# Patient Record
Sex: FEMALE | Race: BLACK OR AFRICAN AMERICAN | NOT HISPANIC OR LATINO | Employment: OTHER | URBAN - METROPOLITAN AREA
[De-identification: names, ages, dates, MRNs, and addresses within clinical notes are randomized per-mention and may not be internally consistent; named-entity substitution may affect disease eponyms.]

---

## 2017-01-05 RX ORDER — HYDROCHLOROTHIAZIDE 12.5 MG/1
CAPSULE ORAL
Qty: 30 CAPSULE | Refills: 1 | Status: SHIPPED | OUTPATIENT
Start: 2017-01-05 | End: 2017-08-31

## 2017-01-10 ENCOUNTER — CLINICAL SUPPORT (OUTPATIENT)
Dept: BARIATRICS | Facility: CLINIC | Age: 42
End: 2017-01-10
Payer: MEDICARE

## 2017-01-10 ENCOUNTER — OFFICE VISIT (OUTPATIENT)
Dept: CARDIOLOGY | Facility: CLINIC | Age: 42
End: 2017-01-10
Payer: MEDICARE

## 2017-01-10 ENCOUNTER — OFFICE VISIT (OUTPATIENT)
Dept: PSYCHIATRY | Facility: CLINIC | Age: 42
End: 2017-01-10
Payer: MEDICARE

## 2017-01-10 VITALS — BODY MASS INDEX: 43.14 KG/M2 | WEIGHT: 259.25 LBS

## 2017-01-10 VITALS
HEART RATE: 124 BPM | DIASTOLIC BLOOD PRESSURE: 106 MMHG | SYSTOLIC BLOOD PRESSURE: 137 MMHG | WEIGHT: 260.13 LBS | HEIGHT: 65 IN | BODY MASS INDEX: 43.34 KG/M2 | OXYGEN SATURATION: 98 %

## 2017-01-10 DIAGNOSIS — E66.01 MORBID OBESITY DUE TO EXCESS CALORIES: ICD-10-CM

## 2017-01-10 DIAGNOSIS — F41.1 GENERALIZED ANXIETY DISORDER: ICD-10-CM

## 2017-01-10 DIAGNOSIS — I10 ESSENTIAL HYPERTENSION: Chronic | ICD-10-CM

## 2017-01-10 DIAGNOSIS — E66.01 MORBID OBESITY WITH BMI OF 40.0-44.9, ADULT: ICD-10-CM

## 2017-01-10 DIAGNOSIS — R00.0 TACHYCARDIA: Primary | ICD-10-CM

## 2017-01-10 DIAGNOSIS — F39 MOOD DISORDER: ICD-10-CM

## 2017-01-10 DIAGNOSIS — Z71.3 DIETARY COUNSELING AND SURVEILLANCE: ICD-10-CM

## 2017-01-10 DIAGNOSIS — Z01.818 PREOPERATIVE EVALUATION TO RULE OUT SURGICAL CONTRAINDICATION: Primary | ICD-10-CM

## 2017-01-10 DIAGNOSIS — I10 UNSPECIFIED ESSENTIAL HYPERTENSION: ICD-10-CM

## 2017-01-10 DIAGNOSIS — Z95.818 STATUS POST PLACEMENT OF IMPLANTABLE LOOP RECORDER: Chronic | ICD-10-CM

## 2017-01-10 DIAGNOSIS — I10 ESSENTIAL HYPERTENSION: ICD-10-CM

## 2017-01-10 DIAGNOSIS — Z98.84 S/P BARIATRIC SURGERY: ICD-10-CM

## 2017-01-10 PROCEDURE — 96102 PR PSYCHOLOGIC TESTING BY TECHNICIAN: CPT | Mod: 59,S$GLB,, | Performed by: PSYCHOLOGIST

## 2017-01-10 PROCEDURE — 96101 PR PSYCHOLOGIC TESTING BY PSYCH/PHYS: CPT | Mod: S$GLB,,, | Performed by: PSYCHOLOGIST

## 2017-01-10 PROCEDURE — 3080F DIAST BP >= 90 MM HG: CPT | Mod: GC,S$GLB,, | Performed by: INTERNAL MEDICINE

## 2017-01-10 PROCEDURE — 3075F SYST BP GE 130 - 139MM HG: CPT | Mod: GC,S$GLB,, | Performed by: INTERNAL MEDICINE

## 2017-01-10 PROCEDURE — 99999 PR PBB SHADOW E&M-EST. PATIENT-LVL I: CPT | Mod: PBBFAC,,, | Performed by: DIETITIAN, REGISTERED

## 2017-01-10 PROCEDURE — 99214 OFFICE O/P EST MOD 30 MIN: CPT | Mod: GC,S$GLB,, | Performed by: INTERNAL MEDICINE

## 2017-01-10 PROCEDURE — 97803 MED NUTRITION INDIV SUBSEQ: CPT | Mod: S$GLB,,, | Performed by: DIETITIAN, REGISTERED

## 2017-01-10 PROCEDURE — 99499 UNLISTED E&M SERVICE: CPT | Mod: S$GLB,,, | Performed by: DIETITIAN, REGISTERED

## 2017-01-10 PROCEDURE — 99999 PR PBB SHADOW E&M-EST. PATIENT-LVL IV: CPT | Mod: PBBFAC,GC,, | Performed by: INTERNAL MEDICINE

## 2017-01-10 PROCEDURE — 1159F MED LIST DOCD IN RCRD: CPT | Mod: GC,S$GLB,, | Performed by: INTERNAL MEDICINE

## 2017-01-10 PROCEDURE — 90791 PSYCH DIAGNOSTIC EVALUATION: CPT | Mod: S$GLB,,, | Performed by: PSYCHOLOGIST
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
## 2017-01-10 NOTE — PROGRESS NOTES
Subjective:    Patient ID:  Homar Jerry is a 41 y.o. female who presents for evaluation of Hypertension; Tachycardia; Dizziness; Shortness of Breath; and Chest Pain (sharp pain )    HPI Ms. Jerry is a 41 y.o. Lady with morbid obesity BMI 44, syncope, hypertension, Hypothyroidism, bipolar d/o, fibromyalgia, gastroparesis, diabetes mellitus, s/p gastric sleeve, and tachycardia who presents for follow up after recently having been admitted to the hospital for tachycardia.    She reports progressive shortness of breath over the past 1-2 years, worse with exertion to where she can only walk around in [employer] for 20 min, 4 pillow orthopnea, occasional chest pain both at rest and with exertion - sharp.  All symptoms have been present for at least the past year without relief.      She presented to hospital in December 2016 with some of these symptoms - was found to have tachycardia with rates to 150s.  She was given adenosine - EP consulted and reviewed strips - and ultimately felt this was sinus tachycardia and recommended prior team work up sinus tachycardia.  TSH near normal and FT4 also normal.  Labs not suggestive of severe hypovolumia, CT PE negative for PE. It was felt there was a component of anxiety. She was already on coreg 25 BID.    She presents today with symptoms as above and continues to have fast heart rate - HR in 120s here.  She reports she has had elevated rates for years however EKG review confirms rates in 100s only since December.  No other medication changes.  No fever, no new pain.    She also a remote history of syncope - has not had any syncope since - however has seen Dr. KIARA Johns and had ILR which interrogations have thusfar failed to reveal any pathology.  Of note most recent interrogation showing normal sinus rate was prior to most recent admission this past month.    Review of Systems   Constitution: Positive for weakness. Negative for chills, diaphoresis and fever.   HENT:  "Negative.    Eyes: Negative.    Cardiovascular: Positive for chest pain, dyspnea on exertion, irregular heartbeat, orthopnea and palpitations. Negative for leg swelling, paroxysmal nocturnal dyspnea and syncope.   Respiratory: Positive for shortness of breath.    Endocrine: Negative.    Skin: Negative.    Musculoskeletal: Positive for arthritis and joint pain.   Gastrointestinal: Negative.    Genitourinary: Negative.    Psychiatric/Behavioral: Negative.         Objective:    Physical Exam   Constitutional: She is oriented to person, place, and time. She appears well-developed and well-nourished. No distress.   HENT:   Head: Normocephalic and atraumatic.   Mouth/Throat: No oropharyngeal exudate.   Eyes: No scleral icterus.   Neck: Normal range of motion. Neck supple. No JVD present.   Cardiovascular: Regular rhythm.  Exam reveals no gallop and no friction rub.    No murmur heard.  Tachycardia, S4 gallop   Pulmonary/Chest: Effort normal and breath sounds normal. No respiratory distress. She has no wheezes.   Abdominal: Soft. Bowel sounds are normal. She exhibits no distension. There is no tenderness.   Musculoskeletal: Normal range of motion. She exhibits no edema.   Neurological: She is alert and oriented to person, place, and time. No cranial nerve deficit.   Skin: Skin is warm and dry. She is not diaphoretic. No erythema.   Psychiatric: She has a normal mood and affect.     Visit Vitals    BP (!) 137/106 (BP Location: Left arm, Patient Position: Sitting, BP Method: Automatic)    Pulse (!) 124    Ht 5' 5" (1.651 m)    Wt 118 kg (260 lb 2.3 oz)    LMP  (LMP Unknown)    SpO2 98%    BMI 43.29 kg/m2         Assessment:       1. Tachycardia    2. Morbid obesity with BMI of 40.0-44.9, adult    3. Status post placement of implantable loop recorder    4. Essential hypertension         Plan:       Ms. Jerry is a 41 y.o. Lady with morbid obesity BMI 44, syncope, hypertension, Hypothyroidism, bipolar d/o, fibromyalgia, " gastroparesis, diabetes mellitus, s/p gastric sleeve, and tachycardia who presents for follow up after recently having been admitted to the hospital for tachycardia.    #Tachycardia - No evidence of Infection on CBC, no fever, TSH/FT4 close to normal, no PE on CTA, Labs without evidence of gross dehydration, cbc without anemia, infrequent albuterol use and denies any other stimulant. Obesity likely contributing however tachycardia appears to be out of proportion. Unclear exact etiology.  It has been~ 1 years since last TTE - will check TTE to ensure EF is maintained and no new cardiac pathology.  Of note P-wave axis is similar but not identical to baseline EKG.  -TTE  -Interrogate loop.  -Metanephrines for pheo  -Discuss with EP for corlanor if IST is felt to be most likely Dx.    #HTN - will re-evaluate after HR addressed depending upon the agent of choice.    Andre Dyer MD
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
## 2017-01-10 NOTE — Clinical Note
Cleared by nutrition. Sx not until April/May. Had psych hospital stay last April and needs to wait 12 mths.

## 2017-01-10 NOTE — MR AVS SNAPSHOT
[facility] - Cardiology  [address]  [facility] [address]  Phone: [phone]                  Homar Jerry   1/10/2017 3:00 PM   Office Visit    Description:  Female : 1975   Provider:  Andre Dyer MD   Department:  [facility] - Cardiology           Reason for Visit     Hypertension     Tachycardia     Dizziness     Shortness of Breath     Chest Pain           Diagnoses this Visit        Comments    Tachycardia    -  Primary            To Do List           Future Appointments        Provider Department Dept Phone    2017 8:00 AM HOME MONITOR DEVICE CHECK, [facility] - Arrhythmia [phone]    2017 1:45 PM ECHO, [facility] - Echo/Stress Lab [phone]      Goals (5 Years of Data)     None      [facility]     [facility] On Call Nurse Care Line -  Assistance  Registered nurses in the [facility] On Call Center provide clinical advisement, health education, appointment booking, and other advisory services.  Call for this free service at [phone].             Medications           Message regarding Medications     Verify the changes and/or additions to your medication regime listed below are the same as discussed with your clinician today.  If any of these changes or additions are incorrect, please notify your healthcare provider.             Verify that the below list of medications is an accurate representation of the medications you are currently taking.  If none reported, the list may be blank. If incorrect, please contact your healthcare provider. Carry this list with you in case of emergency.           Current Medications     albuterol 90 mcg/actuation inhaler Inhale 2 puffs into the lungs every 6 (six) hours as needed for Wheezing. Please provide cheapest brand/generic formulation    alprazolam (XANAX) 1 MG tablet Take 1 tablet (1 mg total) by mouth 2 (two) times daily.    blood-glucose meter kit Use as directed to check BG, three times  "daily. accucheck    carvedilol (COREG) 12.5 MG tablet Take 2 tablets (25 mg total) by mouth 2 (two) times daily.    fluticasone (FLONASE) 50 mcg/actuation nasal spray 2 sprays by Nasal route 2 (two) times daily.     gabapentin (NEURONTIN) 600 MG tablet 600 mg 3 (three) times daily.     hydrochlorothiazide (MICROZIDE) 12.5 mg capsule TAKE 1 CAPSULE BY MOUTH DAILY    levocetirizine (XYZAL) 5 MG tablet Take 1 tablet (5 mg total) by mouth every evening.    levothyroxine (SYNTHROID) 125 MCG tablet Take 1 tablet (125 mcg total) by mouth once daily.    meclizine (ANTIVERT) 25 mg tablet Take 1 tablet (25 mg total) by mouth 3 (three) times daily as needed for Dizziness or Nausea.    mirtazapine (REMERON) 15 MG tablet Take 1 tablet (15 mg total) by mouth every evening.    olanzapine (ZYPREXA) 20 MG tablet Take 1 tablet (20 mg total) by mouth every evening.    oxcarbazepine (TRILEPTAL) 150 MG Tab 150 mg every evening.     oxycodone-acetaminophen (PERCOCET)  mg per tablet Take 1 tablet by mouth every 4 (four) hours as needed for Pain. Per Dr. Villa at [facility] [place], [place]    pantoprazole (PROTONIX) 40 MG tablet Take 1 tablet (40 mg total) by mouth 2 (two) times daily before meals.    promethazine (PHENERGAN) 12.5 MG Tab Take 1 tablet (12.5 mg total) by mouth every 6 (six) hours as needed.    quetiapine (SEROQUEL) 100 MG Tab 100 mg once daily.     quetiapine (SEROQUEL) 200 MG Tab     tizanidine (ZANAFLEX) 4 MG tablet TAKE 3 TABLETS (12 MG TOTAL) BY MOUTH EVERY EVENING.    topiramate (TOPAMAX) 25 MG tablet Take 1 tablet (25 mg total) by mouth daily as needed (migraines).           Clinical Reference Information           Vital Signs - Last Recorded  Most recent update: 1/10/2017  3:06 PM by Pauline Lopez MA    BP Pulse Ht Wt LMP SpO2    (!) 137/106 (BP Location: Left arm, Patient Position: Sitting, BP Method: Automatic) (!) 124 5' 5" (1.651 m) 118 kg (260 lb 2.3 oz) (LMP Unknown) 98%    BMI             "    43.29 kg/m2          Blood Pressure          Most Recent Value    Right Arm BP - Sitting  132/88    Left Arm BP - Sitting  137/106    BP  (!)  137/106      Allergies as of 1/10/2017     Dexamethasone    Ciprofloxacin    Compazine [Prochlorperazine]    Lyrica [Pregabalin]    Prednisone      Immunizations Administered on Date of Encounter - 1/10/2017     None      Orders Placed During Today's Visit     Future Labs/Procedures Expected by Expires    2D Echo w/ Color Flow Doppler  As directed 1/10/2018
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
## 2017-01-11 ENCOUNTER — DOCUMENTATION ONLY (OUTPATIENT)
Dept: BARIATRICS | Facility: CLINIC | Age: 42
End: 2017-01-11

## 2017-01-11 NOTE — PROGRESS NOTES
I have personally seen and examined the patient. All labs and studies were reviewed. I agree with the fellows findings and plan as above.

## 2017-01-12 ENCOUNTER — DOCUMENTATION ONLY (OUTPATIENT)
Dept: BARIATRICS | Facility: CLINIC | Age: 42
End: 2017-01-12

## 2017-01-13 RX ORDER — CARVEDILOL 12.5 MG/1
25 TABLET ORAL 2 TIMES DAILY
Qty: 120 TABLET | Refills: 11 | Status: SHIPPED | OUTPATIENT
Start: 2017-01-13 | End: 2017-10-24 | Stop reason: SDUPTHER

## 2017-01-13 NOTE — TELEPHONE ENCOUNTER
----- Message from Luna Rao sent at 1/13/2017 11:02 AM CST -----  Contact: Self/[phone]  RX request - refill or new RX.  Is this a refill or new RX:  Refill   RX name and strength: carvedilol (COREG) 12.5 MG tablet  Directions: Take 2 tablets (25 mg total) by mouth 2 (two) times daily  Is this a 30 day or 90 day RX:    Pharmacy name and phone #: [facility] [address] , [phone]   Comments:  Please call and advise      Thank you

## 2017-01-17 ENCOUNTER — HOSPITAL ENCOUNTER (INPATIENT)
Facility: HOSPITAL | Age: 42
LOS: 6 days | Discharge: HOME | DRG: 638 | End: 2017-01-23
Attending: EMERGENCY MEDICINE | Admitting: INTERNAL MEDICINE
Payer: MEDICARE

## 2017-01-17 ENCOUNTER — OFFICE VISIT (OUTPATIENT)
Dept: INTERNAL MEDICINE | Facility: CLINIC | Age: 42
DRG: 638 | End: 2017-01-17
Payer: MEDICARE

## 2017-01-17 VITALS
HEIGHT: 65 IN | SYSTOLIC BLOOD PRESSURE: 135 MMHG | BODY MASS INDEX: 42.06 KG/M2 | TEMPERATURE: 98 F | OXYGEN SATURATION: 96 % | WEIGHT: 252.44 LBS | DIASTOLIC BLOOD PRESSURE: 75 MMHG | HEART RATE: 144 BPM

## 2017-01-17 DIAGNOSIS — F11.20 CHRONIC NARCOTIC DEPENDENCE: ICD-10-CM

## 2017-01-17 DIAGNOSIS — C73 THYROID CANCER: ICD-10-CM

## 2017-01-17 DIAGNOSIS — E13.65 OTHER SPECIFIED DIABETES MELLITUS WITH HYPERGLYCEMIA, UNSPECIFIED LONG TERM INSULIN USE STATUS: ICD-10-CM

## 2017-01-17 DIAGNOSIS — E89.0 HYPOTHYROIDISM, POSTSURGICAL: Chronic | ICD-10-CM

## 2017-01-17 DIAGNOSIS — E11.65 TYPE 2 DIABETES MELLITUS WITH HYPERGLYCEMIA, WITHOUT LONG-TERM CURRENT USE OF INSULIN: ICD-10-CM

## 2017-01-17 DIAGNOSIS — E11.10 DIABETIC KETOACIDOSIS WITHOUT COMA ASSOCIATED WITH TYPE 2 DIABETES MELLITUS: Primary | ICD-10-CM

## 2017-01-17 DIAGNOSIS — E11.9 DIABETES MELLITUS, NEW ONSET: ICD-10-CM

## 2017-01-17 DIAGNOSIS — R00.0 TACHYCARDIA: ICD-10-CM

## 2017-01-17 DIAGNOSIS — M06.9 RHEUMATOID ARTHRITIS, INVOLVING UNSPECIFIED SITE, UNSPECIFIED RHEUMATOID FACTOR PRESENCE: ICD-10-CM

## 2017-01-17 DIAGNOSIS — R73.09 SUGAR BLOOD LEVEL INCREASED: Primary | ICD-10-CM

## 2017-01-17 DIAGNOSIS — R73.9 HYPERGLYCEMIA: ICD-10-CM

## 2017-01-17 DIAGNOSIS — R42 DIZZINESS: ICD-10-CM

## 2017-01-17 DIAGNOSIS — E87.6 HYPOKALEMIA: ICD-10-CM

## 2017-01-17 LAB
ALBUMIN SERPL BCP-MCNC: 4.2 G/DL
ALLENS TEST: ABNORMAL
ALP SERPL-CCNC: 156 U/L
ALT SERPL W/O P-5'-P-CCNC: 9 U/L
ANION GAP SERPL CALC-SCNC: 22 MMOL/L
AST SERPL-CCNC: 9 U/L
B-OH-BUTYR BLD STRIP-SCNC: 5 MMOL/L
BACTERIA #/AREA URNS AUTO: ABNORMAL /HPF
BASOPHILS # BLD AUTO: 0.01 K/UL
BASOPHILS NFR BLD: 0.1 %
BILIRUB SERPL-MCNC: 0.3 MG/DL
BILIRUB UR QL STRIP: NEGATIVE
BUN SERPL-MCNC: 10 MG/DL
CALCIUM SERPL-MCNC: 10 MG/DL
CHLORIDE SERPL-SCNC: 99 MMOL/L
CLARITY UR REFRACT.AUTO: CLEAR
CO2 SERPL-SCNC: 14 MMOL/L
COLOR UR AUTO: ABNORMAL
CREAT SERPL-MCNC: 1.4 MG/DL
DELSYS: ABNORMAL
DIFFERENTIAL METHOD: NORMAL
EOSINOPHIL # BLD AUTO: 0 K/UL
EOSINOPHIL NFR BLD: 0.3 %
ERYTHROCYTE [DISTWIDTH] IN BLOOD BY AUTOMATED COUNT: 13.6 %
ERYTHROCYTE [SEDIMENTATION RATE] IN BLOOD BY WESTERGREN METHOD: 18 MM/H
EST. GFR  (AFRICAN AMERICAN): 53.5 ML/MIN/1.73 M^2
EST. GFR  (NON AFRICAN AMERICAN): 46.4 ML/MIN/1.73 M^2
FIO2: 21
GLUCOSE SERPL-MCNC: 626 MG/DL
GLUCOSE SERPL-MCNC: >500 MG/DL (ref 70–110)
GLUCOSE UR QL STRIP: ABNORMAL
HCO3 UR-SCNC: 20.1 MMOL/L (ref 24–28)
HCT VFR BLD AUTO: 42.5 %
HCT VFR BLD CALC: 46 %PCV (ref 36–54)
HGB BLD-MCNC: 14.4 G/DL
HGB UR QL STRIP: NEGATIVE
KETONES UR QL STRIP: ABNORMAL
LEUKOCYTE ESTERASE UR QL STRIP: ABNORMAL
LYMPHOCYTES # BLD AUTO: 3.1 K/UL
LYMPHOCYTES NFR BLD: 38.5 %
MCH RBC QN AUTO: 29.1 PG
MCHC RBC AUTO-ENTMCNC: 33.9 %
MCV RBC AUTO: 86 FL
MICROSCOPIC COMMENT: ABNORMAL
MODE: ABNORMAL
MONOCYTES # BLD AUTO: 0.6 K/UL
MONOCYTES NFR BLD: 7.4 %
NEUTROPHILS # BLD AUTO: 4.2 K/UL
NEUTROPHILS NFR BLD: 53.6 %
NITRITE UR QL STRIP: NEGATIVE
PCO2 BLDA: 40.3 MMHG (ref 35–45)
PH SMN: 7.31 [PH] (ref 7.35–7.45)
PH UR STRIP: 5 [PH] (ref 5–8)
PLATELET # BLD AUTO: 299 K/UL
PMV BLD AUTO: 10.9 FL
PO2 BLDA: 36 MMHG (ref 40–60)
POC BE: -6 MMOL/L
POC IONIZED CALCIUM: 1.13 MMOL/L (ref 1.06–1.42)
POC SATURATED O2: 63 % (ref 95–100)
POC TCO2: 21 MMOL/L (ref 24–29)
POCT GLUCOSE: 370 MG/DL (ref 70–110)
POCT GLUCOSE: 407 MG/DL (ref 70–110)
POCT GLUCOSE: 410 MG/DL (ref 70–110)
POTASSIUM BLD-SCNC: 5.1 MMOL/L (ref 3.5–5.1)
POTASSIUM SERPL-SCNC: 4 MMOL/L
PROT SERPL-MCNC: 8.4 G/DL
PROT UR QL STRIP: ABNORMAL
RBC # BLD AUTO: 4.94 M/UL
RBC #/AREA URNS AUTO: 1 /HPF (ref 0–4)
SAMPLE: ABNORMAL
SITE: ABNORMAL
SODIUM BLD-SCNC: 137 MMOL/L (ref 136–145)
SODIUM SERPL-SCNC: 135 MMOL/L
SP GR UR STRIP: >=1.03 (ref 1–1.03)
SP02: 100
SQUAMOUS #/AREA URNS AUTO: 1 /HPF
URN SPEC COLLECT METH UR: ABNORMAL
UROBILINOGEN UR STRIP-ACNC: NEGATIVE EU/DL
WBC # BLD AUTO: 7.92 K/UL
WBC #/AREA URNS AUTO: 9 /HPF (ref 0–5)
YEAST UR QL AUTO: ABNORMAL

## 2017-01-17 PROCEDURE — 85014 HEMATOCRIT: CPT

## 2017-01-17 PROCEDURE — 3045F PR MOST RECENT HEMOGLOBIN A1C LEVEL 7.0-9.0%: CPT | Mod: S$GLB,,, | Performed by: INTERNAL MEDICINE

## 2017-01-17 PROCEDURE — 36592 COLLECT BLOOD FROM PICC: CPT

## 2017-01-17 PROCEDURE — 3060F POS MICROALBUMINURIA REV: CPT | Mod: S$GLB,,, | Performed by: INTERNAL MEDICINE

## 2017-01-17 PROCEDURE — 25000003 PHARM REV CODE 250: Performed by: EMERGENCY MEDICINE

## 2017-01-17 PROCEDURE — 93005 ELECTROCARDIOGRAM TRACING: CPT

## 2017-01-17 PROCEDURE — 84295 ASSAY OF SERUM SODIUM: CPT

## 2017-01-17 PROCEDURE — 96361 HYDRATE IV INFUSION ADD-ON: CPT

## 2017-01-17 PROCEDURE — 82803 BLOOD GASES ANY COMBINATION: CPT

## 2017-01-17 PROCEDURE — 3078F DIAST BP <80 MM HG: CPT | Mod: S$GLB,,, | Performed by: INTERNAL MEDICINE

## 2017-01-17 PROCEDURE — 83036 HEMOGLOBIN GLYCOSYLATED A1C: CPT

## 2017-01-17 PROCEDURE — 93010 ELECTROCARDIOGRAM REPORT: CPT | Mod: ,,, | Performed by: INTERNAL MEDICINE

## 2017-01-17 PROCEDURE — 99213 OFFICE O/P EST LOW 20 MIN: CPT | Mod: S$GLB,,, | Performed by: INTERNAL MEDICINE

## 2017-01-17 PROCEDURE — 99291 CRITICAL CARE FIRST HOUR: CPT | Mod: ,,, | Performed by: EMERGENCY MEDICINE

## 2017-01-17 PROCEDURE — 1159F MED LIST DOCD IN RCRD: CPT | Mod: S$GLB,,, | Performed by: INTERNAL MEDICINE

## 2017-01-17 PROCEDURE — 82962 GLUCOSE BLOOD TEST: CPT

## 2017-01-17 PROCEDURE — 80053 COMPREHEN METABOLIC PANEL: CPT

## 2017-01-17 PROCEDURE — 96366 THER/PROPH/DIAG IV INF ADDON: CPT

## 2017-01-17 PROCEDURE — 82948 REAGENT STRIP/BLOOD GLUCOSE: CPT | Mod: S$GLB,,, | Performed by: INTERNAL MEDICINE

## 2017-01-17 PROCEDURE — 96365 THER/PROPH/DIAG IV INF INIT: CPT

## 2017-01-17 PROCEDURE — 99499 UNLISTED E&M SERVICE: CPT | Mod: S$GLB,,, | Performed by: INTERNAL MEDICINE

## 2017-01-17 PROCEDURE — 82010 KETONE BODYS QUAN: CPT

## 2017-01-17 PROCEDURE — 94760 N-INVAS EAR/PLS OXIMETRY 1: CPT

## 2017-01-17 PROCEDURE — 82330 ASSAY OF CALCIUM: CPT

## 2017-01-17 PROCEDURE — 96367 TX/PROPH/DG ADDL SEQ IV INF: CPT

## 2017-01-17 PROCEDURE — 81001 URINALYSIS AUTO W/SCOPE: CPT

## 2017-01-17 PROCEDURE — 3075F SYST BP GE 130 - 139MM HG: CPT | Mod: S$GLB,,, | Performed by: INTERNAL MEDICINE

## 2017-01-17 PROCEDURE — 99291 CRITICAL CARE FIRST HOUR: CPT | Mod: 25

## 2017-01-17 PROCEDURE — 84132 ASSAY OF SERUM POTASSIUM: CPT

## 2017-01-17 PROCEDURE — 96375 TX/PRO/DX INJ NEW DRUG ADDON: CPT

## 2017-01-17 PROCEDURE — 99999 PR PBB SHADOW E&M-EST. PATIENT-LVL III: CPT | Mod: PBBFAC,,, | Performed by: INTERNAL MEDICINE

## 2017-01-17 PROCEDURE — 63600175 PHARM REV CODE 636 W HCPCS: Performed by: EMERGENCY MEDICINE

## 2017-01-17 PROCEDURE — 20600001 HC STEP DOWN PRIVATE ROOM

## 2017-01-17 PROCEDURE — 85025 COMPLETE CBC W/AUTO DIFF WBC: CPT

## 2017-01-17 RX ORDER — OXYCODONE AND ACETAMINOPHEN 10; 325 MG/1; MG/1
1 TABLET ORAL EVERY 6 HOURS PRN
Status: DISCONTINUED | OUTPATIENT
Start: 2017-01-18 | End: 2017-01-23 | Stop reason: HOSPADM

## 2017-01-17 RX ORDER — DEXTROSE MONOHYDRATE 100 MG/ML
1000 INJECTION, SOLUTION INTRAVENOUS
Status: DISCONTINUED | OUTPATIENT
Start: 2017-01-17 | End: 2017-01-18

## 2017-01-17 RX ORDER — ENOXAPARIN SODIUM 100 MG/ML
40 INJECTION SUBCUTANEOUS EVERY 24 HOURS
Status: DISCONTINUED | OUTPATIENT
Start: 2017-01-18 | End: 2017-01-23 | Stop reason: HOSPADM

## 2017-01-17 RX ORDER — MECLIZINE HYDROCHLORIDE 25 MG/1
25 TABLET ORAL 3 TIMES DAILY PRN
Status: DISCONTINUED | OUTPATIENT
Start: 2017-01-18 | End: 2017-01-19

## 2017-01-17 RX ORDER — PROMETHAZINE HYDROCHLORIDE 12.5 MG/1
12.5 TABLET ORAL EVERY 6 HOURS PRN
Status: DISCONTINUED | OUTPATIENT
Start: 2017-01-18 | End: 2017-01-19

## 2017-01-17 RX ORDER — PANTOPRAZOLE SODIUM 40 MG/1
40 TABLET, DELAYED RELEASE ORAL
Status: DISCONTINUED | OUTPATIENT
Start: 2017-01-18 | End: 2017-01-23 | Stop reason: HOSPADM

## 2017-01-17 RX ORDER — IBUPROFEN 200 MG
16 TABLET ORAL
Status: DISCONTINUED | OUTPATIENT
Start: 2017-01-18 | End: 2017-01-23 | Stop reason: HOSPADM

## 2017-01-17 RX ORDER — GLUCAGON 1 MG
1 KIT INJECTION
Status: DISCONTINUED | OUTPATIENT
Start: 2017-01-18 | End: 2017-01-23 | Stop reason: HOSPADM

## 2017-01-17 RX ORDER — LEVOTHYROXINE SODIUM 125 UG/1
125 TABLET ORAL DAILY
Status: DISCONTINUED | OUTPATIENT
Start: 2017-01-18 | End: 2017-01-18

## 2017-01-17 RX ORDER — TOPIRAMATE 25 MG/1
25 TABLET ORAL DAILY PRN
Status: DISCONTINUED | OUTPATIENT
Start: 2017-01-18 | End: 2017-01-23 | Stop reason: HOSPADM

## 2017-01-17 RX ORDER — MIRTAZAPINE 7.5 MG/1
15 TABLET, FILM COATED ORAL NIGHTLY
Status: DISCONTINUED | OUTPATIENT
Start: 2017-01-17 | End: 2017-01-23 | Stop reason: HOSPADM

## 2017-01-17 RX ORDER — GABAPENTIN 300 MG/1
600 CAPSULE ORAL 3 TIMES DAILY
Status: DISCONTINUED | OUTPATIENT
Start: 2017-01-17 | End: 2017-01-23 | Stop reason: HOSPADM

## 2017-01-17 RX ORDER — ALPRAZOLAM 1 MG/1
1 TABLET ORAL 2 TIMES DAILY
Status: DISCONTINUED | OUTPATIENT
Start: 2017-01-17 | End: 2017-01-23 | Stop reason: HOSPADM

## 2017-01-17 RX ORDER — CARVEDILOL 25 MG/1
25 TABLET ORAL 2 TIMES DAILY
Status: DISCONTINUED | OUTPATIENT
Start: 2017-01-17 | End: 2017-01-23 | Stop reason: HOSPADM

## 2017-01-17 RX ORDER — TIZANIDINE 4 MG/1
4 TABLET ORAL NIGHTLY
Status: DISCONTINUED | OUTPATIENT
Start: 2017-01-17 | End: 2017-01-22

## 2017-01-17 RX ORDER — HYDROCHLOROTHIAZIDE 12.5 MG/1
12.5 TABLET ORAL DAILY
Status: DISCONTINUED | OUTPATIENT
Start: 2017-01-18 | End: 2017-01-23 | Stop reason: HOSPADM

## 2017-01-17 RX ORDER — IBUPROFEN 200 MG
24 TABLET ORAL
Status: DISCONTINUED | OUTPATIENT
Start: 2017-01-18 | End: 2017-01-23 | Stop reason: HOSPADM

## 2017-01-17 RX ORDER — OXCARBAZEPINE 150 MG/1
150 TABLET, FILM COATED ORAL NIGHTLY
Status: DISCONTINUED | OUTPATIENT
Start: 2017-01-17 | End: 2017-01-23 | Stop reason: HOSPADM

## 2017-01-17 RX ORDER — OLANZAPINE 5 MG/1
20 TABLET ORAL NIGHTLY
Status: DISCONTINUED | OUTPATIENT
Start: 2017-01-17 | End: 2017-01-23 | Stop reason: HOSPADM

## 2017-01-17 RX ADMIN — INSULIN HUMAN 10 UNITS: 100 INJECTION, SOLUTION PARENTERAL at 08:01

## 2017-01-17 RX ADMIN — SODIUM CHLORIDE 10 UNITS/HR: 9 INJECTION, SOLUTION INTRAVENOUS at 08:01

## 2017-01-17 RX ADMIN — SODIUM CHLORIDE 1000 ML: 0.9 INJECTION, SOLUTION INTRAVENOUS at 07:01

## 2017-01-17 RX ADMIN — SODIUM CHLORIDE 10 UNITS/HR: 9 INJECTION, SOLUTION INTRAVENOUS at 09:01

## 2017-01-17 NOTE — IP AVS SNAPSHOT
[facility]  [address]  [facility] [address]  Phone: [phone]           Patient Discharge Instructions     Our goal is to set you up for success. This packet includes information on your condition, medications, and your home care. It will help you to care for yourself so you don't get sicker and need to go back to the hospital.     Please ask your nurse if you have any questions.        There are many details to remember when preparing to leave the hospital. Here is what you will need to do:    1. Take your medicine. If you are prescribed medications, review your Medication List in the following pages. You may have new medications to  at the pharmacy and others that you'll need to stop taking. Review the instructions for how and when to take your medications. Talk with your doctor or nurses if you are unsure of what to do.     2. Go to your follow-up appointments. Specific follow-up information is listed in the following pages. Your may be contacted by a transition nurse or clinical provider about future appointments. Be sure we have all of the phone numbers to reach you, if needed. Please contact your provider's office if you are unable to make an appointment.     3. Watch for warning signs. Your doctor or nurse will give you detailed warning signs to watch for and when to call for assistance. These instructions may also include educational information about your condition. If you experience any of warning signs to your health, call your doctor.               [facility]  Unless otherwise directed by your provider, please contact [facility], our nurse care line that is available for 24/7 assistance.     [phone] (toll-free)    Registered nurses in the [facility] provide clinical advisement, health education, appointment booking, and other advisory services.                    ** Verify the list of medication(s) below is accurate and up  to date. Carry this with you in case of emergency. If your medications have changed, please notify your healthcare provider.             Medication List      START taking these medications        Additional Info                      blood sugar diagnostic Strp   Quantity:  100 each   Refills:  2   Dose:  1 each    Instructions:  1 each by Misc.(Non-Drug; Combo Route) route 4 (four) times daily with meals and nightly.     Begin Date    AM    Noon    PM    Bedtime       blood-glucose meter kit   Quantity:  1 each   Refills:  0    Instructions:  Use as instructed     Begin Date    AM    Noon    PM    Bedtime       calcium carbonate 500 mg calcium (1,250 mg) tablet   Commonly known as:  OS-MARIAN   Refills:  0   Dose:  2 tablet    Instructions:  Take 2 tablets (1,000 mg total) by mouth once daily.     Begin Date    AM    Noon    PM    Bedtime       cholecalciferol (vitamin D3) 1,000 unit capsule   Refills:  0   Dose:  2000 Units    Instructions:  Take 2 capsules (2,000 Units total) by mouth once daily.     Begin Date    AM    Noon    PM    Bedtime       ergocalciferol 50,000 unit Cap   Commonly known as:  ERGOCALCIFEROL   Quantity:  5 capsule   Refills:  0    Last time this was given:  50,000 Units on 1/23/2017  8:58 AM   Instructions:  Take one capsule (50,000 units) daily for 5 days, then take over the counter 2000 units daily of Vitamin D.     Begin Date    AM    Noon    PM    Bedtime       insulin aspart 100 unit/mL Inpn pen   Commonly known as:  NovoLOG   Quantity:  1 Box   Refills:  2   Dose:  16 Units    Last time this was given:  16 Units on 1/23/2017  1:01 PM   Instructions:  Inject 16 Units into the skin 3 (three) times daily with meals.     Begin Date    AM    Noon    PM    Bedtime       insulin detemir 100 unit/mL (3 mL) Inpn pen   Commonly known as:  LEVEMIR FLEXTOUCH   Quantity:  1 Box   Refills:  1   Dose:  30 Units    Last time this was given:  33 Units on 1/23/2017  8:58 AM   Instructions:  Inject 30 Units  "into the skin 2 (two) times daily.     Begin Date    AM    Noon    PM    Bedtime       lancets Misc   Quantity:  100 each   Refills:  2   Dose:  1 each    Instructions:  1 each by Misc.(Non-Drug; Combo Route) route 4 (four) times daily with meals and nightly.     Begin Date    AM    Noon    PM    Bedtime       levothyroxine 150 MCG tablet   Commonly known as:  SYNTHROID   Quantity:  30 tablet   Refills:  3   Dose:  150 mcg    Last time this was given:  150 mcg on 1/23/2017  6:43 AM   Instructions:  Take 1 tablet (150 mcg total) by mouth once daily.     Begin Date    AM    Noon    PM    Bedtime       pen needle, diabetic 31 gauge x 5/16" Ndle   Quantity:  200 each   Refills:  2   Dose:  1 each    Instructions:  1 each by Misc.(Non-Drug; Combo Route) route 5 (five) times daily.     Begin Date    AM    Noon    PM    Bedtime         CHANGE how you take these medications        Additional Info                      levocetirizine 5 MG tablet   Commonly known as:  XYZAL   Quantity:  90 tablet   Refills:  3   Dose:  5 mg   What changed:    - when to take this  - reasons to take this    Instructions:  Take 1 tablet (5 mg total) by mouth every evening.     Begin Date    AM    Noon    PM    Bedtime       promethazine 12.5 MG Tab   Commonly known as:  PHENERGAN   Quantity:  15 tablet   Refills:  3   Dose:  12.5 mg   What changed:  reasons to take this    Last time this was given:  12.5 mg on 1/18/2017  1:42 AM   Instructions:  Take 1 tablet (12.5 mg total) by mouth every 6 (six) hours as needed.     Begin Date    AM    Noon    PM    Bedtime         CONTINUE taking these medications        Additional Info                      albuterol 90 mcg/actuation inhaler   Quantity:  18 g   Refills:  1   Dose:  2 puff    Instructions:  Inhale 2 puffs into the lungs every 6 (six) hours as needed for Wheezing. Please provide cheapest brand/generic formulation     Begin Date    AM    Noon    PM    Bedtime       alprazolam 1 MG tablet "   Commonly known as:  XANAX   Quantity:  60 tablet   Refills:  5   Dose:  1 mg    Last time this was given:  1 mg on 1/23/2017  8:58 AM   Instructions:  Take 1 tablet (1 mg total) by mouth 2 (two) times daily.     Begin Date    AM    Noon    PM    Bedtime       carvedilol 12.5 MG tablet   Commonly known as:  COREG   Quantity:  120 tablet   Refills:  11   Dose:  25 mg    Last time this was given:  25 mg on 1/23/2017  8:58 AM   Instructions:  Take 2 tablets (25 mg total) by mouth 2 (two) times daily.     Begin Date    AM    Noon    PM    Bedtime       fluticasone 50 mcg/actuation nasal spray   Commonly known as:  FLONASE   Refills:  0   Dose:  2 spray    Instructions:  2 sprays by Nasal route 2 (two) times daily.     Begin Date    AM    Noon    PM    Bedtime       gabapentin 600 MG tablet   Commonly known as:  NEURONTIN   Refills:  0   Dose:  600 mg    Instructions:  Take 600 mg by mouth 3 (three) times daily.     Begin Date    AM    Noon    PM    Bedtime       hydrochlorothiazide 12.5 mg capsule   Commonly known as:  MICROZIDE   Quantity:  30 capsule   Refills:  1    Instructions:  TAKE 1 CAPSULE BY MOUTH DAILY     Begin Date    AM    Noon    PM    Bedtime       meclizine 25 mg tablet   Commonly known as:  ANTIVERT   Quantity:  90 tablet   Refills:  0   Dose:  25 mg    Instructions:  Take 1 tablet (25 mg total) by mouth 3 (three) times daily as needed for Dizziness or Nausea.     Begin Date    AM    Noon    PM    Bedtime       mirtazapine 15 MG tablet   Commonly known as:  REMERON   Quantity:  30 tablet   Refills:  5   Dose:  15 mg    Last time this was given:  15 mg on 1/22/2017  9:45 PM   Instructions:  Take 1 tablet (15 mg total) by mouth every evening.     Begin Date    AM    Noon    PM    Bedtime       olanzapine 20 MG tablet   Commonly known as:  ZyPREXA   Quantity:  30 tablet   Refills:  5   Dose:  20 mg    Last time this was given:  20 mg on 1/22/2017  9:45 PM   Instructions:  Take 1 tablet (20 mg total) by  mouth every evening.     Begin Date    AM    Noon    PM    Bedtime       oxcarbazepine 150 MG Tab   Commonly known as:  TRILEPTAL   Refills:  0   Dose:  150 mg    Last time this was given:  150 mg on 1/22/2017  9:44 PM   Instructions:  Take 150 mg by mouth every evening.     Begin Date    AM    Noon    PM    Bedtime       oxycodone-acetaminophen  mg per tablet   Commonly known as:  PERCOCET   Refills:  0   Dose:  1 tablet    Last time this was given:  1 tablet on 1/23/2017 12:43 PM   Instructions:  Take 1 tablet by mouth every 4 (four) hours as needed for Pain. Per Dr. Villa at [facility] [place] [place]     Begin Date    AM    Noon    PM    Bedtime       pantoprazole 40 MG tablet   Commonly known as:  PROTONIX   Quantity:  60 tablet   Refills:  3   Dose:  40 mg    Last time this was given:  40 mg on 1/23/2017  6:43 AM   Instructions:  Take 1 tablet (40 mg total) by mouth 2 (two) times daily before meals.     Begin Date    AM    Noon    PM    Bedtime       * quetiapine 100 MG Tab   Commonly known as:  SEROQUEL   Refills:  0   Dose:  100 mg    Last time this was given:  500 mg on 1/22/2017  9:46 PM   Instructions:  Take 100 mg by mouth every evening. with a 400 mg tablet to equal 500 mg total     Begin Date    AM    Noon    PM    Bedtime       * quetiapine 200 MG Tab   Commonly known as:  SEROQUEL   Refills:  0   Dose:  400 mg    Last time this was given:  500 mg on 1/22/2017  9:46 PM   Instructions:  Take 400 mg by mouth every evening. with a 100 mg tablet to equal 500 mg total     Begin Date    AM    Noon    PM    Bedtime       tizanidine 4 MG tablet   Commonly known as:  ZANAFLEX   Quantity:  90 tablet   Refills:  2    Last time this was given:  12 mg on 1/22/2017  9:45 PM   Instructions:  TAKE 3 TABLETS (12 MG TOTAL) BY MOUTH EVERY EVENING.     Begin Date    AM    Noon    PM    Bedtime       topiramate 25 MG tablet   Commonly known as:  TOPAMAX   Quantity:  90 tablet   Refills:  3   Dose:  25 mg  "  Indications:  Migraine Prevention    Instructions:  Take 1 tablet (25 mg total) by mouth daily as needed (migraines).     Begin Date    AM    Noon    PM    Bedtime       * Notice:  This list has 2 medication(s) that are the same as other medications prescribed for you. Read the directions carefully, and ask your doctor or other care provider to review them with you.         Where to Get Your Medications      These medications were sent to [facility] - [place], [place] - [address] [facility]  [address] [facility] [address]     Phone:  [phone]     blood sugar diagnostic Strp    blood-glucose meter kit    ergocalciferol 50,000 unit Cap    insulin aspart 100 unit/mL Inpn pen    insulin detemir 100 unit/mL (3 mL) Inpn pen    lancets Misc    levothyroxine 150 MCG tablet    pen needle, diabetic 31 gauge x 5/16" Ndle         You can get these medications from any pharmacy     You don't need a prescription for these medications     calcium carbonate 500 mg calcium (1,250 mg) tablet    cholecalciferol (vitamin D3) 1,000 unit capsule                  Please bring to all follow up appointments:    1. A copy of your discharge instructions.  2. All medicines you are currently taking in their original bottles.  3. Identification and insurance card.    Please arrive 15 minutes ahead of scheduled appointment time.    Please call 24 hours in advance if you must reschedule your appointment and/or time.        Your Scheduled Appointments     Jan 25, 2017  1:45 PM [facility]   Color Flow Doppler Echo with ECHO, [facility] - Echo/Stress Lab ([facility] )    [address]  [place] [address] [phone]            Jan 27, 2017  1:00 PM CST   Hospital Follow Up with ELMO Kendrick clive - [facility] ([facility])    [address]  [place] [address] [phone]              Follow-up Information     Call " "Matti Girard - Endo/Diab/Metab.    Specialty:  Endocrinology    Contact information:    [address]  [facility] [address] [phone]    Additional information:    9th Floor        Schedule an appointment as soon as possible for a visit with Mary Cabrera MD.    Specialty:  Internal Medicine    Contact information:    [address]  [facility] [address]  [phone]          Discharge Instructions     Future Orders    Call MD for:  difficulty breathing or increased cough     Call MD for:  increased confusion or weakness     Call MD for:  persistent dizziness, light-headedness, or visual disturbances     Call MD for:  persistent nausea and vomiting or diarrhea     Diet general     Questions:    Total calories:      Fat restriction, if any:      Protein restriction, if any:      Na restriction, if any:      Fluid restriction:      Additional restrictions:          Discharge Instructions       **MODERATE CORRECTION DOSE**   Blood Glucose   mg/dL                  Pre-meal                Bedtime   151-200                2 units                    1 unit   201-250                4 units                    2 units    251-300                6 units                    3 units    301-350                8 units                    4 units    >350                     10 units                  5 units   **CALL MD for BG >350**   Administer subcutaneously if needed at times designated by monitoring schedule.    DO NOT HOLD correction dose insulin for patients who are temporarily NPO.   "HIGH ALERT MEDICATION" - Administer with meals or TF/TPN.     - We have started you on insulin at home to control your blood sugars and prevent another episode diabetic ketoacidosis. Please inject 33 units of DETEMIR in the MORNING and 30 units in the EVENING and 16 units of ASPART with meals along with additional moderate dose correct (see above) with the meals of ASPART. Please be consistent with checking your blood " "glucose.    - Also, your thyroid levels are low and we are increasing your thyroid medication to 150 mcg daily.    - Your calcium values are low as well. We have prescribed for you 5 days of 50,000 unit capsules of Vitamin D. Please take one capsule (50,000 units) daily for additional 3 days, then take over the counter Vitamin D 2000 units daily.  Also take 1200 or 1250 mg of calcium daily as well. You can purchase these supplements over the counter.     - You will follow-up in the next week with endocrine clinic. An appointment should be made for you, but please call the number provided if they have not called you by Monday.         Primary Diagnosis     Your primary diagnosis was:  Diabetic Ketoacidosis Without Coma Associated With Type 2 Diabetes Mellitus      Admission Information     Date & Time Provider Department CSN    1/17/2017  6:22 PM Amanda Wilson MD [facility] [id number]      Care Providers     Provider Role Specialty Primary office phone    Amanda Wilson MD Attending Provider Hospitalist [phone]    Amanda Wilson MD Team Attending  Hospitalist [phone]    Vinnie Thayer II, MD Consulting Physician  Endocrinology [phone]    Roger Menon MD Consulting Physician  Endocrinology [phone]    Natalie Ku MD Consulting Physician  Endocrinology [phone]    Ale Hernandez MD Consulting Physician  Endocrinology [phone]    Sarita Ivey MD (Inactive) Consulting Physician  Endocrinology [phone]    Lyudmila Gee MD Consulting Physician  Endocrinology [phone]      Your Vitals Were     BP Pulse Temp Resp Height Weight    122/79 78 97.7 °F (36.5 °C) (Oral) 20 5' 5" (1.651 m) 117.8 kg (259 lb 11.2 oz)    Last Period SpO2 BMI          (LMP Unknown) 94% 43.22 kg/m2        Recent Lab Values        6/30/2015 9/27/2015 1/24/2016 1/25/2016 12/1/2016 1/17/2017           10:45 AM  2:53 PM  5:37 PM  7:00 AM  4:29 AM  6:48 PM  "     A1C 6.4 (H) 6.4 (H) 6.2 6.1 8.0 (H) 12.7 (H)      Comment for A1C at  4:29 AM on 12/1/2016:  According to ADA guidelines, hemoglobin A1C <7.0% represents  optimal control in non-pregnant diabetic patients.  Different  metrics may apply to specific populations.   Standards of Medical Care in Diabetes - 2016.  For the purpose of screening for the presence of diabetes:  <5.7%     Consistent with the absence of diabetes  5.7-6.4%  Consistent with increasing risk for diabetes   (prediabetes)  >or=6.5%  Consistent with diabetes  Currently no consensus exists for use of hemoglobin A1C  for diagnosis of diabetes for children.      Comment for A1C at  6:48 PM on 1/17/2017:  According to ADA guidelines, hemoglobin A1C <7.0% represents  optimal control in non-pregnant diabetic patients.  Different  metrics may apply to specific populations.   Standards of Medical Care in Diabetes - 2016.  For the purpose of screening for the presence of diabetes:  <5.7%     Consistent with the absence of diabetes  5.7-6.4%  Consistent with increasing risk for diabetes   (prediabetes)  >or=6.5%  Consistent with diabetes  Currently no consensus exists for use of hemoglobin A1C  for diagnosis of diabetes for children.        Allergies as of 1/23/2017        Reactions    Dexamethasone Hives, Shortness Of Breath    Per pt, only steroid she is allergic to is dexamethasone    Ciprofloxacin     Counteracts with seroquel, xanax, tizanidine    Compazine [Prochlorperazine] Hives, Itching    Lyrica [Pregabalin]     depression    Prednisone     Gastroparesis flare up      Advance Directives     An advance directive is a document which, in the event you are no longer able to make decisions for yourself, tells your healthcare team what kind of treatment you do or do not want to receive, or who you would like to make those decisions for you.  If you do not currently have an advance directive, [facility] encourages you to create one.  For more information  call:  [phone]), [phone] ([phone]),  or log on to [email or web address].        Language Assistance Services     ATTENTION: Language assistance services are available, free of charge. Please call [phone].      ATENCIÓN: Si habla español, tiene a gibson disposición servicios gratuitos de asistencia lingüística. Llame al [phone].     CHÚ Ý: N?u b?n nói Ti?ng Vi?t, có các d?ch v? h? tr? ngôn ng? mi?n phí dành cho b?n. G?i s? [phone].        Pneumonmia Discharge Instructions                Diabetes Discharge Instructions                                    [facility] complies with applicable Federal civil rights laws and does not discriminate on the basis of race, color, national origin, age, disability, or sex.

## 2017-01-17 NOTE — PROGRESS NOTES
"Subjective:       Patient ID: Homar Jerry is a 41 y.o. female.    Chief Complaint: Hyperglycemia; Cough; Sinus Problem; and Dizziness    HPI   42 yo F here for urgent visit for elevated blood sugar also with cough, sinus congestion, and dizziness.     POCT glucose is >500.   Pulse is 137.     Seen by cardiology for tachycardia. Unclear etiology. TTE ordered. Loop monitor. Metanephrines.     Last a1c was 8.0% on 12/1/16. Prior to that was 6.1% in Jan 2016. Not currently on any medications.     Recent diagnosis of diabetes.   BG has been running high - 575, 475, 500.   No recent steroids.   Frequent urination, feels sore in vaginal area due to frequent urination.   Review of Systems   Constitutional: Positive for fatigue.   HENT: Positive for sinus pressure.    Respiratory: Negative for shortness of breath.    Genitourinary: Positive for frequency.   Neurological: Positive for dizziness.       Objective:     Visit Vitals    /75    Pulse (!) 144    Temp 98.4 °F (36.9 °C)    Ht 5' 5" (1.651 m)    Wt 114.5 kg (252 lb 6.8 oz)    LMP  (LMP Unknown)    SpO2 96%    BMI 42.01 kg/m2        Physical Exam   Constitutional: She is oriented to person, place, and time. She appears well-developed and well-nourished. No distress.   Fatigued appearing   HENT:   Head: Normocephalic and atraumatic.   Cardiovascular: Normal rate.    Tachycardic - 144   Pulmonary/Chest: Effort normal. No respiratory distress.   Neurological: She is alert and oriented to person, place, and time.   Skin: Skin is warm and dry. She is not diaphoretic.   Psychiatric: She has a normal mood and affect. Her behavior is normal.       Assessment:       1. Sugar blood level increased    2. Diabetes mellitus, new onset    3. Type 2 diabetes mellitus with hyperglycemia, without long-term current use of insulin    4. Tachycardia    5. Dizziness        Plan:       Homar was seen today for hyperglycemia, cough, sinus problem and " dizziness.    Diagnoses and all orders for this visit:    Sugar blood level increased  Diabetes mellitus, new onset with severe hyperglycemia with tachycardia and dizziness   -     POCT glucose over 500  -     Refer to Emergency Dept.   Need to rule out DKA, infection. Needs insulin. Possibly will need diabetic education for insulin if secondary cause not identified. I am concerned by the sudden increase in blood sugars for secondary cause.   She was previously prediabetic with a1c 6.1% last checked Jan 2016. Other provider checked a1c 12/1/16 and increased to 8%.

## 2017-01-17 NOTE — MR AVS SNAPSHOT
[facility] - Internal Medicine  [address]  [facility] [address]  Phone: [phone]  Fax: [phone]                  Homar Jerry   2017 4:00 PM   Office Visit    Description:  Female : 1975   Provider:  Mary Cabrera MD   Department:  [facility] - Internal Medicine           Reason for Visit     Hyperglycemia     Cough     Sinus Problem     Dizziness           Diagnoses this Visit        Comments    Sugar blood level increased    -  Primary     Diabetes mellitus, new onset         Type 2 diabetes mellitus with hyperglycemia, without long-term current use of insulin         Tachycardia         Dizziness                To Do List           Future Appointments        Provider Department Dept Phone    2017 8:00 AM [facility] [phone]    2017 1:45 PM ECHO, [facility] [address] - Echo/Stress Lab [phone]      Goals (5 Years of Data)     None      [facility]     [facility] Nurse Care Line -  Assistance  Registered nurses in the [facility] provide clinical advisement, health education, appointment booking, and other advisory services.  Call for this free service at [phone].             Medications           Message regarding Medications     Verify the changes and/or additions to your medication regime listed below are the same as discussed with your clinician today.  If any of these changes or additions are incorrect, please notify your healthcare provider.             Verify that the below list of medications is an accurate representation of the medications you are currently taking.  If none reported, the list may be blank. If incorrect, please contact your healthcare provider. Carry this list with you in case of emergency.           Current Medications     albuterol 90 mcg/actuation inhaler Inhale 2 puffs into the lungs every 6 (six) hours as needed for Wheezing. Please provide  cheapest brand/generic formulation    alprazolam (XANAX) 1 MG tablet Take 1 tablet (1 mg total) by mouth 2 (two) times daily.    blood-glucose meter kit Use as directed to check BG, three times daily. accucheck    carvedilol (COREG) 12.5 MG tablet Take 2 tablets (25 mg total) by mouth 2 (two) times daily.    fluticasone (FLONASE) 50 mcg/actuation nasal spray 2 sprays by Nasal route 2 (two) times daily.     gabapentin (NEURONTIN) 600 MG tablet 600 mg 3 (three) times daily.     hydrochlorothiazide (MICROZIDE) 12.5 mg capsule TAKE 1 CAPSULE BY MOUTH DAILY    levocetirizine (XYZAL) 5 MG tablet Take 1 tablet (5 mg total) by mouth every evening.    levothyroxine (SYNTHROID) 125 MCG tablet Take 1 tablet (125 mcg total) by mouth once daily.    meclizine (ANTIVERT) 25 mg tablet Take 1 tablet (25 mg total) by mouth 3 (three) times daily as needed for Dizziness or Nausea.    mirtazapine (REMERON) 15 MG tablet Take 1 tablet (15 mg total) by mouth every evening.    olanzapine (ZYPREXA) 20 MG tablet Take 1 tablet (20 mg total) by mouth every evening.    oxcarbazepine (TRILEPTAL) 150 MG Tab 150 mg every evening.     oxycodone-acetaminophen (PERCOCET)  mg per tablet Take 1 tablet by mouth every 4 (four) hours as needed for Pain. Per Dr. Villa at [facility] [place] [place]    pantoprazole (PROTONIX) 40 MG tablet Take 1 tablet (40 mg total) by mouth 2 (two) times daily before meals.    promethazine (PHENERGAN) 12.5 MG Tab Take 1 tablet (12.5 mg total) by mouth every 6 (six) hours as needed.    quetiapine (SEROQUEL) 100 MG Tab 100 mg once daily.     quetiapine (SEROQUEL) 200 MG Tab     tizanidine (ZANAFLEX) 4 MG tablet TAKE 3 TABLETS (12 MG TOTAL) BY MOUTH EVERY EVENING.    topiramate (TOPAMAX) 25 MG tablet Take 1 tablet (25 mg total) by mouth daily as needed (migraines).           Clinical Reference Information           Vital Signs - Last Recorded  Most recent update: 1/17/2017  4:50 PM by Mary Cabrera MD     "BP Pulse Temp Ht Wt LMP    135/75 (!) 144 98.4 °F (36.9 °C) 5' 5" (1.651 m) 114.5 kg (252 lb 6.8 oz) (LMP Unknown)    SpO2 BMI             96% 42.01 kg/m2         Blood Pressure          Most Recent Value    BP  135/75      Allergies as of 1/17/2017     Dexamethasone    Ciprofloxacin    Compazine [Prochlorperazine]    Lyrica [Pregabalin]    Prednisone      Immunizations Administered on Date of Encounter - 1/17/2017     None      Orders Placed During Today's Visit      Normal Orders This Visit    POCT glucose     Refer to Emergency Dept.          1/17/2017  4:55 PM - Shona Roque MA      Component Results     Component Value Flag Ref Range Units Status    POC Glucose >500  70 - 110 mg/dL Final      "

## 2017-01-18 ENCOUNTER — CLINICAL SUPPORT (OUTPATIENT)
Dept: ELECTROPHYSIOLOGY | Facility: CLINIC | Age: 42
DRG: 638 | End: 2017-01-18
Attending: EMERGENCY MEDICINE
Payer: MEDICARE

## 2017-01-18 DIAGNOSIS — R55 SYNCOPE, UNSPECIFIED SYNCOPE TYPE: ICD-10-CM

## 2017-01-18 DIAGNOSIS — Z95.818 STATUS POST PLACEMENT OF IMPLANTABLE LOOP RECORDER: ICD-10-CM

## 2017-01-18 PROBLEM — R73.9 HYPERGLYCEMIA: Status: ACTIVE | Noted: 2017-01-18

## 2017-01-18 PROBLEM — E87.6 HYPOKALEMIA: Status: ACTIVE | Noted: 2017-01-18

## 2017-01-18 PROBLEM — R09.02 HYPOXIA: Status: ACTIVE | Noted: 2017-01-18

## 2017-01-18 PROBLEM — E11.9 DIABETES MELLITUS, NEW ONSET: Status: ACTIVE | Noted: 2017-01-18

## 2017-01-18 PROBLEM — E83.51 HYPOCALCEMIA: Status: ACTIVE | Noted: 2017-01-18

## 2017-01-18 PROBLEM — E66.01 MORBID OBESITY: Status: ACTIVE | Noted: 2017-01-18

## 2017-01-18 LAB
ANION GAP SERPL CALC-SCNC: 10 MMOL/L
ANION GAP SERPL CALC-SCNC: 11 MMOL/L
ANION GAP SERPL CALC-SCNC: 12 MMOL/L
ANION GAP SERPL CALC-SCNC: 13 MMOL/L
ANION GAP SERPL CALC-SCNC: 19 MMOL/L
BUN SERPL-MCNC: 10 MG/DL
BUN SERPL-MCNC: 11 MG/DL
BUN SERPL-MCNC: 9 MG/DL
CALCIUM SERPL-MCNC: 8.4 MG/DL
CALCIUM SERPL-MCNC: 8.6 MG/DL
CALCIUM SERPL-MCNC: 8.8 MG/DL
CALCIUM SERPL-MCNC: 9 MG/DL
CALCIUM SERPL-MCNC: 9.5 MG/DL
CHLORIDE SERPL-SCNC: 103 MMOL/L
CHLORIDE SERPL-SCNC: 104 MMOL/L
CHLORIDE SERPL-SCNC: 105 MMOL/L
CHLORIDE SERPL-SCNC: 107 MMOL/L
CHLORIDE SERPL-SCNC: 108 MMOL/L
CO2 SERPL-SCNC: 16 MMOL/L
CO2 SERPL-SCNC: 16 MMOL/L
CO2 SERPL-SCNC: 20 MMOL/L
CO2 SERPL-SCNC: 22 MMOL/L
CO2 SERPL-SCNC: 23 MMOL/L
CREAT SERPL-MCNC: 0.9 MG/DL
CREAT SERPL-MCNC: 0.9 MG/DL
CREAT SERPL-MCNC: 1 MG/DL
CREAT SERPL-MCNC: 1.1 MG/DL
CREAT SERPL-MCNC: 1.1 MG/DL
EST. GFR  (AFRICAN AMERICAN): >60 ML/MIN/1.73 M^2
EST. GFR  (NON AFRICAN AMERICAN): >60 ML/MIN/1.73 M^2
ESTIMATED AVG GLUCOSE: 318 MG/DL
GLUCOSE SERPL-MCNC: 266 MG/DL
GLUCOSE SERPL-MCNC: 295 MG/DL
GLUCOSE SERPL-MCNC: 339 MG/DL
GLUCOSE SERPL-MCNC: 387 MG/DL
GLUCOSE SERPL-MCNC: 397 MG/DL
HBA1C MFR BLD HPLC: 12.7 %
MAGNESIUM SERPL-MCNC: 1.9 MG/DL
MAGNESIUM SERPL-MCNC: 2 MG/DL
MAGNESIUM SERPL-MCNC: 2.1 MG/DL
MAGNESIUM SERPL-MCNC: 2.1 MG/DL
MAGNESIUM SERPL-MCNC: 2.2 MG/DL
PHOSPHATE SERPL-MCNC: 2.5 MG/DL
PHOSPHATE SERPL-MCNC: 2.6 MG/DL
PHOSPHATE SERPL-MCNC: 2.8 MG/DL
PHOSPHATE SERPL-MCNC: 2.9 MG/DL
PHOSPHATE SERPL-MCNC: 2.9 MG/DL
POCT GLUCOSE: 112 MG/DL (ref 70–110)
POCT GLUCOSE: 228 MG/DL (ref 70–110)
POCT GLUCOSE: 236 MG/DL (ref 70–110)
POCT GLUCOSE: 245 MG/DL (ref 70–110)
POCT GLUCOSE: 273 MG/DL (ref 70–110)
POCT GLUCOSE: 274 MG/DL (ref 70–110)
POCT GLUCOSE: 276 MG/DL (ref 70–110)
POCT GLUCOSE: 277 MG/DL (ref 70–110)
POCT GLUCOSE: 291 MG/DL (ref 70–110)
POCT GLUCOSE: 308 MG/DL (ref 70–110)
POCT GLUCOSE: 310 MG/DL (ref 70–110)
POCT GLUCOSE: 323 MG/DL (ref 70–110)
POCT GLUCOSE: 327 MG/DL (ref 70–110)
POCT GLUCOSE: 342 MG/DL (ref 70–110)
POCT GLUCOSE: 362 MG/DL (ref 70–110)
POTASSIUM SERPL-SCNC: 3.4 MMOL/L
POTASSIUM SERPL-SCNC: 3.6 MMOL/L
POTASSIUM SERPL-SCNC: 3.7 MMOL/L
POTASSIUM SERPL-SCNC: 3.9 MMOL/L
POTASSIUM SERPL-SCNC: 4.7 MMOL/L
SODIUM SERPL-SCNC: 136 MMOL/L
SODIUM SERPL-SCNC: 136 MMOL/L
SODIUM SERPL-SCNC: 139 MMOL/L

## 2017-01-18 PROCEDURE — 20600001 HC STEP DOWN PRIVATE ROOM

## 2017-01-18 PROCEDURE — 93299 LOOP RECORDER REMOTE: CPT | Mod: ,,, | Performed by: INTERNAL MEDICINE

## 2017-01-18 PROCEDURE — 36415 COLL VENOUS BLD VENIPUNCTURE: CPT

## 2017-01-18 PROCEDURE — 93299 LOOP RECORDER REMOTE: CPT

## 2017-01-18 PROCEDURE — C1751 CATH, INF, PER/CENT/MIDLINE: HCPCS

## 2017-01-18 PROCEDURE — 80048 BASIC METABOLIC PNL TOTAL CA: CPT | Mod: 91

## 2017-01-18 PROCEDURE — 86341 ISLET CELL ANTIBODY: CPT

## 2017-01-18 PROCEDURE — 84100 ASSAY OF PHOSPHORUS: CPT | Mod: 91

## 2017-01-18 PROCEDURE — 84681 ASSAY OF C-PEPTIDE: CPT

## 2017-01-18 PROCEDURE — 63600175 PHARM REV CODE 636 W HCPCS: Performed by: INTERNAL MEDICINE

## 2017-01-18 PROCEDURE — 80048 BASIC METABOLIC PNL TOTAL CA: CPT

## 2017-01-18 PROCEDURE — 93297 REM INTERROG DEV EVAL ICPMS: CPT | Mod: ,,, | Performed by: INTERNAL MEDICINE

## 2017-01-18 PROCEDURE — 25000003 PHARM REV CODE 250: Performed by: STUDENT IN AN ORGANIZED HEALTH CARE EDUCATION/TRAINING PROGRAM

## 2017-01-18 PROCEDURE — 25000003 PHARM REV CODE 250: Performed by: INTERNAL MEDICINE

## 2017-01-18 PROCEDURE — 83735 ASSAY OF MAGNESIUM: CPT | Mod: 91

## 2017-01-18 PROCEDURE — 99233 SBSQ HOSP IP/OBS HIGH 50: CPT | Mod: ,,, | Performed by: INTERNAL MEDICINE

## 2017-01-18 PROCEDURE — 36569 INSJ PICC 5 YR+ W/O IMAGING: CPT

## 2017-01-18 PROCEDURE — 63600175 PHARM REV CODE 636 W HCPCS: Performed by: STUDENT IN AN ORGANIZED HEALTH CARE EDUCATION/TRAINING PROGRAM

## 2017-01-18 PROCEDURE — 25000003 PHARM REV CODE 250: Performed by: EMERGENCY MEDICINE

## 2017-01-18 PROCEDURE — 63600175 PHARM REV CODE 636 W HCPCS: Performed by: EMERGENCY MEDICINE

## 2017-01-18 PROCEDURE — 95251 CONT GLUC MNTR ANALYSIS I&R: CPT | Mod: ,,, | Performed by: INTERNAL MEDICINE

## 2017-01-18 PROCEDURE — 76937 US GUIDE VASCULAR ACCESS: CPT

## 2017-01-18 PROCEDURE — 99223 1ST HOSP IP/OBS HIGH 75: CPT | Mod: GC,,, | Performed by: INTERNAL MEDICINE

## 2017-01-18 RX ORDER — POTASSIUM CHLORIDE 20 MEQ/15ML
60 SOLUTION ORAL ONCE
Status: COMPLETED | OUTPATIENT
Start: 2017-01-18 | End: 2017-01-18

## 2017-01-18 RX ORDER — INSULIN ASPART 100 [IU]/ML
0-10 INJECTION, SOLUTION INTRAVENOUS; SUBCUTANEOUS
Status: DISCONTINUED | OUTPATIENT
Start: 2017-01-18 | End: 2017-01-19

## 2017-01-18 RX ORDER — DEXTROSE MONOHYDRATE 100 MG/ML
1000 INJECTION, SOLUTION INTRAVENOUS
Status: DISCONTINUED | OUTPATIENT
Start: 2017-01-18 | End: 2017-01-23 | Stop reason: HOSPADM

## 2017-01-18 RX ADMIN — GABAPENTIN 600 MG: 300 CAPSULE ORAL at 02:01

## 2017-01-18 RX ADMIN — TIZANIDINE 4 MG: 4 TABLET ORAL at 01:01

## 2017-01-18 RX ADMIN — INSULIN ASPART 6 UNITS: 100 INJECTION, SOLUTION INTRAVENOUS; SUBCUTANEOUS at 06:01

## 2017-01-18 RX ADMIN — ALPRAZOLAM 1 MG: 1 TABLET ORAL at 09:01

## 2017-01-18 RX ADMIN — ENOXAPARIN SODIUM 40 MG: 100 INJECTION SUBCUTANEOUS at 12:01

## 2017-01-18 RX ADMIN — OXYCODONE HYDROCHLORIDE AND ACETAMINOPHEN 1 TABLET: 10; 325 TABLET ORAL at 01:01

## 2017-01-18 RX ADMIN — PROMETHAZINE HYDROCHLORIDE 12.5 MG: 12.5 TABLET ORAL at 01:01

## 2017-01-18 RX ADMIN — CARVEDILOL 25 MG: 25 TABLET, FILM COATED ORAL at 09:01

## 2017-01-18 RX ADMIN — TIZANIDINE 4 MG: 4 TABLET ORAL at 09:01

## 2017-01-18 RX ADMIN — OXCARBAZEPINE 150 MG: 150 TABLET ORAL at 01:01

## 2017-01-18 RX ADMIN — GABAPENTIN 600 MG: 300 CAPSULE ORAL at 01:01

## 2017-01-18 RX ADMIN — MIRTAZAPINE 15 MG: 7.5 TABLET, FILM COATED ORAL at 01:01

## 2017-01-18 RX ADMIN — OXYCODONE HYDROCHLORIDE AND ACETAMINOPHEN 1 TABLET: 10; 325 TABLET ORAL at 10:01

## 2017-01-18 RX ADMIN — OXYCODONE HYDROCHLORIDE AND ACETAMINOPHEN 1 TABLET: 10; 325 TABLET ORAL at 04:01

## 2017-01-18 RX ADMIN — ALPRAZOLAM 1 MG: 1 TABLET ORAL at 01:01

## 2017-01-18 RX ADMIN — OXYCODONE HYDROCHLORIDE AND ACETAMINOPHEN 1 TABLET: 10; 325 TABLET ORAL at 09:01

## 2017-01-18 RX ADMIN — QUETIAPINE FUMARATE 500 MG: 300 TABLET, FILM COATED ORAL at 09:01

## 2017-01-18 RX ADMIN — ALPRAZOLAM 1 MG: 1 TABLET ORAL at 08:01

## 2017-01-18 RX ADMIN — INSULIN ASPART 6 UNITS: 100 INJECTION, SOLUTION INTRAVENOUS; SUBCUTANEOUS at 10:01

## 2017-01-18 RX ADMIN — MIRTAZAPINE 15 MG: 7.5 TABLET, FILM COATED ORAL at 09:01

## 2017-01-18 RX ADMIN — OLANZAPINE 20 MG: 5 TABLET, FILM COATED ORAL at 01:01

## 2017-01-18 RX ADMIN — QUETIAPINE FUMARATE 500 MG: 300 TABLET, FILM COATED ORAL at 01:01

## 2017-01-18 RX ADMIN — LEVOTHYROXINE SODIUM 125 MCG: 125 TABLET ORAL at 08:01

## 2017-01-18 RX ADMIN — OLANZAPINE 20 MG: 5 TABLET, FILM COATED ORAL at 09:01

## 2017-01-18 RX ADMIN — POTASSIUM ACETATE: 3.93 INJECTION, SOLUTION, CONCENTRATE INTRAVENOUS at 01:01

## 2017-01-18 RX ADMIN — POTASSIUM ACETATE: 3.93 INJECTION, SOLUTION, CONCENTRATE INTRAVENOUS at 07:01

## 2017-01-18 RX ADMIN — HYDROCHLOROTHIAZIDE 12.5 MG: 12.5 TABLET ORAL at 08:01

## 2017-01-18 RX ADMIN — POTASSIUM CHLORIDE 60 MEQ: 20 SOLUTION ORAL at 01:01

## 2017-01-18 RX ADMIN — PANTOPRAZOLE SODIUM 40 MG: 40 TABLET, DELAYED RELEASE ORAL at 04:01

## 2017-01-18 RX ADMIN — GABAPENTIN 600 MG: 300 CAPSULE ORAL at 09:01

## 2017-01-18 RX ADMIN — CARVEDILOL 25 MG: 25 TABLET, FILM COATED ORAL at 08:01

## 2017-01-18 RX ADMIN — POTASSIUM ACETATE: 3.93 INJECTION, SOLUTION, CONCENTRATE INTRAVENOUS at 12:01

## 2017-01-18 RX ADMIN — CARVEDILOL 25 MG: 25 TABLET, FILM COATED ORAL at 01:01

## 2017-01-18 RX ADMIN — INSULIN ASPART 4 UNITS: 100 INJECTION, SOLUTION INTRAVENOUS; SUBCUTANEOUS at 11:01

## 2017-01-18 RX ADMIN — PROMETHAZINE HYDROCHLORIDE 6.25 MG: 25 INJECTION INTRAMUSCULAR; INTRAVENOUS at 06:01

## 2017-01-18 RX ADMIN — OXCARBAZEPINE 150 MG: 150 TABLET ORAL at 09:01

## 2017-01-18 NOTE — ASSESSMENT & PLAN NOTE
- continue insulin infusion per protocol  - continue BMP q4  - continue accucheck q1  - continue IV NS with 20 meq KCL at 150 cc/hr  - will replenish potassium PRN

## 2017-01-18 NOTE — ASSESSMENT & PLAN NOTE
Blood glucose goal 140-180  Will order c-peptide, CARMEN Ab  Plan for insulin transition gtt today    Discharge rec: will require basal/prandial insulin on discharge with glucometer, test strips, pen needles

## 2017-01-18 NOTE — PLAN OF CARE
Problem: Patient Care Overview  Goal: Plan of Care Review  Outcome: Ongoing (interventions implemented as appropriate)  Pt arrived to Rm 1032 AAOx4.  Pt is on insulin gtt at 3.0 units/h. NS with 20 mEq of K at 150 mL/h going.   Accuchecks q 1.  Pt complained of pain to the legs.  PRN pain medication given.  Pt on clear liquid diet.  Pt complained of nausea and had one episode of emesis throughout the night.  No falls or injuries to occur.  Call light within reach.

## 2017-01-18 NOTE — H&P
[facility] Hospital Medicine  History & Physical    Patient Name: Homar Jerry  MRN: [id number]  Admission Date: 1/17/2017  Attending Physician: Amanda Wilson MD   Primary Care Provider: Mary Cabrera MD    [facility] Medicine Team: [facility] HOSP MED 2 Isai Alcantara MD     Patient information was obtained from patient and ER records.     Subjective:     Principal Problem:Diabetic ketoacidosis without coma associated with type 2 diabetes mellitus    Chief Complaint:  DM and DKA     HPI: Homar Jerry is a 41 year old woman with a past medical history of gastroparesis s/p gastric pacemaker and sleeve gastrectomy, RA, chronic sinus tachycardia, HTN and obesity who presented to the ED from clinic with newly diagnosed diabetes after being found to be hyperglycemic to the 500s with elevated ion gap. States that for approximately the past 2 weeks she has had increasing thirst, urine output, blurred vision, headache. In ED found to have worsening hyperglycemia, with anion gap and acidosis. Was bolused with NS and started on insulin infusion with DKA protocol. Patient denies headache, chest pain, sob, abdominal discomfort, changes in bowel habits, blood in stool or urine, abnormal bruising, bleeding, neurological changes.     Past Medical History   Diagnosis Date    Anemia     Anxiety     Asthma      usually with cold weather    Back pain     Bipolar 1 disorder 3/17/2015    Cervical cancer 2009     DOT    Diabetes mellitus with hyperglycemia 1/17/2017    Fibrocystic breast     Fibromyalgia     Gastroparesis 2012     s/p sleeve to cover damaged nerves; s/p gastric pacemaker which did not help; due to nerve damage during surgery    Hypertension     Hypothyroidism, postsurgical     Morbid obesity     Prediabetes     Rheumatoid arthritis     Thyroid cancer 2003 & 2013     thyroidectomy    Urinary incontinence        Past Surgical History   Procedure Laterality Date     Hysterectomy  2009     total including cervix    Exploratory laparotomy due to complications of hysterectomy  2009     cervical cuff burst with air in abdomen    Ureteral sling      Multiple breast biopsies      Gastric pacemaker      Gastric sleeve      Ankle fracture surgery Right     Knee surgery  05/12/2016    Loop monitor  2016    Breast surgery      Knee arthroscopy w/ meniscal repair Left 2016    Fracture surgery      Tonsillectomy      Gastrectomy         Review of patient's allergies indicates:   Allergen Reactions    Dexamethasone Hives and Shortness Of Breath     Per pt, only steroid she is allergic to is dexamethasone    Ciprofloxacin      Counteracts with seroquel, xanax, tizanidine    Compazine [prochlorperazine] Hives and Itching    Lyrica [pregabalin]      depression    Prednisone      Gastroparesis flare up       No current facility-administered medications on file prior to encounter.      Current Outpatient Prescriptions on File Prior to Encounter   Medication Sig    albuterol 90 mcg/actuation inhaler Inhale 2 puffs into the lungs every 6 (six) hours as needed for Wheezing. Please provide cheapest brand/generic formulation    alprazolam (XANAX) 1 MG tablet Take 1 tablet (1 mg total) by mouth 2 (two) times daily.    blood-glucose meter kit Use as directed to check BG, three times daily. accucheck    carvedilol (COREG) 12.5 MG tablet Take 2 tablets (25 mg total) by mouth 2 (two) times daily.    fluticasone (FLONASE) 50 mcg/actuation nasal spray 2 sprays by Nasal route 2 (two) times daily.     gabapentin (NEURONTIN) 600 MG tablet 600 mg 3 (three) times daily.     hydrochlorothiazide (MICROZIDE) 12.5 mg capsule TAKE 1 CAPSULE BY MOUTH DAILY    levocetirizine (XYZAL) 5 MG tablet Take 1 tablet (5 mg total) by mouth every evening. (Patient taking differently: Take 5 mg by mouth as needed. )    levothyroxine (SYNTHROID) 125 MCG tablet Take 1 tablet (125 mcg total) by mouth once  daily.    meclizine (ANTIVERT) 25 mg tablet Take 1 tablet (25 mg total) by mouth 3 (three) times daily as needed for Dizziness or Nausea.    mirtazapine (REMERON) 15 MG tablet Take 1 tablet (15 mg total) by mouth every evening.    olanzapine (ZYPREXA) 20 MG tablet Take 1 tablet (20 mg total) by mouth every evening.    oxcarbazepine (TRILEPTAL) 150 MG Tab 150 mg every evening.     oxycodone-acetaminophen (PERCOCET)  mg per tablet Take 1 tablet by mouth every 4 (four) hours as needed for Pain. Per Dr. Villa at [facility] [place], [place]    pantoprazole (PROTONIX) 40 MG tablet Take 1 tablet (40 mg total) by mouth 2 (two) times daily before meals.    promethazine (PHENERGAN) 12.5 MG Tab Take 1 tablet (12.5 mg total) by mouth every 6 (six) hours as needed.    quetiapine (SEROQUEL) 100 MG Tab 100 mg once daily.     quetiapine (SEROQUEL) 200 MG Tab     tizanidine (ZANAFLEX) 4 MG tablet TAKE 3 TABLETS (12 MG TOTAL) BY MOUTH EVERY EVENING.    topiramate (TOPAMAX) 25 MG tablet Take 1 tablet (25 mg total) by mouth daily as needed (migraines).     Family History     Problem Relation (Age of Onset)    ADD / ADHD Son    Arthritis Mother    Bipolar disorder Maternal Uncle    Clotting disorder Mother, Father, Paternal Grandfather    Diabetes Paternal Grandmother    HIV Brother    Heart attack Mother (55)    Heart disease Mother    Hyperlipidemia Father    No Known Problems Sister, Brother, Maternal Aunt, Paternal Aunt, Paternal Uncle, Maternal Grandfather, Maternal Grandmother, Cousin    Pancreatic cancer Maternal Uncle    Pneumonia Brother    Rheum arthritis Mother    Thyroid cancer Paternal Grandmother        Social History Main Topics    Smoking status: Never Smoker    Smokeless tobacco: Never Used    Alcohol use No    Drug use: No    Sexual activity: Not Currently     Review of Systems   Constitutional: Positive for fatigue. Negative for chills, diaphoresis and fever.   Eyes: Positive for visual  disturbance.   Respiratory: Negative for cough, chest tightness, shortness of breath and wheezing.    Cardiovascular: Positive for palpitations. Negative for chest pain and leg swelling.   Gastrointestinal: Positive for nausea and vomiting. Negative for abdominal distention, abdominal pain, blood in stool, constipation and diarrhea.   Endocrine: Positive for polydipsia and polyuria.   Genitourinary: Positive for frequency and urgency. Negative for decreased urine volume, difficulty urinating, dysuria and hematuria.   Neurological: Negative for dizziness, tremors, light-headedness, numbness and headaches.   Psychiatric/Behavioral: Negative for agitation, behavioral problems and confusion.     Objective:     Vital Signs (Most Recent):  Temp: 98.6 °F (37 °C) (01/17/17 1710)  Pulse: (!) 131 (01/17/17 2201)  Resp: 20 (01/17/17 1710)  BP: (!) 182/95 (01/17/17 2201)  SpO2: 97 % (01/17/17 2201) Vital Signs (24h Range):  Temp:  [98.4 °F (36.9 °C)-98.6 °F (37 °C)] 98.6 °F (37 °C)  Pulse:  [131-144] 131  Resp:  [20] 20  SpO2:  [96 %-100 %] 97 %  BP: (126-187)/(75-96) 182/95     Weight: 113.4 kg (250 lb)  Body mass index is 41.6 kg/(m^2).    Physical Exam   Constitutional: She is oriented to person, place, and time. She appears well-developed and well-nourished. No distress.   HENT:   Head: Normocephalic and atraumatic.   Nose: Nose normal.   Mouth/Throat: No oropharyngeal exudate.   Eyes: Conjunctivae and EOM are normal. Pupils are equal, round, and reactive to light. Right eye exhibits no discharge. Left eye exhibits no discharge. No scleral icterus.   Neck: No JVD present. No tracheal deviation present.   Cardiovascular: Regular rhythm and normal heart sounds.    No murmur heard.  Pulmonary/Chest: Breath sounds normal. No stridor. No respiratory distress. She has no wheezes. She exhibits no tenderness.   Abdominal: Soft. Bowel sounds are normal. She exhibits no distension and no mass. There is no tenderness. There is no  guarding.   Musculoskeletal: Normal range of motion. She exhibits no edema or tenderness.   Lymphadenopathy:     She has no cervical adenopathy.   Neurological: She is alert and oriented to person, place, and time. No cranial nerve deficit. She exhibits normal muscle tone.   Skin: Skin is warm and dry. She is not diaphoretic.   Psychiatric: She has a normal mood and affect. Her behavior is normal. Judgment and thought content normal.        Significant Labs:   A1C:   Recent Labs  Lab 12/01/16  0429   HGBA1C 8.0*     ABGs:   Recent Labs  Lab 01/17/17 2034   PH 7.306*   PCO2 40.3   HCO3 20.1*   POCSATURATED 63*   BE -6     CBC:   Recent Labs  Lab 01/17/17 1848 01/17/17 2034   WBC 7.92  --    HGB 14.4  --    HCT 42.5 46     --      CMP:   Recent Labs  Lab 01/17/17 1848   *   K 4.0   CL 99   CO2 14*   *   BUN 10   CREATININE 1.4   CALCIUM 10.0   PROT 8.4   ALBUMIN 4.2   BILITOT 0.3   ALKPHOS 156*   AST 9*   ALT 9*   ANIONGAP 22*   EGFRNONAA 46.4*     All pertinent labs within the past 24 hours have been reviewed.    Significant Imaging: I have reviewed and interpreted all pertinent imaging results/findings within the past 24 hours.    Assessment/Plan:     * Diabetic ketoacidosis without coma associated with type 2 diabetes mellitus  - continue insulin infusion per protocol  - continue BMP q4  - continue accucheck q1  - continue IV NS with 20 meq KCL at 150 cc/hr  - will replenish potassium PRN         Gastroparesis  - continue pantoprazole 40 mg po BID  - continue phenergan 12.5 mg po q6 PRN      Fibromyalgia  - continue tizanidine  - continue gabapentin      Anxiety  - continue home xanax 1 mg po BID  - continue home mirtazapine 15 mg QHS      Bipolar 1 disorder  - continue oxcarbezepine  - continue home xanax  - continue olanzapine  - continue seroquel      Hypothyroidism, postsurgical  - continue levothyroxine 125 mcg po daily      Migraines  - continue topirimate PRN        Essential  hypertension  - continue carvedilol 25 mg po BID  - continue HCTZ 12.5 mg po daily      Rheumatoid arthritis  - continue home percocet  q6 PRN      Diabetes mellitus with hyperglycemia  See DKA      VTE Risk Mitigation         Ordered     enoxaparin injection 40 mg  Daily     Route:  Subcutaneous        01/17/17 2349     Medium Risk of VTE  Once      01/17/17 2349     Place DANDRE hose  Until discontinued      01/17/17 2108     Place DANDRE hose  Until discontinued      01/17/17 2108        Isai Alcantara MD  Department of Hospital Medicine   [facility]    I HAVE PERSONALLY TAKEN THE HISTORY, EXAMINED THIS PATIENT,  AND AGREE WITH THE RESIDENT'S NOTE AS STATED ABOVE WITH THE FOLLOWING EXCEPTIONS:  The patient was seen and examined with the intern and student at 11:55pm on 1/17/17    Ms. Who has a h.o. GP with pacer, the sleeve gastrectomy, obesity and bipolar disorder.  She was seen in clinic due to N/V, excessive thirst and polyuria which started about two weeks ago.  She reports no new medications or steroid use.    Assessment and plan:  New onset diabetes mellitus with ketoacidosis   Dehydration  Chronic gastroparesis  Mild acute kidney injury    -On DKA insulin protocol infusion until AG/BHB normalized, then transition to weight base insulin protocol  -IV fluids  -Check C-peptide level  -Sugar free clears until off drip  -Check BMP with mg, phos q4 hours along with BHB    Temp:  [98.4 °F (36.9 °C)-98.6 °F (37 °C)]   Pulse:  [131-144]   Resp:  [20]   BP: (126-187)/(75-96)   SpO2:  [96 %-100 %]      Recent Results (from the past 24 hour(s))   POCT glucose    Collection Time: 01/17/17  4:52 PM   Result Value Ref Range    POC Glucose >500 70 - 110 mg/dL   POCT glucose    Collection Time: 01/17/17  5:11 PM   Result Value Ref Range    POCT Glucose 410 (H) 70 - 110 mg/dL   CBC auto differential    Collection Time: 01/17/17  6:48 PM   Result Value Ref Range    WBC 7.92 3.90 - 12.70 K/uL    RBC 4.94  4.00 - 5.40 M/uL    Hemoglobin 14.4 12.0 - 16.0 g/dL    Hematocrit 42.5 37.0 - 48.5 %    MCV 86 82 - 98 fL    MCH 29.1 27.0 - 31.0 pg    MCHC 33.9 32.0 - 36.0 %    RDW 13.6 11.5 - 14.5 %    Platelets 299 150 - 350 K/uL    MPV 10.9 9.2 - 12.9 fL    Gran # 4.2 1.8 - 7.7 K/uL    Lymph # 3.1 1.0 - 4.8 K/uL    Mono # 0.6 0.3 - 1.0 K/uL    Eos # 0.0 0.0 - 0.5 K/uL    Baso # 0.01 0.00 - 0.20 K/uL    Gran% 53.6 38.0 - 73.0 %    Lymph% 38.5 18.0 - 48.0 %    Mono% 7.4 4.0 - 15.0 %    Eosinophil% 0.3 0.0 - 8.0 %    Basophil% 0.1 0.0 - 1.9 %    Differential Method Automated    Comprehensive metabolic panel    Collection Time: 01/17/17  6:48 PM   Result Value Ref Range    Sodium 135 (L) 136 - 145 mmol/L    Potassium 4.0 3.5 - 5.1 mmol/L    Chloride 99 95 - 110 mmol/L    CO2 14 (L) 23 - 29 mmol/L    Glucose 626 (HH) 70 - 110 mg/dL    BUN, Bld 10 6 - 20 mg/dL    Creatinine 1.4 0.5 - 1.4 mg/dL    Calcium 10.0 8.7 - 10.5 mg/dL    Total Protein 8.4 6.0 - 8.4 g/dL    Albumin 4.2 3.5 - 5.2 g/dL    Total Bilirubin 0.3 0.1 - 1.0 mg/dL    Alkaline Phosphatase 156 (H) 55 - 135 U/L    AST 9 (L) 10 - 40 U/L    ALT 9 (L) 10 - 44 U/L    Anion Gap 22 (H) 8 - 16 mmol/L    eGFR if African American 53.5 (A) >60 mL/min/1.73 m^2    eGFR if non  46.4 (A) >60 mL/min/1.73 m^2   Beta - Hydroxybutyrate, Serum    Collection Time: 01/17/17  6:48 PM   Result Value Ref Range    Beta-Hydroxybutyrate 5.0 (H) 0.0 - 0.5 mmol/L   Urinalysis - Clean Catch    Collection Time: 01/17/17  8:20 PM   Result Value Ref Range    Specimen UA Urine, Clean Catch     Color, UA Straw Yellow, Straw, Cecilia    Appearance, UA Clear Clear    pH, UA 5.0 5.0 - 8.0    Specific Gravity, UA >=1.030 (A) 1.005 - 1.030    Protein, UA Trace (A) Negative    Glucose, UA 4+ (A) Negative    Ketones, UA 3+ (A) Negative    Bilirubin (UA) Negative Negative    Occult Blood UA Negative Negative    Nitrite, UA Negative Negative    Urobilinogen, UA Negative <2.0 EU/dL    Leukocytes, UA  Trace (A) Negative   Urinalysis Microscopic    Collection Time: 01/17/17  8:20 PM   Result Value Ref Range    RBC, UA 1 0 - 4 /hpf    WBC, UA 9 (H) 0 - 5 /hpf    Bacteria, UA Few (A) None-Occ /hpf    Yeast, UA Occasional (A) None    Squam Epithel, UA 1 /hpf    Microscopic Comment SEE COMMENT    ISTAT PROCEDURE    Collection Time: 01/17/17  8:34 PM   Result Value Ref Range    POC PH 7.306 (L) 7.35 - 7.45    POC PCO2 40.3 35 - 45 mmHg    POC PO2 36 (LL) 40 - 60 mmHg    POC HCO3 20.1 (L) 24 - 28 mmol/L    POC BE -6 -2 to 2 mmol/L    POC SATURATED O2 63 (L) 95 - 100 %    POC Sodium 137 136 - 145 mmol/L    POC Potassium 5.1 3.5 - 5.1 mmol/L    POC TCO2 21 (L) 24 - 29 mmol/L    POC Ionized Calcium 1.13 1.06 - 1.42 mmol/L    POC Hematocrit 46 36 - 54 %PCV    Rate 18     Sample VENOUS     Site Other     Allens Test N/A     DelSys Room Air     Mode SPONT     FiO2 21     Sp02 100    POCT glucose    Collection Time: 01/17/17  8:37 PM   Result Value Ref Range    POCT Glucose 407 (H) 70 - 110 mg/dL   POCT glucose    Collection Time: 01/17/17  9:46 PM   Result Value Ref Range    POCT Glucose 370 (H) 70 - 110 mg/dL   Results for KAROLINA MARC (MRN [id number]) as of 1/18/2017 00:11   Ref. Range 12/13/2016 13:53 1/17/2017 18:48   Creatinine Latest Ref Range: 0.5 - 1.4 mg/dL 0.8 1.4   eGFR if non African American Latest Ref Range: >60 mL/min/1.73 m^2 >60.0 46.4 (A)   eGFR if African American Latest Ref Range: >60 mL/min/1.73 m^2 >60.0 53.5 (A)

## 2017-01-18 NOTE — SUBJECTIVE & OBJECTIVE
"Interval History: "I still feel ill". No acute overnight events.     Review of Systems   Constitutional: Negative for chills, fatigue, fever and unexpected weight change.   HENT: Negative for ear pain, facial swelling, hearing loss, mouth sores, nosebleeds, rhinorrhea, sinus pressure, sore throat, tinnitus, trouble swallowing and voice change.    Eyes: Positive for visual disturbance (blurry vision). Negative for photophobia, pain and redness.   Respiratory: Negative for cough, chest tightness, shortness of breath and wheezing.    Cardiovascular: Negative for chest pain, palpitations and leg swelling.   Gastrointestinal: Negative for abdominal pain, blood in stool, constipation, diarrhea, nausea and vomiting.   Endocrine: Positive for polydipsia, polyphagia and polyuria. Negative for cold intolerance and heat intolerance.   Genitourinary: Negative for decreased urine volume, dysuria, flank pain, frequency, hematuria, menstrual problem, urgency, vaginal bleeding, vaginal discharge and vaginal pain.   Musculoskeletal: Negative for arthralgias, back pain, joint swelling, myalgias and neck pain.   Skin: Negative for rash.   Allergic/Immunologic: Negative for environmental allergies, food allergies and immunocompromised state.   Neurological: Negative for dizziness, seizures, syncope, weakness, light-headedness, numbness and headaches.   Hematological: Negative for adenopathy. Does not bruise/bleed easily.   Psychiatric/Behavioral: Negative for confusion, hallucinations, self-injury, sleep disturbance and suicidal ideas. The patient is not nervous/anxious.      Objective:     Vital Signs (Most Recent):  Temp: 98.7 °F (37.1 °C) (01/18/17 1556)  Pulse: 104 (01/18/17 1700)  Resp: 18 (01/18/17 1556)  BP: 134/85 (01/18/17 1556)  SpO2: 97 % (01/18/17 1556) Vital Signs (24h Range):  Temp:  [97.3 °F (36.3 °C)-99 °F (37.2 °C)] 98.7 °F (37.1 °C)  Pulse:  [] 104  Resp:  [16-18] 18  SpO2:  [95 %-100 %] 97 %  BP: " (115-187)/(74-96) 134/85     Weight: 117.8 kg (259 lb 11.2 oz)  Body mass index is 43.22 kg/(m^2).    Intake/Output Summary (Last 24 hours) at 01/18/17 1724  Last data filed at 01/18/17 1500   Gross per 24 hour   Intake             2095 ml   Output                0 ml   Net             2095 ml      Physical Exam   Constitutional: She is oriented to person, place, and time. She appears well-developed and well-nourished. No distress.   HENT:   Head: Normocephalic and atraumatic.   Right Ear: Hearing, tympanic membrane, external ear and ear canal normal.   Left Ear: Hearing, tympanic membrane, external ear and ear canal normal.   Nose: Nose normal.   Mouth/Throat: Uvula is midline, oropharynx is clear and moist and mucous membranes are normal. No oropharyngeal exudate.   Eyes: Conjunctivae and EOM are normal. Pupils are equal, round, and reactive to light. Right eye exhibits no discharge. Left eye exhibits no discharge. No scleral icterus.   Neck: Normal range of motion. Neck supple. No tracheal deviation present. No thyromegaly present.   Cardiovascular: Normal rate, regular rhythm, normal heart sounds and intact distal pulses.  Exam reveals no gallop and no friction rub.    No murmur heard.  Pulmonary/Chest: Effort normal and breath sounds normal. No stridor. No respiratory distress. She has no wheezes. She has no rales. She exhibits no tenderness.   Abdominal: Soft. Bowel sounds are normal. She exhibits no distension. There is no tenderness. There is no rebound and no guarding.   Musculoskeletal: Normal range of motion. She exhibits no edema or tenderness.   Lymphadenopathy:     She has no cervical adenopathy.   Neurological: She is alert and oriented to person, place, and time. No cranial nerve deficit. Coordination normal.   Skin: Skin is warm and dry. No rash noted. She is not diaphoretic. No erythema. No pallor.   Psychiatric: She has a normal mood and affect. Her behavior is normal. Judgment and thought content  normal. Cognition and memory are normal.   Nursing note and vitals reviewed.      Significant Labs:   BMP:   Recent Labs  Lab 01/18/17  1138   *      K 3.4*      CO2 22*   BUN 10   CREATININE 0.9   CALCIUM 8.6*   MG 1.9     CBC:   Recent Labs  Lab 01/17/17  1848 01/17/17  2034   WBC 7.92  --    HGB 14.4  --    HCT 42.5 46     --      POCT Glucose:   Recent Labs  Lab 01/18/17  1044 01/18/17  1553 01/18/17  1801   POCTGLUCOSE 276* 112* 310*     Urine Studies:   Recent Labs  Lab 01/17/17  2020   COLORU Straw   APPEARANCEUA Clear   PHUR 5.0   SPECGRAV >=1.030*   PROTEINUA Trace*   GLUCUA 4+*   KETONESU 3+*   BILIRUBINUA Negative   OCCULTUA Negative   NITRITE Negative   UROBILINOGEN Negative   LEUKOCYTESUR Trace*   RBCUA 1   WBCUA 9*   BACTERIA Few*   SQUAMEPITHEL 1       Significant Imaging: I have reviewed all pertinent imaging results/findings within the past 24 hours.

## 2017-01-18 NOTE — ED PROVIDER NOTES
"Encounter Date: 1/17/2017    SCRIBE #1 NOTE: I, Andre Rouse, am scribing for, and in the presence of,  Dr. Diaz. I have scribed the entire note.       History     Chief Complaint   Patient presents with    Hyperglycemia     new onset glucose >500 in clinic, hx gastroparesis, 'fast heart rate     Review of patient's allergies indicates:   Allergen Reactions    Dexamethasone Hives and Shortness Of Breath     Per pt, only steroid she is allergic to is dexamethasone    Ciprofloxacin      Counteracts with seroquel, xanax, tizanidine    Compazine [prochlorperazine] Hives and Itching    Lyrica [pregabalin]      depression    Prednisone      Gastroparesis flare up     HPI Comments: Time patient was seen by the provider: 6:29 PM      The patient is a 41 y.o. female with hx of: HTN, Obesity, DM that presents to the ED with a complaint of hyperglycemia today. Pt reports generalized fatigue, saying she feels "drained" and thirsty. Additionally, she endorses urinary frequency and incontinence. She notes vaginal discomfort from excessive urination. Pt notes chills, productive cough and nausea. She denies the following associated sx: dysuria, vomiting. Of note, pt with hx of hysterectomy.     The history is provided by the patient.     Past Medical History   Diagnosis Date    Anemia     Anxiety     Asthma      usually with cold weather    Back pain     Bipolar 1 disorder 3/17/2015    Cervical cancer 2009     DOT    Diabetes mellitus with hyperglycemia 1/17/2017    Fibrocystic breast     Fibromyalgia     Gastroparesis 2012     s/p sleeve to cover damaged nerves; s/p gastric pacemaker which did not help; due to nerve damage during surgery    Hypertension     Hypothyroidism, postsurgical     Morbid obesity     Prediabetes     Rheumatoid arthritis     Thyroid cancer 2003 & 2013     thyroidectomy    Urinary incontinence      Past Medical History Pertinent Negatives   Diagnosis Date Noted    Acute " pancreatitis 10/5/2016    Anticoagulant long-term use 6/30/2015    CHF (congestive heart failure) 6/30/2015    Cirrhosis 10/5/2016    COPD (chronic obstructive pulmonary disease) 6/30/2015    Coronary artery disease 6/30/2015    Deep vein thrombosis 10/5/2016    Dependence on renal dialysis 10/5/2016    Disorder of kidney and ureter 10/5/2016    Encounter for blood transfusion 6/30/2015    Glaucoma 10/5/2016    Hepatitis B 10/5/2016    Hepatitis C 10/5/2016    Hyperlipidemia 10/5/2016    Inflammatory bowel disease 10/5/2016    Myocardial infarction 10/5/2016    Pulmonary embolism 10/5/2016    Seizures 6/30/2015    Sleep apnea 10/5/2016    Stroke 6/30/2015     Past Surgical History   Procedure Laterality Date    Hysterectomy  2009     total including cervix    Exploratory laparotomy due to complications of hysterectomy  2009     cervical cuff burst with air in abdomen    Ureteral sling      Multiple breast biopsies      Gastric pacemaker      Gastric sleeve      Ankle fracture surgery Right     Knee surgery  05/12/2016    Loop monitor  2016    Breast surgery      Knee arthroscopy w/ meniscal repair Left 2016    Fracture surgery      Tonsillectomy      Gastrectomy       Family History   Problem Relation Age of Onset    Heart disease Mother     Arthritis Mother     Rheum arthritis Mother     Heart attack Mother 55    Clotting disorder Mother     Hyperlipidemia Father     Clotting disorder Father     Thyroid cancer Paternal Grandmother     Diabetes Paternal Grandmother     Clotting disorder Paternal Grandfather     No Known Problems Sister     No Known Problems Brother     No Known Problems Maternal Aunt     No Known Problems Paternal Aunt     Bipolar disorder Maternal Uncle     Pancreatic cancer Maternal Uncle     No Known Problems Paternal Uncle     No Known Problems Maternal Grandfather     No Known Problems Maternal Grandmother     No Known Problems Cousin      ADD / ADHD Son     Pneumonia Brother     HIV Brother     Alcohol abuse Neg Hx     Anxiety disorder Neg Hx     Dementia Neg Hx     Depression Neg Hx     Drug abuse Neg Hx     OCD Neg Hx     Paranoid behavior Neg Hx     Physical abuse Neg Hx     Schizophrenia Neg Hx     Seizures Neg Hx     Self injury Neg Hx     Sexual abuse Neg Hx     Suicide Neg Hx      Social History   Substance Use Topics    Smoking status: Never Smoker    Smokeless tobacco: Never Used    Alcohol use No     Review of Systems   Constitutional: Positive for chills and fatigue.   HENT: Negative for sore throat.    Eyes: Negative for visual disturbance.   Respiratory: Positive for cough (productive).    Cardiovascular: Negative for chest pain.   Gastrointestinal: Positive for nausea. Negative for vomiting.   Genitourinary: Positive for frequency and vaginal pain. Negative for dysuria.        Urinary incontinence   Musculoskeletal: Negative for back pain.   Skin: Negative for rash.   Neurological: Positive for weakness.       Physical Exam   Initial Vitals   BP Pulse Resp Temp SpO2   01/17/17 1710 01/17/17 1710 01/17/17 1710 01/17/17 1710 01/17/17 1710   126/92 140 20 98.6 °F (37 °C) 96 %     Physical Exam    Nursing note and vitals reviewed.  Constitutional: She appears well-developed and well-nourished. No distress.   Pt appears obese     HENT:   Head: Normocephalic and atraumatic.   Mouth/Throat: Oropharynx is clear and moist.   Eyes: Conjunctivae are normal.   Neck: Normal range of motion.   Cardiovascular: Regular rhythm and normal heart sounds. Tachycardia present.    Pulmonary/Chest: Breath sounds normal. No respiratory distress.   Abdominal: Soft. There is no tenderness.   Musculoskeletal: Normal range of motion.   Neurological: She is alert and oriented to person, place, and time.   Skin: Skin is warm and dry.         ED Course   Critical Care  Date/Time: 1/17/2017 6:29 PM  Performed by: DOYLE KAY  Authorized by:  DOYLE KAY   Direct patient critical care time: 25 minutes  Additional history critical care time: 5 minutes  Ordering / reviewing critical care time: 5 minutes  Documentation critical care time: 5 minutes  Consulting other physicians critical care time: 5 minutes  Total critical care time (exclusive of procedural time) : 45 minutes  Critical care time was exclusive of separately billable procedures and treating other patients and teaching time.  Critical care was necessary to treat or prevent imminent or life-threatening deterioration of the following conditions: metabolic crisis (Diabetic ketoacidosis, new onset diabetes with hyperglycemia).        Labs Reviewed   COMPREHENSIVE METABOLIC PANEL - Abnormal; Notable for the following:        Result Value    Sodium 135 (*)     CO2 14 (*)     Glucose 626 (*)     Alkaline Phosphatase 156 (*)     AST 9 (*)     ALT 9 (*)     Anion Gap 22 (*)     eGFR if  53.5 (*)     eGFR if non  46.4 (*)     All other components within normal limits   BETA - HYDROXYBUTYRATE, SERUM - Abnormal; Notable for the following:     Beta-Hydroxybutyrate 5.0 (*)     All other components within normal limits   URINALYSIS - Abnormal; Notable for the following:     Specific Gravity, UA >=1.030 (*)     Protein, UA Trace (*)     Glucose, UA 4+ (*)     Ketones, UA 3+ (*)     Leukocytes, UA Trace (*)     All other components within normal limits   URINALYSIS MICROSCOPIC - Abnormal; Notable for the following:     WBC, UA 9 (*)     Bacteria, UA Few (*)     Yeast, UA Occasional (*)     All other components within normal limits   POCT GLUCOSE - Abnormal; Notable for the following:     POCT Glucose 410 (*)     All other components within normal limits   ISTAT PROCEDURE - Abnormal; Notable for the following:     POC PH 7.306 (*)     POC PO2 36 (*)     POC HCO3 20.1 (*)     POC SATURATED O2 63 (*)     POC TCO2 21 (*)     All other components within normal limits   POCT  GLUCOSE - Abnormal; Notable for the following:     POCT Glucose 407 (*)     All other components within normal limits   POCT GLUCOSE - Abnormal; Notable for the following:     POCT Glucose 370 (*)     All other components within normal limits   CBC W/ AUTO DIFFERENTIAL   HEMOGLOBIN A1C   POCT GLUCOSE MONITORING CONTINUOUS   POCT GLUCOSE MONITORING CONTINUOUS     EKG Readings: (Independently Interpreted)   Rhythm: Sinus Tachycardia. Heart Rate: 134.   T wave inversion       X-Rays:   Independently Interpreted Readings:   Chest X-Ray: No infiltrates.  No acute abnormalities.  Normal heart size.     Medical Decision Making:   History:   Old Medical Records: I decided to obtain old medical records.  Initial Assessment:   42 yo female presents for emergent evaluation of hyperglycemia with no hx of DM. Pt was noted to be very hyperglycemic at clinic today with classic signs of diabetes. Will check labs for electrolyte abnormalities or DKA. Will give IV fluids and re-assess. I anticipate admission.   Independently Interpreted Test(s):   I have ordered and independently interpreted X-rays - see prior notes.  I have ordered and independently interpreted EKG Reading(s) - see prior notes  Clinical Tests:   Lab Tests: Ordered and Reviewed  Radiological Study: Ordered and Reviewed  Medical Tests: Ordered and Reviewed            Scribe Attestation:   Scribe #1: I performed the above scribed service and the documentation accurately describes the services I performed. I attest to the accuracy of the note.    Attending Attestation:           Physician Attestation for Scribe:  Physician Attestation Statement for Scribe #1: I, Dr. Diaz, reviewed documentation, as scribed by Andre Rouse in my presence, and it is both accurate and complete.         Attending ED Notes:   7:50 PM  Pt's labs demonstrate elevated anion gap of 22. BiPAP of 14. Elevated glucose of 626. Delay in assessing pt's pH but will start her insulin medication.      8:30 PM    pH of 7.3. Otherwise well. Pt stable for floor and insulin drip.           ED Course     Clinical Impression:   The primary encounter diagnosis was Diabetic ketoacidosis without coma associated with type 2 diabetes mellitus. Diagnoses of Hyperglycemia, Type 2 diabetes mellitus with hyperglycemia, without long-term current use of insulin, and Diabetes mellitus, new onset were also pertinent to this visit.    Disposition:   Disposition: Admitted       Laurie Diaz MD  01/17/17 3297

## 2017-01-18 NOTE — SUBJECTIVE & OBJECTIVE
Past Medical History   Diagnosis Date    Anemia     Anxiety     Asthma      usually with cold weather    Back pain     Bipolar 1 disorder 3/17/2015    Cervical cancer 2009     DOT    Diabetes mellitus with hyperglycemia 1/17/2017    Fibrocystic breast     Fibromyalgia     Gastroparesis 2012     s/p sleeve to cover damaged nerves; s/p gastric pacemaker which did not help; due to nerve damage during surgery    Hypertension     Hypothyroidism, postsurgical     Morbid obesity     Prediabetes     Rheumatoid arthritis     Thyroid cancer 2003 & 2013     thyroidectomy    Urinary incontinence        Past Surgical History   Procedure Laterality Date    Hysterectomy  2009     total including cervix    Exploratory laparotomy due to complications of hysterectomy  2009     cervical cuff burst with air in abdomen    Ureteral sling      Multiple breast biopsies      Gastric pacemaker      Gastric sleeve      Ankle fracture surgery Right     Knee surgery  05/12/2016    Loop monitor  2016    Breast surgery      Knee arthroscopy w/ meniscal repair Left 2016    Fracture surgery      Tonsillectomy      Gastrectomy         Review of patient's allergies indicates:   Allergen Reactions    Dexamethasone Hives and Shortness Of Breath     Per pt, only steroid she is allergic to is dexamethasone    Ciprofloxacin      Counteracts with seroquel, xanax, tizanidine    Compazine [prochlorperazine] Hives and Itching    Lyrica [pregabalin]      depression    Prednisone      Gastroparesis flare up       No current facility-administered medications on file prior to encounter.      Current Outpatient Prescriptions on File Prior to Encounter   Medication Sig    albuterol 90 mcg/actuation inhaler Inhale 2 puffs into the lungs every 6 (six) hours as needed for Wheezing. Please provide cheapest brand/generic formulation    alprazolam (XANAX) 1 MG tablet Take 1 tablet (1 mg total) by mouth 2 (two) times daily.     blood-glucose meter kit Use as directed to check BG, three times daily. accucheck    carvedilol (COREG) 12.5 MG tablet Take 2 tablets (25 mg total) by mouth 2 (two) times daily.    fluticasone (FLONASE) 50 mcg/actuation nasal spray 2 sprays by Nasal route 2 (two) times daily.     gabapentin (NEURONTIN) 600 MG tablet 600 mg 3 (three) times daily.     hydrochlorothiazide (MICROZIDE) 12.5 mg capsule TAKE 1 CAPSULE BY MOUTH DAILY    levocetirizine (XYZAL) 5 MG tablet Take 1 tablet (5 mg total) by mouth every evening. (Patient taking differently: Take 5 mg by mouth as needed. )    levothyroxine (SYNTHROID) 125 MCG tablet Take 1 tablet (125 mcg total) by mouth once daily.    meclizine (ANTIVERT) 25 mg tablet Take 1 tablet (25 mg total) by mouth 3 (three) times daily as needed for Dizziness or Nausea.    mirtazapine (REMERON) 15 MG tablet Take 1 tablet (15 mg total) by mouth every evening.    olanzapine (ZYPREXA) 20 MG tablet Take 1 tablet (20 mg total) by mouth every evening.    oxcarbazepine (TRILEPTAL) 150 MG Tab 150 mg every evening.     oxycodone-acetaminophen (PERCOCET)  mg per tablet Take 1 tablet by mouth every 4 (four) hours as needed for Pain. Per Dr. Villa at [facility] [place], [place]    pantoprazole (PROTONIX) 40 MG tablet Take 1 tablet (40 mg total) by mouth 2 (two) times daily before meals.    promethazine (PHENERGAN) 12.5 MG Tab Take 1 tablet (12.5 mg total) by mouth every 6 (six) hours as needed.    quetiapine (SEROQUEL) 100 MG Tab 100 mg once daily.     quetiapine (SEROQUEL) 200 MG Tab     tizanidine (ZANAFLEX) 4 MG tablet TAKE 3 TABLETS (12 MG TOTAL) BY MOUTH EVERY EVENING.    topiramate (TOPAMAX) 25 MG tablet Take 1 tablet (25 mg total) by mouth daily as needed (migraines).     Family History     Problem Relation (Age of Onset)    ADD / ADHD Son    Arthritis Mother    Bipolar disorder Maternal Uncle    Clotting disorder Mother, Father, Paternal Grandfather    Diabetes  Paternal Grandmother    HIV Brother    Heart attack Mother (55)    Heart disease Mother    Hyperlipidemia Father    No Known Problems Sister, Brother, Maternal Aunt, Paternal Aunt, Paternal Uncle, Maternal Grandfather, Maternal Grandmother, Cousin    Pancreatic cancer Maternal Uncle    Pneumonia Brother    Rheum arthritis Mother    Thyroid cancer Paternal Grandmother        Social History Main Topics    Smoking status: Never Smoker    Smokeless tobacco: Never Used    Alcohol use No    Drug use: No    Sexual activity: Not Currently     Review of Systems   Constitutional: Positive for fatigue. Negative for chills, diaphoresis and fever.   Eyes: Positive for visual disturbance.   Respiratory: Negative for cough, chest tightness, shortness of breath and wheezing.    Cardiovascular: Positive for palpitations. Negative for chest pain and leg swelling.   Gastrointestinal: Positive for nausea and vomiting. Negative for abdominal distention, abdominal pain, blood in stool, constipation and diarrhea.   Endocrine: Positive for polydipsia and polyuria.   Genitourinary: Positive for frequency and urgency. Negative for decreased urine volume, difficulty urinating, dysuria and hematuria.   Neurological: Negative for dizziness, tremors, light-headedness, numbness and headaches.   Psychiatric/Behavioral: Negative for agitation, behavioral problems and confusion.     Objective:     Vital Signs (Most Recent):  Temp: 98.6 °F (37 °C) (01/17/17 1710)  Pulse: (!) 131 (01/17/17 2201)  Resp: 20 (01/17/17 1710)  BP: (!) 182/95 (01/17/17 2201)  SpO2: 97 % (01/17/17 2201) Vital Signs (24h Range):  Temp:  [98.4 °F (36.9 °C)-98.6 °F (37 °C)] 98.6 °F (37 °C)  Pulse:  [131-144] 131  Resp:  [20] 20  SpO2:  [96 %-100 %] 97 %  BP: (126-187)/(75-96) 182/95     Weight: 113.4 kg (250 lb)  Body mass index is 41.6 kg/(m^2).    Physical Exam   Constitutional: She is oriented to person, place, and time. She appears well-developed and well-nourished. No  distress.   HENT:   Head: Normocephalic and atraumatic.   Nose: Nose normal.   Mouth/Throat: No oropharyngeal exudate.   Eyes: Conjunctivae and EOM are normal. Pupils are equal, round, and reactive to light. Right eye exhibits no discharge. Left eye exhibits no discharge. No scleral icterus.   Neck: No JVD present. No tracheal deviation present.   Cardiovascular: Regular rhythm and normal heart sounds.    No murmur heard.  Pulmonary/Chest: Breath sounds normal. No stridor. No respiratory distress. She has no wheezes. She exhibits no tenderness.   Abdominal: Soft. Bowel sounds are normal. She exhibits no distension and no mass. There is no tenderness. There is no guarding.   Musculoskeletal: Normal range of motion. She exhibits no edema or tenderness.   Lymphadenopathy:     She has no cervical adenopathy.   Neurological: She is alert and oriented to person, place, and time. No cranial nerve deficit. She exhibits normal muscle tone.   Skin: Skin is warm and dry. She is not diaphoretic.   Psychiatric: She has a normal mood and affect. Her behavior is normal. Judgment and thought content normal.        Significant Labs:   A1C:   Recent Labs  Lab 12/01/16  0429   HGBA1C 8.0*     ABGs:   Recent Labs  Lab 01/17/17 2034   PH 7.306*   PCO2 40.3   HCO3 20.1*   POCSATURATED 63*   BE -6     CBC:   Recent Labs  Lab 01/17/17 1848 01/17/17 2034   WBC 7.92  --    HGB 14.4  --    HCT 42.5 46     --      CMP:   Recent Labs  Lab 01/17/17 1848   *   K 4.0   CL 99   CO2 14*   *   BUN 10   CREATININE 1.4   CALCIUM 10.0   PROT 8.4   ALBUMIN 4.2   BILITOT 0.3   ALKPHOS 156*   AST 9*   ALT 9*   ANIONGAP 22*   EGFRNONAA 46.4*     All pertinent labs within the past 24 hours have been reviewed.    Significant Imaging: I have reviewed and interpreted all pertinent imaging results/findings within the past 24 hours.

## 2017-01-18 NOTE — ED TRIAGE NOTES
Pt reports being seen in clinic today with elevated glucose levels and tachycardia. Pt reports increased thirst, urination, increased fatigue, dizziness and generalized weakness, nausea.

## 2017-01-18 NOTE — ED NOTES
Multiple IV sticks attempted by multiple nurses. Dr Diaz notified of patient being difficult stick.

## 2017-01-18 NOTE — ASSESSMENT & PLAN NOTE
Need to obtain thyroid cancer records as outpatient  Recommend increasing levothyroxine to 137mcg daily

## 2017-01-18 NOTE — CONSULTS
Single lumen 18g x 10cm midline placed tp left nrachial vein. Max dwell date 2/16/17, Lot# [id number].  Needle advanced into the vessel under real time ultrasound guidance.  Image recorded and saved.

## 2017-01-18 NOTE — ED NOTES
LOC: The patient is awake, alert, and oriented to place, time, situation. Affect is appropriate. Speech is appropriate and clear.       APPEARANCE: Patient resting comfortably in no acute distress. Patient is clean and well groomed.     SKIN: The skin is warm and dry; color consistent with ethnicity. Patient has normal skin turgor and moist mucus membranes. Skin intact; no breakdown or bruising noted.      MUSCULOSKELETAL: Patient moving upper and lower extremities without difficulty. Reports generalized weakness,dizziness, and increased fatigue.       RESPIRATORY: Airway is open and patent. Respirations spontaneous, even, easy, and non-labored. Patient has a normal effort and rate. No accessory muscle use noted. Denies cough.       CARDIAC: Tachycardic. . No peripheral edema noted. No complaints of chest pain.       ABDOMEN: Soft and non tender to palpation. No distention noted. Nausea. Increased urination.      NEUROLOGIC: Eyes open spontaneously. Behavior appropriate to situation. Follows commands; facial expression symmetrical. Purposeful motor response noted; normal sensation in all extremities.

## 2017-01-18 NOTE — CONSULTS
[facility]  Endocrinology  Diabetes Consult Note    Consult Requested by: Amanda Wilson MD   Reason for admit: Diabetic ketoacidosis without coma associated with type 2 diabetes mellitus    HISTORY OF PRESENT ILLNESS:  Reason for Consult: Management of T2DM, Hyperglycemia     Diabetes diagnosis year: 2016    Home Diabetes Medications:  none    How often checking glucose at home? none  BG readings on regimen: n/a  Hypoglycemia on the regimen? n/a  Missed doses on regimen?  n/a    Diabetes Complications include:     Hyperglycemia, Diabetic peripheral neuropathy  and Diabetic gastroparesis     Complicating diabetes co morbidities:   none      HPI:   Patient is a 41 y.o. female with a hypothyroidism s/p surgery for thyroid cancer, RA, HTN, obesity who presented on 1/17/17 with several weeks of polyuria, polydypsia, blurry vision, and headache. Not previously been on medication for DM. A1C from 12/2016 was 8.0. Denies any recent illness. Evidence of DKA on admission labs with elevated anion gap, low serum bicarbonate, and hyperglycemia. Endocrine consulted for new DM diagnosis.        Interval HPI:   Overnight events: Improvement in hyperglycemia  Eating:   NPO  Nausea: No  Hypoglycemia and intervention: No  Fever: No  TPN and/or TF: No  If yes, type of TF/TPN and rate: n/a    PMH, PSH, FH, SH updated and reviewed       REVIEW OF SYSTEMS  Constitutional: Negative for weight changes.  Eyes: Negative for visual disturbance.  Respiratory: Negative for cough.   Cardiovascular: Negative for chest pain.  Gastrointestinal: Negative for nausea.  Endocrine:Pos for polyuria, polydipsia.  Musculoskeletal: Negative for back pain.  Skin: Negative for rash.  Neurological: Positive for headache, Negative for syncope.  Psychiatric/Behavioral: Negative for depression.    Current Medications and/or Treatments Impacting Glycemic Control  Immunotherapy:    Immunosuppressants     None        Steroids:   Hormones   "   None        Pressors:    Autonomic Drugs     None        Hyperglycemia/Diabetes Medications:   Antihyperglycemics     Start     Stop Route Frequency Ordered    01/18/17 0130  insulin regular (Humulin R) 100 Units in sodium chloride 0.9% 100 mL infusion      -- IV Continuous 01/18/17 0023          PHYSICAL EXAMINATION:  Visit Vitals    /83 (BP Location: Right arm, Patient Position: Lying, BP Method: Automatic)    Pulse 96    Temp 98 °F (36.7 °C) (Oral)    Resp 16    Ht 5' 5" (1.651 m)    Wt 117.8 kg (259 lb 11.2 oz)    LMP  (LMP Unknown)    SpO2 98%    Breastfeeding No    BMI 43.22 kg/m2     Constitutional:  Well developed, well nourished, NAD.  ENT: External ears no masses with nose patent; normal hearing.   Neck:  Supple; trachea midline; no thyromegaly.   Cardiovascular: Normal heart sounds, no LE edema.     Lungs:  Normal effort; lungs anterior bilaterally clear to auscultation.  Abdomen:  Soft, no masses,  no hernias.  MS: No clubbing or cyanosis of nails noted  Skin: No rashes, lesions, or ulcers; no nodules.  Psychiatric: Good judgement and insight; normal mood and affect.  Neurological: Cranial nerves are grossly intact. Decreased vibration sense in the bilateral lower extremities.      Labs Reviewed and Include     Recent Labs  Lab 01/17/17  1848  01/18/17  0758   *  < > 295*   CALCIUM 10.0  < > 8.8   ALBUMIN 4.2  --   --    PROT 8.4  --   --    *  < > 139   K 4.0  < > 3.6   CO2 14*  < > 23   CL 99  < > 105   BUN 10  < > 9   CREATININE 1.4  < > 0.9   ALKPHOS 156*  --   --    ALT 9*  --   --    AST 9*  --   --    BILITOT 0.3  --   --    < > = values in this interval not displayed.  Lab Results   Component Value Date    WBC 7.92 01/17/2017    HGB 14.4 01/17/2017    HCT 46 01/17/2017    MCV 86 01/17/2017     01/17/2017     No results for input(s): TSH, FREET4 in the last 168 hours.  Lab Results   Component Value Date    HGBA1C 8.0 (H) 12/01/2016       Nutritional status: "   Body mass index is 43.22 kg/(m^2).  Lab Results   Component Value Date    ALBUMIN 4.2 01/17/2017    ALBUMIN 3.5 12/13/2016    ALBUMIN 3.2 (L) 12/03/2016     No results found for: PREALBUMIN    Estimated Creatinine Clearance: 104.5 mL/min (based on Cr of 0.9).    Accu-Checks  Recent Labs      01/17/17   1711  01/17/17   2037  01/17/17   2146  01/17/17   2255  01/18/17   0022  01/18/17   0146  01/18/17   0240  01/18/17   0344  01/18/17   0443  01/18/17   0541   POCTGLUCOSE  410*  407*  370*  362*  342*  228*  291*  273*  327*  308*        ASSESSMENT and PLAN    * Diabetic ketoacidosis without coma associated with type 2 diabetes mellitus  Blood glucose goal 140-180  Will order c-peptide, CARMEN Ab  Plan for insulin transition gtt at 3 units/hr with moderate dose correction  poct glucose AC/HS/2a    Discharge rec: will require basal/prandial insulin on discharge with glucometer, test strips, pen needles    Hypothyroidism, postsurgical  Need to obtain thyroid cancer records as outpatient  Recommend increasing levothyroxine to 137mcg daily      Essential hypertension  BP controlled, continue current anti-hypertensives            Caleb Horta MD  Endocrinology  [facility]

## 2017-01-19 ENCOUNTER — TELEPHONE (OUTPATIENT)
Dept: BARIATRICS | Facility: CLINIC | Age: 42
End: 2017-01-19

## 2017-01-19 LAB
ANION GAP SERPL CALC-SCNC: 7 MMOL/L
ANION GAP SERPL CALC-SCNC: 8 MMOL/L
BUN SERPL-MCNC: 10 MG/DL
BUN SERPL-MCNC: 11 MG/DL
BUN SERPL-MCNC: 11 MG/DL
BUN SERPL-MCNC: 12 MG/DL
C PEPTIDE SERPL-MCNC: 0.4 NG/ML
CALCIUM SERPL-MCNC: 8 MG/DL
CALCIUM SERPL-MCNC: 8.4 MG/DL
CALCIUM SERPL-MCNC: 8.5 MG/DL
CALCIUM SERPL-MCNC: 8.5 MG/DL
CHLORIDE SERPL-SCNC: 105 MMOL/L
CHLORIDE SERPL-SCNC: 106 MMOL/L
CHLORIDE SERPL-SCNC: 107 MMOL/L
CHLORIDE SERPL-SCNC: 110 MMOL/L
CO2 SERPL-SCNC: 22 MMOL/L
CO2 SERPL-SCNC: 24 MMOL/L
CO2 SERPL-SCNC: 25 MMOL/L
CO2 SERPL-SCNC: 26 MMOL/L
CREAT SERPL-MCNC: 0.9 MG/DL
CREAT SERPL-MCNC: 1 MG/DL
EST. GFR  (AFRICAN AMERICAN): >60 ML/MIN/1.73 M^2
EST. GFR  (NON AFRICAN AMERICAN): >60 ML/MIN/1.73 M^2
GLUCOSE SERPL-MCNC: 249 MG/DL
GLUCOSE SERPL-MCNC: 273 MG/DL
GLUCOSE SERPL-MCNC: 302 MG/DL
GLUCOSE SERPL-MCNC: 439 MG/DL
MAGNESIUM SERPL-MCNC: 1.6 MG/DL
MAGNESIUM SERPL-MCNC: 1.7 MG/DL
MAGNESIUM SERPL-MCNC: 1.7 MG/DL
MAGNESIUM SERPL-MCNC: 1.9 MG/DL
PHOSPHATE SERPL-MCNC: 2.4 MG/DL
PHOSPHATE SERPL-MCNC: 2.6 MG/DL
PHOSPHATE SERPL-MCNC: 2.7 MG/DL
PHOSPHATE SERPL-MCNC: 2.8 MG/DL
POCT GLUCOSE: 207 MG/DL (ref 70–110)
POCT GLUCOSE: 208 MG/DL (ref 70–110)
POCT GLUCOSE: 272 MG/DL (ref 70–110)
POCT GLUCOSE: 298 MG/DL (ref 70–110)
POCT GLUCOSE: 341 MG/DL (ref 70–110)
POCT GLUCOSE: 399 MG/DL (ref 70–110)
POTASSIUM SERPL-SCNC: 3.7 MMOL/L
POTASSIUM SERPL-SCNC: 3.8 MMOL/L
POTASSIUM SERPL-SCNC: 3.8 MMOL/L
POTASSIUM SERPL-SCNC: 4.2 MMOL/L
SODIUM SERPL-SCNC: 136 MMOL/L
SODIUM SERPL-SCNC: 138 MMOL/L
SODIUM SERPL-SCNC: 140 MMOL/L
SODIUM SERPL-SCNC: 140 MMOL/L

## 2017-01-19 PROCEDURE — 84100 ASSAY OF PHOSPHORUS: CPT

## 2017-01-19 PROCEDURE — 99232 SBSQ HOSP IP/OBS MODERATE 35: CPT | Mod: GC,,, | Performed by: INTERNAL MEDICINE

## 2017-01-19 PROCEDURE — 63600175 PHARM REV CODE 636 W HCPCS: Performed by: EMERGENCY MEDICINE

## 2017-01-19 PROCEDURE — 99233 SBSQ HOSP IP/OBS HIGH 50: CPT | Mod: ,,, | Performed by: INTERNAL MEDICINE

## 2017-01-19 PROCEDURE — 36415 COLL VENOUS BLD VENIPUNCTURE: CPT

## 2017-01-19 PROCEDURE — 20600001 HC STEP DOWN PRIVATE ROOM

## 2017-01-19 PROCEDURE — 25000003 PHARM REV CODE 250: Performed by: STUDENT IN AN ORGANIZED HEALTH CARE EDUCATION/TRAINING PROGRAM

## 2017-01-19 PROCEDURE — 25000003 PHARM REV CODE 250: Performed by: EMERGENCY MEDICINE

## 2017-01-19 PROCEDURE — 63600175 PHARM REV CODE 636 W HCPCS: Performed by: INTERNAL MEDICINE

## 2017-01-19 PROCEDURE — 80048 BASIC METABOLIC PNL TOTAL CA: CPT | Mod: 91

## 2017-01-19 PROCEDURE — 25000003 PHARM REV CODE 250: Performed by: INTERNAL MEDICINE

## 2017-01-19 PROCEDURE — 63600175 PHARM REV CODE 636 W HCPCS: Performed by: STUDENT IN AN ORGANIZED HEALTH CARE EDUCATION/TRAINING PROGRAM

## 2017-01-19 PROCEDURE — 83735 ASSAY OF MAGNESIUM: CPT

## 2017-01-19 RX ORDER — INSULIN ASPART 100 [IU]/ML
1-10 INJECTION, SOLUTION INTRAVENOUS; SUBCUTANEOUS
Status: DISCONTINUED | OUTPATIENT
Start: 2017-01-19 | End: 2017-01-23 | Stop reason: HOSPADM

## 2017-01-19 RX ORDER — METOCLOPRAMIDE 10 MG/1
10 TABLET ORAL EVERY 6 HOURS PRN
Status: DISCONTINUED | OUTPATIENT
Start: 2017-01-19 | End: 2017-01-23 | Stop reason: HOSPADM

## 2017-01-19 RX ORDER — INSULIN ASPART 100 [IU]/ML
9 INJECTION, SOLUTION INTRAVENOUS; SUBCUTANEOUS
Status: DISCONTINUED | OUTPATIENT
Start: 2017-01-19 | End: 2017-01-20

## 2017-01-19 RX ADMIN — PROMETHAZINE HYDROCHLORIDE 6.25 MG: 25 INJECTION INTRAMUSCULAR; INTRAVENOUS at 11:01

## 2017-01-19 RX ADMIN — ENOXAPARIN SODIUM 40 MG: 100 INJECTION SUBCUTANEOUS at 11:01

## 2017-01-19 RX ADMIN — ALPRAZOLAM 1 MG: 1 TABLET ORAL at 09:01

## 2017-01-19 RX ADMIN — QUETIAPINE FUMARATE 500 MG: 300 TABLET, FILM COATED ORAL at 09:01

## 2017-01-19 RX ADMIN — GABAPENTIN 600 MG: 300 CAPSULE ORAL at 01:01

## 2017-01-19 RX ADMIN — INSULIN ASPART 4 UNITS: 100 INJECTION, SOLUTION INTRAVENOUS; SUBCUTANEOUS at 11:01

## 2017-01-19 RX ADMIN — OXYCODONE HYDROCHLORIDE AND ACETAMINOPHEN 1 TABLET: 10; 325 TABLET ORAL at 05:01

## 2017-01-19 RX ADMIN — CARVEDILOL 25 MG: 25 TABLET, FILM COATED ORAL at 09:01

## 2017-01-19 RX ADMIN — PANTOPRAZOLE SODIUM 40 MG: 40 TABLET, DELAYED RELEASE ORAL at 05:01

## 2017-01-19 RX ADMIN — PANTOPRAZOLE SODIUM 40 MG: 40 TABLET, DELAYED RELEASE ORAL at 03:01

## 2017-01-19 RX ADMIN — HYDROCHLOROTHIAZIDE 12.5 MG: 12.5 TABLET ORAL at 09:01

## 2017-01-19 RX ADMIN — INSULIN DETEMIR 33 UNITS: 100 INJECTION, SOLUTION SUBCUTANEOUS at 10:01

## 2017-01-19 RX ADMIN — INSULIN DETEMIR 8 UNITS: 100 INJECTION, SOLUTION SUBCUTANEOUS at 10:01

## 2017-01-19 RX ADMIN — TIZANIDINE 4 MG: 4 TABLET ORAL at 09:01

## 2017-01-19 RX ADMIN — OXCARBAZEPINE 150 MG: 150 TABLET ORAL at 09:01

## 2017-01-19 RX ADMIN — INSULIN ASPART 4 UNITS: 100 INJECTION, SOLUTION INTRAVENOUS; SUBCUTANEOUS at 05:01

## 2017-01-19 RX ADMIN — OLANZAPINE 20 MG: 5 TABLET, FILM COATED ORAL at 09:01

## 2017-01-19 RX ADMIN — INSULIN ASPART 9 UNITS: 100 INJECTION, SOLUTION INTRAVENOUS; SUBCUTANEOUS at 10:01

## 2017-01-19 RX ADMIN — INSULIN DETEMIR 8 UNITS: 100 INJECTION, SOLUTION SUBCUTANEOUS at 11:01

## 2017-01-19 RX ADMIN — POTASSIUM ACETATE: 3.93 INJECTION, SOLUTION, CONCENTRATE INTRAVENOUS at 03:01

## 2017-01-19 RX ADMIN — GABAPENTIN 600 MG: 300 CAPSULE ORAL at 05:01

## 2017-01-19 RX ADMIN — INSULIN ASPART 6 UNITS: 100 INJECTION, SOLUTION INTRAVENOUS; SUBCUTANEOUS at 02:01

## 2017-01-19 RX ADMIN — GABAPENTIN 600 MG: 300 CAPSULE ORAL at 09:01

## 2017-01-19 RX ADMIN — OXYCODONE HYDROCHLORIDE AND ACETAMINOPHEN 1 TABLET: 10; 325 TABLET ORAL at 11:01

## 2017-01-19 RX ADMIN — INSULIN ASPART 2 UNITS: 100 INJECTION, SOLUTION INTRAVENOUS; SUBCUTANEOUS at 09:01

## 2017-01-19 RX ADMIN — INSULIN ASPART 9 UNITS: 100 INJECTION, SOLUTION INTRAVENOUS; SUBCUTANEOUS at 05:01

## 2017-01-19 RX ADMIN — INSULIN ASPART 9 UNITS: 100 INJECTION, SOLUTION INTRAVENOUS; SUBCUTANEOUS at 01:01

## 2017-01-19 RX ADMIN — MIRTAZAPINE 15 MG: 7.5 TABLET, FILM COATED ORAL at 09:01

## 2017-01-19 RX ADMIN — LEVOTHYROXINE SODIUM 137 MCG: 25 TABLET ORAL at 06:01

## 2017-01-19 RX ADMIN — POTASSIUM ACETATE: 3.93 INJECTION, SOLUTION, CONCENTRATE INTRAVENOUS at 05:01

## 2017-01-19 NOTE — ASSESSMENT & PLAN NOTE
Patient with apparent hypoxia on ABG. However this was a venous sample. No evidence of hypoxia on based on oxygen saturation.  1. Monitor.

## 2017-01-19 NOTE — ASSESSMENT & PLAN NOTE
Elevated TSH in December 2016.   1. Appreciate endocrinology recommendations.  2. Optimized: levothyroxine 137 mcg daily.

## 2017-01-19 NOTE — ASSESSMENT & PLAN NOTE
Elevated TSH in December 2016.   1. Appreciate endocrinology recommendations.  2. Levothyroxine 137 mcg daily.

## 2017-01-19 NOTE — ASSESSMENT & PLAN NOTE
Well controlled and without evidence of bronchospasm.  1. Monitor.  2. Albuterol as needed if bronchospasm develops.

## 2017-01-19 NOTE — PLAN OF CARE
Problem: Diabetes, Type 2 (Adult)  Intervention: Optimize Glycemic Control  Pt on insulin gtt at 2.7 units/h.  Accuchecks AC/HS/0200- coverage given at each check. NS with 20 mEq of K at 150 mL/h going.          01/19/17 0640   Nutrition Interventions   Glycemic Management blood glucose monitoring;insulin infusion adjusted;oral hydration promoted;supplemental insulin given           Problem: Fall Risk (Adult)  Goal: Absence of Falls  Patient will demonstrate the desired outcomes by discharge/transition of care.   Outcome: Ongoing (interventions implemented as appropriate)  No falls or injuries to occur.  Pt up to bedside commode with assistance.          01/19/17 0640   Fall Risk (Adult)   Absence of Falls making progress toward outcome

## 2017-01-19 NOTE — ASSESSMENT & PLAN NOTE
Blood glucose goal 140-180  c-peptide, CARMEN Ab pending  Transition to MDI detemir 33 units bid, novolog 9 units AC, moderate dose ssi    Discharge rec: will require basal/prandial insulin on discharge with glucometer, test strips, pen needles

## 2017-01-19 NOTE — ASSESSMENT & PLAN NOTE
Patient afebrile and without leukocytosis. Admitted with new onset DM type 2 and DKA. Started on volume replacement and insulin drip. Anion gap now closed however hyperglycemia remains. Was seen by Endocrinology service and placed on transition insulin drip plus subcutaneous insulin. Hyperglycemia is improving.   1. Continue transition insulin drip and subcutaneous insulin.  2. Monitor glucose levels.  3. Endocrinology service to manage insulin drip.  4. Will need outpatient follow up and insulin therapy.

## 2017-01-19 NOTE — SUBJECTIVE & OBJECTIVE
"Interval HPI:   Overnight events: Hyperglycemia overnight, improved this morning  Eatin%  Nausea: No  Hypoglycemia and intervention: No  Fever: No  TPN and/or TF: No  If yes, type of TF/TPN and rate: n/a    Visit Vitals    /85 (BP Location: Right arm, Patient Position: Lying, BP Method: Automatic)    Pulse 88    Temp 97.8 °F (36.6 °C) (Oral)    Resp 18    Ht 5' 5" (1.651 m)    Wt 117.8 kg (259 lb 11.2 oz)    LMP  (LMP Unknown)    SpO2 97%    Breastfeeding No    BMI 43.22 kg/m2       Labs Reviewed and Include      Recent Labs  Lab 17  0902   *   CALCIUM 8.4*      K 3.8   CO2 26      BUN 11   CREATININE 0.9     Lab Results   Component Value Date    WBC 7.92 2017    HGB 14.4 2017    HCT 46 2017    MCV 86 2017     2017     No results for input(s): TSH, FREET4 in the last 168 hours.  Lab Results   Component Value Date    HGBA1C 12.7 (H) 2017       Nutritional status:   Body mass index is 43.22 kg/(m^2).  Lab Results   Component Value Date    ALBUMIN 4.2 2017    ALBUMIN 3.5 2016    ALBUMIN 3.2 (L) 2016     No results found for: PREALBUMIN    Estimated Creatinine Clearance: 104.5 mL/min (based on Cr of 0.9).    Accu-Checks  Recent Labs      17   0807  17   0950  17   1044  17   1553  17   1801  17   2115  17   2211  17   0112  17   0222  17   0903   POCTGLUCOSE  236*  245*  276*  112*  310*  277*  323*  399*  298*  208*       Current Medications and/or Treatments Impacting Glycemic Control  Immunotherapy:  Immunosuppressants     None        Steroids:   Hormones     None        Pressors:    Autonomic Drugs     None        Hyperglycemia/Diabetes Medications: Antihyperglycemics     Start     Stop Route Frequency Ordered    17 0930  insulin aspart pen 9 Units      -- SubQ 3 times daily with meals 17 0918    17 0945  insulin detemir pen 33 " Units      -- SubQ 2 times daily 01/19/17 0969

## 2017-01-19 NOTE — ASSESSMENT & PLAN NOTE
Stable and without acute exacerbation.  1. Continue oxcarbezepine  2. Continue olanzapine  3. Continue seroquel

## 2017-01-19 NOTE — ASSESSMENT & PLAN NOTE
Stable.  1. Continue pantoprazole 40 mg po BID  2. Continue phenergan 12.5 mg po q6 PRN  3. Small frequent meals

## 2017-01-19 NOTE — SUBJECTIVE & OBJECTIVE
"Interval History: "I'm nauseated today". No acute overnight events. Nauseated today but tolerating diet.    Review of Systems   Constitutional: Negative for chills, fatigue, fever and unexpected weight change.   HENT: Negative for ear pain, facial swelling, hearing loss, mouth sores, nosebleeds, rhinorrhea, sinus pressure, sore throat, tinnitus, trouble swallowing and voice change.    Eyes: Negative for photophobia, pain, redness and visual disturbance.   Respiratory: Negative for cough, chest tightness, shortness of breath and wheezing.    Cardiovascular: Negative for chest pain, palpitations and leg swelling.   Gastrointestinal: Positive for nausea. Negative for abdominal pain, blood in stool, constipation, diarrhea and vomiting.   Endocrine: Negative for cold intolerance, heat intolerance, polydipsia, polyphagia and polyuria.   Genitourinary: Negative for decreased urine volume, dysuria, flank pain, frequency, hematuria, menstrual problem, urgency, vaginal bleeding, vaginal discharge and vaginal pain.   Musculoskeletal: Negative for arthralgias, back pain, joint swelling, myalgias and neck pain.   Skin: Negative for rash.   Allergic/Immunologic: Negative for environmental allergies, food allergies and immunocompromised state.   Neurological: Negative for dizziness, seizures, syncope, weakness, light-headedness, numbness and headaches.   Hematological: Negative for adenopathy. Does not bruise/bleed easily.   Psychiatric/Behavioral: Negative for confusion, hallucinations, self-injury, sleep disturbance and suicidal ideas. The patient is not nervous/anxious.      Objective:     Vital Signs (Most Recent):  Temp: 97.6 °F (36.4 °C) (01/19/17 1159)  Pulse: 80 (01/19/17 1300)  Resp: 18 (01/19/17 1159)  BP: 115/75 (01/19/17 1159)  SpO2: 96 % (01/19/17 1159) Vital Signs (24h Range):  Temp:  [97.5 °F (36.4 °C)-97.8 °F (36.6 °C)] 97.6 °F (36.4 °C)  Pulse:  [75-94] 80  Resp:  [18-20] 18  SpO2:  [95 %-98 %] 96 %  BP: " (110-118)/(70-85) 115/75     Weight: 117.8 kg (259 lb 11.2 oz)  Body mass index is 43.22 kg/(m^2).    Intake/Output Summary (Last 24 hours) at 01/19/17 1737  Last data filed at 01/18/17 1810   Gross per 24 hour   Intake              500 ml   Output                0 ml   Net              500 ml      Physical Exam   Constitutional: She is oriented to person, place, and time. She appears well-developed and well-nourished. No distress.   HENT:   Head: Normocephalic and atraumatic.   Right Ear: Hearing, tympanic membrane, external ear and ear canal normal.   Left Ear: Hearing, tympanic membrane, external ear and ear canal normal.   Nose: Nose normal.   Mouth/Throat: Uvula is midline, oropharynx is clear and moist and mucous membranes are normal. No oropharyngeal exudate.   Eyes: Conjunctivae and EOM are normal. Pupils are equal, round, and reactive to light. Right eye exhibits no discharge. Left eye exhibits no discharge. No scleral icterus.   Neck: Normal range of motion. Neck supple. No tracheal deviation present. No thyromegaly present.   Cardiovascular: Normal rate, regular rhythm, normal heart sounds and intact distal pulses.  Exam reveals no gallop and no friction rub.    No murmur heard.  Pulmonary/Chest: Effort normal and breath sounds normal. No stridor. No respiratory distress. She has no wheezes. She has no rales. She exhibits no tenderness.   Abdominal: Soft. Bowel sounds are normal. She exhibits no distension. There is no tenderness. There is no rebound and no guarding.   Musculoskeletal: Normal range of motion. She exhibits no edema or tenderness.   Lymphadenopathy:     She has no cervical adenopathy.   Neurological: She is alert and oriented to person, place, and time. No cranial nerve deficit. Coordination normal.   Skin: Skin is warm and dry. No rash noted. She is not diaphoretic. No erythema. No pallor.   Psychiatric: She has a normal mood and affect. Her behavior is normal. Judgment and thought content  normal. Cognition and memory are normal.   Nursing note and vitals reviewed.      Significant Labs:   BMP:   Recent Labs  Lab 01/19/17  1146   *      K 3.8      CO2 25   BUN 10   CREATININE 0.9   CALCIUM 8.0*   MG 1.6     CBC:   Recent Labs  Lab 01/17/17  1848 01/17/17  2034   WBC 7.92  --    HGB 14.4  --    HCT 42.5 46     --        Significant Imaging: I have reviewed all pertinent imaging results/findings within the past 24 hours.

## 2017-01-19 NOTE — ASSESSMENT & PLAN NOTE
On transition insulin drip early this morning. Placed on subcutaneous preprandial and basal insulin. Hyperglycemia improved but not yet at ADA recommended goals.  1. Insulin aspart 9 units TID with meals.  2. Insulin detemir 33 units BID.  3. Moderate correction dose SSI.  4. Diabetic diet.  5. Monitor glucose and titrate insulin to achieve ADA recommended goals.

## 2017-01-19 NOTE — ASSESSMENT & PLAN NOTE
Likely secondary to poor nutritional intake and intracellular shift in the setting of insulin therapy.  1. Replaced.   2. Monitor.

## 2017-01-19 NOTE — PLAN OF CARE
Mary Cabrera MD   [address] [address]       [facility] [place] - [address]  [address] [address]  Phone: [phone] Fax: [phone]    [facility] - [place], [place] - [address]  [address]  [place] [address]  Phone: [phone] Fax: [phone]    [facility] - [place], [place] - [address]  [address] [facility] [address]  Phone: [phone] Fax: [phone]         01/19/17 1130   Discharge Assessment   Assessment Type Discharge Planning Assessment   Confirmed/corrected address and phone number on facesheet? Yes   Assessment information obtained from? Patient   Expected Length of Stay (days) 3   Communicated expected length of stay with patient/caregiver yes   Prior to hospitilization cognitive status: Alert/Oriented   Prior to hospitalization functional status: Independent   Current cognitive status: Alert/Oriented   Current Functional Status: Independent   Arrived From home or self-care   Lives With child(leticia), adult   Able to Return to Prior Arrangements yes   Is patient able to care for self after discharge? Yes   Patient's perception of discharge disposition home or selfcare   Readmission Within The Last 30 Days no previous admission in last 30 days   Patient currently being followed by outpatient case management? No   Patient currently receives home health services? No   Does the patient currently use HME? Yes   Patient currently receives private duty nursing? No   Patient currently receives any other outside agency services? No   Equipment Currently Used at Home shower chair;bedside commode;wheelchair;walker, rolling   Do you have any problems affording any of your prescribed medications? No   Is the patient taking medications as prescribed? yes   Do you have any financial concerns preventing you from receiving the healthcare you need? No    Does the patient have transportation to healthcare appointments? Yes   Transportation Available family or friend will provide   On Dialysis? No   Does the patient receive services at the Coumadin Clinic? No   Discharge Plan A Home with family   Patient/Family In Agreement With Plan yes       Patient/ family given the Patient Information and Education packet.

## 2017-01-19 NOTE — NURSING
Patient was free of falls and injury during shift. Complains of pain in legs, stating that this is a chronic problem but could not identify the cause.  Shows no non-verbal indicators of pain.     Blood sugar is being managed by endocrine, multiple dosing changes throughout shift. Diet was advanced and education was provided about calling staff when food arrives for BS checks. No acute events during shift. VSS, midline in place and has blood return.

## 2017-01-19 NOTE — PROGRESS NOTES
"[facility]  Endocrinology  Progress Note    Admit Date: 2017     Reason for Consult: Management of T2DM, Hyperglycemia     Diabetes diagnosis year:     Home Diabetes Medications:  none    How often checking glucose at home? none  BG readings on regimen: n/a  Hypoglycemia on the regimen? n/a  Missed doses on regimen?  n/a    Diabetes Complications include:     Hyperglycemia, Diabetic peripheral neuropathy  and Diabetic gastroparesis     Complicating diabetes co morbidities:   none      HPI:   Patient is a 41 y.o. female with a hypothyroidism s/p surgery for thyroid cancer, RA, HTN, obesity who presented on 17 with several weeks of polyuria, polydypsia, blurry vision, and headache. Not previously been on medication for DM. A1C from 2016 was 8.0. Denies any recent illness. Evidence of DKA on admission labs with elevated anion gap, low serum bicarbonate, and hyperglycemia. Endocrine consulted for new DM diagnosis.        Interval HPI:   Overnight events: Hyperglycemia overnight, improved this morning  Eatin%  Nausea: No  Hypoglycemia and intervention: No  Fever: No  TPN and/or TF: No  If yes, type of TF/TPN and rate: n/a    Visit Vitals    /85 (BP Location: Right arm, Patient Position: Lying, BP Method: Automatic)    Pulse 88    Temp 97.8 °F (36.6 °C) (Oral)    Resp 18    Ht 5' 5" (1.651 m)    Wt 117.8 kg (259 lb 11.2 oz)    LMP  (LMP Unknown)    SpO2 97%    Breastfeeding No    BMI 43.22 kg/m2       Labs Reviewed and Include      Recent Labs  Lab 17  0902   *   CALCIUM 8.4*      K 3.8   CO2 26      BUN 11   CREATININE 0.9     Lab Results   Component Value Date    WBC 7.92 2017    HGB 14.4 2017    HCT 46 2017    MCV 86 2017     2017     No results for input(s): TSH, FREET4 in the last 168 hours.  Lab Results   Component Value Date    HGBA1C 12.7 (H) 2017       Nutritional status:   Body mass " index is 43.22 kg/(m^2).  Lab Results   Component Value Date    ALBUMIN 4.2 01/17/2017    ALBUMIN 3.5 12/13/2016    ALBUMIN 3.2 (L) 12/03/2016     No results found for: PREALBUMIN    Estimated Creatinine Clearance: 104.5 mL/min (based on Cr of 0.9).    Accu-Checks  Recent Labs      01/18/17   0807  01/18/17   0950  01/18/17   1044  01/18/17   1553  01/18/17   1801  01/18/17   2115  01/18/17   2211  01/19/17   0112  01/19/17   0222  01/19/17   0903   POCTGLUCOSE  236*  245*  276*  112*  310*  277*  323*  399*  298*  208*       Current Medications and/or Treatments Impacting Glycemic Control  Immunotherapy:  Immunosuppressants     None        Steroids:   Hormones     None        Pressors:    Autonomic Drugs     None        Hyperglycemia/Diabetes Medications: Antihyperglycemics     Start     Stop Route Frequency Ordered    01/19/17 0930  insulin aspart pen 9 Units      -- SubQ 3 times daily with meals 01/19/17 0918    01/19/17 0945  insulin detemir pen 33 Units      -- SubQ 2 times daily 01/19/17 0938          ASSESSMENT and PLAN    * Diabetic ketoacidosis without coma associated with type 2 diabetes mellitus  Blood glucose goal 140-180  c-peptide, CARMEN Ab pending  Transition to MDI detemir 33 units bid, novolog 9 units AC, moderate dose ssi    Discharge rec: will require basal/prandial insulin on discharge with glucometer, test strips, pen needles    Hypothyroidism, postsurgical  Need to obtain thyroid cancer records as outpatient  Recommend increasing levothyroxine to 137mcg daily      Essential hypertension  BP controlled, continue current anti-hypertensives        Caleb Horta MD  Endocrinology  [facility]

## 2017-01-19 NOTE — ASSESSMENT & PLAN NOTE
Well controlled as per JNC-8 guideline criteria.  1. Continue carvedilol 25 mg po BID  2. Continue HCTZ 12.5 mg po daily  3. Discontinue volume replacement when patient able to up keep oral intake.

## 2017-01-19 NOTE — PROGRESS NOTES
"[facility] Hospital Medicine  Progress Note    Patient Name: Homar Jerry  MRN: [id number]  Patient Class: IP- Inpatient   Admission Date: 1/17/2017  Length of Stay: 1 days  Attending Physician: Amanda Wilson MD  Primary Care Provider: Mary Cabrera MD    [facility] Medicine Team: [facility] HOSP MED 2 Amanda Wilson MD    Subjective:     Principal Problem:Diabetic ketoacidosis without coma associated with type 2 diabetes mellitus    HPI:  Homar Jerry is a 41 year old woman with a past medical history of gastroparesis s/p gastric pacemaker and sleeve gastrectomy, RA, chronic sinus tachycardia, HTN and obesity who presented to the ED from clinic with newly diagnosed diabetes after being found to be hyperglycemic to the 500s with elevated ion gap. States that for approximately the past 2 weeks she has had increasing thirst, urine output, blurred vision, headache. In ED found to have worsening hyperglycemia, with anion gap and acidosis. Was bolused with NS and started on insulin infusion with DKA protocol. Patient denies headache, chest pain, sob, abdominal discomfort, changes in bowel habits, blood in stool or urine, abnormal bruising, bleeding, neurological changes.     Hospital Course:  On hospital day one she was evaluated by Endocrinology. Her diabetic ketoacidosis resolved as evidenced by a closed anion gap. She continued to have hyperglycemia and was placed on a transition insulin drip with subcutaneous insulin. Clear liquid diet was started and her levothyroxine dose up titrated to 137 mcg.     Interval History: "I still feel ill". No acute overnight events.     Review of Systems   Constitutional: Negative for chills, fatigue, fever and unexpected weight change.   HENT: Negative for ear pain, facial swelling, hearing loss, mouth sores, nosebleeds, rhinorrhea, sinus pressure, sore throat, tinnitus, trouble swallowing and voice change.    Eyes: Positive for visual " disturbance (blurry vision). Negative for photophobia, pain and redness.   Respiratory: Negative for cough, chest tightness, shortness of breath and wheezing.    Cardiovascular: Negative for chest pain, palpitations and leg swelling.   Gastrointestinal: Negative for abdominal pain, blood in stool, constipation, diarrhea, nausea and vomiting.   Endocrine: Positive for polydipsia, polyphagia and polyuria. Negative for cold intolerance and heat intolerance.   Genitourinary: Negative for decreased urine volume, dysuria, flank pain, frequency, hematuria, menstrual problem, urgency, vaginal bleeding, vaginal discharge and vaginal pain.   Musculoskeletal: Negative for arthralgias, back pain, joint swelling, myalgias and neck pain.   Skin: Negative for rash.   Allergic/Immunologic: Negative for environmental allergies, food allergies and immunocompromised state.   Neurological: Negative for dizziness, seizures, syncope, weakness, light-headedness, numbness and headaches.   Hematological: Negative for adenopathy. Does not bruise/bleed easily.   Psychiatric/Behavioral: Negative for confusion, hallucinations, self-injury, sleep disturbance and suicidal ideas. The patient is not nervous/anxious.      Objective:     Vital Signs (Most Recent):  Temp: 98.7 °F (37.1 °C) (01/18/17 1556)  Pulse: 104 (01/18/17 1700)  Resp: 18 (01/18/17 1556)  BP: 134/85 (01/18/17 1556)  SpO2: 97 % (01/18/17 1556) Vital Signs (24h Range):  Temp:  [97.3 °F (36.3 °C)-99 °F (37.2 °C)] 98.7 °F (37.1 °C)  Pulse:  [] 104  Resp:  [16-18] 18  SpO2:  [95 %-100 %] 97 %  BP: (115-187)/(74-96) 134/85     Weight: 117.8 kg (259 lb 11.2 oz)  Body mass index is 43.22 kg/(m^2).    Intake/Output Summary (Last 24 hours) at 01/18/17 1724  Last data filed at 01/18/17 1500   Gross per 24 hour   Intake             2095 ml   Output                0 ml   Net             2095 ml      Physical Exam   Constitutional: She is oriented to person, place, and time. She appears  well-developed and well-nourished. No distress.   HENT:   Head: Normocephalic and atraumatic.   Right Ear: Hearing, tympanic membrane, external ear and ear canal normal.   Left Ear: Hearing, tympanic membrane, external ear and ear canal normal.   Nose: Nose normal.   Mouth/Throat: Uvula is midline, oropharynx is clear and moist and mucous membranes are normal. No oropharyngeal exudate.   Eyes: Conjunctivae and EOM are normal. Pupils are equal, round, and reactive to light. Right eye exhibits no discharge. Left eye exhibits no discharge. No scleral icterus.   Neck: Normal range of motion. Neck supple. No tracheal deviation present. No thyromegaly present.   Cardiovascular: Normal rate, regular rhythm, normal heart sounds and intact distal pulses.  Exam reveals no gallop and no friction rub.    No murmur heard.  Pulmonary/Chest: Effort normal and breath sounds normal. No stridor. No respiratory distress. She has no wheezes. She has no rales. She exhibits no tenderness.   Abdominal: Soft. Bowel sounds are normal. She exhibits no distension. There is no tenderness. There is no rebound and no guarding.   Musculoskeletal: Normal range of motion. She exhibits no edema or tenderness.   Lymphadenopathy:     She has no cervical adenopathy.   Neurological: She is alert and oriented to person, place, and time. No cranial nerve deficit. Coordination normal.   Skin: Skin is warm and dry. No rash noted. She is not diaphoretic. No erythema. No pallor.   Psychiatric: She has a normal mood and affect. Her behavior is normal. Judgment and thought content normal. Cognition and memory are normal.   Nursing note and vitals reviewed.      Significant Labs:   BMP:   Recent Labs  Lab 01/18/17  1138   *      K 3.4*      CO2 22*   BUN 10   CREATININE 0.9   CALCIUM 8.6*   MG 1.9     CBC:   Recent Labs  Lab 01/17/17  1848 01/17/17  2034   WBC 7.92  --    HGB 14.4  --    HCT 42.5 46     --      POCT Glucose:   Recent  Labs  Lab 01/18/17  1044 01/18/17  1553 01/18/17  1801   POCTGLUCOSE 276* 112* 310*     Urine Studies:   Recent Labs  Lab 01/17/17 2020   COLORU Straw   APPEARANCEUA Clear   PHUR 5.0   SPECGRAV >=1.030*   PROTEINUA Trace*   GLUCUA 4+*   KETONESU 3+*   BILIRUBINUA Negative   OCCULTUA Negative   NITRITE Negative   UROBILINOGEN Negative   LEUKOCYTESUR Trace*   RBCUA 1   WBCUA 9*   BACTERIA Few*   SQUAMEPITHEL 1       Significant Imaging: I have reviewed all pertinent imaging results/findings within the past 24 hours.    Assessment/Plan:      * Diabetic ketoacidosis without coma associated with type 2 diabetes mellitus  Patient afebrile and without leukocytosis. Admitted with new onset DM type 2 and DKA. Started on volume replacement and insulin drip. Anion gap now closed however hyperglycemia remains. Was seen by Endocrinology service and placed on transition insulin drip plus subcutaneous insulin. Hyperglycemia is improving.   1. Continue transition insulin drip and subcutaneous insulin.  2. Monitor glucose levels.  3. Endocrinology service to manage insulin drip.  4. Will need outpatient follow up and insulin therapy.      Type 2 diabetes mellitus with hyperglycemia, without long-term current use of insulin  As above.    Gastroparesis  Stable.  1. Continue pantoprazole 40 mg po BID  2. Continue phenergan 12.5 mg po q6 PRN  3. Small frequent meals    Fibromyalgia  Stable.  1. Continue tizanidine  2. Continue gabapentin      Anxiety  Stable.  1. Continue home xanax 1 mg po BID  2. Continue home mirtazapine 15 mg QHS      Bipolar 1 disorder  Stable and without acute exacerbation.  1. Continue oxcarbezepine  2. Continue olanzapine  3. Continue seroquel      Hypothyroidism, postsurgical  Elevated TSH in December 2016.   1. Appreciate endocrinology recommendations.  2. Optimized: levothyroxine 137 mcg daily.    Migraines  Asymptomatic.  1. Continue topirimate PRN        Essential hypertension  Well controlled as per JNC-8  guideline criteria.  1. Continue carvedilol 25 mg po BID  2. Continue HCTZ 12.5 mg po daily  3. Discontinue volume replacement when patient able to up keep oral intake.       Asthma  Well controlled and without evidence of bronchospasm.  1. Monitor.  2. Albuterol as needed if bronchospasm develops.      Rheumatoid arthritis  Stable.  1. Continue home percocet  q6 PRN      Morbid obesity  Patient will require lifestyle modifications.  1. Outpatient management.      Hypokalemia  Likely secondary to poor nutritional intake and intracellular shift in the setting of insulin therapy.  1. Replaced.   2. Monitor.      Hypoxia  Patient with apparent hypoxia on ABG. However this was a venous sample. No evidence of hypoxia on based on oxygen saturation.  1. Monitor.      Hypocalcemia  Likely pseudo-hypocalcemia as patient had normal calcium level upon admission.  1. Monitor.      VTE Risk Mitigation         Ordered     enoxaparin injection 40 mg  Daily     Route:  Subcutaneous        01/17/17 2349     Medium Risk of VTE  Once      01/17/17 2349     Place DANDRE hose  Until discontinued      01/17/17 7125          Amanda Wilson MD  Department of Hospital Medicine   [facility]

## 2017-01-19 NOTE — ASSESSMENT & PLAN NOTE
Stable.  1. Continue pantoprazole 40 mg po BID  2. Continue phenergan 12.5 mg every 6 hours as needed  3. Small frequent meals

## 2017-01-20 LAB
ANION GAP SERPL CALC-SCNC: 8 MMOL/L
BUN SERPL-MCNC: 11 MG/DL
CALCIUM SERPL-MCNC: 8.2 MG/DL
CHLORIDE SERPL-SCNC: 107 MMOL/L
CO2 SERPL-SCNC: 23 MMOL/L
CREAT SERPL-MCNC: 1 MG/DL
EST. GFR  (AFRICAN AMERICAN): >60 ML/MIN/1.73 M^2
EST. GFR  (NON AFRICAN AMERICAN): >60 ML/MIN/1.73 M^2
GAD65 AB SER-SCNC: 0.05 NMOL/L
GLUCOSE SERPL-MCNC: 403 MG/DL
MAGNESIUM SERPL-MCNC: 1.7 MG/DL
PHOSPHATE SERPL-MCNC: 3.9 MG/DL
POCT GLUCOSE: 181 MG/DL (ref 70–110)
POCT GLUCOSE: 192 MG/DL (ref 70–110)
POCT GLUCOSE: 311 MG/DL (ref 70–110)
POCT GLUCOSE: 353 MG/DL (ref 70–110)
POTASSIUM SERPL-SCNC: 3.8 MMOL/L
SODIUM SERPL-SCNC: 138 MMOL/L

## 2017-01-20 PROCEDURE — 80048 BASIC METABOLIC PNL TOTAL CA: CPT

## 2017-01-20 PROCEDURE — 83735 ASSAY OF MAGNESIUM: CPT

## 2017-01-20 PROCEDURE — 25000003 PHARM REV CODE 250: Performed by: STUDENT IN AN ORGANIZED HEALTH CARE EDUCATION/TRAINING PROGRAM

## 2017-01-20 PROCEDURE — 25000003 PHARM REV CODE 250: Performed by: INTERNAL MEDICINE

## 2017-01-20 PROCEDURE — 99233 SBSQ HOSP IP/OBS HIGH 50: CPT | Mod: ,,, | Performed by: INTERNAL MEDICINE

## 2017-01-20 PROCEDURE — 63600175 PHARM REV CODE 636 W HCPCS: Performed by: INTERNAL MEDICINE

## 2017-01-20 PROCEDURE — 99232 SBSQ HOSP IP/OBS MODERATE 35: CPT | Mod: GC,,, | Performed by: INTERNAL MEDICINE

## 2017-01-20 PROCEDURE — 84100 ASSAY OF PHOSPHORUS: CPT

## 2017-01-20 PROCEDURE — 63600175 PHARM REV CODE 636 W HCPCS: Performed by: STUDENT IN AN ORGANIZED HEALTH CARE EDUCATION/TRAINING PROGRAM

## 2017-01-20 PROCEDURE — 20600001 HC STEP DOWN PRIVATE ROOM

## 2017-01-20 RX ORDER — LANCETS
1 EACH MISCELLANEOUS
Qty: 100 EACH | Refills: 2 | Status: SHIPPED | OUTPATIENT
Start: 2017-01-20

## 2017-01-20 RX ORDER — LEVOTHYROXINE SODIUM 137 UG/1
137 TABLET ORAL DAILY
Qty: 30 TABLET | Refills: 11 | Status: SHIPPED | OUTPATIENT
Start: 2017-01-20 | End: 2017-01-21

## 2017-01-20 RX ORDER — INSULIN ASPART 100 [IU]/ML
16 INJECTION, SOLUTION INTRAVENOUS; SUBCUTANEOUS 3 TIMES DAILY
Qty: 1 BOX | Refills: 1 | Status: SHIPPED | OUTPATIENT
Start: 2017-01-20 | End: 2017-01-21 | Stop reason: HOSPADM

## 2017-01-20 RX ORDER — INSULIN ASPART 100 [IU]/ML
13 INJECTION, SOLUTION INTRAVENOUS; SUBCUTANEOUS
Qty: 1 BOX | Refills: 2 | Status: SHIPPED | OUTPATIENT
Start: 2017-01-20 | End: 2017-01-21

## 2017-01-20 RX ORDER — INSULIN ASPART 100 [IU]/ML
16 INJECTION, SOLUTION INTRAVENOUS; SUBCUTANEOUS
Status: DISCONTINUED | OUTPATIENT
Start: 2017-01-20 | End: 2017-01-23 | Stop reason: HOSPADM

## 2017-01-20 RX ORDER — INSULIN PUMP SYRINGE, 3 ML
EACH MISCELLANEOUS
Qty: 1 EACH | Refills: 0 | Status: SHIPPED | OUTPATIENT
Start: 2017-01-20 | End: 2020-07-16

## 2017-01-20 RX ORDER — PEN NEEDLE, DIABETIC 30 GX3/16"
1 NEEDLE, DISPOSABLE MISCELLANEOUS
Qty: 200 EACH | Refills: 2 | Status: SHIPPED | OUTPATIENT
Start: 2017-01-20

## 2017-01-20 RX ORDER — TIZANIDINE 4 MG/1
8 TABLET ORAL ONCE
Status: COMPLETED | OUTPATIENT
Start: 2017-01-20 | End: 2017-01-20

## 2017-01-20 RX ADMIN — OXYCODONE HYDROCHLORIDE AND ACETAMINOPHEN 1 TABLET: 10; 325 TABLET ORAL at 10:01

## 2017-01-20 RX ADMIN — GABAPENTIN 600 MG: 300 CAPSULE ORAL at 05:01

## 2017-01-20 RX ADMIN — TIZANIDINE 8 MG: 4 TABLET ORAL at 11:01

## 2017-01-20 RX ADMIN — ALPRAZOLAM 1 MG: 1 TABLET ORAL at 09:01

## 2017-01-20 RX ADMIN — PANTOPRAZOLE SODIUM 40 MG: 40 TABLET, DELAYED RELEASE ORAL at 05:01

## 2017-01-20 RX ADMIN — CARVEDILOL 25 MG: 25 TABLET, FILM COATED ORAL at 09:01

## 2017-01-20 RX ADMIN — HYDROCHLOROTHIAZIDE 12.5 MG: 12.5 TABLET ORAL at 09:01

## 2017-01-20 RX ADMIN — TIZANIDINE 4 MG: 4 TABLET ORAL at 09:01

## 2017-01-20 RX ADMIN — POTASSIUM ACETATE: 3.93 INJECTION, SOLUTION, CONCENTRATE INTRAVENOUS at 01:01

## 2017-01-20 RX ADMIN — QUETIAPINE FUMARATE 500 MG: 300 TABLET, FILM COATED ORAL at 09:01

## 2017-01-20 RX ADMIN — OLANZAPINE 20 MG: 5 TABLET, FILM COATED ORAL at 09:01

## 2017-01-20 RX ADMIN — INSULIN ASPART 2 UNITS: 100 INJECTION, SOLUTION INTRAVENOUS; SUBCUTANEOUS at 06:01

## 2017-01-20 RX ADMIN — GABAPENTIN 600 MG: 300 CAPSULE ORAL at 03:01

## 2017-01-20 RX ADMIN — POTASSIUM ACETATE: 3.93 INJECTION, SOLUTION, CONCENTRATE INTRAVENOUS at 11:01

## 2017-01-20 RX ADMIN — INSULIN DETEMIR 33 UNITS: 100 INJECTION, SOLUTION SUBCUTANEOUS at 10:01

## 2017-01-20 RX ADMIN — POTASSIUM ACETATE: 3.93 INJECTION, SOLUTION, CONCENTRATE INTRAVENOUS at 09:01

## 2017-01-20 RX ADMIN — MIRTAZAPINE 15 MG: 7.5 TABLET, FILM COATED ORAL at 09:01

## 2017-01-20 RX ADMIN — INSULIN DETEMIR 25 UNITS: 100 INJECTION, SOLUTION SUBCUTANEOUS at 09:01

## 2017-01-20 RX ADMIN — INSULIN ASPART 8 UNITS: 100 INJECTION, SOLUTION INTRAVENOUS; SUBCUTANEOUS at 10:01

## 2017-01-20 RX ADMIN — INSULIN ASPART 1 UNITS: 100 INJECTION, SOLUTION INTRAVENOUS; SUBCUTANEOUS at 09:01

## 2017-01-20 RX ADMIN — OXCARBAZEPINE 150 MG: 150 TABLET ORAL at 09:01

## 2017-01-20 RX ADMIN — INSULIN ASPART 10 UNITS: 100 INJECTION, SOLUTION INTRAVENOUS; SUBCUTANEOUS at 02:01

## 2017-01-20 RX ADMIN — OXYCODONE HYDROCHLORIDE AND ACETAMINOPHEN 1 TABLET: 10; 325 TABLET ORAL at 11:01

## 2017-01-20 RX ADMIN — GABAPENTIN 600 MG: 300 CAPSULE ORAL at 09:01

## 2017-01-20 RX ADMIN — INSULIN ASPART 16 UNITS: 100 INJECTION, SOLUTION INTRAVENOUS; SUBCUTANEOUS at 06:01

## 2017-01-20 RX ADMIN — PANTOPRAZOLE SODIUM 40 MG: 40 TABLET, DELAYED RELEASE ORAL at 03:01

## 2017-01-20 RX ADMIN — ENOXAPARIN SODIUM 40 MG: 100 INJECTION SUBCUTANEOUS at 11:01

## 2017-01-20 RX ADMIN — LEVOTHYROXINE SODIUM 137 MCG: 25 TABLET ORAL at 05:01

## 2017-01-20 RX ADMIN — INSULIN ASPART 9 UNITS: 100 INJECTION, SOLUTION INTRAVENOUS; SUBCUTANEOUS at 02:01

## 2017-01-20 RX ADMIN — OXYCODONE HYDROCHLORIDE AND ACETAMINOPHEN 1 TABLET: 10; 325 TABLET ORAL at 04:01

## 2017-01-20 RX ADMIN — INSULIN ASPART 9 UNITS: 100 INJECTION, SOLUTION INTRAVENOUS; SUBCUTANEOUS at 10:01

## 2017-01-20 NOTE — ASSESSMENT & PLAN NOTE
Blood glucose goal 140-180  c-peptide low -- can be falsely low with glucose toxicity, CARMEN Ab pending  Recommend MDI detemir 33 units bid, novolog 9 units AC, moderate dose ssi    Discharge rec: will require basal/prandial insulin on discharge with glucometer, test strips, pen needles

## 2017-01-20 NOTE — PROGRESS NOTES
"[facility]  Endocrinology  Progress Note    Admit Date: 2017     Reason for Consult: Management of T2DM, Hyperglycemia     Diabetes diagnosis year:     Home Diabetes Medications:  none    How often checking glucose at home? none  BG readings on regimen: n/a  Hypoglycemia on the regimen? n/a  Missed doses on regimen?  n/a    Diabetes Complications include:     Hyperglycemia, Diabetic peripheral neuropathy  and Diabetic gastroparesis     Complicating diabetes co morbidities:   none      HPI:   Patient is a 41 y.o. female with a hypothyroidism s/p surgery for thyroid cancer, RA, HTN, obesity who presented on 17 with several weeks of polyuria, polydypsia, blurry vision, and headache. Not previously been on medication for DM. A1C from 2016 was 8.0. Denies any recent illness. Evidence of DKA on admission labs with elevated anion gap, low serum bicarbonate, and hyperglycemia. Endocrine consulted for new DM diagnosis.        Interval HPI:   Overnight events: hyperglycemia this morning  Eatin%  Nausea: No  Hypoglycemia and intervention: No  Fever: No  TPN and/or TF: No  If yes, type of TF/TPN and rate: n/a    Visit Vitals    /79 (BP Location: Right arm, Patient Position: Lying, BP Method: Automatic)    Pulse 79    Temp 97.9 °F (36.6 °C) (Oral)    Resp 20    Ht 5' 5" (1.651 m)    Wt 117.8 kg (259 lb 11.2 oz)    LMP  (LMP Unknown)    SpO2 97%    Breastfeeding No    BMI 43.22 kg/m2       Labs Reviewed and Include      Recent Labs  Lab 17  0440   *   CALCIUM 8.2*      K 3.8   CO2 23      BUN 11   CREATININE 1.0     Lab Results   Component Value Date    WBC 7.92 2017    HGB 14.4 2017    HCT 46 2017    MCV 86 2017     2017     No results for input(s): TSH, FREET4 in the last 168 hours.  Lab Results   Component Value Date    HGBA1C 12.7 (H) 2017       Nutritional status:   Body mass index is 43.22 " kg/(m^2).  Lab Results   Component Value Date    ALBUMIN 4.2 01/17/2017    ALBUMIN 3.5 12/13/2016    ALBUMIN 3.2 (L) 12/03/2016     No results found for: PREALBUMIN    Estimated Creatinine Clearance: 94.1 mL/min (based on Cr of 1).    Accu-Checks  Recent Labs      01/18/17   1553  01/18/17   1801  01/18/17   2115  01/18/17   2211  01/19/17   0112  01/19/17   0222  01/19/17   0903  01/19/17   1347  01/19/17   1729  01/19/17   2258   POCTGLUCOSE  112*  310*  277*  323*  399*  298*  208*  272*  207*  341*       Current Medications and/or Treatments Impacting Glycemic Control  Immunotherapy:  Immunosuppressants     None        Steroids:   Hormones     None        Pressors:    Autonomic Drugs     None        Hyperglycemia/Diabetes Medications: Antihyperglycemics     Start     Stop Route Frequency Ordered    01/19/17 0930  insulin aspart pen 9 Units      -- SubQ 3 times daily with meals 01/19/17 0918    01/19/17 1610  insulin aspart pen 1-10 Units      -- SubQ Before meals & nightly PRN 01/19/17 1510    01/20/17 2100  insulin detemir pen 33 Units      -- SubQ 2 times daily 01/20/17 1008          ASSESSMENT and PLAN    * Diabetic ketoacidosis without coma associated with type 2 diabetes mellitus  Resolved --Blood glucose goal 140-180  c-peptide low -- can be falsely low with glucose toxicity, CARMEN Ab pending  Recommend MDI detemir 33 units bid, novolog 9 units AC, moderate dose ssi    Discharge rec: will require basal/prandial insulin on discharge with glucometer, test strips, pen needles    Hypothyroidism, postsurgical  Need to obtain thyroid cancer records as outpatient  Recommend levothyroxine to 137mcg daily    Hypocalcemia  Maybe secondary to thyroid surgery and hypoparathyroidism  Would recommend Vit D level  Likely would benefit from calcium and Vit D supplementation    Essential hypertension  BP controlled, continue current anti-hypertensives      Morbid obesity  May contribute to insulin resistance      Caleb  MD Mychal  Endocrinology  [facility]

## 2017-01-20 NOTE — PLAN OF CARE
Sw asked to assist in getting pt's insulin, Sw asked to fax/esribe Pt's prescription to [facility] and Sw did fax cost transfer form to [phone]. RAFI Crawford to assist if needed and team aware.

## 2017-01-20 NOTE — PLAN OF CARE
Problem: Patient Care Overview  Goal: Plan of Care Review  Outcome: Ongoing (interventions implemented as appropriate)  No acute events to occur overnight.  Pt remained AAOx4.  Accuchecks AC/HS- coverage given during those times.  16 units of levemir given during shift per Dr. Hearn's orders.  No falls or injuries to occur.  Pt to bedside commode with assist.

## 2017-01-20 NOTE — PROGRESS NOTES
"[facility] Hospital Medicine  Progress Note    Patient Name: Homar Jerry  MRN: [id number]  Patient Class: IP- Inpatient   Admission Date: 1/17/2017  Length of Stay: 2 days  Attending Physician: Amanda Wilson MD  Primary Care Provider: Mary Cabrera MD    [facility] Medicine Team: [facility] HOSP MED 2 Amanda Wilson MD    Subjective:     Principal Problem:Diabetic ketoacidosis without coma associated with type 2 diabetes mellitus    HPI:  Homar Jerry is a 41 year old woman with a past medical history of gastroparesis s/p gastric pacemaker and sleeve gastrectomy, RA, chronic sinus tachycardia, HTN and obesity who presented to the ED from clinic with newly diagnosed diabetes after being found to be hyperglycemic to the 500s with elevated ion gap. States that for approximately the past 2 weeks she has had increasing thirst, urine output, blurred vision, headache. In ED found to have worsening hyperglycemia, with anion gap and acidosis. Was bolused with NS and started on insulin infusion with DKA protocol. Patient denies headache, chest pain, sob, abdominal discomfort, changes in bowel habits, blood in stool or urine, abnormal bruising, bleeding, neurological changes.     Hospital Course:  On hospital day one she was evaluated by Endocrinology. Her diabetic ketoacidosis resolved as evidenced by a closed anion gap. She continued to have hyperglycemia and was placed on a transition insulin drip with subcutaneous insulin. Clear liquid diet was started and her levothyroxine dose up titrated to 137 mcg. On hospital day 2 she was taken off transition insulin drip and placed on prandial with basal insulin. Her hyperglycemia improved.     Interval History: "I'm nauseated today". No acute overnight events. Nauseated today but tolerating diet.    Review of Systems   Constitutional: Negative for chills, fatigue, fever and unexpected weight change.   HENT: Negative for ear pain, facial " swelling, hearing loss, mouth sores, nosebleeds, rhinorrhea, sinus pressure, sore throat, tinnitus, trouble swallowing and voice change.    Eyes: Negative for photophobia, pain, redness and visual disturbance.   Respiratory: Negative for cough, chest tightness, shortness of breath and wheezing.    Cardiovascular: Negative for chest pain, palpitations and leg swelling.   Gastrointestinal: Positive for nausea. Negative for abdominal pain, blood in stool, constipation, diarrhea and vomiting.   Endocrine: Negative for cold intolerance, heat intolerance, polydipsia, polyphagia and polyuria.   Genitourinary: Negative for decreased urine volume, dysuria, flank pain, frequency, hematuria, menstrual problem, urgency, vaginal bleeding, vaginal discharge and vaginal pain.   Musculoskeletal: Negative for arthralgias, back pain, joint swelling, myalgias and neck pain.   Skin: Negative for rash.   Allergic/Immunologic: Negative for environmental allergies, food allergies and immunocompromised state.   Neurological: Negative for dizziness, seizures, syncope, weakness, light-headedness, numbness and headaches.   Hematological: Negative for adenopathy. Does not bruise/bleed easily.   Psychiatric/Behavioral: Negative for confusion, hallucinations, self-injury, sleep disturbance and suicidal ideas. The patient is not nervous/anxious.      Objective:     Vital Signs (Most Recent):  Temp: 97.6 °F (36.4 °C) (01/19/17 1159)  Pulse: 80 (01/19/17 1300)  Resp: 18 (01/19/17 1159)  BP: 115/75 (01/19/17 1159)  SpO2: 96 % (01/19/17 1159) Vital Signs (24h Range):  Temp:  [97.5 °F (36.4 °C)-97.8 °F (36.6 °C)] 97.6 °F (36.4 °C)  Pulse:  [75-94] 80  Resp:  [18-20] 18  SpO2:  [95 %-98 %] 96 %  BP: (110-118)/(70-85) 115/75     Weight: 117.8 kg (259 lb 11.2 oz)  Body mass index is 43.22 kg/(m^2).    Intake/Output Summary (Last 24 hours) at 01/19/17 6417  Last data filed at 01/18/17 1810   Gross per 24 hour   Intake              500 ml   Output                 0 ml   Net              500 ml      Physical Exam   Constitutional: She is oriented to person, place, and time. She appears well-developed and well-nourished. No distress.   HENT:   Head: Normocephalic and atraumatic.   Right Ear: Hearing, tympanic membrane, external ear and ear canal normal.   Left Ear: Hearing, tympanic membrane, external ear and ear canal normal.   Nose: Nose normal.   Mouth/Throat: Uvula is midline, oropharynx is clear and moist and mucous membranes are normal. No oropharyngeal exudate.   Eyes: Conjunctivae and EOM are normal. Pupils are equal, round, and reactive to light. Right eye exhibits no discharge. Left eye exhibits no discharge. No scleral icterus.   Neck: Normal range of motion. Neck supple. No tracheal deviation present. No thyromegaly present.   Cardiovascular: Normal rate, regular rhythm, normal heart sounds and intact distal pulses.  Exam reveals no gallop and no friction rub.    No murmur heard.  Pulmonary/Chest: Effort normal and breath sounds normal. No stridor. No respiratory distress. She has no wheezes. She has no rales. She exhibits no tenderness.   Abdominal: Soft. Bowel sounds are normal. She exhibits no distension. There is no tenderness. There is no rebound and no guarding.   Musculoskeletal: Normal range of motion. She exhibits no edema or tenderness.   Lymphadenopathy:     She has no cervical adenopathy.   Neurological: She is alert and oriented to person, place, and time. No cranial nerve deficit. Coordination normal.   Skin: Skin is warm and dry. No rash noted. She is not diaphoretic. No erythema. No pallor.   Psychiatric: She has a normal mood and affect. Her behavior is normal. Judgment and thought content normal. Cognition and memory are normal.   Nursing note and vitals reviewed.      Significant Labs:   BMP:   Recent Labs  Lab 01/19/17  1146   *      K 3.8      CO2 25   BUN 10   CREATININE 0.9   CALCIUM 8.0*   MG 1.6     CBC:   Recent  Labs  Lab 01/17/17 1848 01/17/17 2034   WBC 7.92  --    HGB 14.4  --    HCT 42.5 46     --        Significant Imaging: I have reviewed all pertinent imaging results/findings within the past 24 hours.    Assessment/Plan:      * Diabetic ketoacidosis without coma associated with type 2 diabetes mellitus  On transition insulin drip early this morning. Placed on subcutaneous preprandial and basal insulin. Hyperglycemia improved but not yet at ADA recommended goals.  1. Insulin aspart 9 units TID with meals.  2. Insulin detemir 33 units BID.  3. Moderate correction dose SSI.  4. Diabetic diet.  5. Monitor glucose and titrate insulin to achieve ADA recommended goals.    Type 2 diabetes mellitus with hyperglycemia, without long-term current use of insulin  As above.    Gastroparesis  Stable.  1. Continue pantoprazole 40 mg po BID  2. Continue phenergan 12.5 mg every 6 hours as needed  3. Small frequent meals    Fibromyalgia  Stable.  1. Continue tizanidine  2. Continue gabapentin      Anxiety  Stable.  1. Continue home xanax 1 mg po BID  2. Continue home mirtazapine 15 mg QHS      Bipolar 1 disorder  Stable and without acute exacerbation.  1. Continue oxcarbezepine  2. Continue olanzapine  3. Continue seroquel      Hypothyroidism, postsurgical  Elevated TSH in December 2016.   1. Appreciate endocrinology recommendations.  2. Levothyroxine 137 mcg daily.    Migraines  Asymptomatic.  1. Continue topirimate PRN        Essential hypertension  Well controlled as per JNC-8 guideline criteria.  1. Continue carvedilol 25 mg po BID  2. Continue HCTZ 12.5 mg po daily  3. Discontinue volume replacement when patient able to up keep oral intake.       Asthma  Well controlled and without evidence of bronchospasm.  1. Monitor.  2. Albuterol as needed if bronchospasm develops.      Rheumatoid arthritis  Stable.  1. Continue home percocet  every 6 hours as needed      Morbid obesity  Patient will require lifestyle  modifications.  1. Outpatient management.      Hypokalemia  Likely secondary to poor nutritional intake and intracellular shift in the setting of insulin therapy. Now resolved.  1. Monitor.      Hypoxia  Patient with apparent hypoxia on ABG. However this was a venous sample. No evidence of hypoxia on based on oxygen saturation.  1. Monitor.      Hypocalcemia  Likely pseudo-hypocalcemia as patient had normal calcium level upon admission.  1. Monitor.      Diabetes mellitus, new onset  As above.      Hyperglycemia  As above      VTE Risk Mitigation         Ordered     enoxaparin injection 40 mg  Daily     Route:  Subcutaneous        01/17/17 2349     Medium Risk of VTE  Once      01/17/17 2349     Place DANDRE hose  Until discontinued      01/17/17 0268          Amanda Wilson MD  Department of Hospital Medicine   [facility]

## 2017-01-20 NOTE — SUBJECTIVE & OBJECTIVE
"Interval HPI:   Overnight events: hyperglycemia this morning  Eatin%  Nausea: No  Hypoglycemia and intervention: No  Fever: No  TPN and/or TF: No  If yes, type of TF/TPN and rate: n/a    Visit Vitals    /79 (BP Location: Right arm, Patient Position: Lying, BP Method: Automatic)    Pulse 79    Temp 97.9 °F (36.6 °C) (Oral)    Resp 20    Ht 5' 5" (1.651 m)    Wt 117.8 kg (259 lb 11.2 oz)    LMP  (LMP Unknown)    SpO2 97%    Breastfeeding No    BMI 43.22 kg/m2       Labs Reviewed and Include      Recent Labs  Lab 17  0440   *   CALCIUM 8.2*      K 3.8   CO2 23      BUN 11   CREATININE 1.0     Lab Results   Component Value Date    WBC 7.92 2017    HGB 14.4 2017    HCT 46 2017    MCV 86 2017     2017     No results for input(s): TSH, FREET4 in the last 168 hours.  Lab Results   Component Value Date    HGBA1C 12.7 (H) 2017       Nutritional status:   Body mass index is 43.22 kg/(m^2).  Lab Results   Component Value Date    ALBUMIN 4.2 2017    ALBUMIN 3.5 2016    ALBUMIN 3.2 (L) 2016     No results found for: PREALBUMIN    Estimated Creatinine Clearance: 94.1 mL/min (based on Cr of 1).    Accu-Checks  Recent Labs      17   1553  17   1801  17   2115  17   2211  17   0112  17   0222  17   0903  17   1347  17   1729  17   2258   POCTGLUCOSE  112*  310*  277*  323*  399*  298*  208*  272*  207*  341*       Current Medications and/or Treatments Impacting Glycemic Control  Immunotherapy:  Immunosuppressants     None        Steroids:   Hormones     None        Pressors:    Autonomic Drugs     None        Hyperglycemia/Diabetes Medications: Antihyperglycemics     Start     Stop Route Frequency Ordered    17 0930  insulin aspart pen 9 Units      -- SubQ 3 times daily with meals 17 0918    17 1610  insulin aspart pen 1-10 Units      -- SubQ Before " meals & nightly PRN 01/19/17 1510    01/20/17 2100  insulin detemir pen 33 Units      -- SubQ 2 times daily 01/20/17 1008

## 2017-01-20 NOTE — ASSESSMENT & PLAN NOTE
Maybe secondary to thyroid surgery and hypoparathyroidism  Would recommend Vit D level  Likely would benefit from calcium and Vit D supplementation

## 2017-01-20 NOTE — ASSESSMENT & PLAN NOTE
Likely secondary to poor nutritional intake and intracellular shift in the setting of insulin therapy. Now resolved.  1. Monitor.

## 2017-01-20 NOTE — PLAN OF CARE
Patient not medically stable for discharge at this time. Admitted with new onset DM type 2. Patient is still hyperglycemic, adjusting insulin doses. Ms. Jerry is expected to discharge home with family when medically stable.       01/20/17 1352   Right Care Assessment   Can the patient answer the patient profile reliably? Yes, cognitively intact   How often would a person be available to care for the patient? Occasionally   Describe the patient's ability to walk at the present time. No restrictions   How does the patient rate their overall health at the present time? Fair   Number of comorbid conditions (as recorded on the chart) Diabetes, complicated   During the past month, has the patient often been bothered by feeling down, depressed or hopeless? Yes   During the past month, has the patient often been bothered by little interest or pleasure in doing things? Yes

## 2017-01-21 PROBLEM — E55.9 VITAMIN D DEFICIENCY: Status: ACTIVE | Noted: 2017-01-21

## 2017-01-21 PROBLEM — E83.42 HYPOMAGNESEMIA: Status: ACTIVE | Noted: 2017-01-21

## 2017-01-21 PROBLEM — E11.10 DIABETIC KETOACIDOSIS WITHOUT COMA ASSOCIATED WITH TYPE 2 DIABETES MELLITUS: Status: RESOLVED | Noted: 2017-01-17 | Resolved: 2017-01-21

## 2017-01-21 LAB
25(OH)D3+25(OH)D2 SERPL-MCNC: 20 NG/ML
ANION GAP SERPL CALC-SCNC: 7 MMOL/L
BUN SERPL-MCNC: 15 MG/DL
CALCIUM SERPL-MCNC: 8 MG/DL
CHLORIDE SERPL-SCNC: 108 MMOL/L
CO2 SERPL-SCNC: 26 MMOL/L
CREAT SERPL-MCNC: 1 MG/DL
EST. GFR  (AFRICAN AMERICAN): >60 ML/MIN/1.73 M^2
EST. GFR  (NON AFRICAN AMERICAN): >60 ML/MIN/1.73 M^2
GLUCOSE SERPL-MCNC: 297 MG/DL
MAGNESIUM SERPL-MCNC: 1.5 MG/DL
PHOSPHATE SERPL-MCNC: 4.5 MG/DL
POCT GLUCOSE: 164 MG/DL (ref 70–110)
POCT GLUCOSE: 176 MG/DL (ref 70–110)
POCT GLUCOSE: 193 MG/DL (ref 70–110)
POCT GLUCOSE: 219 MG/DL (ref 70–110)
POCT GLUCOSE: 263 MG/DL (ref 70–110)
POTASSIUM SERPL-SCNC: 3.6 MMOL/L
SODIUM SERPL-SCNC: 141 MMOL/L
T4 FREE SERPL-MCNC: 0.83 NG/DL
TSH SERPL DL<=0.005 MIU/L-ACNC: 10.82 UIU/ML

## 2017-01-21 PROCEDURE — 80048 BASIC METABOLIC PNL TOTAL CA: CPT

## 2017-01-21 PROCEDURE — 20600001 HC STEP DOWN PRIVATE ROOM

## 2017-01-21 PROCEDURE — 84439 ASSAY OF FREE THYROXINE: CPT

## 2017-01-21 PROCEDURE — 84443 ASSAY THYROID STIM HORMONE: CPT

## 2017-01-21 PROCEDURE — 84100 ASSAY OF PHOSPHORUS: CPT

## 2017-01-21 PROCEDURE — 99239 HOSP IP/OBS DSCHRG MGMT >30: CPT | Mod: GC,,, | Performed by: INTERNAL MEDICINE

## 2017-01-21 PROCEDURE — 82306 VITAMIN D 25 HYDROXY: CPT

## 2017-01-21 PROCEDURE — 25000003 PHARM REV CODE 250: Performed by: INTERNAL MEDICINE

## 2017-01-21 PROCEDURE — 25000003 PHARM REV CODE 250: Performed by: STUDENT IN AN ORGANIZED HEALTH CARE EDUCATION/TRAINING PROGRAM

## 2017-01-21 PROCEDURE — 63600175 PHARM REV CODE 636 W HCPCS: Performed by: STUDENT IN AN ORGANIZED HEALTH CARE EDUCATION/TRAINING PROGRAM

## 2017-01-21 PROCEDURE — 99232 SBSQ HOSP IP/OBS MODERATE 35: CPT | Mod: GC,,, | Performed by: INTERNAL MEDICINE

## 2017-01-21 PROCEDURE — 83735 ASSAY OF MAGNESIUM: CPT

## 2017-01-21 RX ORDER — LEVOTHYROXINE SODIUM 150 UG/1
150 TABLET ORAL DAILY
Status: DISCONTINUED | OUTPATIENT
Start: 2017-01-22 | End: 2017-01-23 | Stop reason: HOSPADM

## 2017-01-21 RX ORDER — LANOLIN ALCOHOL/MO/W.PET/CERES
400 CREAM (GRAM) TOPICAL ONCE
Status: COMPLETED | OUTPATIENT
Start: 2017-01-21 | End: 2017-01-21

## 2017-01-21 RX ORDER — ERGOCALCIFEROL 1.25 MG/1
CAPSULE ORAL
Qty: 5 CAPSULE | Refills: 0 | Status: SHIPPED | OUTPATIENT
Start: 2017-01-21 | End: 2017-02-07 | Stop reason: ALTCHOICE

## 2017-01-21 RX ORDER — ERGOCALCIFEROL 1.25 MG/1
50000 CAPSULE ORAL ONCE
Status: COMPLETED | OUTPATIENT
Start: 2017-01-22 | End: 2017-01-22

## 2017-01-21 RX ORDER — VIT C/E/ZN/COPPR/LUTEIN/ZEAXAN 250MG-90MG
2000 CAPSULE ORAL DAILY
Refills: 0 | COMMUNITY
Start: 2017-01-21

## 2017-01-21 RX ORDER — INSULIN ASPART 100 [IU]/ML
1-10 INJECTION, SOLUTION INTRAVENOUS; SUBCUTANEOUS
Qty: 1 BOX | Refills: 2 | Status: SHIPPED | OUTPATIENT
Start: 2017-01-21 | End: 2017-01-21 | Stop reason: HOSPADM

## 2017-01-21 RX ORDER — LEVOTHYROXINE SODIUM 137 UG/1
150 TABLET ORAL DAILY
Qty: 30 TABLET | Refills: 11 | Status: SHIPPED | OUTPATIENT
Start: 2017-01-21 | End: 2017-01-21

## 2017-01-21 RX ORDER — INSULIN ASPART 100 [IU]/ML
16 INJECTION, SOLUTION INTRAVENOUS; SUBCUTANEOUS
Qty: 1 BOX | Refills: 2 | Status: ON HOLD | OUTPATIENT
Start: 2017-01-21 | End: 2018-04-16 | Stop reason: HOSPADM

## 2017-01-21 RX ORDER — ERGOCALCIFEROL 1.25 MG/1
50000 CAPSULE ORAL ONCE
Status: DISCONTINUED | OUTPATIENT
Start: 2017-01-21 | End: 2017-01-21

## 2017-01-21 RX ORDER — CALCIUM CARBONATE 500(1250)
2 TABLET ORAL DAILY
Refills: 0 | Status: ON HOLD | COMMUNITY
Start: 2017-01-21 | End: 2018-04-02

## 2017-01-21 RX ORDER — LEVOTHYROXINE SODIUM 150 UG/1
150 TABLET ORAL DAILY
Qty: 30 TABLET | Refills: 3 | Status: ON HOLD | OUTPATIENT
Start: 2017-01-21 | End: 2018-04-16 | Stop reason: HOSPADM

## 2017-01-21 RX ADMIN — OLANZAPINE 20 MG: 5 TABLET, FILM COATED ORAL at 08:01

## 2017-01-21 RX ADMIN — POTASSIUM ACETATE: 3.93 INJECTION, SOLUTION, CONCENTRATE INTRAVENOUS at 08:01

## 2017-01-21 RX ADMIN — GABAPENTIN 600 MG: 300 CAPSULE ORAL at 09:01

## 2017-01-21 RX ADMIN — OXCARBAZEPINE 150 MG: 150 TABLET ORAL at 08:01

## 2017-01-21 RX ADMIN — OXYCODONE HYDROCHLORIDE AND ACETAMINOPHEN 1 TABLET: 10; 325 TABLET ORAL at 12:01

## 2017-01-21 RX ADMIN — MIRTAZAPINE 15 MG: 7.5 TABLET, FILM COATED ORAL at 08:01

## 2017-01-21 RX ADMIN — OXYCODONE HYDROCHLORIDE AND ACETAMINOPHEN 1 TABLET: 10; 325 TABLET ORAL at 08:01

## 2017-01-21 RX ADMIN — ENOXAPARIN SODIUM 40 MG: 100 INJECTION SUBCUTANEOUS at 12:01

## 2017-01-21 RX ADMIN — ALPRAZOLAM 1 MG: 1 TABLET ORAL at 08:01

## 2017-01-21 RX ADMIN — INSULIN ASPART 16 UNITS: 100 INJECTION, SOLUTION INTRAVENOUS; SUBCUTANEOUS at 06:01

## 2017-01-21 RX ADMIN — METOCLOPRAMIDE 10 MG: 10 TABLET ORAL at 06:01

## 2017-01-21 RX ADMIN — GABAPENTIN 600 MG: 300 CAPSULE ORAL at 02:01

## 2017-01-21 RX ADMIN — INSULIN ASPART 2 UNITS: 100 INJECTION, SOLUTION INTRAVENOUS; SUBCUTANEOUS at 01:01

## 2017-01-21 RX ADMIN — MAGNESIUM OXIDE TAB 400 MG (241.3 MG ELEMENTAL MG) 400 MG: 400 (241.3 MG) TAB at 02:01

## 2017-01-21 RX ADMIN — GABAPENTIN 600 MG: 300 CAPSULE ORAL at 05:01

## 2017-01-21 RX ADMIN — PANTOPRAZOLE SODIUM 40 MG: 40 TABLET, DELAYED RELEASE ORAL at 05:01

## 2017-01-21 RX ADMIN — INSULIN ASPART 2 UNITS: 100 INJECTION, SOLUTION INTRAVENOUS; SUBCUTANEOUS at 06:01

## 2017-01-21 RX ADMIN — INSULIN ASPART 16 UNITS: 100 INJECTION, SOLUTION INTRAVENOUS; SUBCUTANEOUS at 08:01

## 2017-01-21 RX ADMIN — QUETIAPINE FUMARATE 500 MG: 300 TABLET, FILM COATED ORAL at 08:01

## 2017-01-21 RX ADMIN — INSULIN ASPART 16 UNITS: 100 INJECTION, SOLUTION INTRAVENOUS; SUBCUTANEOUS at 01:01

## 2017-01-21 RX ADMIN — TIZANIDINE 4 MG: 4 TABLET ORAL at 08:01

## 2017-01-21 RX ADMIN — CARVEDILOL 25 MG: 25 TABLET, FILM COATED ORAL at 08:01

## 2017-01-21 RX ADMIN — HYDROCHLOROTHIAZIDE 12.5 MG: 12.5 TABLET ORAL at 08:01

## 2017-01-21 RX ADMIN — INSULIN ASPART 2 UNITS: 100 INJECTION, SOLUTION INTRAVENOUS; SUBCUTANEOUS at 09:01

## 2017-01-21 RX ADMIN — PANTOPRAZOLE SODIUM 40 MG: 40 TABLET, DELAYED RELEASE ORAL at 04:01

## 2017-01-21 RX ADMIN — INSULIN ASPART 6 UNITS: 100 INJECTION, SOLUTION INTRAVENOUS; SUBCUTANEOUS at 08:01

## 2017-01-21 RX ADMIN — LEVOTHYROXINE SODIUM 137 MCG: 25 TABLET ORAL at 05:01

## 2017-01-21 RX ADMIN — OXYCODONE HYDROCHLORIDE AND ACETAMINOPHEN 1 TABLET: 10; 325 TABLET ORAL at 05:01

## 2017-01-21 NOTE — ASSESSMENT & PLAN NOTE
- Likely secondary to poor nutritional intake and intracellular shift in the setting of insulin therapy. Now resolved at 2.6 this morning.

## 2017-01-21 NOTE — ASSESSMENT & PLAN NOTE
Blood glucose goal 140-180  c-peptide low -- can be falsely low with glucose toxicity, CARMEN Ab positive  Recommend MDI detemir 33 units BID, novolog 16 units AC, moderate dose ssi    Discharge rec: will require basal/prandial insulin on discharge with glucometer, test strips, pen needles

## 2017-01-21 NOTE — ASSESSMENT & PLAN NOTE
- Likely secondary to Vitamin D insufficiency as lab low this morning at 20.   - Calcium 8.0; no CMP done this performed but last albumin was normal on 1/17, therefore not secondary to hypoalbuminemia.   - Endocrinology recommended ergocalciferol 50,000U for 5 days with daily 2000 IU afterward along with 1200 mg OTC calcium on discharge.

## 2017-01-21 NOTE — SUBJECTIVE & OBJECTIVE
"Interval HPI:   Overnight events: Improvement in glycemic control  Eatin%  Nausea: No  Hypoglycemia and intervention: No  Fever: No  TPN and/or TF: No  If yes, type of TF/TPN and rate: n/a    Visit Vitals    /69 (BP Location: Right arm, Patient Position: Lying, BP Method: Automatic)    Pulse 79    Temp 97.5 °F (36.4 °C) (Oral)    Resp 18    Ht 5' 5" (1.651 m)    Wt 117.8 kg (259 lb 11.2 oz)    LMP  (LMP Unknown)    SpO2 97%    Breastfeeding No    BMI 43.22 kg/m2       Labs Reviewed and Include    No results for input(s): GLU, CALCIUM, ALBUMIN, PROT, NA, K, CO2, CL, BUN, CREATININE, ALKPHOS, ALT, AST, BILITOT in the last 24 hours.  Lab Results   Component Value Date    WBC 7.92 2017    HGB 14.4 2017    HCT 46 2017    MCV 86 2017     2017     No results for input(s): TSH, FREET4 in the last 168 hours.  Lab Results   Component Value Date    HGBA1C 12.7 (H) 2017       Nutritional status:   Body mass index is 43.22 kg/(m^2).  Lab Results   Component Value Date    ALBUMIN 4.2 2017    ALBUMIN 3.5 2016    ALBUMIN 3.2 (L) 2016     No results found for: PREALBUMIN    Estimated Creatinine Clearance: 94.1 mL/min (based on Cr of 1).    Accu-Checks  Recent Labs      17   0222  17   0903  17   1347  17   1729  17   2258  17   1039  17   1359  17   1832  17   2147  17   0127   POCTGLUCOSE  298*  208*  272*  207*  341*  311*  353*  192*  181*  219*       Current Medications and/or Treatments Impacting Glycemic Control  Immunotherapy:    Immunosuppressants     None        Steroids:   Hormones     None        Pressors:    Autonomic Drugs     None        Hyperglycemia/Diabetes Medications:   Antihyperglycemics     Start     Stop Route Frequency Ordered    17 1610  insulin aspart pen 1-10 Units      -- SubQ Before meals & nightly PRN 01/19/17 1510    17 1645  insulin aspart pen 16 " Units      -- SubQ 3 times daily with meals 01/20/17 1437    01/21/17 0900  insulin detemir pen 33 Units      -- SubQ Daily 01/20/17 1438    01/20/17 2100  insulin detemir pen 25 Units      -- SubQ Nightly 01/20/17 1439

## 2017-01-21 NOTE — PROGRESS NOTES
"[facility]  Endocrinology  Progress Note    Admit Date: 2017     Reason for Consult: Management of T2DM, Hyperglycemia     Diabetes diagnosis year:     Home Diabetes Medications:  none    How often checking glucose at home? none  BG readings on regimen: n/a  Hypoglycemia on the regimen? n/a  Missed doses on regimen?  n/a    Diabetes Complications include:     Hyperglycemia, Diabetic peripheral neuropathy  and Diabetic gastroparesis     Complicating diabetes co morbidities:   none      HPI:   Patient is a 41 y.o. female with a hypothyroidism s/p surgery for thyroid cancer, RA, HTN, obesity who presented on 17 with several weeks of polyuria, polydypsia, blurry vision, and headache. Not previously been on medication for DM. A1C from 2016 was 8.0. Denies any recent illness. Evidence of DKA on admission labs with elevated anion gap, low serum bicarbonate, and hyperglycemia. Endocrine consulted for new DM diagnosis.        Interval HPI:   Overnight events: Improvement in glycemic control  Eatin%  Nausea: No  Hypoglycemia and intervention: No  Fever: No  TPN and/or TF: No  If yes, type of TF/TPN and rate: n/a    Visit Vitals    /69 (BP Location: Right arm, Patient Position: Lying, BP Method: Automatic)    Pulse 79    Temp 97.5 °F (36.4 °C) (Oral)    Resp 18    Ht 5' 5" (1.651 m)    Wt 117.8 kg (259 lb 11.2 oz)    LMP  (LMP Unknown)    SpO2 97%    Breastfeeding No    BMI 43.22 kg/m2       Labs Reviewed and Include    No results for input(s): GLU, CALCIUM, ALBUMIN, PROT, NA, K, CO2, CL, BUN, CREATININE, ALKPHOS, ALT, AST, BILITOT in the last 24 hours.  Lab Results   Component Value Date    WBC 7.92 2017    HGB 14.4 2017    HCT 46 2017    MCV 86 2017     2017     No results for input(s): TSH, FREET4 in the last 168 hours.  Lab Results   Component Value Date    HGBA1C 12.7 (H) 2017       Nutritional status:   Body mass index " is 43.22 kg/(m^2).  Lab Results   Component Value Date    ALBUMIN 4.2 01/17/2017    ALBUMIN 3.5 12/13/2016    ALBUMIN 3.2 (L) 12/03/2016     No results found for: PREALBUMIN    Estimated Creatinine Clearance: 94.1 mL/min (based on Cr of 1).    Accu-Checks  Recent Labs      01/19/17   0222  01/19/17   0903  01/19/17   1347  01/19/17   1729  01/19/17   2258  01/20/17   1039  01/20/17   1359  01/20/17   1832  01/20/17   2147  01/21/17   0127   POCTGLUCOSE  298*  208*  272*  207*  341*  311*  353*  192*  181*  219*       Current Medications and/or Treatments Impacting Glycemic Control  Immunotherapy:    Immunosuppressants     None        Steroids:   Hormones     None        Pressors:    Autonomic Drugs     None        Hyperglycemia/Diabetes Medications:   Antihyperglycemics     Start     Stop Route Frequency Ordered    01/19/17 1610  insulin aspart pen 1-10 Units      -- SubQ Before meals & nightly PRN 01/19/17 1510    01/20/17 1645  insulin aspart pen 16 Units      -- SubQ 3 times daily with meals 01/20/17 1437    01/21/17 0900  insulin detemir pen 33 Units      -- SubQ Daily 01/20/17 1438    01/20/17 2100  insulin detemir pen 25 Units      -- SubQ Nightly 01/20/17 1439          ASSESSMENT and PLAN    * Diabetic ketoacidosis without coma associated with type 2 diabetes mellitus  Blood glucose goal 140-180  c-peptide low -- can be falsely low with glucose toxicity, CARMEN Ab positive  Recommend MDI detemir 33 units BID, novolog 16 units AC, moderate dose ssi    Discharge rec: will require basal/prandial insulin on discharge with glucometer, test strips, pen needles    Hypothyroidism, postsurgical  Need to obtain thyroid cancer records as outpatient  Recommend increasing levothyroxine to 150mcg daily  TSH remains elevated      Morbid obesity  May contribute to insulin resistance      Hypocalcemia  Maybe secondary to thyroid surgery and hypoparathyroidism  Would recommend Vit D level  Likely would benefit from calcium and  Vit D supplementation        Caleb Horta MD  Endocrinology  [facility]

## 2017-01-21 NOTE — ASSESSMENT & PLAN NOTE
Placed on subcutaneous preprandial and basal insulin. Hyperglycemia in the 400 range this morning. Improved throughout the day  1. Insulin aspart 16 units TID with meals.  2. Insulin detemir 33 units in the morning and 25 units nightly.  3. Moderate correction dose SSI.  4. Diabetic diet.  5. Monitor glucose and titrate insulin to achieve ADA recommended goals.

## 2017-01-21 NOTE — ASSESSMENT & PLAN NOTE
Well controlled as per JNC-8 guideline criteria.  1. Continue carvedilol 25 mg po BID  2. Continue HCTZ 12.5 mg po daily  3. Patient would benefit from antihypertensive optimization with addition of ACEI/ARB in the setting of DM2 however will leave determination of change to her PCP.

## 2017-01-21 NOTE — PLAN OF CARE
Problem: Patient Care Overview  Goal: Plan of Care Review  Outcome: Outcome(s) achieved Date Met:  01/21/17  Pt AAOx4. No falls or injury during shift. Pt very active in own care and was able to self administer SSI. Overnight pt complained of pain to lower extremities. Administered Percocet as ordered. Potassium acetate infusing at 100ml/hr. Call light in reach. Will continue to monitor.

## 2017-01-21 NOTE — PROGRESS NOTES
"[facility] Hospital Medicine  Progress Note    Patient Name: Homar Jerry  MRN: [id number]  Patient Class: IP- Inpatient   Admission Date: 1/17/2017  Length of Stay: 3 days  Attending Physician: Amanda Wilson MD  Primary Care Provider: Mary Cabrera MD    [facility] Medicine Team: [facility] MED 2 Amanda Wilson MD    Subjective:     Principal Problem:Diabetic ketoacidosis without coma associated with type 2 diabetes mellitus    HPI:  Homar Jerry is a 41 year old woman with a past medical history of gastroparesis s/p gastric pacemaker and sleeve gastrectomy, RA, chronic sinus tachycardia, HTN and obesity who presented to the ED from clinic with newly diagnosed diabetes after being found to be hyperglycemic to the 500s with elevated ion gap. States that for approximately the past 2 weeks she has had increasing thirst, urine output, blurred vision, headache. In ED found to have worsening hyperglycemia, with anion gap and acidosis. Was bolused with NS and started on insulin infusion with DKA protocol. Patient denies headache, chest pain, sob, abdominal discomfort, changes in bowel habits, blood in stool or urine, abnormal bruising, bleeding, neurological changes.     Hospital Course:  On hospital day one she was evaluated by Endocrinology. Her diabetic ketoacidosis resolved as evidenced by a closed anion gap. She continued to have hyperglycemia and was placed on a transition insulin drip with subcutaneous insulin. Clear liquid diet was started and her levothyroxine dose up titrated to 137 mcg. On hospital day 2 she was taken off transition insulin drip and placed on prandial with basal insulin. Her hyperglycemia improved. On hospital day 3 she continued to be hyperglycemia. Her insulin therapy was further adjusted by the endocrinology service.     Interval History: "OK". No acute overnight events. Nausea improving. Still hyperglycemic in the 400 range this morning.    Review " of Systems   Constitutional: Negative for chills, fatigue, fever and unexpected weight change.   HENT: Negative for ear pain, facial swelling, hearing loss, mouth sores, nosebleeds, rhinorrhea, sinus pressure, sore throat, tinnitus, trouble swallowing and voice change.    Eyes: Negative for photophobia, pain, redness and visual disturbance.   Respiratory: Negative for cough, chest tightness, shortness of breath and wheezing.    Cardiovascular: Negative for chest pain, palpitations and leg swelling.   Gastrointestinal: Positive for nausea. Negative for abdominal pain, blood in stool, constipation, diarrhea and vomiting.   Endocrine: Negative for cold intolerance, heat intolerance, polydipsia, polyphagia and polyuria.   Genitourinary: Negative for decreased urine volume, dysuria, flank pain, frequency, hematuria, menstrual problem, urgency, vaginal bleeding, vaginal discharge and vaginal pain.   Musculoskeletal: Negative for arthralgias, back pain, joint swelling, myalgias and neck pain.   Skin: Negative for rash.   Allergic/Immunologic: Negative for environmental allergies, food allergies and immunocompromised state.   Neurological: Negative for dizziness, seizures, syncope, weakness, light-headedness, numbness and headaches.   Hematological: Negative for adenopathy. Does not bruise/bleed easily.   Psychiatric/Behavioral: Negative for confusion, hallucinations, self-injury, sleep disturbance and suicidal ideas. The patient is not nervous/anxious.      Objective:     Vital Signs (Most Recent):  Temp: 97.8 °F (36.6 °C) (01/20/17 1500)  Pulse: 93 (01/20/17 1500)  Resp: 20 (01/20/17 1500)  BP: (!) 125/91 (01/20/17 1500)  SpO2: 96 % (01/20/17 1500) Vital Signs (24h Range):  Temp:  [97.6 °F (36.4 °C)-98.6 °F (37 °C)] 97.8 °F (36.6 °C)  Pulse:  [77-95] 93  Resp:  [16-20] 20  SpO2:  [95 %-97 %] 96 %  BP: (108-125)/(67-91) 125/91     Weight: 117.8 kg (259 lb 11.2 oz)  Body mass index is 43.22 kg/(m^2).    Intake/Output  Summary (Last 24 hours) at 01/20/17 2011  Last data filed at 01/20/17 1800   Gross per 24 hour   Intake             1460 ml   Output                0 ml   Net             1460 ml      Physical Exam   Constitutional: She is oriented to person, place, and time. She appears well-developed and well-nourished. No distress.   HENT:   Head: Normocephalic and atraumatic.   Right Ear: Hearing, tympanic membrane, external ear and ear canal normal.   Left Ear: Hearing, tympanic membrane, external ear and ear canal normal.   Nose: Nose normal.   Mouth/Throat: Uvula is midline, oropharynx is clear and moist and mucous membranes are normal. No oropharyngeal exudate.   Eyes: Conjunctivae and EOM are normal. Pupils are equal, round, and reactive to light. Right eye exhibits no discharge. Left eye exhibits no discharge. No scleral icterus.   Neck: Normal range of motion. Neck supple. No tracheal deviation present. No thyromegaly present.   Cardiovascular: Normal rate, regular rhythm, normal heart sounds and intact distal pulses.  Exam reveals no gallop and no friction rub.    No murmur heard.  Pulmonary/Chest: Effort normal and breath sounds normal. No stridor. No respiratory distress. She has no wheezes. She has no rales. She exhibits no tenderness.   Abdominal: Soft. Bowel sounds are normal. She exhibits no distension. There is no tenderness. There is no rebound and no guarding.   Musculoskeletal: Normal range of motion. She exhibits no edema or tenderness.   Lymphadenopathy:     She has no cervical adenopathy.   Neurological: She is alert and oriented to person, place, and time. No cranial nerve deficit. Coordination normal.   Skin: Skin is warm and dry. No rash noted. She is not diaphoretic. No erythema. No pallor.   Psychiatric: She has a normal mood and affect. Her behavior is normal. Judgment and thought content normal. Cognition and memory are normal.   Nursing note and vitals reviewed.      Significant Labs:   BMP:      Recent Labs  Lab 01/20/17  0440   *      K 3.8      CO2 23   BUN 11   CREATININE 1.0   CALCIUM 8.2*   MG 1.7     CBC: No results for input(s): WBC, HGB, HCT, PLT in the last 48 hours.    Significant Imaging: I have reviewed all pertinent imaging results/findings within the past 24 hours.    Assessment/Plan:      * Diabetic ketoacidosis without coma associated with type 2 diabetes mellitus  Placed on subcutaneous preprandial and basal insulin. Hyperglycemia in the 400 range this morning. Improved throughout the day  1. Insulin aspart 16 units TID with meals.  2. Insulin detemir 33 units in the morning and 25 units nightly.  3. Moderate correction dose SSI.  4. Diabetic diet.  5. Monitor glucose and titrate insulin to achieve ADA recommended goals.    Type 2 diabetes mellitus with hyperglycemia, without long-term current use of insulin  As above.    Gastroparesis  Stable.  1. Continue pantoprazole 40 mg po BID  2. Continue phenergan 12.5 mg every 6 hours as needed  3. Small frequent meals    Fibromyalgia  Stable.  1. Continue tizanidine  2. Continue gabapentin      Anxiety  Stable.  1. Continue home xanax 1 mg po BID  2. Continue home mirtazapine 15 mg QHS      Bipolar 1 disorder  Stable and without acute exacerbation.  1. Continue oxcarbezepine  2. Continue olanzapine  3. Continue seroquel      Hypothyroidism, postsurgical  Elevated TSH in December 2016.   1. Appreciate endocrinology recommendations.  2. Levothyroxine 137 mcg daily.    Migraines  Asymptomatic.  1. Continue topirimate PRN        Essential hypertension  Well controlled as per JNC-8 guideline criteria.  1. Continue carvedilol 25 mg po BID  2. Continue HCTZ 12.5 mg po daily  3. Patient would benefit from antihypertensive optimization with addition of ACEI/ARB in the setting of DM2 however will leave determination of change to her PCP.      Asthma  Well controlled and without evidence of bronchospasm.  1. Monitor.  2. Albuterol as  needed if bronchospasm develops.      Rheumatoid arthritis  Stable.  1. Continue home percocet  every 6 hours as needed      Morbid obesity  Patient will require lifestyle modifications.  1. Outpatient management.      Hypokalemia  Likely secondary to poor nutritional intake and intracellular shift in the setting of insulin therapy. Now resolved.  1. Monitor.      Hypoxia  Patient with apparent hypoxia on ABG. However this was a venous sample. No evidence of hypoxia on based on oxygen saturation.  1. Monitor.      Hypocalcemia  Likely pseudo-hypocalcemia as patient had normal calcium level upon admission.  1. Monitor.      Diabetes mellitus, new onset  As above.      Hyperglycemia  As above      VTE Risk Mitigation         Ordered     enoxaparin injection 40 mg  Daily     Route:  Subcutaneous        01/17/17 2349     Medium Risk of VTE  Once      01/17/17 2349     Place DANDRE hose  Until discontinued      01/17/17 3892          Amanda Wilson MD  Department of Hospital Medicine   [facility]

## 2017-01-21 NOTE — DISCHARGE INSTRUCTIONS
"**MODERATE CORRECTION DOSE**   Blood Glucose   mg/dL                  Pre-meal                Bedtime   151-200                2 units                    1 unit   201-250                4 units                    2 units    251-300                6 units                    3 units    301-350                8 units                    4 units    >350                     10 units                  5 units   **CALL MD for BG >350**   Administer subcutaneously if needed at times designated by monitoring schedule.    DO NOT HOLD correction dose insulin for patients who are temporarily NPO.   "HIGH ALERT MEDICATION" - Administer with meals or TF/TPN.     - We have started you on insulin at home to control your blood sugars and prevent another episode diabetic ketoacidosis. Please inject 33 units of DETEMIR in the MORNING and 30 units in the EVENING and 16 units of ASPART with meals along with additional moderate dose correct (see above) with the meals of ASPART. Please be consistent with checking your blood glucose.    - Also, your thyroid levels are low and we are increasing your thyroid medication to 150 mcg daily.    - Your calcium values are low as well. We have prescribed for you 5 days of 50,000 unit capsules of Vitamin D. Please take one capsule (50,000 units) daily for additional 3 days, then take over the counter Vitamin D 2000 units daily.  Also take 1200 or 1250 mg of calcium daily as well. You can purchase these supplements over the counter.     - You will follow-up in the next week with endocrine clinic. An appointment should be made for you, but please call the number provided if they have not called you by Monday.     "

## 2017-01-21 NOTE — PLAN OF CARE
Problem: Patient Care Overview  Goal: Plan of Care Review  Outcome: Ongoing (interventions implemented as appropriate)  Pt hyperglycemic this shift. >350 this afternoon. MD notified and insulin regimen adjusted. Pt pain controlled with PRN percocet. Pain located to lower back and legs. Pt up to commode with one assist. Pt report no BM for one week. Voiding urine without difficulty. Continuous Potassium acetate drip maintained @100 mL. No acute changes. VSS. Free of fall and injury.

## 2017-01-21 NOTE — ASSESSMENT & PLAN NOTE
- Likely secondary to chronic opiate use at home (Percocet) and poor nutrition.  - Without BM in 4 days, but denies abdominal pain. States she has stool softener at home and is not interested in laxative at this time as it gives her diarrhea.

## 2017-01-21 NOTE — SUBJECTIVE & OBJECTIVE
"Interval History: "OK". No acute overnight events. Nausea improving. Still hyperglycemic in the 400 range this morning.    Review of Systems   Constitutional: Negative for chills, fatigue, fever and unexpected weight change.   HENT: Negative for ear pain, facial swelling, hearing loss, mouth sores, nosebleeds, rhinorrhea, sinus pressure, sore throat, tinnitus, trouble swallowing and voice change.    Eyes: Negative for photophobia, pain, redness and visual disturbance.   Respiratory: Negative for cough, chest tightness, shortness of breath and wheezing.    Cardiovascular: Negative for chest pain, palpitations and leg swelling.   Gastrointestinal: Positive for nausea. Negative for abdominal pain, blood in stool, constipation, diarrhea and vomiting.   Endocrine: Negative for cold intolerance, heat intolerance, polydipsia, polyphagia and polyuria.   Genitourinary: Negative for decreased urine volume, dysuria, flank pain, frequency, hematuria, menstrual problem, urgency, vaginal bleeding, vaginal discharge and vaginal pain.   Musculoskeletal: Negative for arthralgias, back pain, joint swelling, myalgias and neck pain.   Skin: Negative for rash.   Allergic/Immunologic: Negative for environmental allergies, food allergies and immunocompromised state.   Neurological: Negative for dizziness, seizures, syncope, weakness, light-headedness, numbness and headaches.   Hematological: Negative for adenopathy. Does not bruise/bleed easily.   Psychiatric/Behavioral: Negative for confusion, hallucinations, self-injury, sleep disturbance and suicidal ideas. The patient is not nervous/anxious.      Objective:     Vital Signs (Most Recent):  Temp: 97.8 °F (36.6 °C) (01/20/17 1500)  Pulse: 93 (01/20/17 1500)  Resp: 20 (01/20/17 1500)  BP: (!) 125/91 (01/20/17 1500)  SpO2: 96 % (01/20/17 1500) Vital Signs (24h Range):  Temp:  [97.6 °F (36.4 °C)-98.6 °F (37 °C)] 97.8 °F (36.6 °C)  Pulse:  [77-95] 93  Resp:  [16-20] 20  SpO2:  [95 %-97 %] 96 " %  BP: (108-125)/(67-91) 125/91     Weight: 117.8 kg (259 lb 11.2 oz)  Body mass index is 43.22 kg/(m^2).    Intake/Output Summary (Last 24 hours) at 01/20/17 2011  Last data filed at 01/20/17 1800   Gross per 24 hour   Intake             1460 ml   Output                0 ml   Net             1460 ml      Physical Exam   Constitutional: She is oriented to person, place, and time. She appears well-developed and well-nourished. No distress.   HENT:   Head: Normocephalic and atraumatic.   Right Ear: Hearing, tympanic membrane, external ear and ear canal normal.   Left Ear: Hearing, tympanic membrane, external ear and ear canal normal.   Nose: Nose normal.   Mouth/Throat: Uvula is midline, oropharynx is clear and moist and mucous membranes are normal. No oropharyngeal exudate.   Eyes: Conjunctivae and EOM are normal. Pupils are equal, round, and reactive to light. Right eye exhibits no discharge. Left eye exhibits no discharge. No scleral icterus.   Neck: Normal range of motion. Neck supple. No tracheal deviation present. No thyromegaly present.   Cardiovascular: Normal rate, regular rhythm, normal heart sounds and intact distal pulses.  Exam reveals no gallop and no friction rub.    No murmur heard.  Pulmonary/Chest: Effort normal and breath sounds normal. No stridor. No respiratory distress. She has no wheezes. She has no rales. She exhibits no tenderness.   Abdominal: Soft. Bowel sounds are normal. She exhibits no distension. There is no tenderness. There is no rebound and no guarding.   Musculoskeletal: Normal range of motion. She exhibits no edema or tenderness.   Lymphadenopathy:     She has no cervical adenopathy.   Neurological: She is alert and oriented to person, place, and time. No cranial nerve deficit. Coordination normal.   Skin: Skin is warm and dry. No rash noted. She is not diaphoretic. No erythema. No pallor.   Psychiatric: She has a normal mood and affect. Her behavior is normal. Judgment and thought  content normal. Cognition and memory are normal.   Nursing note and vitals reviewed.      Significant Labs:   BMP:     Recent Labs  Lab 01/20/17  0440   *      K 3.8      CO2 23   BUN 11   CREATININE 1.0   CALCIUM 8.2*   MG 1.7     CBC: No results for input(s): WBC, HGB, HCT, PLT in the last 48 hours.    Significant Imaging: I have reviewed all pertinent imaging results/findings within the past 24 hours.

## 2017-01-21 NOTE — NURSING
Dc instructions reviewed with patient. Including diabetic education, sign and symptoms of high and low blood sugar readings. How and when to check glucose.

## 2017-01-21 NOTE — ASSESSMENT & PLAN NOTE
- Elevated TSH in December 2016. With history of thyroid cancer.   - TSH checked today this morning elevated at 10.8.   - Endocrinology recommendations include increasing levothyroxine from 137 mcg to 150 mcg PO daily.

## 2017-01-21 NOTE — SUBJECTIVE & OBJECTIVE
Interval History:  With no acute events overnight. Patient states that she does not endorse abdominal pain, and nausea and vomiting are well-controlled. Patient feels fatigued but this has been constant for some time. Otherwise, she states she has not had a BM in 4 days. Tolerated PO intake well.     Review of Systems   Constitutional: Negative for chills, fatigue, fever and unexpected weight change.   HENT: Negative for mouth sores, nosebleeds, rhinorrhea, sore throat and trouble swallowing.    Eyes: Negative for pain and redness.   Respiratory: Negative for cough, chest tightness, shortness of breath and wheezing.    Cardiovascular: Negative for chest pain, palpitations and leg swelling.   Gastrointestinal: Positive for constipation. Negative for abdominal pain, blood in stool, diarrhea, nausea and vomiting.   Endocrine: Negative for cold intolerance, heat intolerance, polydipsia, polyphagia and polyuria.   Genitourinary: Negative for decreased urine volume, dysuria, frequency and hematuria.   Musculoskeletal: Negative for arthralgias.   Skin: Negative for rash.   Neurological: Negative for dizziness, seizures, syncope, weakness, light-headedness, numbness and headaches.   Hematological: Negative for adenopathy.   Psychiatric/Behavioral: Negative for confusion, hallucinations, self-injury, sleep disturbance and suicidal ideas. The patient is not nervous/anxious.      Objective:     Vital Signs (Most Recent):  Temp: 97.8 °F (36.6 °C) (01/21/17 1132)  Pulse: 89 (01/21/17 1132)  Resp: 18 (01/21/17 1132)  BP: 128/85 (01/21/17 1132)  SpO2: 100 % (01/21/17 1132) Vital Signs (24h Range):  Temp:  [97.5 °F (36.4 °C)-98 °F (36.7 °C)] 97.8 °F (36.6 °C)  Pulse:  [79-93] 89  Resp:  [18-20] 18  SpO2:  [96 %-100 %] 100 %  BP: ()/(61-91) 128/85     Weight: 117.8 kg (259 lb 11.2 oz)  Body mass index is 43.22 kg/(m^2).    Intake/Output Summary (Last 24 hours) at 01/21/17 1450  Last data filed at 01/21/17 0600   Gross per 24  hour   Intake              860 ml   Output                0 ml   Net              860 ml      Physical Exam   Constitutional: She is oriented to person, place, and time. She appears well-developed and well-nourished. No distress.   HENT:   Head: Normocephalic.   Right Ear: Hearing, tympanic membrane and ear canal normal.   Left Ear: Hearing, tympanic membrane and ear canal normal.   Mouth/Throat: Uvula is midline, oropharynx is clear and moist and mucous membranes are normal. No oropharyngeal exudate.   Eyes: EOM are normal. Pupils are equal, round, and reactive to light. Right eye exhibits no discharge. Left eye exhibits no discharge.   Neck: Normal range of motion. Neck supple. No JVD present.   Cardiovascular: Normal rate, regular rhythm, normal heart sounds and intact distal pulses.    No murmur heard.  Pulmonary/Chest: Effort normal and breath sounds normal. No respiratory distress. She has no wheezes.   Abdominal: Soft. Bowel sounds are normal. She exhibits no distension. There is no tenderness. There is no rebound.   Musculoskeletal: Normal range of motion. She exhibits no edema or tenderness.   Lymphadenopathy:     She has no cervical adenopathy.   Neurological: She is alert and oriented to person, place, and time. No cranial nerve deficit.   Skin: Skin is warm and dry. She is not diaphoretic.   Psychiatric: Cognition and memory are normal.   Nursing note and vitals reviewed.    Significant Labs:     CMP:   Recent Labs  Lab 01/20/17  0440 01/21/17  0606    141   K 3.8 3.6    108   CO2 23 26   * 297*   BUN 11 15   CREATININE 1.0 1.0   CALCIUM 8.2* 8.0*   ANIONGAP 8 7*   EGFRNONAA >60.0 >60.0      1/21/2017 06:06   TSH 10.823 (H)     Vitamin D, 25-Hydroxy - 20

## 2017-01-21 NOTE — ASSESSMENT & PLAN NOTE
Need to obtain thyroid cancer records as outpatient  Recommend increasing levothyroxine to 150mcg daily  TSH remains elevated

## 2017-01-21 NOTE — ASSESSMENT & PLAN NOTE
Blood glucose goal 140-180  c-peptide low -- can be falsely low with glucose toxicity, CARMEN Ab positive  Recommend MDI detemir 33 units, 25 units nightly, novolog 16 units AC, moderate dose ssi    Discharge rec: will require basal/prandial insulin on discharge with glucometer, test strips, pen needles

## 2017-01-21 NOTE — ASSESSMENT & PLAN NOTE
Unclear if truly hypocalcemia, corrects for low albumin in the past, but no albumin available on daily labs since admission   Maybe secondary to thyroid surgery and hypoparathyroidism.   PTH is inappropriately normal for low vit D and low Ca. If hypocalcemia does not correct with albumin, please notify us, we will consider calcitriol and oral calcium and vit d supplementation

## 2017-01-21 NOTE — ASSESSMENT & PLAN NOTE
Placed on subcutaneous preprandial and basal insulin. Hyperglycemia controlled during day, but elevated this morning at 297; recommended by endocrine to increase nightly detemir 25 U to 33U  1. Insulin aspart 16 units TID with meals.  2. Insulin detemir 33 units in the morning and 33 units nightly.  3. Moderate correction dose SSI.  4. Diabetic diet.  5. Monitor glucose and titrate insulin to achieve ADA recommended goals.

## 2017-01-21 NOTE — PROGRESS NOTES
[facility] Medicine  Progress Note    Patient Name: Homar Jerry  MRN: [id number]  Patient Class: IP- Inpatient   Admission Date: 1/17/2017  Length of Stay: 4 days  Attending Physician: Amanda Wilson MD  Primary Care Provider: Mary Cabrera MD    [facility] Medicine Team: [facility]OSP MED 2 Brodie White MD    Subjective:     Principal Problem:Diabetic ketoacidosis without coma associated with type 2 diabetes mellitus    HPI:  Homar Jerry is a 41 year old woman with a past medical history of gastroparesis s/p gastric pacemaker and sleeve gastrectomy, RA, chronic sinus tachycardia, HTN and obesity who presented to the ED from clinic with newly diagnosed diabetes after being found to be hyperglycemic to the 500s with elevated ion gap. States that for approximately the past 2 weeks she has had increasing thirst, urine output, blurred vision, headache. In ED found to have worsening hyperglycemia, with anion gap and acidosis. Was bolused with NS and started on insulin infusion with DKA protocol. Patient denies headache, chest pain, sob, abdominal discomfort, changes in bowel habits, blood in stool or urine, abnormal bruising, bleeding, neurological changes.     Hospital Course:  On hospital day one she was evaluated by Endocrinology. Her diabetic ketoacidosis resolved as evidenced by a closed anion gap. She continued to have hyperglycemia and was placed on a transition insulin drip with subcutaneous insulin. Clear liquid diet was started and her levothyroxine dose up titrated to 137 mcg. On hospital day 2 she was taken off transition insulin drip and placed on prandial with basal insulin. Her hyperglycemia improved. On hospital day 3 she continued to be hyperglycemia. Her insulin therapy was further adjusted by the endocrinology service. Patient's day glucose was better controlled on 1/20, however the morning glucose on 1/21 was elevated at 297. Patient's final home  insulin regimen per endocrinology was 33U daily and nightly with 16U with meals and moderate dose SSI. Of note, patient's TSH was elevated at 10.8 and patients levothyroxine was increased from 137 mcg to 150 mcg. Patient was also found to be hypocalcemic at 8.0 likely secondary to vitamin D insufficiency which was at 20 that morning. PTH results are pending. Patient was discharged in stable condition on 1/21 with home insulin as above with moderate dose SSI, as well as home ergocalciferol, OTC vitamin D and calcium, and follow-up with endocrinology clinic.     Interval History:  With no acute events overnight. Patient states that she does not endorse abdominal pain, and nausea and vomiting are well-controlled. Patient feels fatigued but this has been constant for some time. Otherwise, she states she has not had a BM in 4 days. Tolerated PO intake well.     Review of Systems   Constitutional: Negative for chills, fatigue, fever and unexpected weight change.   HENT: Negative for mouth sores, nosebleeds, rhinorrhea, sore throat and trouble swallowing.    Eyes: Negative for pain and redness.   Respiratory: Negative for cough, chest tightness, shortness of breath and wheezing.    Cardiovascular: Negative for chest pain, palpitations and leg swelling.   Gastrointestinal: Positive for constipation. Negative for abdominal pain, blood in stool, diarrhea, nausea and vomiting.   Endocrine: Negative for cold intolerance, heat intolerance, polydipsia, polyphagia and polyuria.   Genitourinary: Negative for decreased urine volume, dysuria, frequency and hematuria.   Musculoskeletal: Negative for arthralgias.   Skin: Negative for rash.   Neurological: Negative for dizziness, seizures, syncope, weakness, light-headedness, numbness and headaches.   Hematological: Negative for adenopathy.   Psychiatric/Behavioral: Negative for confusion, hallucinations, self-injury, sleep disturbance and suicidal ideas. The patient is not  nervous/anxious.      Objective:     Vital Signs (Most Recent):  Temp: 97.8 °F (36.6 °C) (01/21/17 1132)  Pulse: 89 (01/21/17 1132)  Resp: 18 (01/21/17 1132)  BP: 128/85 (01/21/17 1132)  SpO2: 100 % (01/21/17 1132) Vital Signs (24h Range):  Temp:  [97.5 °F (36.4 °C)-98 °F (36.7 °C)] 97.8 °F (36.6 °C)  Pulse:  [79-93] 89  Resp:  [18-20] 18  SpO2:  [96 %-100 %] 100 %  BP: ()/(61-91) 128/85     Weight: 117.8 kg (259 lb 11.2 oz)  Body mass index is 43.22 kg/(m^2).    Intake/Output Summary (Last 24 hours) at 01/21/17 1450  Last data filed at 01/21/17 0600   Gross per 24 hour   Intake              860 ml   Output                0 ml   Net              860 ml      Physical Exam   Constitutional: She is oriented to person, place, and time. She appears well-developed and well-nourished. No distress.   HENT:   Head: Normocephalic.   Right Ear: Hearing, tympanic membrane and ear canal normal.   Left Ear: Hearing, tympanic membrane and ear canal normal.   Mouth/Throat: Uvula is midline, oropharynx is clear and moist and mucous membranes are normal. No oropharyngeal exudate.   Eyes: EOM are normal. Pupils are equal, round, and reactive to light. Right eye exhibits no discharge. Left eye exhibits no discharge.   Neck: Normal range of motion. Neck supple. No JVD present.   Cardiovascular: Normal rate, regular rhythm, normal heart sounds and intact distal pulses.    No murmur heard.  Pulmonary/Chest: Effort normal and breath sounds normal. No respiratory distress. She has no wheezes.   Abdominal: Soft. Bowel sounds are normal. She exhibits no distension. There is no tenderness. There is no rebound.   Musculoskeletal: Normal range of motion. She exhibits no edema or tenderness.   Lymphadenopathy:     She has no cervical adenopathy.   Neurological: She is alert and oriented to person, place, and time. No cranial nerve deficit.   Skin: Skin is warm and dry. She is not diaphoretic.   Psychiatric: Cognition and memory are normal.    Nursing note and vitals reviewed.    Significant Labs:     CMP:   Recent Labs  Lab 01/20/17  0440 01/21/17  0606    141   K 3.8 3.6    108   CO2 23 26   * 297*   BUN 11 15   CREATININE 1.0 1.0   CALCIUM 8.2* 8.0*   ANIONGAP 8 7*   EGFRNONAA >60.0 >60.0      1/21/2017 06:06   TSH 10.823 (H)     Vitamin D, 25-Hydroxy - 20     Assessment/Plan:      * Diabetic ketoacidosis without coma associated with type 2 diabetes mellitus  Placed on subcutaneous preprandial and basal insulin. Hyperglycemia controlled during day, but elevated this morning at 297; recommended by endocrine to increase nightly detemir 25 U to 33U  1. Insulin aspart 16 units TID with meals.  2. Insulin detemir 33 units in the morning and 33 units nightly.  3. Moderate correction dose SSI.  4. Diabetic diet.  5. Monitor glucose and titrate insulin to achieve ADA recommended goals.    Gastroparesis  Stable.  1. Continue pantoprazole 40 mg po BID  2. Continue phenergan 12.5 mg every 6 hours as needed  3. Small frequent meals    Fibromyalgia  Stable.  1. Continue tizanidine  2. Continue gabapentin      Anxiety  Stable.  1. Continue home xanax 1 mg po BID  2. Continue home mirtazapine 15 mg QHS      Bipolar 1 disorder  Stable and without acute exacerbation.  1. Continue oxcarbezepine  2. Continue olanzapine  3. Continue seroquel      Hypothyroidism, postsurgical  - Elevated TSH in December 2016. With history of thyroid cancer.   - TSH checked today this morning elevated at 10.8.   - Endocrinology recommendations include increasing levothyroxine from 137 mcg to 150 mcg PO daily.    Migraines  Asymptomatic.  1. Continue topirimate PRN        Essential hypertension  Well controlled as per JNC-8 guideline criteria.  1. Continue carvedilol 25 mg po BID  2. Continue HCTZ 12.5 mg po daily  3. Patient would benefit from antihypertensive optimization with addition of ACEI/ARB in the setting of DM2 however will leave determination of change to her  PCP.      Asthma  Well controlled and without evidence of bronchospasm.  1. Monitor.  2. Albuterol as needed if bronchospasm develops.      Constipation  - Likely secondary to chronic opiate use at home (Percocet) and poor nutrition.  - Without BM in 4 days, but denies abdominal pain. States she has stool softener at home and is not interested in laxative at this time as it gives her diarrhea.       Rheumatoid arthritis  Stable.  1. Continue home percocet  every 6 hours as needed      Type 2 diabetes mellitus with hyperglycemia, without long-term current use of insulin  As above.    Hypokalemia  - Likely secondary to poor nutritional intake and intracellular shift in the setting of insulin therapy. Now resolved at 2.6 this morning.         Hypoxia  Patient with apparent hypoxia on ABG. However this was a venous sample. No evidence of hypoxia on based on oxygen saturation.  1. Monitor.      Hypocalcemia  - Likely secondary to Vitamin D insufficiency as lab low this morning at 20.   - Calcium 8.0; no CMP done this performed but last albumin was normal on 1/17, therefore not secondary to hypoalbuminemia.   - Endocrinology recommended ergocalciferol 50,000U for 5 days with daily 2000 IU afterward along with 1200 mg OTC calcium on discharge.       Diabetes mellitus, new onset  As above.      Hyperglycemia  - Secondary to type II DM.      Hypomagnesemia  - 1.5 this morning; replenished with magnesium oxide 400 mg PO once.       VTE Risk Mitigation         Ordered     enoxaparin injection 40 mg  Daily     Route:  Subcutaneous        01/17/17 2349     Medium Risk of VTE  Once      01/17/17 2349     Place DANDRE hose  Until discontinued      01/17/17 2108        Diet: Diabetic 1800 calories  Code: Full    Disposition: Stable for discharge; following endocrine recommendations as noted above which includes home insulin and moderate does SSI as well as ergocalciferol, and OTC vitamin D and calcium with increase of  levothyroxine. Will follow up with endocrine clinic here.    Brodie White MD  PGY-1 Internal Medicine  [phone]    Department of [facility] Medicine   [facility]

## 2017-01-21 NOTE — ASSESSMENT & PLAN NOTE
Need to obtain thyroid cancer records as outpatient  Recommend levothyroxine to 137mcg daily  TSH pending

## 2017-01-22 LAB
ANION GAP SERPL CALC-SCNC: 5 MMOL/L
BUN SERPL-MCNC: 14 MG/DL
CALCIUM SERPL-MCNC: 8 MG/DL
CHLORIDE SERPL-SCNC: 109 MMOL/L
CO2 SERPL-SCNC: 29 MMOL/L
CREAT SERPL-MCNC: 0.8 MG/DL
EST. GFR  (AFRICAN AMERICAN): >60 ML/MIN/1.73 M^2
EST. GFR  (NON AFRICAN AMERICAN): >60 ML/MIN/1.73 M^2
GLUCOSE SERPL-MCNC: 112 MG/DL
MAGNESIUM SERPL-MCNC: 1.6 MG/DL
PHOSPHATE SERPL-MCNC: 4.8 MG/DL
PHOSPHATE SERPL-MCNC: 4.8 MG/DL
POCT GLUCOSE: 153 MG/DL (ref 70–110)
POCT GLUCOSE: 194 MG/DL (ref 70–110)
POCT GLUCOSE: 89 MG/DL (ref 70–110)
POTASSIUM SERPL-SCNC: 4.2 MMOL/L
PTH-INTACT SERPL-MCNC: 18 PG/ML
SODIUM SERPL-SCNC: 143 MMOL/L

## 2017-01-22 PROCEDURE — 25000003 PHARM REV CODE 250: Performed by: STUDENT IN AN ORGANIZED HEALTH CARE EDUCATION/TRAINING PROGRAM

## 2017-01-22 PROCEDURE — 99233 SBSQ HOSP IP/OBS HIGH 50: CPT | Mod: GC,,, | Performed by: INTERNAL MEDICINE

## 2017-01-22 PROCEDURE — 80048 BASIC METABOLIC PNL TOTAL CA: CPT

## 2017-01-22 PROCEDURE — 36415 COLL VENOUS BLD VENIPUNCTURE: CPT

## 2017-01-22 PROCEDURE — 83970 ASSAY OF PARATHORMONE: CPT

## 2017-01-22 PROCEDURE — 63600175 PHARM REV CODE 636 W HCPCS: Performed by: STUDENT IN AN ORGANIZED HEALTH CARE EDUCATION/TRAINING PROGRAM

## 2017-01-22 PROCEDURE — 84100 ASSAY OF PHOSPHORUS: CPT

## 2017-01-22 PROCEDURE — 83735 ASSAY OF MAGNESIUM: CPT

## 2017-01-22 PROCEDURE — 20600001 HC STEP DOWN PRIVATE ROOM

## 2017-01-22 RX ORDER — LANOLIN ALCOHOL/MO/W.PET/CERES
400 CREAM (GRAM) TOPICAL ONCE
Status: COMPLETED | OUTPATIENT
Start: 2017-01-22 | End: 2017-01-22

## 2017-01-22 RX ORDER — TIZANIDINE 4 MG/1
12 TABLET ORAL NIGHTLY
Status: DISCONTINUED | OUTPATIENT
Start: 2017-01-22 | End: 2017-01-23 | Stop reason: HOSPADM

## 2017-01-22 RX ADMIN — CARVEDILOL 25 MG: 25 TABLET, FILM COATED ORAL at 08:01

## 2017-01-22 RX ADMIN — OXYCODONE HYDROCHLORIDE AND ACETAMINOPHEN 1 TABLET: 10; 325 TABLET ORAL at 09:01

## 2017-01-22 RX ADMIN — ENOXAPARIN SODIUM 40 MG: 100 INJECTION SUBCUTANEOUS at 12:01

## 2017-01-22 RX ADMIN — ALPRAZOLAM 1 MG: 1 TABLET ORAL at 09:01

## 2017-01-22 RX ADMIN — OLANZAPINE 20 MG: 5 TABLET, FILM COATED ORAL at 09:01

## 2017-01-22 RX ADMIN — GABAPENTIN 600 MG: 300 CAPSULE ORAL at 01:01

## 2017-01-22 RX ADMIN — INSULIN ASPART 16 UNITS: 100 INJECTION, SOLUTION INTRAVENOUS; SUBCUTANEOUS at 08:01

## 2017-01-22 RX ADMIN — OXYCODONE HYDROCHLORIDE AND ACETAMINOPHEN 1 TABLET: 10; 325 TABLET ORAL at 12:01

## 2017-01-22 RX ADMIN — OXCARBAZEPINE 150 MG: 150 TABLET ORAL at 09:01

## 2017-01-22 RX ADMIN — TIZANIDINE 12 MG: 4 TABLET ORAL at 09:01

## 2017-01-22 RX ADMIN — QUETIAPINE FUMARATE 500 MG: 300 TABLET, FILM COATED ORAL at 09:01

## 2017-01-22 RX ADMIN — MIRTAZAPINE 15 MG: 7.5 TABLET, FILM COATED ORAL at 09:01

## 2017-01-22 RX ADMIN — INSULIN ASPART 16 UNITS: 100 INJECTION, SOLUTION INTRAVENOUS; SUBCUTANEOUS at 01:01

## 2017-01-22 RX ADMIN — PANTOPRAZOLE SODIUM 40 MG: 40 TABLET, DELAYED RELEASE ORAL at 05:01

## 2017-01-22 RX ADMIN — MAGNESIUM OXIDE TAB 400 MG (241.3 MG ELEMENTAL MG) 400 MG: 400 (241.3 MG) TAB at 09:01

## 2017-01-22 RX ADMIN — GABAPENTIN 600 MG: 300 CAPSULE ORAL at 05:01

## 2017-01-22 RX ADMIN — GABAPENTIN 600 MG: 300 CAPSULE ORAL at 09:01

## 2017-01-22 RX ADMIN — INSULIN ASPART 16 UNITS: 100 INJECTION, SOLUTION INTRAVENOUS; SUBCUTANEOUS at 09:01

## 2017-01-22 RX ADMIN — OXYCODONE HYDROCHLORIDE AND ACETAMINOPHEN 1 TABLET: 10; 325 TABLET ORAL at 05:01

## 2017-01-22 RX ADMIN — INSULIN ASPART 2 UNITS: 100 INJECTION, SOLUTION INTRAVENOUS; SUBCUTANEOUS at 09:01

## 2017-01-22 RX ADMIN — PANTOPRAZOLE SODIUM 40 MG: 40 TABLET, DELAYED RELEASE ORAL at 06:01

## 2017-01-22 RX ADMIN — ERGOCALCIFEROL 50000 UNITS: 1.25 CAPSULE ORAL at 08:01

## 2017-01-22 RX ADMIN — LEVOTHYROXINE SODIUM 150 MCG: 150 TABLET ORAL at 05:01

## 2017-01-22 RX ADMIN — HYDROCHLOROTHIAZIDE 12.5 MG: 12.5 TABLET ORAL at 08:01

## 2017-01-22 RX ADMIN — CARVEDILOL 25 MG: 25 TABLET, FILM COATED ORAL at 09:01

## 2017-01-22 RX ADMIN — INSULIN ASPART 2 UNITS: 100 INJECTION, SOLUTION INTRAVENOUS; SUBCUTANEOUS at 08:01

## 2017-01-22 NOTE — PROGRESS NOTES
[facility] Hospital Medicine  Progress Note    Patient Name: Homar Jerry  MRN: [id number]  Patient Class: IP- Inpatient   Admission Date: 1/17/2017  Length of Stay: 5 days  Attending Physician: Amanda Wilson MD  Primary Care Provider: Mary Cabrera MD    [facility] Medicine Team: [facility] MED 2 Brodie White MD    Subjective:     Principal Problem:Diabetic ketoacidosis without coma associated with type 2 diabetes mellitus    HPI:  Homar Jerry is a 41 year old woman with a past medical history of gastroparesis s/p gastric pacemaker and sleeve gastrectomy, RA, chronic sinus tachycardia, HTN and obesity who presented to the ED from clinic with newly diagnosed diabetes after being found to be hyperglycemic to the 500s with elevated ion gap. States that for approximately the past 2 weeks she has had increasing thirst, urine output, blurred vision, headache. In ED found to have worsening hyperglycemia, with anion gap and acidosis. Was bolused with NS and started on insulin infusion with DKA protocol. Patient denies headache, chest pain, sob, abdominal discomfort, changes in bowel habits, blood in stool or urine, abnormal bruising, bleeding, neurological changes.     Hospital Course:  On hospital day one she was evaluated by Endocrinology. Her diabetic ketoacidosis resolved as evidenced by a closed anion gap. She continued to have hyperglycemia and was placed on a transition insulin drip with subcutaneous insulin. Clear liquid diet was started and her levothyroxine dose up titrated to 137 mcg. On hospital day 2 she was taken off transition insulin drip and placed on prandial with basal insulin. Her hyperglycemia improved. On hospital day 3 she continued to be hyperglycemia. Her insulin therapy was further adjusted by the endocrinology service. Patient's day glucose was better controlled on 1/20, however the morning glucose on 1/21 was elevated at 297. Patient's final home  insulin regimen per endocrinology was 33U daily and nightly with 16U with meals and moderate dose SSI. Of note, patient's TSH was elevated at 10.8 and patients levothyroxine was increased from 137 mcg to 150 mcg. Patient was also found to be hypocalcemic at 8.0 likely secondary to vitamin D insufficiency which was at 20 that morning. PTH results are pending. Patient was stable for discharge on 1/21 but secondary to medication reimbursement of insulin and equipment, her discharge was held. Patient will be discharged on home insulin as above with moderate dose SSI, as well as home ergocalciferol, OTC vitamin D and calcium, and follow-up with endocrinology clinic within one week.     Interval History:  No acute events overnight. Patient was able to have one moderate-sized bowel movement overnight (had not had one reportedly for 6 days). Otherwise, endorses some mild nausea without vomiting. Without shortness of breath, fevers or chills, abdominal pain.     Review of Systems   Constitutional: Negative for chills, fatigue, fever and unexpected weight change.   HENT: Negative for mouth sores, nosebleeds, rhinorrhea, sore throat and trouble swallowing.    Eyes: Negative for pain and redness.   Respiratory: Negative for cough, chest tightness, shortness of breath and wheezing.    Cardiovascular: Negative for chest pain, palpitations and leg swelling.   Gastrointestinal: Negative for abdominal pain, blood in stool, constipation, diarrhea, nausea and vomiting.   Endocrine: Negative for cold intolerance, heat intolerance, polydipsia, polyphagia and polyuria.   Genitourinary: Negative for decreased urine volume, dysuria, frequency and hematuria.   Musculoskeletal: Negative for arthralgias.   Skin: Negative for rash.   Neurological: Negative for dizziness, seizures, syncope, weakness, light-headedness, numbness and headaches.   Hematological: Negative for adenopathy.   Psychiatric/Behavioral: Negative for confusion,  hallucinations, self-injury, sleep disturbance and suicidal ideas. The patient is not nervous/anxious.      Objective:     Vital Signs (Most Recent):  Temp: 97.4 °F (36.3 °C) (01/22/17 0400)  Pulse: 85 (01/22/17 0400)  Resp: 18 (01/22/17 0400)  BP: 110/68 (01/22/17 0400)  SpO2: 100 % (01/22/17 0400) Vital Signs (24h Range):  Temp:  [97.4 °F (36.3 °C)-98.1 °F (36.7 °C)] 97.4 °F (36.3 °C)  Pulse:  [80-89] 85  Resp:  [18] 18  SpO2:  [100 %] 100 %  BP: ()/(60-91) 110/68     Weight: 117.8 kg (259 lb 11.2 oz)  Body mass index is 43.22 kg/(m^2).    Intake/Output Summary (Last 24 hours) at 01/22/17 0801  Last data filed at 01/22/17 0600   Gross per 24 hour   Intake             1700 ml   Output              900 ml   Net              800 ml      Physical Exam   Constitutional: She is oriented to person, place, and time. She appears well-developed and well-nourished. No distress.   Morbidly obese, pleasant female.    HENT:   Head: Normocephalic.   Right Ear: Hearing, tympanic membrane and ear canal normal.   Left Ear: Hearing, tympanic membrane and ear canal normal.   Mouth/Throat: Uvula is midline, oropharynx is clear and moist and mucous membranes are normal. No oropharyngeal exudate.   Eyes: EOM are normal. Pupils are equal, round, and reactive to light. Right eye exhibits no discharge. Left eye exhibits no discharge.   Neck: Normal range of motion. Neck supple. No JVD present.   Cardiovascular: Normal rate, regular rhythm, normal heart sounds and intact distal pulses.    No murmur heard.  Pulmonary/Chest: Effort normal and breath sounds normal. No respiratory distress. She has no wheezes.   Abdominal: Soft. Bowel sounds are normal. She exhibits no distension. There is no tenderness. There is no rebound.   Musculoskeletal: Normal range of motion. She exhibits no edema or tenderness.   Lymphadenopathy:     She has no cervical adenopathy.   Neurological: She is alert and oriented to person, place, and time. No cranial  nerve deficit.   Skin: Skin is warm and dry. She is not diaphoretic.   Psychiatric: Cognition and memory are normal.   Nursing note and vitals reviewed.      Significant Labs:   A1C:   Recent Labs  Lab 12/01/16  0429 01/17/17  1848   HGBA1C 8.0* 12.7*       Recent Labs  Lab 01/21/17  0606 01/22/17  0600    143   K 3.6 4.2    109   CO2 26 29   * 112*   BUN 15 14   CREATININE 1.0 0.8   CALCIUM 8.0* 8.0*   ANIONGAP 7* 5*   EGFRNONAA >60.0 >60.0     Assessment/Plan:      * Diabetic ketoacidosis without coma associated with type 2 diabetes mellitus  Placed on subcutaneous preprandial and basal insulin.   Followed by endocrinology, with much better blood glucose control yesterday. BG at 110 this morning following increase of nightly detemir to 33U yesterday. Recs below  1. Insulin aspart 16 units TID with meals.  2. Insulin detemir 33 units in the morning and 33 units nightly.  3. Moderate correction dose SSI.  4. Diabetic diet.  5. Monitor glucose and titrate insulin to achieve ADA recommended goals.    Gastroparesis  Stable.  1. Continue pantoprazole 40 mg po BID  2. Continue phenergan 12.5 mg every 6 hours as needed  3. Small frequent meals    Fibromyalgia  Stable.  1. Continue tizanidine  2. Continue gabapentin      Anxiety  Stable.  1. Continue home xanax 1 mg po BID  2. Continue home mirtazapine 15 mg QHS      Bipolar 1 disorder  Stable and without acute exacerbation.  1. Continue oxcarbezepine  2. Continue olanzapine  3. Continue seroquel      Hypothyroidism, postsurgical  - Elevated TSH in December 2016. With history of thyroid cancer.   - TSH checked today this morning elevated at 10.8.   - Endocrinology recommendations include increasing levothyroxine from 137 mcg to 150 mcg PO daily.    Migraines  Asymptomatic.  1. Continue topirimate PRN        Essential hypertension  Well controlled as per JNC-8 guideline criteria. Well-controlled overnight with SBP ranging from  in 24 hours.   1.  Continue carvedilol 25 mg po BID  2. Continue HCTZ 12.5 mg po daily  3. Patient would benefit from antihypertensive optimization with addition of ACEI/ARB in the setting of DM2 however will leave determination of change to her PCP.      Asthma  Well controlled and without evidence of bronchospasm.  1. Monitor.  2. Albuterol as needed if bronchospasm develops.      Constipation  - Likely secondary to chronic opiate use at home (Percocet) and poor nutrition/fiber  - Resolved, with one moderate size bowel movement this morning.   - States she has stool softener at home and is not interested in laxative at this time as it gives her diarrhea.       Rheumatoid arthritis  Stable.  1. Continue home percocet  every 6 hours as needed      Type 2 diabetes mellitus with hyperglycemia, without long-term current use of insulin  As above.    Morbid obesity  Patient will require lifestyle modifications.  1. Outpatient management.      Hypokalemia  - Likely secondary to poor nutritional intake and intracellular shift in the setting of insulin therapy.  - 4.2 this morning, but not it is partially hemolyzed.         Hypocalcemia  - Likely secondary to Vitamin D insufficiency as lab low this morning at 20. PTH normal at 18.  - Calcium 8.0; no CMP done this performed but last albumin was normal on 1/17 at 4.2, therefore not secondary to hypoalbuminemia.   - Endocrinology recommended ergocalciferol 50,000U for 5 days with daily 2000 IU afterward along with 1200 mg OTC calcium on discharge.       Diabetes mellitus, new onset  As above.      Hyperglycemia  - Secondary to type II DM.      Hypomagnesemia  - 1.6 this morning; replenished with magnesium oxide 400 mg PO once.       Vitamin D deficiency        VTE Risk Mitigation         Ordered     enoxaparin injection 40 mg  Daily     Route:  Subcutaneous        01/17/17 2342     Medium Risk of VTE  Once      01/17/17 2349     Place DANDRE hose  Until discontinued      01/17/17 5979         Diet: Diabetic 1800 calories    Disposition: Stable for discharge, will need outpatient insulin regimen but there has been issue with affordability of medication. Will discuss with case management today as outpatient [facility] is closed if voucher can be attained with outside pharmacy. Patient will likely be discharged tomorrow to cover insulin reimbursement.     Brodie White MD  PGY-1 Internal Medicine  [phone]    Department of Hospital Medicine   [facility]

## 2017-01-22 NOTE — TREATMENT PLAN
Recommend lantus 33 units qam, 30 units qhs and novolog 16 units with meals   Moderate dose insulin correction

## 2017-01-22 NOTE — ASSESSMENT & PLAN NOTE
- Likely secondary to poor nutritional intake and intracellular shift in the setting of insulin therapy.  - 4.2 this morning, but not it is partially hemolyzed.

## 2017-01-22 NOTE — ASSESSMENT & PLAN NOTE
- Likely secondary to Vitamin D insufficiency as lab low this morning at 20. PTH normal at 18.  - Calcium 8.0; no CMP done this performed but last albumin was normal on 1/17 at 4.2, therefore not secondary to hypoalbuminemia.   - Endocrinology recommended ergocalciferol 50,000U for 5 days with daily 2000 IU afterward along with 1200 mg OTC calcium on discharge.

## 2017-01-22 NOTE — ASSESSMENT & PLAN NOTE
- Likely secondary to chronic opiate use at home (Percocet) and poor nutrition/fiber  - Resolved, with one moderate size bowel movement this morning.   - States she has stool softener at home and is not interested in laxative at this time as it gives her diarrhea.

## 2017-01-22 NOTE — PLAN OF CARE
RAFI asked by Dr. White to assist pt with getting meds covered for d/c today from [facility]. He stated the pharmacy notified him yesterday the form done on Friday by team RAFI/KIRK was not complete and they had questions. Advised [facility] today, this will have to be addressed tomorrow by team RAFI/KIRK when [facility] reopens in the morning as I can not cover medication costs through anywhere else.

## 2017-01-22 NOTE — PLAN OF CARE
Problem: Patient Care Overview  Goal: Plan of Care Review  Outcome: Ongoing (interventions implemented as appropriate)  Plan of care reviewed with patient. No acute events overnight. Patient complains of pain to leg, relieved with prn medication. Patient seemed to be resting comfortably throughout the night. Vitals remain stable. Continued teaching on insulin administration, patient demonstrated use of insulin pen and verbalized understanding of need for different types of insulin, when to take, etc. Patient states she is ready to go home today. Free from injury/falls. Midline access remains intact to left arm.

## 2017-01-22 NOTE — ASSESSMENT & PLAN NOTE
Well controlled as per JNC-8 guideline criteria. Well-controlled overnight with SBP ranging from  in 24 hours.   1. Continue carvedilol 25 mg po BID  2. Continue HCTZ 12.5 mg po daily  3. Patient would benefit from antihypertensive optimization with addition of ACEI/ARB in the setting of DM2 however will leave determination of change to her PCP.

## 2017-01-22 NOTE — ASSESSMENT & PLAN NOTE
Placed on subcutaneous preprandial and basal insulin.   Followed by endocrinology, with much better blood glucose control yesterday. BG at 110 this morning following increase of nightly detemir to 33U yesterday. Recs below  1. Insulin aspart 16 units TID with meals.  2. Insulin detemir 33 units in the morning and 33 units nightly.  3. Moderate correction dose SSI.  4. Diabetic diet.  5. Monitor glucose and titrate insulin to achieve ADA recommended goals.

## 2017-01-22 NOTE — SUBJECTIVE & OBJECTIVE
Interval History:  No acute events overnight. Patient was able to have one moderate-sized bowel movement overnight (had not had one reportedly for 6 days). Otherwise, endorses some mild nausea without vomiting. Without shortness of breath, fevers or chills, abdominal pain.     Review of Systems   Constitutional: Negative for chills, fatigue, fever and unexpected weight change.   HENT: Negative for mouth sores, nosebleeds, rhinorrhea, sore throat and trouble swallowing.    Eyes: Negative for pain and redness.   Respiratory: Negative for cough, chest tightness, shortness of breath and wheezing.    Cardiovascular: Negative for chest pain, palpitations and leg swelling.   Gastrointestinal: Negative for abdominal pain, blood in stool, constipation, diarrhea, nausea and vomiting.   Endocrine: Negative for cold intolerance, heat intolerance, polydipsia, polyphagia and polyuria.   Genitourinary: Negative for decreased urine volume, dysuria, frequency and hematuria.   Musculoskeletal: Negative for arthralgias.   Skin: Negative for rash.   Neurological: Negative for dizziness, seizures, syncope, weakness, light-headedness, numbness and headaches.   Hematological: Negative for adenopathy.   Psychiatric/Behavioral: Negative for confusion, hallucinations, self-injury, sleep disturbance and suicidal ideas. The patient is not nervous/anxious.      Objective:     Vital Signs (Most Recent):  Temp: 97.4 °F (36.3 °C) (01/22/17 0400)  Pulse: 85 (01/22/17 0400)  Resp: 18 (01/22/17 0400)  BP: 110/68 (01/22/17 0400)  SpO2: 100 % (01/22/17 0400) Vital Signs (24h Range):  Temp:  [97.4 °F (36.3 °C)-98.1 °F (36.7 °C)] 97.4 °F (36.3 °C)  Pulse:  [80-89] 85  Resp:  [18] 18  SpO2:  [100 %] 100 %  BP: ()/(60-91) 110/68     Weight: 117.8 kg (259 lb 11.2 oz)  Body mass index is 43.22 kg/(m^2).    Intake/Output Summary (Last 24 hours) at 01/22/17 0801  Last data filed at 01/22/17 0600   Gross per 24 hour   Intake             1700 ml   Output               900 ml   Net              800 ml      Physical Exam   Constitutional: She is oriented to person, place, and time. She appears well-developed and well-nourished. No distress.   Morbidly obese, pleasant female.    HENT:   Head: Normocephalic.   Right Ear: Hearing, tympanic membrane and ear canal normal.   Left Ear: Hearing, tympanic membrane and ear canal normal.   Mouth/Throat: Uvula is midline, oropharynx is clear and moist and mucous membranes are normal. No oropharyngeal exudate.   Eyes: EOM are normal. Pupils are equal, round, and reactive to light. Right eye exhibits no discharge. Left eye exhibits no discharge.   Neck: Normal range of motion. Neck supple. No JVD present.   Cardiovascular: Normal rate, regular rhythm, normal heart sounds and intact distal pulses.    No murmur heard.  Pulmonary/Chest: Effort normal and breath sounds normal. No respiratory distress. She has no wheezes.   Abdominal: Soft. Bowel sounds are normal. She exhibits no distension. There is no tenderness. There is no rebound.   Musculoskeletal: Normal range of motion. She exhibits no edema or tenderness.   Lymphadenopathy:     She has no cervical adenopathy.   Neurological: She is alert and oriented to person, place, and time. No cranial nerve deficit.   Skin: Skin is warm and dry. She is not diaphoretic.   Psychiatric: Cognition and memory are normal.   Nursing note and vitals reviewed.      Significant Labs:   A1C:   Recent Labs  Lab 12/01/16  0429 01/17/17  1848   HGBA1C 8.0* 12.7*       Recent Labs  Lab 01/21/17  0606 01/22/17  0600    143   K 3.6 4.2    109   CO2 26 29   * 112*   BUN 15 14   CREATININE 1.0 0.8   CALCIUM 8.0* 8.0*   ANIONGAP 7* 5*   EGFRNONAA >60.0 >60.0

## 2017-01-23 VITALS
HEART RATE: 78 BPM | BODY MASS INDEX: 43.27 KG/M2 | DIASTOLIC BLOOD PRESSURE: 79 MMHG | WEIGHT: 259.69 LBS | HEIGHT: 65 IN | SYSTOLIC BLOOD PRESSURE: 122 MMHG | RESPIRATION RATE: 20 BRPM | OXYGEN SATURATION: 94 % | TEMPERATURE: 98 F

## 2017-01-23 PROBLEM — E13.9 LADA (LATENT AUTOIMMUNE DIABETES IN ADULTS), MANAGED AS TYPE 1: Status: ACTIVE | Noted: 2017-01-23

## 2017-01-23 LAB
ANION GAP SERPL CALC-SCNC: 6 MMOL/L
BUN SERPL-MCNC: 14 MG/DL
CALCIUM SERPL-MCNC: 8.3 MG/DL
CHLORIDE SERPL-SCNC: 108 MMOL/L
CO2 SERPL-SCNC: 29 MMOL/L
CREAT SERPL-MCNC: 0.8 MG/DL
EST. GFR  (AFRICAN AMERICAN): >60 ML/MIN/1.73 M^2
EST. GFR  (NON AFRICAN AMERICAN): >60 ML/MIN/1.73 M^2
GLUCOSE SERPL-MCNC: 120 MG/DL
MAGNESIUM SERPL-MCNC: 1.8 MG/DL
PHOSPHATE SERPL-MCNC: 5.5 MG/DL
POCT GLUCOSE: 157 MG/DL (ref 70–110)
POCT GLUCOSE: 171 MG/DL (ref 70–110)
POCT GLUCOSE: 93 MG/DL (ref 70–110)
POTASSIUM SERPL-SCNC: 4.1 MMOL/L
SODIUM SERPL-SCNC: 143 MMOL/L

## 2017-01-23 PROCEDURE — 80048 BASIC METABOLIC PNL TOTAL CA: CPT

## 2017-01-23 PROCEDURE — 99239 HOSP IP/OBS DSCHRG MGMT >30: CPT | Mod: GC,,, | Performed by: INTERNAL MEDICINE

## 2017-01-23 PROCEDURE — 84100 ASSAY OF PHOSPHORUS: CPT

## 2017-01-23 PROCEDURE — 25000003 PHARM REV CODE 250: Performed by: INTERNAL MEDICINE

## 2017-01-23 PROCEDURE — 25000003 PHARM REV CODE 250: Performed by: STUDENT IN AN ORGANIZED HEALTH CARE EDUCATION/TRAINING PROGRAM

## 2017-01-23 PROCEDURE — 83735 ASSAY OF MAGNESIUM: CPT

## 2017-01-23 PROCEDURE — 63600175 PHARM REV CODE 636 W HCPCS: Performed by: STUDENT IN AN ORGANIZED HEALTH CARE EDUCATION/TRAINING PROGRAM

## 2017-01-23 RX ORDER — ERGOCALCIFEROL 1.25 MG/1
50000 CAPSULE ORAL ONCE
Status: COMPLETED | OUTPATIENT
Start: 2017-01-23 | End: 2017-01-23

## 2017-01-23 RX ADMIN — INSULIN ASPART 16 UNITS: 100 INJECTION, SOLUTION INTRAVENOUS; SUBCUTANEOUS at 01:01

## 2017-01-23 RX ADMIN — HYDROCHLOROTHIAZIDE 12.5 MG: 12.5 TABLET ORAL at 08:01

## 2017-01-23 RX ADMIN — POTASSIUM ACETATE: 3.93 INJECTION, SOLUTION, CONCENTRATE INTRAVENOUS at 06:01

## 2017-01-23 RX ADMIN — LEVOTHYROXINE SODIUM 150 MCG: 150 TABLET ORAL at 06:01

## 2017-01-23 RX ADMIN — GABAPENTIN 600 MG: 300 CAPSULE ORAL at 06:01

## 2017-01-23 RX ADMIN — OXYCODONE HYDROCHLORIDE AND ACETAMINOPHEN 1 TABLET: 10; 325 TABLET ORAL at 04:01

## 2017-01-23 RX ADMIN — PANTOPRAZOLE SODIUM 40 MG: 40 TABLET, DELAYED RELEASE ORAL at 06:01

## 2017-01-23 RX ADMIN — INSULIN ASPART 1 UNITS: 100 INJECTION, SOLUTION INTRAVENOUS; SUBCUTANEOUS at 12:01

## 2017-01-23 RX ADMIN — ENOXAPARIN SODIUM 40 MG: 100 INJECTION SUBCUTANEOUS at 01:01

## 2017-01-23 RX ADMIN — OXYCODONE HYDROCHLORIDE AND ACETAMINOPHEN 1 TABLET: 10; 325 TABLET ORAL at 12:01

## 2017-01-23 RX ADMIN — CARVEDILOL 25 MG: 25 TABLET, FILM COATED ORAL at 08:01

## 2017-01-23 RX ADMIN — INSULIN ASPART 16 UNITS: 100 INJECTION, SOLUTION INTRAVENOUS; SUBCUTANEOUS at 08:01

## 2017-01-23 RX ADMIN — ALPRAZOLAM 1 MG: 1 TABLET ORAL at 08:01

## 2017-01-23 RX ADMIN — ERGOCALCIFEROL 50000 UNITS: 1.25 CAPSULE ORAL at 08:01

## 2017-01-23 NOTE — PLAN OF CARE
Problem: Patient Care Overview  Goal: Plan of Care Review  Outcome: Ongoing (interventions implemented as appropriate)  Patient is alert, awake, and oriented. Complains of pain. Prn pain medicine is available. VS stable. Neuro checks q4hrs. Free o injuries and falls. Verbalizes understanding of plan. Verbalized and demonstrated insulin administration. Nurse will continue to monitor.

## 2017-01-23 NOTE — PLAN OF CARE
Future Appointments  Date Time Provider Department Center   1/25/2017 1:45 PM ECHO, [facility] ECHOLAB [address]   1/25/2017 3:30 PM Carmelo Zhou NP [facility] PSYCH [address]   1/27/2017 1:00 PM ELMO Kendrick [facility] IMPRICL [address] PCW          01/23/17 0922   Final Note   Assessment Type Final Discharge Note   Discharge Disposition Home   Hospital Follow Up  Appt(s) scheduled? Yes   Offered [facility] -- Bedside Delivery? Yes   Discharge/Hospital Encounter Summary to (non[facility]) PCP n/a   Referral to Outpatient Case Management complete? n/a   Referral to / orders for Home Health Complete? n/a   Any social issues identified prior to discharge? No   Did you assess the readiness or willingness of the family or caregiver to support self management of care? Yes

## 2017-01-23 NOTE — PROGRESS NOTES
[facility]  Endocrinology  Progress Note    Admit Date: 1/17/2017     Reason for Consult: Management of T2DM, Hyperglycemia     Diabetes diagnosis year: 2016    Home Diabetes Medications:  none    How often checking glucose at home? none  BG readings on regimen: n/a  Hypoglycemia on the regimen? n/a  Missed doses on regimen?  n/a    Diabetes Complications include:     Hyperglycemia, Diabetic peripheral neuropathy  and Diabetic gastroparesis     Complicating diabetes co morbidities:   none      HPI:   Patient is a 41 y.o. female with a hypothyroidism s/p surgery for thyroid cancer, RA, HTN, obesity who presented on 1/17/17 with several weeks of polyuria, polydypsia, blurry vision, and headache. Not previously been on medication for DM. A1C from 12/2016 was 8.0. Denies any recent illness. Evidence of DKA on admission labs with elevated anion gap, low serum bicarbonate, and hyperglycemia. Endocrine consulted for new DM diagnosis.        No new subjective & objective note has been filed under this hospital service since the last note was generated.      ASSESSMENT and PLAN    * Diabetic ketoacidosis without coma associated with type 2 diabetes mellitus  Blood glucose goal 140-180  c-peptide low -- can be falsely low with glucose toxicity, CARMEN Ab positive  Recommend MDI detemir 33 units BID, novolog 16 units AC, moderate dose ssi    Discharge rec: will require basal/prandial insulin on discharge with glucometer, test strips, pen needles    Hypothyroidism, postsurgical  Need to obtain thyroid cancer records as outpatient  Recommend increasing levothyroxine to 150mcg daily  TSH remains elevated      Morbid obesity  May contribute to insulin resistance      Hypocalcemia  Unclear if truly hypocalcemia, corrects for low albumin in the past, but no albumin available on daily labs since admission   Maybe secondary to thyroid surgery and hypoparathyroidism.   PTH is inappropriately normal for low vit D and  low Ca. 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

## 2017-01-23 NOTE — PLAN OF CARE
This CM was asked to assist in getting patient's prescriptions filled. Interdepartmental Cost Transfer sheet for $183.83 completed and faxed to [facility]. Pharmacy to deliver patient's prescriptions to bedside. Will continue to follow.    Paulina Diaz RN  Ext [phone]

## 2017-01-23 NOTE — NURSING
Patient education and discharge completed. Patient verbalized and demonstrated understanding of use of blood sugar testing and insulin administration. Patient IV removed and tolerated well. Currently waiting on transportation to arrive.

## 2017-01-23 NOTE — ASSESSMENT & PLAN NOTE
Would treat as T1DM/ESHA since Cpeptide low 0.4 (), CARMEN mildly positive (0.05)  Blood glucose goal 140-180  Noted to have FBS in low nl range (90s) so can consider decreasing levemir 30 bid, continue novolog 16 units AC, moderate dose correction, checks ac/hs     Discharge rec: will require basal/prandial insulin on discharge with glucometer, test strips, lancets, pen needles and o/p DM education

## 2017-01-24 LAB — POCT GLUCOSE: 122 MG/DL (ref 70–110)

## 2017-01-24 NOTE — ASSESSMENT & PLAN NOTE
Well controlled as per JNC-8 guideline criteria. Moderately controlled overnight with SBP ranging from 110-157 in 24 hours.   1. Continue carvedilol 25 mg po BID  2. Continue HCTZ 12.5 mg po daily  3. Patient would benefit from antihypertensive optimization with addition of ACEI/ARB in the setting of DM2 however will leave determination of change to her PCP.

## 2017-01-24 NOTE — ASSESSMENT & PLAN NOTE
Placed on subcutaneous preprandial and basal insulin.   BG well-controlled, at 120 on CMP this morning. Following endocrinology recs.  1. Insulin aspart 16 units TID with meals.  2. Insulin detemir 33 units in the morning and 30 units nightly.  3. Moderate correction dose SSI.  4. Diabetic diet.  5. Monitor glucose and titrate insulin to achieve ADA recommended goals.

## 2017-01-24 NOTE — ASSESSMENT & PLAN NOTE
"- Per endocrine "would treat as T1DM/ESHA since Cpeptide low 0.4 (), CARMEN mildly positive (0.05)"    "

## 2017-01-24 NOTE — PROGRESS NOTES
[facility] Medicine  Progress Note    Patient Name: Homar Jerry  MRN: [id number]  Patient Class: IP- Inpatient   Admission Date: 1/17/2017  Length of Stay: 6 days  Attending Physician: DINORAH PAULA MD  Primary Care Provider: Mary Cabrera MD    [facility] Medicine Team: [facility] MED 2 Brodie White MD    Subjective:     Principal Problem:Diabetic ketoacidosis without coma associated with type 2 diabetes mellitus    HPI:  Homar Jerry is a 41 year old woman with a past medical history of gastroparesis s/p gastric pacemaker and sleeve gastrectomy, RA, chronic sinus tachycardia, HTN and obesity who presented to the ED from clinic with newly diagnosed diabetes after being found to be hyperglycemic to the 500s with elevated ion gap. States that for approximately the past 2 weeks she has had increasing thirst, urine output, blurred vision, headache. In ED found to have worsening hyperglycemia, with anion gap and acidosis. Was bolused with NS and started on insulin infusion with DKA protocol. Patient denies headache, chest pain, sob, abdominal discomfort, changes in bowel habits, blood in stool or urine, abnormal bruising, bleeding, neurological changes.     Hospital Course:  On hospital day one she was evaluated by Endocrinology. Her diabetic ketoacidosis resolved as evidenced by a closed anion gap. She continued to have hyperglycemia and was placed on a transition insulin drip with subcutaneous insulin. Clear liquid diet was started and her levothyroxine dose up titrated to 137 mcg. On hospital day 2 she was taken off transition insulin drip and placed on prandial with basal insulin. Her hyperglycemia improved. On hospital day 3 she continued to be hyperglycemia. Her insulin therapy was further adjusted by the endocrinology service. Patient's day glucose was better controlled on 1/20, however the morning glucose on 1/21 was elevated at 297. Patient's final  home insulin regimen per endocrinology was 33U daily and nightly with 16U with meals and moderate dose SSI. Of note, patient's TSH was elevated at 10.8 and patients levothyroxine was increased from 137 mcg to 150 mcg. Patient was also found to be hypocalcemic at 8.0 likely secondary to vitamin D insufficiency which was at 20 that morning. PTH results are pending. Patient was stable for discharge on 1/21 but secondary to medication reimbursement of insulin and equipment, her discharge was held. Patient will be discharged on home insulin as above with moderate dose SSI, as well as home ergocalciferol, OTC vitamin D and calcium, and follow-up with endocrinology clinic within one week. She was discharged in stable condition the morning of 1/23.    Interval History:   No acute events overnight. Patient states last BM was 2 evenings ago. She does endorse some mild nausea but denies and vomiting episodes. She is tolerating her diet well.     Review of Systems   Constitutional: Negative for chills, fatigue, fever and unexpected weight change.   HENT: Negative for mouth sores, nosebleeds, rhinorrhea, sore throat and trouble swallowing.    Eyes: Negative for pain and redness.   Respiratory: Negative for cough, chest tightness, shortness of breath and wheezing.    Cardiovascular: Negative for chest pain, palpitations and leg swelling.   Gastrointestinal: Negative for abdominal pain, blood in stool, constipation, diarrhea, nausea and vomiting.   Endocrine: Negative for cold intolerance, heat intolerance, polydipsia, polyphagia and polyuria.   Genitourinary: Negative for decreased urine volume, dysuria, frequency and hematuria.   Musculoskeletal: Negative for arthralgias.   Skin: Negative for rash.   Neurological: Negative for dizziness, seizures, syncope, weakness, light-headedness, numbness and headaches.   Hematological: Negative for adenopathy.   Psychiatric/Behavioral: Negative for confusion, hallucinations, self-injury,  sleep disturbance and suicidal ideas. The patient is not nervous/anxious.      Objective:     Vital Signs (Most Recent):  Temp: 97.7 °F (36.5 °C) (01/23/17 0730)  Pulse: 78 (01/23/17 0730)  Resp: 20 (01/23/17 0730)  BP: 122/79 (01/23/17 0858)  SpO2: (!) 94 % (01/23/17 0730) Vital Signs (24h Range):  Temp:  [97.6 °F (36.4 °C)-98.6 °F (37 °C)] 97.7 °F (36.5 °C)  Pulse:  [76-83] 78  Resp:  [16-20] 20  SpO2:  [92 %-95 %] 94 %  BP: ()/(51-93) 122/79     Weight: 117.8 kg (259 lb 11.2 oz)  Body mass index is 43.22 kg/(m^2).    Intake/Output Summary (Last 24 hours) at 01/23/17 1946  Last data filed at 01/23/17 0647   Gross per 24 hour   Intake             1732 ml   Output              700 ml   Net             1032 ml      Physical Exam   Constitutional: She is oriented to person, place, and time. She appears well-developed and well-nourished. No distress.   Morbidly obese, pleasant female.    HENT:   Head: Normocephalic.   Right Ear: Hearing, tympanic membrane and ear canal normal.   Left Ear: Hearing, tympanic membrane and ear canal normal.   Mouth/Throat: Uvula is midline, oropharynx is clear and moist and mucous membranes are normal. No oropharyngeal exudate.   Eyes: EOM are normal. Pupils are equal, round, and reactive to light. Right eye exhibits no discharge. Left eye exhibits no discharge.   Neck: Normal range of motion. Neck supple. No JVD present.   Cardiovascular: Normal rate, regular rhythm, normal heart sounds and intact distal pulses.    No murmur heard.  Pulmonary/Chest: Effort normal and breath sounds normal. No respiratory distress. She has no wheezes.   Abdominal: Soft. Bowel sounds are normal. She exhibits no distension. There is no tenderness. There is no rebound.   Musculoskeletal: Normal range of motion. She exhibits no edema or tenderness.   Lymphadenopathy:     She has no cervical adenopathy.   Neurological: She is alert and oriented to person, place, and time. No cranial nerve deficit.   Skin:  Skin is warm and dry. She is not diaphoretic.   Psychiatric: Cognition and memory are normal.   Nursing note and vitals reviewed.      Significant Labs:   CMP:   Recent Labs  Lab 01/22/17  0600 01/23/17  0445    143   K 4.2 4.1    108   CO2 29 29   * 120*   BUN 14 14   CREATININE 0.8 0.8   CALCIUM 8.0* 8.3*   ANIONGAP 5* 6*   EGFRNONAA >60.0 >60.0     Assessment/Plan:      * Diabetic ketoacidosis without coma associated with type 2 diabetes mellitus  Placed on subcutaneous preprandial and basal insulin.   BG well-controlled, at 120 on CMP this morning. Following endocrinology recs.  1. Insulin aspart 16 units TID with meals.  2. Insulin detemir 33 units in the morning and 30 units nightly.  3. Moderate correction dose SSI.  4. Diabetic diet.  5. Monitor glucose and titrate insulin to achieve ADA recommended goals.    Gastroparesis  Stable.  1. Continue pantoprazole 40 mg po BID  2. Continue phenergan 12.5 mg every 6 hours as needed  3. Small frequent meals    Fibromyalgia  Stable.  1. Continue tizanidine  2. Continue gabapentin      Anxiety  Stable.  1. Continue home xanax 1 mg po BID  2. Continue home mirtazapine 15 mg QHS      Bipolar 1 disorder  Stable and without acute exacerbation.  1. Continue oxcarbezepine  2. Continue olanzapine  3. Continue seroquel      Hypothyroidism, postsurgical  - Elevated TSH in December 2016. With history of thyroid cancer.   - TSH checked today this morning elevated at 10.8.   - Endocrinology recommendations include increasing levothyroxine from 137 mcg to 150 mcg PO daily.    Migraines  Asymptomatic.  1. Continue topirimate PRN        Essential hypertension  Well controlled as per JNC-8 guideline criteria. Moderately controlled overnight with SBP ranging from 110-157 in 24 hours.   1. Continue carvedilol 25 mg po BID  2. Continue HCTZ 12.5 mg po daily  3. Patient would benefit from antihypertensive optimization with addition of ACEI/ARB in the setting of DM2  "however will leave determination of change to her PCP.      Asthma  Well controlled and without evidence of bronchospasm.  1. Monitor.  2. Albuterol as needed if bronchospasm develops.      Constipation  - Likely secondary to chronic opiate use at home (Percocet) and poor nutrition/fiber  - With one moderate size bowel movement two evenings ago.   - States she has stool softener at home and is not interested in laxative at this time as it gives her diarrhea.       Rheumatoid arthritis  Stable.  1. Continue home percocet  every 6 hours as needed      Type 2 diabetes mellitus with hyperglycemia, without long-term current use of insulin  - See DKA.     Hypokalemia  - Likely secondary to poor nutritional intake and intracellular shift in the setting of insulin therapy.  - Resolved. 4.1 this morning.         Hypoxia  Patient with apparent hypoxia on ABG. However this was a venous sample. No evidence of hypoxia on based on oxygen saturation.  1. Monitor.      Hypocalcemia  - Calcium 8.3  - Likely secondary to Vitamin D insufficiency as lab low at 20. PTH normal at 18.  - Endocrinology recommended ergocalciferol 50,000U for 5 days with daily 2000 IU afterward along with 1200 mg OTC calcium on discharge.       Diabetes mellitus, new onset  - See endocrine recommendations, well controlled on current insulin regimen.      Hyperglycemia  - Secondary to type II DM.      Hypomagnesemia  - Resolved with PO magnesium-oxide. 1.8 this morning.       Vitamin D deficiency  - See hypocalcemia.       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 She received insulin from pharmacy this morning. Will follow-up with [facility] care clinic on 1/27 and instructed to make " follow-up with endocrinology (who states they will also call patient to make appointment).     Brodie White MD  PGY-1 Internal Medicine  [phone]    Department of [facility] Medicine   [facility]

## 2017-01-24 NOTE — SUBJECTIVE & OBJECTIVE
Interval History:   No acute events overnight. Patient states last BM was 2 evenings ago. She does endorse some mild nausea but denies and vomiting episodes. She is tolerating her diet well.     Review of Systems   Constitutional: Negative for chills, fatigue, fever and unexpected weight change.   HENT: Negative for mouth sores, nosebleeds, rhinorrhea, sore throat and trouble swallowing.    Eyes: Negative for pain and redness.   Respiratory: Negative for cough, chest tightness, shortness of breath and wheezing.    Cardiovascular: Negative for chest pain, palpitations and leg swelling.   Gastrointestinal: Negative for abdominal pain, blood in stool, constipation, diarrhea, nausea and vomiting.   Endocrine: Negative for cold intolerance, heat intolerance, polydipsia, polyphagia and polyuria.   Genitourinary: Negative for decreased urine volume, dysuria, frequency and hematuria.   Musculoskeletal: Negative for arthralgias.   Skin: Negative for rash.   Neurological: Negative for dizziness, seizures, syncope, weakness, light-headedness, numbness and headaches.   Hematological: Negative for adenopathy.   Psychiatric/Behavioral: Negative for confusion, hallucinations, self-injury, sleep disturbance and suicidal ideas. The patient is not nervous/anxious.      Objective:     Vital Signs (Most Recent):  Temp: 97.7 °F (36.5 °C) (01/23/17 0730)  Pulse: 78 (01/23/17 0730)  Resp: 20 (01/23/17 0730)  BP: 122/79 (01/23/17 0858)  SpO2: (!) 94 % (01/23/17 0730) Vital Signs (24h Range):  Temp:  [97.6 °F (36.4 °C)-98.6 °F (37 °C)] 97.7 °F (36.5 °C)  Pulse:  [76-83] 78  Resp:  [16-20] 20  SpO2:  [92 %-95 %] 94 %  BP: ()/(51-93) 122/79     Weight: 117.8 kg (259 lb 11.2 oz)  Body mass index is 43.22 kg/(m^2).    Intake/Output Summary (Last 24 hours) at 01/23/17 1946  Last data filed at 01/23/17 0623   Gross per 24 hour   Intake             1732 ml   Output              700 ml   Net             1032 ml      Physical Exam    Constitutional: She is oriented to person, place, and time. She appears well-developed and well-nourished. No distress.   Morbidly obese, pleasant female.    HENT:   Head: Normocephalic.   Right Ear: Hearing, tympanic membrane and ear canal normal.   Left Ear: Hearing, tympanic membrane and ear canal normal.   Mouth/Throat: Uvula is midline, oropharynx is clear and moist and mucous membranes are normal. No oropharyngeal exudate.   Eyes: EOM are normal. Pupils are equal, round, and reactive to light. Right eye exhibits no discharge. Left eye exhibits no discharge.   Neck: Normal range of motion. Neck supple. No JVD present.   Cardiovascular: Normal rate, regular rhythm, normal heart sounds and intact distal pulses.    No murmur heard.  Pulmonary/Chest: Effort normal and breath sounds normal. No respiratory distress. She has no wheezes.   Abdominal: Soft. Bowel sounds are normal. She exhibits no distension. There is no tenderness. There is no rebound.   Musculoskeletal: Normal range of motion. She exhibits no edema or tenderness.   Lymphadenopathy:     She has no cervical adenopathy.   Neurological: She is alert and oriented to person, place, and time. No cranial nerve deficit.   Skin: Skin is warm and dry. She is not diaphoretic.   Psychiatric: Cognition and memory are normal.   Nursing note and vitals reviewed.      Significant Labs:   CMP:   Recent Labs  Lab 01/22/17  0600 01/23/17  0445    143   K 4.2 4.1    108   CO2 29 29   * 120*   BUN 14 14   CREATININE 0.8 0.8   CALCIUM 8.0* 8.3*   ANIONGAP 5* 6*   EGFRNONAA >60.0 >60.0

## 2017-01-24 NOTE — ASSESSMENT & PLAN NOTE
- Likely secondary to chronic opiate use at home (Percocet) and poor nutrition/fiber  - With one moderate size bowel movement two evenings ago.   - States she has stool softener at home and is not interested in laxative at this time as it gives her diarrhea.

## 2017-01-24 NOTE — ASSESSMENT & PLAN NOTE
- Calcium 8.3  - Likely secondary to Vitamin D insufficiency as lab low at 20. PTH normal at 18.  - Endocrinology recommended ergocalciferol 50,000U for 5 days with daily 2000 IU afterward along with 1200 mg OTC calcium on discharge.

## 2017-01-24 NOTE — ASSESSMENT & PLAN NOTE
- Likely secondary to poor nutritional intake and intracellular shift in the setting of insulin therapy.  - Resolved. 4.1 this morning.

## 2017-01-24 NOTE — DISCHARGE SUMMARY
[facility] Hospital Medicine  Discharge Summary      Patient Name: Homar Jerry  MRN: [id number]  Admission Date: 1/17/2017  Hospital Length of Stay: 6 days  Discharge Date and Time: 1/23/2017 9 AM  Attending Physician: DINORAH PAULA MD  Discharging Provider: Cristina White MD  Primary Care Provider: Mary Cabrera MD  [facility] Medicine Team: [facility] HOSP MED 2 Cristina White MD    HPI:   Homar Jerry is a 41 year old woman with a past medical history of gastroparesis s/p gastric pacemaker and sleeve gastrectomy, RA, chronic sinus tachycardia, HTN and obesity who presented to the ED from clinic with newly diagnosed diabetes after being found to be hyperglycemic to the 500s with elevated ion gap. States that for approximately the past 2 weeks she has had increasing thirst, urine output, blurred vision, headache. In ED found to have worsening hyperglycemia, with anion gap and acidosis. Was bolused with NS and started on insulin infusion with DKA protocol. Patient denies headache, chest pain, sob, abdominal discomfort, changes in bowel habits, blood in stool or urine, abnormal bruising, bleeding, neurological changes.     * No surgery found *      Indwelling Lines/Drains at time of discharge:   Lines/Drains/Airways          No matching active lines, drains, or airways        Hospital Course:   On hospital day one she was evaluated by Endocrinology. Her diabetic ketoacidosis resolved as evidenced by a closed anion gap. She continued to have hyperglycemia and was placed on a transition insulin drip with subcutaneous insulin. Clear liquid diet was started and her levothyroxine dose up titrated to 137 mcg. On hospital day 2 she was taken off transition insulin drip and placed on prandial with basal insulin. Her hyperglycemia improved. On hospital day 3 she continued to be hyperglycemia. Her insulin therapy was further adjusted by the endocrinology service. Patient's day  glucose was better controlled on 1/20, however the morning glucose on 1/21 was elevated at 297. Patient's final home insulin regimen per endocrinology was 33U daily and nightly with 16U with meals and moderate dose SSI. Of note, patient's TSH was elevated at 10.8 and patients levothyroxine was increased from 137 mcg to 150 mcg. Patient was also found to be hypocalcemic at 8.0 likely secondary to vitamin D insufficiency which was at 20 that morning. PTH results are pending. Patient was stable for discharge on 1/21 but secondary to medication reimbursement of insulin and equipment, her discharge was held. Patient will be discharged on home insulin as above with moderate dose SSI, as well as home ergocalciferol, OTC vitamin D and calcium, and follow-up with endocrinology clinic within one week. She was discharged in stable condition the morning of 1/23.     Consults:   Consults         Status Ordering Provider     Inpatient consult to Endocrinology  Once     Provider:  (Not yet assigned)    Final result COURT WARNER     Inpatient consult to PICC team ([facility])  Once     Provider:  (Not yet assigned)    Completed DINORAH PAULA          Significant Diagnostic Studies:      1/17/2017 18:48 1/18/2017 00:00 1/18/2017 04:18 1/18/2017 07:58 1/18/2017 11:38 1/18/2017 20:41 1/18/2017 23:55 1/19/2017 04:53 1/19/2017 09:02 1/19/2017 11:46 1/20/2017 04:40 1/21/2017 06:06 1/22/2017 06:00 1/23/2017 04:45   Glucose 626 (HH) 387 (H) 339 (H) 295 (H) 266 (H) 397 (H) 439 (H) 273 (H) 249 (H) 302 (H) 403 (H) 297 (H) 112 (H) 120 (H)      1/25/2016 07:00 12/1/2016 04:29 1/17/2017 18:48   Hemoglobin A1C 6.1 8.0 (H) 12.7 (H)     Pending Diagnostic Studies:     None        Final Active Diagnoses:    Diagnosis Date Noted POA    PRINCIPAL PROBLEM:  Diabetic ketoacidosis without coma associated with type 2 diabetes mellitus [E13.10] 01/17/2017 Yes    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), 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] 01/23/2017 Yes     Hypomagnesemia [E83.42] 01/21/2017 Yes    Vitamin D deficiency [E55.9] 01/21/2017 Yes    Morbid obesity [E66.01] 01/18/2017 Yes    Hypokalemia [E87.6] 01/18/2017 Yes    Hypoxia [R09.02] 01/18/2017 Yes    Hypocalcemia [E83.51] 01/18/2017 No    Diabetes mellitus, new onset [E11.9] 01/18/2017 Yes    Hyperglycemia [R73.9] 01/18/2017 Yes    Rheumatoid arthritis [M06.9] 01/17/2017 Yes    Type 2 diabetes mellitus with hyperglycemia, without long-term current use of insulin [E11.65] 01/17/2017 Yes    Constipation [K59.00] 02/19/2016 Yes     Chronic    Asthma [J45.909]  Yes     Chronic    Essential hypertension [I10]  Yes     Chronic    Migraines [G43.909] 06/30/2015 Yes     Chronic    Bipolar 1 disorder [F31.9] 03/17/2015 Yes     Chronic    Hypothyroidism, postsurgical [E89.0]  Yes     Chronic    Anxiety [F41.9] 01/12/2015 Yes     Chronic    Fibromyalgia [M79.7] 11/14/2014 Yes     Chronic    Gastroparesis [K31.84] 03/28/2013 Yes      Problems Resolved During this Admission:    Diagnosis Date Noted Date Resolved POA      No new Assessment & Plan notes have been filed under this hospital service since the last note was generated.  Service: Hospital Medicine      Discharged Condition: fair    Disposition: Home or Self Care    Follow Up:  Follow-up Information     Call Matti Girard - Endo/Diab/Metab.    Specialty:  Endocrinology    Contact information:    [address]  [facility] [address] [phone]    Additional information:    [address]        Schedule an appointment as soon as possible for a visit with Mary Cabrera MD.    Specialty:  Internal Medicine    Contact information:    [address]  [facility] [address] [phone]          Patient Instructions:     Diet general     Call MD for:  persistent nausea and vomiting or diarrhea     Call MD for:  difficulty breathing or increased cough     Call MD for:  increased confusion or weakness     Call MD for:  persistent  "dizziness, light-headedness, or visual disturbances       Medications:  Reconciled Home Medications:   Discharge Medication List as of 1/23/2017  1:24 PM      START taking these medications    Details   blood sugar diagnostic Strp 1 each by Misc.(Non-Drug; Combo Route) route 4 (four) times daily with meals and nightly., Starting 1/20/2017, Until Discontinued, Normal      blood-glucose meter kit Use as instructed, Normal      calcium carbonate (OS-MARIAN) 500 mg calcium (1,250 mg) tablet Take 2 tablets (1,000 mg total) by mouth once daily., Starting 1/21/2017, Until Sun 1/21/18, OTC      cholecalciferol, vitamin D3, 1,000 unit capsule Take 2 capsules (2,000 Units total) by mouth once daily., Starting 1/21/2017, Until Discontinued, OTC      ergocalciferol (ERGOCALCIFEROL) 50,000 unit Cap Take one capsule (50,000 units) daily for 5 days, then take over the counter 2000 units daily of Vitamin D., Normal      lancets Misc 1 each by Misc.(Non-Drug; Combo Route) route 4 (four) times daily with meals and nightly., Starting 1/20/2017, Until Discontinued, Normal      pen needle, diabetic 31 gauge x 5/16" Ndle 1 each by Misc.(Non-Drug; Combo Route) route 5 (five) times daily., Starting 1/20/2017, Until Discontinued, Normal         CONTINUE these medications which have CHANGED    Details   insulin aspart (NOVOLOG) 100 unit/mL InPn pen Inject 16 Units into the skin 3 (three) times daily with meals., Starting 1/21/2017, Until Sun 1/21/18, Normal      insulin detemir (LEVEMIR FLEXTOUCH) 100 unit/mL (3 mL) SubQ InPn pen Inject 30 Units into the skin 2 (two) times daily., Starting 1/23/2017, Until Tue 1/23/18, Normal      levothyroxine (SYNTHROID) 150 MCG tablet Take 1 tablet (150 mcg total) by mouth once daily., Starting 1/21/2017, Until Sun 1/21/18, Normal         CONTINUE these medications which have NOT CHANGED    Details   albuterol 90 mcg/actuation inhaler Inhale 2 puffs into the lungs every 6 (six) hours as needed for Wheezing. " Please provide cheapest brand/generic formulation, Starting 11/1/2016, Until Wed 11/1/17, Normal      alprazolam (XANAX) 1 MG tablet Take 1 tablet (1 mg total) by mouth 2 (two) times daily., Starting 10/20/2016, Until Discontinued, Normal      carvedilol (COREG) 12.5 MG tablet Take 2 tablets (25 mg total) by mouth 2 (two) times daily., Starting 1/13/2017, Until Sat 1/13/18, Normal      fluticasone (FLONASE) 50 mcg/actuation nasal spray 2 sprays by Nasal route 2 (two) times daily. , Starting 8/14/2013, Until Discontinued, Historical Med      gabapentin (NEURONTIN) 600 MG tablet Take 600 mg by mouth 3 (three) times daily. , Starting 10/10/2016, Until Discontinued, Historical Med      hydrochlorothiazide (MICROZIDE) 12.5 mg capsule TAKE 1 CAPSULE BY MOUTH DAILY, Normal      levocetirizine (XYZAL) 5 MG tablet Take 1 tablet (5 mg total) by mouth every evening., Starting 8/24/2015, Until Discontinued, Normal      meclizine (ANTIVERT) 25 mg tablet Take 1 tablet (25 mg total) by mouth 3 (three) times daily as needed for Dizziness or Nausea., Starting 3/31/2016, Until Discontinued, Normal      mirtazapine (REMERON) 15 MG tablet Take 1 tablet (15 mg total) by mouth every evening., Starting 10/20/2016, Until Discontinued, Normal      olanzapine (ZYPREXA) 20 MG tablet Take 1 tablet (20 mg total) by mouth every evening., Starting 10/20/2016, Until Discontinued, Normal      oxcarbazepine (TRILEPTAL) 150 MG Tab Take 150 mg by mouth every evening. , Starting 11/4/2016, Until Discontinued, Historical Med      oxycodone-acetaminophen (PERCOCET)  mg per tablet Take 1 tablet by mouth every 4 (four) hours as needed for Pain. Per Dr. Villa at [facility] [place], [place], Until Discontinued, Historical Med      pantoprazole (PROTONIX) 40 MG tablet Take 1 tablet (40 mg total) by mouth 2 (two) times daily before meals., Starting 9/12/2016, Until Tue 9/12/17, Normal      promethazine (PHENERGAN) 12.5 MG Tab Take 1 tablet (12.5  mg total) by mouth every 6 (six) hours as needed., Starting 12/8/2016, Until Discontinued, Normal      !! quetiapine (SEROQUEL) 100 MG Tab Take 100 mg by mouth every evening. with a 400 mg tablet to equal 500 mg total, Starting 11/8/2016, Until Discontinued, Historical Med      !! quetiapine (SEROQUEL) 200 MG Tab Take 400 mg by mouth every evening. with a 100 mg tablet to equal 500 mg total, Starting 11/8/2016, Until Discontinued, Historical Med      tizanidine (ZANAFLEX) 4 MG tablet TAKE 3 TABLETS (12 MG TOTAL) BY MOUTH EVERY EVENING., Normal      topiramate (TOPAMAX) 25 MG tablet Take 1 tablet (25 mg total) by mouth daily as needed (migraines)., Starting 8/17/2016, Until Discontinued, Normal       !! - Potential duplicate medications found. Please discuss with provider.        Time spent on the discharge of patient: 20 minutes    Brodie White MD  PGY-1 Internal Medicine  [phone]    Department of Hospital Medicine  [facility]

## 2017-01-27 ENCOUNTER — TELEPHONE (OUTPATIENT)
Dept: PRIMARY CARE CLINIC | Facility: CLINIC | Age: 42
End: 2017-01-27

## 2017-02-02 ENCOUNTER — HOSPITAL ENCOUNTER (INPATIENT)
Facility: HOSPITAL | Age: 42
LOS: 2 days | Discharge: HOME | DRG: 074 | End: 2017-02-04
Attending: FAMILY MEDICINE | Admitting: HOSPITALIST
Payer: MEDICARE

## 2017-02-02 DIAGNOSIS — R19.7 DIARRHEA, UNSPECIFIED TYPE: ICD-10-CM

## 2017-02-02 DIAGNOSIS — R10.9 ABDOMINAL PAIN, UNSPECIFIED LOCATION: ICD-10-CM

## 2017-02-02 DIAGNOSIS — R11.0 CHRONIC NAUSEA: ICD-10-CM

## 2017-02-02 DIAGNOSIS — R11.2 INTRACTABLE VOMITING WITH NAUSEA, UNSPECIFIED VOMITING TYPE: ICD-10-CM

## 2017-02-02 DIAGNOSIS — K31.84 GASTROPARESIS: Primary | ICD-10-CM

## 2017-02-02 LAB
ALBUMIN SERPL BCP-MCNC: 2.9 G/DL
ALP SERPL-CCNC: 87 U/L
ALT SERPL W/O P-5'-P-CCNC: 7 U/L
ANION GAP SERPL CALC-SCNC: 12 MMOL/L
AST SERPL-CCNC: 9 U/L
BACTERIA #/AREA URNS AUTO: NORMAL /HPF
BASOPHILS # BLD AUTO: 0.01 K/UL
BASOPHILS NFR BLD: 0.1 %
BILIRUB SERPL-MCNC: 0.2 MG/DL
BILIRUB UR QL STRIP: NEGATIVE
BUN SERPL-MCNC: 9 MG/DL
CALCIUM SERPL-MCNC: 7.8 MG/DL
CHLORIDE SERPL-SCNC: 117 MMOL/L
CLARITY UR REFRACT.AUTO: ABNORMAL
CO2 SERPL-SCNC: 13 MMOL/L
COLOR UR AUTO: YELLOW
CREAT SERPL-MCNC: 0.8 MG/DL
DIFFERENTIAL METHOD: ABNORMAL
EOSINOPHIL # BLD AUTO: 0 K/UL
EOSINOPHIL NFR BLD: 0.1 %
ERYTHROCYTE [DISTWIDTH] IN BLOOD BY AUTOMATED COUNT: 13.8 %
EST. GFR  (AFRICAN AMERICAN): >60 ML/MIN/1.73 M^2
EST. GFR  (NON AFRICAN AMERICAN): >60 ML/MIN/1.73 M^2
GLUCOSE SERPL-MCNC: 193 MG/DL
GLUCOSE UR QL STRIP: ABNORMAL
HCT VFR BLD AUTO: 43.3 %
HGB BLD-MCNC: 14.1 G/DL
HGB UR QL STRIP: NEGATIVE
HYALINE CASTS UR QL AUTO: 1 /LPF
KETONES UR QL STRIP: ABNORMAL
LEUKOCYTE ESTERASE UR QL STRIP: NEGATIVE
LIPASE SERPL-CCNC: 10 U/L
LYMPHOCYTES # BLD AUTO: 2 K/UL
LYMPHOCYTES NFR BLD: 16.8 %
MCH RBC QN AUTO: 28.4 PG
MCHC RBC AUTO-ENTMCNC: 32.6 %
MCV RBC AUTO: 87 FL
MICROSCOPIC COMMENT: NORMAL
MONOCYTES # BLD AUTO: 0.3 K/UL
MONOCYTES NFR BLD: 2.7 %
NEUTROPHILS # BLD AUTO: 9.7 K/UL
NEUTROPHILS NFR BLD: 79.9 %
NITRITE UR QL STRIP: NEGATIVE
PH UR STRIP: 5 [PH] (ref 5–8)
PLATELET # BLD AUTO: 313 K/UL
PMV BLD AUTO: 10.2 FL
POCT GLUCOSE: 211 MG/DL (ref 70–110)
POTASSIUM SERPL-SCNC: 3.4 MMOL/L
PROT SERPL-MCNC: 6.4 G/DL
PROT UR QL STRIP: ABNORMAL
RBC # BLD AUTO: 4.97 M/UL
RBC #/AREA URNS AUTO: 0 /HPF (ref 0–4)
SODIUM SERPL-SCNC: 142 MMOL/L
SP GR UR STRIP: 1.02 (ref 1–1.03)
SQUAMOUS #/AREA URNS AUTO: 4 /HPF
URN SPEC COLLECT METH UR: ABNORMAL
UROBILINOGEN UR STRIP-ACNC: NEGATIVE EU/DL
WBC # BLD AUTO: 12.13 K/UL
WBC #/AREA URNS AUTO: 0 /HPF (ref 0–5)

## 2017-02-02 PROCEDURE — 83690 ASSAY OF LIPASE: CPT

## 2017-02-02 PROCEDURE — 96366 THER/PROPH/DIAG IV INF ADDON: CPT

## 2017-02-02 PROCEDURE — 25000003 PHARM REV CODE 250: Performed by: PHYSICIAN ASSISTANT

## 2017-02-02 PROCEDURE — 80053 COMPREHEN METABOLIC PANEL: CPT

## 2017-02-02 PROCEDURE — 63600175 PHARM REV CODE 636 W HCPCS: Performed by: PHYSICIAN ASSISTANT

## 2017-02-02 PROCEDURE — 85025 COMPLETE CBC W/AUTO DIFF WBC: CPT

## 2017-02-02 PROCEDURE — 96361 HYDRATE IV INFUSION ADD-ON: CPT

## 2017-02-02 PROCEDURE — 99285 EMERGENCY DEPT VISIT HI MDM: CPT | Mod: ,,, | Performed by: PHYSICIAN ASSISTANT

## 2017-02-02 PROCEDURE — 96367 TX/PROPH/DG ADDL SEQ IV INF: CPT

## 2017-02-02 PROCEDURE — 12000002 HC ACUTE/MED SURGE SEMI-PRIVATE ROOM

## 2017-02-02 PROCEDURE — 96375 TX/PRO/DX INJ NEW DRUG ADDON: CPT

## 2017-02-02 PROCEDURE — 96365 THER/PROPH/DIAG IV INF INIT: CPT

## 2017-02-02 PROCEDURE — 82962 GLUCOSE BLOOD TEST: CPT

## 2017-02-02 PROCEDURE — 81001 URINALYSIS AUTO W/SCOPE: CPT

## 2017-02-02 PROCEDURE — 96376 TX/PRO/DX INJ SAME DRUG ADON: CPT

## 2017-02-02 PROCEDURE — 96372 THER/PROPH/DIAG INJ SC/IM: CPT

## 2017-02-02 PROCEDURE — 96368 THER/DIAG CONCURRENT INF: CPT

## 2017-02-02 PROCEDURE — 99285 EMERGENCY DEPT VISIT HI MDM: CPT

## 2017-02-02 RX ORDER — POTASSIUM CHLORIDE 7.45 MG/ML
10 INJECTION INTRAVENOUS
Status: COMPLETED | OUTPATIENT
Start: 2017-02-03 | End: 2017-02-03

## 2017-02-02 RX ORDER — DICYCLOMINE HYDROCHLORIDE 10 MG/ML
20 INJECTION INTRAMUSCULAR
Status: COMPLETED | OUTPATIENT
Start: 2017-02-02 | End: 2017-02-02

## 2017-02-02 RX ORDER — ACETAMINOPHEN 325 MG/1
650 TABLET ORAL EVERY 4 HOURS PRN
Status: DISCONTINUED | OUTPATIENT
Start: 2017-02-03 | End: 2017-02-04 | Stop reason: HOSPADM

## 2017-02-02 RX ORDER — INSULIN ASPART 100 [IU]/ML
0-5 INJECTION, SOLUTION INTRAVENOUS; SUBCUTANEOUS
Status: DISCONTINUED | OUTPATIENT
Start: 2017-02-03 | End: 2017-02-03

## 2017-02-02 RX ORDER — KETOROLAC TROMETHAMINE 30 MG/ML
15 INJECTION, SOLUTION INTRAMUSCULAR; INTRAVENOUS EVERY 6 HOURS PRN
Status: DISCONTINUED | OUTPATIENT
Start: 2017-02-03 | End: 2017-02-03

## 2017-02-02 RX ORDER — ENOXAPARIN SODIUM 100 MG/ML
40 INJECTION SUBCUTANEOUS EVERY 24 HOURS
Status: DISCONTINUED | OUTPATIENT
Start: 2017-02-03 | End: 2017-02-04 | Stop reason: HOSPADM

## 2017-02-02 RX ORDER — ONDANSETRON 2 MG/ML
4 INJECTION INTRAMUSCULAR; INTRAVENOUS EVERY 12 HOURS PRN
Status: DISCONTINUED | OUTPATIENT
Start: 2017-02-03 | End: 2017-02-03

## 2017-02-02 RX ORDER — METOCLOPRAMIDE HYDROCHLORIDE 5 MG/ML
10 INJECTION INTRAMUSCULAR; INTRAVENOUS
Status: COMPLETED | OUTPATIENT
Start: 2017-02-02 | End: 2017-02-02

## 2017-02-02 RX ORDER — IBUPROFEN 200 MG
16 TABLET ORAL
Status: DISCONTINUED | OUTPATIENT
Start: 2017-02-03 | End: 2017-02-04 | Stop reason: HOSPADM

## 2017-02-02 RX ORDER — MORPHINE SULFATE 2 MG/ML
4 INJECTION, SOLUTION INTRAMUSCULAR; INTRAVENOUS EVERY 4 HOURS PRN
Status: DISCONTINUED | OUTPATIENT
Start: 2017-02-03 | End: 2017-02-03

## 2017-02-02 RX ORDER — GLUCAGON 1 MG
1 KIT INJECTION
Status: DISCONTINUED | OUTPATIENT
Start: 2017-02-03 | End: 2017-02-04 | Stop reason: HOSPADM

## 2017-02-02 RX ORDER — IBUPROFEN 200 MG
24 TABLET ORAL
Status: DISCONTINUED | OUTPATIENT
Start: 2017-02-03 | End: 2017-02-04 | Stop reason: HOSPADM

## 2017-02-02 RX ORDER — MORPHINE SULFATE 2 MG/ML
4 INJECTION, SOLUTION INTRAMUSCULAR; INTRAVENOUS
Status: COMPLETED | OUTPATIENT
Start: 2017-02-02 | End: 2017-02-02

## 2017-02-02 RX ORDER — METOCLOPRAMIDE HYDROCHLORIDE 5 MG/ML
5 INJECTION INTRAMUSCULAR; INTRAVENOUS EVERY 6 HOURS PRN
Status: DISCONTINUED | OUTPATIENT
Start: 2017-02-03 | End: 2017-02-03

## 2017-02-02 RX ADMIN — MORPHINE SULFATE 4 MG: 2 INJECTION, SOLUTION INTRAMUSCULAR; INTRAVENOUS at 10:02

## 2017-02-02 RX ADMIN — DICYCLOMINE HYDROCHLORIDE 20 MG: 20 INJECTION, SOLUTION INTRAMUSCULAR at 06:02

## 2017-02-02 RX ADMIN — SODIUM CHLORIDE 1000 ML: 0.9 INJECTION, SOLUTION INTRAVENOUS at 10:02

## 2017-02-02 RX ADMIN — METOCLOPRAMIDE 10 MG: 5 INJECTION, SOLUTION INTRAMUSCULAR; INTRAVENOUS at 06:02

## 2017-02-02 RX ADMIN — SODIUM CHLORIDE 1000 ML: 0.9 INJECTION, SOLUTION INTRAVENOUS at 06:02

## 2017-02-02 RX ADMIN — PROMETHAZINE HYDROCHLORIDE 12.5 MG: 25 INJECTION INTRAMUSCULAR; INTRAVENOUS at 10:02

## 2017-02-02 NOTE — ED NOTES
Patient here c/o N/V/D and abdominal pain onset 6am this morning.  Pt in no acute distress, respirations even and unlabored, AA&OX3

## 2017-02-02 NOTE — IP AVS SNAPSHOT
[facility]  [address]  [facility] [address]  Phone: [phone]           Patient Discharge Instructions     Our goal is to set you up for success. This packet includes information on your condition, medications, and your home care. It will help you to care for yourself so you don't get sicker and need to go back to the hospital.     Please ask your nurse if you have any questions.        There are many details to remember when preparing to leave the hospital. Here is what you will need to do:    1. Take your medicine. If you are prescribed medications, review your Medication List in the following pages. You may have new medications to  at the pharmacy and others that you'll need to stop taking. Review the instructions for how and when to take your medications. Talk with your doctor or nurses if you are unsure of what to do.     2. Go to your follow-up appointments. Specific follow-up information is listed in the following pages. Your may be contacted by a transition nurse or clinical provider about future appointments. Be sure we have all of the phone numbers to reach you, if needed. Please contact your provider's office if you are unable to make an appointment.     3. Watch for warning signs. Your doctor or nurse will give you detailed warning signs to watch for and when to call for assistance. These instructions may also include educational information about your condition. If you experience any of warning signs to your health, call your doctor.               [facility]  Unless otherwise directed by your provider, please contact [facility], our nurse care line that is available for 24/7 assistance.     [phone] (toll-free)    Registered nurses in the [facility] provide clinical advisement, health education, appointment booking, and other advisory services.                    ** Verify the list of medication(s) below is accurate and up  to date. Carry this with you in case of emergency. If your medications have changed, please notify your healthcare provider.             Medication List      START taking these medications        Additional Info                      erythromycin 250 mg Tab   Commonly known as:  ERYTHROCIN   Quantity:  90 tablet   Refills:  0   Dose:  250 mg    Instructions:  Take 1 tablet (250 mg total) by mouth 3 (three) times daily with meals.     Begin Date    AM    Noon    PM    Bedtime       ondansetron 8 MG Tbdl   Commonly known as:  ZOFRAN-ODT   Quantity:  30 tablet   Refills:  1   Dose:  8 mg    Instructions:  Take 1 tablet (8 mg total) by mouth every 6 (six) hours as needed (nausea).     Begin Date    AM    Noon    PM    Bedtime         CHANGE how you take these medications        Additional Info                      levocetirizine 5 MG tablet   Commonly known as:  XYZAL   Quantity:  90 tablet   Refills:  3   Dose:  5 mg   What changed:    - when to take this  - reasons to take this    Instructions:  Take 1 tablet (5 mg total) by mouth every evening.     Begin Date    AM    Noon    PM    Bedtime       promethazine 25 MG tablet   Commonly known as:  PHENERGAN   Quantity:  60 tablet   Refills:  2   Dose:  25 mg   What changed:    - medication strength  - how much to take  - reasons to take this    Instructions:  Take 1 tablet (25 mg total) by mouth every 6 (six) hours as needed (nausea).     Begin Date    AM    Noon    PM    Bedtime         CONTINUE taking these medications        Additional Info                      albuterol 90 mcg/actuation inhaler   Quantity:  18 g   Refills:  1   Dose:  2 puff    Instructions:  Inhale 2 puffs into the lungs every 6 (six) hours as needed for Wheezing. Please provide cheapest brand/generic formulation     Begin Date    AM    Noon    PM    Bedtime       alprazolam 1 MG tablet   Commonly known as:  XANAX   Quantity:  60 tablet   Refills:  5   Dose:  1 mg    Last time this was given:  1 mg on  2/4/2017  9:11 AM   Instructions:  Take 1 tablet (1 mg total) by mouth 2 (two) times daily.     Begin Date    AM    Noon    PM    Bedtime       blood sugar diagnostic Strp   Quantity:  100 each   Refills:  2   Dose:  1 each    Instructions:  1 each by Misc.(Non-Drug; Combo Route) route 4 (four) times daily with meals and nightly.     Begin Date    AM    Noon    PM    Bedtime       blood-glucose meter kit   Quantity:  1 each   Refills:  0    Instructions:  Use as instructed     Begin Date    AM    Noon    PM    Bedtime       calcium carbonate 500 mg calcium (1,250 mg) tablet   Commonly known as:  OS-MARIAN   Refills:  0   Dose:  2 tablet    Instructions:  Take 2 tablets (1,000 mg total) by mouth once daily.     Begin Date    AM    Noon    PM    Bedtime       carvedilol 12.5 MG tablet   Commonly known as:  COREG   Quantity:  120 tablet   Refills:  11   Dose:  25 mg    Last time this was given:  25 mg on 2/4/2017  9:11 AM   Instructions:  Take 2 tablets (25 mg total) by mouth 2 (two) times daily.     Begin Date    AM    Noon    PM    Bedtime       cholecalciferol (vitamin D3) 1,000 unit capsule   Refills:  0   Dose:  2000 Units    Instructions:  Take 2 capsules (2,000 Units total) by mouth once daily.     Begin Date    AM    Noon    PM    Bedtime       ergocalciferol 50,000 unit Cap   Commonly known as:  ERGOCALCIFEROL   Quantity:  5 capsule   Refills:  0    Instructions:  Take one capsule (50,000 units) daily for 5 days, then take over the counter 2000 units daily of Vitamin D.     Begin Date    AM    Noon    PM    Bedtime       fluticasone 50 mcg/actuation nasal spray   Commonly known as:  FLONASE   Refills:  0   Dose:  2 spray    Instructions:  2 sprays by Nasal route 2 (two) times daily.     Begin Date    AM    Noon    PM    Bedtime       gabapentin 600 MG tablet   Commonly known as:  NEURONTIN   Refills:  0   Dose:  600 mg    Instructions:  Take 600 mg by mouth 3 (three) times daily.     Begin Date    AM    Noon     PM    Bedtime       hydrochlorothiazide 12.5 mg capsule   Commonly known as:  MICROZIDE   Quantity:  30 capsule   Refills:  1    Instructions:  TAKE 1 CAPSULE BY MOUTH DAILY     Begin Date    AM    Noon    PM    Bedtime       insulin aspart 100 unit/mL Inpn pen   Commonly known as:  NovoLOG   Quantity:  1 Box   Refills:  2   Dose:  16 Units    Instructions:  Inject 16 Units into the skin 3 (three) times daily with meals.     Begin Date    AM    Noon    PM    Bedtime       insulin detemir 100 unit/mL (3 mL) Inpn pen   Commonly known as:  LEVEMIR FLEXTOUCH   Quantity:  1 Box   Refills:  1   Dose:  30 Units    Last time this was given:  15 Units on 2/4/2017  9:11 AM   Instructions:  Inject 30 Units into the skin 2 (two) times daily.     Begin Date    AM    Noon    PM    Bedtime       lancets Misc   Quantity:  100 each   Refills:  2   Dose:  1 each    Instructions:  1 each by Misc.(Non-Drug; Combo Route) route 4 (four) times daily with meals and nightly.     Begin Date    AM    Noon    PM    Bedtime       levothyroxine 150 MCG tablet   Commonly known as:  SYNTHROID   Quantity:  30 tablet   Refills:  3   Dose:  150 mcg    Last time this was given:  150 mcg on 2/4/2017  5:32 AM   Instructions:  Take 1 tablet (150 mcg total) by mouth once daily.     Begin Date    AM    Noon    PM    Bedtime       meclizine 25 mg tablet   Commonly known as:  ANTIVERT   Quantity:  90 tablet   Refills:  0   Dose:  25 mg    Instructions:  Take 1 tablet (25 mg total) by mouth 3 (three) times daily as needed for Dizziness or Nausea.     Begin Date    AM    Noon    PM    Bedtime       mirtazapine 15 MG tablet   Commonly known as:  REMERON   Quantity:  30 tablet   Refills:  5   Dose:  15 mg    Last time this was given:  15 mg on 2/3/2017  9:32 PM   Instructions:  Take 1 tablet (15 mg total) by mouth every evening.     Begin Date    AM    Noon    PM    Bedtime       olanzapine 20 MG tablet   Commonly known as:  ZyPREXA   Quantity:  30 tablet  "  Refills:  5   Dose:  20 mg    Last time this was given:  20 mg on 2/3/2017  9:35 PM   Instructions:  Take 1 tablet (20 mg total) by mouth every evening.     Begin Date    AM    Noon    PM    Bedtime       oxcarbazepine 150 MG Tab   Commonly known as:  TRILEPTAL   Refills:  0   Dose:  150 mg    Last time this was given:  150 mg on 2/3/2017  9:36 PM   Instructions:  Take 150 mg by mouth every evening.     Begin Date    AM    Noon    PM    Bedtime       oxycodone-acetaminophen  mg per tablet   Commonly known as:  PERCOCET   Refills:  0   Dose:  1 tablet    Instructions:  Take 1 tablet by mouth every 4 (four) hours as needed for Pain. Per Dr. Villa at [facility] [place], [place]     Begin Date    AM    Noon    PM    Bedtime       pantoprazole 40 MG tablet   Commonly known as:  PROTONIX   Quantity:  60 tablet   Refills:  3   Dose:  40 mg    Last time this was given:  40 mg on 2/4/2017  4:14 PM   Instructions:  Take 1 tablet (40 mg total) by mouth 2 (two) times daily before meals.     Begin Date    AM    Noon    PM    Bedtime       pen needle, diabetic 31 gauge x 5/16" Ndle   Quantity:  200 each   Refills:  2   Dose:  1 each    Instructions:  1 each by Misc.(Non-Drug; Combo Route) route 5 (five) times daily.     Begin Date    AM    Noon    PM    Bedtime       * quetiapine 100 MG Tab   Commonly known as:  SEROQUEL   Refills:  0   Dose:  100 mg    Last time this was given:  500 mg on 2/3/2017  9:33 PM   Instructions:  Take 100 mg by mouth every evening. with a 400 mg tablet to equal 500 mg total     Begin Date    AM    Noon    PM    Bedtime       * quetiapine 200 MG Tab   Commonly known as:  SEROQUEL   Refills:  0   Dose:  400 mg    Last time this was given:  500 mg on 2/3/2017  9:33 PM   Instructions:  Take 400 mg by mouth every evening. with a 100 mg tablet to equal 500 mg total     Begin Date    AM    Noon    PM    Bedtime       tizanidine 4 MG tablet   Commonly known as:  ZANAFLEX   Quantity:  90 tablet "   Refills:  2    Last time this was given:  12 mg on 2/3/2017  9:34 PM   Instructions:  TAKE 3 TABLETS (12 MG TOTAL) BY MOUTH EVERY EVENING.     Begin Date    AM    Noon    PM    Bedtime       topiramate 25 MG tablet   Commonly known as:  TOPAMAX   Quantity:  90 tablet   Refills:  3   Dose:  25 mg   Indications:  Migraine Prevention    Instructions:  Take 1 tablet (25 mg total) by mouth daily as needed (migraines).     Begin Date    AM    Noon    PM    Bedtime       * Notice:  This list has 2 medication(s) that are the same as other medications prescribed for you. Read the directions carefully, and ask your doctor or other care provider to review them with you.         Where to Get Your Medications      These medications were sent to [facility] [address]  [address] [place] [address]     Phone:  [phone]     erythromycin 250 mg Tab         You can get these medications from any pharmacy     Bring a paper prescription for each of these medications     ondansetron 8 MG Tbdl    promethazine 25 MG tablet                  Please bring to all follow up appointments:    1. A copy of your discharge instructions.  2. All medicines you are currently taking in their original bottles.  3. Identification and insurance card.    Please arrive 15 minutes ahead of scheduled appointment time.    Please call 24 hours in advance if you must reschedule your appointment and/or time.        Your Scheduled Appointments     Feb 20, 2017  8:00 AM CST   Remote Interrogation with HOME MONITOR DEVICE CHECK, [facility]   [facility] ([address] )    [address]  [facility] [address] [phone]              Follow-up Information     Follow up with Mary Cabrera MD In 1 week.    Specialty:  Internal Medicine    Contact information:    [address]  [facility] [id number] [phone]          Discharge Instructions     Future Orders    Activity as tolerated      "Diet general     Questions:    Total calories:      Fat restriction, if any:      Protein restriction, if any:      Na restriction, if any:      Fluid restriction:      Additional restrictions:          Primary Diagnosis     Your primary diagnosis was:  Digestive System Disorder      Admission Information     Date & Time Provider Department CSN    2/2/2017  5:26 PM Veto Stern MD [facility] [id number]      Care Providers     Provider Role Specialty Primary office phone    Veto Stern MD Attending Provider Hospitalist [phone]    Omar Albarran MD Team Attending  Hospitalist [phone]      Your Vitals Were     BP Pulse Temp Resp Height Weight    109/53 (BP Location: Left arm, Patient Position: Lying, BP Method: Automatic) 90 97.5 °F (36.4 °C) (Oral) 18 5' 5" (1.651 m) 113.4 kg (250 lb)    Last Period SpO2 BMI          (LMP Unknown) 93% 41.6 kg/m2        Recent Lab Values        6/30/2015 9/27/2015 1/24/2016 1/25/2016 12/1/2016 1/17/2017           10:45 AM  2:53 PM  5:37 PM  7:00 AM  4:29 AM  6:48 PM      A1C 6.4 (H) 6.4 (H) 6.2 6.1 8.0 (H) 12.7 (H)      Comment for A1C at  4:29 AM on 12/1/2016:  According to ADA guidelines, hemoglobin A1C <7.0% represents  optimal control in non-pregnant diabetic patients.  Different  metrics may apply to specific populations.   Standards of Medical Care in Diabetes - 2016.  For the purpose of screening for the presence of diabetes:  <5.7%     Consistent with the absence of diabetes  5.7-6.4%  Consistent with increasing risk for diabetes   (prediabetes)  >or=6.5%  Consistent with diabetes  Currently no consensus exists for use of hemoglobin A1C  for diagnosis of diabetes for children.      Comment for A1C at  6:48 PM on 1/17/2017:  According to ADA guidelines, hemoglobin A1C <7.0% represents  optimal control in non-pregnant diabetic patients.  Different  metrics may apply to specific populations.   Standards of Medical Care in " Diabetes - 2016.  For the purpose of screening for the presence of diabetes:  <5.7%     Consistent with the absence of diabetes  5.7-6.4%  Consistent with increasing risk for diabetes   (prediabetes)  >or=6.5%  Consistent with diabetes  Currently no consensus exists for use of hemoglobin A1C  for diagnosis of diabetes for children.        Allergies as of 2/4/2017        Reactions    Dexamethasone Hives, Shortness Of Breath    Per pt, only steroid she is allergic to is dexamethasone    Ciprofloxacin     Counteracts with seroquel, xanax, tizanidine    Compazine [Prochlorperazine] Hives, Itching    Lyrica [Pregabalin]     depression    Prednisone     Gastroparesis flare up      Advance Directives     An advance directive is a document which, in the event you are no longer able to make decisions for yourself, tells your healthcare team what kind of treatment you do or do not want to receive, or who you would like to make those decisions for you.  If you do not currently have an advance directive, [facility] encourages you to create one.  For more information call:  [phone]), [phone] ([phone]),  or log on to [email or web address].        Language Assistance Services     ATTENTION: Language assistance services are available, free of charge. Please call [phone].      ATENCIÓN: Si habla español, tiene a gibson disposición servicios gratuitos de asistencia lingüística. Llame al [phone].     CHÚ Ý: N?u b?n nói Ti?ng Vi?t, có các d?ch v? h? tr? ngôn ng? mi?n phí dành cho b?n. G?i s? [phone].        Pneumonmia Discharge Instructions                Diabetes Discharge Instructions                                    [facility] complies with applicable Federal civil rights laws and does not discriminate on the basis of race, color, national origin, age, disability, or sex.

## 2017-02-03 PROBLEM — E11.10 DIABETIC KETOACIDOSIS WITHOUT COMA ASSOCIATED WITH TYPE 2 DIABETES MELLITUS: Status: RESOLVED | Noted: 2017-01-17 | Resolved: 2017-02-03

## 2017-02-03 PROBLEM — E87.6 HYPOKALEMIA: Status: RESOLVED | Noted: 2017-01-18 | Resolved: 2017-02-03

## 2017-02-03 PROBLEM — R09.02 HYPOXIA: Status: RESOLVED | Noted: 2017-01-18 | Resolved: 2017-02-03

## 2017-02-03 PROBLEM — E83.42 HYPOMAGNESEMIA: Status: RESOLVED | Noted: 2017-01-21 | Resolved: 2017-02-03

## 2017-02-03 PROBLEM — E83.51 HYPOCALCEMIA: Status: RESOLVED | Noted: 2017-01-18 | Resolved: 2017-02-03

## 2017-02-03 PROBLEM — R73.9 HYPERGLYCEMIA: Status: RESOLVED | Noted: 2017-01-18 | Resolved: 2017-02-03

## 2017-02-03 LAB
ANION GAP SERPL CALC-SCNC: 9 MMOL/L
BASOPHILS # BLD AUTO: 0.01 K/UL
BASOPHILS NFR BLD: 0.1 %
BUN SERPL-MCNC: 13 MG/DL
CALCIUM SERPL-MCNC: 7.9 MG/DL
CHLORIDE SERPL-SCNC: 112 MMOL/L
CO2 SERPL-SCNC: 18 MMOL/L
CREAT SERPL-MCNC: 0.8 MG/DL
DIFFERENTIAL METHOD: ABNORMAL
EOSINOPHIL # BLD AUTO: 0.1 K/UL
EOSINOPHIL NFR BLD: 1.2 %
ERYTHROCYTE [DISTWIDTH] IN BLOOD BY AUTOMATED COUNT: 14 %
EST. GFR  (AFRICAN AMERICAN): >60 ML/MIN/1.73 M^2
EST. GFR  (NON AFRICAN AMERICAN): >60 ML/MIN/1.73 M^2
GLUCOSE SERPL-MCNC: 204 MG/DL
HCT VFR BLD AUTO: 36.4 %
HGB BLD-MCNC: 11.9 G/DL
LYMPHOCYTES # BLD AUTO: 3.5 K/UL
LYMPHOCYTES NFR BLD: 33.8 %
MAGNESIUM SERPL-MCNC: 1.7 MG/DL
MCH RBC QN AUTO: 28.4 PG
MCHC RBC AUTO-ENTMCNC: 32.7 %
MCV RBC AUTO: 87 FL
MONOCYTES # BLD AUTO: 0.6 K/UL
MONOCYTES NFR BLD: 5.8 %
NEUTROPHILS # BLD AUTO: 6.1 K/UL
NEUTROPHILS NFR BLD: 58.8 %
PLATELET # BLD AUTO: 279 K/UL
PMV BLD AUTO: 10 FL
POCT GLUCOSE: 113 MG/DL (ref 70–110)
POCT GLUCOSE: 83 MG/DL (ref 70–110)
POTASSIUM SERPL-SCNC: 4.5 MMOL/L
RBC # BLD AUTO: 4.19 M/UL
SODIUM SERPL-SCNC: 139 MMOL/L
WBC # BLD AUTO: 10.43 K/UL

## 2017-02-03 PROCEDURE — 99222 1ST HOSP IP/OBS MODERATE 55: CPT | Mod: AI,GC,, | Performed by: HOSPITALIST

## 2017-02-03 PROCEDURE — 85025 COMPLETE CBC W/AUTO DIFF WBC: CPT

## 2017-02-03 PROCEDURE — 12000002 HC ACUTE/MED SURGE SEMI-PRIVATE ROOM

## 2017-02-03 PROCEDURE — 25000003 PHARM REV CODE 250: Performed by: STUDENT IN AN ORGANIZED HEALTH CARE EDUCATION/TRAINING PROGRAM

## 2017-02-03 PROCEDURE — 83735 ASSAY OF MAGNESIUM: CPT

## 2017-02-03 PROCEDURE — 63600175 PHARM REV CODE 636 W HCPCS: Performed by: STUDENT IN AN ORGANIZED HEALTH CARE EDUCATION/TRAINING PROGRAM

## 2017-02-03 PROCEDURE — 80048 BASIC METABOLIC PNL TOTAL CA: CPT

## 2017-02-03 RX ORDER — METOCLOPRAMIDE HYDROCHLORIDE 5 MG/ML
10 INJECTION INTRAMUSCULAR; INTRAVENOUS DAILY
Status: DISCONTINUED | OUTPATIENT
Start: 2017-02-03 | End: 2017-02-03

## 2017-02-03 RX ORDER — ALPRAZOLAM 1 MG/1
1 TABLET ORAL 2 TIMES DAILY
Status: DISCONTINUED | OUTPATIENT
Start: 2017-02-03 | End: 2017-02-04 | Stop reason: HOSPADM

## 2017-02-03 RX ORDER — PANTOPRAZOLE SODIUM 40 MG/1
40 TABLET, DELAYED RELEASE ORAL
Status: DISCONTINUED | OUTPATIENT
Start: 2017-02-03 | End: 2017-02-04 | Stop reason: HOSPADM

## 2017-02-03 RX ORDER — MIRTAZAPINE 15 MG/1
15 TABLET, FILM COATED ORAL NIGHTLY
Status: DISCONTINUED | OUTPATIENT
Start: 2017-02-03 | End: 2017-02-04 | Stop reason: HOSPADM

## 2017-02-03 RX ORDER — MORPHINE SULFATE 15 MG/1
15 TABLET ORAL EVERY 4 HOURS PRN
Status: DISCONTINUED | OUTPATIENT
Start: 2017-02-03 | End: 2017-02-04 | Stop reason: HOSPADM

## 2017-02-03 RX ORDER — GABAPENTIN 300 MG/1
600 CAPSULE ORAL 3 TIMES DAILY
Status: DISCONTINUED | OUTPATIENT
Start: 2017-02-03 | End: 2017-02-04 | Stop reason: HOSPADM

## 2017-02-03 RX ORDER — ONDANSETRON 4 MG/1
8 TABLET, FILM COATED ORAL EVERY 6 HOURS PRN
Status: DISCONTINUED | OUTPATIENT
Start: 2017-02-03 | End: 2017-02-04 | Stop reason: HOSPADM

## 2017-02-03 RX ORDER — TIZANIDINE 4 MG/1
12 TABLET ORAL NIGHTLY
Status: DISCONTINUED | OUTPATIENT
Start: 2017-02-03 | End: 2017-02-04 | Stop reason: HOSPADM

## 2017-02-03 RX ORDER — OLANZAPINE 10 MG/1
20 TABLET ORAL NIGHTLY
Status: DISCONTINUED | OUTPATIENT
Start: 2017-02-03 | End: 2017-02-04 | Stop reason: HOSPADM

## 2017-02-03 RX ORDER — PROMETHAZINE HYDROCHLORIDE 25 MG/ML
25 INJECTION, SOLUTION INTRAMUSCULAR; INTRAVENOUS EVERY 6 HOURS PRN
Status: DISCONTINUED | OUTPATIENT
Start: 2017-02-03 | End: 2017-02-04 | Stop reason: HOSPADM

## 2017-02-03 RX ORDER — LEVOTHYROXINE SODIUM 75 UG/1
150 TABLET ORAL
Status: DISCONTINUED | OUTPATIENT
Start: 2017-02-03 | End: 2017-02-04 | Stop reason: HOSPADM

## 2017-02-03 RX ORDER — ERYTHROMYCIN 250 MG/1
500 TABLET, DELAYED RELEASE ORAL
Status: DISCONTINUED | OUTPATIENT
Start: 2017-02-04 | End: 2017-02-04

## 2017-02-03 RX ORDER — LISINOPRIL 10 MG/1
10 TABLET ORAL DAILY
Status: DISCONTINUED | OUTPATIENT
Start: 2017-02-03 | End: 2017-02-04 | Stop reason: HOSPADM

## 2017-02-03 RX ORDER — TOPIRAMATE 25 MG/1
25 TABLET ORAL DAILY PRN
Status: DISCONTINUED | OUTPATIENT
Start: 2017-02-03 | End: 2017-02-04 | Stop reason: HOSPADM

## 2017-02-03 RX ORDER — INSULIN ASPART 100 [IU]/ML
0-5 INJECTION, SOLUTION INTRAVENOUS; SUBCUTANEOUS EVERY 6 HOURS PRN
Status: DISCONTINUED | OUTPATIENT
Start: 2017-02-03 | End: 2017-02-04 | Stop reason: HOSPADM

## 2017-02-03 RX ORDER — OXCARBAZEPINE 150 MG/1
150 TABLET, FILM COATED ORAL NIGHTLY
Status: DISCONTINUED | OUTPATIENT
Start: 2017-02-03 | End: 2017-02-04 | Stop reason: HOSPADM

## 2017-02-03 RX ORDER — KETOROLAC TROMETHAMINE 10 MG/1
10 TABLET, FILM COATED ORAL EVERY 6 HOURS PRN
Status: DISCONTINUED | OUTPATIENT
Start: 2017-02-03 | End: 2017-02-04 | Stop reason: HOSPADM

## 2017-02-03 RX ORDER — HYDROCHLOROTHIAZIDE 12.5 MG/1
12.5 TABLET ORAL DAILY
Status: DISCONTINUED | OUTPATIENT
Start: 2017-02-03 | End: 2017-02-04 | Stop reason: HOSPADM

## 2017-02-03 RX ORDER — CARVEDILOL 25 MG/1
25 TABLET ORAL 2 TIMES DAILY
Status: DISCONTINUED | OUTPATIENT
Start: 2017-02-03 | End: 2017-02-04 | Stop reason: HOSPADM

## 2017-02-03 RX ADMIN — MORPHINE SULFATE 15 MG: 15 TABLET ORAL at 09:02

## 2017-02-03 RX ADMIN — ONDANSETRON 8 MG: 4 TABLET, FILM COATED ORAL at 09:02

## 2017-02-03 RX ADMIN — MORPHINE SULFATE 4 MG: 2 INJECTION, SOLUTION INTRAMUSCULAR; INTRAVENOUS at 02:02

## 2017-02-03 RX ADMIN — PROMETHAZINE HYDROCHLORIDE 12.5 MG: 25 INJECTION INTRAMUSCULAR; INTRAVENOUS at 01:02

## 2017-02-03 RX ADMIN — ERYTHROMYCIN LACTOBIONATE 500 MG: 500 INJECTION, POWDER, LYOPHILIZED, FOR SOLUTION INTRAVENOUS at 06:02

## 2017-02-03 RX ADMIN — INSULIN DETEMIR 15 UNITS: 100 INJECTION, SOLUTION SUBCUTANEOUS at 09:02

## 2017-02-03 RX ADMIN — POTASSIUM CHLORIDE 10 MEQ: 10 INJECTION, SOLUTION INTRAVENOUS at 02:02

## 2017-02-03 RX ADMIN — ERYTHROMYCIN LACTOBIONATE 500 MG: 500 INJECTION, POWDER, LYOPHILIZED, FOR SOLUTION INTRAVENOUS at 02:02

## 2017-02-03 RX ADMIN — HYDROCHLOROTHIAZIDE 12.5 MG: 12.5 TABLET ORAL at 09:02

## 2017-02-03 RX ADMIN — INSULIN DETEMIR 15 UNITS: 100 INJECTION, SOLUTION SUBCUTANEOUS at 02:02

## 2017-02-03 RX ADMIN — ENOXAPARIN SODIUM 40 MG: 100 INJECTION SUBCUTANEOUS at 01:02

## 2017-02-03 RX ADMIN — GABAPENTIN 600 MG: 300 CAPSULE ORAL at 01:02

## 2017-02-03 RX ADMIN — PANTOPRAZOLE SODIUM 40 MG: 40 TABLET, DELAYED RELEASE ORAL at 08:02

## 2017-02-03 RX ADMIN — MIRTAZAPINE 15 MG: 15 TABLET, FILM COATED ORAL at 09:02

## 2017-02-03 RX ADMIN — MORPHINE SULFATE 4 MG: 2 INJECTION, SOLUTION INTRAMUSCULAR; INTRAVENOUS at 10:02

## 2017-02-03 RX ADMIN — TIZANIDINE 12 MG: 4 TABLET ORAL at 09:02

## 2017-02-03 RX ADMIN — POTASSIUM CHLORIDE 10 MEQ: 10 INJECTION, SOLUTION INTRAVENOUS at 03:02

## 2017-02-03 RX ADMIN — ALPRAZOLAM 1 MG: 1 TABLET ORAL at 09:02

## 2017-02-03 RX ADMIN — LEVOTHYROXINE SODIUM 150 MCG: 75 TABLET ORAL at 06:02

## 2017-02-03 RX ADMIN — MORPHINE SULFATE 4 MG: 2 INJECTION, SOLUTION INTRAMUSCULAR; INTRAVENOUS at 06:02

## 2017-02-03 RX ADMIN — MIRTAZAPINE 15 MG: 15 TABLET, FILM COATED ORAL at 01:02

## 2017-02-03 RX ADMIN — PANTOPRAZOLE SODIUM 40 MG: 40 TABLET, DELAYED RELEASE ORAL at 04:02

## 2017-02-03 RX ADMIN — POTASSIUM CHLORIDE 10 MEQ: 10 INJECTION, SOLUTION INTRAVENOUS at 04:02

## 2017-02-03 RX ADMIN — CARVEDILOL 25 MG: 25 TABLET, FILM COATED ORAL at 09:02

## 2017-02-03 RX ADMIN — OLANZAPINE 20 MG: 10 TABLET, FILM COATED ORAL at 01:02

## 2017-02-03 RX ADMIN — QUETIAPINE FUMARATE 500 MG: 100 TABLET, FILM COATED ORAL at 09:02

## 2017-02-03 RX ADMIN — OLANZAPINE 20 MG: 10 TABLET, FILM COATED ORAL at 09:02

## 2017-02-03 RX ADMIN — KETOROLAC TROMETHAMINE 15 MG: 30 INJECTION, SOLUTION INTRAMUSCULAR at 06:02

## 2017-02-03 RX ADMIN — OXCARBAZEPINE 150 MG: 150 TABLET ORAL at 09:02

## 2017-02-03 RX ADMIN — GABAPENTIN 600 MG: 300 CAPSULE ORAL at 09:02

## 2017-02-03 RX ADMIN — GABAPENTIN 600 MG: 300 CAPSULE ORAL at 06:02

## 2017-02-03 RX ADMIN — POTASSIUM CHLORIDE 10 MEQ: 10 INJECTION, SOLUTION INTRAVENOUS at 01:02

## 2017-02-03 NOTE — SUBJECTIVE & OBJECTIVE
Interval History: NAEON. VSS last 24 hours; patient remains afebrile. Still reports epigastric pain this morning but is amenable to trying a clear liquid diet today and to advance as tolerated. No further episodes of emesis. When seen later this afternoon patient reports some minor improvement in her symptoms.     Review of Systems   Constitutional: Positive for appetite change. Negative for chills, fatigue and fever.   Respiratory: Negative for cough, shortness of breath, wheezing and stridor.    Cardiovascular: Negative for chest pain, palpitations and leg swelling.   Gastrointestinal: Positive for abdominal pain, diarrhea and nausea. Negative for blood in stool, constipation and vomiting.   Genitourinary: Negative for dysuria.   Skin: Negative for color change, pallor and rash.   Neurological: Negative for dizziness, weakness, light-headedness, numbness and headaches.     Objective:     Vital Signs (Most Recent):  Temp: 98.2 °F (36.8 °C) (02/04/17 0737)  Pulse: 89 (02/04/17 0737)  Resp: 18 (02/04/17 0737)  BP: 125/75 (02/04/17 0737)  SpO2: (!) 93 % (02/04/17 0737) Vital Signs (24h Range):  Temp:  [98 °F (36.7 °C)-98.5 °F (36.9 °C)] 98.2 °F (36.8 °C)  Pulse:  [74-89] 89  Resp:  [18] 18  SpO2:  [93 %-98 %] 93 %  BP: (100-134)/(59-75) 125/75     Weight: 113.4 kg (250 lb)  Body mass index is 41.6 kg/(m^2).  No intake or output data in the 24 hours ending 02/04/17 0903   Physical Exam   Constitutional: She is oriented to person, place, and time. She appears well-developed and well-nourished. No distress.   HENT:   Head: Normocephalic and atraumatic.   Mouth/Throat: Oropharynx is clear and moist. No oropharyngeal exudate.   Eyes: Conjunctivae and EOM are normal. Pupils are equal, round, and reactive to light. No scleral icterus.   Neck: Normal range of motion. Neck supple. No JVD present. No tracheal deviation present.   Cardiovascular: Normal rate, regular rhythm, normal heart sounds and intact distal pulses.  Exam  reveals no gallop and no friction rub.    No murmur heard.  Pulmonary/Chest: Effort normal and breath sounds normal. No stridor. No respiratory distress. She has no wheezes. She has no rales.   Abdominal: Soft. She exhibits distension. She exhibits no mass. There is tenderness. There is no rebound and no guarding.   Normal bowel sounds.  Epigastric TTP.   Musculoskeletal: Normal range of motion. She exhibits no edema, tenderness or deformity.   Neurological: She is alert and oriented to person, place, and time.   Skin: Skin is warm and dry. No rash noted. She is not diaphoretic. No erythema. No pallor.       Significant Labs:   CBC:     Recent Labs  Lab 02/02/17 1832 02/03/17  0536 02/04/17  0433   WBC 12.13 10.43 4.82   HGB 14.1 11.9* 11.0*   HCT 43.3 36.4* 34.1*    279  --      CMP:     Recent Labs  Lab 02/02/17 2015 02/03/17  0536 02/04/17  0433    139 139   K 3.4* 4.5 3.7   * 112* 111*   CO2 13* 18* 18*   * 204* 147*   BUN 9 13 13   CREATININE 0.8 0.8 0.8   CALCIUM 7.8* 7.9* 7.9*   PROT 6.4  --   --    ALBUMIN 2.9*  --   --    BILITOT 0.2  --   --    ALKPHOS 87  --   --    AST 9*  --   --    ALT 7*  --   --    ANIONGAP 12 9 10   EGFRNONAA >60.0 >60.0 >60.0       Significant Imaging: I have reviewed all pertinent imaging results/findings within the past 24 hours.     Abdominal Xray-Limited exam, no evidence of high-grade obstruction.  Correlation for constipation as warranted.

## 2017-02-03 NOTE — ASSESSMENT & PLAN NOTE
-s/p gastric pacemaker (removed) and sleeve gastrectomy  -in ED received bentyl 20 IM, metoclopramide 10 IV, morphine 4 IV, phenergan 12.5 IV, 2L NS bolus    -NPO, but continued many PO home meds to encourage return to PO, patient seems to have stabilized.  -started on erythromycin 500mg IV TID; transitioned to PO this morning for a seven day course  -pain control with toradol and morphine PRN  -phenergan IV PRN nausea, zofran IV PRN nausea, can transition to PO as desired by day team  -continue pantoprazole 40 mg po BID

## 2017-02-03 NOTE — SUBJECTIVE & OBJECTIVE
Past Medical History   Diagnosis Date    Anemia     Anxiety     Asthma      usually with cold weather    Back pain     Bipolar 1 disorder 3/17/2015    Cervical cancer 2009     DOT    Diabetes mellitus with hyperglycemia 1/17/2017    Fibrocystic breast     Fibromyalgia     Gastroparesis 2012     s/p sleeve to cover damaged nerves; s/p gastric pacemaker which did not help; due to nerve damage during surgery    Hypertension     Hypothyroidism, postsurgical     Morbid obesity     Prediabetes     Rheumatoid arthritis     Thyroid cancer 2003 & 2013     thyroidectomy    Urinary incontinence        Past Surgical History   Procedure Laterality Date    Hysterectomy  2009     total including cervix    Exploratory laparotomy due to complications of hysterectomy  2009     cervical cuff burst with air in abdomen    Ureteral sling      Multiple breast biopsies      Gastric pacemaker      Gastric sleeve      Ankle fracture surgery Right     Knee surgery  05/12/2016    Loop monitor  2016    Breast surgery      Knee arthroscopy w/ meniscal repair Left 2016    Fracture surgery      Tonsillectomy      Gastrectomy         Review of patient's allergies indicates:   Allergen Reactions    Dexamethasone Hives and Shortness Of Breath     Per pt, only steroid she is allergic to is dexamethasone    Ciprofloxacin      Counteracts with seroquel, xanax, tizanidine    Compazine [prochlorperazine] Hives and Itching    Lyrica [pregabalin]      depression    Prednisone      Gastroparesis flare up       No current facility-administered medications on file prior to encounter.      Current Outpatient Prescriptions on File Prior to Encounter   Medication Sig    albuterol 90 mcg/actuation inhaler Inhale 2 puffs into the lungs every 6 (six) hours as needed for Wheezing. Please provide cheapest brand/generic formulation    alprazolam (XANAX) 1 MG tablet Take 1 tablet (1 mg total) by mouth 2 (two) times daily.     calcium carbonate (OS-MARIAN) 500 mg calcium (1,250 mg) tablet Take 2 tablets (1,000 mg total) by mouth once daily.    carvedilol (COREG) 12.5 MG tablet Take 2 tablets (25 mg total) by mouth 2 (two) times daily.    cholecalciferol, vitamin D3, 1,000 unit capsule Take 2 capsules (2,000 Units total) by mouth once daily.    ergocalciferol (ERGOCALCIFEROL) 50,000 unit Cap Take one capsule (50,000 units) daily for 5 days, then take over the counter 2000 units daily of Vitamin D.    fluticasone (FLONASE) 50 mcg/actuation nasal spray 2 sprays by Nasal route 2 (two) times daily.     gabapentin (NEURONTIN) 600 MG tablet Take 600 mg by mouth 3 (three) times daily.     hydrochlorothiazide (MICROZIDE) 12.5 mg capsule TAKE 1 CAPSULE BY MOUTH DAILY    insulin aspart (NOVOLOG) 100 unit/mL InPn pen Inject 16 Units into the skin 3 (three) times daily with meals.    insulin detemir (LEVEMIR FLEXTOUCH) 100 unit/mL (3 mL) SubQ InPn pen Inject 30 Units into the skin 2 (two) times daily.    levocetirizine (XYZAL) 5 MG tablet Take 1 tablet (5 mg total) by mouth every evening. (Patient taking differently: Take 5 mg by mouth as needed for Allergies. )    levothyroxine (SYNTHROID) 150 MCG tablet Take 1 tablet (150 mcg total) by mouth once daily.    mirtazapine (REMERON) 15 MG tablet Take 1 tablet (15 mg total) by mouth every evening.    olanzapine (ZYPREXA) 20 MG tablet Take 1 tablet (20 mg total) by mouth every evening.    oxcarbazepine (TRILEPTAL) 150 MG Tab Take 150 mg by mouth every evening.     oxycodone-acetaminophen (PERCOCET)  mg per tablet Take 1 tablet by mouth every 4 (four) hours as needed for Pain. Per Dr. Villa at [facility] [place]    pantoprazole (PROTONIX) 40 MG tablet Take 1 tablet (40 mg total) by mouth 2 (two) times daily before meals.    quetiapine (SEROQUEL) 100 MG Tab Take 100 mg by mouth every evening. with a 400 mg tablet to equal 500 mg total    quetiapine (SEROQUEL) 200 MG Tab  "Take 400 mg by mouth every evening. with a 100 mg tablet to equal 500 mg total    tizanidine (ZANAFLEX) 4 MG tablet TAKE 3 TABLETS (12 MG TOTAL) BY MOUTH EVERY EVENING.    blood sugar diagnostic Strp 1 each by Misc.(Non-Drug; Combo Route) route 4 (four) times daily with meals and nightly.    blood-glucose meter kit Use as instructed    lancets Misc 1 each by Misc.(Non-Drug; Combo Route) route 4 (four) times daily with meals and nightly.    meclizine (ANTIVERT) 25 mg tablet Take 1 tablet (25 mg total) by mouth 3 (three) times daily as needed for Dizziness or Nausea.    pen needle, diabetic 31 gauge x 5/16" Ndle 1 each by Misc.(Non-Drug; Combo Route) route 5 (five) times daily.    promethazine (PHENERGAN) 12.5 MG Tab Take 1 tablet (12.5 mg total) by mouth every 6 (six) hours as needed. (Patient taking differently: Take 12.5 mg by mouth every 6 (six) hours as needed (for nausea). )    topiramate (TOPAMAX) 25 MG tablet Take 1 tablet (25 mg total) by mouth daily as needed (migraines).     Family History     Problem Relation (Age of Onset)    ADD / ADHD Son    Arthritis Mother    Bipolar disorder Maternal Uncle    Clotting disorder Mother, Father, Paternal Grandfather    Diabetes Paternal Grandmother    HIV Brother    Heart attack Mother (55)    Heart disease Mother    Hyperlipidemia Father    No Known Problems Sister, Brother, Maternal Aunt, Paternal Aunt, Paternal Uncle, Maternal Grandfather, Maternal Grandmother, Cousin    Pancreatic cancer Maternal Uncle    Pneumonia Brother    Rheum arthritis Mother    Thyroid cancer Paternal Grandmother        Social History Main Topics    Smoking status: Never Smoker    Smokeless tobacco: Never Used    Alcohol use No    Drug use: No    Sexual activity: Not Currently     Review of Systems   Constitutional: Positive for appetite change and chills. Negative for fatigue and fever.   Respiratory: Negative for cough, shortness of breath, wheezing and stridor.  "   Cardiovascular: Negative for chest pain, palpitations and leg swelling.   Gastrointestinal: Positive for abdominal pain, diarrhea, nausea and vomiting. Negative for blood in stool and constipation.   Genitourinary: Negative for dysuria.   Skin: Negative for color change, pallor and rash.   Neurological: Negative for dizziness, weakness, light-headedness, numbness and headaches.     Objective:     Vital Signs (Most Recent):  Temp: 99.4 °F (37.4 °C) (02/02/17 2125)  Pulse: (!) 114 (02/02/17 2125)  Resp: 20 (02/02/17 2125)  BP: (!) 175/111 (02/02/17 2125)  SpO2: 98 % (02/02/17 2125) Vital Signs (24h Range):  Temp:  [99.3 °F (37.4 °C)-99.4 °F (37.4 °C)] 99.4 °F (37.4 °C)  Pulse:  [112-116] 114  Resp:  [18-20] 20  SpO2:  [97 %-98 %] 98 %  BP: (158-175)/(100-114) 175/111     Weight: 113.4 kg (250 lb)  Body mass index is 41.6 kg/(m^2).    Physical Exam   Constitutional: She is oriented to person, place, and time. She appears well-developed and well-nourished. No distress.   HENT:   Head: Normocephalic and atraumatic.   Mouth/Throat: Oropharynx is clear and moist. No oropharyngeal exudate.   Eyes: Conjunctivae and EOM are normal. Pupils are equal, round, and reactive to light. No scleral icterus.   Neck: Normal range of motion. Neck supple. No JVD present. No tracheal deviation present.   Cardiovascular: Regular rhythm, normal heart sounds and intact distal pulses.  Exam reveals no gallop and no friction rub.    No murmur heard.  Tachycardic   Pulmonary/Chest: Effort normal and breath sounds normal. No stridor. No respiratory distress. She has no wheezes. She has no rales.   Abdominal: Soft. She exhibits no distension and no mass. There is tenderness. There is no rebound and no guarding.   Hypoactive bowel sounds.  Epigastric TTP.   Musculoskeletal: Normal range of motion. She exhibits no edema, tenderness or deformity.   Neurological: She is alert and oriented to person, place, and time.   Skin: Skin is warm and dry. No  rash noted. She is not diaphoretic. No erythema. No pallor.        Significant Labs:   CBC:   Recent Labs  Lab 02/02/17  1832   WBC 12.13   HGB 14.1   HCT 43.3        CMP:   Recent Labs  Lab 02/02/17 2015      K 3.4*   *   CO2 13*   *   BUN 9   CREATININE 0.8   CALCIUM 7.8*   PROT 6.4   ALBUMIN 2.9*   BILITOT 0.2   ALKPHOS 87   AST 9*   ALT 7*   ANIONGAP 12   EGFRNONAA >60.0     Lipase:   Recent Labs  Lab 02/02/17 2015   LIPASE 10     POCT Glucose:   Recent Labs  Lab 02/02/17  1743   POCTGLUCOSE 211*     Urine Studies:   Recent Labs  Lab 02/02/17  2134   COLORU Yellow   APPEARANCEUA Cloudy*   PHUR 5.0   SPECGRAV 1.025   PROTEINUA 1+*   GLUCUA 1+*   KETONESU Trace*   BILIRUBINUA Negative   OCCULTUA Negative   NITRITE Negative   UROBILINOGEN Negative   LEUKOCYTESUR Negative   RBCUA 0   WBCUA 0   BACTERIA None   SQUAMEPITHEL 4   HYALINECASTS 1       Significant Imaging:   Abd Xray 2/2/2017  Exam is limited by body habitus.  No significant air-fluid levels on upright view.  Air and stool is seen within the large bowel, and projected over the rectum noting an overall paucity of bowel gas.  There is moderate stool throughout the colon, correlation for constipation as warranted.  There are no calcifications to convincingly suggest cholelithiasis or nephrolithiasis.  There is suspected vascular calcification.  There appears to be surgical change in the region of the left upper quadrant, suggesting partial gastrectomy.  The lower lung zones are grossly clear.  No acute osseous abnormality.  No large volume free air or pneumatosis.

## 2017-02-03 NOTE — H&P
"[facility] Hospital Medicine  History & Physical    Patient Name: Homar Jerry  MRN: [id number]  Admission Date: 2/2/2017  Attending Physician: Omar Albarran MD  Primary Care Provider: Mary Cabrera MD    [facility] Medicine Team: [facility] MED 2 Mckay Do MD     Patient information was obtained from patient, past medical records and ER records.     Subjective:     Principal Problem:Gastroparesis    Chief Complaint:   Chief Complaint   Patient presents with    Abdominal Pain     hx gastroparesis, diarrhea, nausea, vomiting        HPI: Homar Jerry is a 40 yo female with PMHx of HTN, fibromyalgia, RA, gastroparesis s/p gastric stimulator and sleeve gastrectomy, DM, thyroid cancer s/p thyroidectomy, cervical cancer being admitted for gastroparesis flare.  She presented to the ED complaining of n/v/d and abdominal pain that started at 5:45 am.  She reports this feels like her chronic gastroparesis flareups.  She describes her abdominal pain as a constant 10/10 "stabbing" pain in the epigastric and periumbilical region that has not been relieved with her at-home Percocet.  She states that moving increases her pain.  She's been unable to tolerate neither solids nor liquids by mouth.  She denies hematemesis, melena, or hematochezia.  She denies any abdominal trauma.  She reports associated chills, headache, and lightheadedness.  She denies fever, chest pain, shortness of breath, dysuria, hematuria, vaginal bleeding, vaginal discharge, numbness, weakness.  She denies tobacco, alcohol, or drug use.     She was recently admitted on 1/17 and discharged on 1/23 for DKA.      Past Medical History   Diagnosis Date    Anemia     Anxiety     Asthma      usually with cold weather    Back pain     Bipolar 1 disorder 3/17/2015    Cervical cancer 2009     DOT    Diabetes mellitus with hyperglycemia 1/17/2017    Fibrocystic breast     Fibromyalgia     Gastroparesis 2012     " s/p sleeve to cover damaged nerves; s/p gastric pacemaker which did not help; due to nerve damage during surgery    Hypertension     Hypothyroidism, postsurgical     Morbid obesity     Prediabetes     Rheumatoid arthritis     Thyroid cancer 2003 & 2013     thyroidectomy    Urinary incontinence        Past Surgical History   Procedure Laterality Date    Hysterectomy  2009     total including cervix    Exploratory laparotomy due to complications of hysterectomy  2009     cervical cuff burst with air in abdomen    Ureteral sling      Multiple breast biopsies      Gastric pacemaker      Gastric sleeve      Ankle fracture surgery Right     Knee surgery  05/12/2016    Loop monitor  2016    Breast surgery      Knee arthroscopy w/ meniscal repair Left 2016    Fracture surgery      Tonsillectomy      Gastrectomy         Review of patient's allergies indicates:   Allergen Reactions    Dexamethasone Hives and Shortness Of Breath     Per pt, only steroid she is allergic to is dexamethasone    Ciprofloxacin      Counteracts with seroquel, xanax, tizanidine    Compazine [prochlorperazine] Hives and Itching    Lyrica [pregabalin]      depression    Prednisone      Gastroparesis flare up       No current facility-administered medications on file prior to encounter.      Current Outpatient Prescriptions on File Prior to Encounter   Medication Sig    albuterol 90 mcg/actuation inhaler Inhale 2 puffs into the lungs every 6 (six) hours as needed for Wheezing. Please provide cheapest brand/generic formulation    alprazolam (XANAX) 1 MG tablet Take 1 tablet (1 mg total) by mouth 2 (two) times daily.    calcium carbonate (OS-MARIAN) 500 mg calcium (1,250 mg) tablet Take 2 tablets (1,000 mg total) by mouth once daily.    carvedilol (COREG) 12.5 MG tablet Take 2 tablets (25 mg total) by mouth 2 (two) times daily.    cholecalciferol, vitamin D3, 1,000 unit capsule Take 2 capsules (2,000 Units total) by mouth once  daily.    ergocalciferol (ERGOCALCIFEROL) 50,000 unit Cap Take one capsule (50,000 units) daily for 5 days, then take over the counter 2000 units daily of Vitamin D.    fluticasone (FLONASE) 50 mcg/actuation nasal spray 2 sprays by Nasal route 2 (two) times daily.     gabapentin (NEURONTIN) 600 MG tablet Take 600 mg by mouth 3 (three) times daily.     hydrochlorothiazide (MICROZIDE) 12.5 mg capsule TAKE 1 CAPSULE BY MOUTH DAILY    insulin aspart (NOVOLOG) 100 unit/mL InPn pen Inject 16 Units into the skin 3 (three) times daily with meals.    insulin detemir (LEVEMIR FLEXTOUCH) 100 unit/mL (3 mL) SubQ InPn pen Inject 30 Units into the skin 2 (two) times daily.    levocetirizine (XYZAL) 5 MG tablet Take 1 tablet (5 mg total) by mouth every evening. (Patient taking differently: Take 5 mg by mouth as needed for Allergies. )    levothyroxine (SYNTHROID) 150 MCG tablet Take 1 tablet (150 mcg total) by mouth once daily.    mirtazapine (REMERON) 15 MG tablet Take 1 tablet (15 mg total) by mouth every evening.    olanzapine (ZYPREXA) 20 MG tablet Take 1 tablet (20 mg total) by mouth every evening.    oxcarbazepine (TRILEPTAL) 150 MG Tab Take 150 mg by mouth every evening.     oxycodone-acetaminophen (PERCOCET)  mg per tablet Take 1 tablet by mouth every 4 (four) hours as needed for Pain. Per Dr. Villa at [facility] [place] [place]    pantoprazole (PROTONIX) 40 MG tablet Take 1 tablet (40 mg total) by mouth 2 (two) times daily before meals.    quetiapine (SEROQUEL) 100 MG Tab Take 100 mg by mouth every evening. with a 400 mg tablet to equal 500 mg total    quetiapine (SEROQUEL) 200 MG Tab Take 400 mg by mouth every evening. with a 100 mg tablet to equal 500 mg total    tizanidine (ZANAFLEX) 4 MG tablet TAKE 3 TABLETS (12 MG TOTAL) BY MOUTH EVERY EVENING.    blood sugar diagnostic Strp 1 each by Misc.(Non-Drug; Combo Route) route 4 (four) times daily with meals and nightly.    blood-glucose  "meter kit Use as instructed    lancets Misc 1 each by Misc.(Non-Drug; Combo Route) route 4 (four) times daily with meals and nightly.    meclizine (ANTIVERT) 25 mg tablet Take 1 tablet (25 mg total) by mouth 3 (three) times daily as needed for Dizziness or Nausea.    pen needle, diabetic 31 gauge x 5/16" Ndle 1 each by Misc.(Non-Drug; Combo Route) route 5 (five) times daily.    promethazine (PHENERGAN) 12.5 MG Tab Take 1 tablet (12.5 mg total) by mouth every 6 (six) hours as needed. (Patient taking differently: Take 12.5 mg by mouth every 6 (six) hours as needed (for nausea). )    topiramate (TOPAMAX) 25 MG tablet Take 1 tablet (25 mg total) by mouth daily as needed (migraines).     Family History     Problem Relation (Age of Onset)    ADD / ADHD Son    Arthritis Mother    Bipolar disorder Maternal Uncle    Clotting disorder Mother, Father, Paternal Grandfather    Diabetes Paternal Grandmother    HIV Brother    Heart attack Mother (55)    Heart disease Mother    Hyperlipidemia Father    No Known Problems Sister, Brother, Maternal Aunt, Paternal Aunt, Paternal Uncle, Maternal Grandfather, Maternal Grandmother, Cousin    Pancreatic cancer Maternal Uncle    Pneumonia Brother    Rheum arthritis Mother    Thyroid cancer Paternal Grandmother        Social History Main Topics    Smoking status: Never Smoker    Smokeless tobacco: Never Used    Alcohol use No    Drug use: No    Sexual activity: Not Currently     Review of Systems   Constitutional: Positive for appetite change and chills. Negative for fatigue and fever.   Respiratory: Negative for cough, shortness of breath, wheezing and stridor.    Cardiovascular: Negative for chest pain, palpitations and leg swelling.   Gastrointestinal: Positive for abdominal pain, diarrhea, nausea and vomiting. Negative for blood in stool and constipation.   Genitourinary: Negative for dysuria.   Skin: Negative for color change, pallor and rash.   Neurological: Negative for " dizziness, weakness, light-headedness, numbness and headaches.     Objective:     Vital Signs (Most Recent):  Temp: 99.4 °F (37.4 °C) (02/02/17 2125)  Pulse: (!) 114 (02/02/17 2125)  Resp: 20 (02/02/17 2125)  BP: (!) 175/111 (02/02/17 2125)  SpO2: 98 % (02/02/17 2125) Vital Signs (24h Range):  Temp:  [99.3 °F (37.4 °C)-99.4 °F (37.4 °C)] 99.4 °F (37.4 °C)  Pulse:  [112-116] 114  Resp:  [18-20] 20  SpO2:  [97 %-98 %] 98 %  BP: (158-175)/(100-114) 175/111     Weight: 113.4 kg (250 lb)  Body mass index is 41.6 kg/(m^2).    Physical Exam   Constitutional: She is oriented to person, place, and time. She appears well-developed and well-nourished. No distress.   HENT:   Head: Normocephalic and atraumatic.   Mouth/Throat: Oropharynx is clear and moist. No oropharyngeal exudate.   Eyes: Conjunctivae and EOM are normal. Pupils are equal, round, and reactive to light. No scleral icterus.   Neck: Normal range of motion. Neck supple. No JVD present. No tracheal deviation present.   Cardiovascular: Regular rhythm, normal heart sounds and intact distal pulses.  Exam reveals no gallop and no friction rub.    No murmur heard.  Tachycardic   Pulmonary/Chest: Effort normal and breath sounds normal. No stridor. No respiratory distress. She has no wheezes. She has no rales.   Abdominal: Soft. She exhibits no distension and no mass. There is tenderness. There is no rebound and no guarding.   Hypoactive bowel sounds.  Epigastric TTP.   Musculoskeletal: Normal range of motion. She exhibits no edema, tenderness or deformity.   Neurological: She is alert and oriented to person, place, and time.   Skin: Skin is warm and dry. No rash noted. She is not diaphoretic. No erythema. No pallor.        Significant Labs:   CBC:   Recent Labs  Lab 02/02/17  1832   WBC 12.13   HGB 14.1   HCT 43.3        CMP:   Recent Labs  Lab 02/02/17 2015      K 3.4*   *   CO2 13*   *   BUN 9   CREATININE 0.8   CALCIUM 7.8*   PROT 6.4    ALBUMIN 2.9*   BILITOT 0.2   ALKPHOS 87   AST 9*   ALT 7*   ANIONGAP 12   EGFRNONAA >60.0     Lipase:   Recent Labs  Lab 02/02/17  2015   LIPASE 10     POCT Glucose:   Recent Labs  Lab 02/02/17  1743   POCTGLUCOSE 211*     Urine Studies:   Recent Labs  Lab 02/02/17  2134   COLORU Yellow   APPEARANCEUA Cloudy*   PHUR 5.0   SPECGRAV 1.025   PROTEINUA 1+*   GLUCUA 1+*   KETONESU Trace*   BILIRUBINUA Negative   OCCULTUA Negative   NITRITE Negative   UROBILINOGEN Negative   LEUKOCYTESUR Negative   RBCUA 0   WBCUA 0   BACTERIA None   SQUAMEPITHEL 4   HYALINECASTS 1       Significant Imaging:   Abd Xray 2/2/2017  Exam is limited by body habitus.  No significant air-fluid levels on upright view.  Air and stool is seen within the large bowel, and projected over the rectum noting an overall paucity of bowel gas.  There is moderate stool throughout the colon, correlation for constipation as warranted.  There are no calcifications to convincingly suggest cholelithiasis or nephrolithiasis.  There is suspected vascular calcification.  There appears to be surgical change in the region of the left upper quadrant, suggesting partial gastrectomy.  The lower lung zones are grossly clear.  No acute osseous abnormality.  No large volume free air or pneumatosis.    Assessment/Plan:     * Gastroparesis  -s/p gastric pacemaker (removed) and sleeve gastrectomy  -in ED received bentyl 20 IM, metoclopramide 10 IV, morphine 4 IV, phenergan 12.5 IV, 2L NS bolus    -NPO, but continued many PO home meds to encourage return to PO, patient seems to have stabilized.  -erythromycin 500mg IV TID, can transition to PO as desired by day team.  Chose over metoclopramide due to less side effect profile.  -pain control with toradol and morphine PRN  -phenergan IV PRN nausea, zofran IV PRN nausea, can transition to PO as desired by day team  -continue pantoprazole 40 mg po BID    Fibromyalgia  - continue tizanidine  - continue gabapentin    Anxiety  -  continue home xanax 1 mg po BID  - continue home mirtazapine 15 mg QHS    Bipolar 1 disorder  - continue oxcarbezepine  - continue home xanax  - continue olanzapine  - continue seroquel    Hypothyroidism, postsurgical  - continue levothyroxine 150 mcg po daily    Migraines  - continue topirimate PRN    Essential hypertension  - continue carvedilol 25 mg po BID  - continue HCTZ 12.5 mg po daily    Type 2 diabetes mellitus with hyperglycemia, without long-term current use of insulin  -Home regimen of 16 aspart with meals and 30 detemir BID  -LDSSI with 15 detemir BID while NPO    Abdominal pain  -lipase normal at 10  -abd xray limited by body habitus but no signs of obstruction.    VTE Risk Mitigation         Ordered     enoxaparin injection 40 mg  Daily     Route:  Subcutaneous        02/02/17 2358     Place DANDRE hose  Until discontinued      02/02/17 2358     Place sequential compression device  Until discontinued      02/02/17 2358     Medium Risk of VTE  Once      02/02/17 2358        Mckay Do MD  Department of Hospital Medicine   [facility]

## 2017-02-03 NOTE — PHYSICIAN QUERY
PT Name: Homar Jerry  MR #: [id number]     Physician Query Form - Documentation Clarification    Reviewer  Ext  Jenny Colunga RN - [email or web address]    This form is a permanent document in the medical record.     Query Date: February 3, 2017  By submitting this query, we are merely seeking further clarification of documentation to reflect the severity of illness of your patient. Please utilize your independent clinical judgment when addressing the question(s) below.    (The Medical record reflects the following:)      Supporting Clinical Findings Location in Medical Record   intractable N/V and abdominal pain consistent with gastroparesis exacerbation. Start IV erythromycin for motility and avoid narcotics as much as possible, especially given chronic narcotic dependence.     Past medical history  Gastroparesis  Date 2012        s/p sleeve to cover damaged nerves; s/p gastric pacemaker which did not help; due to nerve damage during surgery    Past surgical history  Hysterectomy    Date 2009  Exploratory laparotomy due to complications of hysterectomy  Date 2009          cervical cuff burst with air in abdomen  Ureteral sling  Gastrectomy [facility]                                                                                        Doctor, Please specify diagnosis or diagnoses associated with above clinical findings.  Please clarify if gastroparesis is  [   ] Inherent/integral to prior surgery   [   ] Routine outcome of prior surgery   [   ] Complication of procedure of prior surgery   [   ] Other (specify) __________  [ X ] Clinically undetermined    Physician Use Only                                                                                                                               [  ] Unable to determine

## 2017-02-03 NOTE — PLAN OF CARE
Mary Cabrera MD   [address] [facility] [address]       [facility]  [address]  [address] [address]  Phone: [phone] Fax: [phone]    [facility] - [place], [place] - [address] [facility]  [address]  [place] [address]  Phone: [phone] Fax: [phone]    [facility] - [place], [place] - [address] [facility]  [address] [facility] [address]  Phone: [phone] Fax: [phone]       02/03/17 1402   Discharge Assessment   Assessment Type Discharge Planning Assessment   Confirmed/corrected address and phone number on facesheet? Yes   Assessment information obtained from? Patient   Expected Length of Stay (days) 3   Communicated expected length of stay with patient/caregiver yes   Prior to hospitilization cognitive status: Alert/Oriented   Prior to hospitalization functional status: Independent   Current cognitive status: Alert/Oriented   Current Functional Status: Independent   Arrived From home or self-care   Lives With child(leticia), adult   Able to Return to Prior Arrangements yes   Is patient able to care for self after discharge? Yes   Patient's perception of discharge disposition home or selfcare   Readmission Within The Last 30 Days current reason for admission unrelated to previous admission   Patient currently being followed by outpatient case management? No   Patient currently receives home health services? No   Does the patient currently use HME? No   Patient currently receives private duty nursing? No   Equipment Currently Used at Home shower chair;bedside commode;wheelchair;walker, rolling   Do you have any problems affording any of your prescribed medications? TBD   Is the patient taking medications as prescribed? yes   Do you have any financial concerns preventing you from receiving the healthcare you need? No   Does the patient have transportation to  healthcare appointments? Yes   Does the patient receive services at the [facility]? No   Discharge Plan A Home with family   Patient/Family In Agreement With Plan yes

## 2017-02-03 NOTE — ASSESSMENT & PLAN NOTE
-epigastric tenderness  -lipase normal at 10  -abd xray limited by body habitus but no signs of obstruction or acute processes.  -UA clean  -WBC 12.13, no signs of acute abdomen

## 2017-02-03 NOTE — ASSESSMENT & PLAN NOTE
-s/p gastric pacemaker and sleeve gastrectomy  -in ED received bentyl 20 IM, metoclopramide 10 IV, morphine 4 IV, phenergan 12.5 IV, 2L NS bolus    -NPO, but continued many PO home meds to encourage return to PO, patient seems to have stabilized.  -metoclopramide 10 IV daily, can transition to PO as desired by day team  -phenergan IV PRN nausea, zofran IV PRN nausea, can transition to PO as desired by day team  -continue pantoprazole 40 mg po BID

## 2017-02-03 NOTE — ED PROVIDER NOTES
"Encounter Date: 2/2/2017       History     Chief Complaint   Patient presents with    Abdominal Pain     hx gastroparesis, diarrhea, nausea, vomiting     Review of patient's allergies indicates:   Allergen Reactions    Dexamethasone Hives and Shortness Of Breath     Per pt, only steroid she is allergic to is dexamethasone    Ciprofloxacin      Counteracts with seroquel, xanax, tizanidine    Compazine [prochlorperazine] Hives and Itching    Lyrica [pregabalin]      depression    Prednisone      Gastroparesis flare up     HPI Comments: 40 y/o  female with PMHx of HTN, fibromyalgia, RA, gastroparesis s/p gastric stimulator and sleeve gastrectomy, DM, thyroid cancer s/p thyroidectomy, cervical cancer presents to the ED complaining of n/v/d and abdominal pain that started at 5:45 am.  She reports this feels like her chronic gastroparesis flareups.  She describes the abdominal pain as a constant 10/10 "stabbing" pain in the epigastric and periumbilical region that has not been relieved with her at-home Percocet.  She denies any specific aggravating factors.  She's been unable to tolerate anything by mouth.  She denies hematemesis, melena, or BRBPR.  She denies any abdominal trauma.  She reports associated chills, headache, and lightheadedness.  She denies fever, chest pain, shortness of breath, dysuria, hematuria, vaginal bleeding, vaginal discharge, numbness, weakness.  She denies tobacco, alcohol, or drug use.    The history is provided by the patient.     Past Medical History   Diagnosis Date    Anemia     Anxiety     Asthma      usually with cold weather    Back pain     Bipolar 1 disorder 3/17/2015    Cervical cancer 2009     DOT    Diabetes mellitus with hyperglycemia 1/17/2017    Fibrocystic breast     Fibromyalgia     Gastroparesis 2012     s/p sleeve to cover damaged nerves; s/p gastric pacemaker which did not help; due to nerve damage during surgery    Hypertension     " Hypothyroidism, postsurgical     Morbid obesity     Prediabetes     Rheumatoid arthritis     Thyroid cancer 2003 & 2013     thyroidectomy    Urinary incontinence      Past Medical History Pertinent Negatives   Diagnosis Date Noted    Acute pancreatitis 10/5/2016    Anticoagulant long-term use 6/30/2015    CHF (congestive heart failure) 6/30/2015    Cirrhosis 10/5/2016    COPD (chronic obstructive pulmonary disease) 6/30/2015    Coronary artery disease 6/30/2015    Deep vein thrombosis 10/5/2016    Dependence on renal dialysis 10/5/2016    Disorder of kidney and ureter 10/5/2016    Encounter for blood transfusion 6/30/2015    Glaucoma 10/5/2016    Hepatitis B 10/5/2016    Hepatitis C 10/5/2016    Hyperlipidemia 10/5/2016    Inflammatory bowel disease 10/5/2016    Myocardial infarction 10/5/2016    Pulmonary embolism 10/5/2016    Seizures 6/30/2015    Sleep apnea 10/5/2016    Stroke 6/30/2015     Past Surgical History   Procedure Laterality Date    Hysterectomy  2009     total including cervix    Exploratory laparotomy due to complications of hysterectomy  2009     cervical cuff burst with air in abdomen    Ureteral sling      Multiple breast biopsies      Gastric pacemaker      Gastric sleeve      Ankle fracture surgery Right     Knee surgery  05/12/2016    Loop monitor  2016    Breast surgery      Knee arthroscopy w/ meniscal repair Left 2016    Fracture surgery      Tonsillectomy      Gastrectomy       Family History   Problem Relation Age of Onset    Heart disease Mother     Arthritis Mother     Rheum arthritis Mother     Heart attack Mother 55    Clotting disorder Mother     Hyperlipidemia Father     Clotting disorder Father     Thyroid cancer Paternal Grandmother     Diabetes Paternal Grandmother     Clotting disorder Paternal Grandfather     No Known Problems Sister     No Known Problems Brother     No Known Problems Maternal Aunt     No Known Problems  Paternal Aunt     Bipolar disorder Maternal Uncle     Pancreatic cancer Maternal Uncle     No Known Problems Paternal Uncle     No Known Problems Maternal Grandfather     No Known Problems Maternal Grandmother     No Known Problems Cousin     ADD / ADHD Son     Pneumonia Brother     HIV Brother     Alcohol abuse Neg Hx     Anxiety disorder Neg Hx     Dementia Neg Hx     Depression Neg Hx     Drug abuse Neg Hx     OCD Neg Hx     Paranoid behavior Neg Hx     Physical abuse Neg Hx     Schizophrenia Neg Hx     Seizures Neg Hx     Self injury Neg Hx     Sexual abuse Neg Hx     Suicide Neg Hx      Social History   Substance Use Topics    Smoking status: Never Smoker    Smokeless tobacco: Never Used    Alcohol use No     Review of Systems   Constitutional: Positive for appetite change and chills. Negative for fever.   HENT: Negative for congestion, rhinorrhea and sore throat.    Eyes: Positive for visual disturbance (blurry with lightheadedness). Negative for photophobia.   Respiratory: Negative for shortness of breath.    Cardiovascular: Negative for chest pain.   Gastrointestinal: Positive for abdominal pain, diarrhea, nausea and vomiting. Negative for anal bleeding, blood in stool and constipation.        No hematemesis   Genitourinary: Positive for flank pain. Negative for dysuria, hematuria, vaginal bleeding and vaginal discharge.        +decreased urination   Musculoskeletal: Positive for back pain. Negative for neck pain and neck stiffness.   Skin: Negative for rash and wound.   Neurological: Positive for light-headedness and headaches. Negative for dizziness, syncope, weakness and numbness.   Psychiatric/Behavioral: Negative for confusion.       Physical Exam   Initial Vitals   BP Pulse Resp Temp SpO2   02/02/17 1602 02/02/17 1602 02/02/17 1602 02/02/17 1602 02/02/17 1602   158/114 112 18 99.3 °F (37.4 °C) 97 %     Physical Exam    Nursing note and vitals reviewed.  Constitutional: She  appears well-developed and well-nourished. She is not diaphoretic. No distress.   HENT:   Head: Normocephalic and atraumatic.   Neck: Normal range of motion. Neck supple.   Cardiovascular: Regular rhythm and normal heart sounds. Tachycardia present.  Exam reveals no gallop and no friction rub.    No murmur heard.  Pulmonary/Chest: Breath sounds normal. She has no wheezes. She has no rhonchi. She has no rales.   Abdominal: Soft. Bowel sounds are normal. There is tenderness in the epigastric area and periumbilical area. There is CVA tenderness (R). There is no rigidity, no rebound, no guarding, no tenderness at McBurney's point and negative Steel's sign.   Musculoskeletal: Normal range of motion.   Neurological: She is alert and oriented to person, place, and time.   Skin: Skin is warm and dry. No rash noted. No erythema.   Psychiatric: She has a normal mood and affect.         ED Course   Procedures  Labs Reviewed   CBC W/ AUTO DIFFERENTIAL - Abnormal; Notable for the following:        Result Value    Gran # 9.7 (*)     Gran% 79.9 (*)     Lymph% 16.8 (*)     Mono% 2.7 (*)     All other components within normal limits   URINALYSIS - Abnormal; Notable for the following:     Appearance, UA Cloudy (*)     Protein, UA 1+ (*)     Glucose, UA 1+ (*)     Ketones, UA Trace (*)     All other components within normal limits   COMPREHENSIVE METABOLIC PANEL - Abnormal; Notable for the following:     Potassium 3.4 (*)     Chloride 117 (*)     CO2 13 (*)     Glucose 193 (*)     Calcium 7.8 (*)     Albumin 2.9 (*)     AST 9 (*)     ALT 7 (*)     All other components within normal limits   POCT GLUCOSE - Abnormal; Notable for the following:     POCT Glucose 211 (*)     All other components within normal limits   LIPASE   URINALYSIS MICROSCOPIC   BASIC METABOLIC PANEL   MAGNESIUM   CBC W/ AUTO DIFFERENTIAL   POCT GLUCOSE MONITORING CONTINUOUS   POCT GLUCOSE MONITORING CONTINUOUS        Imaging Results         X-Ray Abdomen Flat And  Erect (Final result) Result time:  02/02/17 19:16:41    Final result by Dinora Talamantes MD (02/02/17 19:16:41)    Impression:      1.  Limited exam, no evidence of high-grade obstruction.  Correlation for constipation as warranted.      Electronically signed by: DINORA TALAMANTES MD  Date:     02/02/17  Time:    19:16     Narrative:    Abdomen flat and erect    Clinical history: Pain    Comparison: 11/30/2016    Findings:  1 upright view, 2 supine views.    Exam is limited by body habitus.  No significant air-fluid levels on upright view.  Air and stool is seen within the large bowel, and projected over the rectum noting an overall paucity of bowel gas.  There is moderate stool throughout the colon, correlation for constipation as warranted.  There are no calcifications to convincingly suggest cholelithiasis or nephrolithiasis.  There is suspected vascular calcification.  There appears to be surgical change in the region of the left upper quadrant, suggesting partial gastrectomy.  The lower lung zones are grossly clear.  No acute osseous abnormality.  No large volume free air or pneumatosis.                 Medical Decision Making:   History:   Old Medical Records: I decided to obtain old medical records.  Old Records Summarized: records from previous admission(s).       <> Summary of Records: Recently admitted from 1/17-1/23 for DKA.  Has had multiple admissions in the past for gastroparesis pain.  Clinical Tests:   Lab Tests: Ordered and Reviewed  Radiological Study: Ordered and Reviewed       APC / Resident Notes:   42 y/o  female with PMHx of HTN, fibromyalgia, RA, gastroparesis s/p gastric stimulator and sleeve gastrectomy, DM, thyroid cancer s/p thyroidectomy, cervical cancer presents to the ED complaining of n/v/d and abdominal pain that started at 5:45 am.  Tachycardic at 116.  Regular rhythm without murmurs.  Lungs are clear to auscultation bilaterally without wheezes.  Abdomen is soft and  tender to palpation in the epigastric and periumbilical region.  Right CVA tenderness noted.  Differential diagnosis includes but is not limited to chronic pain associated gastroparesis, viral gastroenteritis, pancreatitis, bowel obstruction, DKA, UTI, pyelonephritis.  We'll get labs and x-ray abdomen flat and erect, give IVF, reglan, and bentyl and reassess.     CBC with no signs of leukocytosis with a WBC of 12.13.  CMP shows hyperglycemia with a glucose of 193 but a normal anion gap at 12.  Minimal hypokalemia noted with potassium of 3.4.  Lipase within normal limits at 10.  UA with no signs of infection.    X-ray abdomen flat and erect does not show any evidence of obstruction.    Patient reports minimal improvement of symptoms with fluids, Bentyl, and Reglan.  Still tachycardic.  Will give second liter fluids.      Patient reports she still having significant abdominal pain and nausea.  States that she does not think that she'll be able to go home due to her severe pain.  Will admit to medicine for further management of abdominal pain with n/v.          Attending Attestation:     Physician Attestation Statement for NP/PA:   I have conducted a face to face encounter with this patient in addition to the NP/PA, due to Medical Complexity    Other NP/PA Attestation Additions:     Physical Exam: Mild diffuse upper abdominal tenderness without rebound or involuntary guarding   Medical Decision Making: Needed for pain and nausea control.  Emergency room workup does not suggest a severe acute process.  However, the patient is unable to maintain hydration and pain control at home and will be admitted to observation for actual reintroduction of liquids and food                 ED Course     Clinical Impression:   The primary encounter diagnosis was Gastroparesis. Diagnoses of Abdominal pain, unspecified location, Intractable vomiting with nausea, unspecified vomiting type, and Diarrhea, unspecified type were also  pertinent to this visit.    Disposition:   Disposition: Admitted  Condition: Fair  Internal Medicine       Malorie Robertson PA-C  02/03/17 0114       Deion Martinez MD  02/03/17 1130

## 2017-02-03 NOTE — PHYSICIAN QUERY
"PT Name: Homar Jerry  MR #: [id number]     Physician Query Form - Documentation Clarification    Reviewer  Ext Jenny Colunga RN - [email or web address]    This form is a permanent document in the medical record.     Query Date: February 3, 2017  By submitting this query, we are merely seeking further clarification of documentation to reflect the severity of illness of your patient. Please utilize your independent clinical judgment when addressing the question(s) below.    (The Medical record reflects the following:)      Supporting Clinical Findings Location in Medical Record   Height:  5' 5"   Weight:  113.4 kg  BMI:  41.7   Anthropometrics flow sheet 02/02                                                                                       Doctor, Please specify diagnosis or diagnoses associated with above clinical findings.  [ X ] Morbid obesity  [   ] Other (specify) _____________  [   ] Clinically undetermined      Physician Use Only                                                                                                                               [  ] Unable to determine            "

## 2017-02-03 NOTE — ED NOTES
Erythromycin accidentally disposed of by tech when cleaning recliner area while pt was in the bathroom. GLORIA Barkley, aware; request called in to pharmacy to send a replacement bag to the 9th floor.

## 2017-02-03 NOTE — ASSESSMENT & PLAN NOTE
-Home regimen of 16 aspart with meals and 30 detemir BID  -LDSSI with 15 detemir BID; will restart Aspart once patient starts eating regular meals

## 2017-02-04 VITALS
HEIGHT: 65 IN | TEMPERATURE: 98 F | DIASTOLIC BLOOD PRESSURE: 53 MMHG | HEART RATE: 90 BPM | WEIGHT: 250 LBS | RESPIRATION RATE: 18 BRPM | OXYGEN SATURATION: 93 % | SYSTOLIC BLOOD PRESSURE: 109 MMHG | BODY MASS INDEX: 41.65 KG/M2

## 2017-02-04 LAB
ANION GAP SERPL CALC-SCNC: 10 MMOL/L
BASOPHILS # BLD AUTO: 0 K/UL
BASOPHILS NFR BLD: 0 %
BUN SERPL-MCNC: 13 MG/DL
CALCIUM SERPL-MCNC: 7.9 MG/DL
CHLORIDE SERPL-SCNC: 111 MMOL/L
CO2 SERPL-SCNC: 18 MMOL/L
CREAT SERPL-MCNC: 0.8 MG/DL
DIFFERENTIAL METHOD: ABNORMAL
EOSINOPHIL # BLD AUTO: 0.2 K/UL
EOSINOPHIL NFR BLD: 3.5 %
ERYTHROCYTE [DISTWIDTH] IN BLOOD BY AUTOMATED COUNT: 14.2 %
EST. GFR  (AFRICAN AMERICAN): >60 ML/MIN/1.73 M^2
EST. GFR  (NON AFRICAN AMERICAN): >60 ML/MIN/1.73 M^2
GLUCOSE SERPL-MCNC: 147 MG/DL
HCT VFR BLD AUTO: 34.1 %
HGB BLD-MCNC: 11 G/DL
LYMPHOCYTES # BLD AUTO: 1.8 K/UL
LYMPHOCYTES NFR BLD: 37.3 %
MCH RBC QN AUTO: 28.1 PG
MCHC RBC AUTO-ENTMCNC: 32.3 %
MCV RBC AUTO: 87 FL
MONOCYTES # BLD AUTO: 0.5 K/UL
MONOCYTES NFR BLD: 10 %
NEUTROPHILS # BLD AUTO: 2.4 K/UL
NEUTROPHILS NFR BLD: 49.2 %
PLATELET # BLD AUTO: 166 K/UL
PLATELET BLD QL SMEAR: ABNORMAL
PMV BLD AUTO: 10.9 FL
POCT GLUCOSE: 131 MG/DL (ref 70–110)
POCT GLUCOSE: 146 MG/DL (ref 70–110)
POCT GLUCOSE: 167 MG/DL (ref 70–110)
POTASSIUM SERPL-SCNC: 3.7 MMOL/L
RBC # BLD AUTO: 3.92 M/UL
SODIUM SERPL-SCNC: 139 MMOL/L
WBC # BLD AUTO: 4.82 K/UL

## 2017-02-04 PROCEDURE — 99239 HOSP IP/OBS DSCHRG MGMT >30: CPT | Mod: ,,, | Performed by: INTERNAL MEDICINE

## 2017-02-04 PROCEDURE — 80048 BASIC METABOLIC PNL TOTAL CA: CPT

## 2017-02-04 PROCEDURE — 36415 COLL VENOUS BLD VENIPUNCTURE: CPT

## 2017-02-04 PROCEDURE — 25000003 PHARM REV CODE 250: Performed by: INTERNAL MEDICINE

## 2017-02-04 PROCEDURE — 63600175 PHARM REV CODE 636 W HCPCS: Performed by: STUDENT IN AN ORGANIZED HEALTH CARE EDUCATION/TRAINING PROGRAM

## 2017-02-04 PROCEDURE — 85025 COMPLETE CBC W/AUTO DIFF WBC: CPT

## 2017-02-04 PROCEDURE — 25000003 PHARM REV CODE 250: Performed by: STUDENT IN AN ORGANIZED HEALTH CARE EDUCATION/TRAINING PROGRAM

## 2017-02-04 RX ORDER — ONDANSETRON 8 MG/1
8 TABLET, ORALLY DISINTEGRATING ORAL EVERY 6 HOURS PRN
Qty: 30 TABLET | Refills: 1 | Status: SHIPPED | OUTPATIENT
Start: 2017-02-04 | End: 2018-02-05

## 2017-02-04 RX ORDER — PROMETHAZINE HYDROCHLORIDE 25 MG/1
25 TABLET ORAL EVERY 6 HOURS PRN
Qty: 60 TABLET | Refills: 2 | Status: SHIPPED | OUTPATIENT
Start: 2017-02-04 | End: 2017-06-09 | Stop reason: SDUPTHER

## 2017-02-04 RX ORDER — ERYTHROMYCIN ETHYLSUCCINATE 200 MG/5ML
200 SUSPENSION ORAL
Status: DISCONTINUED | OUTPATIENT
Start: 2017-02-04 | End: 2017-02-04 | Stop reason: HOSPADM

## 2017-02-04 RX ORDER — ERYTHROMYCIN STEARATE 250 MG/1
250 TABLET, FILM COATED ORAL
Qty: 90 TABLET | Refills: 0 | Status: SHIPPED | OUTPATIENT
Start: 2017-02-04 | End: 2017-03-03

## 2017-02-04 RX ADMIN — ERYTHROMYCIN ETHYLSUCCINATE 200 MG: 200 GRANULE, FOR SUSPENSION ORAL at 01:02

## 2017-02-04 RX ADMIN — LEVOTHYROXINE SODIUM 150 MCG: 75 TABLET ORAL at 05:02

## 2017-02-04 RX ADMIN — ENOXAPARIN SODIUM 40 MG: 100 INJECTION SUBCUTANEOUS at 11:02

## 2017-02-04 RX ADMIN — INSULIN DETEMIR 15 UNITS: 100 INJECTION, SOLUTION SUBCUTANEOUS at 09:02

## 2017-02-04 RX ADMIN — CARVEDILOL 25 MG: 25 TABLET, FILM COATED ORAL at 09:02

## 2017-02-04 RX ADMIN — ERYTHROMYCIN ETHYLSUCCINATE 200 MG: 200 GRANULE, FOR SUSPENSION ORAL at 09:02

## 2017-02-04 RX ADMIN — GABAPENTIN 600 MG: 300 CAPSULE ORAL at 05:02

## 2017-02-04 RX ADMIN — ONDANSETRON 8 MG: 4 TABLET, FILM COATED ORAL at 05:02

## 2017-02-04 RX ADMIN — LISINOPRIL 10 MG: 10 TABLET ORAL at 09:02

## 2017-02-04 RX ADMIN — PANTOPRAZOLE SODIUM 40 MG: 40 TABLET, DELAYED RELEASE ORAL at 05:02

## 2017-02-04 RX ADMIN — PANTOPRAZOLE SODIUM 40 MG: 40 TABLET, DELAYED RELEASE ORAL at 04:02

## 2017-02-04 RX ADMIN — MORPHINE SULFATE 15 MG: 15 TABLET ORAL at 11:02

## 2017-02-04 RX ADMIN — ERYTHROMYCIN ETHYLSUCCINATE 200 MG: 200 GRANULE, FOR SUSPENSION ORAL at 05:02

## 2017-02-04 RX ADMIN — GABAPENTIN 600 MG: 300 CAPSULE ORAL at 01:02

## 2017-02-04 RX ADMIN — ONDANSETRON 8 MG: 4 TABLET, FILM COATED ORAL at 11:02

## 2017-02-04 RX ADMIN — HYDROCHLOROTHIAZIDE 12.5 MG: 12.5 TABLET ORAL at 09:02

## 2017-02-04 RX ADMIN — MORPHINE SULFATE 15 MG: 15 TABLET ORAL at 05:02

## 2017-02-04 RX ADMIN — ALPRAZOLAM 1 MG: 1 TABLET ORAL at 09:02

## 2017-02-04 NOTE — DISCHARGE SUMMARY
"DISCHARGE SUMMARY  Hospital Medicine    Team: [facility] HOSP MED 2    Patient Name: Homar Jerry  YOB: 1975    Admit Date: 2/2/2017    Discharge Date: 02/04/2017    Discharge Attending Physician: Veto Stern MD     Resident on Service: Dr. Carmelo Leon    Chief Complaint: Gastroparesis     Princilpal Diagnoses:  Active Hospital Problems    Diagnosis  POA    *Gastroparesis [K31.84]  Yes    Abdominal pain [R10.9]  Yes    Type 2 diabetes mellitus with hyperglycemia, without long-term current use of insulin [E11.65]  Yes    Chronic narcotic dependence [F11.20]  Yes    Essential hypertension [I10]  Yes     Chronic    Migraines [G43.909]  Yes     Chronic    Bipolar 1 disorder [F31.9]  Yes     Chronic    Hypothyroidism, postsurgical [E89.0]  Yes     Chronic    Anxiety [F41.9]  Yes     Chronic    Fibromyalgia [M79.7]  Yes     Chronic      Resolved Hospital Problems    Diagnosis Date Resolved POA   No resolved problems to display.       Discharged Condition: Admit problems have stabilized      HOSPITAL COURSE:      Initial Presentation:    Homar Jerry is a 40 yo female with PMHx of HTN, fibromyalgia, RA, gastroparesis s/p gastric stimulator and sleeve gastrectomy, DM, thyroid cancer s/p thyroidectomy, cervical cancer being admitted for gastroparesis flare. She presented to the ED complaining of n/v/d and abdominal pain that started at 5:45 am. She reports this feels like her chronic gastroparesis flareups. She describes her abdominal pain as a constant 10/10 "stabbing" pain in the epigastric and periumbilical region that has not been relieved with her at-home Percocet. She states that moving increases her pain. She's been unable to tolerate neither solids nor liquids by mouth. She denies hematemesis, melena, or hematochezia. She denies any abdominal trauma. She reports associated chills, headache, and lightheadedness. She denies fever, chest pain, shortness of breath, " dysuria, hematuria, vaginal bleeding, vaginal discharge, numbness, weakness. She denies tobacco, alcohol, or drug use. She was recently admitted for DKA on 1/17 and discharged on 1/23.     Course of Principle Problem for Admission:    Patient with chronic history of gastroparesis s/p gastric pacemaker (removed) and sleeve gastrectomy. In the ED patient received bentyl 20 IM, metoclopramide 10 IV, morphine 4 IV, phenergan 12.5 IV, and 2L NS bolus. She was kept NPO overnight but converted to PO in the morning of discharge after her symptoms of vomiting and nausea resolved with anti-emetics. She was also started on erythromycin 500mg IV TID and transitioned to PO the morning of discharge. Her pain was controlled with toradol and morphine PRN. She was given phenergan IV PRN for and zofran IV PRN for nausea initially but transitioned to PO after one day. We continued her pantoprazole 40 mg PO BID and she was sent home on her home medication after tolerating her diet and feeling better. She was also given a prescription for phenergan as she stated she was almost out of medication. Social work was contacted and provided the patient with a taxi cab ride to her residence as she had no transportation at time of discharge.      Other Medical Problems Addressed in the Hospital:    Fibromyalgia  - continue tizanidine  - continue gabapentin     Anxiety  - continue home xanax 1 mg po BID  - continue home mirtazapine 15 mg QHS     Bipolar 1 disorder  - continue oxcarbezepine  - continue home xanax  - continue olanzapine  - continue seroquel     Hypothyroidism, postsurgical  - continue levothyroxine 150 mcg po daily     Migraines  - continue topirimate PRN     Essential hypertension  - continue carvedilol 25 mg po BID  - continue HCTZ 12.5 mg po daily     Chronic narcotic dependence        Type 2 diabetes mellitus with hyperglycemia, without long-term current use of insulin  -Home regimen of 16 aspart with meals and 30 detemir  BID  -LDSSI with 15 detemir BID; will restart Aspart once patient starts eating regular meals      Abdominal pain  -epigastric tenderness that is intermittent and poorly defined   -lipase normal at 10  -XR abdomen limited by body habitus but no signs of obstruction or acute processes; patient reports an episode of diarrhea 2/3  -UA clean  -WBC 12.13>4.23, no signs of acute abdomen or infectious etiology      CONSULTS:  N/A    Last CBC/BMP/HgbA1c (if applicable):  Recent Results (from the past 336 hour(s))   CBC with Automated Differential    Collection Time: 02/04/17  4:33 AM   Result Value Ref Range    WBC 4.82 3.90 - 12.70 K/uL    Hemoglobin 11.0 (L) 12.0 - 16.0 g/dL    Hematocrit 34.1 (L) 37.0 - 48.5 %    Platelets 166 150 - 350 K/uL   CBC with Automated Differential    Collection Time: 02/03/17  5:36 AM   Result Value Ref Range    WBC 10.43 3.90 - 12.70 K/uL    Hemoglobin 11.9 (L) 12.0 - 16.0 g/dL    Hematocrit 36.4 (L) 37.0 - 48.5 %    Platelets 279 150 - 350 K/uL   CBC auto differential    Collection Time: 02/02/17  6:32 PM   Result Value Ref Range    WBC 12.13 3.90 - 12.70 K/uL    Hemoglobin 14.1 12.0 - 16.0 g/dL    Hematocrit 43.3 37.0 - 48.5 %    Platelets 313 150 - 350 K/uL     Recent Results (from the past 336 hour(s))   Basic Metabolic Panel (BMP)    Collection Time: 02/04/17  4:33 AM   Result Value Ref Range    Sodium 139 136 - 145 mmol/L    Potassium 3.7 3.5 - 5.1 mmol/L    Chloride 111 (H) 95 - 110 mmol/L    CO2 18 (L) 23 - 29 mmol/L    BUN, Bld 13 6 - 20 mg/dL    Creatinine 0.8 0.5 - 1.4 mg/dL    Calcium 7.9 (L) 8.7 - 10.5 mg/dL    Anion Gap 10 8 - 16 mmol/L   Basic Metabolic Panel (BMP)    Collection Time: 02/03/17  5:36 AM   Result Value Ref Range    Sodium 139 136 - 145 mmol/L    Potassium 4.5 3.5 - 5.1 mmol/L    Chloride 112 (H) 95 - 110 mmol/L    CO2 18 (L) 23 - 29 mmol/L    BUN, Bld 13 6 - 20 mg/dL    Creatinine 0.8 0.5 - 1.4 mg/dL    Calcium 7.9 (L) 8.7 - 10.5 mg/dL    Anion Gap 9 8 - 16  mmol/L   Basic metabolic panel    Collection Time: 01/23/17  4:45 AM   Result Value Ref Range    Sodium 143 136 - 145 mmol/L    Potassium 4.1 3.5 - 5.1 mmol/L    Chloride 108 95 - 110 mmol/L    CO2 29 23 - 29 mmol/L    BUN, Bld 14 6 - 20 mg/dL    Creatinine 0.8 0.5 - 1.4 mg/dL    Calcium 8.3 (L) 8.7 - 10.5 mg/dL    Anion Gap 6 (L) 8 - 16 mmol/L     Lab Results   Component Value Date    HGBA1C 12.7 (H) 01/17/2017       Pertinent/Significant Diagnostic Studies:  XR Abdomen: Limited exam, no evidence of high-grade obstruction    Special Treatments/Procedures: N/A    Disposition:  Home       Future Scheduled Appointments:  Future Appointments  Date Time Provider Department Center   2/20/2017 8:00 AM HOME MONITOR DEVICE CHECK, [facility]         Discharge Medication List:       Medication List      START taking these medications          erythromycin 250 mg Tab   Commonly known as:  ERYTHROCIN   Take 1 tablet (250 mg total) by mouth 3 (three) times daily with meals.       ondansetron 8 MG Tbdl   Commonly known as:  ZOFRAN-ODT   Take 1 tablet (8 mg total) by mouth every 6 (six) hours as needed (nausea).         CHANGE how you take these medications          levocetirizine 5 MG tablet   Commonly known as:  XYZAL   Take 1 tablet (5 mg total) by mouth every evening.   What changed:    - when to take this  - reasons to take this       promethazine 25 MG tablet   Commonly known as:  PHENERGAN   Take 1 tablet (25 mg total) by mouth every 6 (six) hours as needed (nausea).   What changed:    - medication strength  - how much to take  - reasons to take this         CONTINUE taking these medications          albuterol 90 mcg/actuation inhaler   Inhale 2 puffs into the lungs every 6 (six) hours as needed for Wheezing. Please provide cheapest brand/generic formulation       alprazolam 1 MG tablet   Commonly known as:  XANAX   Take 1 tablet (1 mg total) by mouth 2 (two) times daily.       blood sugar diagnostic Strp    1 each by Misc.(Non-Drug; Combo Route) route 4 (four) times daily with meals and nightly.       blood-glucose meter kit   Use as instructed       calcium carbonate 500 mg calcium (1,250 mg) tablet   Commonly known as:  OS-MARIAN   Take 2 tablets (1,000 mg total) by mouth once daily.       carvedilol 12.5 MG tablet   Commonly known as:  COREG   Take 2 tablets (25 mg total) by mouth 2 (two) times daily.       cholecalciferol (vitamin D3) 1,000 unit capsule   Take 2 capsules (2,000 Units total) by mouth once daily.       ergocalciferol 50,000 unit Cap   Commonly known as:  ERGOCALCIFEROL   Take one capsule (50,000 units) daily for 5 days, then take over the counter 2000 units daily of Vitamin D.       fluticasone 50 mcg/actuation nasal spray   Commonly known as:  FLONASE       gabapentin 600 MG tablet   Commonly known as:  NEURONTIN       hydrochlorothiazide 12.5 mg capsule   Commonly known as:  MICROZIDE   TAKE 1 CAPSULE BY MOUTH DAILY       insulin aspart 100 unit/mL Inpn pen   Commonly known as:  NovoLOG   Inject 16 Units into the skin 3 (three) times daily with meals.       insulin detemir 100 unit/mL (3 mL) Inpn pen   Commonly known as:  LEVEMIR FLEXTOUCH   Inject 30 Units into the skin 2 (two) times daily.       lancets Misc   1 each by Misc.(Non-Drug; Combo Route) route 4 (four) times daily with meals and nightly.       levothyroxine 150 MCG tablet   Commonly known as:  SYNTHROID   Take 1 tablet (150 mcg total) by mouth once daily.       meclizine 25 mg tablet   Commonly known as:  ANTIVERT   Take 1 tablet (25 mg total) by mouth 3 (three) times daily as needed for Dizziness or Nausea.       mirtazapine 15 MG tablet   Commonly known as:  REMERON   Take 1 tablet (15 mg total) by mouth every evening.       olanzapine 20 MG tablet   Commonly known as:  ZyPREXA   Take 1 tablet (20 mg total) by mouth every evening.       oxcarbazepine 150 MG Tab   Commonly known as:  TRILEPTAL       oxycodone-acetaminophen  mg  "per tablet   Commonly known as:  PERCOCET       pantoprazole 40 MG tablet   Commonly known as:  PROTONIX   Take 1 tablet (40 mg total) by mouth 2 (two) times daily before meals.       pen needle, diabetic 31 gauge x 5/16" Ndle   1 each by Misc.(Non-Drug; Combo Route) route 5 (five) times daily.       * quetiapine 100 MG Tab   Commonly known as:  SEROQUEL       * quetiapine 200 MG Tab   Commonly known as:  SEROQUEL       tizanidine 4 MG tablet   Commonly known as:  ZANAFLEX   TAKE 3 TABLETS (12 MG TOTAL) BY MOUTH EVERY EVENING.       topiramate 25 MG tablet   Commonly known as:  TOPAMAX   Take 1 tablet (25 mg total) by mouth daily as needed (migraines).       * Notice:  This list has 2 medication(s) that are the same as other medications prescribed for you. Read the directions carefully, and ask your doctor or other care provider to review them with you.         Where to Get Your Medications      These medications were sent to [facility] [address]  [address] [place] [address]     Phone:  [phone]     erythromycin 250 mg Tab         You can get these medications from any pharmacy     Bring a paper prescription for each of these medications     ondansetron 8 MG Tbdl    promethazine 25 MG tablet             Patient Instructions:    Discharge Procedure Orders  Diet general     Activity as tolerated         At the time of discharge patient was told to take all medications as prescribed, to keep all followup appointments, and to call their primary care physician or return to the emergency room if they have any worsening or concerning symptoms.    Signing Physician:  Carmelo Leon MD    "

## 2017-02-04 NOTE — PROGRESS NOTES
Examined pt at bedside. Pt was resting comfortably napping. Stated she is feeling improved and was able to eat her lunch and keep it down. Still has some pain, though tolerable. Stated she still has some nausea but overall improved.     Ready for DC home.    Claudy Tejada MD  PGY-3

## 2017-02-04 NOTE — PROGRESS NOTES
Patient received discharge instructions and prescriptions. Patient verbalized understanding. Patient transported off floor via wheelchair. Will continue to monitor.

## 2017-02-04 NOTE — ASSESSMENT & PLAN NOTE
-epigastric tenderness that is intermittent and poorly defined   -lipase normal at 10  -XR abdomen limited by body habitus but no signs of obstruction or acute processes; patient reports an episode of diarrhea 2/3  -UA clean  -WBC 12.13>4.23, no signs of acute abdomen or infectious etiology

## 2017-02-04 NOTE — PROGRESS NOTES
IV access lost during administration of antibiotic. Patient refused nursing stick stating she'd rather wait for the PICC team to do it because she is such a hard stick. MD notified. Meds for nausea and pain were switched to oral meds as well as the antibiotic.

## 2017-02-04 NOTE — PLAN OF CARE
On-call  contacted by physician to arrange a taxi for discharge home today.  Taxi arranged through Service cab to  pt at 1800 and transport to residence on file.

## 2017-02-07 ENCOUNTER — PATIENT OUTREACH (OUTPATIENT)
Dept: ADMINISTRATIVE | Facility: CLINIC | Age: 42
End: 2017-02-07
Payer: MEDICARE

## 2017-02-07 LAB — POCT GLUCOSE: 187 MG/DL (ref 70–110)

## 2017-02-07 NOTE — PATIENT INSTRUCTIONS
Unknown Causes of Abdominal Pain (Female)    The exact cause of your abdominal (stomach) pain is not clear. This does not mean that this is something to worry about. Everyone likes to know the exact cause of the problem, but sometimes with abdominal pain, there is no clear-cut cause, and this could be a good thing. The good news is that your symptoms can be treated, and you will feel better.   Your condition does not seem serious now; however, sometimes the signs of a serious problem may take more time to appear. For this reason, it is important for you to watch for any new symptoms, problems, or worsening of your condition.  Over the next few days, the abdominal pain may come and go, or be continuous. Other common symptoms can include nausea and vomiting. Sometimes it can be difficult to tell if you feel nauseous, you may just feel bad and not associate that feeling with nausea. Constipation, diarrhea, and a fever may go along with the pain.  The pain may continue even if treated correctly over the following days. Depending on how things go, sometimes the cause can become clear and may require further or different treatment. Additional evaluations, medications, or tests may also be needed.  Home care  Your healthcare provider may prescribe medicine for pain, symptoms, or an infection.  Follow the healthcare provider's instructions for taking these medicines.  General care  · Rest as much as you can until your next exam. No strenuous activities.  · Try to find positions that ease discomfort. A small pillow placed on the abdomen may help relieve pain.  · Something warm on your abdomen (such as a heating pad) may help, but be careful not to burn yourself.  Diet  · Do not force yourself to eat, especially if having cramps, vomiting, or diarrhea.  · Water is important so you do not get dehydrated. Soup may also be good. Sports drinks may also help, especially if they are not too acidic. Make sure you don't drink  sugary drinks as this can make things worse. Take liquids in small amounts. Do not guzzle them.  · Caffeine sometimes makes the pain and cramping worse.  · Avoid dairy products if you have vomiting or diarrhea.  · Don't eat large amounts at a time. Wait a few minutes between bites.  · Eat a diet low in fiber (called a low-residue diet). Foods allowed include refined breads, white rice, fruit and vegetable juices without pulp, tender meats. These foods will pass more easily through the intestine.  · Avoid whole-grain foods, whole fruits and vegetables, meats, seeds and nuts, fried or fatty foods, dairy, alcohol and spicy foods until your symptoms go away.  Follow-up care  Follow up with your healthcare provider, or as advised, if your pain does not begin to improve in the next 24 hours.  Call 911  Call 911 if any of these occur:  · Trouble breathing  · Confusion  · Fainting or loss of consciousness  · Rapid heart rate  · Seizure  When to seek medical advice  Call your healthcare provider right away if any of these occur:  · Pain gets worse or moves to the right lower abdomen  · New or worsening vomiting or diarrhea  · Swelling of the abdomen  · Unable to pass stool for more than 3 days  · Fever of 100.4ºF (38ºC) or higher, or as directed by your healthcare provider.  · Blood in vomit or bowel movements (dark red or black color)  · Jaundice (yellow color of eyes and skin)  · Weakness, dizziness  · Chest, arm, back, neck or jaw pain  · Unexpected vaginal bleeding or missed period  · Can't keep down liquids or water and are getting dehydrated  Date Last Reviewed: 12/30/2015  © 4438-3136 [employer]. [address] [place] [address], [place] [address]. All rights reserved. This information is not intended as a substitute for professional medical care. Always follow your healthcare professional's instructions.

## 2017-02-07 NOTE — Clinical Note
This patient Homar Jerry  due to financial reason maybe unable to purchase the following medications erythromycin 250 mg  . If there is anything you can do to assist this patient with the purchase these medications in the future, she would greatly appreciate it. Please follow up with this patient at your earliest convenience.    Respectfully, Jackie Walker LPN  [facility]   [email or web address]

## 2017-02-07 NOTE — Clinical Note
C3 nurse spoke with pt for hospital follow up call, the patient due to financial reasons cannot afford Erythromycin 250 mg rx, cost is 700.00, please call in alternative, message sent to pharmacy assistance to help with coverage if possible.  Respectfully,   Jackie Walker LPN

## 2017-02-10 ENCOUNTER — TELEPHONE (OUTPATIENT)
Dept: BARIATRICS | Facility: CLINIC | Age: 42
End: 2017-02-10

## 2017-02-10 NOTE — TELEPHONE ENCOUNTER
"Nutrition F/U    Patient reports feeling "so-so" and had a recent admit to the hospital for gastroparesis. Reports not eating well because of this. Discussed strategies for nausea/vomiting management. Encouraged patient to continue 5-6 small meals a day and to continue low CHO diet.     Patient reports not drinking protein drinks this past week because of her recent admit but says her son is going to buy her some more today.     Answered all patient's questions and encouraged her to call with any questions.     "

## 2017-02-10 NOTE — TELEPHONE ENCOUNTER
----- Message from Concepción Mckee sent at 1/11/2017  1:34 PM CST -----  Regarding: check in  Check in on jacy tejeda not until April/May

## 2017-02-15 ENCOUNTER — OFFICE VISIT (OUTPATIENT)
Dept: PSYCHIATRY | Facility: CLINIC | Age: 42
End: 2017-02-15
Payer: MEDICARE

## 2017-02-15 DIAGNOSIS — F39 MOOD DISORDER: Primary | ICD-10-CM

## 2017-02-15 DIAGNOSIS — F41.0 PANIC DISORDER WITHOUT AGORAPHOBIA: ICD-10-CM

## 2017-02-15 DIAGNOSIS — F41.1 GENERALIZED ANXIETY DISORDER: ICD-10-CM

## 2017-02-15 PROCEDURE — 99499 UNLISTED E&M SERVICE: CPT | Mod: S$GLB,,, | Performed by: NURSE PRACTITIONER

## 2017-02-15 PROCEDURE — 99214 OFFICE O/P EST MOD 30 MIN: CPT | Mod: S$GLB,,, | Performed by: NURSE PRACTITIONER

## 2017-02-15 PROCEDURE — 90833 PSYTX W PT W E/M 30 MIN: CPT | Mod: S$GLB,,, | Performed by: NURSE PRACTITIONER

## 2017-02-15 PROCEDURE — 99999 PR PBB SHADOW E&M-EST. PATIENT-LVL III: CPT | Mod: PBBFAC,,, | Performed by: NURSE PRACTITIONER

## 2017-02-15 RX ORDER — MIRTAZAPINE 30 MG/1
30 TABLET, FILM COATED ORAL NIGHTLY
Qty: 30 TABLET | Refills: 5 | Status: SHIPPED | OUTPATIENT
Start: 2017-02-15 | End: 2017-07-11 | Stop reason: SDUPTHER

## 2017-02-15 RX ORDER — OLANZAPINE 10 MG/1
10 TABLET ORAL NIGHTLY
Qty: 30 TABLET | Refills: 5 | Status: SHIPPED | OUTPATIENT
Start: 2017-02-15 | End: 2017-07-11 | Stop reason: SDDI

## 2017-02-15 RX ORDER — ALPRAZOLAM 1 MG/1
1 TABLET ORAL NIGHTLY
Qty: 30 TABLET | Refills: 5 | Status: SHIPPED | OUTPATIENT
Start: 2017-02-15 | End: 2017-07-11 | Stop reason: SDUPTHER

## 2017-02-15 RX ORDER — HYDROXYZINE PAMOATE 25 MG/1
25 CAPSULE ORAL DAILY
Qty: 30 CAPSULE | Refills: 5 | Status: SHIPPED | OUTPATIENT
Start: 2017-02-15 | End: 2017-07-11 | Stop reason: SDDI

## 2017-02-15 NOTE — PROGRESS NOTES
"Outpatient Psychiatry Follow-Up Visit (MD/NP)    2/15/2017    Clinical Status of Patient:  Outpatient (Ambulatory)    Chief Complaint:  Homar Jerry is a 41 y.o. female who presents today for follow-up of mood disorder.  Met with patient.      Interval History and Content of Current Session:  Interim Events/Subjective Report/Content of Current Session:     Patient has mult medical issues, please see medical record, chart reviewed, several missed appt, please see medical record    Xanax 1mg po twice daily  Zyprexa 20mg po q hs  Remeron 15mg po q hs    Recently learned she is IDDM, now on insulin.  Patient states she is not sleeping but no communication with me during appts. Was given Seroquel while in the hospital.  States she is sleeping 2-3 hours/night.  Reports improved sleep with Remeron at 30mg in the past.  Mood "all over the place", not sleeping.  Trileptal ordered in the past but patient could not afford.  Does not believe Zyprexa is helpful, requesting higher dose of Xanax, denied, asked for Ativan 2mg po TID, which was also denied.  Patient enjoys reading during the day yet states she has anxiety with nausea during the day.       Psychotherapy:  · Target symptoms: mood disorder  · Why chosen therapy is appropriate versus another modality: relevant to diagnosis  · Outcome monitoring methods: self-report  · Therapeutic intervention type: insight oriented psychotherapy  · Topics discussed/themes: symptom recognition  · The patient's response to the intervention is accepting. The patient's progress toward treatment goals is poor.   · Duration of intervention: 16 minutes.    Review of Systems   GENERAL: weight loss  SKIN: No rashes or lacerations   HEAD: No headaches   EYES: No exophthalmos, jaundice or blindness   EARS: No dizziness, tinnitus or hearing loss   NOSE: No changes in smell   MOUTH & THROAT: No dyskinetic movements or obvious goiter   CHEST: No shortness of breath, hyperventilation or " cough   CARDIOVASCULAR: No tachycardia or chest pain   ABDOMEN: No nausea, vomiting, pain, constipation or diarrhea   URINARY: No frequency, dysuria or sexual dysfunction   ENDOCRINE: No polydipsia, polyuria   MUSCULOSKELETAL: L knee pain, 10/10  NEUROLOGIC: No weakness, sensory changes, seizures, confusion, memory loss, tremor or other abnormal movements      Psychiatric Review Of Systems:  sleep: no  appetite changes: no  weight changes: no  energy/anergy: no  interest/pleasure/anhedonia: no  somatic symptoms: yes  libido: yes  anxiety/panic: yes      Past Medical, Family and Social History: The patient's past medical, family and social history have been reviewed and updated as appropriate within the electronic medical record - see encounter notes.    Compliance: yes but questionable    Side effects: None    Risk Parameters:  Patient reports no suicidal ideation  Patient reports no homicidal ideation  Patient reports no self-injurious behavior  Patient reports no violent behavior    Exam (detailed: at least 9 elements; comprehensive: all 15 elements)   Constitutional  Vitals:  Most recent vital signs, dated less than 90 days prior to this appointment, were reviewed.   There were no vitals filed for this visit.     General:  unremarkable, age appropriate     Musculoskeletal  Muscle Strength/Tone:  no dyskinesia, no dystonia, no tremor, no tic   Gait & Station:  slow, limp     Psychiatric  Speech:  no latency; no press   Mood & Affect:  anxious, depressed, irritable, sad  appropriate   Thought Process:  normal and logical   Associations:  intact   Thought Content:  normal, no suicidality, no homicidality, delusions, or paranoia   Insight:  has awareness of illness   Judgement: behavior is adequate to circumstances   Orientation:  grossly intact   Memory: intact for content of interview   Language: grossly intact   Attention Span & Concentration:  able to focus   Fund of Knowledge:  intact and appropriate to age and  level of education     Assessment and Diagnosis   Status/Progress: Based on the examination today, the patient's problem(s) is/are failing to respond as expected to treatment.  New problems have been presented today.   Co-morbidities and Lack of compliance are complicating management of the primary condition.  There are no active rule-out diagnoses for this patient at this time.     General Impression:       ICD-10-CM ICD-9-CM   1. Mood disorder F39 296.90   2. Generalized anxiety disorder F41.1 300.02       Intervention/Counseling/Treatment Plan   · Medication Management: Continue current medications. The risks and benefits of medication were discussed with the patient.   · Increase Remeron 30mg po q hs  · Decrease  Zyprexa 10mg po q hs  · Decrease Xanax 1mg po q hs  · Trial Vistaril 25mg po q day          Return to Clinic: 6 weeks

## 2017-02-15 NOTE — PATIENT INSTRUCTIONS
[facility] - DEPARTMENT OF PSYCHIATRY   PATIENT INFORMATION    We appreciate the opportunity to participate in your medical care and hope the following protocols will make it easier for you to receive quality treatment in our department.    1. PUNCTUALITY: Your appointment is scheduled for a fixed amount of time.  To get the benefit of your appointment, please arrive early enough to allow time for traffic, parking and registration.  If you are late for your appointment, you may have to reschedule.  Please make every effort to be on time.      2. PAYMENT FOR SERVICES:   Payments are expected at the time of service.  Please contact [phone] if you need to resolve issues involving your account at [facility] or to set up a payment plan.    3. CANCELLATION / MISSED APPOINTMENTS:   In order to receive quality care, all appointments must be kept.  Appointment may be cancelled at least 24 hours before your appointment time. If you do not give at least 24-hour notice of cancellation a fee may be billed directly to the patient.  Please note that insurance does not cover no-show charges, so you will be billed directly.  If you are consistently late, cancel, or do not show for your appointments, our department reserves the right to terminate treatment     MESSAGES- In general you can reach the department by calling [phone], between 8:00 a.m. and 5:00 p.m., Monday through Friday.  You can also use the [facility] Patient Portal.     AFTER HOURS, WEEKEND OR HOLIDAYS- For urgent questions after hours, weekends and holidays, calling the department number [phone] will connect you with a representative.  EMERGENCY-  In case of a crisis when there is a concern of harm to self or others, call 911 or the office [phone] between 8:00 a.m. and 5:00 p.m., Monday through Friday or go to the [facility] Emergency Room.    4. PRESCRIPTION REFILLS:  Prescription refills must be done at your physician office visit, You  will be given a sufficient number of refills to last until your next appointment, you must come to appointments for prescriptions.   No additional refills will be approved beyond the original treatment plan. Again, please note that no additional prescriptions will be approved per patient request.     5. FOLLOW UP APPOINTMENTS:  Follow-up appointments can be made in person at the Psychiatry Appointment Desk, online through the [facility] Patient Portal, or by calling [phone], from 8am to 5pm, between Monday and Friday.  Patients are responsible for scheduling their own follow-up appointment,  It  Is recommended you schedule your appointment before leaving the clinic.    6. Providers are NOT ABLE to schedule appointments; all appointments must be made through the appointment department or through [facility] Patient Portal.           PATIENTS MAY EXPERIENCE SYMPTOMS OF WITHDRAWAL IF THEY RUN OUT OF MEDICATIONS.  THE PATIENT WILL ASSUME ALL RESPONSIBILITIES OF ANY OUTCOMES WHEN THERE IS AN ISSUE WITH NONCOMPLIANCE WITH FOLLOW-UP APPOINTMENTS AND MEDICATIONS.        THERE IS TO BE NO USE OF ALCOHOL AND/OR ILLEGAL SUBSTANCES    PATIENT ARE TO GO TO THE CLOSEST EMERGENCY ROOM IF FEELING A THREAT TO THEMSELVES OR OTHER OR IF GRAVELY DISABLED    TELL US HOW WE ARE DOING.  PLEASE COMPLETE THE PATIENT SATISFACTION SURVERY  Revised December 2016

## 2017-02-15 NOTE — MR AVS SNAPSHOT
[facility] - Psychiatry  [facility]  [place] [address]  Phone: [phone]  Fax: [phone]                  Homar Jerry   2/15/2017 3:30 PM   Office Visit    Description:  Female : 1975   Provider:  Carmelo Zhou NP   Department:  [facility] - Psychiatry           Reason for Visit     Mood           Diagnoses this Visit        Comments    Mood disorder    -  Primary     Generalized anxiety disorder         Panic disorder without agoraphobia                To Do List           Future Appointments        Provider Department Dept Phone    2017 8:00 AM HOME MONITOR DEVICE CHECK, [facility] - Arrhythmia [phone]      Goals (5 Years of Data)     None       These Medications        Disp Refills Start End    alprazolam (XANAX) 1 MG tablet 30 tablet 5 2/15/2017     Take 1 tablet (1 mg total) by mouth every evening. - Oral    Pharmacy: [facility] - [place], [place] - [address] Ph #: [phone]       mirtazapine (REMERON) 30 MG tablet 30 tablet 5 2/15/2017     Take 1 tablet (30 mg total) by mouth every evening. - Oral    Pharmacy: [facility] - [place], [place] - [address] Ph #: [phone]       olanzapine (ZYPREXA) 10 MG tablet 30 tablet 5 2/15/2017     Take 1 tablet (10 mg total) by mouth every evening. - Oral    Pharmacy: [facility] - [place], [place] - [address] Ph #: [phone]       hydrOXYzine pamoate (VISTARIL) 25 MG Cap 30 capsule 5 2/15/2017     Take 1 capsule (25 mg total) by mouth once daily. - Oral    Pharmacy: [facility] - [place], [place] - [address] Ph #: [phone]         [facility]     [facility] Care Line -  Assistance  Registered nurses in the [facility] provide clinical advisement, health education, appointment booking, and other advisory services.  Call for  this free service at [phone].             Medications           Message regarding Medications     Verify the changes and/or additions to your medication regime listed below are the same as discussed with your clinician today.  If any of these changes or additions are incorrect, please notify your healthcare provider.        START taking these NEW medications        Refills    mirtazapine (REMERON) 30 MG tablet 5    Sig: Take 1 tablet (30 mg total) by mouth every evening.    Class: Normal    Route: Oral    olanzapine (ZYPREXA) 10 MG tablet 5    Sig: Take 1 tablet (10 mg total) by mouth every evening.    Class: Normal    Route: Oral    hydrOXYzine pamoate (VISTARIL) 25 MG Cap 5    Sig: Take 1 capsule (25 mg total) by mouth once daily.    Class: Normal    Route: Oral      CHANGE how you are taking these medications     Start Taking Instead of    alprazolam (XANAX) 1 MG tablet alprazolam (XANAX) 1 MG tablet    Dosage:  Take 1 tablet (1 mg total) by mouth every evening. Dosage:  Take 1 tablet (1 mg total) by mouth 2 (two) times daily.    Reason for Change:  Reorder       STOP taking these medications     quetiapine (SEROQUEL) 100 MG Tab Take 100 mg by mouth every evening. with a 400 mg tablet to equal 500 mg total    quetiapine (SEROQUEL) 200 MG Tab Take 400 mg by mouth every evening. with a 100 mg tablet to equal 500 mg total    oxcarbazepine (TRILEPTAL) 150 MG Tab Take 150 mg by mouth every evening.            Verify that the below list of medications is an accurate representation of the medications you are currently taking.  If none reported, the list may be blank. If incorrect, please contact your healthcare provider. Carry this list with you in case of emergency.           Current Medications     albuterol 90 mcg/actuation inhaler Inhale 2 puffs into the lungs every 6 (six) hours as needed for Wheezing. Please provide cheapest brand/generic formulation    alprazolam (XANAX) 1 MG tablet Take 1 tablet (1 mg  total) by mouth every evening.    blood sugar diagnostic Strp 1 each by Misc.(Non-Drug; Combo Route) route 4 (four) times daily with meals and nightly.    blood-glucose meter kit Use as instructed    calcium carbonate (OS-MARIAN) 500 mg calcium (1,250 mg) tablet Take 2 tablets (1,000 mg total) by mouth once daily.    carvedilol (COREG) 12.5 MG tablet Take 2 tablets (25 mg total) by mouth 2 (two) times daily.    cholecalciferol, vitamin D3, 1,000 unit capsule Take 2 capsules (2,000 Units total) by mouth once daily.    erythromycin (ERYTHROCIN) 250 mg Tab Take 1 tablet (250 mg total) by mouth 3 (three) times daily with meals.    fluticasone (FLONASE) 50 mcg/actuation nasal spray 2 sprays by Nasal route 2 (two) times daily.     gabapentin (NEURONTIN) 600 MG tablet Take 600 mg by mouth 3 (three) times daily.     hydrochlorothiazide (MICROZIDE) 12.5 mg capsule TAKE 1 CAPSULE BY MOUTH DAILY    hydrOXYzine pamoate (VISTARIL) 25 MG Cap Take 1 capsule (25 mg total) by mouth once daily.    insulin aspart (NOVOLOG) 100 unit/mL InPn pen Inject 16 Units into the skin 3 (three) times daily with meals.    insulin detemir (LEVEMIR FLEXTOUCH) 100 unit/mL (3 mL) SubQ InPn pen Inject 30 Units into the skin 2 (two) times daily.    lancets Misc 1 each by Misc.(Non-Drug; Combo Route) route 4 (four) times daily with meals and nightly.    levocetirizine (XYZAL) 5 MG tablet Take 1 tablet (5 mg total) by mouth every evening.    levothyroxine (SYNTHROID) 150 MCG tablet Take 1 tablet (150 mcg total) by mouth once daily.    meclizine (ANTIVERT) 25 mg tablet Take 1 tablet (25 mg total) by mouth 3 (three) times daily as needed for Dizziness or Nausea.    mirtazapine (REMERON) 30 MG tablet Take 1 tablet (30 mg total) by mouth every evening.    olanzapine (ZYPREXA) 10 MG tablet Take 1 tablet (10 mg total) by mouth every evening.    ondansetron (ZOFRAN-ODT) 8 MG TbDL Take 1 tablet (8 mg total) by mouth every 6 (six) hours as needed (nausea).     "oxycodone-acetaminophen (PERCOCET)  mg per tablet Take 1 tablet by mouth every 4 (four) hours as needed for Pain. Per Dr. Villa at [facility]    pantoprazole (PROTONIX) 40 MG tablet Take 1 tablet (40 mg total) by mouth 2 (two) times daily before meals.    pen needle, diabetic 31 gauge x 5/16" Ndle 1 each by Misc.(Non-Drug; Combo Route) route 5 (five) times daily.    promethazine (PHENERGAN) 25 MG tablet Take 1 tablet (25 mg total) by mouth every 6 (six) hours as needed (nausea).    tizanidine (ZANAFLEX) 4 MG tablet TAKE 3 TABLETS (12 MG TOTAL) BY MOUTH EVERY EVENING.    topiramate (TOPAMAX) 25 MG tablet Take 1 tablet (25 mg total) by mouth daily as needed (migraines).           Clinical Reference Information           Your Vitals Were     Last Period                   (LMP Unknown)           Allergies as of 2/15/2017     Dexamethasone    Ciprofloxacin    Compazine [Prochlorperazine]    Lyrica [Pregabalin]    Prednisone      Immunizations Administered on Date of Encounter - 2/15/2017     None      Instructions    [facility] - DEPARTMENT OF PSYCHIATRY   PATIENT INFORMATION    We appreciate the opportunity to participate in your medical care and hope the following protocols will make it easier for you to receive quality treatment in our department.    1. PUNCTUALITY: Your appointment is scheduled for a fixed amount of time.  To get the benefit of your appointment, please arrive early enough to allow time for traffic, parking and registration.  If you are late for your appointment, you may have to reschedule.  Please make every effort to be on time.      2. PAYMENT FOR SERVICES:   Payments are expected at the time of service.  Please contact [phone] if you need to resolve issues involving your account at [facility] or to set up a payment plan.    3. CANCELLATION / MISSED APPOINTMENTS:   In order to receive quality care, all appointments must be kept.  Appointment may be " cancelled at least 24 hours before your appointment time. If you do not give at least 24-hour notice of cancellation a fee may be billed directly to the patient.  Please note that insurance does not cover no-show charges, so you will be billed directly.  If you are consistently late, cancel, or do not show for your appointments, our department reserves the right to terminate treatment     MESSAGES- In general you can reach the department by calling [phone], between 8:00 a.m. and 5:00 p.m., Monday through Friday.  You can also use the [facility] Patient Portal.     AFTER HOURS, WEEKEND OR HOLIDAYS- For urgent questions after hours, weekends and holidays, calling the department number [phone] will connect you with a representative.  EMERGENCY-  In case of a crisis when there is a concern of harm to self or others, call 911 or the office [phone] between 8:00 a.m. and 5:00 p.m., Monday through Friday or go to the [facility] Emergency Room.    4. PRESCRIPTION REFILLS:  Prescription refills must be done at your physician office visit, You will be given a sufficient number of refills to last until your next appointment, you must come to appointments for prescriptions.   No additional refills will be approved beyond the original treatment plan. Again, please note that no additional prescriptions will be approved per patient request.     5. FOLLOW UP APPOINTMENTS:  Follow-up appointments can be made in person at the Psychiatry Appointment Desk, online through the [facility] Patient Portal, or by calling [phone], from 8am to 5pm, between Monday and Friday.  Patients are responsible for scheduling their own follow-up appointment,  It  Is recommended you schedule your appointment before leaving the clinic.    6. Providers are NOT ABLE to schedule appointments; all appointments must be made through the appointment department or through [facility].           PATIENTS MAY EXPERIENCE SYMPTOMS OF  WITHDRAWAL IF THEY RUN OUT OF MEDICATIONS.  THE PATIENT WILL ASSUME ALL RESPONSIBILITIES OF ANY OUTCOMES WHEN THERE IS AN ISSUE WITH NONCOMPLIANCE WITH FOLLOW-UP APPOINTMENTS AND MEDICATIONS.        THERE IS TO BE NO USE OF ALCOHOL AND/OR ILLEGAL SUBSTANCES    PATIENT ARE TO GO TO THE CLOSEST EMERGENCY ROOM IF FEELING A THREAT TO THEMSELVES OR OTHER OR IF GRAVELY DISABLED    TELL US HOW WE ARE DOING.  PLEASE COMPLETE THE PATIENT SATISFACTION SURVERY  Revised December 2016         Language Assistance Services     ATTENTION: Language assistance services are available, free of charge. Please call [phone].      ATENCIÓN: Si habla español, tiene a gibson disposición servicios gratuitos de asistencia lingüística. Llame al [phone].     FRED Ý: N?u b?n nói Ti?ng Vi?t, có các d?ch v? h? tr? ngôn ng? mi?n phí dành cho b?n. G?i s? [phone].         [facility] complies with applicable Federal civil rights laws and does not discriminate on the basis of race, color, national origin, age, disability, or sex.

## 2017-02-20 ENCOUNTER — CLINICAL SUPPORT (OUTPATIENT)
Dept: ELECTROPHYSIOLOGY | Facility: CLINIC | Age: 42
End: 2017-02-20
Payer: MEDICARE

## 2017-02-20 DIAGNOSIS — Z95.818 STATUS POST PLACEMENT OF IMPLANTABLE LOOP RECORDER: ICD-10-CM

## 2017-02-20 DIAGNOSIS — R55 SYNCOPE, UNSPECIFIED SYNCOPE TYPE: ICD-10-CM

## 2017-02-20 PROCEDURE — 93299 LOOP RECORDER REMOTE: CPT | Mod: S$GLB,,, | Performed by: INTERNAL MEDICINE

## 2017-02-20 PROCEDURE — 93297 REM INTERROG DEV EVAL ICPMS: CPT | Mod: S$GLB,,, | Performed by: INTERNAL MEDICINE

## 2017-03-02 ENCOUNTER — HOSPITAL ENCOUNTER (INPATIENT)
Facility: HOSPITAL | Age: 42
LOS: 1 days | Discharge: HOME | DRG: 074 | End: 2017-03-04
Attending: EMERGENCY MEDICINE | Admitting: INTERNAL MEDICINE
Payer: MEDICARE

## 2017-03-02 DIAGNOSIS — R10.13 EPIGASTRIC PAIN: ICD-10-CM

## 2017-03-02 DIAGNOSIS — F11.20 CHRONIC NARCOTIC DEPENDENCE: Chronic | ICD-10-CM

## 2017-03-02 DIAGNOSIS — M79.7 FIBROMYALGIA: Chronic | ICD-10-CM

## 2017-03-02 DIAGNOSIS — G43.909 MIGRAINE WITHOUT STATUS MIGRAINOSUS, NOT INTRACTABLE, UNSPECIFIED MIGRAINE TYPE: Chronic | ICD-10-CM

## 2017-03-02 DIAGNOSIS — E55.9 VITAMIN D DEFICIENCY: Chronic | ICD-10-CM

## 2017-03-02 DIAGNOSIS — R11.2 INTRACTABLE VOMITING WITH NAUSEA, UNSPECIFIED VOMITING TYPE: ICD-10-CM

## 2017-03-02 DIAGNOSIS — E66.01 MORBID OBESITY DUE TO EXCESS CALORIES: ICD-10-CM

## 2017-03-02 DIAGNOSIS — K59.00 CONSTIPATION, UNSPECIFIED CONSTIPATION TYPE: Chronic | ICD-10-CM

## 2017-03-02 DIAGNOSIS — Z85.850 HISTORY OF THYROID CANCER: Chronic | ICD-10-CM

## 2017-03-02 DIAGNOSIS — E13.9 LADA (LATENT AUTOIMMUNE DIABETES IN ADULTS), MANAGED AS TYPE 1: ICD-10-CM

## 2017-03-02 DIAGNOSIS — J45.909 UNCOMPLICATED ASTHMA, UNSPECIFIED ASTHMA SEVERITY: Chronic | ICD-10-CM

## 2017-03-02 DIAGNOSIS — I10 ESSENTIAL HYPERTENSION: Chronic | ICD-10-CM

## 2017-03-02 DIAGNOSIS — F31.9 BIPOLAR 1 DISORDER: Chronic | ICD-10-CM

## 2017-03-02 DIAGNOSIS — Z76.5 DRUG-SEEKING BEHAVIOR: ICD-10-CM

## 2017-03-02 DIAGNOSIS — K31.84 GASTROPARESIS: Primary | ICD-10-CM

## 2017-03-02 DIAGNOSIS — Z95.818 STATUS POST PLACEMENT OF IMPLANTABLE LOOP RECORDER: Chronic | ICD-10-CM

## 2017-03-02 DIAGNOSIS — E89.0 HYPOTHYROIDISM, POSTSURGICAL: Chronic | ICD-10-CM

## 2017-03-02 DIAGNOSIS — R53.81 DEBILITY: Chronic | ICD-10-CM

## 2017-03-02 DIAGNOSIS — M17.0 PRIMARY OSTEOARTHRITIS OF BOTH KNEES: ICD-10-CM

## 2017-03-02 DIAGNOSIS — E11.65 TYPE 2 DIABETES MELLITUS WITH HYPERGLYCEMIA, WITHOUT LONG-TERM CURRENT USE OF INSULIN: Chronic | ICD-10-CM

## 2017-03-02 DIAGNOSIS — M06.9 RHEUMATOID ARTHRITIS, INVOLVING UNSPECIFIED SITE, UNSPECIFIED RHEUMATOID FACTOR PRESENCE: Chronic | ICD-10-CM

## 2017-03-02 DIAGNOSIS — F41.9 ANXIETY: Chronic | ICD-10-CM

## 2017-03-02 LAB
ALBUMIN SERPL BCP-MCNC: 4.2 G/DL
ALP SERPL-CCNC: 114 U/L
ALT SERPL W/O P-5'-P-CCNC: 10 U/L
ANION GAP SERPL CALC-SCNC: 15 MMOL/L
AST SERPL-CCNC: 10 U/L
BASOPHILS # BLD AUTO: 0.01 K/UL
BASOPHILS NFR BLD: 0.1 %
BILIRUB SERPL-MCNC: 0.5 MG/DL
BUN SERPL-MCNC: 20 MG/DL
CALCIUM SERPL-MCNC: 9.3 MG/DL
CHLORIDE SERPL-SCNC: 99 MMOL/L
CO2 SERPL-SCNC: 22 MMOL/L
CREAT SERPL-MCNC: 1.4 MG/DL
DIFFERENTIAL METHOD: ABNORMAL
EOSINOPHIL # BLD AUTO: 0.2 K/UL
EOSINOPHIL NFR BLD: 1.3 %
ERYTHROCYTE [DISTWIDTH] IN BLOOD BY AUTOMATED COUNT: 14 %
EST. GFR  (AFRICAN AMERICAN): 53.5 ML/MIN/1.73 M^2
EST. GFR  (NON AFRICAN AMERICAN): 46.4 ML/MIN/1.73 M^2
GLUCOSE SERPL-MCNC: 158 MG/DL
HCT VFR BLD AUTO: 42.4 %
HGB BLD-MCNC: 14.1 G/DL
LIPASE SERPL-CCNC: 8 U/L
LYMPHOCYTES # BLD AUTO: 4.4 K/UL
LYMPHOCYTES NFR BLD: 37.2 %
MCH RBC QN AUTO: 28.1 PG
MCHC RBC AUTO-ENTMCNC: 33.3 %
MCV RBC AUTO: 85 FL
MONOCYTES # BLD AUTO: 0.8 K/UL
MONOCYTES NFR BLD: 6.6 %
NEUTROPHILS # BLD AUTO: 6.5 K/UL
NEUTROPHILS NFR BLD: 54.4 %
PLATELET # BLD AUTO: 360 K/UL
PMV BLD AUTO: 9.7 FL
POCT GLUCOSE: 160 MG/DL (ref 70–110)
POTASSIUM SERPL-SCNC: 3.5 MMOL/L
PROT SERPL-MCNC: 8.4 G/DL
RBC # BLD AUTO: 5.02 M/UL
SODIUM SERPL-SCNC: 136 MMOL/L
WBC # BLD AUTO: 11.92 K/UL

## 2017-03-02 PROCEDURE — 99285 EMERGENCY DEPT VISIT HI MDM: CPT | Mod: 25

## 2017-03-02 PROCEDURE — 85025 COMPLETE CBC W/AUTO DIFF WBC: CPT

## 2017-03-02 PROCEDURE — 96365 THER/PROPH/DIAG IV INF INIT: CPT

## 2017-03-02 PROCEDURE — 93005 ELECTROCARDIOGRAM TRACING: CPT

## 2017-03-02 PROCEDURE — 82962 GLUCOSE BLOOD TEST: CPT

## 2017-03-02 PROCEDURE — 96375 TX/PRO/DX INJ NEW DRUG ADDON: CPT

## 2017-03-02 PROCEDURE — 96368 THER/DIAG CONCURRENT INF: CPT

## 2017-03-02 PROCEDURE — 96366 THER/PROPH/DIAG IV INF ADDON: CPT

## 2017-03-02 PROCEDURE — 80053 COMPREHEN METABOLIC PANEL: CPT

## 2017-03-02 PROCEDURE — 83690 ASSAY OF LIPASE: CPT

## 2017-03-02 PROCEDURE — 96376 TX/PRO/DX INJ SAME DRUG ADON: CPT

## 2017-03-02 PROCEDURE — 96361 HYDRATE IV INFUSION ADD-ON: CPT

## 2017-03-02 PROCEDURE — 63600175 PHARM REV CODE 636 W HCPCS: Performed by: EMERGENCY MEDICINE

## 2017-03-02 PROCEDURE — 99285 EMERGENCY DEPT VISIT HI MDM: CPT | Mod: ,,, | Performed by: EMERGENCY MEDICINE

## 2017-03-02 PROCEDURE — 25000003 PHARM REV CODE 250: Performed by: EMERGENCY MEDICINE

## 2017-03-02 PROCEDURE — 93010 ELECTROCARDIOGRAM REPORT: CPT | Mod: ,,, | Performed by: INTERNAL MEDICINE

## 2017-03-02 RX ORDER — HYDROMORPHONE HYDROCHLORIDE 1 MG/ML
1 INJECTION, SOLUTION INTRAMUSCULAR; INTRAVENOUS; SUBCUTANEOUS
Status: COMPLETED | OUTPATIENT
Start: 2017-03-02 | End: 2017-03-02

## 2017-03-02 RX ORDER — HYDROMORPHONE HYDROCHLORIDE 1 MG/ML
1 INJECTION, SOLUTION INTRAMUSCULAR; INTRAVENOUS; SUBCUTANEOUS
Status: COMPLETED | OUTPATIENT
Start: 2017-03-03 | End: 2017-03-02

## 2017-03-02 RX ORDER — ONDANSETRON 2 MG/ML
8 INJECTION INTRAMUSCULAR; INTRAVENOUS
Status: COMPLETED | OUTPATIENT
Start: 2017-03-02 | End: 2017-03-02

## 2017-03-02 RX ADMIN — HYDROMORPHONE HYDROCHLORIDE 1 MG: 1 INJECTION, SOLUTION INTRAMUSCULAR; INTRAVENOUS; SUBCUTANEOUS at 10:03

## 2017-03-02 RX ADMIN — SODIUM CHLORIDE 1000 ML: 0.9 INJECTION, SOLUTION INTRAVENOUS at 10:03

## 2017-03-02 RX ADMIN — HYDROMORPHONE HYDROCHLORIDE 1 MG: 1 INJECTION, SOLUTION INTRAMUSCULAR; INTRAVENOUS; SUBCUTANEOUS at 11:03

## 2017-03-02 RX ADMIN — ONDANSETRON 8 MG: 2 INJECTION INTRAMUSCULAR; INTRAVENOUS at 10:03

## 2017-03-02 NOTE — IP AVS SNAPSHOT
[facility]  [address]  [facility] [address]  Phone: [phone]           Patient Discharge Instructions     Our goal is to set you up for success. This packet includes information on your condition, medications, and your home care. It will help you to care for yourself so you don't get sicker and need to go back to the hospital.     Please ask your nurse if you have any questions.        There are many details to remember when preparing to leave the hospital. Here is what you will need to do:    1. Take your medicine. If you are prescribed medications, review your Medication List in the following pages. You may have new medications to  at the pharmacy and others that you'll need to stop taking. Review the instructions for how and when to take your medications. Talk with your doctor or nurses if you are unsure of what to do.     2. Go to your follow-up appointments. Specific follow-up information is listed in the following pages. Your may be contacted by a transition nurse or clinical provider about future appointments. Be sure we have all of the phone numbers to reach you, if needed. Please contact your provider's office if you are unable to make an appointment.     3. Watch for warning signs. Your doctor or nurse will give you detailed warning signs to watch for and when to call for assistance. These instructions may also include educational information about your condition. If you experience any of warning signs to your health, call your doctor.               [facility]  Unless otherwise directed by your provider, please contact [facility], our nurse care line that is available for 24/7 assistance.     [phone] (toll-free)    Registered nurses in the [facility] provide clinical advisement, health education, appointment booking, and other advisory services.                    ** Verify the list of medication(s) below is accurate and up  to date. Carry this with you in case of emergency. If your medications have changed, please notify your healthcare provider.             Medication List      START taking these medications        Additional Info                      erythromycin base 500 MG tablet   Commonly known as:  E-MYCIN   Quantity:  90 tablet   Refills:  0   Dose:  500 mg    Instructions:  Take 1 tablet (500 mg total) by mouth 3 (three) times daily with meals.     Begin Date    AM    Noon    PM    Bedtime         CHANGE how you take these medications        Additional Info                      levocetirizine 5 MG tablet   Commonly known as:  XYZAL   Quantity:  90 tablet   Refills:  3   Dose:  5 mg   What changed:    - when to take this  - reasons to take this    Instructions:  Take 1 tablet (5 mg total) by mouth every evening.     Begin Date    AM    Noon    PM    Bedtime         CONTINUE taking these medications        Additional Info                      albuterol 90 mcg/actuation inhaler   Quantity:  18 g   Refills:  1   Dose:  2 puff    Instructions:  Inhale 2 puffs into the lungs every 6 (six) hours as needed for Wheezing. Please provide cheapest brand/generic formulation     Begin Date    AM    Noon    PM    Bedtime       alprazolam 1 MG tablet   Commonly known as:  XANAX   Quantity:  30 tablet   Refills:  5   Dose:  1 mg    Last time this was given:  1 mg on 3/3/2017  9:11 PM   Instructions:  Take 1 tablet (1 mg total) by mouth every evening.     Begin Date    AM    Noon    PM    Bedtime       blood sugar diagnostic Strp   Quantity:  100 each   Refills:  2   Dose:  1 each    Instructions:  1 each by Misc.(Non-Drug; Combo Route) route 4 (four) times daily with meals and nightly.     Begin Date    AM    Noon    PM    Bedtime       blood-glucose meter kit   Quantity:  1 each   Refills:  0    Instructions:  Use as instructed     Begin Date    AM    Noon    PM    Bedtime       calcium carbonate 500 mg calcium (1,250 mg) tablet   Commonly  known as:  OS-MARIAN   Refills:  0   Dose:  2 tablet    Last time this was given:  1,000 mg on 3/4/2017  8:45 AM   Instructions:  Take 2 tablets (1,000 mg total) by mouth once daily.     Begin Date    AM    Noon    PM    Bedtime       carvedilol 12.5 MG tablet   Commonly known as:  COREG   Quantity:  120 tablet   Refills:  11   Dose:  25 mg    Last time this was given:  25 mg on 3/4/2017  8:45 AM   Instructions:  Take 2 tablets (25 mg total) by mouth 2 (two) times daily.     Begin Date    AM    Noon    PM    Bedtime       cholecalciferol (vitamin D3) 1,000 unit capsule   Refills:  0   Dose:  2000 Units    Instructions:  Take 2 capsules (2,000 Units total) by mouth once daily.     Begin Date    AM    Noon    PM    Bedtime       fluticasone 50 mcg/actuation nasal spray   Commonly known as:  FLONASE   Refills:  0   Dose:  2 spray    Last time this was given:  2 sprays on 3/4/2017  9:00 AM   Instructions:  2 sprays by Nasal route 2 (two) times daily.     Begin Date    AM    Noon    PM    Bedtime       gabapentin 600 MG tablet   Commonly known as:  NEURONTIN   Refills:  0   Dose:  600 mg    Instructions:  Take 600 mg by mouth 3 (three) times daily.     Begin Date    AM    Noon    PM    Bedtime       hydrochlorothiazide 12.5 mg capsule   Commonly known as:  MICROZIDE   Quantity:  30 capsule   Refills:  1    Instructions:  TAKE 1 CAPSULE BY MOUTH DAILY     Begin Date    AM    Noon    PM    Bedtime       hydrOXYzine pamoate 25 MG Cap   Commonly known as:  VISTARIL   Quantity:  30 capsule   Refills:  5   Dose:  25 mg    Last time this was given:  25 mg on 3/4/2017  8:45 AM   Instructions:  Take 1 capsule (25 mg total) by mouth once daily.     Begin Date    AM    Noon    PM    Bedtime       insulin aspart 100 unit/mL Inpn pen   Commonly known as:  NovoLOG   Quantity:  1 Box   Refills:  2   Dose:  16 Units    Instructions:  Inject 16 Units into the skin 3 (three) times daily with meals.     Begin Date    AM    Noon    PM     Bedtime       insulin detemir 100 unit/mL (3 mL) Inpn pen   Commonly known as:  LEVEMIR FLEXTOUCH   Quantity:  1 Box   Refills:  1   Dose:  30 Units    Instructions:  Inject 30 Units into the skin 2 (two) times daily.     Begin Date    AM    Noon    PM    Bedtime       lancets Misc   Quantity:  100 each   Refills:  2   Dose:  1 each    Instructions:  1 each by Misc.(Non-Drug; Combo Route) route 4 (four) times daily with meals and nightly.     Begin Date    AM    Noon    PM    Bedtime       levothyroxine 150 MCG tablet   Commonly known as:  SYNTHROID   Quantity:  30 tablet   Refills:  3   Dose:  150 mcg    Last time this was given:  150 mcg on 3/4/2017  5:35 AM   Instructions:  Take 1 tablet (150 mcg total) by mouth once daily.     Begin Date    AM    Noon    PM    Bedtime       meclizine 25 mg tablet   Commonly known as:  ANTIVERT   Quantity:  90 tablet   Refills:  0   Dose:  25 mg    Instructions:  Take 1 tablet (25 mg total) by mouth 3 (three) times daily as needed for Dizziness or Nausea.     Begin Date    AM    Noon    PM    Bedtime       mirtazapine 30 MG tablet   Commonly known as:  REMERON   Quantity:  30 tablet   Refills:  5   Dose:  30 mg    Last time this was given:  30 mg on 3/3/2017  9:12 PM   Instructions:  Take 1 tablet (30 mg total) by mouth every evening.     Begin Date    AM    Noon    PM    Bedtime       olanzapine 10 MG tablet   Commonly known as:  ZyPREXA   Quantity:  30 tablet   Refills:  5   Dose:  10 mg    Last time this was given:  10 mg on 3/3/2017  9:11 PM   Instructions:  Take 1 tablet (10 mg total) by mouth every evening.     Begin Date    AM    Noon    PM    Bedtime       ondansetron 8 MG Tbdl   Commonly known as:  ZOFRAN-ODT   Quantity:  30 tablet   Refills:  1   Dose:  8 mg    Instructions:  Take 1 tablet (8 mg total) by mouth every 6 (six) hours as needed (nausea).     Begin Date    AM    Noon    PM    Bedtime       oxycodone-acetaminophen  mg per tablet   Commonly known as:   "PERCOCET   Refills:  0   Dose:  1 tablet    Instructions:  Take 1 tablet by mouth every 4 (four) hours as needed for Pain. Per Dr. Villa [facility]     Begin Date    AM    Noon    PM    Bedtime       pantoprazole 40 MG tablet   Commonly known as:  PROTONIX   Quantity:  60 tablet   Refills:  3   Dose:  40 mg    Last time this was given:  40 mg on 3/4/2017  8:45 AM   Instructions:  Take 1 tablet (40 mg total) by mouth 2 (two) times daily before meals.     Begin Date    AM    Noon    PM    Bedtime       pen needle, diabetic 31 gauge x 5/16" Ndle   Quantity:  200 each   Refills:  2   Dose:  1 each    Instructions:  1 each by Misc.(Non-Drug; Combo Route) route 5 (five) times daily.     Begin Date    AM    Noon    PM    Bedtime       promethazine 25 MG tablet   Commonly known as:  PHENERGAN   Quantity:  60 tablet   Refills:  2   Dose:  25 mg    Last time this was given:  25 mg on 3/3/2017  2:11 PM   Instructions:  Take 1 tablet (25 mg total) by mouth every 6 (six) hours as needed (nausea).     Begin Date    AM    Noon    PM    Bedtime       tizanidine 4 MG tablet   Commonly known as:  ZANAFLEX   Quantity:  90 tablet   Refills:  2    Instructions:  TAKE 3 TABLETS (12 MG TOTAL) BY MOUTH EVERY EVENING.     Begin Date    AM    Noon    PM    Bedtime       topiramate 25 MG tablet   Commonly known as:  TOPAMAX   Quantity:  90 tablet   Refills:  3   Dose:  25 mg   Indications:  Migraine Prevention    Instructions:  Take 1 tablet (25 mg total) by mouth daily as needed (migraines).     Begin Date    AM    Noon    PM    Bedtime            Where to Get Your Medications      These medications were sent to [facility] - [place], [place] - [address], [place] [address]     Phone:  [phone]     erythromycin base 500 MG tablet                  Please bring to all follow up appointments:    1. A copy of your discharge instructions.  2. All " "medicines you are currently taking in their original bottles.  3. Identification and insurance card.    Please arrive 15 minutes ahead of scheduled appointment time.    Please call 24 hours in advance if you must reschedule your appointment and/or time.        Follow-up Information     Follow up with Mary Cabrera MD In 2 weeks.    Specialty:  Internal Medicine    Why:  hospital follow up; diabetes mgmt    Contact information:    Leonardo GIRARD  [facility] [id number] [phone]        Referrals     Future Orders    Ambulatory referral to Outpatient Case Management     Questions:    Does the patient have a chronic or uncontrolled disease process?:      Does the patient have a new diagnosis of a catastrophic or life altering illness/treatment?:      Does the patient have any psycho-social issues that may affect their ability to adhere to treatment plan?:      Does patient have any behaviors or circumstances that may impede ability to adhere to treatment plan?:      Is patient at risk for admission/readmission?:          Discharge Instructions     Future Orders    Diet general     Questions:    Total calories:      Fat restriction, if any:      Protein restriction, if any:      Na restriction, if any:      Fluid restriction:      Additional restrictions:          Primary Diagnosis     Your primary diagnosis was:  Digestive System Disorder      Admission Information     Date & Time Provider Department CSN    3/2/2017  9:30 PM Veto Stern MD [facility] [id number]      Care Providers     Provider Role Specialty Primary office phone    Veto Stern MD Attending Provider Hospitalist [phone]    Amanda Wilson MD Team Attending  Hospitalist [phone]    Veto Stern MD Consulting Physician  Hospitalist [phone]      Your Vitals Were     BP Pulse Temp Resp Height Weight    110/62 80 97.7 °F (36.5 °C) (Oral) 15 5' 5" (1.651 m) 105.2 kg (232 lb)    " Last Period SpO2 BMI          (LMP Unknown) 94% 38.61 kg/m2        Recent Lab Values        6/30/2015 9/27/2015 1/24/2016 1/25/2016 12/1/2016 1/17/2017 3/3/2017        10:45 AM  2:53 PM  5:37 PM  7:00 AM  4:29 AM  6:48 PM 11:26 AM     A1C 6.4 (H) 6.4 (H) 6.2 6.1 8.0 (H) 12.7 (H) 10.3 (H)     Comment for A1C at  4:29 AM on 12/1/2016:  According to ADA guidelines, hemoglobin A1C <7.0% represents  optimal control in non-pregnant diabetic patients.  Different  metrics may apply to specific populations.   Standards of Medical Care in Diabetes - 2016.  For the purpose of screening for the presence of diabetes:  <5.7%     Consistent with the absence of diabetes  5.7-6.4%  Consistent with increasing risk for diabetes   (prediabetes)  >or=6.5%  Consistent with diabetes  Currently no consensus exists for use of hemoglobin A1C  for diagnosis of diabetes for children.      Comment for A1C at  6:48 PM on 1/17/2017:  According to ADA guidelines, hemoglobin A1C <7.0% represents  optimal control in non-pregnant diabetic patients.  Different  metrics may apply to specific populations.   Standards of Medical Care in Diabetes - 2016.  For the purpose of screening for the presence of diabetes:  <5.7%     Consistent with the absence of diabetes  5.7-6.4%  Consistent with increasing risk for diabetes   (prediabetes)  >or=6.5%  Consistent with diabetes  Currently no consensus exists for use of hemoglobin A1C  for diagnosis of diabetes for children.      Comment for A1C at 11:26 AM on 3/3/2017:  According to ADA guidelines, hemoglobin A1C <7.0% represents  optimal control in non-pregnant diabetic patients.  Different  metrics may apply to specific populations.   Standards of Medical Care in Diabetes - 2016.  For the purpose of screening for the presence of diabetes:  <5.7%     Consistent with the absence of diabetes  5.7-6.4%  Consistent with increasing risk for diabetes   (prediabetes)  >or=6.5%  Consistent with diabetes  Currently no  consensus exists for use of hemoglobin A1C  for diagnosis of diabetes for children.        Allergies as of 3/4/2017        Reactions    Dexamethasone Hives, Shortness Of Breath    Per pt, only steroid she is allergic to is dexamethasone    Ciprofloxacin     Counteracts with seroquel, xanax, tizanidine    Compazine [Prochlorperazine] Hives, Itching    Lyrica [Pregabalin]     depression    Prednisone     Gastroparesis flare up      Advance Directives     An advance directive is a document which, in the event you are no longer able to make decisions for yourself, tells your healthcare team what kind of treatment you do or do not want to receive, or who you would like to make those decisions for you.  If you do not currently have an advance directive, [facility] encourages you to create one.  For more information call:  [phone]), [phone] ([phone]),  or log on to [email or web address].        Language Assistance Services     ATTENTION: Language assistance services are available, free of charge. Please call [phone].      ATENCIÓN: Si habla español, tiene a gibson disposición servicios gratuitos de asistencia lingüística. Llame al [phone].     [facility] Ý: N?u b?n nói Ti?ng Vi?t, có các d?ch v? h? tr? ngôn ng? mi?n phí dành cho b?n. G?i s? [phone].        Pneumonmia Discharge Instructions                Diabetes Discharge Instructions                                    [facility] complies with applicable Federal civil rights laws and does not discriminate on the basis of race, color, national origin, age, disability, or sex.

## 2017-03-03 ENCOUNTER — OUTPATIENT CASE MANAGEMENT (OUTPATIENT)
Dept: ADMINISTRATIVE | Facility: OTHER | Age: 42
End: 2017-03-03

## 2017-03-03 PROBLEM — E11.65 TYPE 2 DIABETES MELLITUS WITH HYPERGLYCEMIA, WITHOUT LONG-TERM CURRENT USE OF INSULIN: Chronic | Status: ACTIVE | Noted: 2017-01-17

## 2017-03-03 PROBLEM — E55.9 VITAMIN D DEFICIENCY: Chronic | Status: ACTIVE | Noted: 2017-01-21

## 2017-03-03 PROBLEM — F11.20 CHRONIC NARCOTIC DEPENDENCE: Chronic | Status: ACTIVE | Noted: 2017-01-17

## 2017-03-03 PROBLEM — M06.9 RHEUMATOID ARTHRITIS: Chronic | Status: ACTIVE | Noted: 2017-01-17

## 2017-03-03 LAB
ANION GAP SERPL CALC-SCNC: 11 MMOL/L
BACTERIA #/AREA URNS AUTO: ABNORMAL /HPF
BILIRUB UR QL STRIP: NEGATIVE
BUN SERPL-MCNC: 20 MG/DL
CALCIUM SERPL-MCNC: 8.1 MG/DL
CHLORIDE SERPL-SCNC: 106 MMOL/L
CLARITY UR REFRACT.AUTO: ABNORMAL
CO2 SERPL-SCNC: 22 MMOL/L
COLOR UR AUTO: YELLOW
CREAT SERPL-MCNC: 1 MG/DL
EST. GFR  (AFRICAN AMERICAN): >60 ML/MIN/1.73 M^2
EST. GFR  (NON AFRICAN AMERICAN): >60 ML/MIN/1.73 M^2
GLUCOSE SERPL-MCNC: 164 MG/DL
GLUCOSE UR QL STRIP: ABNORMAL
HGB UR QL STRIP: NEGATIVE
HYALINE CASTS UR QL AUTO: 16 /LPF
KETONES UR QL STRIP: ABNORMAL
LEUKOCYTE ESTERASE UR QL STRIP: NEGATIVE
MAGNESIUM SERPL-MCNC: 1.5 MG/DL
MICROSCOPIC COMMENT: ABNORMAL
NITRITE UR QL STRIP: NEGATIVE
PH UR STRIP: 6 [PH] (ref 5–8)
PHOSPHATE SERPL-MCNC: 4.2 MG/DL
POCT GLUCOSE: 110 MG/DL (ref 70–110)
POCT GLUCOSE: 114 MG/DL (ref 70–110)
POCT GLUCOSE: 126 MG/DL (ref 70–110)
POCT GLUCOSE: 156 MG/DL (ref 70–110)
POTASSIUM SERPL-SCNC: 3.4 MMOL/L
PROT UR QL STRIP: ABNORMAL
RBC #/AREA URNS AUTO: 0 /HPF (ref 0–4)
SODIUM SERPL-SCNC: 139 MMOL/L
SP GR UR STRIP: 1.02 (ref 1–1.03)
SQUAMOUS #/AREA URNS AUTO: 3 /HPF
URN SPEC COLLECT METH UR: ABNORMAL
UROBILINOGEN UR STRIP-ACNC: NEGATIVE EU/DL
WBC #/AREA URNS AUTO: 2 /HPF (ref 0–5)

## 2017-03-03 PROCEDURE — 63600175 PHARM REV CODE 636 W HCPCS: Performed by: INTERNAL MEDICINE

## 2017-03-03 PROCEDURE — 25000003 PHARM REV CODE 250: Performed by: EMERGENCY MEDICINE

## 2017-03-03 PROCEDURE — 97161 PT EVAL LOW COMPLEX 20 MIN: CPT

## 2017-03-03 PROCEDURE — 25000003 PHARM REV CODE 250: Performed by: INTERNAL MEDICINE

## 2017-03-03 PROCEDURE — 99223 1ST HOSP IP/OBS HIGH 75: CPT | Mod: AI,GC,, | Performed by: INTERNAL MEDICINE

## 2017-03-03 PROCEDURE — 63600175 PHARM REV CODE 636 W HCPCS: Performed by: EMERGENCY MEDICINE

## 2017-03-03 PROCEDURE — 83735 ASSAY OF MAGNESIUM: CPT

## 2017-03-03 PROCEDURE — 97110 THERAPEUTIC EXERCISES: CPT

## 2017-03-03 PROCEDURE — 80048 BASIC METABOLIC PNL TOTAL CA: CPT

## 2017-03-03 PROCEDURE — 25000003 PHARM REV CODE 250: Performed by: HOSPITALIST

## 2017-03-03 PROCEDURE — 84100 ASSAY OF PHOSPHORUS: CPT

## 2017-03-03 PROCEDURE — 36415 COLL VENOUS BLD VENIPUNCTURE: CPT

## 2017-03-03 PROCEDURE — 81001 URINALYSIS AUTO W/SCOPE: CPT

## 2017-03-03 PROCEDURE — 63600175 PHARM REV CODE 636 W HCPCS

## 2017-03-03 PROCEDURE — 11000001 HC ACUTE MED/SURG PRIVATE ROOM

## 2017-03-03 PROCEDURE — 83036 HEMOGLOBIN GLYCOSYLATED A1C: CPT

## 2017-03-03 RX ORDER — TRAMADOL HYDROCHLORIDE 50 MG/1
50 TABLET ORAL EVERY 6 HOURS PRN
Status: DISCONTINUED | OUTPATIENT
Start: 2017-03-03 | End: 2017-03-04 | Stop reason: HOSPADM

## 2017-03-03 RX ORDER — HYDROCHLOROTHIAZIDE 12.5 MG/1
12.5 TABLET ORAL DAILY
Status: DISCONTINUED | OUTPATIENT
Start: 2017-03-03 | End: 2017-03-03

## 2017-03-03 RX ORDER — SODIUM CHLORIDE 9 MG/ML
1000 INJECTION, SOLUTION INTRAVENOUS CONTINUOUS
Status: ACTIVE | OUTPATIENT
Start: 2017-03-03 | End: 2017-03-03

## 2017-03-03 RX ORDER — PROMETHAZINE HYDROCHLORIDE 25 MG/1
25 SUPPOSITORY RECTAL EVERY 6 HOURS PRN
Status: DISCONTINUED | OUTPATIENT
Start: 2017-03-03 | End: 2017-03-04 | Stop reason: HOSPADM

## 2017-03-03 RX ORDER — ONDANSETRON 2 MG/ML
8 INJECTION INTRAMUSCULAR; INTRAVENOUS EVERY 8 HOURS PRN
Status: DISCONTINUED | OUTPATIENT
Start: 2017-03-03 | End: 2017-03-03

## 2017-03-03 RX ORDER — INSULIN ASPART 100 [IU]/ML
0-5 INJECTION, SOLUTION INTRAVENOUS; SUBCUTANEOUS EVERY 6 HOURS PRN
Status: DISCONTINUED | OUTPATIENT
Start: 2017-03-03 | End: 2017-03-03

## 2017-03-03 RX ORDER — PANTOPRAZOLE SODIUM 40 MG/1
40 TABLET, DELAYED RELEASE ORAL DAILY
Status: DISCONTINUED | OUTPATIENT
Start: 2017-03-04 | End: 2017-03-04 | Stop reason: HOSPADM

## 2017-03-03 RX ORDER — GLUCAGON 1 MG
1 KIT INJECTION
Status: DISCONTINUED | OUTPATIENT
Start: 2017-03-03 | End: 2017-03-04 | Stop reason: HOSPADM

## 2017-03-03 RX ORDER — ONDANSETRON 8 MG/1
8 TABLET, ORALLY DISINTEGRATING ORAL EVERY 8 HOURS PRN
Status: DISCONTINUED | OUTPATIENT
Start: 2017-03-03 | End: 2017-03-03

## 2017-03-03 RX ORDER — PANTOPRAZOLE SODIUM 40 MG/1
40 TABLET, DELAYED RELEASE ORAL
Status: DISCONTINUED | OUTPATIENT
Start: 2017-03-03 | End: 2017-03-03

## 2017-03-03 RX ORDER — HEPARIN SODIUM 5000 [USP'U]/ML
5000 INJECTION, SOLUTION INTRAVENOUS; SUBCUTANEOUS EVERY 8 HOURS
Status: DISCONTINUED | OUTPATIENT
Start: 2017-03-03 | End: 2017-03-04 | Stop reason: HOSPADM

## 2017-03-03 RX ORDER — INSULIN ASPART 100 [IU]/ML
1-10 INJECTION, SOLUTION INTRAVENOUS; SUBCUTANEOUS EVERY 6 HOURS PRN
Status: DISCONTINUED | OUTPATIENT
Start: 2017-03-03 | End: 2017-03-03

## 2017-03-03 RX ORDER — CARVEDILOL 25 MG/1
25 TABLET ORAL 2 TIMES DAILY
Status: DISCONTINUED | OUTPATIENT
Start: 2017-03-03 | End: 2017-03-04 | Stop reason: HOSPADM

## 2017-03-03 RX ORDER — HYDROXYZINE PAMOATE 25 MG/1
25 CAPSULE ORAL DAILY
Status: DISCONTINUED | OUTPATIENT
Start: 2017-03-03 | End: 2017-03-04 | Stop reason: HOSPADM

## 2017-03-03 RX ORDER — SODIUM CHLORIDE 9 MG/ML
1000 INJECTION, SOLUTION INTRAVENOUS
Status: COMPLETED | OUTPATIENT
Start: 2017-03-03 | End: 2017-03-03

## 2017-03-03 RX ORDER — ALPRAZOLAM 1 MG/1
1 TABLET ORAL NIGHTLY
Status: DISCONTINUED | OUTPATIENT
Start: 2017-03-03 | End: 2017-03-04 | Stop reason: HOSPADM

## 2017-03-03 RX ORDER — ERYTHROMYCIN 500 MG/1
500 TABLET, COATED ORAL
Qty: 90 TABLET | Refills: 0 | Status: SHIPPED | OUTPATIENT
Start: 2017-03-03 | End: 2017-04-02

## 2017-03-03 RX ORDER — MIRTAZAPINE 15 MG/1
30 TABLET, FILM COATED ORAL NIGHTLY
Status: DISCONTINUED | OUTPATIENT
Start: 2017-03-03 | End: 2017-03-04 | Stop reason: HOSPADM

## 2017-03-03 RX ORDER — ONDANSETRON 2 MG/ML
4 INJECTION INTRAMUSCULAR; INTRAVENOUS ONCE
Status: COMPLETED | OUTPATIENT
Start: 2017-03-03 | End: 2017-03-03

## 2017-03-03 RX ORDER — LEVOTHYROXINE SODIUM 75 UG/1
150 TABLET ORAL
Status: DISCONTINUED | OUTPATIENT
Start: 2017-03-03 | End: 2017-03-04 | Stop reason: HOSPADM

## 2017-03-03 RX ORDER — KETOROLAC TROMETHAMINE 30 MG/ML
INJECTION, SOLUTION INTRAMUSCULAR; INTRAVENOUS
Status: COMPLETED
Start: 2017-03-03 | End: 2017-03-03

## 2017-03-03 RX ORDER — FLUTICASONE PROPIONATE 50 MCG
2 SPRAY, SUSPENSION (ML) NASAL DAILY
Status: DISCONTINUED | OUTPATIENT
Start: 2017-03-03 | End: 2017-03-04 | Stop reason: HOSPADM

## 2017-03-03 RX ORDER — GABAPENTIN 300 MG/1
600 CAPSULE ORAL 3 TIMES DAILY
Status: DISCONTINUED | OUTPATIENT
Start: 2017-03-03 | End: 2017-03-04 | Stop reason: HOSPADM

## 2017-03-03 RX ORDER — TRAMADOL HYDROCHLORIDE 50 MG/1
50 TABLET ORAL ONCE
Status: COMPLETED | OUTPATIENT
Start: 2017-03-03 | End: 2017-03-03

## 2017-03-03 RX ORDER — INSULIN ASPART 100 [IU]/ML
0-5 INJECTION, SOLUTION INTRAVENOUS; SUBCUTANEOUS EVERY 6 HOURS PRN
Status: DISCONTINUED | OUTPATIENT
Start: 2017-03-03 | End: 2017-03-04 | Stop reason: HOSPADM

## 2017-03-03 RX ORDER — PROMETHAZINE HYDROCHLORIDE 25 MG/1
25 TABLET ORAL EVERY 6 HOURS PRN
Status: DISCONTINUED | OUTPATIENT
Start: 2017-03-03 | End: 2017-03-04 | Stop reason: HOSPADM

## 2017-03-03 RX ORDER — CALCIUM CARBONATE 500(1250)
1000 TABLET ORAL DAILY
Status: DISCONTINUED | OUTPATIENT
Start: 2017-03-03 | End: 2017-03-04 | Stop reason: HOSPADM

## 2017-03-03 RX ORDER — OLANZAPINE 10 MG/1
10 TABLET ORAL NIGHTLY
Status: DISCONTINUED | OUTPATIENT
Start: 2017-03-03 | End: 2017-03-04 | Stop reason: HOSPADM

## 2017-03-03 RX ORDER — ONDANSETRON 2 MG/ML
8 INJECTION INTRAMUSCULAR; INTRAVENOUS EVERY 8 HOURS PRN
Status: DISCONTINUED | OUTPATIENT
Start: 2017-03-03 | End: 2017-03-04 | Stop reason: HOSPADM

## 2017-03-03 RX ORDER — METOCLOPRAMIDE 10 MG/1
10 TABLET ORAL EVERY 6 HOURS PRN
Status: DISCONTINUED | OUTPATIENT
Start: 2017-03-03 | End: 2017-03-03

## 2017-03-03 RX ORDER — KETOROLAC TROMETHAMINE 15 MG/ML
15 INJECTION, SOLUTION INTRAMUSCULAR; INTRAVENOUS EVERY 6 HOURS PRN
Status: DISCONTINUED | OUTPATIENT
Start: 2017-03-03 | End: 2017-03-03

## 2017-03-03 RX ORDER — CHOLECALCIFEROL (VITAMIN D3) 25 MCG
2000 TABLET ORAL DAILY
Status: DISCONTINUED | OUTPATIENT
Start: 2017-03-03 | End: 2017-03-04 | Stop reason: HOSPADM

## 2017-03-03 RX ORDER — ALBUTEROL SULFATE 90 UG/1
2 AEROSOL, METERED RESPIRATORY (INHALATION) EVERY 6 HOURS PRN
Status: DISCONTINUED | OUTPATIENT
Start: 2017-03-03 | End: 2017-03-04 | Stop reason: HOSPADM

## 2017-03-03 RX ORDER — PROMETHAZINE HYDROCHLORIDE 12.5 MG/1
25 SUPPOSITORY RECTAL EVERY 6 HOURS PRN
Status: DISCONTINUED | OUTPATIENT
Start: 2017-03-03 | End: 2017-03-03

## 2017-03-03 RX ADMIN — ERYTHROMYCIN LACTOBIONATE 500 MG: 500 INJECTION, POWDER, LYOPHILIZED, FOR SOLUTION INTRAVENOUS at 06:03

## 2017-03-03 RX ADMIN — ERYTHROMYCIN LACTOBIONATE 500 MG: 500 INJECTION, POWDER, LYOPHILIZED, FOR SOLUTION INTRAVENOUS at 03:03

## 2017-03-03 RX ADMIN — MIRTAZAPINE 30 MG: 30 TABLET, FILM COATED ORAL at 02:03

## 2017-03-03 RX ADMIN — OLANZAPINE 10 MG: 10 TABLET, FILM COATED ORAL at 09:03

## 2017-03-03 RX ADMIN — ALPRAZOLAM 1 MG: 1 TABLET ORAL at 09:03

## 2017-03-03 RX ADMIN — MIRTAZAPINE 30 MG: 30 TABLET, FILM COATED ORAL at 09:03

## 2017-03-03 RX ADMIN — HEPARIN SODIUM 5000 UNITS: 5000 INJECTION, SOLUTION INTRAVENOUS; SUBCUTANEOUS at 02:03

## 2017-03-03 RX ADMIN — CALCIUM 1000 MG: 500 TABLET ORAL at 08:03

## 2017-03-03 RX ADMIN — TRAMADOL HYDROCHLORIDE 50 MG: 50 TABLET, COATED ORAL at 02:03

## 2017-03-03 RX ADMIN — LEVOTHYROXINE SODIUM 150 MCG: 75 TABLET ORAL at 05:03

## 2017-03-03 RX ADMIN — HYDROXYZINE PAMOATE 25 MG: 25 CAPSULE ORAL at 08:03

## 2017-03-03 RX ADMIN — FLUTICASONE PROPIONATE 2 SPRAY: 50 SPRAY, METERED NASAL at 08:03

## 2017-03-03 RX ADMIN — ERYTHROMYCIN LACTOBIONATE 500 MG: 500 INJECTION, POWDER, LYOPHILIZED, FOR SOLUTION INTRAVENOUS at 11:03

## 2017-03-03 RX ADMIN — PROMETHAZINE HYDROCHLORIDE 25 MG: 25 INJECTION INTRAMUSCULAR; INTRAVENOUS at 12:03

## 2017-03-03 RX ADMIN — VITAMIN D, TAB 1000IU (100/BT) 2000 UNITS: 25 TAB at 08:03

## 2017-03-03 RX ADMIN — OLANZAPINE 10 MG: 10 TABLET, FILM COATED ORAL at 02:03

## 2017-03-03 RX ADMIN — PROMETHAZINE HYDROCHLORIDE 25 MG: 25 TABLET ORAL at 02:03

## 2017-03-03 RX ADMIN — PANTOPRAZOLE SODIUM 40 MG: 40 TABLET, DELAYED RELEASE ORAL at 05:03

## 2017-03-03 RX ADMIN — SODIUM CHLORIDE 1000 ML: 0.9 INJECTION, SOLUTION INTRAVENOUS at 11:03

## 2017-03-03 RX ADMIN — ONDANSETRON 8 MG: 2 INJECTION INTRAMUSCULAR; INTRAVENOUS at 06:03

## 2017-03-03 RX ADMIN — HEPARIN SODIUM 5000 UNITS: 5000 INJECTION, SOLUTION INTRAVENOUS; SUBCUTANEOUS at 09:03

## 2017-03-03 RX ADMIN — CARVEDILOL 25 MG: 25 TABLET, FILM COATED ORAL at 08:03

## 2017-03-03 RX ADMIN — SODIUM CHLORIDE 1000 ML: 0.9 INJECTION, SOLUTION INTRAVENOUS at 12:03

## 2017-03-03 RX ADMIN — HEPARIN SODIUM 5000 UNITS: 5000 INJECTION, SOLUTION INTRAVENOUS; SUBCUTANEOUS at 05:03

## 2017-03-03 RX ADMIN — GABAPENTIN 600 MG: 300 CAPSULE ORAL at 05:03

## 2017-03-03 RX ADMIN — HYDROCHLOROTHIAZIDE 12.5 MG: 12.5 TABLET ORAL at 08:03

## 2017-03-03 RX ADMIN — PROMETHAZINE HYDROCHLORIDE 25 MG: 25 TABLET ORAL at 08:03

## 2017-03-03 RX ADMIN — TRAMADOL HYDROCHLORIDE 50 MG: 50 TABLET, COATED ORAL at 05:03

## 2017-03-03 RX ADMIN — GABAPENTIN 600 MG: 300 CAPSULE ORAL at 02:03

## 2017-03-03 RX ADMIN — ONDANSETRON 4 MG: 2 INJECTION INTRAMUSCULAR; INTRAVENOUS at 05:03

## 2017-03-03 RX ADMIN — TRAMADOL HYDROCHLORIDE 50 MG: 50 TABLET, COATED ORAL at 09:03

## 2017-03-03 RX ADMIN — KETOROLAC TROMETHAMINE 30 MG: 30 INJECTION, SOLUTION INTRAMUSCULAR at 03:03

## 2017-03-03 RX ADMIN — GABAPENTIN 600 MG: 300 CAPSULE ORAL at 09:03

## 2017-03-03 RX ADMIN — TRAMADOL HYDROCHLORIDE 50 MG: 50 TABLET, COATED ORAL at 08:03

## 2017-03-03 NOTE — ASSESSMENT & PLAN NOTE
- While NPO, will hold off on insulin detemir, mealtime aspart.  - Will start low-dose sliding scale insulin aspart while NPO. If able to start eating again will resume detemir, aspart; likewise if hyperglycemic while NPO will resume reduced-dose detemir.

## 2017-03-03 NOTE — NURSING
Pt arrived to the unit via stretcher. Pt AAO x 4, NAD or complaints noted at this time. Pt oriented to the room. Bed locked and in lowest position, side rails up x 2, call light with in reach. Will continue to monitor. VS via flow sheet.

## 2017-03-03 NOTE — H&P
"[facility] Medicine  History & Physical    Patient Name: Homar Jerry  MRN: [id number]  Admission Date: 3/2/2017  Attending Physician: Amanda Wilson MD  Primary Care Provider: Mary Cabrera MD    [facility] Medicine Team: [facility] 5 D Mateo Aguirre MD     Patient information was obtained from patient, past medical records and ER records.     Subjective:     Principal Problem:Gastroparesis    Chief Complaint:   Chief Complaint   Patient presents with    Abdominal Pain     Reports hx of gastroparesis with worsening abdominal pain, watery diarrhea, decreased urinary output. Pt appears pale        HPI: Ms. Jerry is a 41/F with PMH HTN, hypothyroidism, anxiety, bipolar type I, rheumatoid arthritis, DMII and gastroparesis s/p gastric pacemaker (with subsequent removal) and sleeve gastrectomy who presented to the ED with nausea, vomiting and abdominal pain. History is obtained from the patient and medical records. She reports that she began having worsening pain the morning prior to admission, followed by nausea and vomiting. She states she has had about 6 episodes of emesis in total, all non-bloody. She was last admitted around 02/02/17 with similar complaints and started on IV erythromycin; she was able to be discharged several days later but states that she was unable to afford PO erythromycin because it cost "about 700 dollars for me, and I couldn't afford that." She has otherwise kept up with her other medications.    Past Medical History:   Diagnosis Date    Anemia     Anxiety     Asthma     usually with cold weather    Back pain     Bipolar 1 disorder 3/17/2015    Cervical cancer 2009    DOT    Diabetes mellitus with hyperglycemia 1/17/2017    Fibrocystic breast     Fibromyalgia     Gastroparesis 2012    s/p sleeve to cover damaged nerves; s/p gastric pacemaker which did not help; due to nerve damage during surgery    Hypertension     Hypothyroidism, " postsurgical     Morbid obesity     Prediabetes     Rheumatoid arthritis     Thyroid cancer 2003 & 2013    thyroidectomy    Urinary incontinence        Past Surgical History:   Procedure Laterality Date    ANKLE FRACTURE SURGERY Right     BREAST SURGERY      exploratory laparotomy due to complications of hysterectomy  2009    cervical cuff burst with air in abdomen    FRACTURE SURGERY      GASTRECTOMY      gastric pacemaker      gastric sleeve      HYSTERECTOMY  2009    total including cervix    KNEE ARTHROSCOPY W/ MENISCAL REPAIR Left 2016    KNEE SURGERY  05/12/2016    loop monitor  2016    multiple breast biopsies      TONSILLECTOMY      ureteral sling         Review of patient's allergies indicates:   Allergen Reactions    Dexamethasone Hives and Shortness Of Breath     Per pt, only steroid she is allergic to is dexamethasone    Ciprofloxacin      Counteracts with seroquel, xanax, tizanidine    Compazine [prochlorperazine] Hives and Itching    Lyrica [pregabalin]      depression    Prednisone      Gastroparesis flare up       No current facility-administered medications on file prior to encounter.      Current Outpatient Prescriptions on File Prior to Encounter   Medication Sig    albuterol 90 mcg/actuation inhaler Inhale 2 puffs into the lungs every 6 (six) hours as needed for Wheezing. Please provide cheapest brand/generic formulation    alprazolam (XANAX) 1 MG tablet Take 1 tablet (1 mg total) by mouth every evening.    blood sugar diagnostic Strp 1 each by Misc.(Non-Drug; Combo Route) route 4 (four) times daily with meals and nightly.    blood-glucose meter kit Use as instructed    calcium carbonate (OS-MARIAN) 500 mg calcium (1,250 mg) tablet Take 2 tablets (1,000 mg total) by mouth once daily.    carvedilol (COREG) 12.5 MG tablet Take 2 tablets (25 mg total) by mouth 2 (two) times daily.    cholecalciferol, vitamin D3, 1,000 unit capsule Take 2 capsules (2,000 Units total) by  "mouth once daily.    fluticasone (FLONASE) 50 mcg/actuation nasal spray 2 sprays by Nasal route 2 (two) times daily.     gabapentin (NEURONTIN) 600 MG tablet Take 600 mg by mouth 3 (three) times daily.     hydrochlorothiazide (MICROZIDE) 12.5 mg capsule TAKE 1 CAPSULE BY MOUTH DAILY    hydrOXYzine pamoate (VISTARIL) 25 MG Cap Take 1 capsule (25 mg total) by mouth once daily.    insulin aspart (NOVOLOG) 100 unit/mL InPn pen Inject 16 Units into the skin 3 (three) times daily with meals.    insulin detemir (LEVEMIR FLEXTOUCH) 100 unit/mL (3 mL) SubQ InPn pen Inject 30 Units into the skin 2 (two) times daily.    lancets Misc 1 each by Misc.(Non-Drug; Combo Route) route 4 (four) times daily with meals and nightly.    levocetirizine (XYZAL) 5 MG tablet Take 1 tablet (5 mg total) by mouth every evening. (Patient taking differently: Take 5 mg by mouth daily as needed for Allergies. )    levothyroxine (SYNTHROID) 150 MCG tablet Take 1 tablet (150 mcg total) by mouth once daily.    meclizine (ANTIVERT) 25 mg tablet Take 1 tablet (25 mg total) by mouth 3 (three) times daily as needed for Dizziness or Nausea.    mirtazapine (REMERON) 30 MG tablet Take 1 tablet (30 mg total) by mouth every evening.    olanzapine (ZYPREXA) 10 MG tablet Take 1 tablet (10 mg total) by mouth every evening.    ondansetron (ZOFRAN-ODT) 8 MG TbDL Take 1 tablet (8 mg total) by mouth every 6 (six) hours as needed (nausea).    oxycodone-acetaminophen (PERCOCET)  mg per tablet Take 1 tablet by mouth every 4 (four) hours as needed for Pain. Per Dr. Villa [facility]    pantoprazole (PROTONIX) 40 MG tablet Take 1 tablet (40 mg total) by mouth 2 (two) times daily before meals.    pen needle, diabetic 31 gauge x 5/16" Ndle 1 each by Misc.(Non-Drug; Combo Route) route 5 (five) times daily.    promethazine (PHENERGAN) 25 MG tablet Take 1 tablet (25 mg total) by mouth every 6 (six) hours as needed (nausea).    " tizanidine (ZANAFLEX) 4 MG tablet TAKE 3 TABLETS (12 MG TOTAL) BY MOUTH EVERY EVENING.    topiramate (TOPAMAX) 25 MG tablet Take 1 tablet (25 mg total) by mouth daily as needed (migraines).    [DISCONTINUED] erythromycin (ERYTHROCIN) 250 mg Tab Take 1 tablet (250 mg total) by mouth 3 (three) times daily with meals.     Family History     Problem Relation (Age of Onset)    ADD / ADHD Son    Arthritis Mother    Bipolar disorder Maternal Uncle    Clotting disorder Mother, Father, Paternal Grandfather    Diabetes Paternal Grandmother    HIV Brother    Heart attack Mother (55)    Heart disease Mother    Hyperlipidemia Father    No Known Problems Sister, Brother, Maternal Aunt, Paternal Aunt, Paternal Uncle, Maternal Grandfather, Maternal Grandmother, Cousin    Pancreatic cancer Maternal Uncle    Pneumonia Brother    Rheum arthritis Mother    Thyroid cancer Paternal Grandmother        Social History Main Topics    Smoking status: Never Smoker    Smokeless tobacco: Never Used    Alcohol use No    Drug use: No    Sexual activity: Not Currently     Review of Systems   Constitutional: Positive for fatigue. Negative for chills and fever.   HENT: Negative for sore throat and trouble swallowing.    Eyes: Negative for pain and visual disturbance.   Respiratory: Negative for cough and shortness of breath.    Cardiovascular: Positive for palpitations. Negative for leg swelling.   Gastrointestinal: Positive for abdominal pain, nausea and vomiting. Negative for anal bleeding and blood in stool.   Genitourinary: Positive for decreased urine volume. Negative for difficulty urinating and dysuria.   Musculoskeletal: Positive for arthralgias and myalgias.   Skin: Negative for rash and wound.   Neurological: Positive for light-headedness. Negative for dizziness, weakness and numbness.     Objective:     Vital Signs (Most Recent):  Temp: 98.5 °F (36.9 °C) (03/02/17 2026)  Pulse: (!) 112 (03/03/17 0204)  Resp: 18 (03/03/17 0204)  BP:  119/80 (03/03/17 0204)  SpO2: 95 % (03/03/17 0204) Vital Signs (24h Range):  Temp:  [98.5 °F (36.9 °C)] 98.5 °F (36.9 °C)  Pulse:  [106-123] 112  Resp:  [15-20] 18  SpO2:  [95 %-99 %] 95 %  BP: (116-146)/(74-93) 119/80     Weight: 105.2 kg (232 lb)  Body mass index is 38.61 kg/(m^2).    Physical Exam   Constitutional: She is oriented to person, place, and time. She appears well-developed and well-nourished. No distress.   HENT:   Head: Normocephalic and atraumatic.   Nose: Nose normal.   Mouth/Throat: Oropharynx is clear and moist.   Eyes: Conjunctivae are normal. Pupils are equal, round, and reactive to light.   Cardiovascular: Regular rhythm, normal heart sounds and intact distal pulses.    tachycardic   Pulmonary/Chest: Effort normal and breath sounds normal. No respiratory distress.   Abdominal: Soft. She exhibits no distension. There is tenderness (TTP epigastric with some radiation to RUQ). There is no rebound and no guarding.   borborygmy   Musculoskeletal: She exhibits no edema.   Neurological: She is alert and oriented to person, place, and time.   Skin: Skin is warm and dry. No rash noted.        Significant Labs:   Recent Results (from the past 24 hour(s))   CBC W/ AUTO DIFFERENTIAL    Collection Time: 03/02/17 10:10 PM   Result Value Ref Range    WBC 11.92 3.90 - 12.70 K/uL    RBC 5.02 4.00 - 5.40 M/uL    Hemoglobin 14.1 12.0 - 16.0 g/dL    Hematocrit 42.4 37.0 - 48.5 %    MCV 85 82 - 98 fL    MCH 28.1 27.0 - 31.0 pg    MCHC 33.3 32.0 - 36.0 %    RDW 14.0 11.5 - 14.5 %    Platelets 360 (H) 150 - 350 K/uL    MPV 9.7 9.2 - 12.9 fL    Gran # 6.5 1.8 - 7.7 K/uL    Lymph # 4.4 1.0 - 4.8 K/uL    Mono # 0.8 0.3 - 1.0 K/uL    Eos # 0.2 0.0 - 0.5 K/uL    Baso # 0.01 0.00 - 0.20 K/uL    Gran% 54.4 38.0 - 73.0 %    Lymph% 37.2 18.0 - 48.0 %    Mono% 6.6 4.0 - 15.0 %    Eosinophil% 1.3 0.0 - 8.0 %    Basophil% 0.1 0.0 - 1.9 %    Differential Method Automated    Comp. Metabolic Panel    Collection Time: 03/02/17  10:10 PM   Result Value Ref Range    Sodium 136 136 - 145 mmol/L    Potassium 3.5 3.5 - 5.1 mmol/L    Chloride 99 95 - 110 mmol/L    CO2 22 (L) 23 - 29 mmol/L    Glucose 158 (H) 70 - 110 mg/dL    BUN, Bld 20 6 - 20 mg/dL    Creatinine 1.4 0.5 - 1.4 mg/dL    Calcium 9.3 8.7 - 10.5 mg/dL    Total Protein 8.4 6.0 - 8.4 g/dL    Albumin 4.2 3.5 - 5.2 g/dL    Total Bilirubin 0.5 0.1 - 1.0 mg/dL    Alkaline Phosphatase 114 55 - 135 U/L    AST 10 10 - 40 U/L    ALT 10 10 - 44 U/L    Anion Gap 15 8 - 16 mmol/L    eGFR if African American 53.5 (A) >60 mL/min/1.73 m^2    eGFR if non  46.4 (A) >60 mL/min/1.73 m^2   Lipase    Collection Time: 03/02/17 10:10 PM   Result Value Ref Range    Lipase 8 4 - 60 U/L   POCT glucose    Collection Time: 03/02/17 10:35 PM   Result Value Ref Range    POCT Glucose 160 (H) 70 - 110 mg/dL     Significant Imaging:   KUB 03/03/17:  Unremarkable bowel gas pattern.    Assessment/Plan:     * Gastroparesis  - Gastroparesis s/p gastric pacemaker and subsequent removal, s/p sleeve gastrectomy; most recent episode 02/2017 treated with erythromycin but unable to afford medication as outpatient. Complicated by history of DM; patient additionally reports started having gastroparesis after abdominal surgery initially.  - As it appears to have been efficacious previously, will attempt erythromycin 500mg IV q8hr.  - Given symptom exacerbation with opioid medications will attempt to avoid opiates for pain control, as this will most likely worsen her symptoms. Discussed this with the patient; she voiced her understanding but is reticent to agree. During her last admission they attempted ketorolac for pain control; will attempt ketorolac 15mg IV q6hr PRN for pain initially. She received hydromorphone 1mg IV twice in ED.  - She reports promethazine is more effective for her than ondansetron. Will attempt nausea control with promethazine 25mg PO q6hr first, followed by ondansetron 8mg IV q8hr PRN,  with option of promethazine 25mg [place] q6hr PRN if unable to tolerate PO and ondansetron ineffective.  - Will continue to monitor and adjust pain control and nausea control as needed.  - Will consult social work for assistance with affording erythromycin as outpatient.    Fibromyalgia  - Continuing gabapentin 600mg PO TID.    Anxiety  - Continuing alprazolam 1mg PO qHS, hydroxyzine 25mg PO daily.    Bipolar 1 disorder  - Continuing olanzapine 10mg PO qHS, mirtazapine 30mg PO qHS.    Hypothyroidism, postsurgical  - Continuing levothyroxine 150mcg PO daily.    Migraines  - Takes topiramate at home PRN. Will resume if having migraine aura in hospital.    Essential hypertension  - Continuing HCTZ 12.5mg PO dialy, carvedilol 25mg PO BID.    Asthma  - Continuing albuterol 2 puff inhaled q6hr PRN SOB/wheezing.    Debility  - Chronic debility likely contributing to fibromyalgia, which in turn likely contributing to her symptoms of gastroparesis as well.  - Will order PT/OT to further assess.    Type 2 diabetes mellitus with hyperglycemia, without long-term current use of insulin  - While NPO, will hold off on insulin detemir, mealtime aspart.  - Will start low-dose sliding scale insulin aspart while NPO. If able to start eating again will resume detemir, aspart; likewise if hyperglycemic while NPO will resume reduced-dose detemir.    Vitamin D deficiency  - Continuing calcium carbonate 1000mg PO daily, vitamin D 2000U PO daily.    VTE Risk Mitigation         Ordered     heparin (porcine) injection 5,000 Units  Every 8 hours     Route:  Subcutaneous        03/03/17 0209     Place sequential compression device  Until discontinued      03/03/17 0209     Medium Risk of VTE  Once      03/03/17 0209        Staff attestation to follow.    TANNA Samuel MD   PGY-2   [phone]

## 2017-03-03 NOTE — ED NOTES
Patient identifiers for Homar Stout Jerry  verified.     APPEARANCE: Pt clean and well groomed with clothes appropriately fastened. No distress is noted.  NEURO: Pt is awake and alert x3, Pt follows commands and affect is appropriate. Pt is moving all extremities well and sensation is intact. Speech is clear.  RESPIRATORY: Respirations are even and unlabored on room air. No accessory muscle use noted. Normal rate and effort is noted. Pt placed on continuous pulse ox with O2 sats noted at 98% on room air.   CARDIAC: Pt placed on cardiac monitor. Sinus tach rhythm noted. Rate is abnormal. No abnormal heart sounds noted. Radial and dorsalis pedis pulses are palpable and +2. No edema noted. Cap refill is <3 seconds.   GASTRO: C/o epigastric pain, vomiting, diarrhea x2 days. Similar to previous gastroparesis episodes.   : Pt denies any pain or frequency with urination.  SKIN: Skin is warm, dry and intact. Skin color is appropriate for ethnicity. Normal skin turgor noted. Mucous membranes are moist.   MUSCULOSKELETAL: No abnormalities are noted.

## 2017-03-03 NOTE — SUBJECTIVE & OBJECTIVE
Past Medical History:   Diagnosis Date    Anemia     Anxiety     Asthma     usually with cold weather    Back pain     Bipolar 1 disorder 3/17/2015    Cervical cancer 2009    DOT    Diabetes mellitus with hyperglycemia 1/17/2017    Fibrocystic breast     Fibromyalgia     Gastroparesis 2012    s/p sleeve to cover damaged nerves; s/p gastric pacemaker which did not help; due to nerve damage during surgery    Hypertension     Hypothyroidism, postsurgical     Morbid obesity     Prediabetes     Rheumatoid arthritis     Thyroid cancer 2003 & 2013    thyroidectomy    Urinary incontinence        Past Surgical History:   Procedure Laterality Date    ANKLE FRACTURE SURGERY Right     BREAST SURGERY      exploratory laparotomy due to complications of hysterectomy  2009    cervical cuff burst with air in abdomen    FRACTURE SURGERY      GASTRECTOMY      gastric pacemaker      gastric sleeve      HYSTERECTOMY  2009    total including cervix    KNEE ARTHROSCOPY W/ MENISCAL REPAIR Left 2016    KNEE SURGERY  05/12/2016    loop monitor  2016    multiple breast biopsies      TONSILLECTOMY      ureteral sling         Review of patient's allergies indicates:   Allergen Reactions    Dexamethasone Hives and Shortness Of Breath     Per pt, only steroid she is allergic to is dexamethasone    Ciprofloxacin      Counteracts with seroquel, xanax, tizanidine    Compazine [prochlorperazine] Hives and Itching    Lyrica [pregabalin]      depression    Prednisone      Gastroparesis flare up       No current facility-administered medications on file prior to encounter.      Current Outpatient Prescriptions on File Prior to Encounter   Medication Sig    albuterol 90 mcg/actuation inhaler Inhale 2 puffs into the lungs every 6 (six) hours as needed for Wheezing. Please provide cheapest brand/generic formulation    alprazolam (XANAX) 1 MG tablet Take 1 tablet (1 mg total) by mouth every evening.    blood sugar  diagnostic Strp 1 each by Misc.(Non-Drug; Combo Route) route 4 (four) times daily with meals and nightly.    blood-glucose meter kit Use as instructed    calcium carbonate (OS-MARIAN) 500 mg calcium (1,250 mg) tablet Take 2 tablets (1,000 mg total) by mouth once daily.    carvedilol (COREG) 12.5 MG tablet Take 2 tablets (25 mg total) by mouth 2 (two) times daily.    cholecalciferol, vitamin D3, 1,000 unit capsule Take 2 capsules (2,000 Units total) by mouth once daily.    fluticasone (FLONASE) 50 mcg/actuation nasal spray 2 sprays by Nasal route 2 (two) times daily.     gabapentin (NEURONTIN) 600 MG tablet Take 600 mg by mouth 3 (three) times daily.     hydrochlorothiazide (MICROZIDE) 12.5 mg capsule TAKE 1 CAPSULE BY MOUTH DAILY    hydrOXYzine pamoate (VISTARIL) 25 MG Cap Take 1 capsule (25 mg total) by mouth once daily.    insulin aspart (NOVOLOG) 100 unit/mL InPn pen Inject 16 Units into the skin 3 (three) times daily with meals.    insulin detemir (LEVEMIR FLEXTOUCH) 100 unit/mL (3 mL) SubQ InPn pen Inject 30 Units into the skin 2 (two) times daily.    lancets Misc 1 each by Misc.(Non-Drug; Combo Route) route 4 (four) times daily with meals and nightly.    levocetirizine (XYZAL) 5 MG tablet Take 1 tablet (5 mg total) by mouth every evening. (Patient taking differently: Take 5 mg by mouth daily as needed for Allergies. )    levothyroxine (SYNTHROID) 150 MCG tablet Take 1 tablet (150 mcg total) by mouth once daily.    meclizine (ANTIVERT) 25 mg tablet Take 1 tablet (25 mg total) by mouth 3 (three) times daily as needed for Dizziness or Nausea.    mirtazapine (REMERON) 30 MG tablet Take 1 tablet (30 mg total) by mouth every evening.    olanzapine (ZYPREXA) 10 MG tablet Take 1 tablet (10 mg total) by mouth every evening.    ondansetron (ZOFRAN-ODT) 8 MG TbDL Take 1 tablet (8 mg total) by mouth every 6 (six) hours as needed (nausea).    oxycodone-acetaminophen (PERCOCET)  mg per tablet Take 1  "tablet by mouth every 4 (four) hours as needed for Pain. Per Dr. Villa [facility]    pantoprazole (PROTONIX) 40 MG tablet Take 1 tablet (40 mg total) by mouth 2 (two) times daily before meals.    pen needle, diabetic 31 gauge x 5/16" Ndle 1 each by Misc.(Non-Drug; Combo Route) route 5 (five) times daily.    promethazine (PHENERGAN) 25 MG tablet Take 1 tablet (25 mg total) by mouth every 6 (six) hours as needed (nausea).    tizanidine (ZANAFLEX) 4 MG tablet TAKE 3 TABLETS (12 MG TOTAL) BY MOUTH EVERY EVENING.    topiramate (TOPAMAX) 25 MG tablet Take 1 tablet (25 mg total) by mouth daily as needed (migraines).    [DISCONTINUED] erythromycin (ERYTHROCIN) 250 mg Tab Take 1 tablet (250 mg total) by mouth 3 (three) times daily with meals.     Family History     Problem Relation (Age of Onset)    ADD / ADHD Son    Arthritis Mother    Bipolar disorder Maternal Uncle    Clotting disorder Mother, Father, Paternal Grandfather    Diabetes Paternal Grandmother    HIV Brother    Heart attack Mother (55)    Heart disease Mother    Hyperlipidemia Father    No Known Problems Sister, Brother, Maternal Aunt, Paternal Aunt, Paternal Uncle, Maternal Grandfather, Maternal Grandmother, Cousin    Pancreatic cancer Maternal Uncle    Pneumonia Brother    Rheum arthritis Mother    Thyroid cancer Paternal Grandmother        Social History Main Topics    Smoking status: Never Smoker    Smokeless tobacco: Never Used    Alcohol use No    Drug use: No    Sexual activity: Not Currently     Review of Systems   Constitutional: Positive for fatigue. Negative for chills and fever.   HENT: Negative for sore throat and trouble swallowing.    Eyes: Negative for pain and visual disturbance.   Respiratory: Negative for cough and shortness of breath.    Cardiovascular: Positive for palpitations. Negative for leg swelling.   Gastrointestinal: Positive for abdominal pain, nausea and vomiting. Negative for anal bleeding " and blood in stool.   Genitourinary: Positive for decreased urine volume. Negative for difficulty urinating and dysuria.   Musculoskeletal: Positive for arthralgias and myalgias.   Skin: Negative for rash and wound.   Neurological: Positive for light-headedness. Negative for dizziness, weakness and numbness.     Objective:     Vital Signs (Most Recent):  Temp: 98.5 °F (36.9 °C) (03/02/17 2026)  Pulse: (!) 112 (03/03/17 0204)  Resp: 18 (03/03/17 0204)  BP: 119/80 (03/03/17 0204)  SpO2: 95 % (03/03/17 0204) Vital Signs (24h Range):  Temp:  [98.5 °F (36.9 °C)] 98.5 °F (36.9 °C)  Pulse:  [106-123] 112  Resp:  [15-20] 18  SpO2:  [95 %-99 %] 95 %  BP: (116-146)/(74-93) 119/80     Weight: 105.2 kg (232 lb)  Body mass index is 38.61 kg/(m^2).    Physical Exam   Constitutional: She is oriented to person, place, and time. She appears well-developed and well-nourished. No distress.   HENT:   Head: Normocephalic and atraumatic.   Nose: Nose normal.   Mouth/Throat: Oropharynx is clear and moist.   Eyes: Conjunctivae are normal. Pupils are equal, round, and reactive to light.   Cardiovascular: Regular rhythm, normal heart sounds and intact distal pulses.    tachycardic   Pulmonary/Chest: Effort normal and breath sounds normal. No respiratory distress.   Abdominal: Soft. She exhibits no distension. There is tenderness (TTP epigastric with some radiation to RUQ). There is no rebound and no guarding.   borborygmy   Musculoskeletal: She exhibits no edema.   Neurological: She is alert and oriented to person, place, and time.   Skin: Skin is warm and dry. No rash noted.        Significant Labs:   Recent Results (from the past 24 hour(s))   CBC W/ AUTO DIFFERENTIAL    Collection Time: 03/02/17 10:10 PM   Result Value Ref Range    WBC 11.92 3.90 - 12.70 K/uL    RBC 5.02 4.00 - 5.40 M/uL    Hemoglobin 14.1 12.0 - 16.0 g/dL    Hematocrit 42.4 37.0 - 48.5 %    MCV 85 82 - 98 fL    MCH 28.1 27.0 - 31.0 pg    MCHC 33.3 32.0 - 36.0 %    RDW  14.0 11.5 - 14.5 %    Platelets 360 (H) 150 - 350 K/uL    MPV 9.7 9.2 - 12.9 fL    Gran # 6.5 1.8 - 7.7 K/uL    Lymph # 4.4 1.0 - 4.8 K/uL    Mono # 0.8 0.3 - 1.0 K/uL    Eos # 0.2 0.0 - 0.5 K/uL    Baso # 0.01 0.00 - 0.20 K/uL    Gran% 54.4 38.0 - 73.0 %    Lymph% 37.2 18.0 - 48.0 %    Mono% 6.6 4.0 - 15.0 %    Eosinophil% 1.3 0.0 - 8.0 %    Basophil% 0.1 0.0 - 1.9 %    Differential Method Automated    Comp. Metabolic Panel    Collection Time: 03/02/17 10:10 PM   Result Value Ref Range    Sodium 136 136 - 145 mmol/L    Potassium 3.5 3.5 - 5.1 mmol/L    Chloride 99 95 - 110 mmol/L    CO2 22 (L) 23 - 29 mmol/L    Glucose 158 (H) 70 - 110 mg/dL    BUN, Bld 20 6 - 20 mg/dL    Creatinine 1.4 0.5 - 1.4 mg/dL    Calcium 9.3 8.7 - 10.5 mg/dL    Total Protein 8.4 6.0 - 8.4 g/dL    Albumin 4.2 3.5 - 5.2 g/dL    Total Bilirubin 0.5 0.1 - 1.0 mg/dL    Alkaline Phosphatase 114 55 - 135 U/L    AST 10 10 - 40 U/L    ALT 10 10 - 44 U/L    Anion Gap 15 8 - 16 mmol/L    eGFR if African American 53.5 (A) >60 mL/min/1.73 m^2    eGFR if non  46.4 (A) >60 mL/min/1.73 m^2   Lipase    Collection Time: 03/02/17 10:10 PM   Result Value Ref Range    Lipase 8 4 - 60 U/L   POCT glucose    Collection Time: 03/02/17 10:35 PM   Result Value Ref Range    POCT Glucose 160 (H) 70 - 110 mg/dL     Significant Imaging:   KUB 03/03/17:  Unremarkable bowel gas pattern.

## 2017-03-03 NOTE — PLAN OF CARE
Problem: Patient Care Overview  Goal: Plan of Care Review  Outcome: Ongoing (interventions implemented as appropriate)  Pt is AAOx4. No falls/injury as pt calls for assistance when needed. Assisted patient with ambulating to bathroom this AM. Urine specimen sent. No s/s of skin breakdown as pt is independent with re-positioning self. Pain being monitored and controlled with PRN and scheduled meds. Antibiotic given as scheduled. Accu checks Q6H and insulin available if needed. Pt remains NPO with sips with meds. Bed in lowest position. Call light within reach. Will continue to monitor.

## 2017-03-03 NOTE — ASSESSMENT & PLAN NOTE
- Chronic debility likely contributing to fibromyalgia, which in turn likely contributing to her symptoms of gastroparesis as well.  - Will order PT/OT to further assess.

## 2017-03-03 NOTE — PROGRESS NOTES
Please note the following patient has been assigned to Ying Hayes RN CCM,  with [facility] for screening.    Please contact [facility] at ext [phone] with questions.    Thank you    Zainab Harp, SSC

## 2017-03-03 NOTE — ASSESSMENT & PLAN NOTE
- Gastroparesis s/p gastric pacemaker and subsequent removal, s/p sleeve gastrectomy; most recent episode 02/2017 treated with erythromycin but unable to afford medication as outpatient. Complicated by history of DM; patient additionally reports started having gastroparesis after abdominal surgery initially.  - As it appears to have been efficacious previously, will attempt erythromycin 500mg IV q8hr.  - Given symptom exacerbation with opioid medications will attempt to avoid opiates for pain control, as this will most likely worsen her symptoms. Discussed this with the patient; she voiced her understanding but is reticent to agree. During her last admission they attempted ketorolac for pain control; will attempt ketorolac 15mg IV q6hr PRN for pain initially. She received hydromorphone 1mg IV twice in ED.  - She reports promethazine is more effective for her than ondansetron. Will attempt nausea control with promethazine 25mg PO q6hr first, followed by ondansetron 8mg IV q8hr PRN, with option of promethazine 25mg MD q6hr PRN if unable to tolerate PO and ondansetron ineffective.  - Will continue to monitor and adjust pain control and nausea control as needed.  - Will consult social work for assistance with affording erythromycin as outpatient.

## 2017-03-03 NOTE — PLAN OF CARE
"   03/03/17 0930   Readmission Questionnaire   At the time of your discharge, did someone talk to you about what your health problems were? Yes   At the time of discharge, did someone talk to you about what to watch out for regarding worsening of your health problem? Yes   At the time of discharge, did someone talk to you about what to do if you experienced worsening of your health problem? Yes   At the time of discharge, did someone talk to you about which medication to take when you left the hospital and which ones to stop taking? Yes   At the time of discharge, did someone talk to you about when and where to follow up with a doctor after you left the hospital? Yes   What do you believe caused you to be sick enough to be re-admitted? ("I was sent home with antibiotics I couldn't afford so I had to come back to hospital")   How often do you need to have someone help you when you read instructions, pamphlets, or other written material from your doctor or pharmacy? Always   Do you have problems taking your medications as prescribed? No   Do you have any problems affording any of  your prescribed medications? No   Do you have problems obtaining/receiving your medications? No   Does the patient have transportation to healthcare appointments? Yes   Lives With child(leticia), adult   Living Arrangements house   Does the patient have family/friends to help with healtcare needs after discharge? yes   Who are your caregiver(s) and their phone number(s)? (Girma Ramirez, son, [phone])   Does your caregiver provide all the help you need? Yes   Are you currently feeling confused? No   Are you currently having problems thinking? No   Are you currently having memory problems? No   Have you felt down, depressed, or hopeless? 1   Have you felt little interest or pleasure in doing things? 0   In the last 7 days, my sleep quality was: good     Plan A: home with son  Plan B: home with HH  "

## 2017-03-03 NOTE — PT/OT/SLP EVAL
Physical Therapy  Evaluation    Homar Jerry   MRN: [id number]   Admitting Diagnosis: Gastroparesis    PT Received On: 03/03/17  PT Start Time: 1557     PT Stop Time: 1625    PT Total Time (min): 28 min       Billable Minutes:  Evaluation 20 and Therapeutic Exercise 8    Diagnosis: Gastroparesis  Comorbidities that affect PT POC:  · Rheumatoid arthritis   · Bipolar   · Fibromyalgia  · Asthma  · Morbid obesity  · L knee surgery    Past Medical History:   Diagnosis Date    Anemia     Anxiety     Asthma     usually with cold weather    Back pain     Bipolar 1 disorder 3/17/2015    Cervical cancer 2009    [facility]    Diabetes mellitus with hyperglycemia 1/17/2017    Fibrocystic breast     Fibromyalgia     Gastroparesis 2012    s/p sleeve to cover damaged nerves; s/p gastric pacemaker which did not help; due to nerve damage during surgery    Hypertension     Hypothyroidism, postsurgical     Morbid obesity     Prediabetes     Rheumatoid arthritis     Thyroid cancer 2003 & 2013    thyroidectomy    Urinary incontinence       Past Surgical History:   Procedure Laterality Date    ANKLE FRACTURE SURGERY Right     BREAST SURGERY      exploratory laparotomy due to complications of hysterectomy  2009    cervical cuff burst with air in abdomen    FRACTURE SURGERY      GASTRECTOMY      gastric pacemaker      gastric sleeve      HYSTERECTOMY  2009    total including cervix    KNEE ARTHROSCOPY W/ MENISCAL REPAIR Left 2016    KNEE SURGERY  05/12/2016    loop monitor  2016    multiple breast biopsies      TONSILLECTOMY      ureteral sling         Referring physician: Mateo Aguirre MD  Date referred to PT: 3/3/2017    General Precautions: Standard,      Patient History:  Living Environment Comment: Pt lives with her son in  1 tory home with no steps to enter.  She reports sveral hospitalizations for similar symptoms.  She states she was modified independent at home with RW for ambulation outside the home  "and no assistive device inside the home.  Her adult son does the cooking, cleaning and house upkeep.  He works and goes to school, but is available to assist at night. Pt states she performs her LE exercises for her knees alek and was able to demonstrate exercises for therapist.   Equipment Currently Used at Home:  (RW, BSC, shower seat)  DME owned (not currently used): none    Subjective:  Communicated with Rn prior to session.  "Depending on how much pain I am in I either spend the day in bed or up in a chair."  Chief Complaint: abd pain  Patient goals: none stated    Pain Rating: 10/10 abdomen     Objective:   Patient found with: peripheral IV supine in bed.  Pt agreed to therapy evaluation.  Patient participated well with minimal c/o lightheadedness with position changes 2* low BP today.       Cognitive Exam:  Oriented to: Person, Place, Time and Situation    Follows Commands/attention: Follows multistep  commands  Communication: clear/fluent, flat affect  Safety awareness/insight to disability: intact    Physical Exam:  Postural examination/scapula alignment: Rounded shoulder, Head forward and morbid obesity    Skin integrity: Visible skin intact  Edema: None noted     Sensation:   Intact    Lower Extremity Range of Motion:  Right Lower Extremity: WFL  Left Lower Extremity: WFL    Lower Extremity Strength:  Right Lower Extremity: WFL  Left Lower Extremity: WFL     Fine motor coordination:  Intact    Gross motor coordination: WFL    Functional Mobility:  Bed Mobility:  Rolling/Turning to Left: Independent  Rolling/Turning Right: Independent  Scooting/Bridging: Independent  Supine to Sit: Independent  Sit to Supine: Independent    Transfers:  Sit <> Stand Assistance: Stand By Assistance (2nd low BP and recent c/o of lightheadedness)    Gait:   Gait Distance: 150 feet SBA with RW.  Patient demonstrates decreased larry, forward flexed posture and increased DIOGENES, but no other significant gait deviations. "     Balance:   Static Sit: GOOD+: Takes MAXIMAL challenges from all directions.    Dynamic Sit: GOOD: Maintains balance through MODERATE excursions of active trunk movement  Static Stand: GOOD: Takes MODERATE challenges from all directions  Dynamic stand: GOOD: Needs SUPERVISION only during gait and able to self right with moderate     Therapeutic Activities and Exercises:  Therapist educated patient on the following:  · Role of PT, POC  · Importance of mobilization   · LE HEP to maintain strength  · OOB activity  · Compensations for s/s of orthostatic hypotension with position changes.     Sitting B LE exercises 15x/ea to maintain LE strength for functional mobility and prevent deconditioning while hospitalized:  · Ankle pumps  · Marching  · LAQ  · Hip ADD  · Glut sets    Pt states she is familiar with exercises from knee surgery and demonstrated independence with exercises.  Patient was able to repeat these and remember her supine exercises without verbal cues.  Therapist instructed patient to perform LE exercises daily while hospitalized.     AM-PAC 6 CLICK MOBILITY  How much help from another person does this patient currently need?   1 = Unable, Total/Dependent Assistance  2 = A lot, Maximum/Moderate Assistance  3 = A little, Minimum/Contact Guard/Supervision  4 = None, Modified [place]/Independent    Turning over in bed (including adjusting bedclothes, sheets and blankets)?: 4  Sitting down on and standing up from a chair with arms (e.g., wheelchair, bedside commode, etc.): 4  Moving to and from a bed to a chair (including a wheelchair)?: 3  Need to walk in hospital room?: 3  Climbing 3-5 steps with a railing?: 3     AM-PAC Raw Score CMS G-Code Modifier Level of Impairment Assistance   6 % Total / Unable   7 - 9 CM 80 - 100% Maximal Assist   10 - 14 CL 60 - 80% Moderate Assist   15 - 19 CK 40 - 60% Moderate Assist   20 - 22 CJ 20 - 40% Minimal Assist   23 CI 1-20% SBA / CGA   24 CH 0% Independent/  Mod I     Patient left supine with all lines intact, call button in reach and Rn notified.    Clinical Presentation: stable  Assessment:   Homar Jerry is a 41 y.o. female with a medical diagnosis of Gastroparesis and presents with slightly decreased functional mobility with s/s of orthostatic hypotension 2* low BP today.  Patient was independent with mobility prior to admit and only needed Rw for gait in the community for knee pain.  Now patient requires RW for safety with gait for short distances 2* c/o nausea, pain and weakness.  Patient has the potential to progress to independence with all mobility by d/c.  Pt is safe to ambulate in the halls with family or nursing using a RW.      Rehab identified problem list/impairments: Rehab identified problem list/impairments: impaired functional mobilty, pain    Rehab potential is good.    Activity tolerance: Good    Discharge recommendations: Discharge Facility/Level Of Care Needs: home     Barriers to discharge: Barriers to Discharge: None    Equipment recommendations: Equipment Needed After Discharge: none     GOALS:   Physical Therapy Goals        Problem: Physical Therapy Goal    Goal Priority Disciplines Outcome Goal Variances Interventions   Physical Therapy Goal     PT/OT, PT Ongoing (interventions implemented as appropriate)     Description:  Goals to be met by: 3/17/2017     Patient will increase functional independence with mobility by performin. Sit to stand transfer with [place] and no s/s of orthostatic hypotension  2. Bed to chair transfer with [place] using no assistive device  3. Gait  x 150 feet with [place] without an assistive device to regain PLOF.                 PLAN:    Patient to be seen 3 x/week to address the above listed problems via gait training, therapeutic activities, therapeutic exercises  Plan of Care expires: 17  Plan of Care reviewed with: patient          Miladys Newman, PT  2017

## 2017-03-03 NOTE — ED NOTES
Assume care of pt. Pt aaox4. Pt in NAD. Pt connected to cardiac monitor, BP and pulse ox. Call bell in reach

## 2017-03-03 NOTE — ED TRIAGE NOTES
Pt presents to ED with c/o epigastric pain, vomiting, and diarrhea x2 days. Pt states these symptoms are the same as previous gastroparesis episodes. Pt states she is a newly diagnosed diabetic and her sugar has been running high. Pt states she has had decreased PO intake as well.

## 2017-03-03 NOTE — ED PROVIDER NOTES
Encounter Date: 3/2/2017    SCRIBE #1 NOTE: I, Terry Ryder, am scribing for, and in the presence of,  Elliott Horn MD. I have scribed the following portions of the note - the Resident attestation and the EKG reading.       History     Chief Complaint   Patient presents with    Abdominal Pain     Reports hx of gastroparesis with worsening abdominal pain, watery diarrhea, decreased urinary output. Pt appears pale     Review of patient's allergies indicates:   Allergen Reactions    Dexamethasone Hives and Shortness Of Breath     Per pt, only steroid she is allergic to is dexamethasone    Ciprofloxacin      Counteracts with seroquel, xanax, tizanidine    Compazine [prochlorperazine] Hives and Itching    Lyrica [pregabalin]      depression    Prednisone      Gastroparesis flare up     HPI Comments: 41-year-old female past medical history of insulin-dependent diabetes presents for evaluation of abdominal pain.  Patient has had many episodes of gastroparesis in the past, reports that she was recently started getting them every month.  Patient states she takes insulin for her blood pressure control however glucose still runs 300 range.  Pain is been persistent in the upper left and right quadrant since yesterday morning.  She has been unable tolerate anything by mouth since onset of the pain is also having decreased frequency and volume of urination..  Also reporting multiple episodes of nausea and vomiting today.  She has also had yellow colored diarrhea today.  She reports that she has an appointment with a new endocrinologist next week. Patient tachycardic to 120s on arrival     The history is provided by the patient. No  was used.     Past Medical History:   Diagnosis Date    Anemia     Anxiety     Asthma     usually with cold weather    Back pain     Bipolar 1 disorder 3/17/2015    [facility]    Diabetes mellitus with hyperglycemia 1/17/2017    Fibrocystic breast      Fibromyalgia     Gastroparesis 2012    s/p sleeve to cover damaged nerves; s/p gastric pacemaker which did not help; due to nerve damage during surgery    Hypertension     Hypothyroidism, postsurgical     Morbid obesity     Prediabetes     Rheumatoid arthritis     Thyroid cancer 2003 & 2013    thyroidectomy    Urinary incontinence      Past Surgical History:   Procedure Laterality Date    ANKLE FRACTURE SURGERY Right     BREAST SURGERY      exploratory laparotomy due to complications of hysterectomy  2009    cervical cuff burst with air in abdomen    FRACTURE SURGERY      GASTRECTOMY      gastric pacemaker      gastric sleeve      HYSTERECTOMY  2009    total including cervix    KNEE ARTHROSCOPY W/ MENISCAL REPAIR Left 2016    KNEE SURGERY  05/12/2016    loop monitor  2016    multiple breast biopsies      TONSILLECTOMY      ureteral sling       Family History   Problem Relation Age of Onset    Heart disease Mother     Arthritis Mother     Rheum arthritis Mother     Heart attack Mother 55    Clotting disorder Mother     Hyperlipidemia Father     Clotting disorder Father     Thyroid cancer Paternal Grandmother     Diabetes Paternal Grandmother     Clotting disorder Paternal Grandfather     No Known Problems Sister     No Known Problems Brother     No Known Problems Maternal Aunt     No Known Problems Paternal Aunt     Bipolar disorder Maternal Uncle     Pancreatic cancer Maternal Uncle     No Known Problems Paternal Uncle     No Known Problems Maternal Grandfather     No Known Problems Maternal Grandmother     No Known Problems Cousin     ADD / ADHD Son     Pneumonia Brother     HIV Brother     Alcohol abuse Neg Hx     Anxiety disorder Neg Hx     Dementia Neg Hx     Depression Neg Hx     Drug abuse Neg Hx     OCD Neg Hx     Paranoid behavior Neg Hx     Physical abuse Neg Hx     Schizophrenia Neg Hx     Seizures Neg Hx     Self injury Neg Hx     Sexual abuse Neg  Hx     Suicide Neg Hx      Social History   Substance Use Topics    Smoking status: Never Smoker    Smokeless tobacco: Never Used    Alcohol use No     Review of Systems   Constitutional: Positive for activity change and appetite change. Negative for fatigue and fever.   HENT: Negative for nosebleeds.    Eyes: Negative for photophobia.   Respiratory: Positive for cough. Negative for wheezing and stridor.    Cardiovascular: Negative for chest pain and palpitations.   Gastrointestinal: Positive for abdominal distention, abdominal pain, diarrhea, nausea and vomiting. Negative for blood in stool.   Genitourinary: Positive for decreased urine volume and difficulty urinating. Negative for flank pain, frequency and hematuria.   Musculoskeletal: Negative for neck stiffness.   Skin: Negative for rash.   Neurological: Negative for headaches.   Psychiatric/Behavioral: Negative.        Physical Exam   Initial Vitals   BP Pulse Resp Temp SpO2   03/02/17 2026 03/02/17 2026 03/02/17 2026 03/02/17 2026 03/02/17 2026   138/91 123 18 98.5 °F (36.9 °C) 99 %     Physical Exam    Nursing note and vitals reviewed.  Constitutional: She appears well-developed and well-nourished. She is not diaphoretic. No distress.   HENT:   Head: Normocephalic and atraumatic.   Right Ear: External ear normal.   Left Ear: External ear normal.   Eyes: Right eye exhibits no discharge. Left eye exhibits no discharge.   Neck: Normal range of motion. No tracheal deviation present.   Cardiovascular: Regular rhythm and intact distal pulses.   No murmur heard.  Tachycardic in 110s on exam    Pulmonary/Chest: Breath sounds normal. No respiratory distress. She has no wheezes. She has no rhonchi. She has no rales. She exhibits no tenderness.   Abdominal: Soft. She exhibits no distension and no mass. There is tenderness (upper abd). There is no rebound and no guarding.   High pitched bowel sounds   Neurological: She is alert and oriented to person, place, and  time. She has normal strength. No cranial nerve deficit.   Skin: Skin is warm and dry. No erythema. No pallor.   Psychiatric: She has a normal mood and affect. Thought content normal.         ED Course   Procedures  Labs Reviewed   CBC W/ AUTO DIFFERENTIAL - Abnormal; Notable for the following:        Result Value    Platelets 360 (*)     All other components within normal limits   COMPREHENSIVE METABOLIC PANEL - Abnormal; Notable for the following:     CO2 22 (*)     Glucose 158 (*)     eGFR if  53.5 (*)     eGFR if non  46.4 (*)     All other components within normal limits   POCT GLUCOSE - Abnormal; Notable for the following:     POCT Glucose 160 (*)     All other components within normal limits   LIPASE          X-Rays:   Independently Interpreted Readings:   Abdomen:   Flat and Erect of Abdomen - No acute process.      Medical Decision Making:   History:   Old Medical Records: I decided to obtain old medical records.  Initial Assessment:   41-year-old female with a history of poorly controlled insulin dependent diabetes presents for evaluation of upper abdominal pain, nausea, vomiting, and diarrhea.  She reports this feels similar to her previous episodes of gastroparesis  Differential Diagnosis:    Gastroparesis, bowel obstruction, pancreatitis, gallbladder etiology Gastritis, GERD, DKA, hyperglycemia, HHS  Independently Interpreted Test(s):   I have ordered and independently interpreted X-rays - see prior notes.  I have ordered and independently interpreted EKG Reading(s) - see summary below       <> Summary of EKG Reading(s): Sinus tachycardia. Rate of 123. T-wave inversions inferiorly and laterally. No significant change from prior.  Clinical Tests:   Lab Tests: Ordered and Reviewed  Radiological Study: Ordered  Medical Tests: Ordered  ED Management:  We'll begin workup with plain films and lab work.  Final disposition will be pending regions but did tolerate by mouth and  workup.  Patient is a difficult IV stick, and we have been unable to insert a peripheral IV at this time.  We'll treat pain with Dilaudid and nausea with IV Zofran once IV is established. Patient tachycardic, likely due to volume depletion+ pain,  IVF ordered. Will also start fluids.  Other:   I have discussed this case with another health care provider.       <> Summary of the Discussion: IM         HO-II Update:  After 2 rounds of Dilaudid, and Zofran, and Phenergan patient is still experiencing nausea with inability to tolerate by mouth and still having pain.  We will admit the patient to Dr. Steve Wilson team as an inpatient.    Ashwin Foster MD, PGY- 2  12:50 AM        Scribe Attestation:   Scribe #1: I performed the above scribed service and the documentation accurately describes the services I performed. I attest to the accuracy of the note.    Attending Attestation:   Physician Attestation Statement for Resident:  As the supervising MD   Physician Attestation Statement: I have personally seen and examined this patient.   I agree with the above history. -: Abdominal pain. Nausea/vomiting.      As the supervising MD I agree with the above PE.    As the supervising MD I agree with the above treatment, course, plan, and disposition.          Physician Attestation for Scribe:  Physician Attestation Statement for Scribe #1: I, Elliott Horn MD, reviewed documentation, as scribed by Terry Ryder in my presence, and it is both accurate and complete.                 ED Course     Clinical Impression:   The primary encounter diagnosis was Gastroparesis. Diagnoses of Intractable vomiting with nausea, unspecified vomiting type, Anxiety, Uncomplicated asthma, unspecified asthma severity, Bipolar 1 disorder, Debility, Essential hypertension, Fibromyalgia, Hypothyroidism, postsurgical, Migraine without status migrainosus, not intractable, unspecified migraine type, Type 2 diabetes mellitus with hyperglycemia, without long-term  current use of insulin, Vitamin D deficiency, Status post placement of implantable loop recorder, History of thyroid cancer, Constipation, unspecified constipation type, Primary osteoarthritis of both knees, Drug-seeking behavior, Rheumatoid arthritis, involving unspecified site, unspecified rheumatoid factor presence, Chronic narcotic dependence, Morbid obesity due to excess calories, 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          Leroy Foster MD  Resident  03/03/17 0050       Elliott Horn III, MD  03/07/17 3708

## 2017-03-03 NOTE — PLAN OF CARE
03/03/17 0931   Discharge Assessment   Assessment Type Discharge Planning Assessment   Confirmed/corrected address and phone number on facesheet? Yes   Assessment information obtained from? Patient   Expected Length of Stay (days) 2   Communicated expected length of stay with patient/caregiver yes   Prior to hospitilization cognitive status: Alert/Oriented   Prior to hospitalization functional status: Independent   Current cognitive status: Alert/Oriented   Current Functional Status: Independent   Arrived From home or self-care   Lives With child(leticia), adult   Able to Return to Prior Arrangements unable to determine at this time (comments)   Is patient able to care for self after discharge? Unable to determine at this time (comments)   Does the patient have family/friends to help with healtcare needs after discharge? yes   How many people do you have in your home that can help with your care after discharge? 1   Who are your caregiver(s) and their phone number(s)? Girma Ramirez, [place], [phone]   Patient's perception of discharge disposition home or selfcare   Readmission Within The Last 30 Days previous discharge plan unsuccessful   Patient currently being followed by outpatient case management? No   Patient currently receives home health services? No   Does the patient currently use HME? No   Patient currently receives private duty nursing? N/A   Patient currently receives any other outside agency services? No   Equipment Currently Used at Home walker, rolling;rollator;bedside commode;shower chair;wheelchair   Do you have any problems affording any of your prescribed medications? Yes   If yes, what medications? (PO antibiotics)   Is the patient taking medications as prescribed? yes   Do you have any financial concerns preventing you from receiving the healthcare you need? Yes   Does the patient have transportation to healthcare appointments? Yes   Transportation Available family or friend will provide   On  Dialysis? No   Does the patient receive services at the [facility]? No   Are there any open cases? No   Discharge Plan A Home;Home with family   Discharge Plan B Home;Home with family;Home Health   Patient/Family In Agreement With Plan yes     Mary Cabrera MD  [address] [facility] [address]      [facility] - [place] - [address]  [address] [place] [address]  Phone: [phone] Fax: [phone]    [facility] - [place], [place] - [address] [place] [id number]  Phone: [phone] Fax: [phone]    [facility] - [place], [place] - [address] [place] [address]  Phone: [phone] Fax: [phone]      Payor: HU[employer] MANAGED MEDICARE / Plan: [employer] MEDICARE HMO / Product Type: Capitation /

## 2017-03-03 NOTE — PLAN OF CARE
Problem: Physical Therapy Goal  Goal: Physical Therapy Goal  Goals to be met by: 3/17/2017     Patient will increase functional independence with mobility by performin. Sit to stand transfer with [place] and no s/s of orthostatic hypotension  2. Bed to chair transfer with [place] using no assistive device  3. Gait x 150 feet with [place] without an assistive device to regain PLOF.   Outcome: Ongoing (interventions implemented as appropriate)  Initial eval completed.  Results, POC and goals discussed with patient.

## 2017-03-04 VITALS
WEIGHT: 232 LBS | OXYGEN SATURATION: 94 % | HEIGHT: 65 IN | TEMPERATURE: 98 F | RESPIRATION RATE: 15 BRPM | SYSTOLIC BLOOD PRESSURE: 110 MMHG | BODY MASS INDEX: 38.65 KG/M2 | HEART RATE: 80 BPM | DIASTOLIC BLOOD PRESSURE: 62 MMHG

## 2017-03-04 LAB
ANION GAP SERPL CALC-SCNC: 11 MMOL/L
BUN SERPL-MCNC: 14 MG/DL
CALCIUM SERPL-MCNC: 7.8 MG/DL
CHLORIDE SERPL-SCNC: 107 MMOL/L
CO2 SERPL-SCNC: 22 MMOL/L
CREAT SERPL-MCNC: 0.8 MG/DL
EST. GFR  (AFRICAN AMERICAN): >60 ML/MIN/1.73 M^2
EST. GFR  (NON AFRICAN AMERICAN): >60 ML/MIN/1.73 M^2
ESTIMATED AVG GLUCOSE: 249 MG/DL
GLUCOSE SERPL-MCNC: 116 MG/DL
HBA1C MFR BLD HPLC: 10.3 %
MAGNESIUM SERPL-MCNC: 1.7 MG/DL
PHOSPHATE SERPL-MCNC: 4 MG/DL
POCT GLUCOSE: 112 MG/DL (ref 70–110)
POCT GLUCOSE: 128 MG/DL (ref 70–110)
POTASSIUM SERPL-SCNC: 3 MMOL/L
SODIUM SERPL-SCNC: 140 MMOL/L

## 2017-03-04 PROCEDURE — 25000003 PHARM REV CODE 250: Performed by: HOSPITALIST

## 2017-03-04 PROCEDURE — 99239 HOSP IP/OBS DSCHRG MGMT >30: CPT | Mod: ,,, | Performed by: INTERNAL MEDICINE

## 2017-03-04 PROCEDURE — 80048 BASIC METABOLIC PNL TOTAL CA: CPT

## 2017-03-04 PROCEDURE — 25000003 PHARM REV CODE 250: Performed by: INTERNAL MEDICINE

## 2017-03-04 PROCEDURE — 63600175 PHARM REV CODE 636 W HCPCS: Performed by: INTERNAL MEDICINE

## 2017-03-04 PROCEDURE — 63600175 PHARM REV CODE 636 W HCPCS: Performed by: HOSPITALIST

## 2017-03-04 PROCEDURE — 84100 ASSAY OF PHOSPHORUS: CPT

## 2017-03-04 PROCEDURE — 97165 OT EVAL LOW COMPLEX 30 MIN: CPT

## 2017-03-04 PROCEDURE — 83735 ASSAY OF MAGNESIUM: CPT

## 2017-03-04 PROCEDURE — 25000003 PHARM REV CODE 250: Performed by: STUDENT IN AN ORGANIZED HEALTH CARE EDUCATION/TRAINING PROGRAM

## 2017-03-04 PROCEDURE — 36415 COLL VENOUS BLD VENIPUNCTURE: CPT

## 2017-03-04 RX ORDER — POTASSIUM CHLORIDE 7.45 MG/ML
10 INJECTION INTRAVENOUS
Status: COMPLETED | OUTPATIENT
Start: 2017-03-04 | End: 2017-03-04

## 2017-03-04 RX ORDER — POTASSIUM CHLORIDE 20 MEQ/1
60 TABLET, EXTENDED RELEASE ORAL ONCE
Status: DISCONTINUED | OUTPATIENT
Start: 2017-03-04 | End: 2017-03-04

## 2017-03-04 RX ADMIN — POTASSIUM CHLORIDE 10 MEQ: 10 INJECTION, SOLUTION INTRAVENOUS at 10:03

## 2017-03-04 RX ADMIN — FLUTICASONE PROPIONATE 2 SPRAY: 50 SPRAY, METERED NASAL at 09:03

## 2017-03-04 RX ADMIN — ERYTHROMYCIN LACTOBIONATE 500 MG: 500 INJECTION, POWDER, LYOPHILIZED, FOR SOLUTION INTRAVENOUS at 03:03

## 2017-03-04 RX ADMIN — TRAMADOL HYDROCHLORIDE 50 MG: 50 TABLET, COATED ORAL at 05:03

## 2017-03-04 RX ADMIN — POTASSIUM CHLORIDE 10 MEQ: 10 INJECTION, SOLUTION INTRAVENOUS at 01:03

## 2017-03-04 RX ADMIN — HYDROXYZINE PAMOATE 25 MG: 25 CAPSULE ORAL at 08:03

## 2017-03-04 RX ADMIN — TRAMADOL HYDROCHLORIDE 50 MG: 50 TABLET, COATED ORAL at 11:03

## 2017-03-04 RX ADMIN — CALCIUM 1000 MG: 500 TABLET ORAL at 08:03

## 2017-03-04 RX ADMIN — POTASSIUM CHLORIDE 10 MEQ: 10 INJECTION, SOLUTION INTRAVENOUS at 08:03

## 2017-03-04 RX ADMIN — VITAMIN D, TAB 1000IU (100/BT) 2000 UNITS: 25 TAB at 08:03

## 2017-03-04 RX ADMIN — LEVOTHYROXINE SODIUM 150 MCG: 75 TABLET ORAL at 05:03

## 2017-03-04 RX ADMIN — PANTOPRAZOLE SODIUM 40 MG: 40 TABLET, DELAYED RELEASE ORAL at 08:03

## 2017-03-04 RX ADMIN — GABAPENTIN 600 MG: 300 CAPSULE ORAL at 05:03

## 2017-03-04 RX ADMIN — CARVEDILOL 25 MG: 25 TABLET, FILM COATED ORAL at 08:03

## 2017-03-04 RX ADMIN — GABAPENTIN 600 MG: 300 CAPSULE ORAL at 01:03

## 2017-03-04 RX ADMIN — ERYTHROMYCIN LACTOBIONATE 500 MG: 500 INJECTION, POWDER, LYOPHILIZED, FOR SOLUTION INTRAVENOUS at 01:03

## 2017-03-04 RX ADMIN — HEPARIN SODIUM 5000 UNITS: 5000 INJECTION, SOLUTION INTRAVENOUS; SUBCUTANEOUS at 05:03

## 2017-03-04 RX ADMIN — SODIUM CHLORIDE 500 ML: 0.9 INJECTION, SOLUTION INTRAVENOUS at 08:03

## 2017-03-04 RX ADMIN — POTASSIUM CHLORIDE 10 MEQ: 10 INJECTION, SOLUTION INTRAVENOUS at 12:03

## 2017-03-04 NOTE — PLAN OF CARE
Service Cab ([phone]) requested for 4:30PM. Pt to be at front of hospital.    Lenka Acosta LMSW, CCM  On Call SW

## 2017-03-04 NOTE — DISCHARGE SUMMARY
"DISCHARGE SUMMARY  Hospital Medicine    Team: [facility] HOSP MED 5    Patient Name: Homar Jerry  YOB: 1975    Admit Date: 3/2/2017    Discharge Date: 03/04/2017    Discharge Attending Physician: Veto Stern MD     Diagnoses:  Active Hospital Problems    Diagnosis  POA    *Gastroparesis [K31.84]  Yes    Vitamin D deficiency [E55.9]  Yes     Chronic    Type 2 diabetes mellitus with hyperglycemia, without long-term current use of insulin [E11.65]  Yes     Chronic    Debility [R53.81]  Yes     Chronic    Asthma [J45.909]  Yes     Chronic     usually with cold weather      Essential hypertension [I10]  Yes     Chronic    Migraines [G43.909]  Yes     Chronic    Bipolar 1 disorder [F31.9]  Yes     Chronic    Hypothyroidism, postsurgical [E89.0]  Yes     Chronic    Anxiety [F41.9]  Yes     Chronic    Fibromyalgia [M79.7]  Yes     Chronic      Resolved Hospital Problems    Diagnosis Date Resolved POA   No resolved problems to display.       Discharged Condition: admit problems have stabilized      HOSPITAL COURSE:    Initial Presentation:     Ms. Jerry is a 41/F with PMH HTN, hypothyroidism, anxiety, bipolar type I, rheumatoid arthritis, DMII and gastroparesis s/p gastric pacemaker (with subsequent removal) and sleeve gastrectomy who presented to the ED with nausea, vomiting and abdominal pain. History was obtained from the patient and medical records. She reported that she had begun having worsening pain the morning prior to admission, followed by nausea and vomiting. She stated she has had about 6 episodes of emesis in total, all non-bloody. She was last admitted around 02/02/17 with similar complaints and started on IV erythromycin; she was able to be discharged several days later but stated that she was unable to afford PO erythromycin because it cost "about 700 dollars for me, and I couldn't afford that." She has otherwise kept up with her other medications.       Course of " Principle Problem for Admission:    Gastroparesis  - Gastroparesis s/p gastric pacemaker and subsequent removal, s/p sleeve gastrectomy; most recent episode 02/2017 treated with erythromycin but unable to afford medication as outpatient. Complicated by history of DM; patient additionally reported started having gastroparesis after abdominal surgery initially.  - As it appeared to have been efficacious previously, restarted erythromycin 500mg IV q8hr.  - Given symptom exacerbation with opioid medications, avoided opiates for pain control, as this was most likely to worsen her symptoms. Discussed this with the patient; she voiced her understanding but was reticent to agree. She received hydromorphone 1mg IV twice in ED but was only given tramadol thereafter. Upon review, pharmacy informed us that she receives 120 Percocet 10s monthly. She was not given any narcotic rx upon discharge.   - She reported promethazine was more effective for her than ondansetron. Promethazine 25mg PO q6hr first, followed by ondansetron 8mg IV q8hr PRN, with option of promethazine 25mg PA q6hr PRN if unable to tolerate PO and ondansetron ineffective written for.  - Consulted social work for assistance with affording erythromycin as outpatient. Patient was provided with 1 month of her medication. Only 1 month therapy is indicated due to side effect risks with prolonged use. Advised to f/u w/ PCP.        Other Medical Problems Addressed in the Hospital:    Fibromyalgia  - Continued gabapentin 600mg PO TID.     Anxiety  - Continued alprazolam 1mg PO qHS, hydroxyzine 25mg PO daily.     Bipolar 1 disorder  - Continued olanzapine 10mg PO qHS, mirtazapine 30mg PO qHS.     Hypothyroidism, postsurgical  - Continued levothyroxine 150mcg PO daily.     Migraines  - Takes topiramate at home PRN. Will resume if having migraine aura in hospital.     Essential hypertension  - Continued HCTZ 12.5mg PO dialy, carvedilol 25mg PO BID.     Asthma  - Continued  albuterol 2 puff inhaled q6hr PRN SOB/wheezing.     Debility  - Chronic debility likely contributing to fibromyalgia, which in turn likely contributing to her symptoms of gastroparesis as well.  - PT/OT     Type 2 diabetes mellitus with hyperglycemia, without long-term current use of insulin  - While NPO, held off on insulin detemir, mealtime aspart.  - Started low-dose sliding scale insulin aspart while NPO.Resume home regimen once she returns to normal diet.      Vitamin D deficiency  - Continued calcium carbonate 1000mg PO daily, vitamin D 2000U PO daily.     Other Pertinent Lab Findings:  Serial CBC, CMP, POCT glucose, UA, Mag, Phos stable (see MR for full results)    Pertinent/Significant Diagnostic Studies:   KUB 3/3  Unremarkable bowel gas pattern.    Disposition:  Home      Future Scheduled Appointments:  No future appointments.    Follow-up Plans from This Hospitalization:  Follow-up Information     Follow up with Mary Cabrera MD In 2 weeks.    Specialty:  Internal Medicine    Why:  hospital follow up; diabetes mgmt    Contact information:    [address]  [place] [address] [phone]            Last CBC/BMP/HgbA1c (if applicable):  Recent Results (from the past 336 hour(s))   CBC W/ AUTO DIFFERENTIAL    Collection Time: 03/02/17 10:10 PM   Result Value Ref Range    WBC 11.92 3.90 - 12.70 K/uL    Hemoglobin 14.1 12.0 - 16.0 g/dL    Hematocrit 42.4 37.0 - 48.5 %    Platelets 360 (H) 150 - 350 K/uL     Recent Results (from the past 336 hour(s))   Basic Metabolic Panel (BMP)    Collection Time: 03/04/17  4:36 AM   Result Value Ref Range    Sodium 140 136 - 145 mmol/L    Potassium 3.0 (L) 3.5 - 5.1 mmol/L    Chloride 107 95 - 110 mmol/L    CO2 22 (L) 23 - 29 mmol/L    BUN, Bld 14 6 - 20 mg/dL    Creatinine 0.8 0.5 - 1.4 mg/dL    Calcium 7.8 (L) 8.7 - 10.5 mg/dL    Anion Gap 11 8 - 16 mmol/L   Basic Metabolic Panel (BMP)    Collection Time: 03/03/17  6:39 AM   Result Value Ref Range    Sodium  139 136 - 145 mmol/L    Potassium 3.4 (L) 3.5 - 5.1 mmol/L    Chloride 106 95 - 110 mmol/L    CO2 22 (L) 23 - 29 mmol/L    BUN, Bld 20 6 - 20 mg/dL    Creatinine 1.0 0.5 - 1.4 mg/dL    Calcium 8.1 (L) 8.7 - 10.5 mg/dL    Anion Gap 11 8 - 16 mmol/L     Lab Results   Component Value Date    HGBA1C 10.3 (H) 03/03/2017       Discharge Medication List:     Medication List      START taking these medications          erythromycin base 500 MG tablet   Commonly known as:  E-MYCIN   Take 1 tablet (500 mg total) by mouth 3 (three) times daily with meals.         CHANGE how you take these medications          levocetirizine 5 MG tablet   Commonly known as:  XYZAL   Take 1 tablet (5 mg total) by mouth every evening.   What changed:    - when to take this  - reasons to take this         CONTINUE taking these medications          albuterol 90 mcg/actuation inhaler   Inhale 2 puffs into the lungs every 6 (six) hours as needed for Wheezing. Please provide cheapest brand/generic formulation       alprazolam 1 MG tablet   Commonly known as:  XANAX   Take 1 tablet (1 mg total) by mouth every evening.       blood sugar diagnostic Strp   1 each by Misc.(Non-Drug; Combo Route) route 4 (four) times daily with meals and nightly.       blood-glucose meter kit   Use as instructed       calcium carbonate 500 mg calcium (1,250 mg) tablet   Commonly known as:  OS-MARIAN   Take 2 tablets (1,000 mg total) by mouth once daily.       carvedilol 12.5 MG tablet   Commonly known as:  COREG   Take 2 tablets (25 mg total) by mouth 2 (two) times daily.       cholecalciferol (vitamin D3) 1,000 unit capsule   Take 2 capsules (2,000 Units total) by mouth once daily.       fluticasone 50 mcg/actuation nasal spray   Commonly known as:  FLONASE       gabapentin 600 MG tablet   Commonly known as:  NEURONTIN       hydrochlorothiazide 12.5 mg capsule   Commonly known as:  MICROZIDE   TAKE 1 CAPSULE BY MOUTH DAILY       hydrOXYzine pamoate 25 MG Cap   Commonly known  "as:  VISTARIL   Take 1 capsule (25 mg total) by mouth once daily.       insulin aspart 100 unit/mL Inpn pen   Commonly known as:  NovoLOG   Inject 16 Units into the skin 3 (three) times daily with meals.       insulin detemir 100 unit/mL (3 mL) Inpn pen   Commonly known as:  LEVEMIR FLEXTOUCH   Inject 30 Units into the skin 2 (two) times daily.       lancets Misc   1 each by Misc.(Non-Drug; Combo Route) route 4 (four) times daily with meals and nightly.       levothyroxine 150 MCG tablet   Commonly known as:  SYNTHROID   Take 1 tablet (150 mcg total) by mouth once daily.       meclizine 25 mg tablet   Commonly known as:  ANTIVERT   Take 1 tablet (25 mg total) by mouth 3 (three) times daily as needed for Dizziness or Nausea.       mirtazapine 30 MG tablet   Commonly known as:  REMERON   Take 1 tablet (30 mg total) by mouth every evening.       olanzapine 10 MG tablet   Commonly known as:  ZyPREXA   Take 1 tablet (10 mg total) by mouth every evening.       ondansetron 8 MG Tbdl   Commonly known as:  ZOFRAN-ODT   Take 1 tablet (8 mg total) by mouth every 6 (six) hours as needed (nausea).       oxycodone-acetaminophen  mg per tablet   Commonly known as:  PERCOCET       pantoprazole 40 MG tablet   Commonly known as:  PROTONIX   Take 1 tablet (40 mg total) by mouth 2 (two) times daily before meals.       pen needle, diabetic 31 gauge x 5/16" Ndle   1 each by Misc.(Non-Drug; Combo Route) route 5 (five) times daily.       promethazine 25 MG tablet   Commonly known as:  PHENERGAN   Take 1 tablet (25 mg total) by mouth every 6 (six) hours as needed (nausea).       tizanidine 4 MG tablet   Commonly known as:  ZANAFLEX   TAKE 3 TABLETS (12 MG TOTAL) BY MOUTH EVERY EVENING.       topiramate 25 MG tablet   Commonly known as:  TOPAMAX   Take 1 tablet (25 mg total) by mouth daily as needed (migraines).            Where to Get Your Medications      These medications were sent to [facility] - [place] [id number] [address] [address]     Phone:  [phone]     erythromycin base 500 MG tablet             Patient Instructions:    Discharge Procedure Orders  Ambulatory referral to Outpatient Case Management   Referral Priority: Routine Referral Type: Consultation   Referral Reason: Specialty Services Required    Number of Visits Requested: 1      Diet general         Signing Physician:  Chloé Torres MD

## 2017-03-04 NOTE — NURSING
RN notified Dr. Tejada re: pt low BP and requested info on which meds ok to give.  Per MD hold Coreg and ok to give all meds including PRN tramadol.

## 2017-03-04 NOTE — PLAN OF CARE
Problem: Patient Care Overview  Goal: Plan of Care Review  Outcome: Ongoing (interventions implemented as appropriate)  Pt plan of care reviewed and updated. Pt frequently assessed for pain and safety issues. Pt receives prn pain meds when needed. Vs as charted. Will continue to monitor

## 2017-03-04 NOTE — PT/OT/SLP EVAL
Occupational Therapy  Evaluation, and Discharge    Homar Jerry   MRN: [id number]   Admitting Diagnosis: Gastroparesis    OT Date of Treatment: 03/04/17   OT Start Time: 0907  OT Stop Time: 0917  OT Total Time (min): 10 min    Billable Minutes:  Evaluation 10    Diagnosis: Gastroparesis       Past Medical History:   Diagnosis Date    Anemia     Anxiety     Asthma     usually with cold weather    Back pain     Bipolar 1 disorder 3/17/2015    Cervical cancer 2009    DOT    Diabetes mellitus with hyperglycemia 1/17/2017    Fibrocystic breast     Fibromyalgia     Gastroparesis 2012    s/p sleeve to cover damaged nerves; s/p gastric pacemaker which did not help; due to nerve damage during surgery    Hypertension     Hypothyroidism, postsurgical     Morbid obesity     Prediabetes     Rheumatoid arthritis     Thyroid cancer 2003 & 2013    thyroidectomy    Urinary incontinence       Past Surgical History:   Procedure Laterality Date    ANKLE FRACTURE SURGERY Right     BREAST SURGERY      exploratory laparotomy due to complications of hysterectomy  2009    cervical cuff burst with air in abdomen    FRACTURE SURGERY      GASTRECTOMY      gastric pacemaker      gastric sleeve      HYSTERECTOMY  2009    total including cervix    KNEE ARTHROSCOPY W/ MENISCAL REPAIR Left 2016    KNEE SURGERY  05/12/2016    loop monitor  2016    multiple breast biopsies      TONSILLECTOMY      ureteral sling         Referring physician: TANNA Aguirre  Date referred to OT: 3/3/2017    General Precautions: Standard, fall  Orthopedic Precautions:    Braces:      Do you have any cultural, spiritual, Hindu conflicts, given your current situation?: no     Patient History:  Living Environment  Living Environment Comment: Pt lives with son in 1SH with 0 ROSENDO. Pt is Mod (I)<>(I) with ADLs and functional mobility PTA. Pt has RW for community mobility, shower chair, and bedside commode. PT son completes IADLs and is  "away during the day for work and school. Pt does not have A during the day.  Equipment Currently Used at Home: walker, rolling, bedside commode, shower chair    Prior level of function:   Bed Mobility/Transfers: independent  Grooming: independent  Bathing: independent  Upper Body Dressing: independent  Lower Body Dressing: independent  Toileting: independent  Home Management Skills: independent  Driving License: No  Mode of Transportation: Family  Occupation: On disability     Dominant hand: right    Subjective:  Communicated with RN prior to session.  "they are DCing me today." "I am ok." "I can do all of that."  Chief Complaint: pain  Patient/Family stated goals: return home    Pain Rating: 10/10  Location - Side: Bilateral  Location - Orientation: generalized  Location:  (back and abdomen)  Pain Addressed: Reposition, Distraction, Cessation of Activity, Nurse notified  Pain Rating Post-Intervention: 10/10    Objective:  Patient found with: peripheral IV    Cognitive Exam:  Oriented to: Person, Place, Time and Situation  Follows Commands/attention: Follows multistep  commands  Communication: clear/fluent  Memory:  No Deficits noted  Safety awareness/insight to disability: intact  Coping skills/emotional control: Appropriate to situation    Visual/perceptual:  Intact    Physical Exam:  Postural examination/scapula alignment: No postural abnormalities identified  Skin integrity: Visible skin intact  Edema: None noted     Sensation:   Intact    Upper Extremity Range of Motion:  Right Upper Extremity: WFL  Left Upper Extremity: WFL    Upper Extremity Strength:  Right Upper Extremity: WFL  Left Upper Extremity: WFL   Strength: grossly 4/5    Fine motor coordination:   Intact    Gross motor coordination: WFL    Functional Mobility:  Bed Mobility:  Scooting/Bridging: Independent  Supine to Sit: Independent (HOB flat)  Sit to Supine: Independent (HOB flat)    Transfers:  Sit <> Stand Assistance: Modified Independent " "(hand rail for support during transitions at EOB)  Toilet Transfer Technique: Stand Pivot  Toilet Transfer Assistance: Modified Independent  Toilet Transfer Assistive Device: grab bar    Functional Ambulation: Pt performed functional mobility ~25ft with (I) and no AD.      Activities of Daily Living:       UE Dressing Level of Assistance:  (simulate (I) and reported no difficulty with UBD)    LE Dressing Level of Assistance: Independent (don/doff tennis shoes)    Grooming Position: Standing at sink  Grooming Level of Assistance: Modified independent (simulated washing hands standing at sink without difficutly; applies lotion seated in bed )        Therapeutic Activities and Exercises:  Pt educated on safety with daily tasks OOB, and importance of participating in daily ax. Pt whiteboard updated.      AM-PAC 6 CLICK ADL  How much help from another person does this patient currently need?  1 = Unable, Total/Dependent Assistance  2 = A lot, Maximum/Moderate Assistance  3 = A little, Minimum/Contact Guard/Supervision  4 = None, Modified [place]/Independent    Putting on and taking off regular lower body clothing? : 4  Bathing (including washing, rinsing, drying)?: 4  Toileting, which includes using toilet, bedpan, or urinal? : 4  Putting on and taking off regular upper body clothing?: 4  Taking care of personal grooming such as brushing teeth?: 4  Eating meals?: 4  Total Score: 24    AM-PAC Raw Score CMS "G-Code Modifier Level of Impairment Assistance   6 % Total / Unable   7 - 9 CM 80 - 100% Maximal Assist   10 - 14 CL 60 - 80% Moderate Assist   15 - 19 CK 40 - 60% Moderate Assist   20 - 22 CJ 20 - 40% Minimal Assist   23 CI 1-20% SBA / CGA   24 CH 0% Independent/ Mod I       Patient left supine with all lines intact, call button in reach and RN notified    Assessment:  Homar Jerry is a 41 y.o. female with a medical diagnosis of Gastroparesis. Pt tolerated session well and put forth good effort " to participate. Pt presented with high (I) and only slight deficits in functional performance. Pt being DC'd by acute OT 2* performing at baseline and demonstrating no significant decline in functional performance. Pt required no further OT at this time.         Rehab identified problem list/impairments: Rehab identified problem list/impairments: pain    Rehab potential is good.    Activity tolerance: Good    Discharge recommendations: Discharge Facility/Level Of Care Needs: home     Barriers to discharge: Barriers to Discharge: Decreased caregiver support (home alone during the day while son is away at work or school)    Equipment recommendations: none     GOALS:   Occupational Therapy Goals     Not on file      Multidisciplinary Problems (Resolved)        Problem: Occupational Therapy Goal    Goal Priority Disciplines Outcome Interventions   Occupational Therapy Goal   (Resolved)     OT, PT/OT Outcome(s) achieved    Description:  No goals set 2* high (I).                PLAN:  DC from acute OT.  Plan of Care reviewed with: patient         Haylee SUZIE Tabor  03/04/2017

## 2017-03-04 NOTE — PLAN OF CARE
Problem: Occupational Therapy Goal  Goal: Occupational Therapy Goal  No goals set 2* high (I).  Outcome: Outcome(s) achieved Date Met:  03/04/17  No goals set this date 2* high (I) demonstrated during eval. Pt does not require further OT at this time due to performing functional tasks at baseline.  SUZIE Roland  3/4/2017

## 2017-03-04 NOTE — PROGRESS NOTES
Pt will bedischarged to home via wheelchair. Pt denies denies pain at the present time. Condition stable. Follows commands. Pt given prescriptions and copy of discharge home care instructions. Pt verbalized understanding of activity limitations and s/s of when to call the dr. Pt also given information on followup appointment. All questions answered. All belongings with the patient. Pt verbalized understanding of all discharge instructions.     Awaiting arrangement of ride home.

## 2017-03-07 ENCOUNTER — PATIENT OUTREACH (OUTPATIENT)
Dept: ADMINISTRATIVE | Facility: CLINIC | Age: 42
End: 2017-03-07
Payer: MEDICARE

## 2017-03-07 NOTE — PT/OT/SLP DISCHARGE
Physical Therapy Discharge Summary    Homar Jerry  MRN: [id number]   Gastroparesis     Patient Discharged from acute Physical Therapy on 3/4/2017.  Please refer to prior PT noted date on 3/3/2017 for functional status.     Assessment:   Patient appropriate for care in another setting.  GOALS:   Physical Therapy Goals     Not on file      Multidisciplinary Problems (Resolved)        Problem: Physical Therapy Goal    Goal Priority Disciplines Outcome Goal Variances Interventions   Physical Therapy Goal   (Resolved)     PT/OT, PT Outcome(s) achieved     Description:  Goals to be met by: 3/17/2017     Patient will increase functional independence with mobility by performin. Sit to stand transfer with [place] and no s/s of orthostatic hypotension  2. Bed to chair transfer with [place] using no assistive device  3. Gait  x 150 feet with [place] without an assistive device to regain PLOF.               Reasons for Discontinuation of Therapy Services  Transfer to alternate level of care.      Plan:  Patient Discharged to: Home no PT services needed.    Miladys Newman, PT  3/4/2017

## 2017-03-07 NOTE — PATIENT INSTRUCTIONS
Diabetic Gastroparesis  Gastroparesis, or delayed gastric emptying, is when food moves through the stomach more slowly than normal. In someone with diabetes, its caused by damage to the vagus nerve because of chronic high blood sugar. (Other chronic diseases may also cause gastroparesis.) The vagus nerve helps control how food moves through the digestive system. When this nerve is damaged, the movement of food is slowed down or stopped.  Food that stays in the stomach for too long can cause problems. Food can ferment in the stomach, causing bacteria to grow. Undigested food can also harden into masses called bezoars. These can cause nausea and vomiting. In some cases they may block food from passing from the stomach to the small intestine.  Gastroparesis can make it hard to manage blood sugar levels. It can also cause problems with vitamins and minerals being absorbed into the body as well as maintaining a healthy weight.    Symptoms of diabetic gastroparesis include:  · Nausea  · Vomiting  · Feeling full after eating a small amount of food  · Abdominal pain or cramps  · Heartburn  · Abdominal bloating  · Weight loss  · Loss of appetite  · High or low blood sugar levels  There are several different tests and studies that can help diagnose gastroparesis.  For many people, gastroparesis is a lifelong condition. Managing it will likely include changes in how you eat. You may be prescribed medicine to help with blood sugar levels, help your symptoms like nausea and vomiting, or act on muscles in the digestive system. In severe cases, surgery to insert a feeding tube may be needed. Or a special device may be implanted to encourage the stomach muscles to contract.  Home care  These changes may help relieve your symptoms:  · Take prescribed medicines exactly as directed.  · Eat a liquid or soft diet if advised.  · Eat frequent small meals instead of less frequent large meals.  · Avoid foods that are high in fat (such as  whole milk, cheese, and fried foods) or fiber (such as beans, and many fruits and vegetables). These can slow digestion.  · People with diabetes should always control sugar levels as well as possible as directed by your healthcare provider.  Follow-up care  Follow up with your healthcare provider as advised. Regular visits may be needed to manage gastroparesis.  When to seek medical advice  Call your healthcare provider right away if any of these occur.  · Severe pain in your abdomen  · Inability to keep down food or liquids  · Weight loss  · Other symptoms as directed by your healthcare provider  Date Last Reviewed: 12/27/2015  © 2458-8076 [email or web address]. [address] [place] [address], [place] [address]. All rights reserved. This information is not intended as a substitute for professional medical care. Always follow your healthcare professional's instructions.

## 2017-03-22 ENCOUNTER — OUTPATIENT CASE MANAGEMENT (OUTPATIENT)
Dept: ADMINISTRATIVE | Facility: OTHER | Age: 42
End: 2017-03-22

## 2017-03-22 ENCOUNTER — CLINICAL SUPPORT (OUTPATIENT)
Dept: ELECTROPHYSIOLOGY | Facility: CLINIC | Age: 42
End: 2017-03-22
Payer: MEDICARE

## 2017-03-22 DIAGNOSIS — R55 SYNCOPE, UNSPECIFIED SYNCOPE TYPE: ICD-10-CM

## 2017-03-22 DIAGNOSIS — Z95.818 STATUS POST PLACEMENT OF IMPLANTABLE LOOP RECORDER: ICD-10-CM

## 2017-03-22 PROCEDURE — 93299 LOOP RECORDER REMOTE: CPT | Mod: S$GLB,,, | Performed by: INTERNAL MEDICINE

## 2017-03-22 PROCEDURE — 93297 REM INTERROG DEV EVAL ICPMS: CPT | Mod: S$GLB,,, | Performed by: INTERNAL MEDICINE

## 2017-03-22 NOTE — PROGRESS NOTES
"3/22/17  CM called and spoke telephonically with patient, Homar Jerry; completed initial screen/nursing assessment/MED REC no discrepancies/PHQ9=14   ( >5-14 Physician uses clinical judgement about treatment, based on patients duration of symptoms and functional impairment).   Ms Jerry is disabled 2* chronic ill effects of fibromyalgia; chronic pain to LEs. She stays in bed most of the time. Limited amb with walker. H/o 4 falls in last yr- knees weak/give out. She lives with her son who helps as able when he is not working to bring her to medical appts and other errands. She has [facility] transportation program  3 round trips max/yr. Ms Jerry lives in [place], [place] that has limited [employer] options. She states that she has had to choose between food & medications in last 2 mos.  S/p 5 ED/hospitalizations last 4 months for Gastroparesis primarily, tachycardia and Diabetic Ketoacidosis.   She reports checking her BGs 3 x days. BGs vaguely reported as in the 100s--- specifying when further asked--- 170, 195 mid day.   CM instructed pt to record her BGs daily, reported pt's  last A1C = 10. 3 (249) and goal is to reach A1C =7 or less ([address] BG). She agrees to record her BGs and to receive educational material on DM. Ms. Jerry is active on Patient Portal.   Provided contacts for CM, O[facility] and [facility]. Encouraged Ms Jerry to call and register with OEP. She says that she will.       Referred to LCSW to connect pt to community resources, food sources, transportation programs, f/u on OEP registration.   Referred to Patient Pharm Assistance.  Referred to ANGEL Zhu with [facility] for MTM program-- review of pt's multiple meds, recommendations to prevent potentially harmful interactions.   Verified with [facility] OTC that pt has $30/qtr.     Mailed contact for CM, "Diabetic Management Guide", BG logs & OEP contactinfo-- TEL  [phone]. Mailed out information for [facility] $30/qtr- catalogue to be mailed to " pt's home.   Mailed med organizer (4 box/day x 7days)    CM direct focus on DM education/management and med compliance through cost savings and med education, inform pt about her [facility] OTC benefit.

## 2017-03-23 ENCOUNTER — TELEPHONE (OUTPATIENT)
Dept: ADMINISTRATIVE | Facility: OTHER | Age: 42
End: 2017-03-23

## 2017-03-23 NOTE — TELEPHONE ENCOUNTER
I attempted to contact the patient to make a Medication Therapy Mgmt appt to have her meet with a pharmacist to go over her medications.      I left a voicemail asking to have her call me at [phone].  I will attempt to contact the patient again at a later time in hopes of scheduling the appt.    Zainab Harp, [facility]  Ext [phone]

## 2017-03-29 ENCOUNTER — OUTPATIENT CASE MANAGEMENT (OUTPATIENT)
Dept: ADMINISTRATIVE | Facility: OTHER | Age: 42
End: 2017-03-29

## 2017-03-29 NOTE — PROGRESS NOTES
An [facility] welcome Packet and consent form was created and mailed to the patient on 3/29/2017        Thank you,  Yadira Back, SSC

## 2017-03-29 NOTE — LETTER
March 29, 2017    Homar Jerry  [address] [place] [address]             Outpatient Case Management  [address]  [place] [id number] Dear Homar Jerry:    We understand that receiving many services from different doctors and healthcare providers is overwhelming. There are appointments to make, transportation to arrange, dietary instructions to understand, and new medications to obtain.    This is where [facility] can help.     You are eligible to receive Outpatient Case Management services when you have healthcare needs that require the coordination of many providers, treatments, and services. You also qualify if you need assistance with a new treatment plan.     There is no charge for this support. You may have been referred to this program from your doctor(s), hospital staff member(s), or insurance company but you always have a choice to participate or not participate. To participate, you must give us your permission to be enrolled.     When you are enrolled in the [facility] program, the  who is assigned to you is    Ying Hayes RN  [phone]    Depending on your needs and wishes, your  may speak with you by phone, visit you at your place of living (for example your home, skilled nursing facility, or rehabilitation facility), or meet you at your doctors office.     Your  will tell you why you have been selected to participate in the program and will complete an assessment of your needs. Then a personalized plan of care will be developed with you and or your caregiver.             Here are examples of the services your [facility]  provides.     Coordinate communication among multiple providers.   Arrange for transportation, doctors visits, durable medical equipment, home care services, and special clinics.    Provide coaching on how to manage your health  condition.    Answer questions about your health condition.   Help you understand your doctors treatment plan.    Provide additional instruction about your health condition, treatments, and medications.    Help you obtain information about your insurance coverage.    Advocate for your individual needs.    Request a Licensed Clinical  (LCSW) to visit you if you need their services. LCSWs help with long term planning (discussing placement options, advanced planning directives), financial planning, and assistance (for example rent, utilities, medication funding).     Your  will coordinate their activities with other outpatient services you are receiving. All services provided by [facility]  are coordinated with and communicated to your primary care physician.    Our goal is to help you manage your health condition(s) safely within your living environment, whether that is your home or a medical facility. We want to help you function at the healthiest and highest level possible.     Sincerely,      Conrad Cobb MD  Medical Director    Enclosures:    Frequently Asked Questions  Patient Rights and Responsibilities   Reporting a Grievance or Complaint  Consent/Release of Information  Stamped Addressed Envelope                  Frequently Asked Questions about [facility]    What is [facility]?  Outpatient Case management is not Home healthcare services. [facility]  do not provide hands-on care. [facility]  will work with your doctor to arrange for home health services, if needed. Home health services have a limited duration and there are some restrictions as to who can get these services. There is no prescribed limit to the amount of time you receive [facility] services. [facility]  are not agents of your insurance company. However,  [facility]  can help you obtain information from your insurance company.     Who are the [facility] ?  [facility]  are Registered Nurses and Social Workers. It is important to remember that you and your  are a team that works together with your primary care physician to create your individualized plan of care. The ultimate goal of your care plan is to help you implement your doctors treatment plan and to help you function at the highest level of health possible.     What are my rights as a patient?  It is important for you to know and understand your rights and responsibilities while receiving services from the [facility] program. We have enclosed a complete description of your rights and responsibilities. You can help to make your care more effective when you understand your right and responsibilities.     What is needed to be enrolled in the program?  You are only enrolled in the [facility] Program when you give us your consent to participate. You will find enclosed a consent form. You are receiving this letter because you or your caregivers have given us a verbal consent to enroll you in [facility] Program. We ask that you sign and return the enclosed written consent in the stamped self-addressed envelope.                           Patient Rights and Responsibilities    We consider you a partner in your care. When you are well informed, participate in treatment decisions and communicate openly with your doctor and other healthcare professionals, you help make your care more effective.     While you are in the Outpatient Case Management Program, your rights include the following:     You have a right to be provided services in a non-discriminatory manner in accordance with the provisions of Title VI of the Civil Rights Act of 1964, Section 504 of the Rehabilitation  Act of 1973, the Age Discrimination Act of 1975, the Americans with Disabilities Act as well as any other applicable Federal and State laws and regulations.     You have the right to a reasonable, timely response to your request or need for care, as well as the right to considerate and respectful care including an environment that preserves dignity and contributes to a positive self-image. You are responsible for being considerate and respectful of our staff.     You have a right to information regarding patient rights, advocacy services and complaint mechanisms, and the right to prompt resolution of any complaint. You or a designee has the right to participate in the resolution of ethical issues surrounding your care. You have a right to file a complaint if you feel that your rights have been infringed, without fear or penalty from [facility] or the federal government. You may file a complaint with the Director of Outpatient Case Management by calling [phone]. At any time, you may lodge a grievance with the [facility] and [facility] by calling [phone], or the Joint Commission on Accreditation of Healthcare Organizations at [phone].     You, or someone acting on your behalf, have the right to understandable information on your health status, treatment and progress in order to make decisions. You have the right to know the nature, risks and alternatives to treatment. You have the right to be informed, when appropriate, regarding the outcome of the care that has been provided. You have the right to refuse treatment to the extent permitted by law, and the right to be informed of the alternatives and consequences of refusing treatment.     You, in collaboration with your physician, have the right to make decisions regarding care and the right to participate in the development and implementation of the plan of care and effective pain management. You have the right to know the name and  professional status of those responsible for the delivery of your care and treatment.       You have a right, within legal guidelines, to have a guardian, next-of-kin or legal designee exercises your patient rights when you are unable to do so. You have the right for your wishes regarding end-of-life decisions to be addressed by the healthcare team through advance directives. You have the right to personal privacy and confidentiality and to expect confidentiality of all records and communications pertaining to your care.      You have the right to receive communications about your health information confidentially. You have the right to request restrictions on the uses and disclosures of your health information. You have the right to inspect, copy, request amendments and receive an accounting of to whom we have disclosed your health information.     You have the right to be provided with interpretation services if you do not speak English; to alternative communication techniques if you are hearing or vision impaired; and to have any other resources needed on your behalf to ensure effective communication. These services are provided free of charge.     You have a right to personal safety (free from mental, physical, sexual and verbal abuse, neglect and exploitation). You have the right to access protective and advocacy services.     Advance Directives  A Patient Advocate is available to meet with patients to answer questions regarding advance directives.    Living Will  A document that outlines what medical treatment the patient does or does not want in the event the patient becomes unable to make those decisions at the appropriate time.    Durable Medical Power of   A document by which the patient designates an individual to be responsible for making medical decisions in the event the patient becomes unable to do so.    HIPAA Notice of Privacy Practices  Your medical information is governed by federal  privacy laws. HIPAA protects private medical information and how that information is disclosed. If you have a question regarding the HIPAA Notice of Privacy Practices, or if you believe your privacy rights have been violated, you may call our designated hotline at [phone].            Quality Improvement  Because we consistently strive to improve the care and service provided to our patients, we welcome your feedback. Your comments are an important part of our quality improvement process, as we like to know what we are doing right and which areas are in need of improvement. Our policy is to listen, be responsive and provide you with an appropriate and timely follow-up to your questions or concerns. Our goal is active patient and family involvement in all aspects of the care process.                                                                                  Reporting a Grievance or Complaint    During your time with the [facility] team you may have a grievance or complaint with our services. Your Patients Bill of Rights gives patients, families, and caregivers the right to express concerns and grievances and the right to expect a reasonable and timely response.     Your presentation of your concerns is not viewed negatively. It is an opportunity for us to improve the quality of our care and services we provide to you.     You may report your concerns directly to your , or you can phone in a complaint to:     Director of Outpatient Case Management  [phone]    You may also send a complaint letter to:    Director of Outpatient Case Management Services  [address] [place] [id number]    Tell us the details of your complaint and provide us with a contact phone number so we can contact you to obtain additional information. We will return a call to you within two business days of our receipt of your complaint, and to request additional information as  needed. If you choose to mail a letter, your complaint may take a few days longer to reach us.     All grievances will be addressed as quickly as possible. A grievance or complaint that involves situations or practices which place patients in immediate danger will be addressed as an urgent matter. We will work to resolve all other complaints within seven days of receipt. By that time, you will receive a phone call with either the resolution of your complaint, or a plan for corrective action. A formal written response will be sent to you within 30 days of receipt of your grievance.     If a resolution cannot be completed within 30 days, a letter will be sent to you or your family member with an estimated time for the final response.    Additionally, all patients have the right to file complaints with external agencies, without exception. Complaints/grievances can be addressed to the following agencies:            Patient Safety or Quality of Care Concerns  Office of Quality Monitoring   The Joint [facility] [place] [address]  [phone] Toll Free    HIPPA Privacy/Security Concerns  Office for Civil Rights Region IV  [place] Department of Health & Human Services  [address] [place] [address], [address]  [place] [address] [phone] Phone  [phone] TDD  [phone] Toll Free    Medicare/Medicaid Billing Concerns  [place] for Medicare & Medicaid Services  Region [address]  [address] [place] [address], [address]  [place] [address] [phone] Phone  [phone] Toll Free    General Concerns  [place] [facility] ([facility])  [phone] Toll Free Complaint Hotline                                                              Consent Form/Release of Information    By signing--     (1) I agree I have read the Outpatient Case Management information provided to me;     (2) I agree to voluntarily participate in the Outpatient Case Management program;     (3) I understand I  must consent to participation in the Outpatient Case Management program during my first interview with my ;    (4) I consent to the discussion and release of my personal health information to my healthcare team (including my personal physician, my medical home care team, any specialty physician(s), and my [facility] team);     (5) I agree my consent is valid for the length of time I am receiving Outpatient Case Management;    (6) I agree to referrals to community resources which my Case Management team recommends for me. I agree to the release of my personal information and personal health information as necessary to referral sources.    ___________________________________________________________________  Patients Printed Name     ___________________________________________________________________  Patients Signature       Date    If patient is in being cared for, please complete this section:     ___________________________________________________________________  Printed Name of Person Caring For Patient   Relationship To Patient    ___________________________________________________________________   Signature of Person Caring For Patient     Date    PLEASE SIGN AND RETURN IN THE ENCLOSED PRE-ADDRESSED ENVELOPE.

## 2017-03-29 NOTE — PROGRESS NOTES
Patient referred by [facility] RN Ying Hayes for psychosocial support.  Patient is a 41 y.o. Female who lives with her adult son in [place]. She said her son assists with caregiving as needed.  Patient is under the care of Carmelo Velazquez NP, [facility] for Bi-polar D/O I and Anxiety.  Patient states that she is compliant with medication and sees Mr. Velazquez routinely.  She states that her mother  unexpectedly toward the end of last year.  She said she is having some difficulty coping with the loss.  She said she is addressing grief with her therapist.  She denies S/I/H/I.  Patient has a weak network of social support and states that she mostly stays in her home.  She said she would like to participate in social activities when she feels well enough to do so.  Patient states that she is having difficulty paying her utility and [facility] medical bill.  She has contacted [employer] for an extension on her utility bill.  She has no evacuation plan in place and would like to register with [facility].  Community resource information discussed and mailed to patient:  [facility] Crisis Intervention Hotline; [facility] (health/mental health clinics); Utility & Financial Assistance Programs - [facility]; Aquatic Therapy Classes; Wellness & Fitness Programs; [facility] Education Course; [facility] Resource Guide; Grief/Bereavement Support Groups; [facility]; [facility] telephone number (patient will call to register).  LCSW will continue to follow.

## 2017-04-04 RX ORDER — MIRTAZAPINE 15 MG/1
TABLET, FILM COATED ORAL
Qty: 30 TABLET | Refills: 4 | OUTPATIENT
Start: 2017-04-04

## 2017-04-07 ENCOUNTER — OUTPATIENT CASE MANAGEMENT (OUTPATIENT)
Dept: ADMINISTRATIVE | Facility: OTHER | Age: 42
End: 2017-04-07

## 2017-04-07 NOTE — PROGRESS NOTES
4/7/17  CM call for plan of care f/u. Verbal message left with CM request for pt call back. Noted LCSW pt contact.   CM to f/u in one week to check that mailed educational materials received, continue DM teaching, check on Pt Pharm Assistance contact and MTM contact.   Checked with [facility] SSC, Zainab Harp who has attempted to reach pt unsuccessfully and attempted again at this time leaving a verbal message for pt to call her.

## 2017-04-10 ENCOUNTER — OUTPATIENT CASE MANAGEMENT (OUTPATIENT)
Dept: ADMINISTRATIVE | Facility: OTHER | Age: 42
End: 2017-04-10

## 2017-04-10 NOTE — PROGRESS NOTES
Attempted to contact patient for follow-up.  Left voice mail message requesting return call. Will try again at a later date.

## 2017-04-13 DIAGNOSIS — E11.9 DIABETES MELLITUS WITHOUT COMPLICATION: ICD-10-CM

## 2017-04-17 DIAGNOSIS — E11.9 TYPE 2 DIABETES MELLITUS WITHOUT COMPLICATION: ICD-10-CM

## 2017-04-18 ENCOUNTER — OUTPATIENT CASE MANAGEMENT (OUTPATIENT)
Dept: ADMINISTRATIVE | Facility: OTHER | Age: 42
End: 2017-04-18
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
## 2017-04-18 NOTE — PROGRESS NOTES
4/18/17  CM f/u call; verbal message left with request for call back to CM.   Noted contact by O[facility] LCSW with pt today.  EPIC letter written and mailed.

## 2017-04-18 NOTE — LETTER
April 18, 2017    Homar Jerry  [address] [facility] [address]             [facility] [address] [place] [address] Dear Ms. Narcisa Jerry:    I work with [facility] Outpatient Case Management Department. I have been unsuccessful at reaching you to follow-up to see how you have been doing. If you require any future assistance or if any new concerns or problems arise, please do not hesitate to call.     The Outpatient Case Management Department can be reached at [phone] from 8:00 am to 4:30 pm on Monday thru Friday. [facility] also has a program where a nurse is available 24/7 to answer questions or provide medical advice. Their phone number is [phone].     I will be planning to call you again in the next week, if I don't hear from you sooner. I understand you experienced a recent loss and that may not be readily able to get in touch.    Sincerely,        Ying Hayes RN   Outpatient Case Management  TEL: [phone]

## 2017-04-24 ENCOUNTER — OUTPATIENT CASE MANAGEMENT (OUTPATIENT)
Dept: ADMINISTRATIVE | Facility: OTHER | Age: 42
End: 2017-04-24

## 2017-04-24 NOTE — PROGRESS NOTES
Attempt to contact patient for follow-up.  Left voice message requesting return call.  Will try again at a later date.

## 2017-04-26 ENCOUNTER — CLINICAL SUPPORT (OUTPATIENT)
Dept: ELECTROPHYSIOLOGY | Facility: CLINIC | Age: 42
End: 2017-04-26
Payer: MEDICARE

## 2017-04-26 DIAGNOSIS — R55 SYNCOPE, UNSPECIFIED SYNCOPE TYPE: ICD-10-CM

## 2017-04-26 DIAGNOSIS — Z95.818 STATUS POST PLACEMENT OF IMPLANTABLE LOOP RECORDER: ICD-10-CM

## 2017-04-26 PROCEDURE — 93297 REM INTERROG DEV EVAL ICPMS: CPT | Mod: S$GLB,,, | Performed by: INTERNAL MEDICINE

## 2017-04-26 PROCEDURE — 93299 LOOP RECORDER REMOTE: CPT | Mod: S$GLB,,, | Performed by: INTERNAL MEDICINE

## 2017-04-27 ENCOUNTER — TELEPHONE (OUTPATIENT)
Dept: BARIATRICS | Facility: CLINIC | Age: 42
End: 2017-04-27

## 2017-04-27 NOTE — TELEPHONE ENCOUNTER
----- Message from Curtis Carmen sent at 4/26/2017  1:55 PM CDT -----      Pt said she would like to schedule surgery. Please call pt regarding this at [phone]

## 2017-04-27 NOTE — TELEPHONE ENCOUNTER
Had informed patient of team decision of 6 momths of medication compliance needed with RD visits. She's been in/out of hospital and when I tried calling her the around the time of the meeting unable to reach. She states she feels the problem is with Abelardo and that she is compliant so I read some of our note to her in that she expresses financial difficulty with medications and I told her that makes it difficult I understand to be compliant.  I told her that she's coming off of her year hold this April and we'll need 6 months of compliance, etc..  She says well that's life and I told her Dr. Soto states that she if she needed a decreased fee for psych services she could assist her.  She said that she may never get her surgery and I asked if she wanted Mervat to call her and she said ok. She also states she feels she needs someone other than Dr. Zhou.  I told her I'd tell Mervat as well.

## 2017-05-01 ENCOUNTER — TELEPHONE (OUTPATIENT)
Dept: BARIATRICS | Facility: CLINIC | Age: 42
End: 2017-05-01

## 2017-05-01 NOTE — TELEPHONE ENCOUNTER
----- Message from Mervat Soto, PhD sent at 5/1/2017  9:16 AM CDT -----  Regarding: RE: Remember Her from Jan?  Kendall Hawkins -   So I just reviewed this patient's chart and my report on her and see a lot of red flags. Med noncompliance, drug seeking behavior, financial difficulties (wondering how she will afford this surgery?) Her psychiatrist is not her the problem. If she's having issues with him, she needs to speak with him about it and ask for a transfer if she wants to. She will definitely be someone who needs 6 months - 1 year of prep given the issues mentioned above and her chronic mental illness. I will call her this week after being in touch with [facility]. Not sure that we can offer her low-fee therapy at this point because there is no current social work intern.     ----- Message -----     From: Genaro Koch RN     Sent: 4/27/2017   4:21 PM       To: Mervat Soto, PhD  Subject: RE: Remember Her from Jan?                       I guess after you review her can you call her?  She isn't happy with [facility] but I'm not sure what is going on with that.  If she agrees to the 6 months of therapy and 100% med compliance she will definitely need assistance with lower fees.    ----- Message -----     From: Mervat Soto, PhD     Sent: 4/27/2017   4:15 PM       To: Genaro Koch RN  Subject: RE: Remember Her from Jan?                       Yeah, I vaguely remember her. I'd have to go back and re-read her report. Just to clarify though, is there anything I'm supposed to do about this patient at this time, or am I waiting for her to contact me?    ----- Message -----     From: Genaro Koch RN     Sent: 4/27/2017   1:40 PM       To: Mervat Soto, PhD  Subject: Remember Her from Jan?                           I talked to her today as she was calling to schedule surgery.  I'd not been able to give her the outcomes of our Jan meeting as she was in the hospital and I'd called but never reached her.     She is out of her 1 year hold this month.  I told her we had decided that upon her return she needed 6 months of medical compliance and RD visits and needed to see Abelardo for therapy as well and she's not happy with Abelardo and feels like he's her problem.    I did look through her notes and she's been in the hospital a couple of times but OP Case Management is seeing her as well.  A few months ago they were trying to arrange financial assistance with her utilities, etc..      I told her she'd need to do what was needed with the compliance piece and your offer of a decreased fee.  I'm not sure but she said she'd talk with you, she wants someone other than Abelardo, and she states she has 50.00 co-pays.      I really don't know if she's in a position to pay for any of this.    Thanks,  jasvir

## 2017-05-02 ENCOUNTER — OUTPATIENT CASE MANAGEMENT (OUTPATIENT)
Dept: ADMINISTRATIVE | Facility: OTHER | Age: 42
End: 2017-05-02
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
## 2017-05-04 ENCOUNTER — OUTPATIENT CASE MANAGEMENT (OUTPATIENT)
Dept: ADMINISTRATIVE | Facility: OTHER | Age: 42
End: 2017-05-04

## 2017-05-04 ENCOUNTER — TELEPHONE (OUTPATIENT)
Dept: INTERNAL MEDICINE | Facility: CLINIC | Age: 42
End: 2017-05-04

## 2017-05-04 DIAGNOSIS — E66.9 DIABETES MELLITUS TYPE 2 IN OBESE: Primary | ICD-10-CM

## 2017-05-04 DIAGNOSIS — E11.69 DIABETES MELLITUS TYPE 2 IN OBESE: Primary | ICD-10-CM

## 2017-05-04 NOTE — PROGRESS NOTES
5/4/17  CM f/u call.   BGs 160, 170s average and once 270. She is checking BGs 2-3 x day. Pt confirms receiving all educational material, plus the HG OTC information, applying it to obtaining vitamins. She confirms a meeting with pharmacist by phone from the MTM program.  CM encouraged her to call [employer] to enroll in their SNP for Diabetes Plan & lower costs for diabetic supplies and co payments. She states that she will call.   Discussed 2 healthy foods in a Diabetic diet-- she offers, grilled shrimp and fish on her Jakub [place]. CM described contents of a health food plate with the 5 food groups- Vegs/Salad, Lean Meat, Starch wheat rice/pasta/sweet potatoe in place of white potatoes, fruit- she like melons, cantaloupe (1 cup) and skim milk. Ms. Jerry does not know what Caloric Diabetic Diet to follow. She would like to meet with a Diabetic Educator.     She asks how to obtain documentation of meds prescribed while last in hosp. Advised her to sign a release of information at [facility] and have her outside [facility] physician request the needed documents. Ms. Jerry will contact the  for the exact Medical Records ph #.      In basket message to Dr. Cabrera request for CDE referral.  Referral to PAP.     CM to f/u in 2 wks to check on CDE  and PAP, continue DM education, f/u on pt's findings for [employer] SNP plan.

## 2017-05-04 NOTE — TELEPHONE ENCOUNTER
----- Message from Ying Hayes RN sent at 5/4/2017  4:08 PM CDT -----  Contact: Ying Hayes RN,   Dr. Cabrera/Staff:    Ms. Jerry would like a referral to a Diabetic Educator for further education in meal planning.      Please advise.   Thank you,  YOLI ChapinN, RN, CCM [facility]  TEL:  [phone]

## 2017-05-05 ENCOUNTER — TELEPHONE (OUTPATIENT)
Dept: PHARMACY | Facility: CLINIC | Age: 42
End: 2017-05-05

## 2017-05-08 ENCOUNTER — OUTPATIENT CASE MANAGEMENT (OUTPATIENT)
Dept: ADMINISTRATIVE | Facility: OTHER | Age: 42
End: 2017-05-08

## 2017-05-08 ENCOUNTER — TELEPHONE (OUTPATIENT)
Dept: PSYCHIATRY | Facility: CLINIC | Age: 42
End: 2017-05-08

## 2017-05-08 NOTE — TELEPHONE ENCOUNTER
Called Ms. Jerry at the request of Shruthi Koch, bariatric nurse, to discuss current mental health recommendations to improve candidacy for bariatric surgery, and to address complaints that pt has about her current treatment. Ms. Jerry did not answer the phone; I left a voice mail requesting a call back at her convenience.
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
## 2017-05-15 ENCOUNTER — TELEPHONE (OUTPATIENT)
Dept: PSYCHIATRY | Facility: CLINIC | Age: 42
End: 2017-05-15

## 2017-05-15 NOTE — TELEPHONE ENCOUNTER
----- Message from Alyssa Curtis MA sent at 5/15/2017  1:11 PM CDT -----  Contact: pt  Pt called to cx 1:30 appt today sched for tomorrow at 10:30

## 2017-05-16 ENCOUNTER — TELEPHONE (OUTPATIENT)
Dept: BARIATRICS | Facility: CLINIC | Age: 42
End: 2017-05-16

## 2017-05-16 ENCOUNTER — DOCUMENTATION ONLY (OUTPATIENT)
Dept: BARIATRICS | Facility: CLINIC | Age: 42
End: 2017-05-16

## 2017-05-23 ENCOUNTER — OUTPATIENT CASE MANAGEMENT (OUTPATIENT)
Dept: ADMINISTRATIVE | Facility: OTHER | Age: 42
End: 2017-05-23

## 2017-05-23 NOTE — PROGRESS NOTES
5/23/17  CM f/u to update plan of care. Spoke with Ms. Jerry. She reports c/o knee arthritic pain and that her BGs continue in the 100s. CM questioned this AM BG= 142. The lowest reading of 92 last week. She denies s/s hypoglycemia. CM reviewed s/s hypoglycemia and hyperglycemia. Mrs. Jerry denies any such symptoms. Referral to Diabetic Educator entered and appt scheduled for 6/1/17. Mrs. Jerry is aware of appt and plans to attend. CM discussed pt's last A1C 3/3/17 =10.3 % (249) with the goal of 7% (150). She answers that she understands the goal of 7%. CM inquired about ways pt is taking steps to manage her DM. She states by eating fish and lean meats and vegs on her Jakub [place] and decreasing her carbohydrates.     No further needs identified at this time. Mrs. Jerry agrees to case closure from CM. CM explained that she may call should future needs arise. She understands that LCSW is still involved.   Discussed case with OPCM KIRK. KIRK is still working with patient on meeting needs. This RN will continue to monitor and assist with care coordination.

## 2017-05-23 NOTE — PROGRESS NOTES
Chart reviewed.  Case consult with OPCM RN Ying Hayes. Attempted to contact patient for follow-up. Left voice mail message requesting return call.  Patient's case will be closed if call not returned by Friday, 5/26/17.

## 2017-05-26 ENCOUNTER — OUTPATIENT CASE MANAGEMENT (OUTPATIENT)
Dept: ADMINISTRATIVE | Facility: OTHER | Age: 42
End: 2017-05-26

## 2017-05-26 NOTE — PROGRESS NOTES
Several attempts have been made to contact patient for follow-up.  All resources/referrals are in place.  Case closed.  OPCM RN Ying Hayes has been notified via Epic mail.

## 2017-05-29 ENCOUNTER — CLINICAL SUPPORT (OUTPATIENT)
Dept: ELECTROPHYSIOLOGY | Facility: CLINIC | Age: 42
End: 2017-05-29
Payer: MEDICARE

## 2017-05-29 DIAGNOSIS — Z95.818 STATUS POST PLACEMENT OF IMPLANTABLE LOOP RECORDER: ICD-10-CM

## 2017-05-29 DIAGNOSIS — R55 SYNCOPE, UNSPECIFIED SYNCOPE TYPE: ICD-10-CM

## 2017-05-29 PROCEDURE — 93297 REM INTERROG DEV EVAL ICPMS: CPT | Mod: S$GLB,,, | Performed by: INTERNAL MEDICINE

## 2017-05-29 PROCEDURE — 93299 LOOP RECORDER REMOTE: CPT | Mod: S$GLB,,, | Performed by: INTERNAL MEDICINE

## 2017-05-30 ENCOUNTER — TELEPHONE (OUTPATIENT)
Dept: PHARMACY | Facility: CLINIC | Age: 42
End: 2017-05-30

## 2017-06-09 ENCOUNTER — TELEPHONE (OUTPATIENT)
Dept: INTERNAL MEDICINE | Facility: CLINIC | Age: 42
End: 2017-06-09

## 2017-06-09 DIAGNOSIS — R11.0 CHRONIC NAUSEA: ICD-10-CM

## 2017-06-09 RX ORDER — PROMETHAZINE HYDROCHLORIDE 25 MG/1
25 TABLET ORAL EVERY 6 HOURS PRN
Qty: 60 TABLET | Refills: 2 | Status: SHIPPED | OUTPATIENT
Start: 2017-06-09 | End: 2017-08-03 | Stop reason: SDUPTHER

## 2017-06-09 NOTE — TELEPHONE ENCOUNTER
----- Message from Solorzano Shakir sent at 6/9/2017  1:02 PM CDT -----  Contact: self/ [phone] cell  Type: Rx    Name of medication(s): promethazine (PHENERGAN) 25 MG tablet    Is this a refill? New rx? refill    Who prescribed medication? Dr. Cabrera    Pharmacy Name, Phone, & Location: [facility]    Comments: Pt would like to request a refill on the medication above.  Please call and advise.    Thank you

## 2017-06-12 DIAGNOSIS — E11.9 DIABETES MELLITUS WITHOUT COMPLICATION: ICD-10-CM

## 2017-06-13 ENCOUNTER — OUTPATIENT CASE MANAGEMENT (OUTPATIENT)
Dept: ADMINISTRATIVE | Facility: OTHER | Age: 42
End: 2017-06-13

## 2017-06-14 NOTE — PROGRESS NOTES
Thank you for the referral.     For your information:    The following patient has been assigned to Ying Hayes RN  with Outpatient Complex Care Management for high risk screening.    Reason: High Risk  Diabetes mellitus without complication [E11.9]    Please contact [facility] at [phone] with any questions.    Thank you,      Zainab Harp ,SSC

## 2017-06-16 RX ORDER — OLANZAPINE 20 MG/1
TABLET ORAL
Qty: 30 TABLET | Refills: 4 | OUTPATIENT
Start: 2017-06-16

## 2017-06-20 ENCOUNTER — TELEPHONE (OUTPATIENT)
Dept: BARIATRICS | Facility: CLINIC | Age: 42
End: 2017-06-20

## 2017-06-20 NOTE — TELEPHONE ENCOUNTER
Left VM to call me. It was determined by the bariatric team that she isn't a candidate for surgical wt loss.  See Mervat's note 5/16/17 (telephone note) and team made a decision that this wasn't the right time for her.  She has many financial concerns as well as medication non compliance and mental health issues that need to be worked on/resolved.

## 2017-06-20 NOTE — TELEPHONE ENCOUNTER
Discussed with patient that we had our team meeting and it was felt that this is not the right time for her to have bariatric surgery. I told her we'd place her on hold for a year to see if financially she is in a better place and that her mental health issues are being addressed and we didn't want to cause her more distress in her life with surgery.  She understands that in a year she can revisit the prospect of surgery if she is in a better place all the way around so that her surgery would be successful and she understands and states thank you.

## 2017-06-21 ENCOUNTER — OUTPATIENT CASE MANAGEMENT (OUTPATIENT)
Dept: ADMINISTRATIVE | Facility: OTHER | Age: 42
End: 2017-06-21

## 2017-06-21 NOTE — PROGRESS NOTES
6/21/17  Attempt to complete initial assessment for Outpatient Care Management; left message requesting return call.

## 2017-06-22 ENCOUNTER — OUTPATIENT CASE MANAGEMENT (OUTPATIENT)
Dept: ADMINISTRATIVE | Facility: OTHER | Age: 42
End: 2017-06-22

## 2017-06-22 NOTE — PROGRESS NOTES
6/22/17  2nd attempt to conduct a new assessment of pt needs with Outpatient Case Management. Ms. Jerry answers, states she is presently at a medical appt. She requests a call back either later this PM or in the AM tomorrow.

## 2017-06-23 ENCOUNTER — OUTPATIENT CASE MANAGEMENT (OUTPATIENT)
Dept: ADMINISTRATIVE | Facility: OTHER | Age: 42
End: 2017-06-23

## 2017-06-23 NOTE — PROGRESS NOTES
6/23/17  CM completed initial screen/nursing assessment/MED-REC no discrepancies found/PHQ2 =2.  New referral to Outpatient Case Management. Spoke with Ms. Jerry telephonically.CM focused on Diabetes management. Last A1C=10.3 (249) on 3/3/17. She reports checking her BG maybe once a day. BG this AM = 427. She denies eating anything outside her diabetic diet last night. She reports ever since starting with new rx of  Zyprexa x 4-6 mos ago  for her Bipolar Disorder--- her BGs have run high. She states that high BGs is a SE of Zyprexa. CM verified while on the phone that hyperglycemia is SE.   Ms. Jerry was a no show for her appt with Diabetic Educator 6/1/17. She is agreeable to rescheduled appt with CDE. Her son no longer is living with her. She lives alone but relies upon her son, Girma Daniel to provide transportation to her med/lab appts. She is limited physically 2* dx fibromyalgia & arthritic p[ain, spending much of her time in bed. Her residence being in [place] poses additional barrier to alternative transportation options should son be unavailable. Unfortunately, [facility] Diabetes Plus Plan does not include her current residence in [place]. [facility] has contacted Ms Rodríguez and are mailing out applications to assist with unknown meds.     Carmelo Zhou NP:  (PSYCH)    Ms. Jerry has be re-referred to Outpatient Case Management to assist with her health management of her diabetes in particular. It was learned through talking with her today that her BGs have increased since taking Zyprexa for her Bipolar Disorder. She reports being on it x 4-6 mos. Verified that hyperglycemia is one of the SEs for Zyprexa. Her last A1C = 10.3 (249) on 3/3/17. She reports a BG this AM =427.   In view of this effect, might there be an alternative treatment for Ms. Jerry?    Please advise.   Thank you,    ROBERTA Chapin, RN, [facility] Management  TEL:  [phone]    [facility]  [facility] community resources/transporation.   Rescheduled appt with CDE at [facility] for 7/26/17-- earliest. Notified pt. She agrees.   CM to provide education in diabetes--f/u in one week.   Mailed educational material per care plan,log sheets,Diabetes Grocery Shopping List

## 2017-06-27 ENCOUNTER — OUTPATIENT CASE MANAGEMENT (OUTPATIENT)
Dept: ADMINISTRATIVE | Facility: OTHER | Age: 42
End: 2017-06-27

## 2017-06-27 ENCOUNTER — TELEPHONE (OUTPATIENT)
Dept: PSYCHIATRY | Facility: CLINIC | Age: 42
End: 2017-06-27

## 2017-06-27 NOTE — TELEPHONE ENCOUNTER
----- Message from Ying Hayes RN sent at 6/27/2017  9:23 AM CDT -----  Contact: GLORIA Chapin NP:  Thank you for this update. I will explain to her the need to schedule & keep her appt.   Sincerely,  Ying  ----- Message -----  From: Carmelo Zhou NP  Sent: 6/23/2017   2:28 PM  To: GLORIA Murcia.    I last saw this patient in Feb 2017 at which time I decrease her Zyprexa with the goal of possibly weaning her off. However, patient has cancelled her last 3 appts.  Please have her schedule an appt so we can discuss treatment.    Thank you in advance.    DEB Segura  ----- Message -----  From: Ying Hayes RN  Sent: 6/23/2017   2:13 PM  To: TABITHA Bullock NP:    Ms. Jerry has be re-referred to Outpatient Case Management to assist with her health management of her diabetes in particular. It was learned through talking with her today that her BGs have increased since taking Zyprexa for her Bipolar Disorder. She reports being on it x 4-6 mos. Verified that hyperglycemia is one of the SEs for Zyprexa. Her last A1C = 10.3 (249) on 3/3/17. She reports a BG this AM =427.   In view of this effect, might there be an alternative treatment for Ms. Jerry?    Please advise.   Thank you,    ROBERTA Chapin, RN, CCM [facility]  TEL:  [phone]

## 2017-06-27 NOTE — PROGRESS NOTES
CM f/u to update plan of care. Verbal message left requesting return call.  In message to pt stated that Carmelo Zhou NP in Psych would like Ms. Jerry to schedule an appt to discuss her treatment plan regarding the Zyprexa rx.

## 2017-06-30 ENCOUNTER — CLINICAL SUPPORT (OUTPATIENT)
Dept: ELECTROPHYSIOLOGY | Facility: CLINIC | Age: 42
End: 2017-06-30
Payer: MEDICARE

## 2017-06-30 DIAGNOSIS — R55 SYNCOPE, UNSPECIFIED SYNCOPE TYPE: ICD-10-CM

## 2017-06-30 DIAGNOSIS — Z95.818 STATUS POST PLACEMENT OF IMPLANTABLE LOOP RECORDER: ICD-10-CM

## 2017-06-30 PROCEDURE — 93297 REM INTERROG DEV EVAL ICPMS: CPT | Mod: S$GLB,,, | Performed by: INTERNAL MEDICINE

## 2017-06-30 PROCEDURE — 93299 LOOP RECORDER REMOTE: CPT | Mod: S$GLB,,, | Performed by: INTERNAL MEDICINE

## 2017-07-03 ENCOUNTER — OUTPATIENT CASE MANAGEMENT (OUTPATIENT)
Dept: ADMINISTRATIVE | Facility: OTHER | Age: 42
End: 2017-07-03

## 2017-07-03 NOTE — PROGRESS NOTES
7/3/17  CM f/u to update plan of care. Spoke for a second with pt. She is leaving from cortisone injection to her car. CM to call her back at another time.

## 2017-07-05 ENCOUNTER — OUTPATIENT CASE MANAGEMENT (OUTPATIENT)
Dept: ADMINISTRATIVE | Facility: OTHER | Age: 42
End: 2017-07-05

## 2017-07-05 NOTE — PROGRESS NOTES
7/5/17  CM f/u to update plan of care. Spoke with Ms Jerry regarding her DM management. BG continue in 200s she reports. She is checking BGs 3 x day. CM discussed 2 s/s hyperglycemia--- increased thrist/urination/hunger and hypoglycemia---sweating/weakness/shakiness. She denies having symptoms for high BGs and is not having low BGs.  She confirms receiving Diabetes Management Guide. She denies any questions. Explained healthy food plate of 5 food groups with portion controlled sections. CM addressed Mr. hZou's request for pt to schedule appt to him regarding Zyprexa rx tapering (2* its s/e of hyperglycemia). She reports 7/11 appt already scheduled. Reviewed upcoming CDE appt 7/26. She confirms plans to attend. CM stressed BG control to prevent long-term complications. CM apologized for a delay in issuing LCSW referral correctly. Completed with today's date. Explained LCSW referral to assist her with transportation needs. She verbalizes her understanding.    F/u on next encounter in 2 wks:  Check on Zyprexa changes  Continue with Diabetes teaching per care plan

## 2017-07-08 ENCOUNTER — OUTPATIENT CASE MANAGEMENT (OUTPATIENT)
Dept: ADMINISTRATIVE | Facility: OTHER | Age: 42
End: 2017-07-08

## 2017-07-08 NOTE — PROGRESS NOTES
Please note an Outpatient Complex Care Management welcome packet and consent form was created and mailed to the patient on 7/7/17.    Please contact \[facility] [place] [facility]\"" at [phone]. [phone] with any questions.    Thank you,    Zainab Harp, SSC

## 2017-07-08 NOTE — LETTER
July 8, 2017    Homar Jerry  [address] [place] [address]             Outpatient Case Management  [address]  [facility] [address] Dear Homar Jerry:    We understand that receiving many services from different doctors and healthcare providers is overwhelming. There are appointments to make, transportation to arrange, dietary instructions to understand, and new medications to obtain.    This is where [facility] can help.     You are eligible to receive Outpatient Case Management services when you have healthcare needs that require the coordination of many providers, treatments, and services. You also qualify if you need assistance with a new treatment plan.     There is no charge for this support. You may have been referred to this program from your doctor(s), hospital staff member(s), or insurance company but you always have a choice to participate or not participate. To participate, you must give us your permission to be enrolled.     When you are enrolled in the [facility] program, the  who is assigned to you is    Ying Hayes RN  [phone]    Depending on your needs and wishes, your  may speak with you by phone, visit you at your place of living (for example your home, skilled nursing facility, or rehabilitation facility), or meet you at your doctors office.     Your  will tell you why you have been selected to participate in the program and will complete an assessment of your needs. Then a personalized plan of care will be developed with you and or your caregiver.             Here are examples of the services your [facility]  provides.     Coordinate communication among multiple providers.   Arrange for transportation, doctors visits, durable medical equipment, home care services, and special clinics.    Provide coaching on how to manage your health condition.     Answer questions about your health condition.   Help you understand your doctors treatment plan.    Provide additional instruction about your health condition, treatments, and medications.    Help you obtain information about your insurance coverage.    Advocate for your individual needs.    Request a Licensed Clinical  (LCSW) to visit you if you need their services. LCSWs help with long term planning (discussing placement options, advanced planning directives), financial planning, and assistance (for example rent, utilities, medication funding).     Your  will coordinate their activities with other outpatient services you are receiving. All services provided by [facility]  are coordinated with and communicated to your primary care physician.    Our goal is to help you manage your health condition(s) safely within your living environment, whether that is your home or a medical facility. We want to help you function at the healthiest and highest level possible.     Sincerely,      Conrad Cobb MD  Medical Director    Enclosures:    Frequently Asked Questions  Patient Rights and Responsibilities   Reporting a Grievance or Complaint  Consent/Release of Information  Stamped Addressed Envelope                  Frequently Asked Questions about [facility]    What is [facility]?  Outpatient Case management is not Home healthcare services. [facility]  do not provide hands-on care. [facility]  will work with your doctor to arrange for home health services, if needed. Home health services have a limited duration and there are some restrictions as to who can get these services. There is no prescribed limit to the amount of time you receive [facility] services. [facility]  are not agents of your insurance company. However, [facility]  can help you obtain information from your insurance company.     Who are the [facility] ?  [facility]  are Registered Nurses and Social Workers. It is important to remember that you and your  are a team that works together with your primary care physician to create your individualized plan of care. The ultimate goal of your care plan is to help you implement your doctors treatment plan and to help you function at the highest level of health possible.     What are my rights as a patient?  It is important for you to know and understand your rights and responsibilities while receiving services from the [facility] program. We have enclosed a complete description of your rights and responsibilities. You can help to make your care more effective when you understand your right and responsibilities.     What is needed to be enrolled in the program?  You are only enrolled in the [facility] Program when you give us your consent to participate. You will find enclosed a consent form. You are receiving this letter because you or your caregivers have given us a verbal consent to enroll you in [facility] Management Program. We ask that you sign and return the enclosed written consent in the stamped self-addressed envelope.                           Patient Rights and Responsibilities    We consider you a partner in your care. When you are well informed, participate in treatment decisions and communicate openly with your doctor and other healthcare professionals, you help make your care more effective.     While you are in the Outpatient Case Management Program, your rights include the following:     You have a right to be provided services in a non-discriminatory manner in accordance with the provisions of Title VI of the Civil Rights Act of 1964, Section 504 of the Rehabilitation Act of  1973, the Age Discrimination Act of 1975, the Americans with Disabilities Act as well as any other applicable Federal and State laws and regulations.     You have the right to a reasonable, timely response to your request or need for care, as well as the right to considerate and respectful care including an environment that preserves dignity and contributes to a positive self-image. You are responsible for being considerate and respectful of our staff.     You have a right to information regarding patient rights, advocacy services and complaint mechanisms, and the right to prompt resolution of any complaint. You or a designee has the right to participate in the resolution of ethical issues surrounding your care. You have a right to file a complaint if you feel that your rights have been infringed, without fear or penalty from [facility] or the federal government. You may file a complaint with the Director of Outpatient Case Management by calling [phone]. At any time, you may lodge a grievance with the [facility] by calling [phone], or the Joint Commission on Accreditation of Healthcare Organizations at [phone].     You, or someone acting on your behalf, have the right to understandable information on your health status, treatment and progress in order to make decisions. You have the right to know the nature, risks and alternatives to treatment. You have the right to be informed, when appropriate, regarding the outcome of the care that has been provided. You have the right to refuse treatment to the extent permitted by law, and the right to be informed of the alternatives and consequences of refusing treatment.     You, in collaboration with your physician, have the right to make decisions regarding care and the right to participate in the development and implementation of the plan of care and effective pain management. You have the right to know the name and  professional status of those responsible for the delivery of your care and treatment.       You have a right, within legal guidelines, to have a guardian, next-of-kin or legal designee exercises your patient rights when you are unable to do so. You have the right for your wishes regarding end-of-life decisions to be addressed by the healthcare team through advance directives. You have the right to personal privacy and confidentiality and to expect confidentiality of all records and communications pertaining to your care.      You have the right to receive communications about your health information confidentially. You have the right to request restrictions on the uses and disclosures of your health information. You have the right to inspect, copy, request amendments and receive an accounting of to whom we have disclosed your health information.     You have the right to be provided with interpretation services if you do not speak English; to alternative communication techniques if you are hearing or vision impaired; and to have any other resources needed on your behalf to ensure effective communication. These services are provided free of charge.     You have a right to personal safety (free from mental, physical, sexual and verbal abuse, neglect and exploitation). You have the right to access protective and advocacy services.     Advance Directives  A Patient Advocate is available to meet with patients to answer questions regarding advance directives.    Living Will  A document that outlines what medical treatment the patient does or does not want in the event the patient becomes unable to make those decisions at the appropriate time.    Durable Medical Power of   A document by which the patient designates an individual to be responsible for making medical decisions in the event the patient becomes unable to do so.    HIPAA Notice of Privacy Practices  Your medical information is governed by federal  privacy laws. HIPAA protects private medical information and how that information is disclosed. If you have a question regarding the HIPAA Notice of Privacy Practices, or if you believe your privacy rights have been violated, you may call our designated hotline at [phone].            Quality Improvement  Because we consistently strive to improve the care and service provided to our patients, we welcome your feedback. Your comments are an important part of our quality improvement process, as we like to know what we are doing right and which areas are in need of improvement. Our policy is to listen, be responsive and provide you with an appropriate and timely follow-up to your questions or concerns. Our goal is active patient and family involvement in all aspects of the care process.        Reporting a Grievance or Complaint    During your time with the [facility] team you may have a grievance or complaint with our services. Your Patients Bill of Rights gives patients, families, and caregivers the right to express concerns and grievances and the right to expect a reasonable and timely response.     Your presentation of your concerns is not viewed negatively. It is an opportunity for us to improve the quality of our care and services we provide to you.     You may report your concerns directly to your , or you can phone in a complaint to:     Director of Outpatient Case Management  [phone]    You may also send a complaint letter to:    Director of Outpatient Case Management Services  [address] [place] [id number]    Tell us the details of your complaint and provide us with a contact phone number so we can contact you to obtain additional information. We will return a call to you within two business days of our receipt of your complaint, and to request additional information as needed. If you choose to mail a letter, your complaint may take a few days  longer to reach us.     All grievances will be addressed as quickly as possible. A grievance or complaint that involves situations or practices which place patients in immediate danger will be addressed as an urgent matter. We will work to resolve all other complaints within seven days of receipt. By that time, you will receive a phone call with either the resolution of your complaint, or a plan for corrective action. A formal written response will be sent to you within 30 days of receipt of your grievance.     If a resolution cannot be completed within 30 days, a letter will be sent to you or your family member with an estimated time for the final response.    Additionally, all patients have the right to file complaints with external agencies, without exception. Complaints/grievances can be addressed to the following agencies:            Patient Safety or Quality of Care Concerns  Office of Quality Monitoring   The [facility] [place] [address]  [phone] Toll Free    HIPPA Privacy/Security Concerns  Office for Civil Rights Region IV  [place] Department of Health & Human Services  [address] [place] [address], [address]  [place] [address] [phone] Phone  [phone] TDD  [phone] Toll Free    Medicare/Medicaid Billing Concerns  [place] for Medicare & Medicaid Services  Region [address]  [address] [place] [address], [address]  [place] [address] [phone] Phone  [phone] Toll Free    General Concerns  [facility]  [phone] Toll Free Complaint Hotline                                          Consent Form/Release of Information    By signing--     (1) I agree I have read the Outpatient Case Management information provided to me;     (2) I agree to voluntarily participate in the Outpatient Case Management program;     (3) I understand I must consent to participation in the Outpatient Case Management program during my first interview  with my ;    (4) I consent to the discussion and release of my personal health information to my healthcare team (including my personal physician, my medical home care team, any specialty physician(s), and my [facility] team);     (5) I agree my consent is valid for the length of time I am receiving Outpatient Case Management;    (6) I agree to referrals to community resources which my Case Management team recommends for me. I agree to the release of my personal information and personal health information as necessary to referral sources.    ___________________________________________________________________  Patients Printed Name     ___________________________________________________________________  Patients Signature       Date    If patient is in being cared for, please complete this section:     ___________________________________________________________________  Printed Name of Person Caring For Patient   Relationship To Patient    ___________________________________________________________________   Signature of Person Caring For Patient     Date    PLEASE SIGN AND RETURN IN THE ENCLOSED PRE-ADDRESSED ENVELOPE.

## 2017-07-11 ENCOUNTER — OFFICE VISIT (OUTPATIENT)
Dept: PSYCHIATRY | Facility: CLINIC | Age: 42
End: 2017-07-11
Payer: MEDICARE

## 2017-07-11 VITALS
WEIGHT: 263.38 LBS | HEIGHT: 65 IN | DIASTOLIC BLOOD PRESSURE: 132 MMHG | BODY MASS INDEX: 43.88 KG/M2 | SYSTOLIC BLOOD PRESSURE: 182 MMHG | HEART RATE: 112 BPM

## 2017-07-11 DIAGNOSIS — F41.1 GENERALIZED ANXIETY DISORDER: Primary | ICD-10-CM

## 2017-07-11 DIAGNOSIS — F39 MOOD DISORDER: ICD-10-CM

## 2017-07-11 DIAGNOSIS — F41.0 PANIC DISORDER WITHOUT AGORAPHOBIA: ICD-10-CM

## 2017-07-11 PROCEDURE — 99999 PR PBB SHADOW E&M-EST. PATIENT-LVL V: CPT | Mod: PBBFAC,,, | Performed by: NURSE PRACTITIONER

## 2017-07-11 PROCEDURE — 99499 UNLISTED E&M SERVICE: CPT | Mod: S$GLB,,, | Performed by: NURSE PRACTITIONER

## 2017-07-11 PROCEDURE — 99214 OFFICE O/P EST MOD 30 MIN: CPT | Mod: S$GLB,,, | Performed by: NURSE PRACTITIONER

## 2017-07-11 PROCEDURE — 90833 PSYTX W PT W E/M 30 MIN: CPT | Mod: S$GLB,,, | Performed by: NURSE PRACTITIONER

## 2017-07-11 RX ORDER — ALPRAZOLAM 1 MG/1
1 TABLET ORAL NIGHTLY
Qty: 30 TABLET | Refills: 5 | Status: ON HOLD | OUTPATIENT
Start: 2017-07-11 | End: 2018-04-16

## 2017-07-11 RX ORDER — LAMOTRIGINE 100 MG/1
TABLET ORAL
Qty: 30 TABLET | Refills: 11 | Status: SHIPPED | OUTPATIENT
Start: 2017-07-11 | End: 2018-01-25

## 2017-07-11 RX ORDER — MIRTAZAPINE 30 MG/1
30 TABLET, FILM COATED ORAL NIGHTLY
Qty: 30 TABLET | Refills: 11 | Status: SHIPPED | OUTPATIENT
Start: 2017-07-11 | End: 2017-10-24 | Stop reason: SDUPTHER

## 2017-07-11 RX ORDER — DOXEPIN HYDROCHLORIDE 25 MG/1
25 CAPSULE ORAL NIGHTLY PRN
Qty: 30 CAPSULE | Refills: 11 | Status: SHIPPED | OUTPATIENT
Start: 2017-07-11 | End: 2018-03-15

## 2017-07-11 NOTE — PATIENT INSTRUCTIONS
[facility] - DEPARTMENT OF PSYCHIATRY   PATIENT INFORMATION    We appreciate the opportunity to participate in your medical care and hope the following protocols will make it easier for you to receive quality treatment in our department.    1. PUNCTUALITY: Your appointment is scheduled for a fixed amount of time.  To get the benefit of your appointment, please arrive at least 15 minutes early enough to allow time for traffic, parking and registration.  If you are late for your appointment, you may have to reschedule.  Please make every effort to be on time.      2. PAYMENT FOR SERVICES:   Payments are expected at the time of service.  Please contact [phone] if you need to resolve issues involving your account at [facility] or to set up a payment plan.    3. CANCELLATION / MISSED APPOINTMENTS:   In order to receive quality care, all appointments must be kept.  Appointment may be cancelled at least 24 hours before your appointment time. If you do not give at least 24-hour notice of cancellation a fee may be billed directly to the patient.  Please note that insurance does not cover no-show charges, so you will be billed directly.  If you are consistently late, cancel, or do not show for your appointments, our department reserves the right to terminate treatment     MESSAGES- In general you can reach the department by calling [phone], between 8:00 a.m. and 5:00 p.m., Monday through Friday.  You can also use the [facility] Patient Portal.     AFTER HOURS, WEEKEND OR HOLIDAYS- For urgent questions after hours, weekends and holidays, calling the department number [phone] will connect you with a representative.  EMERGENCY-  In case of a crisis when there is a concern of harm to self or others, call 911 or the office [phone] between 8:00 a.m. and 5:00 p.m., Monday through Friday or go to the [facility] Emergency Room.    4. PRESCRIPTION REFILLS:  Prescription refills must be done at your  physician office visit, You will be given a sufficient number of refills to last until your next appointment, you must come to appointments for prescriptions.       5. FOLLOW UP APPOINTMENTS:  Follow-up appointments can be made in person at the Psychiatry Appointment Desk, online through the [facility] Portal, or by calling [phone], from 8am to 5pm, between Monday and Friday.  Patients are responsible for scheduling their own follow-up appointment,  It  Is recommended you schedule your appointment before leaving the clinic.    6. Providers are NOT ABLE to schedule appointments; all appointments must be made through the appointment department or through [facility].     Cancellation Policy:  Please provide greater than 24 hour notice of any cancellations as a fee will be charged for failure to do so. This policy is in effect to allow for other individuals on a long waiting list to be seen as soon as possible. Unlike other branches of medicine where several individuals can be scheduled in a 15 minute time slot, only one individual can be scheduled in any time slot in Psychiatry.    Walk-in appointments:      Walk in appointments are not available. For emergencies, please go to the Emergency Room.    [facility]: Please enroll as this is the preferred method of contacting your doctor for non-emergent calls. It will also allow you to book your own follow up appointments.    No Shows: Repeated no shows may result in a warning letter or you may be asked to seek care elsewhere.    Phone Calls: Please discuss concerns with your doctor within your scheduled appointment time. In most cases Psychotherapy and Medication management are not appropriate by telephone. If you have a simple question/concern, please provide all of the following information to the :   Detailed description of concern   Pharmacy name and phone number   Date of last visit and next scheduled visit   Phone number  where you can be reached throughout the day   Indication as to whether leaving a voice mail or message on an answering machine is appropriate (if applicable)  Calls will be returned by the Medical Assistant or Office Staff after your doctor has reviewed the message.  Please allow 24 hours for a returned phone call before calling back to leave another message. (If numerous calls are made between appointments, or if calls end up being lengthy, more frequent appointments are required for proper management.)    Disability: We do not do disability evaluations.  Please contact Social Security Administration for evaluations and determinations. You will then sign releases allowing for records from this office to be forwarded to Social Security Administration to use in their evaluation.    Gun Permit: We do not offer Sound Judgment Evaluations or assessments leading to gun ownership, nor do we fill out or file paperwork relevant to owning, concealing or purchasing a firearm.        Emotional Support     Animals: ESAs are not trained to perform tasks or recognize particular signs or symptoms but are distinguished by the close, emotional, and supportive bond between the animal and the owner; therefore we do not provide documentation, including letters, to aid in the acclamation that an Emotional Support Animal is required.      Samples: We do not provide samples of any medications. If you have financial difficulties and are on a limited income, you may qualify for Patient Assistance Programs from various pharmaceutical companies. This will require that you complete paperwork with your financial information, but this does not guarantee that the company will approve the application. Alternative medication options can be discussed.    Controlled Medications: Some medications are tightly controlled and regulated by the FDA and DOTTY.  Controlled medications will not be refilled before 30 days. Some of these medications will not  be refilled without a face to face appointment. For some individuals, monthly appointments may have to be scheduled.    Forms: If you have any forms or letters that need to be completed by your doctor, please present these at the beginning of the appointment. Forms, letters, etc. will not be completed outside of appointment times. Please provide a full name and address of the recipient. Anonymous letters will not be dictated. We do not have disability forms, FMLA forms, etc. on file.  You will need to obtain the necessary paperwork from your employer/school.    Limitations: You will be referred to other providers if we feel unable to adequately diagnose or treat your particular condition, or if collaboration with another provider would allow for better management of your condition.    Revised 4/12/2017    PATIENTS MAY EXPERIENCE SYMPTOMS OF WITHDRAWAL IF THEY RUN OUT OF MEDICATIONS.  THE PATIENT WILL ASSUME ALL RESPONSIBILITIES OF ANY OUTCOMES WHEN THERE IS AN ISSUE WITH NONCOMPLIANCE WITH FOLLOW-UP APPOINTMENTS AND MEDICATIONS.        THERE IS TO BE NO USE OF ALCOHOL AND/OR ILLEGAL SUBSTANCES      PATIENT ARE TO GO TO THE CLOSEST EMERGENCY ROOM IF FEELING A THREAT TO THEMSELVES OR OTHER OR IF GRAVELY DISABLED    TELL US HOW WE ARE DOING.  PLEASE COMPLETE THE PATIENT SATISFACTION SURVERY

## 2017-07-11 NOTE — PROGRESS NOTES
Outpatient Psychiatry Follow-Up Visit (MD/NP)    7/11/2017    Clinical Status of Patient:  Outpatient (Ambulatory)    Chief Complaint:  Homar Jerry is a 42 y.o. female who presents today for follow-up of mood disorder.  Met with patient.      Interval History and Content of Current Session:  Interim Events/Subjective Report/Content of Current Session:     Last seen Feb 2017, no communication between appt    Patient has mult medical issues, please see medical record, chart reviewed, several missed appt, please see medical record.      Xanax 1mg po q hs  Zyprexa 10mg po q hs  Remeron 30mg po q hs  Vistaril 25mg po q day    Stopped Zyprexa May/Charlene without consultation with me. Adjusting Thyroid meds.  Reports high anxiety, but sit calmly in chair, resp even, unlabored at 16/min. Vistaril with poor effects. Patient refusing most medication, overly focused on Xanax and/or Ativan,  Refusing SSRIs and mood stabilizers.       Psychotherapy:  · Target symptoms: mood disorder  · Why chosen therapy is appropriate versus another modality: relevant to diagnosis  · Outcome monitoring methods: self-report  · Therapeutic intervention type: insight oriented psychotherapy  · Topics discussed/themes: symptom recognition  · The patient's response to the intervention is reluctant. The patient's progress toward treatment goals is poor.   · Duration of intervention: 16 minutes.    Review of Systems   GENERAL: weight gain of 30lbs since March 2017  SKIN: No rashes or lacerations   HEAD: No headaches   EYES: No exophthalmos, jaundice or blindness   EARS: No dizziness, tinnitus or hearing loss   NOSE: No changes in smell   MOUTH & THROAT: No dyskinetic movements or obvious goiter   CHEST: No shortness of breath, hyperventilation or cough   CARDIOVASCULAR: No tachycardia or chest pain   ABDOMEN: No nausea, vomiting, pain, constipation or diarrhea   URINARY: No frequency, dysuria or sexual dysfunction   ENDOCRINE: No polydipsia,  "polyuria   MUSCULOSKELETAL: L knee pain, 10/10  NEUROLOGIC: No weakness, sensory changes, seizures, confusion, memory loss, tremor or other abnormal movements      Psychiatric Review Of Systems:  sleep: no  appetite changes: no  weight changes: no  energy/anergy: no  interest/pleasure/anhedonia: no  somatic symptoms: yes  libido: yes  anxiety/panic: yes      Past Medical, Family and Social History: The patient's past medical, family and social history have been reviewed and updated as appropriate within the electronic medical record - see encounter notes.    Compliance: yes but questionable    Side effects: None    Risk Parameters:  Patient reports no suicidal ideation  Patient reports no homicidal ideation  Patient reports no self-injurious behavior  Patient reports no violent behavior    Exam (detailed: at least 9 elements; comprehensive: all 15 elements)   Constitutional  Vitals:  Most recent vital signs, dated less than 90 days prior to this appointment, were reviewed.   Vitals:    07/11/17 1618   BP: (!) 182/132   Pulse: (!) 112   Weight: 119.5 kg (263 lb 6.4 oz)   Height: 5' 5" (1.651 m)        General:  unremarkable, age appropriate     Musculoskeletal  Muscle Strength/Tone:  no dyskinesia, no dystonia, no tremor, no tic   Gait & Station:  slow, limp     Psychiatric  Speech:  no latency; no press   Mood & Affect:  anxious, depressed  appropriate   Thought Process:  normal and logical   Associations:  intact   Thought Content:  normal, no suicidality, no homicidality, delusions, or paranoia   Insight:  has awareness of illness   Judgement: behavior is adequate to circumstances   Orientation:  grossly intact   Memory: intact for content of interview   Language: grossly intact   Attention Span & Concentration:  able to focus   Fund of Knowledge:  intact and appropriate to age and level of education     Assessment and Diagnosis   Status/Progress: Based on the examination today, the patient's problem(s) is/are " failing to respond as expected to treatment.  New problems have been presented today.   Co-morbidities and Lack of compliance are complicating management of the primary condition.  There are no active rule-out diagnoses for this patient at this time.     General Impression:       ICD-10-CM ICD-9-CM   1. Generalized anxiety disorder F41.1 300.02   2. Panic disorder without agoraphobia F41.0 300.01   3. Mood disorder F39 296.90       Intervention/Counseling/Treatment Plan   · Medication Management: Continue current medications. The risks and benefits of medication were discussed with the patient.   · Remeron 30mg po q hs  · D/C Zyprexa due to noncompliance  · D/C Vistaril   · Xanax 1mg po q hs, no increase or change of benzo  · Lamictal 100mg, half tab po q day x 14 days, then 1 tab by mouth q day  · Doxepin 25mg po q hs            Return to Clinic: 6 weeks

## 2017-07-14 DIAGNOSIS — F39 MOOD DISORDER: ICD-10-CM

## 2017-07-14 RX ORDER — MIRTAZAPINE 30 MG/1
TABLET, FILM COATED ORAL
Qty: 30 TABLET | Refills: 3 | OUTPATIENT
Start: 2017-07-14

## 2017-07-14 RX ORDER — HYDROXYZINE PAMOATE 25 MG/1
CAPSULE ORAL
Qty: 30 CAPSULE | Refills: 3 | OUTPATIENT
Start: 2017-07-14

## 2017-07-19 ENCOUNTER — OUTPATIENT CASE MANAGEMENT (OUTPATIENT)
Dept: ADMINISTRATIVE | Facility: OTHER | Age: 42
End: 2017-07-19

## 2017-07-21 ENCOUNTER — OUTPATIENT CASE MANAGEMENT (OUTPATIENT)
Dept: ADMINISTRATIVE | Facility: OTHER | Age: 42
End: 2017-07-21

## 2017-07-26 ENCOUNTER — OUTPATIENT CASE MANAGEMENT (OUTPATIENT)
Dept: ADMINISTRATIVE | Facility: OTHER | Age: 42
End: 2017-07-26

## 2017-07-26 ENCOUNTER — PATIENT OUTREACH (OUTPATIENT)
Dept: DIABETES | Facility: CLINIC | Age: 42
End: 2017-07-26

## 2017-07-26 NOTE — PROGRESS NOTES
Pt did not keep diabetes education appt scheduled for today. Called pt and was unable to reschedule d/t hard stop - pt must call financial services. Discussed this with Ms. Jerry and provided phone number for financial services. Requested pt to call diabetes management back to reschedule appt once she has resolved issue with financial services.

## 2017-07-26 NOTE — PROGRESS NOTES
7/26/17: LCSW attempted to contact pt to complete SW assessment; no answer, left voicemail.  LCSW attempted to contact pt's son and received busy signal.  LCSW contacted pt's sister Nuzhat to see if she could relay a message to her sister.  Nuzhat stated her sister's phone sometimes does not receive calls, possibly due to poor reception.  Nuzhat stated she will first try to call her sister to inform her LCSW is trying to get in touch with her; if she does not answer she will text her sister LCSW's contact information.  LCSW received call from pt to complete SW assessment.  Pt lives alone and has hx of anxiety, bipolar 1 disorder, chronic narcotic dependence.  Pt self-reports having two previous in-pt psychiatric hospitalizations for visual hallucinations and suicidal ideation ([facility] and [facility]).  Pt previously saw an [facility] psychiatric nurse practitioner for medication management; she believes NP is no longer with [facility] and she has not been scheduled to see anyone new.  Pt self-reports having financial problems; she states she has to often juggle which bills to pay and which ones not to pay.  Pt states she was unable to afford her insulin so she did not purchase it this month; LCSW noticed [employer] has been in touch with pt and has mailed application to pt.  Pt wanted to know if there were any places that could assist her with her utility bill; will research Low Income Home Energy Assistance Programs (LIHEAP) agencies near pt's residence.  LCSW will follow up with pt next week.

## 2017-07-31 ENCOUNTER — CLINICAL SUPPORT (OUTPATIENT)
Dept: ELECTROPHYSIOLOGY | Facility: CLINIC | Age: 42
End: 2017-07-31
Payer: MEDICARE

## 2017-07-31 DIAGNOSIS — Z95.818 STATUS POST PLACEMENT OF IMPLANTABLE LOOP RECORDER: ICD-10-CM

## 2017-07-31 DIAGNOSIS — R55 SYNCOPE, UNSPECIFIED SYNCOPE TYPE: ICD-10-CM

## 2017-07-31 PROCEDURE — 93297 REM INTERROG DEV EVAL ICPMS: CPT | Mod: S$GLB,,, | Performed by: INTERNAL MEDICINE

## 2017-07-31 PROCEDURE — 93299 LOOP RECORDER REMOTE: CPT | Mod: S$GLB,,, | Performed by: INTERNAL MEDICINE

## 2017-08-01 ENCOUNTER — OUTPATIENT CASE MANAGEMENT (OUTPATIENT)
Dept: ADMINISTRATIVE | Facility: OTHER | Age: 42
End: 2017-08-01

## 2017-08-01 NOTE — PROGRESS NOTES
8/1/17: LCSW completed online search for utility assistance programs in [facility].  LCSW contacted [facility] Office ([phone]); left message.  LCSW attempted to contact pt for follow up; no answer, left voicemail.

## 2017-08-03 ENCOUNTER — OUTPATIENT CASE MANAGEMENT (OUTPATIENT)
Dept: ADMINISTRATIVE | Facility: OTHER | Age: 42
End: 2017-08-03

## 2017-08-03 DIAGNOSIS — R11.0 CHRONIC NAUSEA: ICD-10-CM

## 2017-08-03 RX ORDER — PROMETHAZINE HYDROCHLORIDE 25 MG/1
TABLET ORAL
Qty: 60 TABLET | Refills: 2 | Status: SHIPPED | OUTPATIENT
Start: 2017-08-03 | End: 2017-08-16 | Stop reason: SDUPTHER

## 2017-08-03 NOTE — PROGRESS NOTES
"8/3/17  CM f/u to update plan of care. Spoke briefly with Ms Jerry. She is sleeping after a hard time sleeping overnight. She agrees to call back later.       F/u call to pt . She reports running out of her Novolog rx on a few more doses. She received it last "free" through [facility]. CM able to later determine that PAP had worked with her previously. She says her appetite is poor, doesn't take Novolog if she doesn't eat.   Pt has been blocked from scheduling appts- due to "Bad Debt", preventing her CDE appt 7/26/17. Provided Ms. Jerry with the [facility] to obtain help and remove the block- TEL: [phone]. She records number, CM asks her to call CM back with update. She promises to call CM. CM explained to pt how she is in need of her f/u med appts ie PCP/f/u A1C, PSYCH and CDE. CM explained that f/u with her med appts/labs help her in the prevention of DM complications. She reports stopping rx Zyprexa some time ago generally speaking noticing decreased BGs as a result. Because pt stopped rx herself, her therapist discontinued it. She c/o new rx Lamictal not effective enough to promote sleep hs and wishes to see Psych again.   Reviewed pt's last A1C 10.3 (249) in 3/3/17-- and should be due for f/u labs. CM explained the purpose of A1C test, providing 3 mo BG ave/eval of DM control. Ms Jerry reports her BGs range 142, 168, 192. She denies any s/s hypoglycemia.     Verbal message and in basket message sent to Brooke Lozada with PCP familiar with pt.     Plan next encounter  Check on PAP assistance for Novolog and Levemir refills          "

## 2017-08-04 ENCOUNTER — TELEPHONE (OUTPATIENT)
Dept: PHARMACY | Facility: CLINIC | Age: 42
End: 2017-08-04

## 2017-08-04 ENCOUNTER — OUTPATIENT CASE MANAGEMENT (OUTPATIENT)
Dept: ADMINISTRATIVE | Facility: OTHER | Age: 42
End: 2017-08-04

## 2017-08-04 NOTE — PROGRESS NOTES
"8/4/17  F/u from yesterday. Message from Brooke Lozada received. Spoke with Mrs. Rodríguez informing her that applications for her insulins,ventolin,synthroid and zyprexa had been sent to patient with Brooke's contact information after being unsuccessful in reaching pt by phone. Mrs. Jerry states she will have to check with her son about the mail. She is not aware of receiving it.   Informed Ms Jerry that Ms DONYA Siddiqiu, Financial Pt Advocate reports removing the "block" on scheduling appts. CM encouraged pt to reschedule her CDE appt and schedule her Psych appt. She agrees.   CM asked to be updated with pt's findings regarding the med applications as she will be out of her insulin. She says that she will.         Plan next encounter  CHeck on application status  Check on appts scheduled CDE/Psych?  "

## 2017-08-04 NOTE — TELEPHONE ENCOUNTER
Best the patient didn't answer I left a message and also mailed the patients applications for insulin,synthroid,levemir,novolog, ventolin and zyprexa.

## 2017-08-08 ENCOUNTER — OUTPATIENT CASE MANAGEMENT (OUTPATIENT)
Dept: ADMINISTRATIVE | Facility: OTHER | Age: 42
End: 2017-08-08

## 2017-08-08 NOTE — PROGRESS NOTES
8/8/17: LCSW contacted pt for follow up.  Pt stated she was resting due to pain issues.  LCSW gave pt the contact information for the [facility] and [facility] Office to see if she can get assistance with her utilities.  Pt did not report any other social needs at this time.  LCSW will follow up with pt in two weeks.

## 2017-08-15 ENCOUNTER — OUTPATIENT CASE MANAGEMENT (OUTPATIENT)
Dept: ADMINISTRATIVE | Facility: OTHER | Age: 42
End: 2017-08-15

## 2017-08-15 NOTE — PROGRESS NOTES
8/15/17  CM f/u to update plan of care. Spoke with Ms Jerry. Praised her for rescheduling her CDE for 9/5/17. She reports that Brooke Lozada with PAP remailed med applications for insulins, inhalers, Synthroid & Zyprexa and that she is on the way to [facility] to be with her sister undergoing a port a cath implant for chemo and will fill out application while at [facility]. She has Brooke's contact information to drop off filled application. She reports that her BGs are going about the same. No outstanding changes. Just stress from illness in family.   David Mott LCSW w/[facility] continues active.    Plan  F/u with pt to check on application completion/outcome.  F/u on 9/6 CDE appt eventually

## 2017-08-16 DIAGNOSIS — R11.0 CHRONIC NAUSEA: ICD-10-CM

## 2017-08-16 RX ORDER — PROMETHAZINE HYDROCHLORIDE 25 MG/1
25 TABLET ORAL EVERY 6 HOURS PRN
Qty: 60 TABLET | Refills: 2 | Status: SHIPPED | OUTPATIENT
Start: 2017-08-16 | End: 2017-09-26 | Stop reason: SDUPTHER

## 2017-08-16 NOTE — TELEPHONE ENCOUNTER
----- Message from Michelle Mendoza sent at 8/16/2017 12:27 PM CDT -----  Contact: pt [phone]  RX request - refill or new RX.  Is this a refill or new RX: refill   RX name and strength: promethazine (PHENERGAN) 25 MG tablet  Directions:   Is this a 30 day or 90 day RX:  30  Pharmacy name and phone #: [facility] [phone]  Comments:  Pt is asking for a 30 day supply

## 2017-08-22 ENCOUNTER — OUTPATIENT CASE MANAGEMENT (OUTPATIENT)
Dept: ADMINISTRATIVE | Facility: OTHER | Age: 42
End: 2017-08-22

## 2017-08-31 ENCOUNTER — OFFICE VISIT (OUTPATIENT)
Dept: INTERNAL MEDICINE | Facility: CLINIC | Age: 42
End: 2017-08-31
Payer: MEDICARE

## 2017-08-31 VITALS
HEIGHT: 64 IN | DIASTOLIC BLOOD PRESSURE: 89 MMHG | BODY MASS INDEX: 44.3 KG/M2 | OXYGEN SATURATION: 98 % | SYSTOLIC BLOOD PRESSURE: 147 MMHG | WEIGHT: 259.5 LBS

## 2017-08-31 DIAGNOSIS — F41.9 ANXIETY: Chronic | ICD-10-CM

## 2017-08-31 DIAGNOSIS — R05.9 COUGH: ICD-10-CM

## 2017-08-31 DIAGNOSIS — E13.9 LADA (LATENT AUTOIMMUNE DIABETES IN ADULTS), MANAGED AS TYPE 1: ICD-10-CM

## 2017-08-31 DIAGNOSIS — Z76.5 DRUG-SEEKING BEHAVIOR: Primary | ICD-10-CM

## 2017-08-31 DIAGNOSIS — I10 ESSENTIAL HYPERTENSION: Chronic | ICD-10-CM

## 2017-08-31 DIAGNOSIS — F11.20 CHRONIC NARCOTIC DEPENDENCE: Chronic | ICD-10-CM

## 2017-08-31 DIAGNOSIS — G47.00 INSOMNIA, UNSPECIFIED TYPE: ICD-10-CM

## 2017-08-31 PROCEDURE — 3046F HEMOGLOBIN A1C LEVEL >9.0%: CPT | Mod: S$GLB,,, | Performed by: INTERNAL MEDICINE

## 2017-08-31 PROCEDURE — 3079F DIAST BP 80-89 MM HG: CPT | Mod: S$GLB,,, | Performed by: INTERNAL MEDICINE

## 2017-08-31 PROCEDURE — 99215 OFFICE O/P EST HI 40 MIN: CPT | Mod: S$GLB,,, | Performed by: INTERNAL MEDICINE

## 2017-08-31 PROCEDURE — 99999 PR PBB SHADOW E&M-EST. PATIENT-LVL III: CPT | Mod: PBBFAC,,, | Performed by: INTERNAL MEDICINE

## 2017-08-31 PROCEDURE — 3008F BODY MASS INDEX DOCD: CPT | Mod: S$GLB,,, | Performed by: INTERNAL MEDICINE

## 2017-08-31 PROCEDURE — 3077F SYST BP >= 140 MM HG: CPT | Mod: S$GLB,,, | Performed by: INTERNAL MEDICINE

## 2017-08-31 PROCEDURE — 99499 UNLISTED E&M SERVICE: CPT | Mod: S$GLB,,, | Performed by: INTERNAL MEDICINE

## 2017-08-31 RX ORDER — HYDROCHLOROTHIAZIDE 25 MG/1
25 TABLET ORAL DAILY
Qty: 30 TABLET | Refills: 11 | Status: ON HOLD | OUTPATIENT
Start: 2017-08-31 | End: 2018-04-16 | Stop reason: HOSPADM

## 2017-08-31 NOTE — PROGRESS NOTES
"Subjective:       Patient ID: Homar Jerry is a 42 y.o. female.    Chief Complaint: Anxiety (pt states she's been having panic attacks x2wks ago.) and Cough (pt reports she has been having a dry cough on and off excessively for a few wks now. pt hasn't taken any meds to relieve.)    HPI   41 yo F here for an urgent eval for anxiety and cough.   I have previously informed her to get all psychiatric medications through psychiatry.   She was seeing Carmelo Zhou in psychiatry who is leaving [facility]. Last seen 7/11/17. Was supposed to have a 6 week follow up with him.    · Remeron 30mg qhs  · Xanax 1mg po q hs, no increase or change of benzo  · Lamictal 100mg, half tab po q day x 14 days, then 1 tab by mouth q day  · Doxepin 25mg po q hs  Her zyprexa was dc due to noncompliance, vistaril was discontinued. He reported focus on xanax/ativan and refused ssris, mood stabilizers.     She reports she hasn't been sleeping at all.     [facility] reviewed and she received:  Alprazolam 1mg #30 on 8/12 from Carmelo Zhou.  Percocet #10 on 8/23 from Dr. Brielle Degroot.     She reports that [facility] changed her alprazolam to 2mg and that I need to prescribe this for her.     Dry cough. 2 weeks. Sweats especially at night.   Some wheezing, taking twice a day.     BG staying in the 200s.     Review of Systems   Constitutional: Negative for fever.   Respiratory: Positive for cough. Negative for shortness of breath.    Cardiovascular: Negative for chest pain.   Musculoskeletal: Negative.    Skin: Negative.    Psychiatric/Behavioral: Positive for sleep disturbance. The patient is nervous/anxious.        Objective:   BP (!) 147/89 (BP Location: Right arm, Patient Position: Sitting, BP Method: Medium (Manual))   Ht 5' 4" (1.626 m)   Wt 117.7 kg (259 lb 7.7 oz)   LMP  (LMP Unknown)   SpO2 98%   BMI 44.54 kg/m²      Physical Exam   Constitutional: She is oriented to person, place, and time. She appears well-developed " and well-nourished. No distress.   HENT:   Head: Normocephalic and atraumatic.   Cardiovascular: Normal rate and regular rhythm.    Pulmonary/Chest: Effort normal. No respiratory distress. She has no wheezes. She has no rales.   Neurological: She is alert and oriented to person, place, and time.   Skin: Skin is warm and dry. She is not diaphoretic.   Psychiatric:   Pt is argumentative. She does sit still in chair and does not appear fidgety.       Assessment:       1. Drug-seeking behavior    2. 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 Essential hypertension    4. Chronic narcotic dependence    5. Anxiety    6. Cough    7. Insomnia, unspecified type        Plan:       Homar was seen today for anxiety and cough.    Diagnoses and all orders for this visit:    Drug-seeking behavior  Pt counseled extensively that I will not prescribe benzos for her. She is on Alprazolam 1mg nightly and filled  #30 on 8/12 from Carmelo Zhou. She has refills on this rx. I have verified that she has an appt with psychiatry on 9/26 at 3pm with Dr. Girard. She was offered a cancellation earlier this week but declined (bad weather.)   She becomes argumentative when I tell her I cannot fill her psychiatric medications (which I have told her on previous occasions.) . She demonstrates manipulative behavior. She states that [facility] increased her dose to 2mg and told her to get this from her PCP and therefore I must fill this. I told her no and that she must discuss with psychiatry at her appointment. She states that she is not sleeping and needs something now to help her. In addition to the benzo she is on chronic percocet 10mg of which she receives 120 pills per month. She is already on remeron 30mg nightly which she has from [employer]. I will not prescribe any additional medication.       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    Chronic narcotic dependence  See above    Anxiety, insomnia  See above    Cough  Pt does not cough for the entirety of the visit until I tell her that I will not prescribe her benzos and then begins coughing frequently during the exam. She stops coughing when we start talking about her anxiety/benzos again. There are no wheezing or rales on exam. She may take OTC cough drops prn. I will not prescribe any codeine cough medications due to her percocet and benzo prescriptions.       Over 40  minutes were spent with patient with over half of this time spent in coordination of care and/or counseling.

## 2017-08-31 NOTE — LETTER
August 31, 2017      Tacos Johns MD  [address] [address]           [facility]  [address]  [place] [address]  Phone: [phone]  Fax: [phone]          Patient: Homar Jerry   MR Number: [id number]   YOB: 1975   Date of Visit: 8/31/2017       Dear Dr. Tacos Johns:    Thank you for referring Homar Jerry to me for evaluation. Attached you will find relevant portions of my assessment and plan of care.    If you have questions, please do not hesitate to call me. I look forward to following Homar Jerry along with you.    Sincerely,    Mary Cabrera MD    Enclosure  CC:  No Recipients    If you would like to receive this communication electronically, please contact [email or web address] or [phone] to request more information on [employer] Link access.    For providers and/or their staff who would like to refer a patient to [facility], please contact us through our one-stop-shop provider referral line, [facility], at [phone].    If you feel you have received this communication in error or would no longer like to receive these types of communications, please e-mail [email or web address]

## 2017-09-01 ENCOUNTER — OUTPATIENT CASE MANAGEMENT (OUTPATIENT)
Dept: ADMINISTRATIVE | Facility: OTHER | Age: 42
End: 2017-09-01

## 2017-09-01 NOTE — PROGRESS NOTES
9/1/17  RAFI f/u to update plan of care. Spoke with Ms. Jerry. She is s/p PCP appt yest-noted Drug Seeking Beh and reports not being happy by the fact she can't get her Xanax rx ordered by her PCP. Her PCP directed pt to Psych to order her Psych related rxs. Ms. Jerry c/o Xanax 1mg does not work as effectively as 2mg that was ordered for her during a recent ED Hosp visit in [place]. RAFI spoke with Tram Curtis, nurse in Psych who confirms already speaking with Ms Jerry when arranging the earliest next Psych appt available 9/26. Pt has been put on the wait list to fill cancellation spots. Tram explains that pt's meds can not be ordered over the phone without having seen Psych. Tram reports providing pt with 3 outside [facility] resources in the event of more immed needs by pt. Tram states that this was all explained to Ms Jerry. CM reinforced Tram's instruction. Pt states she tried the other resources and are booked up too. CM instructed pt to contact [facility] to schedule an UC appt and offer her medical advice on how to best manage her complaints at the time.   RAFI made sure pt had the 211 Crisis Hot Line Number, [facility] Crisis #. Ms. Jerry speaks in a loud angry voice. She denies SI/HI.   JASMIN Diaz remailed applications to pt for Novolog & Levemir, Inhaler, Synthroid, Zyprexa.  RAFI reminded pt of her 9/5 appts to CDE and arrhythmia clinic. She does not commit to saying she will attend CDE appt. She is checking her BGs 3-4x day. She provides little information on her BG readings.   Discussed case with [facility] KIRK. SW is still working with patient on meeting needs. This RN will continue to monitor and assist with care coordination.   [facility]

## 2017-09-05 ENCOUNTER — CLINICAL SUPPORT (OUTPATIENT)
Dept: ELECTROPHYSIOLOGY | Facility: CLINIC | Age: 42
End: 2017-09-05
Payer: MEDICARE

## 2017-09-05 DIAGNOSIS — R55 SYNCOPE, UNSPECIFIED SYNCOPE TYPE: ICD-10-CM

## 2017-09-05 DIAGNOSIS — Z95.818 STATUS POST PLACEMENT OF IMPLANTABLE LOOP RECORDER: ICD-10-CM

## 2017-09-05 PROCEDURE — 93299 LOOP RECORDER REMOTE: CPT | Mod: S$GLB,,, | Performed by: INTERNAL MEDICINE

## 2017-09-05 PROCEDURE — 93297 REM INTERROG DEV EVAL ICPMS: CPT | Mod: S$GLB,,, | Performed by: INTERNAL MEDICINE

## 2017-09-06 ENCOUNTER — OUTPATIENT CASE MANAGEMENT (OUTPATIENT)
Dept: ADMINISTRATIVE | Facility: OTHER | Age: 42
End: 2017-09-06

## 2017-09-06 NOTE — PROGRESS NOTES
"[facility]17[facility].  Pt self-reports experiencing problems with sleep.  She stated how she does have an appointment with psychiatry on 9/26/17 (LCSW unable to view psychiatry appointments).  Pt believes she would sleep better if her Zanax was increased to 2 mg along with her Seroquel and Remeron.  Pt stated that utility assistance company has not returned her call.  [facility] encouraged [facility] to keep her upcoming psychiatry appointment.  [facility] asked pt if he could be of any further assistance as a  and she stated, "no I don't believe so."    "

## 2017-09-07 ENCOUNTER — OUTPATIENT CASE MANAGEMENT (OUTPATIENT)
Dept: ADMINISTRATIVE | Facility: OTHER | Age: 42
End: 2017-09-07

## 2017-09-07 NOTE — PROGRESS NOTES
9/7/17  CM notified by Richard Mott LCSW case closed.   CM closed case.    Notified Dr. Cabrera.

## 2017-09-11 DIAGNOSIS — E11.9 DIABETES MELLITUS WITHOUT COMPLICATION: ICD-10-CM

## 2017-09-12 ENCOUNTER — OUTPATIENT CASE MANAGEMENT (OUTPATIENT)
Dept: ADMINISTRATIVE | Facility: OTHER | Age: 42
End: 2017-09-12

## 2017-09-12 NOTE — PROGRESS NOTES
Thank you for the referral.   For your information:    The following patient has been assigned to Ying Hayes RN with Outpatient Complex Care Management for high risk screening.    Reason: Diabetes mellitus without complication    Please contact [facility] at [phone] with any questions.    Thank you,  Yadira Back

## 2017-09-16 ENCOUNTER — HOSPITAL ENCOUNTER (INPATIENT)
Facility: HOSPITAL | Age: 42
LOS: 1 days | Discharge: HOME | DRG: 339 | End: 2017-09-19
Attending: EMERGENCY MEDICINE | Admitting: SURGERY
Payer: MEDICARE

## 2017-09-16 DIAGNOSIS — K35.30 ACUTE APPENDICITIS WITH LOCALIZED PERITONITIS: Primary | ICD-10-CM

## 2017-09-16 DIAGNOSIS — R10.9 ABDOMINAL PAIN: ICD-10-CM

## 2017-09-16 LAB
ABO + RH BLD: NORMAL
ALBUMIN SERPL BCP-MCNC: 3.6 G/DL
ALP SERPL-CCNC: 99 U/L
ALT SERPL W/O P-5'-P-CCNC: 9 U/L
ANION GAP SERPL CALC-SCNC: 19 MMOL/L
AST SERPL-CCNC: 12 U/L
BASOPHILS # BLD AUTO: 0 K/UL
BASOPHILS NFR BLD: 0 %
BILIRUB SERPL-MCNC: 0.3 MG/DL
BLD GP AB SCN CELLS X3 SERPL QL: NORMAL
BUN SERPL-MCNC: 18 MG/DL
CALCIUM SERPL-MCNC: 9.3 MG/DL
CHLORIDE SERPL-SCNC: 100 MMOL/L
CO2 SERPL-SCNC: 23 MMOL/L
CREAT SERPL-MCNC: 1.2 MG/DL
CRP SERPL-MCNC: 99.4 MG/L
DIFFERENTIAL METHOD: ABNORMAL
EOSINOPHIL # BLD AUTO: 0.1 K/UL
EOSINOPHIL NFR BLD: 0.5 %
ERYTHROCYTE [DISTWIDTH] IN BLOOD BY AUTOMATED COUNT: 14.6 %
EST. GFR  (AFRICAN AMERICAN): >60 ML/MIN/1.73 M^2
EST. GFR  (NON AFRICAN AMERICAN): 55.5 ML/MIN/1.73 M^2
GLUCOSE SERPL-MCNC: 173 MG/DL
HCT VFR BLD AUTO: 38.4 %
HGB BLD-MCNC: 13.2 G/DL
LIPASE SERPL-CCNC: 15 U/L
LYMPHOCYTES # BLD AUTO: 3.7 K/UL
LYMPHOCYTES NFR BLD: 26.2 %
MCH RBC QN AUTO: 28 PG
MCHC RBC AUTO-ENTMCNC: 34.4 G/DL
MCV RBC AUTO: 81 FL
MONOCYTES # BLD AUTO: 0.9 K/UL
MONOCYTES NFR BLD: 6.2 %
NEUTROPHILS # BLD AUTO: 9.4 K/UL
NEUTROPHILS NFR BLD: 66.7 %
PLATELET # BLD AUTO: 322 K/UL
PMV BLD AUTO: 10.2 FL
POCT GLUCOSE: 171 MG/DL (ref 70–110)
POTASSIUM SERPL-SCNC: 3 MMOL/L
PROT SERPL-MCNC: 8.3 G/DL
RBC # BLD AUTO: 4.72 M/UL
SODIUM SERPL-SCNC: 142 MMOL/L
WBC # BLD AUTO: 14.07 K/UL

## 2017-09-16 PROCEDURE — 96376 TX/PRO/DX INJ SAME DRUG ADON: CPT

## 2017-09-16 PROCEDURE — 99285 EMERGENCY DEPT VISIT HI MDM: CPT | Mod: ,,, | Performed by: EMERGENCY MEDICINE

## 2017-09-16 PROCEDURE — 25000003 PHARM REV CODE 250: Performed by: STUDENT IN AN ORGANIZED HEALTH CARE EDUCATION/TRAINING PROGRAM

## 2017-09-16 PROCEDURE — 99285 EMERGENCY DEPT VISIT HI MDM: CPT | Mod: 25

## 2017-09-16 PROCEDURE — 63600175 PHARM REV CODE 636 W HCPCS: Performed by: STUDENT IN AN ORGANIZED HEALTH CARE EDUCATION/TRAINING PROGRAM

## 2017-09-16 PROCEDURE — 96366 THER/PROPH/DIAG IV INF ADDON: CPT

## 2017-09-16 PROCEDURE — 96375 TX/PRO/DX INJ NEW DRUG ADDON: CPT

## 2017-09-16 PROCEDURE — 83690 ASSAY OF LIPASE: CPT

## 2017-09-16 PROCEDURE — 80053 COMPREHEN METABOLIC PANEL: CPT

## 2017-09-16 PROCEDURE — 85025 COMPLETE CBC W/AUTO DIFF WBC: CPT

## 2017-09-16 PROCEDURE — 96365 THER/PROPH/DIAG IV INF INIT: CPT

## 2017-09-16 PROCEDURE — 86900 BLOOD TYPING SEROLOGIC ABO: CPT

## 2017-09-16 PROCEDURE — 86850 RBC ANTIBODY SCREEN: CPT

## 2017-09-16 PROCEDURE — 82962 GLUCOSE BLOOD TEST: CPT

## 2017-09-16 PROCEDURE — 86140 C-REACTIVE PROTEIN: CPT

## 2017-09-16 PROCEDURE — 93010 ELECTROCARDIOGRAM REPORT: CPT | Mod: ,,, | Performed by: INTERNAL MEDICINE

## 2017-09-16 PROCEDURE — 63600175 PHARM REV CODE 636 W HCPCS: Performed by: EMERGENCY MEDICINE

## 2017-09-16 PROCEDURE — 93005 ELECTROCARDIOGRAM TRACING: CPT

## 2017-09-16 PROCEDURE — 96361 HYDRATE IV INFUSION ADD-ON: CPT

## 2017-09-16 RX ORDER — HYDROMORPHONE HYDROCHLORIDE 1 MG/ML
1 INJECTION, SOLUTION INTRAMUSCULAR; INTRAVENOUS; SUBCUTANEOUS
Status: COMPLETED | OUTPATIENT
Start: 2017-09-16 | End: 2017-09-16

## 2017-09-16 RX ORDER — HYDROMORPHONE HYDROCHLORIDE 1 MG/ML
1 INJECTION, SOLUTION INTRAMUSCULAR; INTRAVENOUS; SUBCUTANEOUS
Status: DISCONTINUED | OUTPATIENT
Start: 2017-09-16 | End: 2017-09-16

## 2017-09-16 RX ORDER — POTASSIUM CHLORIDE 7.45 MG/ML
10 INJECTION INTRAVENOUS ONCE
Status: COMPLETED | OUTPATIENT
Start: 2017-09-16 | End: 2017-09-16

## 2017-09-16 RX ORDER — HYDROMORPHONE HYDROCHLORIDE 1 MG/ML
1 INJECTION, SOLUTION INTRAMUSCULAR; INTRAVENOUS; SUBCUTANEOUS
Status: COMPLETED | OUTPATIENT
Start: 2017-09-17 | End: 2017-09-16

## 2017-09-16 RX ORDER — METRONIDAZOLE 500 MG/100ML
500 INJECTION, SOLUTION INTRAVENOUS
Status: DISCONTINUED | OUTPATIENT
Start: 2017-09-17 | End: 2017-09-17

## 2017-09-16 RX ORDER — KETOROLAC TROMETHAMINE 30 MG/ML
15 INJECTION, SOLUTION INTRAMUSCULAR; INTRAVENOUS
Status: COMPLETED | OUTPATIENT
Start: 2017-09-16 | End: 2017-09-16

## 2017-09-16 RX ADMIN — KETOROLAC TROMETHAMINE 15 MG: 30 INJECTION, SOLUTION INTRAMUSCULAR at 08:09

## 2017-09-16 RX ADMIN — SODIUM CHLORIDE, SODIUM LACTATE, POTASSIUM CHLORIDE, AND CALCIUM CHLORIDE 1000 ML: .6; .31; .03; .02 INJECTION, SOLUTION INTRAVENOUS at 09:09

## 2017-09-16 RX ADMIN — HYDROMORPHONE HYDROCHLORIDE 1 MG: 1 INJECTION, SOLUTION INTRAMUSCULAR; INTRAVENOUS; SUBCUTANEOUS at 09:09

## 2017-09-16 RX ADMIN — HYDROMORPHONE HYDROCHLORIDE 1 MG: 1 INJECTION, SOLUTION INTRAMUSCULAR; INTRAVENOUS; SUBCUTANEOUS at 11:09

## 2017-09-16 RX ADMIN — IOHEXOL 100 ML: 350 INJECTION, SOLUTION INTRAVENOUS at 11:09

## 2017-09-16 RX ADMIN — HYDROMORPHONE HYDROCHLORIDE 1 MG: 1 INJECTION, SOLUTION INTRAMUSCULAR; INTRAVENOUS; SUBCUTANEOUS at 07:09

## 2017-09-16 RX ADMIN — POTASSIUM CHLORIDE 10 MEQ: 10 INJECTION, SOLUTION INTRAVENOUS at 09:09

## 2017-09-16 RX ADMIN — SODIUM CHLORIDE 1000 ML: 0.9 INJECTION, SOLUTION INTRAVENOUS at 08:09

## 2017-09-16 NOTE — ED NOTES
LOC: The patient is awake, alert and aware of environment with an appropriate affect, the patient is oriented x 3 and speaking appropriately.  APPEARANCE: Patient resting comfortably and in no acute distress, patient is clean and well groomed  SKIN: The skin is warm and dry, color consistent with ethnicity, patient has normal skin turgor and moist mucus membranes, skin intact, no breakdown or bruising noted.  MUSCULOSKELETAL: Patient moving all extremities well, no obvious swelling or deformities noted.    RESPIRATORY: Airway is open and patent, breath sounds clear throughout all lung fields; respirations are spontaneous, patient has a normal effort and rate, no accessory muscle use noted. Pt reports SOB  CARDIAC: Patient has no peripheral edema noted, capillary refill < 3 seconds. Pt reports CP  ABDOMEN: Soft and tender to palpation in the RLQ, distention noted.  Pt reports fullness to the RLQ. Bowel sounds present x 4.   NEUROLOGIC: PERRL, 3 mm bilaterally, eyes open spontaneously, behavior appropriate to situation, follows commands, facial expression symmetrical, bilateral hand grasp equal and even, purposeful motor response noted, normal sensation in all extremities when touched with a finger. Pt denies numbness and tingling.

## 2017-09-16 NOTE — ED PROVIDER NOTES
"Encounter Date: 9/16/2017    SCRIBE #1 NOTE: I, Marilia Bravo, am scribing for, and in the presence of,  Dr. Groves. I have scribed the following portions of the note - the Resident attestation and the EKG reading.       History     Chief Complaint   Patient presents with    Abdominal Pain     r lower abd pain , with severe nausea started yesterday, temp was 101 and took 2 tylenols     HPI   42 y.o. F w/ right lower abd pain, nausea. Sx's began yesterday. +fever 101F last night.  Pain began yesterday afternoon, around her "belly button," then migrated to her RLQ and has been constant since then, sharp, non-radiating at this point. She tried to stand up yesterday with the pain, and was overcome with lightheadedness and nausea.  Pain was unrelieved with Percocet.  Pain persisted, and she was able to sleep for a little, until the pain woke her up at 2 AM.  At that time, she also had nausea, was feeling clammy, and was drenched in sweat.  She took 2 Tylenols at that time because she had fever of 101 F.  She has not eaten in 2 days, 2/2 no appetite.  She has been having diarrhea for the past 2 days, nonbloody and not black.  She is status post total hysterectomy, about 10 years ago, denies any vaginal symptoms.  She denies any urinary symptoms.      Review of patient's allergies indicates:   Allergen Reactions    Dexamethasone Hives and Shortness Of Breath     Per pt, only steroid she is allergic to is dexamethasone    Doxepin hcl Hives, Diarrhea and Itching    Ciprofloxacin      Counteracts with seroquel, xanax, tizanidine    Compazine [prochlorperazine] Hives and Itching    Lyrica [pregabalin]      depression    Prednisone      Gastroparesis flare up     Past Medical History:   Diagnosis Date    Anemia     Anxiety     Asthma     usually with cold weather    Back pain     Bipolar 1 disorder 3/17/2015    Cervical cancer 2009    DOT    Diabetes mellitus with hyperglycemia 1/17/2017    Fibrocystic breast  "    Fibromyalgia     Gastroparesis 2012    s/p sleeve to cover damaged nerves; s/p gastric pacemaker which did not help; due to nerve damage during surgery    Hypertension     Hypothyroidism, postsurgical     Morbid obesity     Prediabetes     Rheumatoid arthritis     Thyroid cancer 2003 & 2013    thyroidectomy    Urinary incontinence      Past Surgical History:   Procedure Laterality Date    ANKLE FRACTURE SURGERY Right     BREAST SURGERY      exploratory laparotomy due to complications of hysterectomy  2009    cervical cuff burst with air in abdomen    FRACTURE SURGERY      GASTRECTOMY      gastric pacemaker      gastric sleeve      HYSTERECTOMY  2009    total including cervix    KNEE ARTHROSCOPY W/ MENISCAL REPAIR Left 2016    KNEE SURGERY  05/12/2016    loop monitor  2016    multiple breast biopsies      TONSILLECTOMY      ureteral sling       Family History   Problem Relation Age of Onset    Heart disease Mother     Arthritis Mother     Rheum arthritis Mother     Heart attack Mother 55    Clotting disorder Mother     Hyperlipidemia Father     Clotting disorder Father     Thyroid cancer Paternal Grandmother     Diabetes Paternal Grandmother     Clotting disorder Paternal Grandfather     No Known Problems Sister     No Known Problems Brother     No Known Problems Maternal Aunt     No Known Problems Paternal Aunt     Bipolar disorder Maternal Uncle     Pancreatic cancer Maternal Uncle     No Known Problems Paternal Uncle     No Known Problems Maternal Grandfather     No Known Problems Maternal Grandmother     No Known Problems Cousin     ADD / ADHD Son     Pneumonia Brother     HIV Brother     Alcohol abuse Neg Hx     Anxiety disorder Neg Hx     Dementia Neg Hx     Depression Neg Hx     Drug abuse Neg Hx     OCD Neg Hx     Paranoid behavior Neg Hx     Physical abuse Neg Hx     Schizophrenia Neg Hx     Seizures Neg Hx     Self injury Neg Hx     Sexual abuse  Neg Hx     Suicide Neg Hx      Social History   Substance Use Topics    Smoking status: Never Smoker    Smokeless tobacco: Never Used    Alcohol use No     Review of Systems   Constitutional: Positive for appetite change, chills, diaphoresis and fever.   HENT: Negative for rhinorrhea, sore throat and trouble swallowing.    Eyes: Negative for pain and visual disturbance.   Respiratory: Negative for cough, chest tightness and shortness of breath.    Cardiovascular: Negative for chest pain and palpitations.   Gastrointestinal: Positive for abdominal pain, diarrhea and nausea. Negative for blood in stool, constipation and vomiting.   Genitourinary: Negative for dysuria, flank pain, frequency, hematuria, pelvic pain, vaginal bleeding, vaginal discharge and vaginal pain.   Musculoskeletal: Negative for back pain and neck stiffness.   Skin: Negative for rash and wound.   Neurological: Negative for seizures, syncope, weakness and headaches.   All other systems reviewed and are negative.      Physical Exam     Initial Vitals [09/16/17 1811]   BP Pulse Resp Temp SpO2   (!) 151/111 (!) 144 18 98.9 °F (37.2 °C) 99 %      MAP       124.33         Physical Exam    Nursing note and vitals reviewed.  Constitutional: She appears well-developed. She is not diaphoretic. No distress.   Patient appears uncomfortable.  She is obese.   HENT:   Head: Normocephalic and atraumatic.   Nose: Nose normal.   Mouth/Throat: Oropharynx is clear and moist.   Eyes: Conjunctivae and EOM are normal. Pupils are equal, round, and reactive to light.   Neck: Normal range of motion. Neck supple.   Cardiovascular: Normal heart sounds and intact distal pulses.   No murmur heard.  Pulses:       Dorsalis pedis pulses are 2+ on the right side, and 2+ on the left side.   Pulmonary/Chest: Breath sounds normal. No respiratory distress. She has no rales. She exhibits no tenderness.   Abdominal: Soft. Normal appearance. There is tenderness (Right lower quadrant,  +Rovsing's, obturator signs.).   Musculoskeletal: Normal range of motion. She exhibits no edema or tenderness.   Neurological: She is alert and oriented to person, place, and time. She has normal strength. No cranial nerve deficit. She exhibits normal muscle tone.   Skin: Skin is warm and dry. No pallor.         ED Course   Procedures  Labs Reviewed   CBC W/ AUTO DIFFERENTIAL - Abnormal; Notable for the following:        Result Value    WBC 14.07 (*)     MCV 81 (*)     RDW 14.6 (*)     Gran # 9.4 (*)     All other components within normal limits   COMPREHENSIVE METABOLIC PANEL - Abnormal; Notable for the following:     Potassium 3.0 (*)     Glucose 173 (*)     ALT 9 (*)     Anion Gap 19 (*)     eGFR if non  55.5 (*)     All other components within normal limits   C-REACTIVE PROTEIN - Abnormal; Notable for the following:     CRP 99.4 (*)     All other components within normal limits   POCT GLUCOSE - Abnormal; Notable for the following:     POCT Glucose 171 (*)     All other components within normal limits   LIPASE   LIPASE    Narrative:     ADDING ON LIPASE PER KYLE BRUCE MD 20:52  09/16/2017    URINALYSIS, REFLEX TO URINE CULTURE   TYPE & SCREEN   POCT GLUCOSE MONITORING CONTINUOUS     EKG Readings: (Independently Interpreted)   Sinus tachycardia at 118 bpm. T wave inversions in II, III, and aVF. V3-V6 normal axis. No ST depressions in V4-V6.       HO-II MDM  42 y.o. F  P/w RLQ abd pain x2 days, with fever, anorexia, diarrhea.  Physical exam showed tachycardia, and right lower quadrant tenderness +Rovsing's and obturator sign  DDX includes: Appendicitis, gastroenteritis, mesenteric ischemia, colitis, IBD.  Gynecological and obstetrical differential diagnoses ruled down because of history of total hysterectomy.  Work up: Basic labs, CRP, CT abdomen with tones.  We have given her Dilaudid, which has not relieved her symptoms.  I will give her IV Toradol at this time.  Disposition is pending  results, abs vs surgical management.  Pt is aware of plan and is amenable.     Ava Faye M.D.  [facility] EMERGENCY MEDICINE PGY-2  6:59 PM 09/16/2017    HO-II UPDATE  Patient's pain control with Dilaudid.  Results reviewed, and showed leukocytosis, elevated CRP.  CT scan results were given to me by the radiologist via direct phone call.  Patient has acute appendicitis, based on history and physical and CT findings.  I will consult surgery for admission for appendectomy.  Pt is aware of plan and is amenable.     Ava Faye M.D.  [facility] EMERGENCY MEDICINE PGY-2  9:11 AM 09/16/2017       Medical Decision Making:   History:   Old Medical Records: I decided to obtain old medical records.  Independently Interpreted Test(s):   I have ordered and independently interpreted EKG Reading(s) - see prior notes  Clinical Tests:   Lab Tests: Ordered and Reviewed  Radiological Study: Ordered and Reviewed  Medical Tests: Ordered and Reviewed            Scribe Attestation:   Scribe #1: I performed the above scribed service and the documentation accurately describes the services I performed. I attest to the accuracy of the note.    Attending Attestation:   Physician Attestation Statement for Resident:  As the supervising MD   Physician Attestation Statement: I have personally seen and examined this patient.   I agree with the above history. -: 42 year old female presenting with abdominal pain for 2 days beginning in periumbilical radiating down to RLQ associated with fever, anorexia, and diarrhea. She has a hx of a DOT. Pain is severe. On exam she has tenderness to RLQ with psoas sign. She is tachycardic in distress secondary to pain. Ddx includes but is not limited to appendicitis, ovarian torsion, TOA, and other intraabdominal processes. Will obtain serum and urine labs and CT scan.     8:46 PM Labs [place] [address] and hypokalemia.   As the supervising MD I agree with the above PE.    As the supervising MD I agree with the  above treatment, course, plan, and disposition.          Physician Attestation for Scribe:  Physician Attestation Statement for Scribe #1: I, Dr. Groves, reviewed documentation, as scribed by Marilia Bravo in my presence, and it is both accurate and complete.                 ED Course      Clinical Impression:   The primary encounter diagnosis was Acute appendicitis with localized peritonitis. A diagnosis of Abdominal pain was also pertinent to this visit.                           Ava Faye MD  Resident  09/17/17 0612       Akash Groves MD  09/17/17 0980

## 2017-09-16 NOTE — ED NOTES
Pt reports abdominal pain to RLQ beginning this AM . Pt reports nausea, diarrhea, SOB and CP beginning yesterday. Pt denies numbness and tingling. Pt reports dizziness when ambulating. Pt denies trauma. Pt reports max temp 101, states she took tylenol before coming into the ED

## 2017-09-17 ENCOUNTER — ANESTHESIA EVENT (OUTPATIENT)
Dept: SURGERY | Facility: HOSPITAL | Age: 42
DRG: 339 | End: 2017-09-17
Payer: MEDICARE

## 2017-09-17 ENCOUNTER — ANESTHESIA (OUTPATIENT)
Dept: SURGERY | Facility: HOSPITAL | Age: 42
DRG: 339 | End: 2017-09-17
Payer: MEDICARE

## 2017-09-17 PROBLEM — K35.30 ACUTE APPENDICITIS WITH LOCALIZED PERITONITIS: Status: ACTIVE | Noted: 2017-09-17

## 2017-09-17 LAB
BACTERIA #/AREA URNS AUTO: NORMAL /HPF
BILIRUB UR QL STRIP: NEGATIVE
CLARITY UR REFRACT.AUTO: ABNORMAL
COLOR UR AUTO: YELLOW
GLUCOSE UR QL STRIP: NEGATIVE
HGB UR QL STRIP: ABNORMAL
KETONES UR QL STRIP: NEGATIVE
LEUKOCYTE ESTERASE UR QL STRIP: ABNORMAL
MICROSCOPIC COMMENT: NORMAL
NITRITE UR QL STRIP: POSITIVE
PH UR STRIP: 5 [PH] (ref 5–8)
POCT GLUCOSE: 141 MG/DL (ref 70–110)
POCT GLUCOSE: 151 MG/DL (ref 70–110)
POCT GLUCOSE: 151 MG/DL (ref 70–110)
POCT GLUCOSE: 163 MG/DL (ref 70–110)
POCT GLUCOSE: 174 MG/DL (ref 70–110)
POCT GLUCOSE: 225 MG/DL (ref 70–110)
PROT UR QL STRIP: NEGATIVE
RBC #/AREA URNS AUTO: 2 /HPF (ref 0–4)
SP GR UR STRIP: >=1.03 (ref 1–1.03)
SQUAMOUS #/AREA URNS AUTO: 2 /HPF
URN SPEC COLLECT METH UR: ABNORMAL
UROBILINOGEN UR STRIP-ACNC: NEGATIVE EU/DL
WBC #/AREA URNS AUTO: 1 /HPF (ref 0–5)

## 2017-09-17 PROCEDURE — 63600175 PHARM REV CODE 636 W HCPCS: Performed by: NURSE ANESTHETIST, CERTIFIED REGISTERED

## 2017-09-17 PROCEDURE — S0030 INJECTION, METRONIDAZOLE: HCPCS | Performed by: STUDENT IN AN ORGANIZED HEALTH CARE EDUCATION/TRAINING PROGRAM

## 2017-09-17 PROCEDURE — 27201423 OPTIME MED/SURG SUP & DEVICES STERILE SUPPLY: Performed by: SURGERY

## 2017-09-17 PROCEDURE — 25000003 PHARM REV CODE 250: Performed by: STUDENT IN AN ORGANIZED HEALTH CARE EDUCATION/TRAINING PROGRAM

## 2017-09-17 PROCEDURE — 25000003 PHARM REV CODE 250: Performed by: SURGERY

## 2017-09-17 PROCEDURE — 44970 LAPAROSCOPY APPENDECTOMY: CPT | Mod: ,,, | Performed by: SURGERY

## 2017-09-17 PROCEDURE — S0028 INJECTION, FAMOTIDINE, 20 MG: HCPCS | Performed by: NURSE ANESTHETIST, CERTIFIED REGISTERED

## 2017-09-17 PROCEDURE — 63600175 PHARM REV CODE 636 W HCPCS: Performed by: STUDENT IN AN ORGANIZED HEALTH CARE EDUCATION/TRAINING PROGRAM

## 2017-09-17 PROCEDURE — 81001 URINALYSIS AUTO W/SCOPE: CPT

## 2017-09-17 PROCEDURE — A4216 STERILE WATER/SALINE, 10 ML: HCPCS | Performed by: SURGERY

## 2017-09-17 PROCEDURE — S0030 INJECTION, METRONIDAZOLE: HCPCS | Performed by: SURGERY

## 2017-09-17 PROCEDURE — 71000039 HC RECOVERY, EACH ADD'L HOUR: Performed by: SURGERY

## 2017-09-17 PROCEDURE — 88302 TISSUE EXAM BY PATHOLOGIST: CPT | Performed by: PATHOLOGY

## 2017-09-17 PROCEDURE — D9220A PRA ANESTHESIA: Mod: CRNA,,, | Performed by: NURSE ANESTHETIST, CERTIFIED REGISTERED

## 2017-09-17 PROCEDURE — 63600175 PHARM REV CODE 636 W HCPCS: Performed by: SURGERY

## 2017-09-17 PROCEDURE — 37000009 HC ANESTHESIA EA ADD 15 MINS: Performed by: SURGERY

## 2017-09-17 PROCEDURE — G0378 HOSPITAL OBSERVATION PER HR: HCPCS

## 2017-09-17 PROCEDURE — 71000033 HC RECOVERY, INTIAL HOUR: Performed by: SURGERY

## 2017-09-17 PROCEDURE — 63600175 PHARM REV CODE 636 W HCPCS

## 2017-09-17 PROCEDURE — 37000008 HC ANESTHESIA 1ST 15 MINUTES: Performed by: SURGERY

## 2017-09-17 PROCEDURE — 25500020 PHARM REV CODE 255: Performed by: EMERGENCY MEDICINE

## 2017-09-17 PROCEDURE — 96367 TX/PROPH/DG ADDL SEQ IV INF: CPT

## 2017-09-17 PROCEDURE — 36000708 HC OR TIME LEV III 1ST 15 MIN: Performed by: SURGERY

## 2017-09-17 PROCEDURE — D9220A PRA ANESTHESIA: Mod: ANES,,, | Performed by: ANESTHESIOLOGY

## 2017-09-17 PROCEDURE — 99220 PR INITIAL OBSERVATION CARE,LEVL III: CPT | Mod: 57,,, | Performed by: SURGERY

## 2017-09-17 PROCEDURE — 36000709 HC OR TIME LEV III EA ADD 15 MIN: Performed by: SURGERY

## 2017-09-17 PROCEDURE — 82962 GLUCOSE BLOOD TEST: CPT | Performed by: SURGERY

## 2017-09-17 PROCEDURE — 25000003 PHARM REV CODE 250: Performed by: NURSE ANESTHETIST, CERTIFIED REGISTERED

## 2017-09-17 PROCEDURE — 88302 TISSUE EXAM BY PATHOLOGIST: CPT | Mod: 26,,, | Performed by: PATHOLOGY

## 2017-09-17 PROCEDURE — 63600175 PHARM REV CODE 636 W HCPCS: Performed by: ANESTHESIOLOGY

## 2017-09-17 RX ORDER — DIPHENHYDRAMINE HYDROCHLORIDE 50 MG/ML
6.25 INJECTION INTRAMUSCULAR; INTRAVENOUS EVERY 8 HOURS PRN
Status: DISCONTINUED | OUTPATIENT
Start: 2017-09-17 | End: 2017-09-19

## 2017-09-17 RX ORDER — GLUCAGON 1 MG
1 KIT INJECTION
Status: DISCONTINUED | OUTPATIENT
Start: 2017-09-17 | End: 2017-09-19 | Stop reason: HOSPADM

## 2017-09-17 RX ORDER — KETAMINE HYDROCHLORIDE 100 MG/ML
INJECTION, SOLUTION INTRAMUSCULAR; INTRAVENOUS
Status: DISCONTINUED | OUTPATIENT
Start: 2017-09-17 | End: 2017-09-17

## 2017-09-17 RX ORDER — LEVOTHYROXINE SODIUM 50 UG/1
TABLET ORAL
Status: DISPENSED
Start: 2017-09-17 | End: 2017-09-17

## 2017-09-17 RX ORDER — ALPRAZOLAM 1 MG/1
2 TABLET ORAL NIGHTLY
Status: DISCONTINUED | OUTPATIENT
Start: 2017-09-17 | End: 2017-09-19 | Stop reason: HOSPADM

## 2017-09-17 RX ORDER — ENOXAPARIN SODIUM 100 MG/ML
INJECTION SUBCUTANEOUS
Status: DISPENSED
Start: 2017-09-17 | End: 2017-09-17

## 2017-09-17 RX ORDER — FAMOTIDINE 10 MG/ML
INJECTION INTRAVENOUS
Status: DISCONTINUED | OUTPATIENT
Start: 2017-09-17 | End: 2017-09-17

## 2017-09-17 RX ORDER — BUPIVACAINE HYDROCHLORIDE 2.5 MG/ML
INJECTION, SOLUTION EPIDURAL; INFILTRATION; INTRACAUDAL
Status: DISCONTINUED | OUTPATIENT
Start: 2017-09-17 | End: 2017-09-17 | Stop reason: HOSPADM

## 2017-09-17 RX ORDER — MORPHINE SULFATE 4 MG/ML
INJECTION, SOLUTION INTRAMUSCULAR; INTRAVENOUS
Status: DISPENSED
Start: 2017-09-17 | End: 2017-09-17

## 2017-09-17 RX ORDER — METRONIDAZOLE 500 MG/100ML
500 INJECTION, SOLUTION INTRAVENOUS
Status: DISCONTINUED | OUTPATIENT
Start: 2017-09-17 | End: 2017-09-19

## 2017-09-17 RX ORDER — ROCURONIUM BROMIDE 10 MG/ML
INJECTION, SOLUTION INTRAVENOUS
Status: DISCONTINUED | OUTPATIENT
Start: 2017-09-17 | End: 2017-09-17

## 2017-09-17 RX ORDER — HYDROMORPHONE HYDROCHLORIDE 1 MG/ML
INJECTION, SOLUTION INTRAMUSCULAR; INTRAVENOUS; SUBCUTANEOUS
Status: COMPLETED
Start: 2017-09-17 | End: 2017-09-17

## 2017-09-17 RX ORDER — RAMELTEON 8 MG/1
8 TABLET ORAL NIGHTLY PRN
Status: DISCONTINUED | OUTPATIENT
Start: 2017-09-17 | End: 2017-09-19

## 2017-09-17 RX ORDER — PROPOFOL 10 MG/ML
VIAL (ML) INTRAVENOUS
Status: DISCONTINUED | OUTPATIENT
Start: 2017-09-17 | End: 2017-09-17

## 2017-09-17 RX ORDER — MORPHINE SULFATE 2 MG/ML
2 INJECTION, SOLUTION INTRAMUSCULAR; INTRAVENOUS EVERY 4 HOURS PRN
Status: DISCONTINUED | OUTPATIENT
Start: 2017-09-17 | End: 2017-09-17

## 2017-09-17 RX ORDER — OXYCODONE HYDROCHLORIDE 5 MG/1
10 TABLET ORAL EVERY 4 HOURS PRN
Status: DISCONTINUED | OUTPATIENT
Start: 2017-09-17 | End: 2017-09-19

## 2017-09-17 RX ORDER — SODIUM CHLORIDE, SODIUM LACTATE, POTASSIUM CHLORIDE, CALCIUM CHLORIDE 600; 310; 30; 20 MG/100ML; MG/100ML; MG/100ML; MG/100ML
INJECTION, SOLUTION INTRAVENOUS CONTINUOUS
Status: DISCONTINUED | OUTPATIENT
Start: 2017-09-17 | End: 2017-09-19 | Stop reason: HOSPADM

## 2017-09-17 RX ORDER — ENOXAPARIN SODIUM 100 MG/ML
40 INJECTION SUBCUTANEOUS
Status: DISCONTINUED | OUTPATIENT
Start: 2017-09-17 | End: 2017-09-19 | Stop reason: HOSPADM

## 2017-09-17 RX ORDER — KETOROLAC TROMETHAMINE 30 MG/ML
15 INJECTION, SOLUTION INTRAMUSCULAR; INTRAVENOUS EVERY 6 HOURS
Status: COMPLETED | OUTPATIENT
Start: 2017-09-17 | End: 2017-09-19

## 2017-09-17 RX ORDER — NEOSTIGMINE METHYLSULFATE 1 MG/ML
INJECTION, SOLUTION INTRAVENOUS
Status: DISCONTINUED | OUTPATIENT
Start: 2017-09-17 | End: 2017-09-17

## 2017-09-17 RX ORDER — INSULIN ASPART 100 [IU]/ML
1-10 INJECTION, SOLUTION INTRAVENOUS; SUBCUTANEOUS EVERY 6 HOURS PRN
Status: DISCONTINUED | OUTPATIENT
Start: 2017-09-17 | End: 2017-09-19 | Stop reason: HOSPADM

## 2017-09-17 RX ORDER — LEVOTHYROXINE SODIUM 100 UG/1
TABLET ORAL
Status: DISPENSED
Start: 2017-09-17 | End: 2017-09-17

## 2017-09-17 RX ORDER — LIDOCAINE HCL/PF 100 MG/5ML
SYRINGE (ML) INTRAVENOUS
Status: DISCONTINUED | OUTPATIENT
Start: 2017-09-17 | End: 2017-09-17

## 2017-09-17 RX ORDER — ACETAMINOPHEN 10 MG/ML
INJECTION, SOLUTION INTRAVENOUS
Status: DISCONTINUED | OUTPATIENT
Start: 2017-09-17 | End: 2017-09-17

## 2017-09-17 RX ORDER — SUCCINYLCHOLINE CHLORIDE 20 MG/ML
INJECTION INTRAMUSCULAR; INTRAVENOUS
Status: DISCONTINUED | OUTPATIENT
Start: 2017-09-17 | End: 2017-09-17

## 2017-09-17 RX ORDER — CEFTRIAXONE 1 G/50ML
INJECTION, SOLUTION INTRAVENOUS
Status: DISPENSED
Start: 2017-09-17 | End: 2017-09-17

## 2017-09-17 RX ORDER — SODIUM CHLORIDE 0.9 % (FLUSH) 0.9 %
3 SYRINGE (ML) INJECTION EVERY 8 HOURS
Status: DISCONTINUED | OUTPATIENT
Start: 2017-09-17 | End: 2017-09-19 | Stop reason: HOSPADM

## 2017-09-17 RX ORDER — MIDAZOLAM HYDROCHLORIDE 1 MG/ML
INJECTION, SOLUTION INTRAMUSCULAR; INTRAVENOUS
Status: DISCONTINUED | OUTPATIENT
Start: 2017-09-17 | End: 2017-09-17

## 2017-09-17 RX ORDER — CARVEDILOL 25 MG/1
25 TABLET ORAL 2 TIMES DAILY
Status: DISCONTINUED | OUTPATIENT
Start: 2017-09-17 | End: 2017-09-19 | Stop reason: HOSPADM

## 2017-09-17 RX ORDER — ACETAMINOPHEN 500 MG
1000 TABLET ORAL EVERY 8 HOURS
Status: DISCONTINUED | OUTPATIENT
Start: 2017-09-17 | End: 2017-09-19 | Stop reason: HOSPADM

## 2017-09-17 RX ORDER — ONDANSETRON 2 MG/ML
INJECTION INTRAMUSCULAR; INTRAVENOUS
Status: DISCONTINUED | OUTPATIENT
Start: 2017-09-17 | End: 2017-09-17

## 2017-09-17 RX ORDER — HYDROMORPHONE HYDROCHLORIDE 1 MG/ML
0.2 INJECTION, SOLUTION INTRAMUSCULAR; INTRAVENOUS; SUBCUTANEOUS EVERY 5 MIN PRN
Status: COMPLETED | OUTPATIENT
Start: 2017-09-17 | End: 2017-09-17

## 2017-09-17 RX ORDER — GLYCOPYRROLATE 0.2 MG/ML
INJECTION INTRAMUSCULAR; INTRAVENOUS
Status: DISCONTINUED | OUTPATIENT
Start: 2017-09-17 | End: 2017-09-17

## 2017-09-17 RX ORDER — AMOXICILLIN 250 MG
1 CAPSULE ORAL DAILY
Status: DISCONTINUED | OUTPATIENT
Start: 2017-09-17 | End: 2017-09-19 | Stop reason: HOSPADM

## 2017-09-17 RX ORDER — FENTANYL CITRATE 50 UG/ML
INJECTION, SOLUTION INTRAMUSCULAR; INTRAVENOUS
Status: DISCONTINUED | OUTPATIENT
Start: 2017-09-17 | End: 2017-09-17

## 2017-09-17 RX ORDER — LEVOTHYROXINE SODIUM 75 UG/1
150 TABLET ORAL
Status: DISCONTINUED | OUTPATIENT
Start: 2017-09-17 | End: 2017-09-19 | Stop reason: HOSPADM

## 2017-09-17 RX ORDER — MORPHINE SULFATE 4 MG/ML
4 INJECTION, SOLUTION INTRAMUSCULAR; INTRAVENOUS EVERY 4 HOURS PRN
Status: DISCONTINUED | OUTPATIENT
Start: 2017-09-17 | End: 2017-09-17

## 2017-09-17 RX ORDER — SODIUM CHLORIDE 0.9 % (FLUSH) 0.9 %
3 SYRINGE (ML) INJECTION
Status: DISCONTINUED | OUTPATIENT
Start: 2017-09-17 | End: 2017-09-19 | Stop reason: HOSPADM

## 2017-09-17 RX ORDER — TIZANIDINE 4 MG/1
4 TABLET ORAL DAILY
Status: DISCONTINUED | OUTPATIENT
Start: 2017-09-17 | End: 2017-09-19 | Stop reason: HOSPADM

## 2017-09-17 RX ORDER — ONDANSETRON 2 MG/ML
4 INJECTION INTRAMUSCULAR; INTRAVENOUS EVERY 8 HOURS PRN
Status: DISCONTINUED | OUTPATIENT
Start: 2017-09-17 | End: 2017-09-19

## 2017-09-17 RX ADMIN — CARVEDILOL 25 MG: 25 TABLET, FILM COATED ORAL at 10:09

## 2017-09-17 RX ADMIN — ENOXAPARIN SODIUM 40 MG: 100 INJECTION SUBCUTANEOUS at 02:09

## 2017-09-17 RX ADMIN — ALPRAZOLAM 2 MG: 1 TABLET ORAL at 09:09

## 2017-09-17 RX ADMIN — CEFTRIAXONE 2 G: 2 INJECTION, SOLUTION INTRAVENOUS at 02:09

## 2017-09-17 RX ADMIN — HYDROMORPHONE HYDROCHLORIDE 0.2 MG: 1 INJECTION, SOLUTION INTRAMUSCULAR; INTRAVENOUS; SUBCUTANEOUS at 10:09

## 2017-09-17 RX ADMIN — SODIUM CHLORIDE, SODIUM LACTATE, POTASSIUM CHLORIDE, AND CALCIUM CHLORIDE: 600; 310; 30; 20 INJECTION, SOLUTION INTRAVENOUS at 02:09

## 2017-09-17 RX ADMIN — TIZANIDINE 4 MG: 4 TABLET ORAL at 09:09

## 2017-09-17 RX ADMIN — HYDROMORPHONE HYDROCHLORIDE 0.2 MG: 1 INJECTION, SOLUTION INTRAMUSCULAR; INTRAVENOUS; SUBCUTANEOUS at 11:09

## 2017-09-17 RX ADMIN — SODIUM CHLORIDE, SODIUM GLUCONATE, SODIUM ACETATE, POTASSIUM CHLORIDE, MAGNESIUM CHLORIDE, SODIUM PHOSPHATE, DIBASIC, AND POTASSIUM PHOSPHATE: .53; .5; .37; .037; .03; .012; .00082 INJECTION, SOLUTION INTRAVENOUS at 09:09

## 2017-09-17 RX ADMIN — SODIUM CHLORIDE, SODIUM GLUCONATE, SODIUM ACETATE, POTASSIUM CHLORIDE, MAGNESIUM CHLORIDE, SODIUM PHOSPHATE, DIBASIC, AND POTASSIUM PHOSPHATE: .53; .5; .37; .037; .03; .012; .00082 INJECTION, SOLUTION INTRAVENOUS at 07:09

## 2017-09-17 RX ADMIN — ONDANSETRON 4 MG: 2 INJECTION INTRAMUSCULAR; INTRAVENOUS at 07:09

## 2017-09-17 RX ADMIN — LIDOCAINE HYDROCHLORIDE 100 MG: 20 INJECTION, SOLUTION INTRAVENOUS at 07:09

## 2017-09-17 RX ADMIN — INSULIN DETEMIR 15 UNITS: 100 INJECTION, SOLUTION SUBCUTANEOUS at 10:09

## 2017-09-17 RX ADMIN — ROCURONIUM BROMIDE 25 MG: 10 INJECTION, SOLUTION INTRAVENOUS at 08:09

## 2017-09-17 RX ADMIN — Medication 3 ML: at 06:09

## 2017-09-17 RX ADMIN — ROCURONIUM BROMIDE 5 MG: 10 INJECTION, SOLUTION INTRAVENOUS at 07:09

## 2017-09-17 RX ADMIN — KETAMINE HYDROCHLORIDE 40 MG: 100 INJECTION, SOLUTION, CONCENTRATE INTRAMUSCULAR; INTRAVENOUS at 08:09

## 2017-09-17 RX ADMIN — FENTANYL CITRATE 100 MCG: 50 INJECTION, SOLUTION INTRAMUSCULAR; INTRAVENOUS at 07:09

## 2017-09-17 RX ADMIN — METRONIDAZOLE 500 MG: 500 INJECTION, SOLUTION INTRAVENOUS at 12:09

## 2017-09-17 RX ADMIN — ACETAMINOPHEN 1000 MG: 10 INJECTION, SOLUTION INTRAVENOUS at 08:09

## 2017-09-17 RX ADMIN — Medication 3 ML: at 09:09

## 2017-09-17 RX ADMIN — MIDAZOLAM HYDROCHLORIDE 2 MG: 1 INJECTION, SOLUTION INTRAMUSCULAR; INTRAVENOUS at 07:09

## 2017-09-17 RX ADMIN — OXYCODONE HYDROCHLORIDE 10 MG: 5 TABLET ORAL at 02:09

## 2017-09-17 RX ADMIN — CEFTRIAXONE 1 G: 1 INJECTION, SOLUTION INTRAVENOUS at 12:09

## 2017-09-17 RX ADMIN — SODIUM CHLORIDE, SODIUM LACTATE, POTASSIUM CHLORIDE, AND CALCIUM CHLORIDE: 600; 310; 30; 20 INJECTION, SOLUTION INTRAVENOUS at 10:09

## 2017-09-17 RX ADMIN — FENTANYL CITRATE 50 MCG: 50 INJECTION, SOLUTION INTRAMUSCULAR; INTRAVENOUS at 08:09

## 2017-09-17 RX ADMIN — ROCURONIUM BROMIDE 10 MG: 10 INJECTION, SOLUTION INTRAVENOUS at 08:09

## 2017-09-17 RX ADMIN — INSULIN ASPART 1 UNITS: 100 INJECTION, SOLUTION INTRAVENOUS; SUBCUTANEOUS at 09:09

## 2017-09-17 RX ADMIN — OXYCODONE HYDROCHLORIDE 10 MG: 5 TABLET ORAL at 10:09

## 2017-09-17 RX ADMIN — MORPHINE SULFATE 4 MG: 4 INJECTION INTRAVENOUS at 01:09

## 2017-09-17 RX ADMIN — INSULIN ASPART 2 UNITS: 100 INJECTION, SOLUTION INTRAVENOUS; SUBCUTANEOUS at 06:09

## 2017-09-17 RX ADMIN — ONDANSETRON 4 MG: 2 INJECTION INTRAMUSCULAR; INTRAVENOUS at 11:09

## 2017-09-17 RX ADMIN — KETOROLAC TROMETHAMINE 15 MG: 30 INJECTION, SOLUTION INTRAMUSCULAR at 10:09

## 2017-09-17 RX ADMIN — FAMOTIDINE 20 MG: 10 INJECTION, SOLUTION INTRAVENOUS at 07:09

## 2017-09-17 RX ADMIN — ACETAMINOPHEN 1000 MG: 500 TABLET ORAL at 09:09

## 2017-09-17 RX ADMIN — CARVEDILOL 25 MG: 25 TABLET, FILM COATED ORAL at 09:09

## 2017-09-17 RX ADMIN — METRONIDAZOLE 500 MG: 500 INJECTION, SOLUTION INTRAVENOUS at 08:09

## 2017-09-17 RX ADMIN — MORPHINE SULFATE 4 MG: 4 INJECTION INTRAVENOUS at 06:09

## 2017-09-17 RX ADMIN — CEFTRIAXONE 1 G: 1 INJECTION, SOLUTION INTRAVENOUS at 02:09

## 2017-09-17 RX ADMIN — Medication 3 ML: at 02:09

## 2017-09-17 RX ADMIN — GLYCOPYRROLATE 0.4 MG: 0.2 INJECTION, SOLUTION INTRAMUSCULAR; INTRAVENOUS at 09:09

## 2017-09-17 RX ADMIN — SODIUM CHLORIDE, SODIUM LACTATE, POTASSIUM CHLORIDE, AND CALCIUM CHLORIDE 1000 ML: 600; 310; 30; 20 INJECTION, SOLUTION INTRAVENOUS at 01:09

## 2017-09-17 RX ADMIN — ACETAMINOPHEN 1000 MG: 500 TABLET ORAL at 03:09

## 2017-09-17 RX ADMIN — NEOSTIGMINE METHYLSULFATE 4 MG: 1 INJECTION INTRAVENOUS at 09:09

## 2017-09-17 RX ADMIN — RAMELTEON 8 MG: 8 TABLET, FILM COATED ORAL at 09:09

## 2017-09-17 RX ADMIN — KETOROLAC TROMETHAMINE 15 MG: 30 INJECTION, SOLUTION INTRAMUSCULAR at 06:09

## 2017-09-17 RX ADMIN — PROPOFOL 200 MG: 10 INJECTION, EMULSION INTRAVENOUS at 07:09

## 2017-09-17 RX ADMIN — SUCCINYLCHOLINE CHLORIDE 140 MG: 20 INJECTION, SOLUTION INTRAMUSCULAR; INTRAVENOUS at 07:09

## 2017-09-17 RX ADMIN — METRONIDAZOLE 500 MG: 500 INJECTION, SOLUTION INTRAVENOUS at 06:09

## 2017-09-17 RX ADMIN — LEVOTHYROXINE SODIUM 150 MCG: 100 TABLET ORAL at 05:09

## 2017-09-17 RX ADMIN — INSULIN ASPART 2 UNITS: 100 INJECTION, SOLUTION INTRAVENOUS; SUBCUTANEOUS at 01:09

## 2017-09-17 RX ADMIN — STANDARDIZED SENNA CONCENTRATE AND DOCUSATE SODIUM 1 TABLET: 8.6; 5 TABLET, FILM COATED ORAL at 10:09

## 2017-09-17 RX ADMIN — INSULIN DETEMIR 15 UNITS: 100 INJECTION, SOLUTION SUBCUTANEOUS at 09:09

## 2017-09-17 RX ADMIN — DEXTROSE 2 G: 50 INJECTION, SOLUTION INTRAVENOUS at 08:09

## 2017-09-17 RX ADMIN — OXYCODONE HYDROCHLORIDE 10 MG: 5 TABLET ORAL at 06:09

## 2017-09-17 NOTE — ANESTHESIA PREPROCEDURE EVALUATION
Pre-operative evaluation for Procedure(s) (LRB):  APPENDECTOMY-LAPAROSCOPIC (N/A)    Homar Jerry is a 42 y.o. female with past medical hx significant for HTN, hypothyroidism, fibromyalgia, gastroparesis, hx of cervical and thyroid cancer, pt currently has a loop recorder in her chest for sinus tachycardia.     *No anesthetic complications but reports being hard to put to sleep due to tolerance.     Patient Active Problem List   Diagnosis    Gastroparesis    Fibromyalgia    Anxiety    Bipolar 1 disorder    Hypothyroidism, postsurgical    Chronic low back pain    Migraines    Status post placement of implantable loop recorder    Allergic rhinitis    Essential hypertension    Asthma    History of thyroid cancer    Debility    Constipation    Primary osteoarthritis of both knees    Drug-seeking behavior    Rheumatoid arthritis    Chronic narcotic dependence    Type 2 diabetes mellitus with hyperglycemia, without long-term current use of insulin    Morbid obesity    Vitamin D deficiency    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      Current Outpatient Prescriptions on File Prior to Encounter   Medication Sig Dispense Refill    carvedilol (COREG) 12.5 MG tablet Take 2 tablets (25 mg total) by mouth 2 (two) times daily. (Patient taking differently: Take 25 mg by mouth 4 (four) times daily. ) 120  tablet 11    levothyroxine (SYNTHROID) 150 MCG tablet Take 1 tablet (150 mcg total) by mouth once daily. 30 tablet 3    oxycodone-acetaminophen (PERCOCET)  mg per tablet Take 1 tablet by mouth every 4 (four) hours as needed for Pain. Per Dr. Villa at [facility]      albuterol 90 mcg/actuation inhaler Inhale 2 puffs into the lungs every 6 (six) hours as needed for Wheezing. Please provide cheapest brand/generic formulation 18 g 1    alprazolam (XANAX) 1 MG tablet Take 1 tablet (1 mg total) by mouth every evening. (Patient taking differently: Take 2 mg by mouth every evening. ) 30 tablet 5    blood sugar diagnostic Strp 1 each by Misc.(Non-Drug; Combo Route) route 4 (four) times daily with meals and nightly. 100 each 2    blood-glucose meter kit Use as instructed 1 each 0    calcium carbonate (OS-MARIAN) 500 mg calcium (1,250 mg) tablet Take 2 tablets (1,000 mg total) by mouth once daily.  0    cholecalciferol, vitamin D3, 1,000 unit capsule Take 2 capsules (2,000 Units total) by mouth once daily.  0    doxepin (SINEQUAN) 25 MG capsule Take 1 capsule (25 mg total) by mouth nightly as needed. 30 capsule 11    fluticasone (FLONASE) 50 mcg/actuation nasal spray 2 sprays by Nasal route 2 (two) times daily.       gabapentin (NEURONTIN) 600 MG tablet Take 600 mg by mouth 3 (three) times daily.       hydrochlorothiazide (HYDRODIURIL) 25 MG tablet Take 1 tablet (25 mg total) by mouth once daily. 30 tablet 11    insulin aspart (NOVOLOG) 100 unit/mL InPn pen Inject 16 Units into the skin 3 (three) times daily with meals. 1 Box 2    insulin detemir (LEVEMIR FLEXTOUCH) 100 unit/mL (3 mL) SubQ InPn pen Inject 30 Units into the skin 2 (two) times daily. 1 Box 1    lamotrigine (LAMICTAL) 100 MG tablet Take half tab by mouth each morning x 14 days, then 1 tab by mouth each morning 30 tablet 11    lancets Misc 1 each by Misc.(Non-Drug; Combo Route) route 4 (four) times daily with meals and  "nightly. 100 each 2    levocetirizine (XYZAL) 5 MG tablet Take 1 tablet (5 mg total) by mouth every evening. (Patient taking differently: Take 5 mg by mouth daily as needed for Allergies. ) 90 tablet 3    meclizine (ANTIVERT) 25 mg tablet Take 1 tablet (25 mg total) by mouth 3 (three) times daily as needed for Dizziness or Nausea. 90 tablet 0    mirtazapine (REMERON) 30 MG tablet Take 1 tablet (30 mg total) by mouth every evening. (Patient taking differently: Take 45 mg by mouth every evening. ) 30 tablet 11    ondansetron (ZOFRAN-ODT) 8 MG TbDL Take 1 tablet (8 mg total) by mouth every 6 (six) hours as needed (nausea). 30 tablet 1    pen needle, diabetic 31 gauge x 5/16" Ndle 1 each by Misc.(Non-Drug; Combo Route) route 5 (five) times daily. 200 each 2    promethazine (PHENERGAN) 25 MG tablet Take 1 tablet (25 mg total) by mouth every 6 (six) hours as needed. 60 tablet 2    tizanidine (ZANAFLEX) 4 MG tablet TAKE 3 TABLETS (12 MG TOTAL) BY MOUTH EVERY EVENING. 90 tablet 2    topiramate (TOPAMAX) 25 MG tablet Take 1 tablet (25 mg total) by mouth daily as needed (migraines). 90 tablet 3       Past Surgical History:   Procedure Laterality Date    ANKLE FRACTURE SURGERY Right     BREAST SURGERY      exploratory laparotomy due to complications of hysterectomy  2009    cervical cuff burst with air in abdomen    FRACTURE SURGERY      GASTRECTOMY      gastric pacemaker      gastric sleeve      HYSTERECTOMY  2009    total including cervix    KNEE ARTHROSCOPY W/ MENISCAL REPAIR Left 2016    KNEE SURGERY  05/12/2016    loop monitor  2016    multiple breast biopsies      TONSILLECTOMY      ureteral sling         Social History     Social History    Marital status:      Spouse name: N/A    Number of children: 2    Years of education: N/A     Occupational History    disability      Social History Main Topics    Smoking status: Never Smoker    Smokeless tobacco: Never Used    Alcohol use No    " Drug use: No    Sexual activity: Not Currently     Other Topics Concern    Not on file     Social History Narrative    Lives at home with 22 yr old son.     No small children     No pets             Vital Signs Range (Last 24H):  Temp:  [37.2 °C (98.9 °F)]   Pulse:  [103-144]   Resp:  [18-20]   BP: (127-165)/()   SpO2:  [93 %-100 %]       CBC:   Recent Labs      17   WBC  14.07*   RBC  4.72   HGB  13.2   HCT  38.4   PLT  322   MCV  81*   MCH  28.0   MCHC  34.4       CMP:   Recent Labs      17   NA  142   K  3.0*   CL  100   CO2  23   BUN  18   CREATININE  1.2   GLU  173*   CALCIUM  9.3   ALBUMIN  3.6   PROT  8.3   ALKPHOS  99   ALT  9*   AST  12   BILITOT  0.3       INR  No results for input(s): INR, PROTIME, APTT in the last 72 hours.    Invalid input(s): PT        Diagnostic Studies:      EKD Echo:                                                                                                                 2017  Homar Jerry is a 42 y.o., female.    Anesthesia Evaluation    I have reviewed the Patient Summary Reports.    I have reviewed the Nursing Notes.   I have reviewed the Medications.     Review of Systems  Anesthesia Hx:  No problems with previous Anesthesia  History of prior surgery of interest to airway management or planning: Denies Family Hx of Anesthesia complications.   Denies Personal Hx of Anesthesia complications.   Hematology/Oncology:         -- Cancer in past history:   Cardiovascular:   Hypertension    Renal/:  Renal/ Normal     Hepatic/GI:  Hepatic/GI Normal    Neurological:   Pt reports chronic pain, takes multiple medications at home for pain. Reports pain level 7/10 at best at home  Chronic Pain Syndrome   Endocrine:   Diabetes Hypothyroidism    Psych:   Psychiatric History anxiety depression          Physical Exam  General:  Well nourished, Obesity    Airway/Jaw/Neck:  Airway Findings: Mouth Opening: Normal General Airway  Assessment: Adult  Mallampati: II  TM Distance: Normal, at least 6 cm      Dental:  Dental Findings: In tact        Mental Status:  Mental Status Findings:  Cooperative         Anesthesia Plan  Type of Anesthesia, risks & benefits discussed:  Anesthesia Type:  general  Patient's Preference:   Intra-op Monitoring Plan:   Intra-op Monitoring Plan Comments:   Post Op Pain Control Plan:   Post Op Pain Control Plan Comments:   Induction:   IV  Beta Blocker:  Patient is on a Beta-Blocker and has received one dose within the past 24 hours (No further documentation required).       Informed Consent: Patient understands risks and agrees with Anesthesia plan.  Questions answered. Anesthesia consent signed with patient.  ASA Score: 3     Day of Surgery Review of History & Physical:            Ready For Surgery From Anesthesia Perspective.

## 2017-09-17 NOTE — HPI
43yo woman with h/o gastroparesis, thyroid cancer, cervical cancer presenting with periumbilical pain that started yesterday at 1pm. It moved to the RLQ, associated with nausea, diarrhea, anorexia. No fever/chills.

## 2017-09-17 NOTE — ED NOTES
Pt resting on stretcher, respirations even and unlabored. Stretcher locked and in lowest position, side rails x 2, call bell within reach. BP cuff, cardiac monitor, pulse ox applied continuously. Pt updated on wait for orders. Family at bedside.

## 2017-09-17 NOTE — ED NOTES
Pt informed a urine specimen was needed. Pt states she will call when she feels she can provide a sample.

## 2017-09-17 NOTE — BRIEF OP NOTE
[facility]  Brief Operative Note    SUMMARY     Surgery Date: 9/17/2017     Surgeon(s) and Role:     * Carmelo Saucedo MD - Primary     * Mark Escobar MD - Resident - Assisting        Pre-op Diagnosis:  Acute appendicitis with localized peritonitis [K35.3]    Post-op Diagnosis:  Post-Op Diagnosis Codes:     * Acute appendicitis with localized peritonitis [K35.3]    Procedure(s) (LRB):  APPENDECTOMY-LAPAROSCOPIC (N/A)    Anesthesia: General    Description of Procedure: Laparoscopic appendicitis    Description of the findings of the procedure: Non perforated inflamed appendix consistent with acute appendicitis     Estimated Blood Loss: * No values recorded between 9/17/2017  8:11 AM and 9/17/2017  9:24 AM *         Specimens:   Specimen (12h ago through future)    Start     Ordered    09/17/17 0850  Specimen to Pathology - Surgery  Once     Comments:  #1 APPENDIX (PERM)      09/17/17 0861

## 2017-09-17 NOTE — PROVIDER PROGRESS NOTES - EMERGENCY DEPT.
Encounter Date: 9/16/2017    ED Physician Progress Notes       SCRIBE NOTE: I, Amy Holt, am scribing for, and in the presence of,  Dr. Brewer.  Physician Statement: I, Dr. Brewer, personally performed the services described in this documentation as scribed by Amy Holt in my presence, and it is both accurate and complete.     Physician Note:   I received this pt in sign out from Dr. Groves.  Pt presents with RLQ abdominal pain. CT abdomen/pelvis pending to rule out APPY.  Labs notable for leukocytosis, hypokalemia, which was reproduced with elevated CRP.  CT results pending at this time.  11:45 PM CT notable for APPY.  Surgery consulted. Abx started.      Surgery will admit the patient

## 2017-09-17 NOTE — PLAN OF CARE
Problem: Patient Care Overview  Goal: Plan of Care Review  Outcome: Ongoing (interventions implemented as appropriate)  Pt AAO x3 Diabetic Pt possible sx due to acute appendicitis. Pain manage with IV pain med. Whiteboard updated. Q 2 hour monitoring for pain and safety. Call light in reach.

## 2017-09-17 NOTE — ANESTHESIA POSTPROCEDURE EVALUATION
"Anesthesia Post Evaluation    Patient: Homar Jerry    Procedure(s) Performed: Procedure(s) (LRB):  APPENDECTOMY-LAPAROSCOPIC (N/A)    Final Anesthesia Type: general  Patient location during evaluation: PACU  Patient participation: Yes- Able to Participate  Level of consciousness: awake and alert  Post-procedure vital signs: reviewed and stable  Pain management: adequate  Airway patency: patent  PONV status at discharge: No PONV  Anesthetic complications: no      Cardiovascular status: blood pressure returned to baseline  Respiratory status: unassisted  Hydration status: euvolemic  Follow-up not needed.        Visit Vitals  BP (P) 133/82   Pulse (P) 73   Temp (P) 36.6 °C (97.9 °F) (Oral)   Resp (P) 18   Ht 5' 4" (1.626 m)   Wt 107 kg (236 lb)   LMP  (LMP Unknown)   SpO2 (P) 95%   Breastfeeding? No   BMI 40.51 kg/m²       Pain/Shar Score: Pain Assessment Performed: Yes (9/17/2017 11:30 AM)  Presence of Pain: complains of pain/discomfort (9/17/2017 11:30 AM)  Pain Rating Prior to Med Admin: 9 (9/17/2017 11:10 AM)  Pain Rating Post Med Admin: 0 (9/17/2017  4:00 AM)  Shar Score: 9 (9/17/2017 11:30 AM)      "

## 2017-09-17 NOTE — NURSING
Pt arrived from floor for possible sx in the Am. Pt AAOx3 Call light in reach and pain meds given will continue to monitor.

## 2017-09-17 NOTE — SUBJECTIVE & OBJECTIVE
No current facility-administered medications on file prior to encounter.      Current Outpatient Prescriptions on File Prior to Encounter   Medication Sig    carvedilol (COREG) 12.5 MG tablet Take 2 tablets (25 mg total) by mouth 2 (two) times daily. (Patient taking differently: Take 25 mg by mouth 4 (four) times daily. )    levothyroxine (SYNTHROID) 150 MCG tablet Take 1 tablet (150 mcg total) by mouth once daily.    oxycodone-acetaminophen (PERCOCET)  mg per tablet Take 1 tablet by mouth every 4 (four) hours as needed for Pain. Per Dr. Villa at [facility]    albuterol 90 mcg/actuation inhaler Inhale 2 puffs into the lungs every 6 (six) hours as needed for Wheezing. Please provide cheapest brand/generic formulation    alprazolam (XANAX) 1 MG tablet Take 1 tablet (1 mg total) by mouth every evening. (Patient taking differently: Take 2 mg by mouth every evening. )    blood sugar diagnostic Strp 1 each by Misc.(Non-Drug; Combo Route) route 4 (four) times daily with meals and nightly.    blood-glucose meter kit Use as instructed    calcium carbonate (OS-MARIAN) 500 mg calcium (1,250 mg) tablet Take 2 tablets (1,000 mg total) by mouth once daily.    cholecalciferol, vitamin D3, 1,000 unit capsule Take 2 capsules (2,000 Units total) by mouth once daily.    doxepin (SINEQUAN) 25 MG capsule Take 1 capsule (25 mg total) by mouth nightly as needed.    fluticasone (FLONASE) 50 mcg/actuation nasal spray 2 sprays by Nasal route 2 (two) times daily.     gabapentin (NEURONTIN) 600 MG tablet Take 600 mg by mouth 3 (three) times daily.     hydrochlorothiazide (HYDRODIURIL) 25 MG tablet Take 1 tablet (25 mg total) by mouth once daily.    insulin aspart (NOVOLOG) 100 unit/mL InPn pen Inject 16 Units into the skin 3 (three) times daily with meals.    insulin detemir (LEVEMIR FLEXTOUCH) 100 unit/mL (3 mL) SubQ InPn pen Inject 30 Units into the skin 2 (two) times daily.    lamotrigine  "(LAMICTAL) 100 MG tablet Take half tab by mouth each morning x 14 days, then 1 tab by mouth each morning    lancets Misc 1 each by Misc.(Non-Drug; Combo Route) route 4 (four) times daily with meals and nightly.    levocetirizine (XYZAL) 5 MG tablet Take 1 tablet (5 mg total) by mouth every evening. (Patient taking differently: Take 5 mg by mouth daily as needed for Allergies. )    meclizine (ANTIVERT) 25 mg tablet Take 1 tablet (25 mg total) by mouth 3 (three) times daily as needed for Dizziness or Nausea.    mirtazapine (REMERON) 30 MG tablet Take 1 tablet (30 mg total) by mouth every evening. (Patient taking differently: Take 45 mg by mouth every evening. )    ondansetron (ZOFRAN-ODT) 8 MG TbDL Take 1 tablet (8 mg total) by mouth every 6 (six) hours as needed (nausea).    pen needle, diabetic 31 gauge x 5/16" Ndle 1 each by Misc.(Non-Drug; Combo Route) route 5 (five) times daily.    promethazine (PHENERGAN) 25 MG tablet Take 1 tablet (25 mg total) by mouth every 6 (six) hours as needed.    tizanidine (ZANAFLEX) 4 MG tablet TAKE 3 TABLETS (12 MG TOTAL) BY MOUTH EVERY EVENING.    topiramate (TOPAMAX) 25 MG tablet Take 1 tablet (25 mg total) by mouth daily as needed (migraines).       Review of patient's allergies indicates:   Allergen Reactions    Dexamethasone Hives and Shortness Of Breath     Per pt, only steroid she is allergic to is dexamethasone    Doxepin hcl Hives, Diarrhea and Itching    Ciprofloxacin      Counteracts with seroquel, xanax, tizanidine    Compazine [prochlorperazine] Hives and Itching    Lyrica [pregabalin]      depression    Prednisone      Gastroparesis flare up       Past Medical History:   Diagnosis Date    Anemia     Anxiety     Asthma     usually with cold weather    Back pain     Bipolar 1 disorder 3/17/2015    [facility] 2009    [facility]    Diabetes mellitus with hyperglycemia 1/17/2017    Fibrocystic breast     Fibromyalgia     Gastroparesis 2012    s/p sleeve " to cover damaged nerves; s/p gastric pacemaker which did not help; due to nerve damage during surgery    Hypertension     Hypothyroidism, postsurgical     Morbid obesity     Prediabetes     Rheumatoid arthritis     Thyroid cancer 2003 & 2013    thyroidectomy    Urinary incontinence      Past Surgical History:   Procedure Laterality Date    ANKLE FRACTURE SURGERY Right     BREAST SURGERY      exploratory laparotomy due to complications of hysterectomy  2009    cervical cuff burst with air in abdomen    FRACTURE SURGERY      GASTRECTOMY      gastric pacemaker      gastric sleeve      HYSTERECTOMY  2009    total including cervix    KNEE ARTHROSCOPY W/ MENISCAL REPAIR Left 2016    KNEE SURGERY  05/12/2016    loop monitor  2016    multiple breast biopsies      TONSILLECTOMY      ureteral sling       Family History     Problem Relation (Age of Onset)    ADD / ADHD Son    Arthritis Mother    Bipolar disorder Maternal Uncle    Clotting disorder Mother, Father, Paternal Grandfather    Diabetes Paternal Grandmother    HIV Brother    Heart attack Mother (55)    Heart disease Mother    Hyperlipidemia Father    No Known Problems Sister, Brother, Maternal Aunt, Paternal Aunt, Paternal Uncle, Maternal Grandfather, Maternal Grandmother, Cousin    Pancreatic cancer Maternal Uncle    Pneumonia Brother    Rheum arthritis Mother    Thyroid cancer Paternal Grandmother        Social History Main Topics    Smoking status: Never Smoker    Smokeless tobacco: Never Used    Alcohol use No    Drug use: No    Sexual activity: Not Currently     Review of Systems   Constitutional: Negative for activity change, appetite change, chills and fever.   HENT: Negative for congestion and trouble swallowing.    Eyes: Negative for visual disturbance.   Respiratory: Negative for cough and shortness of breath.    Cardiovascular: Negative for chest pain and leg swelling.   Gastrointestinal: Positive for abdominal pain, diarrhea and  nausea. Negative for abdominal distention, constipation and vomiting.   Endocrine: Negative for cold intolerance and heat intolerance.   Genitourinary: Negative for difficulty urinating and dysuria.   Musculoskeletal: Negative for arthralgias and back pain.   Skin: Negative for rash and wound.   Neurological: Negative for dizziness and headaches.   Psychiatric/Behavioral: Negative for agitation and behavioral problems.     Objective:     Vital Signs (Most Recent):  Temp: 98.9 °F (37.2 °C) (09/16/17 1832)  Pulse: 104 (09/17/17 0037)  Resp: 18 (09/17/17 0037)  BP: 127/89 (09/16/17 2234)  SpO2: 96 % (09/17/17 0037) Vital Signs (24h Range):  Temp:  [98.9 °F (37.2 °C)] 98.9 °F (37.2 °C)  Pulse:  [103-144] 104  Resp:  [18-20] 18  SpO2:  [93 %-100 %] 96 %  BP: (127-165)/() 127/89     Weight: 107 kg (236 lb)  Body mass index is 40.51 kg/m².    Physical Exam   Constitutional: She is oriented to person, place, and time. She appears well-developed and well-nourished. No distress.   Cardiovascular: Normal rate and regular rhythm.  Exam reveals no gallop and no friction rub.    No murmur heard.  Pulmonary/Chest: Effort normal and breath sounds normal. No respiratory distress. She has no wheezes. She has no rales.   Abdominal: Soft. Bowel sounds are normal.   TTP RLQ, +McBurney's point, morbidly obese.   Musculoskeletal: Normal range of motion. She exhibits no edema.   Neurological: She is alert and oriented to person, place, and time.   Skin: Skin is warm and dry.   Psychiatric: She has a normal mood and affect. Her behavior is normal.       Significant Labs:  CBC:   Recent Labs  Lab 09/16/17 1951   WBC 14.07*   RBC 4.72   HGB 13.2   HCT 38.4      MCV 81*   MCH 28.0   MCHC 34.4     CMP:   Recent Labs  Lab 09/16/17 1951   *   CALCIUM 9.3   ALBUMIN 3.6   PROT 8.3      K 3.0*   CO2 23      BUN 18   CREATININE 1.2   ALKPHOS 99   ALT 9*   AST 12   BILITOT 0.3       Significant Diagnostics:  CT: I  have reviewed all pertinent results/findings within the past 24 hours and my personal findings are:  appendicitis

## 2017-09-17 NOTE — ED NOTES
Pt updated that she will be next for ct scan. Pt actively drinking in the bed. Pt informed of need for npo status until ct scan has been read. Pt verbalized understanding.

## 2017-09-17 NOTE — ED NOTES
Pt resting on stretcher, respirations even and unlabored. Stretcher locked and in lowest position, side rails x 2, call bell within reach. BP cuff, cardiac monitor, pulse ox applied continuously. Pt updated on plan of care. LR infusing by gravity with potassium chloride infusing at 100 ml/hr. Pt informed urine specimen was still needed, pt will call when ready to provide sample.

## 2017-09-17 NOTE — TRANSFER OF CARE
"Anesthesia Transfer of Care Note    Patient: Homar Jerry    Procedure(s) Performed: Procedure(s) (LRB):  APPENDECTOMY-LAPAROSCOPIC (N/A)    Patient location: PACU    Anesthesia Type: general    Transport from OR: Transported from OR on 100% O2 by closed face mask with adequate spontaneous ventilation    Post pain: adequate analgesia    Post assessment: no apparent anesthetic complications    Post vital signs: stable    Level of consciousness: sedated and responds to stimulation    Nausea/Vomiting: no nausea/vomiting    Complications: none    Transfer of care protocol was followed      Last vitals:   Visit Vitals  BP (!) 152/94   Pulse 70   Temp 36.2 °C (97.2 °F) (Oral)   Resp 17   Ht 5' 4" (1.626 m)   Wt 107 kg (236 lb)   LMP  (LMP Unknown)   SpO2 100%   Breastfeeding? No   BMI 40.51 kg/m²     "

## 2017-09-17 NOTE — H&P
[facility]  General Surgery  History & Physical    Patient Name: Homar Jerry  MRN: [id number]  Admission Date: 9/16/2017  Attending Physician: Akash Groves MD   Primary Care Provider: Mary Cabrera MD    Patient information was obtained from patient and ER records.     Subjective:     Chief Complaint/Reason for Admission: abd pain    History of Present Illness: 43yo woman with h/o gastroparesis, thyroid cancer, cervical cancer presenting with periumbilical pain that started yesterday at 1pm. It moved to the [facility], associated with nausea, diarrhea, anorexia. No fever/chills.     No current facility-administered medications on file prior to encounter.      Current Outpatient Prescriptions on File Prior to Encounter   Medication Sig    carvedilol (COREG) 12.5 MG tablet Take 2 tablets (25 mg total) by mouth 2 (two) times daily. (Patient taking differently: Take 25 mg by mouth 4 (four) times daily. )    levothyroxine (SYNTHROID) 150 MCG tablet Take 1 tablet (150 mcg total) by mouth once daily.    oxycodone-acetaminophen (PERCOCET)  mg per tablet Take 1 tablet by mouth every 4 (four) hours as needed for Pain. Per Dr. Villa at [facility]    albuterol 90 mcg/actuation inhaler Inhale 2 puffs into the lungs every 6 (six) hours as needed for Wheezing. Please provide cheapest brand/generic formulation    alprazolam (XANAX) 1 MG tablet Take 1 tablet (1 mg total) by mouth every evening. (Patient taking differently: Take 2 mg by mouth every evening. )    blood sugar diagnostic Strp 1 each by Misc.(Non-Drug; Combo Route) route 4 (four) times daily with meals and nightly.    blood-glucose meter kit Use as instructed    calcium carbonate (OS-MARIAN) 500 mg calcium (1,250 mg) tablet Take 2 tablets (1,000 mg total) by mouth once daily.    cholecalciferol, vitamin D3, 1,000 unit capsule Take 2 capsules (2,000 Units total) by mouth once daily.    doxepin  "(SINEQUAN) 25 MG capsule Take 1 capsule (25 mg total) by mouth nightly as needed.    fluticasone (FLONASE) 50 mcg/actuation nasal spray 2 sprays by Nasal route 2 (two) times daily.     gabapentin (NEURONTIN) 600 MG tablet Take 600 mg by mouth 3 (three) times daily.     hydrochlorothiazide (HYDRODIURIL) 25 MG tablet Take 1 tablet (25 mg total) by mouth once daily.    insulin aspart (NOVOLOG) 100 unit/mL InPn pen Inject 16 Units into the skin 3 (three) times daily with meals.    insulin detemir (LEVEMIR FLEXTOUCH) 100 unit/mL (3 mL) SubQ InPn pen Inject 30 Units into the skin 2 (two) times daily.    lamotrigine (LAMICTAL) 100 MG tablet Take half tab by mouth each morning x 14 days, then 1 tab by mouth each morning    lancets Misc 1 each by Misc.(Non-Drug; Combo Route) route 4 (four) times daily with meals and nightly.    levocetirizine (XYZAL) 5 MG tablet Take 1 tablet (5 mg total) by mouth every evening. (Patient taking differently: Take 5 mg by mouth daily as needed for Allergies. )    meclizine (ANTIVERT) 25 mg tablet Take 1 tablet (25 mg total) by mouth 3 (three) times daily as needed for Dizziness or Nausea.    mirtazapine (REMERON) 30 MG tablet Take 1 tablet (30 mg total) by mouth every evening. (Patient taking differently: Take 45 mg by mouth every evening. )    ondansetron (ZOFRAN-ODT) 8 MG TbDL Take 1 tablet (8 mg total) by mouth every 6 (six) hours as needed (nausea).    pen needle, diabetic 31 gauge x 5/16" Ndle 1 each by Misc.(Non-Drug; Combo Route) route 5 (five) times daily.    promethazine (PHENERGAN) 25 MG tablet Take 1 tablet (25 mg total) by mouth every 6 (six) hours as needed.    tizanidine (ZANAFLEX) 4 MG tablet TAKE 3 TABLETS (12 MG TOTAL) BY MOUTH EVERY EVENING.    topiramate (TOPAMAX) 25 MG tablet Take 1 tablet (25 mg total) by mouth daily as needed (migraines).       Review of patient's allergies indicates:   Allergen Reactions    Dexamethasone Hives and Shortness Of Breath     " Per pt, only steroid she is allergic to is dexamethasone    Doxepin hcl Hives, Diarrhea and Itching    Ciprofloxacin      Counteracts with seroquel, xanax, tizanidine    Compazine [prochlorperazine] Hives and Itching    Lyrica [pregabalin]      depression    Prednisone      Gastroparesis flare up       Past Medical History:   Diagnosis Date    Anemia     Anxiety     Asthma     usually with cold weather    Back pain     Bipolar 1 disorder 3/17/2015    Cervical cancer 2009    [facility]    Diabetes mellitus with hyperglycemia 1/17/2017    Fibrocystic breast     Fibromyalgia     Gastroparesis 2012    s/p sleeve to cover damaged nerves; s/p gastric pacemaker which did not help; due to nerve damage during surgery    Hypertension     Hypothyroidism, postsurgical     Morbid obesity     Prediabetes     Rheumatoid arthritis     Thyroid cancer 2003 & 2013    thyroidectomy    Urinary incontinence      Past Surgical History:   Procedure Laterality Date    ANKLE FRACTURE SURGERY Right     BREAST SURGERY      exploratory laparotomy due to complications of hysterectomy  2009    cervical cuff burst with air in abdomen    FRACTURE SURGERY      GASTRECTOMY      gastric pacemaker      gastric sleeve      HYSTERECTOMY  2009    total including cervix    KNEE ARTHROSCOPY W/ MENISCAL REPAIR Left 2016    KNEE SURGERY  05/12/2016    loop monitor  2016    multiple breast biopsies      TONSILLECTOMY      ureteral sling       Family History     Problem Relation (Age of Onset)    ADD / ADHD Son    Arthritis Mother    Bipolar disorder Maternal Uncle    Clotting disorder Mother, Father, Paternal Grandfather    Diabetes Paternal Grandmother    HIV Brother    Heart attack Mother (55)    Heart disease Mother    Hyperlipidemia Father    No Known Problems Sister, Brother, Maternal Aunt, Paternal Aunt, Paternal Uncle, Maternal Grandfather, Maternal Grandmother, Cousin    Pancreatic cancer Maternal Uncle    Pneumonia Brother     Rheum arthritis Mother    Thyroid cancer Paternal Grandmother        Social History Main Topics    Smoking status: Never Smoker    Smokeless tobacco: Never Used    Alcohol use No    Drug use: No    Sexual activity: Not Currently     Review of Systems   Constitutional: Negative for activity change, appetite change, chills and fever.   HENT: Negative for congestion and trouble swallowing.    Eyes: Negative for visual disturbance.   Respiratory: Negative for cough and shortness of breath.    Cardiovascular: Negative for chest pain and leg swelling.   Gastrointestinal: Positive for abdominal pain, diarrhea and nausea. Negative for abdominal distention, constipation and vomiting.   Endocrine: Negative for cold intolerance and heat intolerance.   Genitourinary: Negative for difficulty urinating and dysuria.   Musculoskeletal: Negative for arthralgias and back pain.   Skin: Negative for rash and wound.   Neurological: Negative for dizziness and headaches.   Psychiatric/Behavioral: Negative for agitation and behavioral problems.     Objective:     Vital Signs (Most Recent):  Temp: 98.9 °F (37.2 °C) (09/16/17 1832)  Pulse: 104 (09/17/17 0037)  Resp: 18 (09/17/17 0037)  BP: 127/89 (09/16/17 2234)  SpO2: 96 % (09/17/17 0037) Vital Signs (24h Range):  Temp:  [98.9 °F (37.2 °C)] 98.9 °F (37.2 °C)  Pulse:  [103-144] 104  Resp:  [18-20] 18  SpO2:  [93 %-100 %] 96 %  BP: (127-165)/() 127/89     Weight: 107 kg (236 lb)  Body mass index is 40.51 kg/m².    Physical Exam   Constitutional: She is oriented to person, place, and time. She appears well-developed and well-nourished. No distress.   Cardiovascular: Normal rate and regular rhythm.  Exam reveals no gallop and no friction rub.    No murmur heard.  Pulmonary/Chest: Effort normal and breath sounds normal. No respiratory distress. She has no wheezes. She has no rales.   Abdominal: Soft. Bowel sounds are normal.   TTP RLQ, +McBurney's point, morbidly obese.    Musculoskeletal: Normal range of motion. She exhibits no edema.   Neurological: She is alert and oriented to person, place, and time.   Skin: Skin is warm and dry.   Psychiatric: She has a normal mood and affect. Her behavior is normal.       Significant Labs:  CBC:   Recent Labs  Lab 09/16/17 1951   WBC 14.07*   RBC 4.72   HGB 13.2   HCT 38.4      MCV 81*   MCH 28.0   MCHC 34.4     CMP:   Recent Labs  Lab 09/16/17 1951   *   CALCIUM 9.3   ALBUMIN 3.6   PROT 8.3      K 3.0*   CO2 23      BUN 18   CREATININE 1.2   ALKPHOS 99   ALT 9*   AST 12   BILITOT 0.3       Significant Diagnostics:  CT: I have reviewed all pertinent results/findings within the past 24 hours and my personal findings are:  appendicitis    Assessment/Plan:     * Acute appendicitis with localized peritonitis    Admit obs  To OR this am, NPO, abx, IVF  Insulin checks  Another liter LR, MARY          VTE Risk Mitigation         Ordered     enoxaparin injection 40 mg  Every 12 hours (non-standard times)     Route:  Subcutaneous        09/17/17 0126     Medium Risk of VTE  Once      09/17/17 0126     Place sequential compression device  Until discontinued      09/17/17 0126          Chhaya Hankins MD  General Surgery  [facility]

## 2017-09-17 NOTE — OP NOTE
DATE OF PROCEDURE:  09/17/2017.    PREOPERATIVE DIAGNOSIS:  Acute appendicitis.    POSTOPERATIVE DIAGNOSIS:  Acute appendicitis.    PROCEDURE:  Laparoscopic appendectomy.    SURGEON:  Carmelo Saucedo M.D.    ASSISTANT:  Mark Escobar M.D. (RES).    ANESTHESIA:  General.    CLINICAL NOTE:  The patient is a 42-year-old female with clinical and   radiographic evidence of acute appendicitis.    PROCEDURE NOTE:  Following induction of adequate general anesthesia, the patient   had a Tolliver catheter placed and her abdomen was prepped and draped with   ChloraPrep.  We made a supraumbilical incision and dissected down to the linea   alba, which we grasped between clamps and then incised.  We entered the   peritoneal cavity under direct vision and then placed a balloon-tipped trocar   through this site.  We insufflated creating a pneumoperitoneum of 15 mmHg   intra-abdominal pressure and then placed a laparoscopic camera through this   port.  This documented our proper positioning and allowed under direct vision   the placement of two lower midline 5 mm ports.  We then explored and noted a   very inflamed appendix and dissected it away from its attachments to the lateral   retroperitoneum as well as to the cecum and were ultimately able to dissect   free a plane at the base.  We came across the base of the appendix at its   junction with the cecum with an Endo-ELIZ stapler.  We then similarly came across   the mesoappendix with a vascular load of the same device.  We then removed the   appendix through an EndoCatch bag.  We then irrigated and obtained final   hemostasis with cautery and then we removed all instruments and relieved the   pneumoperitoneum.  We closed the fascia at the midline site with 0 Vicryl suture   and then we closed all skin incisions with subcuticular 4-0 Monocryl suture.    Sterile dressings were applied and the procedure was terminated with the patient   tolerating it well.    ESTIMATED BLOOD LOSS:   10 mL.      MCT/MONICA  dd: 09/17/2017 09:58:16 (CDT)  td: 09/17/2017 10:18:04 (CDT)  Doc ID   #[id number]  Job ID #[id number]    CC:

## 2017-09-17 NOTE — NURSING TRANSFER
Nursing Transfer Note      9/17/2017     Transfer To: 526    Transfer via stretcher    Transfer with 2L O2    Transported by RN x2    Medicines sent: IVF    Chart send with patient: Yes    Notified: son    Patient reassessed at: 9/17/17, 1138

## 2017-09-18 ENCOUNTER — OUTPATIENT CASE MANAGEMENT (OUTPATIENT)
Dept: ADMINISTRATIVE | Facility: OTHER | Age: 42
End: 2017-09-18

## 2017-09-18 LAB
ALBUMIN SERPL BCP-MCNC: 2.3 G/DL
ALP SERPL-CCNC: 68 U/L
ALT SERPL W/O P-5'-P-CCNC: 8 U/L
ANION GAP SERPL CALC-SCNC: 9 MMOL/L
AST SERPL-CCNC: 8 U/L
BASOPHILS # BLD AUTO: 0 K/UL
BASOPHILS NFR BLD: 0 %
BILIRUB SERPL-MCNC: 0.1 MG/DL
BUN SERPL-MCNC: 6 MG/DL
CALCIUM SERPL-MCNC: 7.5 MG/DL
CHLORIDE SERPL-SCNC: 98 MMOL/L
CO2 SERPL-SCNC: 31 MMOL/L
CREAT SERPL-MCNC: 0.7 MG/DL
DIFFERENTIAL METHOD: ABNORMAL
EOSINOPHIL # BLD AUTO: 0.2 K/UL
EOSINOPHIL NFR BLD: 2.4 %
ERYTHROCYTE [DISTWIDTH] IN BLOOD BY AUTOMATED COUNT: 14.2 %
EST. GFR  (AFRICAN AMERICAN): >60 ML/MIN/1.73 M^2
EST. GFR  (NON AFRICAN AMERICAN): >60 ML/MIN/1.73 M^2
GLUCOSE SERPL-MCNC: 100 MG/DL
HCT VFR BLD AUTO: 28.9 %
HGB BLD-MCNC: 9.7 G/DL
LYMPHOCYTES # BLD AUTO: 2.8 K/UL
LYMPHOCYTES NFR BLD: 41.1 %
MAGNESIUM SERPL-MCNC: 1.7 MG/DL
MCH RBC QN AUTO: 27.7 PG
MCHC RBC AUTO-ENTMCNC: 33.6 G/DL
MCV RBC AUTO: 83 FL
MONOCYTES # BLD AUTO: 0.3 K/UL
MONOCYTES NFR BLD: 4.9 %
NEUTROPHILS # BLD AUTO: 3.5 K/UL
NEUTROPHILS NFR BLD: 51.3 %
PHOSPHATE SERPL-MCNC: 3.6 MG/DL
PLATELET # BLD AUTO: 184 K/UL
PMV BLD AUTO: 9.8 FL
POCT GLUCOSE: 117 MG/DL (ref 70–110)
POCT GLUCOSE: 123 MG/DL (ref 70–110)
POTASSIUM SERPL-SCNC: 2.7 MMOL/L
PROT SERPL-MCNC: 5.4 G/DL
RBC # BLD AUTO: 3.5 M/UL
SODIUM SERPL-SCNC: 138 MMOL/L
WBC # BLD AUTO: 6.76 K/UL

## 2017-09-18 PROCEDURE — 25000003 PHARM REV CODE 250: Performed by: SURGERY

## 2017-09-18 PROCEDURE — 36415 COLL VENOUS BLD VENIPUNCTURE: CPT

## 2017-09-18 PROCEDURE — 25000003 PHARM REV CODE 250: Performed by: STUDENT IN AN ORGANIZED HEALTH CARE EDUCATION/TRAINING PROGRAM

## 2017-09-18 PROCEDURE — 63600175 PHARM REV CODE 636 W HCPCS: Performed by: SURGERY

## 2017-09-18 PROCEDURE — 11000001 HC ACUTE MED/SURG PRIVATE ROOM

## 2017-09-18 PROCEDURE — A4216 STERILE WATER/SALINE, 10 ML: HCPCS | Performed by: SURGERY

## 2017-09-18 PROCEDURE — 0DTJ4ZZ RESECTION OF APPENDIX, PERCUTANEOUS ENDOSCOPIC APPROACH: ICD-10-PCS | Performed by: SURGERY

## 2017-09-18 PROCEDURE — 85025 COMPLETE CBC W/AUTO DIFF WBC: CPT

## 2017-09-18 PROCEDURE — 83735 ASSAY OF MAGNESIUM: CPT

## 2017-09-18 PROCEDURE — 63600175 PHARM REV CODE 636 W HCPCS: Performed by: STUDENT IN AN ORGANIZED HEALTH CARE EDUCATION/TRAINING PROGRAM

## 2017-09-18 PROCEDURE — 25000003 PHARM REV CODE 250: Performed by: PHYSICIAN ASSISTANT

## 2017-09-18 PROCEDURE — 80053 COMPREHEN METABOLIC PANEL: CPT

## 2017-09-18 PROCEDURE — S0030 INJECTION, METRONIDAZOLE: HCPCS | Performed by: SURGERY

## 2017-09-18 PROCEDURE — 84100 ASSAY OF PHOSPHORUS: CPT

## 2017-09-18 RX ORDER — POTASSIUM CHLORIDE 20 MEQ/1
40 TABLET, EXTENDED RELEASE ORAL DAILY
Status: DISCONTINUED | OUTPATIENT
Start: 2017-09-18 | End: 2017-09-19 | Stop reason: HOSPADM

## 2017-09-18 RX ORDER — PROMETHAZINE HYDROCHLORIDE 12.5 MG/1
12.5 SUPPOSITORY RECTAL EVERY 6 HOURS PRN
Status: DISCONTINUED | OUTPATIENT
Start: 2017-09-18 | End: 2017-09-19

## 2017-09-18 RX ORDER — OXYCODONE AND ACETAMINOPHEN 5; 325 MG/1; MG/1
1-2 TABLET ORAL EVERY 4 HOURS PRN
Qty: 45 TABLET | Refills: 0 | Status: SHIPPED | OUTPATIENT
Start: 2017-09-18 | End: 2017-10-14 | Stop reason: DRUGHIGH

## 2017-09-18 RX ORDER — LANOLIN ALCOHOL/MO/W.PET/CERES
400 CREAM (GRAM) TOPICAL ONCE
Status: COMPLETED | OUTPATIENT
Start: 2017-09-18 | End: 2017-09-18

## 2017-09-18 RX ADMIN — ACETAMINOPHEN 1000 MG: 500 TABLET ORAL at 05:09

## 2017-09-18 RX ADMIN — ACETAMINOPHEN 1000 MG: 500 TABLET ORAL at 02:09

## 2017-09-18 RX ADMIN — INSULIN DETEMIR 15 UNITS: 100 INJECTION, SOLUTION SUBCUTANEOUS at 09:09

## 2017-09-18 RX ADMIN — POTASSIUM CHLORIDE 40 MEQ: 1500 TABLET, EXTENDED RELEASE ORAL at 09:09

## 2017-09-18 RX ADMIN — KETOROLAC TROMETHAMINE 15 MG: 30 INJECTION, SOLUTION INTRAMUSCULAR at 05:09

## 2017-09-18 RX ADMIN — CEFTRIAXONE 2 G: 2 INJECTION, SOLUTION INTRAVENOUS at 01:09

## 2017-09-18 RX ADMIN — METRONIDAZOLE 500 MG: 500 INJECTION, SOLUTION INTRAVENOUS at 09:09

## 2017-09-18 RX ADMIN — ALPRAZOLAM 2 MG: 1 TABLET ORAL at 09:09

## 2017-09-18 RX ADMIN — OXYCODONE HYDROCHLORIDE 10 MG: 5 TABLET ORAL at 09:09

## 2017-09-18 RX ADMIN — RAMELTEON 8 MG: 8 TABLET, FILM COATED ORAL at 09:09

## 2017-09-18 RX ADMIN — Medication 3 ML: at 09:09

## 2017-09-18 RX ADMIN — POTASSIUM BICARBONATE 50 MEQ: 25 TABLET, EFFERVESCENT ORAL at 07:09

## 2017-09-18 RX ADMIN — KETOROLAC TROMETHAMINE 15 MG: 30 INJECTION, SOLUTION INTRAMUSCULAR at 11:09

## 2017-09-18 RX ADMIN — Medication 3 ML: at 02:09

## 2017-09-18 RX ADMIN — PROMETHAZINE HYDROCHLORIDE 12.5 MG: 25 INJECTION INTRAMUSCULAR; INTRAVENOUS at 09:09

## 2017-09-18 RX ADMIN — LEVOTHYROXINE SODIUM 150 MCG: 100 TABLET ORAL at 05:09

## 2017-09-18 RX ADMIN — OXYCODONE HYDROCHLORIDE 10 MG: 5 TABLET ORAL at 02:09

## 2017-09-18 RX ADMIN — PROMETHAZINE HYDROCHLORIDE 12.5 MG: 12.5 SUPPOSITORY RECTAL at 04:09

## 2017-09-18 RX ADMIN — OXYCODONE HYDROCHLORIDE 10 MG: 5 TABLET ORAL at 04:09

## 2017-09-18 RX ADMIN — METRONIDAZOLE 500 MG: 500 INJECTION, SOLUTION INTRAVENOUS at 04:09

## 2017-09-18 RX ADMIN — CEFTRIAXONE 2 G: 2 INJECTION, SOLUTION INTRAVENOUS at 02:09

## 2017-09-18 RX ADMIN — CARVEDILOL 25 MG: 25 TABLET, FILM COATED ORAL at 09:09

## 2017-09-18 RX ADMIN — ENOXAPARIN SODIUM 40 MG: 100 INJECTION SUBCUTANEOUS at 01:09

## 2017-09-18 RX ADMIN — ONDANSETRON 4 MG: 2 INJECTION INTRAMUSCULAR; INTRAVENOUS at 09:09

## 2017-09-18 RX ADMIN — ENOXAPARIN SODIUM 40 MG: 100 INJECTION SUBCUTANEOUS at 02:09

## 2017-09-18 RX ADMIN — OXYCODONE HYDROCHLORIDE 10 MG: 5 TABLET ORAL at 05:09

## 2017-09-18 RX ADMIN — MAGNESIUM OXIDE TAB 400 MG (241.3 MG ELEMENTAL MG) 400 MG: 400 (241.3 MG) TAB at 09:09

## 2017-09-18 RX ADMIN — Medication 3 ML: at 05:09

## 2017-09-18 RX ADMIN — KETOROLAC TROMETHAMINE 15 MG: 30 INJECTION, SOLUTION INTRAMUSCULAR at 12:09

## 2017-09-18 RX ADMIN — METRONIDAZOLE 500 MG: 500 INJECTION, SOLUTION INTRAVENOUS at 12:09

## 2017-09-18 RX ADMIN — OXYCODONE HYDROCHLORIDE 10 MG: 5 TABLET ORAL at 12:09

## 2017-09-18 RX ADMIN — TIZANIDINE 4 MG: 4 TABLET ORAL at 09:09

## 2017-09-18 NOTE — PLAN OF CARE
Problem: Patient Care Overview  Goal: Plan of Care Review  Outcome: Ongoing (interventions implemented as appropriate)  Pt AAO x 3 progressing to tamar of care goals. Ambulated to bathroom. Pain managed with oral meds. Q2 hour monitoring for pain and safety. Pt free from falls. Call light in reach.

## 2017-09-18 NOTE — PROGRESS NOTES
9/18/17  Case opened to review for new referral to Outpatient Case Management. Pt presently INPT, pending d/c. Will f/u.

## 2017-09-18 NOTE — PLAN OF CARE
Problem: Patient Care Overview  Goal: Plan of Care Review  Outcome: Ongoing (interventions implemented as appropriate)  Pt in bed resting. C/o pain relieved with PRN pain med. In no acute distress. Activity promoted. Up with assist. Regular diet c/o n/v. md notified.  No falls noted. Will continue to monitor. Call bell intact and in reach.

## 2017-09-18 NOTE — ASSESSMENT & PLAN NOTE
S/p laparoscopic appendectomy POD 1  -regular diet  -PO pain meds  -nausea meds PRN  -bowel regimen  -IS/ambulate

## 2017-09-18 NOTE — SUBJECTIVE & OBJECTIVE
Interval History: No acute events. Some nausea and emesis. Pain controlled. AF, VSS.    Medications:  Continuous Infusions:   lactated Ringers 125 mL/hr at 09/17/17 1036     Scheduled Meds:   acetaminophen  1,000 mg Oral Q8H    alprazolam  2 mg Oral QHS    carvedilol  25 mg Oral BID    cefTRIAXone (ROCEPHIN) IVPB  2 g Intravenous Q12H    enoxparin  40 mg Subcutaneous Q12H    insulin detemir  15 Units Subcutaneous BID    ketorolac  15 mg Intravenous Q6H    levothyroxine  150 mcg Oral Before breakfast    magnesium oxide  400 mg Oral Once    metronidazole  500 mg Intravenous Q8H    potassium chloride  40 mEq Oral Daily    senna-docusate 8.6-50 mg  1 tablet Oral Daily    sodium chloride 0.9%  3 mL Intravenous Q8H    tizanidine  4 mg Oral Daily     PRN Meds:dextrose 50%, diphenhydrAMINE, glucagon (human recombinant), insulin aspart, ondansetron, oxycodone, ramelteon, sodium chloride 0.9%     Review of patient's allergies indicates:   Allergen Reactions    Dexamethasone Hives and Shortness Of Breath     Per pt, only steroid she is allergic to is dexamethasone    Doxepin hcl Hives, Diarrhea and Itching    Ciprofloxacin      Counteracts with seroquel, xanax, tizanidine    Compazine [prochlorperazine] Hives and Itching    Lyrica [pregabalin]      depression    Prednisone      Gastroparesis flare up     Objective:     Vital Signs (Most Recent):  Temp: 97.8 °F (36.6 °C) (09/18/17 0441)  Pulse: 77 (09/18/17 0441)  Resp: 16 (09/18/17 0441)  BP: 97/60 (09/18/17 0441)  SpO2: (!) 92 % (09/18/17 0441) Vital Signs (24h Range):  Temp:  [97.2 °F (36.2 °C)-98 °F (36.7 °C)] 97.8 °F (36.6 °C)  Pulse:  [] 77  Resp:  [14-20] 16  SpO2:  [92 %-100 %] 92 %  BP: ()/() 97/60     Weight: 107 kg (236 lb)  Body mass index is 40.51 kg/m².    Intake/Output - Last 3 Shifts       09/16 0700 - 09/17 0659 09/17 0700 - 09/18 0659 09/18 0700 - 09/19 0659    P.O.  150     I.V. (mL/kg)  1000 (9.3)     IV Piggyback 1150       Total Intake(mL/kg) 1150 (10.7) 1150 (10.7)     Urine (mL/kg/hr)  850 (0.3)     Total Output   850      Net +1150 +300             Stool Occurrence 0 x            Physical Exam   Constitutional: She appears well-developed.   HENT:   Head: Normocephalic and atraumatic.   Eyes: Conjunctivae are normal.   Cardiovascular: Normal rate.    Pulmonary/Chest: Effort normal.   Abdominal: Soft.   Neurological: She is alert.   Skin: Skin is warm and dry.       Significant Labs:  CBC:   Recent Labs  Lab 09/18/17  0514   WBC 6.76   RBC 3.50*   HGB 9.7*   HCT 28.9*      MCV 83   MCH 27.7   MCHC 33.6     CMP:   Recent Labs  Lab 09/18/17  0514      CALCIUM 7.5*   ALBUMIN 2.3*   PROT 5.4*      K 2.7*   CO2 31*   CL 98   BUN 6   CREATININE 0.7   ALKPHOS 68   ALT 8*   AST 8*   BILITOT 0.1

## 2017-09-18 NOTE — SUBJECTIVE & OBJECTIVE
Interval History: patient seen and examined, no acute events overnight; s/p appendectomy POD 1  States pain has improved since yesterday  Tolerating diet, had some nausea, no vomiting    Medications:  Continuous Infusions:   lactated Ringers 125 mL/hr at 09/17/17 1036     Scheduled Meds:   acetaminophen  1,000 mg Oral Q8H    alprazolam  2 mg Oral QHS    carvedilol  25 mg Oral BID    cefTRIAXone (ROCEPHIN) IVPB  2 g Intravenous Q12H    enoxparin  40 mg Subcutaneous Q12H    insulin detemir  15 Units Subcutaneous BID    ketorolac  15 mg Intravenous Q6H    levothyroxine  150 mcg Oral Before breakfast    metronidazole  500 mg Intravenous Q8H    potassium bicarbonate  50 mEq Oral Once    senna-docusate 8.6-50 mg  1 tablet Oral Daily    sodium chloride 0.9%  3 mL Intravenous Q8H    tizanidine  4 mg Oral Daily     PRN Meds:dextrose 50%, diphenhydrAMINE, glucagon (human recombinant), insulin aspart, ondansetron, oxycodone, ramelteon, sodium chloride 0.9%     Review of patient's allergies indicates:   Allergen Reactions    Dexamethasone Hives and Shortness Of Breath     Per pt, only steroid she is allergic to is dexamethasone    Doxepin hcl Hives, Diarrhea and Itching    Ciprofloxacin      Counteracts with seroquel, xanax, tizanidine    Compazine [prochlorperazine] Hives and Itching    Lyrica [pregabalin]      depression    Prednisone      Gastroparesis flare up     Objective:     Vital Signs (Most Recent):  Temp: 97.8 °F (36.6 °C) (09/18/17 0441)  Pulse: 77 (09/18/17 0441)  Resp: 16 (09/18/17 0441)  BP: 97/60 (09/18/17 0441)  SpO2: (!) 92 % (09/18/17 0441) Vital Signs (24h Range):  Temp:  [97.2 °F (36.2 °C)-98 °F (36.7 °C)] 97.8 °F (36.6 °C)  Pulse:  [] 77  Resp:  [14-20] 16  SpO2:  [92 %-100 %] 92 %  BP: ()/() 97/60     Weight: 107 kg (236 lb)  Body mass index is 40.51 kg/m².    Intake/Output - Last 3 Shifts       09/16 0700 - 09/17 0659 09/17 0700 - 09/18 0659    P.O.  150    I.V.  (mL/kg)  1000 (9.3)    IV Piggyback 1150     Total Intake(mL/kg) 1150 (10.7) 1150 (10.7)    Urine (mL/kg/hr)  850 (0.3)    Total Output   850    Net +1150 +300          Stool Occurrence 0 x           Physical Exam   Constitutional: She is oriented to person, place, and time. She appears well-developed and well-nourished. No distress.   HENT:   Head: Normocephalic and atraumatic.   Eyes: No scleral icterus.   Cardiovascular: Normal rate and regular rhythm.    Pulmonary/Chest: Effort normal. No respiratory distress.   Abdominal:   Soft, appropriate TTP, incisions - surgical dressing in place - clean, dry and intact   Neurological: She is alert and oriented to person, place, and time.       Significant Labs:  CBC:   Recent Labs  Lab 09/18/17 0514   WBC 6.76   RBC 3.50*   HGB 9.7*   HCT 28.9*      MCV 83   MCH 27.7   MCHC 33.6     BMP:   Recent Labs  Lab 09/18/17 0514         K 2.7*   CL 98   CO2 31*   BUN 6   CREATININE 0.7   CALCIUM 7.5*   MG 1.7     CMP:   Recent Labs  Lab 09/18/17 0514      CALCIUM 7.5*   ALBUMIN 2.3*   PROT 5.4*      K 2.7*   CO2 31*   CL 98   BUN 6   CREATININE 0.7   ALKPHOS 68   ALT 8*   AST 8*   BILITOT 0.1     LFTs:   Recent Labs  Lab 09/18/17 0514   ALT 8*   AST 8*   ALKPHOS 68   BILITOT 0.1   PROT 5.4*   ALBUMIN 2.3*

## 2017-09-18 NOTE — PLAN OF CARE
Patient lives alone in a 1 story house. Discharge to home, without needs, pending resolvement of nausea.        09/18/17 1235   Discharge Assessment   Assessment Type Discharge Planning Assessment   Confirmed/corrected address and phone number on facesheet? Yes   Assessment information obtained from? Patient;Medical Record   Expected Length of Stay (days) (2+)   Communicated expected length of stay with patient/caregiver no  (Per MD)   Prior to hospitilization cognitive status: Alert/Oriented;No Deficits   Prior to hospitalization functional status: Independent   Current cognitive status: Alert/Oriented;No Deficits   Current Functional Status: Independent;Needs Assistance   Facility Arrived From: (N/A)   Lives With alone   Able to Return to Prior Arrangements yes   Is patient able to care for self after discharge? Yes   Who are your caregiver(s) and their phone number(s)? (Lives alone;emergency contacts help is limited: 1. Son: Girma LEZAMA [phone] (away at school), 2. Sister: Nuzhat LEZAMA [phone] (has Stage 3 Breast CA))   Patient's perception of discharge disposition home or selfcare   Readmission Within The Last 30 Days no previous admission in last 30 days   Patient currently being followed by outpatient case management? No   Patient currently receives any other outside agency services? No   Equipment Currently Used at Home none   Do you have any problems affording any of your prescribed medications? No   Is the patient taking medications as prescribed? yes   Does the patient have transportation home? Yes   Transportation Available taxi;car;family or friend will provide  (Will have to make arrangements-she will check w/niece)   Dialysis Name and Scheduled days (N/A)   Does the patient receive services at the [facility]? No   Discharge Plan A Home   Discharge Plan B Home   Patient/Family In Agreement With Plan yes

## 2017-09-18 NOTE — DISCHARGE SUMMARY
[facility]  General Surgery  Discharge Summary      Patient Name: Homar Jerry  MRN: [id number]  Admission Date: 9/16/2017  Hospital Length of Stay: 1 days  Discharge Date and Time:  09/19/2017 7:09 AM  Attending Physician: Carmelo Saucedo MD   Discharging Provider: Ivania Jones PA-C  Primary Care Provider: Mary Cabrera MD    HPI:   41yo woman with h/o gastroparesis, thyroid cancer, cervical cancer presenting with periumbilical pain that started yesterday at 1pm. It moved to the RLQ, associated with nausea, diarrhea, anorexia. No fever/chills.     Procedure(s) (LRB):  APPENDECTOMY-LAPAROSCOPIC (N/A)      Indwelling Lines/Drains at time of discharge:   Lines/Drains/Airways          No matching active lines, drains, or airways        Hospital Course:   Patient presented to the ER with acute appendicitis. She underwent: Procedure(s) (LRB):  APPENDECTOMY-LAPAROSCOPIC (N/A). She tolerated the procedure well and was transferred to the floor after recovery from anesthesia. Please see the operative note for further procedure details. Vitals remained stable, and she was afebrile all throughout the post operative period. Labs were reviewed and electrolytes were replaced appropriately. Physical exam was appropriate for post operative state.  Diet was advanced, and she was able to tolerate a regular diet prior to discharge. She was ambulating without difficulty and had normal bowel function prior to discharge. Patient was deemed suitable for discharge on 2 Days Post-Op. Discharge was delayed due to nausea and vomiting on a regular diet, this resolved with appropriate medication. She was discharged home with medications and instructions as below. She voiced understanding of the instructions prior to discharge.     For more thorough information, please refer to the hospital record and operative report.    Consults:   Consults         Status Ordering Provider     Inpatient consult to  General surgery  Once     Provider:  (Not yet assigned)    Completed ADELE VALDERRAMA          Significant Diagnostic Studies: Labs:   BMP:     Recent Labs  Lab 09/18/17  0514 09/19/17  0417    87    140   K 2.7* 3.1*   CL 98 100   CO2 31* 32*   BUN 6 7   CREATININE 0.7 0.7   CALCIUM 7.5* 7.8*   MG 1.7 1.8   , CMP     Recent Labs  Lab 09/18/17  0514 09/19/17  0417    140   K 2.7* 3.1*   CL 98 100   CO2 31* 32*    87   BUN 6 7   CREATININE 0.7 0.7   CALCIUM 7.5* 7.8*   PROT 5.4* 5.5*   ALBUMIN 2.3* 2.3*   BILITOT 0.1 0.1   ALKPHOS 68 91   AST 8* 21   ALT 8* 16   ANIONGAP 9 8   ESTGFRAFRICA >60.0 >60.0   EGFRNONAA >60.0 >60.0   , CBC     Recent Labs  Lab 09/18/17  0514 09/19/17 0417   WBC 6.76 7.04   HGB 9.7* 10.1*   HCT 28.9* 30.2*    216    All labs within the past 24 hours have been reviewed    Radiology: CT scan: CT ABDOMEN PELVIS WITH CONTRAST:   Results for orders placed or performed during the hospital encounter of 09/16/17   CT Abdomen Pelvis With Contrast    Narrative    Time of Procedure: 09/16/17 23:24:34  Accession # [id number]    CT abdomen and pelvis.    Technique: The patient was surveyed from the lung bases through the pelvis after the administration of 100 cc Omni 350 IV contrast as well as oral contrast. The data was reconstructed for coronal, sagittal, and axial images.    Clinical indication: 42-year-old female with history of right lower quadrant pain started as the umbilical    Comparison: CT abdomen and pelvis 1/2016    Findings:    The lung bases demonstrate dependent atelectatic changes.  No pleural effusion is seen.      The visualized portions of the heart appear normal. No pericardial effusion.    The liver is normal in size and attenuation.  No focal hepatic abnormality is seen. The gallbladder is unremarkable.  No intrahepatic or extrahepatic biliary ductal dilatation is identified. The spleen is unremarkable.      The stomach, pancreas, and adrenal glands are  unremarkable. There are postoperative changes of sleeve gastrectomy. There is a small hiatal hernia.    The kidneys are normal in size and location. No hydronephrosis is seen.  The ureters appear normal in course and caliber. The urinary bladder is unremarkable. The uterus is surgically removed.    The appendix is visualized and is swollen with evidence of surrounding inflammatory change and haziness of the surrounding fat consistent with acute appendicitis. The visualized loops of small and large bowel show no evidence of obstruction or inflammation.    No ascites, free fluid, or intraperitoneal free air is identified.    There is no evidence of lymph node enlargement in the abdomen or pelvis.    The abdominal aorta is normal in course and caliber without significant atherosclerotic calcifications.    The osseous structures demonstrate no significant abnormality.      The extraperitoneal soft tissues are unremarkable.  Small fat containing umbilical hernia.    Impression    1. Findings consistent with acute appendicitis noting swollen appendix with surrounding inflammatory change and haziness of the adjacent fat.    2. Small hiatal hernia.    3. Postoperative changes of sleeve gastrectomy and hysterectomy.    Preliminary report discussed with ADELE VALDERRAMA by SULAIMAN Arrieta on behalf of Mae Schulz M.D. at 23:45:28 on Saturday, September 16, 2017.  ______________________________________     Electronically signed by resident: DIANE ARRIETA MD  Date:     09/16/17  Time:    23:50            As the supervising and teaching physician, I personally reviewed the images and resident's interpretation and I agree with the findings.          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] 01/23/2017 Yes    Hypomagnesemia [E83.42] 01/21/2017 Yes    Morbid obesity [E66.01] 01/18/2017 Yes    Hypokalemia [E87.6] 01/18/2017 Yes    Essential hypertension [I10]  Yes     Chronic    Hypothyroidism, postsurgical [E89.0]  Yes     Chronic    Anxiety [F41.9] 01/12/2015 Yes     Chronic      Problems Resolved During this Admission:    Diagnosis Date Noted Date Resolved POA      Patient Active Problem List   Diagnosis    Gastroparesis    Fibromyalgia    Anxiety    Bipolar 1 disorder    Hypothyroidism, postsurgical    Chronic low back pain    Migraines    Status post placement of implantable loop recorder    Allergic rhinitis    Essential hypertension    Asthma    History of thyroid cancer    Debility    Constipation    Primary osteoarthritis of both knees    Drug-seeking behavior    Rheumatoid arthritis    Chronic narcotic dependence    Type 2 diabetes mellitus with hyperglycemia, without long-term current use of insulin    Morbid obesity    Hypokalemia    Hypomagnesemia    Vitamin D deficiency    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    Specialty:  General Surgery  Why:  Wound check/Office to call you w/Appt. Appt scheduled on 9/28/17 at 10:45 AM.   Contact information:  [address]  [facility] [address] [phone]             [facility].    Specialty:  Administration  Why:  Outpatient services/Ambulatory referral sent to Outpatient Case Management/they will contact you, ([phone] ask for Outpatient Case managemen(-phone number if needed)  Contact information:  [address]  [facility] [id number]               Patient Instructions:     Diet general      Lifting restrictions   Order Comments: No heavy lifting, nothing heavier than 10lbs     Call MD for:  difficulty breathing or increased cough     Call MD for:  redness, tenderness, or signs of infection (pain, swelling, redness, odor or green/yellow discharge around incision site)     Call MD for:  severe uncontrolled pain     Call MD for:  persistent nausea and vomiting or diarrhea     Call MD for:  temperature >100.4     Change dressing (specify)   Order Comments: Outer dressing may be removed tomorrow. Steri-strips will remain in place unless they fall off on their own, or they will be removed in clinic. No bathing, swimming or soaking in a tub. Showering is okay.       Medications:  Reconciled Home Medications:   Current Discharge Medication List      START taking these medications    Details   docusate sodium (COLACE) 50 MG capsule Take 1 capsule (50 mg total) by mouth 2 (two) times daily.  Refills: 0      ondansetron (ZOFRAN) 4 MG tablet Take 1 tablet (4 mg total) by mouth every 6 (six) hours as needed for Nausea.  Qty: 12 tablet, Refills: 0      oxycodone-acetaminophen (PERCOCET) 5-325 mg per tablet Take 1-2 tablets by mouth every 4 (four) hours as needed.  Qty: 45 tablet, Refills: 0         CONTINUE these medications which have NOT CHANGED    Details   carvedilol (COREG) 12.5 MG tablet Take 2 tablets (25 mg total) by mouth 2 (two) times daily.  Qty: 120 tablet, Refills: 11      levothyroxine (SYNTHROID) 150 MCG tablet Take 1 tablet (150 mcg total) by mouth once daily.  Qty: 30 tablet, Refills: 3      oxycodone-acetaminophen (PERCOCET)  mg per tablet Take 1 tablet by mouth every 4 (four) hours as needed for Pain. Per Dr. Villa at [facility]      albuterol 90 mcg/actuation inhaler Inhale 2 puffs into the lungs every 6 (six) hours as needed for Wheezing. Please provide cheapest brand/generic formulation  Qty: 18 g, Refills: 1    Associated Diagnoses: Uncomplicated asthma,  unspecified asthma severity      alprazolam (XANAX) 1 MG tablet Take 1 tablet (1 mg total) by mouth every evening.  Qty: 30 tablet, Refills: 5    Associated Diagnoses: Panic disorder without agoraphobia      blood sugar diagnostic Strp 1 each by Misc.(Non-Drug; Combo Route) route 4 (four) times daily with meals and nightly.  Qty: 100 each, Refills: 2      blood-glucose meter kit Use as instructed  Qty: 1 each, Refills: 0      calcium carbonate (OS-MARIAN) 500 mg calcium (1,250 mg) tablet Take 2 tablets (1,000 mg total) by mouth once daily.  Refills: 0      cholecalciferol, vitamin D3, 1,000 unit capsule Take 2 capsules (2,000 Units total) by mouth once daily.  Refills: 0      doxepin (SINEQUAN) 25 MG capsule Take 1 capsule (25 mg total) by mouth nightly as needed.  Qty: 30 capsule, Refills: 11    Associated Diagnoses: Generalized anxiety disorder      fluticasone (FLONASE) 50 mcg/actuation nasal spray 2 sprays by Nasal route 2 (two) times daily.       gabapentin (NEURONTIN) 600 MG tablet Take 600 mg by mouth 3 (three) times daily.       hydrochlorothiazide (HYDRODIURIL) 25 MG tablet Take 1 tablet (25 mg total) by mouth once daily.  Qty: 30 tablet, Refills: 11    Associated Diagnoses: Essential hypertension      insulin aspart (NOVOLOG) 100 unit/mL InPn pen Inject 16 Units into the skin 3 (three) times daily with meals.  Qty: 1 Box, Refills: 2      insulin detemir (LEVEMIR FLEXTOUCH) 100 unit/mL (3 mL) SubQ InPn pen Inject 30 Units into the skin 2 (two) times daily.  Qty: 1 Box, Refills: 1      lamotrigine (LAMICTAL) 100 MG tablet Take half tab by mouth each morning x 14 days, then 1 tab by mouth each morning  Qty: 30 tablet, Refills: 11    Associated Diagnoses: Mood disorder      lancets Misc 1 each by Misc.(Non-Drug; Combo Route) route 4 (four) times daily with meals and nightly.  Qty: 100 each, Refills: 2      levocetirizine (XYZAL) 5 MG tablet Take 1 tablet (5 mg total) by mouth every evening.  Qty: 90 tablet,  "Refills: 3    Associated Diagnoses: Allergic rhinitis, unspecified allergic rhinitis type      meclizine (ANTIVERT) 25 mg tablet Take 1 tablet (25 mg total) by mouth 3 (three) times daily as needed for Dizziness or Nausea.  Qty: 90 tablet, Refills: 0    Associated Diagnoses: Chronic nausea      mirtazapine (REMERON) 30 MG tablet Take 1 tablet (30 mg total) by mouth every evening.  Qty: 30 tablet, Refills: 11    Associated Diagnoses: Mood disorder      ondansetron (ZOFRAN-ODT) 8 MG TbDL Take 1 tablet (8 mg total) by mouth every 6 (six) hours as needed (nausea).  Qty: 30 tablet, Refills: 1      pen needle, diabetic 31 gauge x 5/16" Ndle 1 each by Misc.(Non-Drug; Combo Route) route 5 (five) times daily.  Qty: 200 each, Refills: 2      promethazine (PHENERGAN) 25 MG tablet Take 1 tablet (25 mg total) by mouth every 6 (six) hours as needed.  Qty: 60 tablet, Refills: 2    Associated Diagnoses: Chronic nausea      tizanidine (ZANAFLEX) 4 MG tablet TAKE 3 TABLETS (12 MG TOTAL) BY MOUTH EVERY EVENING.  Qty: 90 tablet, Refills: 2      topiramate (TOPAMAX) 25 MG tablet Take 1 tablet (25 mg total) by mouth daily as needed (migraines).  Qty: 90 tablet, Refills: 3    Associated Diagnoses: Chronic migraine without aura without status migrainosus, not intractable           Time spent on the discharge of patient: 2 minutes    Ivania Jones PA-C  General Surgery  [facility]  "

## 2017-09-18 NOTE — PROGRESS NOTES
[facility]  General Surgery  Progress Note    Subjective:     History of Present Illness:  41yo woman with h/o gastroparesis, thyroid cancer, cervical cancer presenting with periumbilical pain that started yesterday at 1pm. It moved to the RLQ, associated with nausea, diarrhea, anorexia. No fever/chills.     Post-Op Info:  Procedure(s) (LRB):  APPENDECTOMY-LAPAROSCOPIC (N/A)   1 Day Post-Op     Interval History: patient seen and examined, no acute events overnight; s/p appendectomy POD 1  States pain has improved since yesterday  Tolerating diet, had some nausea, no vomiting    Medications:  Continuous Infusions:   lactated Ringers 125 mL/hr at 09/17/17 1036     Scheduled Meds:   acetaminophen  1,000 mg Oral Q8H    alprazolam  2 mg Oral QHS    carvedilol  25 mg Oral BID    cefTRIAXone (ROCEPHIN) IVPB  2 g Intravenous Q12H    enoxparin  40 mg Subcutaneous Q12H    insulin detemir  15 Units Subcutaneous BID    ketorolac  15 mg Intravenous Q6H    levothyroxine  150 mcg Oral Before breakfast    metronidazole  500 mg Intravenous Q8H    potassium bicarbonate  50 mEq Oral Once    senna-docusate 8.6-50 mg  1 tablet Oral Daily    sodium chloride 0.9%  3 mL Intravenous Q8H    tizanidine  4 mg Oral Daily     PRN Meds:dextrose 50%, diphenhydrAMINE, glucagon (human recombinant), insulin aspart, ondansetron, oxycodone, ramelteon, sodium chloride 0.9%     Review of patient's allergies indicates:   Allergen Reactions    Dexamethasone Hives and Shortness Of Breath     Per pt, only steroid she is allergic to is dexamethasone    Doxepin hcl Hives, Diarrhea and Itching    Ciprofloxacin      Counteracts with seroquel, xanax, tizanidine    Compazine [prochlorperazine] Hives and Itching    Lyrica [pregabalin]      depression    Prednisone      Gastroparesis flare up     Objective:     Vital Signs (Most Recent):  Temp: 97.8 °F (36.6 °C) (09/18/17 0441)  Pulse: 77 (09/18/17 0441)  Resp: 16 (09/18/17  0441)  BP: 97/60 (09/18/17 0441)  SpO2: (!) 92 % (09/18/17 0441) Vital Signs (24h Range):  Temp:  [97.2 °F (36.2 °C)-98 °F (36.7 °C)] 97.8 °F (36.6 °C)  Pulse:  [] 77  Resp:  [14-20] 16  SpO2:  [92 %-100 %] 92 %  BP: ()/() 97/60     Weight: 107 kg (236 lb)  Body mass index is 40.51 kg/m².    Intake/Output - Last 3 Shifts       09/16 0700 - 09/17 0659 09/17 0700 - 09/18 0659    P.O.  150    I.V. (mL/kg)  1000 (9.3)    IV Piggyback 1150     Total Intake(mL/kg) 1150 (10.7) 1150 (10.7)    Urine (mL/kg/hr)  850 (0.3)    Total Output   850    Net +1150 +300          Stool Occurrence 0 x           Physical Exam   Constitutional: She is oriented to person, place, and time. She appears well-developed and well-nourished. No distress.   HENT:   Head: Normocephalic and atraumatic.   Eyes: No scleral icterus.   Cardiovascular: Normal rate and regular rhythm.    Pulmonary/Chest: Effort normal. No respiratory distress.   Abdominal:   Soft, appropriate TTP, incisions - surgical dressing in place - clean, dry and intact   Neurological: She is alert and oriented to person, place, and time.       Significant Labs:  CBC:   Recent Labs  Lab 09/18/17 0514   WBC 6.76   RBC 3.50*   HGB 9.7*   HCT 28.9*      MCV 83   MCH 27.7   MCHC 33.6     BMP:   Recent Labs  Lab 09/18/17 0514         K 2.7*   CL 98   CO2 31*   BUN 6   CREATININE 0.7   CALCIUM 7.5*   MG 1.7     CMP:   Recent Labs  Lab 09/18/17 0514      CALCIUM 7.5*   ALBUMIN 2.3*   PROT 5.4*      K 2.7*   CO2 31*   CL 98   BUN 6   CREATININE 0.7   ALKPHOS 68   ALT 8*   AST 8*   BILITOT 0.1     LFTs:   Recent Labs  Lab 09/18/17  0514   ALT 8*   AST 8*   ALKPHOS 68   BILITOT 0.1   PROT 5.4*   ALBUMIN 2.3*     Assessment/Plan:     * Acute appendicitis with localized peritonitis    S/p laparoscopic appendectomy POD 1  -regular diet  -PO pain meds  -nausea meds PRN  -bowel regimen  -IS/ambulate        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          Essential hypertension    Home meds        Hypothyroidism, postsurgical    Home meds        Anxiety    Home meds            Ivania Jones PA-C   [id number]  General Surgery  [facility]

## 2017-09-19 VITALS
SYSTOLIC BLOOD PRESSURE: 130 MMHG | DIASTOLIC BLOOD PRESSURE: 84 MMHG | HEIGHT: 64 IN | TEMPERATURE: 98 F | OXYGEN SATURATION: 95 % | WEIGHT: 236 LBS | BODY MASS INDEX: 40.29 KG/M2 | RESPIRATION RATE: 18 BRPM | HEART RATE: 80 BPM

## 2017-09-19 LAB
ALBUMIN SERPL BCP-MCNC: 2.3 G/DL
ALP SERPL-CCNC: 91 U/L
ALT SERPL W/O P-5'-P-CCNC: 16 U/L
ANION GAP SERPL CALC-SCNC: 8 MMOL/L
AST SERPL-CCNC: 21 U/L
BASOPHILS # BLD AUTO: 0 K/UL
BASOPHILS NFR BLD: 0 %
BILIRUB SERPL-MCNC: 0.1 MG/DL
BUN SERPL-MCNC: 7 MG/DL
CALCIUM SERPL-MCNC: 7.8 MG/DL
CHLORIDE SERPL-SCNC: 100 MMOL/L
CO2 SERPL-SCNC: 32 MMOL/L
CREAT SERPL-MCNC: 0.7 MG/DL
DIFFERENTIAL METHOD: ABNORMAL
EOSINOPHIL # BLD AUTO: 0.1 K/UL
EOSINOPHIL NFR BLD: 2 %
ERYTHROCYTE [DISTWIDTH] IN BLOOD BY AUTOMATED COUNT: 14 %
EST. GFR  (AFRICAN AMERICAN): >60 ML/MIN/1.73 M^2
EST. GFR  (NON AFRICAN AMERICAN): >60 ML/MIN/1.73 M^2
GLUCOSE SERPL-MCNC: 87 MG/DL
HCT VFR BLD AUTO: 30.2 %
HGB BLD-MCNC: 10.1 G/DL
LYMPHOCYTES # BLD AUTO: 1.4 K/UL
LYMPHOCYTES NFR BLD: 19.2 %
MAGNESIUM SERPL-MCNC: 1.8 MG/DL
MCH RBC QN AUTO: 27.9 PG
MCHC RBC AUTO-ENTMCNC: 33.4 G/DL
MCV RBC AUTO: 83 FL
MONOCYTES # BLD AUTO: 0.4 K/UL
MONOCYTES NFR BLD: 5.1 %
NEUTROPHILS # BLD AUTO: 5.2 K/UL
NEUTROPHILS NFR BLD: 73.4 %
PHOSPHATE SERPL-MCNC: 2.6 MG/DL
PLATELET # BLD AUTO: 216 K/UL
PMV BLD AUTO: 9.8 FL
POCT GLUCOSE: 136 MG/DL (ref 70–110)
POCT GLUCOSE: 169 MG/DL (ref 70–110)
POCT GLUCOSE: 83 MG/DL (ref 70–110)
POTASSIUM SERPL-SCNC: 3.1 MMOL/L
PROT SERPL-MCNC: 5.5 G/DL
RBC # BLD AUTO: 3.62 M/UL
SODIUM SERPL-SCNC: 140 MMOL/L
WBC # BLD AUTO: 7.04 K/UL

## 2017-09-19 PROCEDURE — 85025 COMPLETE CBC W/AUTO DIFF WBC: CPT

## 2017-09-19 PROCEDURE — 25000003 PHARM REV CODE 250: Performed by: STUDENT IN AN ORGANIZED HEALTH CARE EDUCATION/TRAINING PROGRAM

## 2017-09-19 PROCEDURE — 80053 COMPREHEN METABOLIC PANEL: CPT

## 2017-09-19 PROCEDURE — 83735 ASSAY OF MAGNESIUM: CPT

## 2017-09-19 PROCEDURE — 63600175 PHARM REV CODE 636 W HCPCS: Performed by: SURGERY

## 2017-09-19 PROCEDURE — 25000003 PHARM REV CODE 250: Performed by: SURGERY

## 2017-09-19 PROCEDURE — 84100 ASSAY OF PHOSPHORUS: CPT

## 2017-09-19 PROCEDURE — A4216 STERILE WATER/SALINE, 10 ML: HCPCS | Performed by: SURGERY

## 2017-09-19 PROCEDURE — 63600175 PHARM REV CODE 636 W HCPCS: Performed by: STUDENT IN AN ORGANIZED HEALTH CARE EDUCATION/TRAINING PROGRAM

## 2017-09-19 PROCEDURE — S0030 INJECTION, METRONIDAZOLE: HCPCS | Performed by: SURGERY

## 2017-09-19 PROCEDURE — 25000003 PHARM REV CODE 250: Performed by: PHYSICIAN ASSISTANT

## 2017-09-19 PROCEDURE — 36415 COLL VENOUS BLD VENIPUNCTURE: CPT

## 2017-09-19 RX ORDER — IBUPROFEN 600 MG/1
600 TABLET ORAL EVERY 6 HOURS
Status: DISCONTINUED | OUTPATIENT
Start: 2017-09-19 | End: 2017-09-19 | Stop reason: HOSPADM

## 2017-09-19 RX ORDER — OXYCODONE HYDROCHLORIDE 5 MG/1
10 TABLET ORAL EVERY 4 HOURS PRN
Status: DISCONTINUED | OUTPATIENT
Start: 2017-09-19 | End: 2017-09-19 | Stop reason: HOSPADM

## 2017-09-19 RX ORDER — OXYCODONE AND ACETAMINOPHEN 7.5; 325 MG/1; MG/1
2 TABLET ORAL EVERY 4 HOURS PRN
Status: DISCONTINUED | OUTPATIENT
Start: 2017-09-19 | End: 2017-09-19 | Stop reason: HOSPADM

## 2017-09-19 RX ORDER — PROMETHAZINE HYDROCHLORIDE 25 MG/1
25 SUPPOSITORY RECTAL EVERY 6 HOURS PRN
Status: DISCONTINUED | OUTPATIENT
Start: 2017-09-19 | End: 2017-09-19 | Stop reason: HOSPADM

## 2017-09-19 RX ORDER — ONDANSETRON 4 MG/1
4 TABLET, FILM COATED ORAL EVERY 6 HOURS PRN
Qty: 12 TABLET | Refills: 0 | Status: SHIPPED | OUTPATIENT
Start: 2017-09-19 | End: 2017-10-24 | Stop reason: SDUPTHER

## 2017-09-19 RX ORDER — ONDANSETRON 4 MG/1
4 TABLET, ORALLY DISINTEGRATING ORAL EVERY 6 HOURS PRN
Status: DISCONTINUED | OUTPATIENT
Start: 2017-09-19 | End: 2017-09-19 | Stop reason: HOSPADM

## 2017-09-19 RX ORDER — LANOLIN ALCOHOL/MO/W.PET/CERES
400 CREAM (GRAM) TOPICAL ONCE
Status: COMPLETED | OUTPATIENT
Start: 2017-09-19 | End: 2017-09-19

## 2017-09-19 RX ORDER — SCOLOPAMINE TRANSDERMAL SYSTEM 1 MG/1
1 PATCH, EXTENDED RELEASE TRANSDERMAL
Status: DISCONTINUED | OUTPATIENT
Start: 2017-09-19 | End: 2017-09-19 | Stop reason: HOSPADM

## 2017-09-19 RX ORDER — SODIUM,POTASSIUM PHOSPHATES 280-250MG
2 POWDER IN PACKET (EA) ORAL ONCE
Status: DISCONTINUED | OUTPATIENT
Start: 2017-09-19 | End: 2017-09-19 | Stop reason: HOSPADM

## 2017-09-19 RX ADMIN — ONDANSETRON 4 MG: 4 TABLET, ORALLY DISINTEGRATING ORAL at 01:09

## 2017-09-19 RX ADMIN — OXYCODONE HYDROCHLORIDE 10 MG: 5 TABLET ORAL at 01:09

## 2017-09-19 RX ADMIN — KETOROLAC TROMETHAMINE 15 MG: 30 INJECTION, SOLUTION INTRAMUSCULAR at 12:09

## 2017-09-19 RX ADMIN — ENOXAPARIN SODIUM 40 MG: 100 INJECTION SUBCUTANEOUS at 01:09

## 2017-09-19 RX ADMIN — MAGNESIUM OXIDE TAB 400 MG (241.3 MG ELEMENTAL MG) 400 MG: 400 (241.3 MG) TAB at 09:09

## 2017-09-19 RX ADMIN — CARVEDILOL 25 MG: 25 TABLET, FILM COATED ORAL at 09:09

## 2017-09-19 RX ADMIN — ONDANSETRON 4 MG: 2 INJECTION INTRAMUSCULAR; INTRAVENOUS at 07:09

## 2017-09-19 RX ADMIN — POTASSIUM CHLORIDE 40 MEQ: 1500 TABLET, EXTENDED RELEASE ORAL at 09:09

## 2017-09-19 RX ADMIN — OXYCODONE HYDROCHLORIDE 10 MG: 5 TABLET ORAL at 10:09

## 2017-09-19 RX ADMIN — OXYCODONE HYDROCHLORIDE 10 MG: 5 TABLET ORAL at 06:09

## 2017-09-19 RX ADMIN — ACETAMINOPHEN 1000 MG: 500 TABLET ORAL at 06:09

## 2017-09-19 RX ADMIN — METRONIDAZOLE 500 MG: 500 INJECTION, SOLUTION INTRAVENOUS at 12:09

## 2017-09-19 RX ADMIN — KETOROLAC TROMETHAMINE 15 MG: 30 INJECTION, SOLUTION INTRAMUSCULAR at 09:09

## 2017-09-19 RX ADMIN — LEVOTHYROXINE SODIUM 150 MCG: 100 TABLET ORAL at 06:09

## 2017-09-19 RX ADMIN — SCOPALAMINE 1 PATCH: 1 PATCH, EXTENDED RELEASE TRANSDERMAL at 09:09

## 2017-09-19 RX ADMIN — OXYCODONE HYDROCHLORIDE AND ACETAMINOPHEN 2 TABLET: 7.5; 325 TABLET ORAL at 03:09

## 2017-09-19 RX ADMIN — INSULIN DETEMIR 15 UNITS: 100 INJECTION, SOLUTION SUBCUTANEOUS at 09:09

## 2017-09-19 RX ADMIN — CEFTRIAXONE 2 G: 2 INJECTION, SOLUTION INTRAVENOUS at 01:09

## 2017-09-19 RX ADMIN — IBUPROFEN 600 MG: 600 TABLET, FILM COATED ORAL at 11:09

## 2017-09-19 RX ADMIN — ACETAMINOPHEN 1000 MG: 500 TABLET ORAL at 01:09

## 2017-09-19 RX ADMIN — Medication 3 ML: at 05:09

## 2017-09-19 RX ADMIN — TIZANIDINE 4 MG: 4 TABLET ORAL at 09:09

## 2017-09-19 NOTE — PROGRESS NOTES
Was notified of patient having trouble with nausea and vomiting. On exam, she said the phenergan suppository didn't help. She had thrown up one small amount earlier today after lunch. She did not feel comfortable going home today. She was still passing flatus. She was trying to eat fruits. On exam her VSS, her abdomen was mildly tender and distended. A KUB showed no evidence of ileus. She has had no further complaints of nausea. Will continue to monitor.

## 2017-09-19 NOTE — ASSESSMENT & PLAN NOTE
S/p laparoscopic appendectomy POD 2  -regular diet  -PO pain meds  -nausea meds PRN  -bowel regimen  -IS/ambulate    Discharge home

## 2017-09-19 NOTE — PLAN OF CARE
Problem: Patient Care Overview  Goal: Plan of Care Review  Outcome: Ongoing (interventions implemented as appropriate)  Plan of care reviewed with patient. Verbal understanding received. Patient complaint of nausea and vomiting prior to shift change. Phenergan suppository given by day RN. Patient dry heaving and vomiting when Night RN walked in. VS taken, informed will return with meds. Patient given required meds and phenergan IV for nausea. Patient currently has no complaints of nausea. When asked patient pain is level 10. PRN meds given. Patient states no relief received. LR @125, regular diet, accue check Q6. Last reading 127. Bed in low locked positition, call light within reach. Frequent rounding made to ensure patient safety and comfort. RN will continue to monitor

## 2017-09-19 NOTE — PHYSICIAN QUERY
PT Name: Homar Jerry  MR #: [id number]  Physician Query Form - Renal Clarification     CDS/: Akanksha Peña               Contact information: [email or web address]    This form is a permanent document in the medical record.     QueryDate: September 19, 2017    By submitting this query, we are merely seeking further clarification of documentation. Please utilize your independent clinical judgment when addressing the question(s) below.    The Medical record contains the following:   Indicator Supporting Clinical Findings Location in Medical Record    Kidney (Renal) Insufficiency      Kidney (Renal) Failure / Injury      Nephrotoxic Agents     x BUN/Creatinine GFR Cr= 1.2 - 0.7  BUN= 18 - 6  GFR= >60 Labs, Chem Profile 09/16 - 09/19    Urine: Casts         Eosinophils      Dehydration     x Nausea/Vomiting Nausea, diarrhea, has not eaten in 2 days ED Provider Note    Dialysis/CRRT     x Treatment: Another liter LR    LR bolus x 2 L given H&P    MAR   x Other:  MARY H&P       Provider, please specify the diagnosis or diagnoses associated with above clinical findings.    [ ] Other Acute Kidney Failure/Injury (please specify): ____________  [ ] Unspecified Acute Kidney Failure/Injury  [ ] Acute Renal Insufficiency  [ ] Other (please specify): _______________________________  [ x] Clinically Undetermined    Please document in your progress notes daily for the duration of treatment, until resolved, and include in your discharge summary.

## 2017-09-19 NOTE — PLAN OF CARE
"Visited patient. AAOX4. She states she is feeling better now. Discussed IMM;she initialed form. CM asked if she was able to arrange for a ride home? She states,"I put a call into my cousin."        09/19/17 1230   Medicare Message   Important Message from Medicare regarding Discharge Appeal Rights Given to patient/caregiver;Explained to patient/caregiver;Signed/date by patient/caregiver   Date IMM was signed 09/19/17   Time IMM was signed 1230     "

## 2017-09-19 NOTE — PLAN OF CARE
Discharging to home today, without needs. BS delivery of prescription confirmed w/patient.        09/19/17 1935   Final Note   Assessment Type Final Discharge Note   Discharge Disposition Home   What phone number can be called within the next 1-3 days to see how you are doing after discharge? ([phone])   Hospital Follow Up  Appt(s) scheduled? Yes   Discharge plans and expectations educations in teach back method with documentation complete? Yes   Right Care Referral Info   Post Acute Recommendation Home-care

## 2017-09-19 NOTE — NURSING
Patient asked if she was ready for discharge. Patient responded that she had to tolerate her diet. She told the ADT nurse that she was nauseous with her lunch and she stopped and then vomitted. Dr. Gale notified.

## 2017-09-19 NOTE — PROGRESS NOTES
[facility]  General Surgery  Progress Note    Subjective:     History of Present Illness:  43yo woman with h/o gastroparesis, thyroid cancer, cervical cancer presenting with periumbilical pain that started yesterday at 1pm. It moved to the RLQ, associated with nausea, diarrhea, anorexia. No fever/chills.     Post-Op Info:  Procedure(s) (LRB):  APPENDECTOMY-LAPAROSCOPIC (N/A)   2 Days Post-Op     Interval History: No acute events. Some nausea and emesis. Pain controlled. AF, VSS.    Medications:  Continuous Infusions:   lactated Ringers 125 mL/hr at 09/17/17 1036     Scheduled Meds:   acetaminophen  1,000 mg Oral Q8H    alprazolam  2 mg Oral QHS    carvedilol  25 mg Oral BID    cefTRIAXone (ROCEPHIN) IVPB  2 g Intravenous Q12H    enoxparin  40 mg Subcutaneous Q12H    insulin detemir  15 Units Subcutaneous BID    ketorolac  15 mg Intravenous Q6H    levothyroxine  150 mcg Oral Before breakfast    magnesium oxide  400 mg Oral Once    metronidazole  500 mg Intravenous Q8H    potassium chloride  40 mEq Oral Daily    senna-docusate 8.6-50 mg  1 tablet Oral Daily    sodium chloride 0.9%  3 mL Intravenous Q8H    tizanidine  4 mg Oral Daily     PRN Meds:dextrose 50%, diphenhydrAMINE, glucagon (human recombinant), insulin aspart, ondansetron, oxycodone, ramelteon, sodium chloride 0.9%     Review of patient's allergies indicates:   Allergen Reactions    Dexamethasone Hives and Shortness Of Breath     Per pt, only steroid she is allergic to is dexamethasone    Doxepin hcl Hives, Diarrhea and Itching    Ciprofloxacin      Counteracts with seroquel, xanax, tizanidine    Compazine [prochlorperazine] Hives and Itching    Lyrica [pregabalin]      depression    Prednisone      Gastroparesis flare up     Objective:     Vital Signs (Most Recent):  Temp: 97.8 °F (36.6 °C) (09/18/17 0441)  Pulse: 77 (09/18/17 0441)  Resp: 16 (09/18/17 0441)  BP: 97/60 (09/18/17 0441)  SpO2: (!) 92 % (09/18/17 0441)  Vital Signs (24h Range):  Temp:  [97.2 °F (36.2 °C)-98 °F (36.7 °C)] 97.8 °F (36.6 °C)  Pulse:  [] 77  Resp:  [14-20] 16  SpO2:  [92 %-100 %] 92 %  BP: ()/() 97/60     Weight: 107 kg (236 lb)  Body mass index is 40.51 kg/m².    Intake/Output - Last 3 Shifts       09/16 0700 - 09/17 0659 09/17 0700 - 09/18 0659 09/18 0700 - 09/19 0659    P.O.  150     I.V. (mL/kg)  1000 (9.3)     IV Piggyback 1150      Total Intake(mL/kg) 1150 (10.7) 1150 (10.7)     Urine (mL/kg/hr)  850 (0.3)     Total Output   850      Net +1150 +300             Stool Occurrence 0 x            Physical Exam   Constitutional: She appears well-developed.   HENT:   Head: Normocephalic and atraumatic.   Eyes: Conjunctivae are normal.   Cardiovascular: Normal rate.    Pulmonary/Chest: Effort normal.   Abdominal: Soft.   Neurological: She is alert.   Skin: Skin is warm and dry.       Significant Labs:  CBC:   Recent Labs  Lab 09/18/17  0514   WBC 6.76   RBC 3.50*   HGB 9.7*   HCT 28.9*      MCV 83   MCH 27.7   MCHC 33.6     CMP:   Recent Labs  Lab 09/18/17  0514      CALCIUM 7.5*   ALBUMIN 2.3*   PROT 5.4*      K 2.7*   CO2 31*   CL 98   BUN 6   CREATININE 0.7   ALKPHOS 68   ALT 8*   AST 8*   BILITOT 0.1     Assessment/Plan:     * Acute appendicitis with localized peritonitis    S/p laparoscopic appendectomy POD 2  -regular diet  -PO pain meds  -nausea meds PRN  -bowel regimen  -IS/ambulate    Discharge home        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), managed as type 1    Home meds        Hypomagnesemia    Replace         Hypokalemia    Replace         Morbid obesity    Body mass index is 40.51 kg/m².          Essential hypertension    Home meds        Hypothyroidism, postsurgical    Home meds        Anxiety    Home meds            Rosario Gale MD  General Surgery  [facility]

## 2017-09-21 ENCOUNTER — OUTPATIENT CASE MANAGEMENT (OUTPATIENT)
Dept: ADMINISTRATIVE | Facility: OTHER | Age: 42
End: 2017-09-21

## 2017-09-21 NOTE — PROGRESS NOTES
"9/21/17  CM spoke by phone with Ms. Narcisa Jerry; Completed initial screen/assessment/Med Rec/PHQ2=2. 43 yo female w/ Multiple co morbid conditions and is disabled.   Ms Rodríguez is s/p ED/Hosp 9/16-9/19/17 with dx appendicitis, peritonitis. Discharged home to [facility] where she lives alone. She denies having any particular caregiver to assist her in her recuperation. Her son, Girma Ramirez is her primary contact and means of transportation at times. She lives in [place], [place] where limited [employer] options available. She reports dissatisfaction with [facility] after they failed to  pt 3 xs causing her to miss 3 appts. CM instructed pt in upcoming post hosp appt 9/26 with Gen Surgeon. She is aware. She says she will see if her Aunt can bring her to appt. Amb with walker, denies any recent falls since surgery. She reports BGs running in the 100s, denies lows or high since home. Diabetic Education appts scheduled x 3 (6/1/17, 7/26/17, 9/5/17) and pt a "no show" for all three. In hosp BG = 169. Last A1C =10.3 (249) on 3/3/17. Overdue for A1C.   CM explained s/s infection to report to her 3 abd lap surgical sites. She denies drainage, odor, swelling, fever. C/o 10/10 pain.  CM instructed pt to avoid heavy lifting -- she reports she is not to lift 5 lbs or more. CM reinforced avoiding bending over, straining with activities. C/o nausea without emesis, diarrhea 4-5 x day, lack of appetite, productive cough yellow sputum. CM instructed pt in keeping hydrated with 6-8 cups of fluid daily, caution with taking insulin if she is not eating. She states she is trying to drink fluids and she doesn't take any insulin if she doesn't eat.   CM instructed pt to take deep breathes 5-10 consecutively a few times a day while sitting down holding pillow to abd to expand her lungs, promote cough and prevent Pneumonia, report persistent productive cough, fever. CM instructed pt to report her persistent diarrhea and poor " appetite and CM stated that CM to send message to Dr. Cabrera regarding her diarrhea.  She states she wants to change her PCP. CM stated it is her right to change physicians but CM suggested waiting until some of her immediate health conditions are addressed by MD already familiar with her before making the change because her surgeon would not address her diarrhea or cough for example. CM stressed that it is her choice however and she verbalizes her general understanding and no specifics on her plans. She has Psych appt 9/26 with Dr. Tejada.   CM inquired about pt having all of her meds. She reports soon to be out of her insulins. She thinks she returned the applications to [facility] but is not sure.   CM to check with Brooke on pt's application status. Ms. Jerry denies the need for Home Health Services at this time. CM stressed that pt's PCP would be the one to order HH, PCP would need to see pt if >30 days-- last saw PCP 8/31/17- with drug seeking behavior.   CM provided Ms. Jerry with information on [facility] s/p her Inpt Admission even though pt is having nausea/diarrhea issues. She is familiar with program and says she will call.     Ms. Jerry reports having CM's contact information.  Message sent to Chen Lozada with PAP.   Referred to [facility] for reevaluation of transportation options and information on establishing MPOA.   Message sent to Dr. Cabrera regarding pt's c/o diarrhea--- not knowing when pt will make a PCP change.   Mailed contact anyway for CM and OOC, educational materials per care plan.     Plan next encounter  F/u on pt GI status  F/u on post op appt 9/26.   Check that mail received    Dr. Cabrera/Staff:    Outpatient Case Management received a new referral for Ms. Jerry s/p her ED/Hosp for appendicitis. She reports having diarrhea 4-5 x day, nausea, trying to keep hydrated.   Instructed pt to call with continuing symptoms.   Please advise.   Thank  Ying calvin BSN, RN, CCM [facility]  TEL:  [phone]

## 2017-09-21 NOTE — PATIENT INSTRUCTIONS
What Is Appendicitis?    Your side may hurt so much that you called your healthcare provider. Or maybe you went straight to the hospital emergency room. If the symptoms came on quickly, you may have appendicitis. This is an infection of the appendix. Surgery can remove the infection and relieve your symptoms. Read on to learn more.  Your appendix  The appendix is a hollow structure about the size of your little finger. It opens off the colon (large intestine). The purpose of the appendix is unclear. But if it becomes blocked, it may become infected.  Symptoms of appendicitis  Symptoms tend to appear quickly, often over a day or two. Symptoms can include:  · Pain that starts in the center of your belly and moves to your lower right side  · Increased pain and pressure on your side when you walk  · Vomiting, nausea, or decreased appetite  · Fever or fatigue  · Either diarrhea or constipation  How surgery helps  Medicine cant cure appendicitis. Surgery is needed to remove an infected appendix (an appendectomy). This is a very common procedure. Removing the appendix should not affect your long-term health. Its best to remove the appendix before it bursts. If an infected or burst appendix is not removed, it can cause severe health problems.   Date Last Reviewed: 7/1/2016  © 5813-3312 [employer]. [address] [place] [address], [place] [address]. All rights reserved. This information is not intended as a substitute for professional medical care. Always follow your healthcare professional's instructions.        Dehydration (Adult)  Dehydration occurs when your body loses too much fluid. This may be the result of prolonged vomiting or diarrhea, excessive sweating, or a high fever. It may also happen if you dont drink enough fluid when youre sick or out in the heat. Misuse of diuretics (water pills) can also be a cause.  Symptoms include thirst and decreased urine output. You may also feel dizzy, weak, fatigued,  or very drowsy. The diet described below is usually enough to treat dehydration. In some cases, you may need medicine.  Home care  · Drink at least 12 8-ounce glasses of fluid every day to resolve the dehydration. Fluid may include water; orange juice; lemonade; apple, grape, or cranberry juice; clear fruit drinks; electrolyte replacement and sports drinks; and teas and coffee without caffeine. If you have been diagnosed with a kidney disease, ask your doctor how much and what types of fluids you should drink to prevent dehydration. If you have kidney disease, fluid can build up in the body. This can be dangerous to your health.  · If you have a fever, muscle aches, or a headache as a result of a cold or flu, you may take acetaminophen or ibuprofen, unless another medicine was prescribed. If you have chronic liver or kidney disease, or have ever had a stomach ulcer or gastrointestinal bleeding, talk with your health care provider before using these medicines. Don't take aspirin if you are younger than 18 and have a fever. Aspirin raises the chance for severe liver injury.  Follow-up care  Follow up with your health care provider, or as advised.  When to seek medical advice  Call your health care provider right away if any of these occur:  · Continued vomiting  · Frequent diarrhea (more than 5 times a day); blood (red or black color) or mucus in diarrhea  · Blood in vomit or stool  · Swollen abdomen or increasing abdominal pain  · Weakness, dizziness, or fainting  · Unusual drowsiness or confusion  · Reduced urine output or extreme thirst  · Fever of 100.4°F (34°C) or higher  Date Last Reviewed: 5/31/2015  © 8348-1213 [employer]. [address] [place] [address], [place] [address]. All rights reserved. This information is not intended as a substitute for professional medical care. Always follow your healthcare professional's instructions.

## 2017-09-26 ENCOUNTER — HOSPITAL ENCOUNTER (OUTPATIENT)
Facility: HOSPITAL | Age: 42
Discharge: HOME | End: 2017-09-27
Attending: EMERGENCY MEDICINE | Admitting: INTERNAL MEDICINE
Payer: MEDICARE

## 2017-09-26 DIAGNOSIS — R11.0 CHRONIC NAUSEA: ICD-10-CM

## 2017-09-26 DIAGNOSIS — R11.2 INTRACTABLE VOMITING WITH NAUSEA, UNSPECIFIED VOMITING TYPE: ICD-10-CM

## 2017-09-26 DIAGNOSIS — E86.0 DEHYDRATION: Primary | ICD-10-CM

## 2017-09-26 DIAGNOSIS — R19.7 DIARRHEA, UNSPECIFIED TYPE: ICD-10-CM

## 2017-09-26 PROBLEM — Z90.49 S/P LAPAROSCOPIC APPENDECTOMY: Status: ACTIVE | Noted: 2017-09-26

## 2017-09-26 LAB
ALBUMIN SERPL BCP-MCNC: 3.8 G/DL
ALP SERPL-CCNC: 100 U/L
ALT SERPL W/O P-5'-P-CCNC: 10 U/L
ANION GAP SERPL CALC-SCNC: 16 MMOL/L
AST SERPL-CCNC: 12 U/L
BACTERIA #/AREA URNS AUTO: NORMAL /HPF
BASOPHILS # BLD AUTO: 0.01 K/UL
BASOPHILS NFR BLD: 0.1 %
BILIRUB SERPL-MCNC: 0.3 MG/DL
BILIRUB UR QL STRIP: NEGATIVE
BUN SERPL-MCNC: 11 MG/DL
CALCIUM SERPL-MCNC: 9.5 MG/DL
CHLORIDE SERPL-SCNC: 104 MMOL/L
CLARITY UR REFRACT.AUTO: ABNORMAL
CO2 SERPL-SCNC: 22 MMOL/L
COLOR UR AUTO: YELLOW
CREAT SERPL-MCNC: 1 MG/DL
DIFFERENTIAL METHOD: ABNORMAL
EOSINOPHIL # BLD AUTO: 0 K/UL
EOSINOPHIL NFR BLD: 0.2 %
ERYTHROCYTE [DISTWIDTH] IN BLOOD BY AUTOMATED COUNT: 15.7 %
EST. GFR  (AFRICAN AMERICAN): >60 ML/MIN/1.73 M^2
EST. GFR  (NON AFRICAN AMERICAN): >60 ML/MIN/1.73 M^2
ESTIMATED AVG GLUCOSE: 169 MG/DL
GLUCOSE SERPL-MCNC: 152 MG/DL
GLUCOSE UR QL STRIP: NEGATIVE
HBA1C MFR BLD HPLC: 7.5 %
HCT VFR BLD AUTO: 39.2 %
HGB BLD-MCNC: 13.4 G/DL
HGB UR QL STRIP: ABNORMAL
HYALINE CASTS UR QL AUTO: 1 /LPF
INR PPP: 1.1
KETONES UR QL STRIP: ABNORMAL
LACTATE SERPL-SCNC: 1 MMOL/L
LEUKOCYTE ESTERASE UR QL STRIP: ABNORMAL
LYMPHOCYTES # BLD AUTO: 3.3 K/UL
LYMPHOCYTES NFR BLD: 28.2 %
MAGNESIUM SERPL-MCNC: 2.4 MG/DL
MCH RBC QN AUTO: 27.9 PG
MCHC RBC AUTO-ENTMCNC: 34.2 G/DL
MCV RBC AUTO: 82 FL
MICROSCOPIC COMMENT: NORMAL
MONOCYTES # BLD AUTO: 0.8 K/UL
MONOCYTES NFR BLD: 6.6 %
NEUTROPHILS # BLD AUTO: 7.5 K/UL
NEUTROPHILS NFR BLD: 64.6 %
NITRITE UR QL STRIP: NEGATIVE
PH UR STRIP: 5 [PH] (ref 5–8)
PHOSPHATE SERPL-MCNC: 2.5 MG/DL
PLATELET # BLD AUTO: 432 K/UL
PMV BLD AUTO: 9.2 FL
POCT GLUCOSE: 121 MG/DL (ref 70–110)
POTASSIUM SERPL-SCNC: 3.8 MMOL/L
PROCALCITONIN SERPL IA-MCNC: 0.1 NG/ML
PROT SERPL-MCNC: 8.5 G/DL
PROT UR QL STRIP: NEGATIVE
PROTHROMBIN TIME: 11.7 SEC
RBC # BLD AUTO: 4.8 M/UL
RBC #/AREA URNS AUTO: 0 /HPF (ref 0–4)
SODIUM SERPL-SCNC: 142 MMOL/L
SP GR UR STRIP: >=1.03 (ref 1–1.03)
SQUAMOUS #/AREA URNS AUTO: 8 /HPF
T4 FREE SERPL-MCNC: 0.9 NG/DL
TROPONIN I SERPL DL<=0.01 NG/ML-MCNC: <0.006 NG/ML
TSH SERPL DL<=0.005 MIU/L-ACNC: 7.76 UIU/ML
URN SPEC COLLECT METH UR: ABNORMAL
UROBILINOGEN UR STRIP-ACNC: NEGATIVE EU/DL
WBC # BLD AUTO: 11.56 K/UL
WBC #/AREA URNS AUTO: 1 /HPF (ref 0–5)

## 2017-09-26 PROCEDURE — 99285 EMERGENCY DEPT VISIT HI MDM: CPT | Mod: 25

## 2017-09-26 PROCEDURE — 96376 TX/PRO/DX INJ SAME DRUG ADON: CPT

## 2017-09-26 PROCEDURE — G0378 HOSPITAL OBSERVATION PER HR: HCPCS

## 2017-09-26 PROCEDURE — 87040 BLOOD CULTURE FOR BACTERIA: CPT | Mod: 59

## 2017-09-26 PROCEDURE — 83036 HEMOGLOBIN GLYCOSYLATED A1C: CPT

## 2017-09-26 PROCEDURE — 96366 THER/PROPH/DIAG IV INF ADDON: CPT

## 2017-09-26 PROCEDURE — 84484 ASSAY OF TROPONIN QUANT: CPT

## 2017-09-26 PROCEDURE — 85610 PROTHROMBIN TIME: CPT

## 2017-09-26 PROCEDURE — 96375 TX/PRO/DX INJ NEW DRUG ADDON: CPT

## 2017-09-26 PROCEDURE — 25500020 PHARM REV CODE 255: Performed by: EMERGENCY MEDICINE

## 2017-09-26 PROCEDURE — 84443 ASSAY THYROID STIM HORMONE: CPT

## 2017-09-26 PROCEDURE — 83735 ASSAY OF MAGNESIUM: CPT

## 2017-09-26 PROCEDURE — 83605 ASSAY OF LACTIC ACID: CPT

## 2017-09-26 PROCEDURE — 84100 ASSAY OF PHOSPHORUS: CPT

## 2017-09-26 PROCEDURE — 25000003 PHARM REV CODE 250: Performed by: PHYSICIAN ASSISTANT

## 2017-09-26 PROCEDURE — 99285 EMERGENCY DEPT VISIT HI MDM: CPT | Mod: ,,, | Performed by: EMERGENCY MEDICINE

## 2017-09-26 PROCEDURE — 63600175 PHARM REV CODE 636 W HCPCS: Performed by: EMERGENCY MEDICINE

## 2017-09-26 PROCEDURE — 99220 PR INITIAL OBSERVATION CARE,LEVL III: CPT | Mod: ,,, | Performed by: PHYSICIAN ASSISTANT

## 2017-09-26 PROCEDURE — 96372 THER/PROPH/DIAG INJ SC/IM: CPT

## 2017-09-26 PROCEDURE — 81001 URINALYSIS AUTO W/SCOPE: CPT

## 2017-09-26 PROCEDURE — 80053 COMPREHEN METABOLIC PANEL: CPT

## 2017-09-26 PROCEDURE — 93005 ELECTROCARDIOGRAM TRACING: CPT

## 2017-09-26 PROCEDURE — 96365 THER/PROPH/DIAG IV INF INIT: CPT

## 2017-09-26 PROCEDURE — 96361 HYDRATE IV INFUSION ADD-ON: CPT

## 2017-09-26 PROCEDURE — 85025 COMPLETE CBC W/AUTO DIFF WBC: CPT

## 2017-09-26 PROCEDURE — 63600175 PHARM REV CODE 636 W HCPCS: Performed by: PHYSICIAN ASSISTANT

## 2017-09-26 PROCEDURE — 84439 ASSAY OF FREE THYROXINE: CPT

## 2017-09-26 PROCEDURE — 25000003 PHARM REV CODE 250: Performed by: EMERGENCY MEDICINE

## 2017-09-26 PROCEDURE — 93010 ELECTROCARDIOGRAM REPORT: CPT | Mod: ,,, | Performed by: INTERNAL MEDICINE

## 2017-09-26 PROCEDURE — 82962 GLUCOSE BLOOD TEST: CPT

## 2017-09-26 PROCEDURE — 84145 PROCALCITONIN (PCT): CPT

## 2017-09-26 RX ORDER — ALBUTEROL SULFATE 90 UG/1
2 AEROSOL, METERED RESPIRATORY (INHALATION) EVERY 6 HOURS PRN
Status: DISCONTINUED | OUTPATIENT
Start: 2017-09-26 | End: 2017-09-27 | Stop reason: HOSPADM

## 2017-09-26 RX ORDER — MIRTAZAPINE 15 MG/1
30 TABLET, FILM COATED ORAL NIGHTLY
Status: DISCONTINUED | OUTPATIENT
Start: 2017-09-26 | End: 2017-09-27 | Stop reason: HOSPADM

## 2017-09-26 RX ORDER — HYDROCHLOROTHIAZIDE 25 MG/1
25 TABLET ORAL DAILY
Status: DISCONTINUED | OUTPATIENT
Start: 2017-09-27 | End: 2017-09-27 | Stop reason: HOSPADM

## 2017-09-26 RX ORDER — CARVEDILOL 25 MG/1
25 TABLET ORAL
Status: DISCONTINUED | OUTPATIENT
Start: 2017-09-26 | End: 2017-09-27 | Stop reason: HOSPADM

## 2017-09-26 RX ORDER — LEVOTHYROXINE SODIUM 150 UG/1
150 TABLET ORAL DAILY
Status: DISCONTINUED | OUTPATIENT
Start: 2017-09-27 | End: 2017-09-27 | Stop reason: HOSPADM

## 2017-09-26 RX ORDER — CARVEDILOL 12.5 MG/1
25 TABLET ORAL
Status: DISCONTINUED | OUTPATIENT
Start: 2017-09-26 | End: 2017-09-26

## 2017-09-26 RX ORDER — ONDANSETRON 2 MG/ML
4 INJECTION INTRAMUSCULAR; INTRAVENOUS
Status: COMPLETED | OUTPATIENT
Start: 2017-09-26 | End: 2017-09-26

## 2017-09-26 RX ORDER — HYDROCHLOROTHIAZIDE 25 MG/1
25 TABLET ORAL
Status: COMPLETED | OUTPATIENT
Start: 2017-09-26 | End: 2017-09-26

## 2017-09-26 RX ORDER — MORPHINE SULFATE 4 MG/ML
4 INJECTION, SOLUTION INTRAMUSCULAR; INTRAVENOUS
Status: COMPLETED | OUTPATIENT
Start: 2017-09-26 | End: 2017-09-26

## 2017-09-26 RX ORDER — DOXEPIN HYDROCHLORIDE 25 MG/1
25 CAPSULE ORAL NIGHTLY
Status: DISCONTINUED | OUTPATIENT
Start: 2017-09-26 | End: 2017-09-26

## 2017-09-26 RX ORDER — LAMOTRIGINE 100 MG/1
100 TABLET ORAL DAILY
Status: DISCONTINUED | OUTPATIENT
Start: 2017-09-27 | End: 2017-09-27 | Stop reason: HOSPADM

## 2017-09-26 RX ORDER — TIZANIDINE 4 MG/1
12 TABLET ORAL NIGHTLY
Status: DISCONTINUED | OUTPATIENT
Start: 2017-09-26 | End: 2017-09-27 | Stop reason: HOSPADM

## 2017-09-26 RX ORDER — ALPRAZOLAM 1 MG/1
1 TABLET ORAL NIGHTLY PRN
Status: DISCONTINUED | OUTPATIENT
Start: 2017-09-26 | End: 2017-09-27 | Stop reason: HOSPADM

## 2017-09-26 RX ORDER — METOCLOPRAMIDE HYDROCHLORIDE 5 MG/ML
10 INJECTION INTRAMUSCULAR; INTRAVENOUS
Status: COMPLETED | OUTPATIENT
Start: 2017-09-26 | End: 2017-09-26

## 2017-09-26 RX ORDER — PROMETHAZINE HYDROCHLORIDE 25 MG/1
TABLET ORAL
Qty: 60 TABLET | Refills: 2 | Status: SHIPPED | OUTPATIENT
Start: 2017-09-26 | End: 2017-11-22 | Stop reason: SDUPTHER

## 2017-09-26 RX ORDER — POLYETHYLENE GLYCOL 3350 17 G/17G
17 POWDER, FOR SOLUTION ORAL EVERY 12 HOURS PRN
Status: DISCONTINUED | OUTPATIENT
Start: 2017-09-26 | End: 2017-09-27 | Stop reason: HOSPADM

## 2017-09-26 RX ORDER — GLUCAGON 1 MG
1 KIT INJECTION
Status: DISCONTINUED | OUTPATIENT
Start: 2017-09-26 | End: 2017-09-27 | Stop reason: HOSPADM

## 2017-09-26 RX ORDER — SODIUM CHLORIDE 9 MG/ML
INJECTION, SOLUTION INTRAVENOUS CONTINUOUS
Status: DISCONTINUED | OUTPATIENT
Start: 2017-09-26 | End: 2017-09-27

## 2017-09-26 RX ORDER — INSULIN ASPART 100 [IU]/ML
0-5 INJECTION, SOLUTION INTRAVENOUS; SUBCUTANEOUS
Status: DISCONTINUED | OUTPATIENT
Start: 2017-09-26 | End: 2017-09-27 | Stop reason: HOSPADM

## 2017-09-26 RX ORDER — ACETAMINOPHEN 325 MG/1
650 TABLET ORAL EVERY 6 HOURS PRN
Status: DISCONTINUED | OUTPATIENT
Start: 2017-09-26 | End: 2017-09-27 | Stop reason: HOSPADM

## 2017-09-26 RX ORDER — IBUPROFEN 200 MG
24 TABLET ORAL
Status: DISCONTINUED | OUTPATIENT
Start: 2017-09-26 | End: 2017-09-27 | Stop reason: HOSPADM

## 2017-09-26 RX ORDER — TOPIRAMATE 25 MG/1
25 TABLET ORAL DAILY PRN
Status: DISCONTINUED | OUTPATIENT
Start: 2017-09-26 | End: 2017-09-27 | Stop reason: HOSPADM

## 2017-09-26 RX ORDER — MORPHINE SULFATE 2 MG/ML
2 INJECTION, SOLUTION INTRAMUSCULAR; INTRAVENOUS EVERY 6 HOURS PRN
Status: DISCONTINUED | OUTPATIENT
Start: 2017-09-26 | End: 2017-09-27 | Stop reason: HOSPADM

## 2017-09-26 RX ORDER — PROMETHAZINE HYDROCHLORIDE 25 MG/1
25 SUPPOSITORY RECTAL EVERY 6 HOURS PRN
Status: DISCONTINUED | OUTPATIENT
Start: 2017-09-26 | End: 2017-09-27 | Stop reason: HOSPADM

## 2017-09-26 RX ORDER — MECLIZINE HYDROCHLORIDE 25 MG/1
25 TABLET ORAL 3 TIMES DAILY PRN
Status: DISCONTINUED | OUTPATIENT
Start: 2017-09-26 | End: 2017-09-27 | Stop reason: HOSPADM

## 2017-09-26 RX ORDER — IBUPROFEN 200 MG
16 TABLET ORAL
Status: DISCONTINUED | OUTPATIENT
Start: 2017-09-26 | End: 2017-09-27 | Stop reason: HOSPADM

## 2017-09-26 RX ORDER — FLUTICASONE PROPIONATE 50 MCG
2 SPRAY, SUSPENSION (ML) NASAL 2 TIMES DAILY
Status: DISCONTINUED | OUTPATIENT
Start: 2017-09-26 | End: 2017-09-27 | Stop reason: HOSPADM

## 2017-09-26 RX ORDER — CETIRIZINE HYDROCHLORIDE 5 MG/1
5 TABLET ORAL NIGHTLY
Status: DISCONTINUED | OUTPATIENT
Start: 2017-09-26 | End: 2017-09-27 | Stop reason: HOSPADM

## 2017-09-26 RX ORDER — MORPHINE SULFATE 2 MG/ML
6 INJECTION, SOLUTION INTRAMUSCULAR; INTRAVENOUS
Status: COMPLETED | OUTPATIENT
Start: 2017-09-26 | End: 2017-09-26

## 2017-09-26 RX ORDER — HYDRALAZINE HYDROCHLORIDE 20 MG/ML
10 INJECTION INTRAMUSCULAR; INTRAVENOUS EVERY 6 HOURS PRN
Status: DISCONTINUED | OUTPATIENT
Start: 2017-09-26 | End: 2017-09-27 | Stop reason: HOSPADM

## 2017-09-26 RX ORDER — GABAPENTIN 300 MG/1
600 CAPSULE ORAL 3 TIMES DAILY
Status: DISCONTINUED | OUTPATIENT
Start: 2017-09-26 | End: 2017-09-27 | Stop reason: HOSPADM

## 2017-09-26 RX ORDER — CARVEDILOL 12.5 MG/1
25 TABLET ORAL 2 TIMES DAILY
Status: DISCONTINUED | OUTPATIENT
Start: 2017-09-26 | End: 2017-09-26

## 2017-09-26 RX ORDER — METOCLOPRAMIDE HYDROCHLORIDE 5 MG/ML
5 INJECTION INTRAMUSCULAR; INTRAVENOUS EVERY 6 HOURS PRN
Status: DISCONTINUED | OUTPATIENT
Start: 2017-09-26 | End: 2017-09-27 | Stop reason: HOSPADM

## 2017-09-26 RX ADMIN — ONDANSETRON 4 MG: 2 INJECTION INTRAMUSCULAR; INTRAVENOUS at 12:09

## 2017-09-26 RX ADMIN — POTASSIUM PHOSPHATE, MONOBASIC AND POTASSIUM PHOSPHATE, DIBASIC 15 MMOL: 224; 236 INJECTION, SOLUTION, CONCENTRATE INTRAVENOUS at 06:09

## 2017-09-26 RX ADMIN — CETIRIZINE HYDROCHLORIDE 5 MG: 5 TABLET, FILM COATED ORAL at 09:09

## 2017-09-26 RX ADMIN — Medication 6 MG: at 12:09

## 2017-09-26 RX ADMIN — CARVEDILOL 25 MG: 25 TABLET, FILM COATED ORAL at 06:09

## 2017-09-26 RX ADMIN — IOHEXOL 75 ML: 350 INJECTION, SOLUTION INTRAVENOUS at 01:09

## 2017-09-26 RX ADMIN — SODIUM CHLORIDE, SODIUM LACTATE, POTASSIUM CHLORIDE, AND CALCIUM CHLORIDE 1000 ML: 600; 310; 30; 20 INJECTION, SOLUTION INTRAVENOUS at 02:09

## 2017-09-26 RX ADMIN — GABAPENTIN 600 MG: 300 CAPSULE ORAL at 10:09

## 2017-09-26 RX ADMIN — MORPHINE SULFATE 4 MG: 4 INJECTION INTRAVENOUS at 05:09

## 2017-09-26 RX ADMIN — METOCLOPRAMIDE 10 MG: 5 INJECTION, SOLUTION INTRAMUSCULAR; INTRAVENOUS at 05:09

## 2017-09-26 RX ADMIN — SODIUM CHLORIDE, SODIUM LACTATE, POTASSIUM CHLORIDE, AND CALCIUM CHLORIDE 1000 ML: 600; 310; 30; 20 INJECTION, SOLUTION INTRAVENOUS at 11:09

## 2017-09-26 RX ADMIN — SODIUM CHLORIDE: 0.9 INJECTION, SOLUTION INTRAVENOUS at 06:09

## 2017-09-26 RX ADMIN — MIRTAZAPINE 30 MG: 30 TABLET, FILM COATED ORAL at 09:09

## 2017-09-26 RX ADMIN — ALPRAZOLAM 1 MG: 0.5 TABLET ORAL at 10:09

## 2017-09-26 RX ADMIN — INSULIN DETEMIR 22 UNITS: 100 INJECTION, SOLUTION SUBCUTANEOUS at 09:09

## 2017-09-26 RX ADMIN — ONDANSETRON 4 MG: 2 INJECTION INTRAMUSCULAR; INTRAVENOUS at 02:09

## 2017-09-26 RX ADMIN — HYDROCHLOROTHIAZIDE 25 MG: 25 TABLET ORAL at 05:09

## 2017-09-26 RX ADMIN — Medication 6 MG: at 02:09

## 2017-09-26 NOTE — ED NOTES
"Pt states "I had my appendix taken out on the 17th and I can't keep anything down." Pt reports N/V/D since appendectomy 9/17. She also reports fever of 102 last night. Pt states "This is not like my gastroparesis. I usually throw up yellow with that. This time. I throwing up foam." She reports RUQ pain.   "

## 2017-09-26 NOTE — ASSESSMENT & PLAN NOTE
See above  Strict I&Os  Pt reports she cannot take po medications.  Phenergan IV on backorder and pt states phenergan suppositories provide no relief.  Will order reglan IV prn.

## 2017-09-26 NOTE — H&P
[facility] Medicine  History & Physical    Patient Name: Homar Jerry  MRN: [id number]  Admission Date: 9/26/2017  Attending Physician: Ginger Doyle MD   Primary Care Provider: Mary Cabrera MD    [facility] Medicine Team: [facility]OSP MED F Sonali Ricci PA-C     Patient information was obtained from patient, past medical records and ER records.     Subjective:     Principal Problem:Dehydration    Chief Complaint:   Chief Complaint   Patient presents with    Post-op Problem     appy sep 17, still having pain , vomiting chills fever, pale        HPI: 41 y/o F s/p laparoscopic appendectomy 9/17/17 for non perforated appendicitis, PMH HTN, hypothyroidism, fibromyalgia, ESHA DM (managed as type 1), gastroparesis, morbid obesity s/p sleeve gastrectomy (2013) presented to the ED with c/o N/V/D, fever (102.1 F at home), and RUQ abdominal pain.  She was tolerating a diet at the time of her discharge per discharge summary however pt reports otherwise. Pt states for the past 7 days after each meal she immediately vomits or has watery diarrhea.  RUQ started 2-3 days ago and worsening.  Pt reports she cannot take percocet 10 due to vomiting.  Pt also reports last admission N/V was not controlled with phenergan suppositories.  +chills, dizziness, lightheadedness.  Pt denies CP, SOB, leg swelling.    In the ED, labs reveal WBC 11.56, Tmax 99.3 F, Phos 2.5.  Mg, K, BUN, Cr all WNL.  Urine unremarkable.  EKG sinus tach.  CT abd/pelvis normal, no evidence that her symptoms are related to her appendectomy 10 days ago.  General Surgery consulted and recommended admission for rehydration.    Past Medical History:   Diagnosis Date    Anemia     Anxiety     Asthma     usually with cold weather    Back pain     Bipolar 1 disorder 3/17/2015    Cervical cancer 2009    DOT    Diabetes mellitus with hyperglycemia 1/17/2017    Fibrocystic breast     Fibromyalgia     Gastroparesis  2012    s/p sleeve to cover damaged nerves; s/p gastric pacemaker which did not help; due to nerve damage during surgery    Hypertension     Hypothyroidism, postsurgical     Morbid obesity     Non-intractable vomiting with nausea 9/26/2017    Prediabetes     Rheumatoid arthritis     S/P laparoscopic appendectomy 9/26/2017    Thyroid cancer 2003 & 2013    thyroidectomy    Urinary incontinence        Past Surgical History:   Procedure Laterality Date    ANKLE FRACTURE SURGERY Right     BREAST SURGERY      exploratory laparotomy due to complications of hysterectomy  2009    cervical cuff burst with air in abdomen    FRACTURE SURGERY      GASTRECTOMY      gastric pacemaker      gastric sleeve      HYSTERECTOMY  2009    total including cervix    KNEE ARTHROSCOPY W/ MENISCAL REPAIR Left 2016    KNEE SURGERY  05/12/2016    loop monitor  2016    multiple breast biopsies      TONSILLECTOMY      ureteral sling         Review of patient's allergies indicates:   Allergen Reactions    Dexamethasone Hives and Shortness Of Breath     Per pt, only steroid she is allergic to is dexamethasone    Doxepin hcl Hives, Diarrhea and Itching    Ciprofloxacin      Counteracts with seroquel, xanax, tizanidine    Compazine [prochlorperazine] Hives and Itching    Lyrica [pregabalin]      depression    Prednisone      Gastroparesis flare up       No current facility-administered medications on file prior to encounter.      Current Outpatient Prescriptions on File Prior to Encounter   Medication Sig    doxepin (SINEQUAN) 25 MG capsule Take 1 capsule (25 mg total) by mouth nightly as needed.    albuterol 90 mcg/actuation inhaler Inhale 2 puffs into the lungs every 6 (six) hours as needed for Wheezing. Please provide cheapest brand/generic formulation    alprazolam (XANAX) 1 MG tablet Take 1 tablet (1 mg total) by mouth every evening. (Patient taking differently: Take 2 mg by mouth every evening. )    blood sugar  diagnostic Strp 1 each by Misc.(Non-Drug; Combo Route) route 4 (four) times daily with meals and nightly.    blood-glucose meter kit Use as instructed    calcium carbonate (OS-MARIAN) 500 mg calcium (1,250 mg) tablet Take 2 tablets (1,000 mg total) by mouth once daily.    carvedilol (COREG) 12.5 MG tablet Take 2 tablets (25 mg total) by mouth 2 (two) times daily. (Patient taking differently: Take 25 mg by mouth 4 (four) times daily. )    cholecalciferol, vitamin D3, 1,000 unit capsule Take 2 capsules (2,000 Units total) by mouth once daily.    docusate sodium (COLACE) 50 MG capsule Take 1 capsule (50 mg total) by mouth 2 (two) times daily.    fluticasone (FLONASE) 50 mcg/actuation nasal spray 2 sprays by Nasal route 2 (two) times daily.     gabapentin (NEURONTIN) 600 MG tablet Take 600 mg by mouth 3 (three) times daily.     hydrochlorothiazide (HYDRODIURIL) 25 MG tablet Take 1 tablet (25 mg total) by mouth once daily.    insulin aspart (NOVOLOG) 100 unit/mL InPn pen Inject 16 Units into the skin 3 (three) times daily with meals.    insulin detemir (LEVEMIR FLEXTOUCH) 100 unit/mL (3 mL) SubQ InPn pen Inject 30 Units into the skin 2 (two) times daily.    lamotrigine (LAMICTAL) 100 MG tablet Take half tab by mouth each morning x 14 days, then 1 tab by mouth each morning    lancets Misc 1 each by Misc.(Non-Drug; Combo Route) route 4 (four) times daily with meals and nightly.    levocetirizine (XYZAL) 5 MG tablet Take 1 tablet (5 mg total) by mouth every evening. (Patient taking differently: Take 5 mg by mouth daily as needed for Allergies. )    levothyroxine (SYNTHROID) 150 MCG tablet Take 1 tablet (150 mcg total) by mouth once daily.    meclizine (ANTIVERT) 25 mg tablet Take 1 tablet (25 mg total) by mouth 3 (three) times daily as needed for Dizziness or Nausea.    mirtazapine (REMERON) 30 MG tablet Take 1 tablet (30 mg total) by mouth every evening. (Patient taking differently: Take 45 mg by mouth every  "evening. )    ondansetron (ZOFRAN) 4 MG tablet Take 1 tablet (4 mg total) by mouth every 6 (six) hours as needed for Nausea.    ondansetron (ZOFRAN-ODT) 8 MG TbDL Take 1 tablet (8 mg total) by mouth every 6 (six) hours as needed (nausea).    oxycodone-acetaminophen (PERCOCET)  mg per tablet Take 1 tablet by mouth every 4 (four) hours as needed for Pain. Per Dr. Villa at [facility] [place] [place]    oxycodone-acetaminophen (PERCOCET) 5-325 mg per tablet Take 1-2 tablets by mouth every 4 (four) hours as needed.    pen needle, diabetic 31 gauge x 5/16" Ndle 1 each by Misc.(Non-Drug; Combo Route) route 5 (five) times daily.    promethazine (PHENERGAN) 25 MG tablet TAKE 1 TABLET (25 MG TOTAL) BY MOUTH EVERY 6 HOURS AS NEEDED.    tizanidine (ZANAFLEX) 4 MG tablet TAKE 3 TABLETS (12 MG TOTAL) BY MOUTH EVERY EVENING.    topiramate (TOPAMAX) 25 MG tablet Take 1 tablet (25 mg total) by mouth daily as needed (migraines).    [DISCONTINUED] promethazine (PHENERGAN) 25 MG tablet Take 1 tablet (25 mg total) by mouth every 6 (six) hours as needed.     Family History     Problem Relation (Age of Onset)    ADD / ADHD Son    Arthritis Mother    Bipolar disorder Maternal Uncle    Clotting disorder Mother, Father, Paternal Grandfather    Diabetes Paternal Grandmother    HIV Brother    Heart attack Mother (55)    Heart disease Mother    Hyperlipidemia Father    No Known Problems Sister, Brother, Maternal Aunt, Paternal Aunt, Paternal Uncle, Maternal Grandfather, Maternal Grandmother, Cousin    Pancreatic cancer Maternal Uncle    Pneumonia Brother    Rheum arthritis Mother    Thyroid cancer Paternal Grandmother        Social History Main Topics    Smoking status: Never Smoker    Smokeless tobacco: Never Used    Alcohol use No    Drug use: No    Sexual activity: Not Currently     Review of Systems   Constitutional: Positive for appetite change, chills and fever. Negative for diaphoresis, fatigue and " unexpected weight change.   HENT: Negative for rhinorrhea, sore throat, trouble swallowing and voice change.    Eyes: Negative for photophobia, pain, redness and visual disturbance.   Respiratory: Negative for cough, chest tightness, shortness of breath and wheezing.    Cardiovascular: Negative for chest pain, palpitations and leg swelling.   Gastrointestinal: Positive for abdominal pain, diarrhea, nausea and vomiting. Negative for abdominal distention and constipation.   Endocrine: Negative for cold intolerance, heat intolerance, polydipsia, polyphagia and polyuria.   Genitourinary: Negative for dysuria, flank pain, frequency and pelvic pain.   Musculoskeletal: Negative for arthralgias, back pain, myalgias and neck pain.   Skin: Negative for color change, pallor, rash and wound.   Neurological: Positive for dizziness and light-headedness. Negative for tremors, syncope and weakness.   Hematological: Negative for adenopathy. Does not bruise/bleed easily.   Psychiatric/Behavioral: Positive for behavioral problems (CARMEN, panic disorder, mood disorder, drug seeking behavior). Negative for dysphoric mood and suicidal ideas. The patient is nervous/anxious.      Objective:     Vital Signs (Most Recent):  Temp: 98.7 °F (37.1 °C) (09/26/17 1348)  Pulse: 105 (09/26/17 1802)  Resp: 15 (09/26/17 1802)  BP: (!) 172/95 (09/26/17 1801)  SpO2: 97 % (09/26/17 1802) Vital Signs (24h Range):  Temp:  [98.7 °F (37.1 °C)-99.3 °F (37.4 °C)] 98.7 °F (37.1 °C)  Pulse:  [104-138] 105  Resp:  [11-20] 15  SpO2:  [95 %-99 %] 97 %  BP: (140-189)/() 172/95     Weight: 107.5 kg (237 lb)  Body mass index is 39.44 kg/m².    Physical Exam   Constitutional: She is oriented to person, place, and time. She appears well-developed and well-nourished.   HENT:   Head: Normocephalic and atraumatic.   Eyes: EOM are normal. Pupils are equal, round, and reactive to light.   Neck: Normal range of motion. Neck supple. No tracheal deviation present. No  thyromegaly present.   Cardiovascular: Regular rhythm.  Tachycardia present.    No murmur heard.  Pulmonary/Chest: Effort normal and breath sounds normal. She has no wheezes.   Abdominal: Soft. Bowel sounds are normal. There is tenderness in the right upper quadrant.   Incision sites C/D/I (see photo)   Musculoskeletal: Normal range of motion. She exhibits no edema or tenderness.   Lymphadenopathy:     She has no cervical adenopathy.   Neurological: She is alert and oriented to person, place, and time. She has normal reflexes. No cranial nerve deficit.   Skin: Skin is warm and dry.   Psychiatric: She has a normal mood and affect. Her behavior is normal.   Nursing note and vitals reviewed.           Significant Labs: All pertinent labs within the past 24 hours have been reviewed.    Significant Imaging: I have reviewed all pertinent imaging results/findings within the past 24 hours.    Assessment/Plan:     * Dehydration    Hypophosphatemia  S/p appendectomy 9/17/17, general surgery evaluated and CT Abdomen with no acute abnormalities  Suspect post surgical syndrome however will obtain RUQ US due to pain on exam  Pt given 2L LR in the ED with some improvement in tachycardia.  Will initiate continuous IVF.  Supportive care, strict I&Os.  Replace electrolytes IV.  Monitor K, Phos, Mg daily.          Non-intractable vomiting with nausea    See above  Strict I&Os  Pt reports she cannot take po medications.  Phenergan IV on backorder and pt states phenergan suppositories provide no relief.  Will order reglan IV prn.          S/P laparoscopic appendectomy    Per General Surgery: CT abd/pelvis normal, no evidence that her symptoms are related to her appendectomy 10 days ago          Abdominal pain    See above  Tmax 99.3, WBC 11.56.  Will check lactic acid, procalcitonin and RUQ US.  Morphine 2 mg q 6 hrs prn for 24 hrs with goal to transition back to po.  Pt discharged on percocet 5-325, take 1-2 tablets q 4 hrs prn.         "  Gastroparesis    History of gastric bypass 2013  Could consider erythromycin/reglan for improved motility          Drug-seeking behavior    PCP note on 8/31/17:   "Pt counseled extensively that I will not prescribe benzos for her. She is on Alprazolam 1mg nightly and filled  #30 on 8/12 from [facility]. She has refills on this rx. I have verified that she has an appt with psychiatry on 9/26 at 3pm with Dr. Girard. She was offered a cancellation earlier this week but declined (bad weather.)   She becomes argumentative when I tell her I cannot fill her psychiatric medications (which I have told her on previous occasions.) . She demonstrates manipulative behavior. She states that [facility] increased her dose to 2mg and told her to get this from her PCP and therefore I must fill this. I told her no and that she must discuss with psychiatry at her appointment. She states that she is not sleeping and needs something now to help her. In addition to the benzo she is on chronic percocet 10mg of which she receives 120 pills per month. 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), managed as type 1    TDD at home 108 units (levemir 30U bid and novolog 16U tid with meals plus scale)  Will order levemir 22U bid and low dose correction scale  Hyper/hypoglycemia protocols in place  Clear liquid diabetic diet and advance to diabetic as tolerated          Hypothyroidism, postsurgical    Continue lt4 150 mcg daily  Noted in psych note that pt adjust home dose  Will check TSH          Essential hypertension    183/112 on " admission  Continue HCTZ and carvedilol            VTE Risk Mitigation     None             Sonali Ricci PA-C  Department of Hospital Medicine   [facility]

## 2017-09-26 NOTE — ASSESSMENT & PLAN NOTE
TDD at home 108 units (levemir 30U bid and novolog 16U tid with meals plus scale)  Will order levemir 22U bid and low dose correction scale  Hyper/hypoglycemia protocols in place  Clear liquid diabetic diet and advance to diabetic as tolerated

## 2017-09-26 NOTE — ASSESSMENT & PLAN NOTE
Per General Surgery: CT abd/pelvis normal, no evidence that her symptoms are related to her appendectomy 10 days ago

## 2017-09-26 NOTE — ED NOTES
Patient identifiers verified and correct for Homar Jerry.    LOC: The patient is awake, alert and aware of environment with an appropriate affect, the patient is oriented x 3 and speaking appropriately.  APPEARANCE: Patient resting comfortably and in no acute distress, patient is clean and well groomed, patient's clothing is properly fastened.  SKIN: The skin is warm and dry, color consistent with ethnicity, patient has normal skin turgor and moist mucus membranes, skin intact, no breakdown or bruising noted.  MUSCULOSKELETAL: Patient moving all extremities spontaneously, no obvious swelling or deformities noted.  RESPIRATORY: Airway is open and patent, respirations are spontaneous, patient has a normal effort and rate, no accessory muscle use noted, bilateral breath sounds clear and present.   CARDIAC: Patient has a normal rate and regular rhythm, no periphreal edema noted, capillary refill < 3 seconds.  ABDOMEN: Soft and non tender to palpation, no distention noted, normoactive bowel sounds present in all four quadrants.  NEUROLOGIC: PERRL, 3 mm bilaterally, eyes open spontaneously, behavior appropriate to situation, follows commands, facial expression symmetrical, bilateral hand grasp equal and even, purposeful motor response noted, normal sensation in all extremities when touched with a finger.

## 2017-09-26 NOTE — SUBJECTIVE & OBJECTIVE
Past Medical History:   Diagnosis Date    Anemia     Anxiety     Asthma     usually with cold weather    Back pain     Bipolar 1 disorder 3/17/2015    Cervical cancer 2009    DOT    Diabetes mellitus with hyperglycemia 1/17/2017    Fibrocystic breast     Fibromyalgia     Gastroparesis 2012    s/p sleeve to cover damaged nerves; s/p gastric pacemaker which did not help; due to nerve damage during surgery    Hypertension     Hypothyroidism, postsurgical     Morbid obesity     Non-intractable vomiting with nausea 9/26/2017    Prediabetes     Rheumatoid arthritis     S/P laparoscopic appendectomy 9/26/2017    Thyroid cancer 2003 & 2013    thyroidectomy    Urinary incontinence        Past Surgical History:   Procedure Laterality Date    ANKLE FRACTURE SURGERY Right     BREAST SURGERY      exploratory laparotomy due to complications of hysterectomy  2009    cervical cuff burst with air in abdomen    FRACTURE SURGERY      GASTRECTOMY      gastric pacemaker      gastric sleeve      HYSTERECTOMY  2009    total including cervix    KNEE ARTHROSCOPY W/ MENISCAL REPAIR Left 2016    KNEE SURGERY  05/12/2016    loop monitor  2016    multiple breast biopsies      TONSILLECTOMY      ureteral sling         Review of patient's allergies indicates:   Allergen Reactions    Dexamethasone Hives and Shortness Of Breath     Per pt, only steroid she is allergic to is dexamethasone    Doxepin hcl Hives, Diarrhea and Itching    Ciprofloxacin      Counteracts with seroquel, xanax, tizanidine    Compazine [prochlorperazine] Hives and Itching    Lyrica [pregabalin]      depression    Prednisone      Gastroparesis flare up       No current facility-administered medications on file prior to encounter.      Current Outpatient Prescriptions on File Prior to Encounter   Medication Sig    doxepin (SINEQUAN) 25 MG capsule Take 1 capsule (25 mg total) by mouth nightly as needed.    albuterol 90 mcg/actuation  inhaler Inhale 2 puffs into the lungs every 6 (six) hours as needed for Wheezing. Please provide cheapest brand/generic formulation    alprazolam (XANAX) 1 MG tablet Take 1 tablet (1 mg total) by mouth every evening. (Patient taking differently: Take 2 mg by mouth every evening. )    blood sugar diagnostic Strp 1 each by Misc.(Non-Drug; Combo Route) route 4 (four) times daily with meals and nightly.    blood-glucose meter kit Use as instructed    calcium carbonate (OS-MARIAN) 500 mg calcium (1,250 mg) tablet Take 2 tablets (1,000 mg total) by mouth once daily.    carvedilol (COREG) 12.5 MG tablet Take 2 tablets (25 mg total) by mouth 2 (two) times daily. (Patient taking differently: Take 25 mg by mouth 4 (four) times daily. )    cholecalciferol, vitamin D3, 1,000 unit capsule Take 2 capsules (2,000 Units total) by mouth once daily.    docusate sodium (COLACE) 50 MG capsule Take 1 capsule (50 mg total) by mouth 2 (two) times daily.    fluticasone (FLONASE) 50 mcg/actuation nasal spray 2 sprays by Nasal route 2 (two) times daily.     gabapentin (NEURONTIN) 600 MG tablet Take 600 mg by mouth 3 (three) times daily.     hydrochlorothiazide (HYDRODIURIL) 25 MG tablet Take 1 tablet (25 mg total) by mouth once daily.    insulin aspart (NOVOLOG) 100 unit/mL InPn pen Inject 16 Units into the skin 3 (three) times daily with meals.    insulin detemir (LEVEMIR FLEXTOUCH) 100 unit/mL (3 mL) SubQ InPn pen Inject 30 Units into the skin 2 (two) times daily.    lamotrigine (LAMICTAL) 100 MG tablet Take half tab by mouth each morning x 14 days, then 1 tab by mouth each morning    lancets Misc 1 each by Misc.(Non-Drug; Combo Route) route 4 (four) times daily with meals and nightly.    levocetirizine (XYZAL) 5 MG tablet Take 1 tablet (5 mg total) by mouth every evening. (Patient taking differently: Take 5 mg by mouth daily as needed for Allergies. )    levothyroxine (SYNTHROID) 150 MCG tablet Take 1 tablet (150 mcg total)  "by mouth once daily.    meclizine (ANTIVERT) 25 mg tablet Take 1 tablet (25 mg total) by mouth 3 (three) times daily as needed for Dizziness or Nausea.    mirtazapine (REMERON) 30 MG tablet Take 1 tablet (30 mg total) by mouth every evening. (Patient taking differently: Take 45 mg by mouth every evening. )    ondansetron (ZOFRAN) 4 MG tablet Take 1 tablet (4 mg total) by mouth every 6 (six) hours as needed for Nausea.    ondansetron (ZOFRAN-ODT) 8 MG TbDL Take 1 tablet (8 mg total) by mouth every 6 (six) hours as needed (nausea).    oxycodone-acetaminophen (PERCOCET)  mg per tablet Take 1 tablet by mouth every 4 (four) hours as needed for Pain. Per Dr. [facility] at [facility] [place], [place]    oxycodone-acetaminophen (PERCOCET) 5-325 mg per tablet Take 1-2 tablets by mouth every 4 (four) hours as needed.    pen needle, diabetic 31 gauge x 5/16" Ndle 1 each by Misc.(Non-Drug; Combo Route) route 5 (five) times daily.    promethazine (PHENERGAN) 25 MG tablet TAKE 1 TABLET (25 MG TOTAL) BY MOUTH EVERY 6 HOURS AS NEEDED.    tizanidine (ZANAFLEX) 4 MG tablet TAKE 3 TABLETS (12 MG TOTAL) BY MOUTH EVERY EVENING.    topiramate (TOPAMAX) 25 MG tablet Take 1 tablet (25 mg total) by mouth daily as needed (migraines).    [DISCONTINUED] promethazine (PHENERGAN) 25 MG tablet Take 1 tablet (25 mg total) by mouth every 6 (six) hours as needed.     Family History     Problem Relation (Age of Onset)    ADD / ADHD Son    Arthritis Mother    Bipolar disorder Maternal Uncle    Clotting disorder Mother, Father, Paternal Grandfather    Diabetes Paternal Grandmother    HIV Brother    Heart attack Mother (55)    Heart disease Mother    Hyperlipidemia Father    No Known Problems Sister, Brother, Maternal Aunt, Paternal Aunt, Paternal Uncle, Maternal Grandfather, Maternal Grandmother, Cousin    Pancreatic cancer Maternal Uncle    Pneumonia Brother    Rheum arthritis Mother    Thyroid cancer Paternal Grandmother    "     Social History Main Topics    Smoking status: Never Smoker    Smokeless tobacco: Never Used    Alcohol use No    Drug use: No    Sexual activity: Not Currently     Review of Systems   Constitutional: Positive for appetite change, chills and fever. Negative for diaphoresis, fatigue and unexpected weight change.   HENT: Negative for rhinorrhea, sore throat, trouble swallowing and voice change.    Eyes: Negative for photophobia, pain, redness and visual disturbance.   Respiratory: Negative for cough, chest tightness, shortness of breath and wheezing.    Cardiovascular: Negative for chest pain, palpitations and leg swelling.   Gastrointestinal: Positive for abdominal pain, diarrhea, nausea and vomiting. Negative for abdominal distention and constipation.   Endocrine: Negative for cold intolerance, heat intolerance, polydipsia, polyphagia and polyuria.   Genitourinary: Negative for dysuria, flank pain, frequency and pelvic pain.   Musculoskeletal: Negative for arthralgias, back pain, myalgias and neck pain.   Skin: Negative for color change, pallor, rash and wound.   Neurological: Positive for dizziness and light-headedness. Negative for tremors, syncope and weakness.   Hematological: Negative for adenopathy. Does not bruise/bleed easily.   Psychiatric/Behavioral: Positive for behavioral problems (CARMEN, panic disorder, mood disorder, drug seeking behavior). Negative for dysphoric mood and suicidal ideas. The patient is nervous/anxious.      Objective:     Vital Signs (Most Recent):  Temp: 98.7 °F (37.1 °C) (09/26/17 1348)  Pulse: 105 (09/26/17 1802)  Resp: 15 (09/26/17 1802)  BP: (!) 172/95 (09/26/17 1801)  SpO2: 97 % (09/26/17 1802) Vital Signs (24h Range):  Temp:  [98.7 °F (37.1 °C)-99.3 °F (37.4 °C)] 98.7 °F (37.1 °C)  Pulse:  [104-138] 105  Resp:  [11-20] 15  SpO2:  [95 %-99 %] 97 %  BP: (140-189)/() 172/95     Weight: 107.5 kg (237 lb)  Body mass index is 39.44 kg/m².    Physical Exam   Constitutional:  She is oriented to person, place, and time. She appears well-developed and well-nourished.   HENT:   Head: Normocephalic and atraumatic.   Eyes: EOM are normal. Pupils are equal, round, and reactive to light.   Neck: Normal range of motion. Neck supple. No tracheal deviation present. No thyromegaly present.   Cardiovascular: Regular rhythm.  Tachycardia present.    No murmur heard.  Pulmonary/Chest: Effort normal and breath sounds normal. She has no wheezes.   Abdominal: Soft. Bowel sounds are normal. There is tenderness in the right upper quadrant.   Incision sites C/D/I (see photo)   Musculoskeletal: Normal range of motion. She exhibits no edema or tenderness.   Lymphadenopathy:     She has no cervical adenopathy.   Neurological: She is alert and oriented to person, place, and time. She has normal reflexes. No cranial nerve deficit.   Skin: Skin is warm and dry.   Psychiatric: She has a normal mood and affect. Her behavior is normal.   Nursing note and vitals reviewed.           Significant Labs: All pertinent labs within the past 24 hours have been reviewed.    Significant Imaging: I have reviewed all pertinent imaging results/findings within the past 24 hours.

## 2017-09-26 NOTE — ASSESSMENT & PLAN NOTE
"PCP note on 8/31/17:   "Pt counseled extensively that I will not prescribe benzos for her. She is on Alprazolam 1mg nightly and filled  #30 on 8/12 from [facility]. She has refills on this rx. I have verified that she has an appt with psychiatry on 9/26 at 3pm with Dr. Girard. She was offered a cancellation earlier this week but declined (bad weather.)   She becomes argumentative when I tell her I cannot fill her psychiatric medications (which I have told her on previous occasions.) . She demonstrates manipulative behavior. She states that [facility] increased her dose to 2mg and told her to get this from her PCP and therefore I must fill this. I told her no and that she must discuss with psychiatry at her appointment. She states that she is not sleeping and needs something now to help her. In addition to the benzo she is on chronic percocet 10mg of which she receives 120 pills per month. She is already on remeron 30mg nightly which she has from [facility]."    "

## 2017-09-26 NOTE — CONSULTS
History & Physical  Surgery      SUBJECTIVE:     Chief Complaint/Reason for Admission: N/V, dehydration    History of Present Illness: Homar Jerry is a 42 y.o. female with h/o HTN, hypothyroidism, fibromyalgia, DM II, gastroparesis, morbid obesity s/p sleeve gastrectomy who underwent laparoscopic appendectomy 9/17/17 for non perforated appendicitis.  She was tolerating a diet at the time of her discharged.  She states that she has had continued nausea.  Began having vomiting several days ago that has progressed to her not being able to hold anything down anymore.  She also notes R sided abdominal pain.  Tachycardic to 138 on admission, improved to 100 with 2L NS.  Labs wnl.  CT abdomen/pelvis with postoperative surgical changes of appendectomy but otherwise unremarkable.      No current facility-administered medications on file prior to encounter.      Current Outpatient Prescriptions on File Prior to Encounter   Medication Sig    albuterol 90 mcg/actuation inhaler Inhale 2 puffs into the lungs every 6 (six) hours as needed for Wheezing. Please provide cheapest brand/generic formulation    alprazolam (XANAX) 1 MG tablet Take 1 tablet (1 mg total) by mouth every evening. (Patient taking differently: Take 2 mg by mouth every evening. )    blood sugar diagnostic Strp 1 each by Misc.(Non-Drug; Combo Route) route 4 (four) times daily with meals and nightly.    blood-glucose meter kit Use as instructed    calcium carbonate (OS-MARIAN) 500 mg calcium (1,250 mg) tablet Take 2 tablets (1,000 mg total) by mouth once daily.    carvedilol (COREG) 12.5 MG tablet Take 2 tablets (25 mg total) by mouth 2 (two) times daily. (Patient taking differently: Take 25 mg by mouth 4 (four) times daily. )    cholecalciferol, vitamin D3, 1,000 unit capsule Take 2 capsules (2,000 Units total) by mouth once daily.    docusate sodium (COLACE) 50 MG capsule Take 1 capsule (50 mg total) by mouth 2 (two) times daily.    doxepin  (SINEQUAN) 25 MG capsule Take 1 capsule (25 mg total) by mouth nightly as needed.    fluticasone (FLONASE) 50 mcg/actuation nasal spray 2 sprays by Nasal route 2 (two) times daily.     gabapentin (NEURONTIN) 600 MG tablet Take 600 mg by mouth 3 (three) times daily.     hydrochlorothiazide (HYDRODIURIL) 25 MG tablet Take 1 tablet (25 mg total) by mouth once daily.    insulin aspart (NOVOLOG) 100 unit/mL InPn pen Inject 16 Units into the skin 3 (three) times daily with meals.    insulin detemir (LEVEMIR FLEXTOUCH) 100 unit/mL (3 mL) SubQ InPn pen Inject 30 Units into the skin 2 (two) times daily.    lamotrigine (LAMICTAL) 100 MG tablet Take half tab by mouth each morning x 14 days, then 1 tab by mouth each morning    lancets Misc 1 each by Misc.(Non-Drug; Combo Route) route 4 (four) times daily with meals and nightly.    levocetirizine (XYZAL) 5 MG tablet Take 1 tablet (5 mg total) by mouth every evening. (Patient taking differently: Take 5 mg by mouth daily as needed for Allergies. )    levothyroxine (SYNTHROID) 150 MCG tablet Take 1 tablet (150 mcg total) by mouth once daily.    meclizine (ANTIVERT) 25 mg tablet Take 1 tablet (25 mg total) by mouth 3 (three) times daily as needed for Dizziness or Nausea.    mirtazapine (REMERON) 30 MG tablet Take 1 tablet (30 mg total) by mouth every evening. (Patient taking differently: Take 45 mg by mouth every evening. )    ondansetron (ZOFRAN) 4 MG tablet Take 1 tablet (4 mg total) by mouth every 6 (six) hours as needed for Nausea.    ondansetron (ZOFRAN-ODT) 8 MG TbDL Take 1 tablet (8 mg total) by mouth every 6 (six) hours as needed (nausea).    oxycodone-acetaminophen (PERCOCET)  mg per tablet Take 1 tablet by mouth every 4 (four) hours as needed for Pain. Per Dr. Villa at [facility] [place]    oxycodone-acetaminophen (PERCOCET) 5-325 mg per tablet Take 1-2 tablets by mouth every 4 (four) hours as needed.    pen needle, diabetic 31 gauge  "x 5/16" Ndle 1 each by Misc.(Non-Drug; Combo Route) route 5 (five) times daily.    promethazine (PHENERGAN) 25 MG tablet TAKE 1 TABLET (25 MG TOTAL) BY MOUTH EVERY 6 HOURS AS NEEDED.    tizanidine (ZANAFLEX) 4 MG tablet TAKE 3 TABLETS (12 MG TOTAL) BY MOUTH EVERY EVENING.    topiramate (TOPAMAX) 25 MG tablet Take 1 tablet (25 mg total) by mouth daily as needed (migraines).    [DISCONTINUED] promethazine (PHENERGAN) 25 MG tablet Take 1 tablet (25 mg total) by mouth every 6 (six) hours as needed.       Review of patient's allergies indicates:   Allergen Reactions    Dexamethasone Hives and Shortness Of Breath     Per pt, only steroid she is allergic to is dexamethasone    Doxepin hcl Hives, Diarrhea and Itching    Ciprofloxacin      Counteracts with seroquel, xanax, tizanidine    Compazine [prochlorperazine] Hives and Itching    Lyrica [pregabalin]      depression    Prednisone      Gastroparesis flare up       Past Medical History:   Diagnosis Date    Anemia     Anxiety     Asthma     usually with cold weather    Back pain     Bipolar 1 disorder 3/17/2015    Cervical cancer 2009    DOT    Diabetes mellitus with hyperglycemia 1/17/2017    Fibrocystic breast     Fibromyalgia     Gastroparesis 2012    s/p sleeve to cover damaged nerves; s/p gastric pacemaker which did not help; due to nerve damage during surgery    Hypertension     Hypothyroidism, postsurgical     Morbid obesity     Prediabetes     Rheumatoid arthritis     Thyroid cancer 2003 & 2013    thyroidectomy    Urinary incontinence      Past Surgical History:   Procedure Laterality Date    ANKLE FRACTURE SURGERY Right     BREAST SURGERY      exploratory laparotomy due to complications of hysterectomy  2009    cervical cuff burst with air in abdomen    FRACTURE SURGERY      GASTRECTOMY      gastric pacemaker      gastric sleeve      HYSTERECTOMY  2009    total including cervix    KNEE ARTHROSCOPY W/ MENISCAL REPAIR Left 2016 "    KNEE SURGERY  05/12/2016    loop monitor  2016    multiple breast biopsies      TONSILLECTOMY      ureteral sling       Family History   Problem Relation Age of Onset    Heart disease Mother     Arthritis Mother     Rheum arthritis Mother     Heart attack Mother 55    Clotting disorder Mother     Hyperlipidemia Father     Clotting disorder Father     Thyroid cancer Paternal Grandmother     Diabetes Paternal Grandmother     Clotting disorder Paternal Grandfather     No Known Problems Sister     No Known Problems Brother     No Known Problems Maternal Aunt     No Known Problems Paternal Aunt     Bipolar disorder Maternal Uncle     Pancreatic cancer Maternal Uncle     No Known Problems Paternal Uncle     No Known Problems Maternal Grandfather     No Known Problems Maternal Grandmother     No Known Problems Cousin     ADD / ADHD Son     Pneumonia Brother     HIV Brother     Alcohol abuse Neg Hx     Anxiety disorder Neg Hx     Dementia Neg Hx     Depression Neg Hx     Drug abuse Neg Hx     OCD Neg Hx     Paranoid behavior Neg Hx     Physical abuse Neg Hx     Schizophrenia Neg Hx     Seizures Neg Hx     Self injury Neg Hx     Sexual abuse Neg Hx     Suicide Neg Hx      Social History   Substance Use Topics    Smoking status: Never Smoker    Smokeless tobacco: Never Used    Alcohol use No        Review of Systems   Constitutional: Negative for chills, fatigue and fever.   HENT: Negative for congestion and rhinorrhea.    Eyes: Negative for visual disturbance.   Respiratory: Negative for cough and shortness of breath.    Cardiovascular: Negative for chest pain and leg swelling.   Gastrointestinal: Positive for abdominal pain, nausea and vomiting. Negative for constipation and diarrhea.   Endocrine: Negative for polyuria.   Genitourinary: Negative for dysuria.   Musculoskeletal: Negative for arthralgias and myalgias.   Skin: Negative for wound.   Neurological: Negative for  dizziness, light-headedness and headaches.   Psychiatric/Behavioral: Negative for agitation.     OBJECTIVE:     Vital Signs (Most Recent)  Temp: 98.7 °F (37.1 °C) (09/26/17 1348)  Pulse: 108 (09/26/17 1420)  Resp: 16 (09/26/17 1420)  BP: (!) 151/94 (09/26/17 1420)  SpO2: 99 % (09/26/17 1420)    Physical Exam   Constitutional: She is oriented to person, place, and time. She appears well-developed and well-nourished. No distress.   HENT:   Head: Normocephalic and atraumatic.   Eyes: Conjunctivae and EOM are normal. Pupils are equal, round, and reactive to light.   Neck: Normal range of motion. Neck supple. No tracheal deviation present.   Cardiovascular: Normal rate, regular rhythm and normal heart sounds.    Pulmonary/Chest: Effort normal and breath sounds normal.   Abdominal: Soft. Bowel sounds are normal.   Mild R sided abdominal pain.  Incisions CDI, steri strips removed   Musculoskeletal: Normal range of motion. She exhibits no edema.   Neurological: She is alert and oriented to person, place, and time.   Skin: Skin is warm and dry.   Psychiatric: She has a normal mood and affect.     Laboratory  CBC:   Recent Labs  Lab 09/26/17  1150   WBC 11.56   RBC 4.80   HGB 13.4   HCT 39.2   *   MCV 82   MCH 27.9   MCHC 34.2     CMP:   Recent Labs  Lab 09/26/17  1150   *   CALCIUM 9.5   ALBUMIN 3.8   PROT 8.5*      K 3.8   CO2 22*      BUN 11   CREATININE 1.0   ALKPHOS 100   ALT 10   AST 12   BILITOT 0.3       Diagnostic Results:  CT abd/pelvis:  1.  Expected postsurgical changes of recent appendectomy without focal fluid collection to suggest hematoma, seroma or abscess formation.  There is mild cecal wall thickening, likely reactive to recent surgery.    2.  Postsurgical changes of prior sleeve gastrectomy.    ASSESSMENT/PLAN:     A/P:  41 yo F with N/V dehydration    CT abd/pelvis normal, no evidence that her symptoms are related to her appendectomy 10 days ago    Agree with Medicine admit for  observation and rehydration    These symptoms could be explained by history of gastroparesis, can consider erythromycin/reglan for improved motility    Brandt Holbrook  General Surgery, PGY-5  Pager # [phone]    I have personally performed a detailed history and physical examination on this patient. My findings are summarized in the resident's note included in the record.

## 2017-09-26 NOTE — HPI
41 y/o F s/p laparoscopic appendectomy 9/17/17 for non perforated appendicitis, PMH HTN, hypothyroidism, fibromyalgia, ESHA DM (managed as type 1), gastroparesis, morbid obesity s/p sleeve gastrectomy (2013) presented to the ED with c/o N/V/D, fever (102.1 F at home), and RUQ abdominal pain.  She was tolerating a diet at the time of her discharge per discharge summary however pt reports otherwise. Pt states for the past 7 days after each meal she immediately vomits or has watery diarrhea.  RUQ started 2-3 days ago and worsening.  Pt reports she cannot take percocet 10 due to vomiting.  Pt also reports last admission N/V was not controlled with phenergan suppositories.  +chills, dizziness, lightheadedness.  Pt denies CP, SOB, leg swelling.    In the ED, labs reveal WBC 11.56, Tmax 99.3 F, Phos 2.5.  Mg, K, BUN, Cr all WNL.  Urine unremarkable.  EKG sinus tach.  CT abd/pelvis normal, no evidence that her symptoms are related to her appendectomy 10 days ago.  General Surgery consulted and recommended admission for rehydration.

## 2017-09-26 NOTE — ASSESSMENT & PLAN NOTE
See above  Tmax 99.3, WBC 11.56.  Will check lactic acid, procalcitonin and RUQ US.  Morphine 2 mg q 6 hrs prn for 24 hrs with goal to transition back to po.  Pt discharged on percocet 5-325, take 1-2 tablets q 4 hrs prn.

## 2017-09-26 NOTE — ASSESSMENT & PLAN NOTE
Psychiatry Note 7/11/2017  · Remeron 30mg po q hs  · D/C Zyprexa due to noncompliance  · D/C Vistaril   · Xanax 1mg po q hs, no increase or change of benzo  · Lamictal 100mg, half tab po q day x 14 days, then 1 tab by mouth q day  · Doxepin 25mg po q hs

## 2017-09-26 NOTE — ASSESSMENT & PLAN NOTE
Hypophosphatemia  S/p appendectomy 9/17/17, general surgery evaluated and CT Abdomen with no acute abnormalities  Suspect post surgical syndrome however will obtain RUQ US due to pain on exam  Pt given 2L LR in the ED with some improvement in tachycardia.  Will initiate continuous IVF.  Supportive care, strict I&Os.  Replace electrolytes IV.  Monitor K, Phos, Mg daily.

## 2017-09-26 NOTE — ED PROVIDER NOTES
"Encounter Date: 9/26/2017    SCRIBE #1 NOTE: I, Malorie Lucero, am scribing for, and in the presence of, Dr. Velez.       History     Chief Complaint   Patient presents with    Post-op Problem     appy sep 17, still having pain , vomiting chills fever, pale     Time seen by provider: 11:46 AM    This is a 42 y.o. female with a history of HTN, hypothyroidism (postsurgical), fibromyalgia, anxiety, morbid obesity, gastroparesis, rheumatoid arthritis, asthma, fibrocystic breast, thyroid CA, cervical CA, back pain, DM with hyperglycemia, anemia, and urinary incontinence who presents with recent appendectomy complaint of constant diarrhea, vomiting, inability to tolerate food/drink PO, chills, "shakes", worsening fever (102.1 this morning, the highest yet), nausea (currently), and severe and worsening RUQ abdominal pain for 2-3 days. She reports that she improved after her hospital admission for the first 2 days she was able to tolerate food and H2O PO and she had constpiation. She reports compliance with medication the first 2 days but she has not been able to keep any medication/food/drink down for 2 days. The patient indicates associated dizziness, light-headedness, blurred vision, SOB, leg cramping (she reports low K+ in the hospital), and abdominal pain radiation to chest (which woke her up this morning) which she describes as a feeling of "smothering" for approximately 2-3 minutes, improvement with raising her head up on multiple pillows. The patient says that her recent surgery was for a GI pacemaker removal.       The history is provided by the patient.     Review of patient's allergies indicates:   Allergen Reactions    Dexamethasone Hives and Shortness Of Breath     Per pt, only steroid she is allergic to is dexamethasone    Doxepin hcl Hives, Diarrhea and Itching    Ciprofloxacin      Counteracts with seroquel, xanax, tizanidine    Compazine [prochlorperazine] Hives and Itching    Lyrica [pregabalin]      " depression    Prednisone      Gastroparesis flare up     Past Medical History:   Diagnosis Date    Anemia     Anxiety     Asthma     usually with cold weather    Back pain     Bipolar 1 disorder 3/17/2015    Cervical cancer 2009    DOT    Diabetes mellitus with hyperglycemia 1/17/2017    Fibrocystic breast     Fibromyalgia     Gastroparesis 2012    s/p sleeve to cover damaged nerves; s/p gastric pacemaker which did not help; due to nerve damage during surgery    Hypertension     Hypothyroidism, postsurgical     Morbid obesity     Prediabetes     Rheumatoid arthritis     Thyroid cancer 2003 & 2013    thyroidectomy    Urinary incontinence      Past Surgical History:   Procedure Laterality Date    ANKLE FRACTURE SURGERY Right     BREAST SURGERY      exploratory laparotomy due to complications of hysterectomy  2009    cervical cuff burst with air in abdomen    FRACTURE SURGERY      GASTRECTOMY      gastric pacemaker      gastric sleeve      HYSTERECTOMY  2009    total including cervix    KNEE ARTHROSCOPY W/ MENISCAL REPAIR Left 2016    KNEE SURGERY  05/12/2016    loop monitor  2016    multiple breast biopsies      TONSILLECTOMY      ureteral sling       Family History   Problem Relation Age of Onset    Heart disease Mother     Arthritis Mother     Rheum arthritis Mother     Heart attack Mother 55    Clotting disorder Mother     Hyperlipidemia Father     Clotting disorder Father     Thyroid cancer Paternal Grandmother     Diabetes Paternal Grandmother     Clotting disorder Paternal Grandfather     No Known Problems Sister     No Known Problems Brother     No Known Problems Maternal Aunt     No Known Problems Paternal Aunt     Bipolar disorder Maternal Uncle     Pancreatic cancer Maternal Uncle     No Known Problems Paternal Uncle     No Known Problems Maternal Grandfather     No Known Problems Maternal Grandmother     No Known Problems Cousin     ADD / ADHD Son      "Pneumonia Brother     HIV Brother     Alcohol abuse Neg Hx     Anxiety disorder Neg Hx     Dementia Neg Hx     Depression Neg Hx     Drug abuse Neg Hx     OCD Neg Hx     Paranoid behavior Neg Hx     Physical abuse Neg Hx     Schizophrenia Neg Hx     Seizures Neg Hx     Self injury Neg Hx     Sexual abuse Neg Hx     Suicide Neg Hx      Social History   Substance Use Topics    Smoking status: Never Smoker    Smokeless tobacco: Never Used    Alcohol use No     Review of Systems   Constitutional: Positive for chills and fever (102.1).        Inability to tolerate food/drink/medication PO and "shakes."   HENT: Negative for congestion.    Eyes: Positive for visual disturbance (Blurred vision).   Respiratory: Positive for shortness of breath.    Cardiovascular: Positive for chest pain.   Gastrointestinal: Positive for abdominal pain (Severe, RUQ), diarrhea, nausea and vomiting.        Intermittent radiation of the abdominal pain into the chest which she describes as a "smothering" feeling.   Genitourinary: Negative for difficulty urinating and dysuria.   Musculoskeletal: Negative for arthralgias.        Leg cramping.   Neurological: Positive for dizziness and light-headedness.   Hematological: Does not bruise/bleed easily.       Physical Exam     Initial Vitals [09/26/17 1127]   BP Pulse Resp Temp SpO2   (!) 183/112 (!) 138 20 99.3 °F (37.4 °C) 98 %      MAP       135.67         Physical Exam    Nursing note and vitals reviewed.  Constitutional: She appears well-developed and well-nourished. No distress.   Uncomfortable.   HENT:   Head: Normocephalic and atraumatic.   Right Ear: External ear normal.   Left Ear: External ear normal.   Mouth/Throat: Oropharynx is clear and moist.   Eyes: EOM are normal. Pupils are equal, round, and reactive to light.   Neck: Normal range of motion. Neck supple.   Cardiovascular: Regular rhythm and normal heart sounds. Tachycardia present.  Exam reveals no gallop and no " friction rub.    No murmur heard.  Pulmonary/Chest: Breath sounds normal. No respiratory distress. She has no wheezes. She has no rhonchi. She has no rales.   Abdominal: Soft. She exhibits no distension. There is tenderness (Right side). There is no rebound and no guarding.   Healing wound in the RLQ with Steristrips present. Clean, dry, and intact.   Musculoskeletal: Normal range of motion.   Neurological: She is alert and oriented to person, place, and time. She has normal strength. No cranial nerve deficit or sensory deficit.   Skin: Skin is warm and dry.   Psychiatric: Her behavior is normal. Thought content normal.         ED Course   Procedures  Labs Reviewed   CBC W/ AUTO DIFFERENTIAL - Abnormal; Notable for the following:        Result Value    RDW 15.7 (*)     Platelets 432 (*)     All other components within normal limits   COMPREHENSIVE METABOLIC PANEL - Abnormal; Notable for the following:     CO2 22 (*)     Glucose 152 (*)     Total Protein 8.5 (*)     All other components within normal limits    Narrative:     Add on Phosphorus per Dr. Velez, order#[id number]  Add on Magnesium per Dr. Velez, alyseeer#[id number]  Add on Troponin Per Dr. Holland, order#[id number]   09/26/2017  12:48    URINALYSIS, REFLEX TO URINE CULTURE - Abnormal; Notable for the following:     Appearance, UA Hazy (*)     Specific Gravity, UA >=1.030 (*)     Ketones, UA 1+ (*)     Occult Blood UA 1+ (*)     Leukocytes, UA Trace (*)     All other components within normal limits    Narrative:     Preferred Collection Type->Urine, Clean Catch   PHOSPHORUS - Abnormal; Notable for the following:     Phosphorus 2.5 (*)     All other components within normal limits    Narrative:     Add on Phosphorus per Dr. Velez, order#[id number]  Add on Magnesium per Dr. Velez, ordeer#[id number]  Add on Troponin Per Dr. Holland, order#[id number]   09/26/2017  12:48    CULTURE, BLOOD   CULTURE, BLOOD   PROTIME-INR   TROPONIN I   MAGNESIUM   PHOSPHORUS   MAGNESIUM     Narrative:     Add on Phosphorus per Dr. Velez, order#[id number]  Add on Magnesium per Dr. Velez, ordeer#[id number]  Add on Troponin Per Dr. Holland, order#[id number]   09/26/2017  12:48    TROPONIN I    Narrative:     Add on Phosphorus per Dr. Velez, order#[id number]  Add on Magnesium per Dr. Velez, ordeer#[id number]  Add on Troponin Per Dr. Holland, order#[id number]   09/26/2017  12:48    URINALYSIS MICROSCOPIC    Narrative:     Preferred Collection Type->Urine, Clean Catch     EKG Readings: (Independently Interpreted)   Sinus tachycardia at a rate of 114 BPM with T-wave inversions in the inferior and lateral leads which does not appear significantly changed compared to EKG September 16.          Medical Decision Making:   History:   Old Medical Records: I decided to obtain old medical records.  Old Records Summarized: other records.       <> Summary of Records: 42 y.o. female with a history of gastroparesis and throat and cervical CA who was discharged from the hospital September 16-19 after surgery for acute appendicitis with localized peritonitis by Dr. Saucedo.  Initial Assessment:   42 y.o. Female with recent appendectomy who presents now with worsening abdominal pain, nausea, vomiting, diarrhea, and not tolerating PO. My differential diagnoses include but are not limited to intraabdominal abscess, gastroenteritis, opioid withdrawal, other surgical complication, or UTI. Will hydrate, give pain medications check labs, and plan to discuss with general surgery.  Independently Interpreted Test(s):   I have ordered and independently interpreted EKG Reading(s) - see prior notes  Clinical Tests:   Lab Tests: Ordered and Reviewed  Radiological Study: Ordered and Reviewed  Medical Tests: Reviewed and Ordered  Other:   I have discussed this case with another health care provider.            Scribe Attestation:   Scribe #1: I performed the above scribed service and the documentation accurately describes the services I  performed. I attest to the accuracy of the note.    Attending Attestation:           Physician Attestation for Scribe:  Physician Attestation Statement for Scribe #1: I, Dr. Velez, reviewed documentation, as scribed by Malorie Lucero in my presence, and it is both accurate and complete.         Attending ED Notes:   12:33 PM  Discussed the case with general surgery who agreed with the plan to get a CT of abdomen and pelvis.    3:36 PM  The pt evaluated by General Surgery who feels that there is no surgical issue. They agree with the plan to place the pt in Observation to IM. She continues to be tachycardic after 2 L of fluid. Her urine specific gravity is very high. K+ is 3.8 moderately hemolyzed. I suspect that it is low.  Will place in Observation for continued hydration and antiemetic and pain control.          ED Course      Clinical Impression:   There were no encounter diagnoses.    Disposition:   Disposition: Placed in Observation                        Ginger Doyle MD  09/28/17 0478

## 2017-09-26 NOTE — HOSPITAL COURSE
Pt admitted for N/V, abdominal pain s/p lap appendectomy 9/17/17.  On admission, pt tachycardic to 138, improved to 100 with 2L NS.  WBC 11.56 with Tmax 99.3.  CT abdomen/pelvis with postoperative surgical changes of appendectomy but otherwise unremarkable.  General Surgery consulted and did not feel symptoms related to appendectomy.  Continuous IVF initiated.  RUQ US ordered due to pain on palpation revealing probable hepatic steatosis.  Procalcitonin (0.10), lactic acid (1.0), TSH ordered (7.757, free T4 0.90).  Strict I&Os documented overnight and no vomiting or diarrhea.  On admission pt stated she had not taken all of her percocet  prescribed at discharge last admission.  Day 2 WBC 9.20.  Pt tolerated advancement of diet to full liquids and requested solid food.  Day of discharge pt sitting up in bed comfortably listening to music without any signs of distress.  It was noted overnight pt did not request any medications for nausea, only IV pain medication.  Day of discharge pt tolerating po percocet which was prescribed at discharge last admission.  At discharge pt given prescription for phenergan suppositories prn.

## 2017-09-27 VITALS
HEART RATE: 73 BPM | DIASTOLIC BLOOD PRESSURE: 93 MMHG | HEIGHT: 65 IN | SYSTOLIC BLOOD PRESSURE: 135 MMHG | TEMPERATURE: 98 F | OXYGEN SATURATION: 94 % | RESPIRATION RATE: 18 BRPM | WEIGHT: 237 LBS | BODY MASS INDEX: 39.49 KG/M2

## 2017-09-27 PROBLEM — R11.2 NON-INTRACTABLE VOMITING WITH NAUSEA: Status: RESOLVED | Noted: 2017-09-26 | Resolved: 2017-09-27

## 2017-09-27 PROBLEM — E86.0 DEHYDRATION: Status: RESOLVED | Noted: 2017-09-26 | Resolved: 2017-09-27

## 2017-09-27 LAB
ALBUMIN SERPL BCP-MCNC: 3 G/DL
ALP SERPL-CCNC: 79 U/L
ALT SERPL W/O P-5'-P-CCNC: 8 U/L
ANION GAP SERPL CALC-SCNC: 10 MMOL/L
AST SERPL-CCNC: 10 U/L
BASOPHILS # BLD AUTO: 0 K/UL
BASOPHILS NFR BLD: 0 %
BILIRUB SERPL-MCNC: 0.3 MG/DL
BUN SERPL-MCNC: 8 MG/DL
CALCIUM SERPL-MCNC: 8.4 MG/DL
CHLORIDE SERPL-SCNC: 101 MMOL/L
CO2 SERPL-SCNC: 29 MMOL/L
CREAT SERPL-MCNC: 0.9 MG/DL
DIFFERENTIAL METHOD: ABNORMAL
EOSINOPHIL # BLD AUTO: 0.1 K/UL
EOSINOPHIL NFR BLD: 0.5 %
ERYTHROCYTE [DISTWIDTH] IN BLOOD BY AUTOMATED COUNT: 15.7 %
EST. GFR  (AFRICAN AMERICAN): >60 ML/MIN/1.73 M^2
EST. GFR  (NON AFRICAN AMERICAN): >60 ML/MIN/1.73 M^2
GLUCOSE SERPL-MCNC: 145 MG/DL
HCT VFR BLD AUTO: 34.7 %
HGB BLD-MCNC: 11 G/DL
LIPASE SERPL-CCNC: 8 U/L
LYMPHOCYTES # BLD AUTO: 3.6 K/UL
LYMPHOCYTES NFR BLD: 38.9 %
MAGNESIUM SERPL-MCNC: 1.9 MG/DL
MCH RBC QN AUTO: 27.4 PG
MCHC RBC AUTO-ENTMCNC: 31.7 G/DL
MCV RBC AUTO: 86 FL
MONOCYTES # BLD AUTO: 0.6 K/UL
MONOCYTES NFR BLD: 7 %
NEUTROPHILS # BLD AUTO: 4.9 K/UL
NEUTROPHILS NFR BLD: 53.3 %
PLATELET # BLD AUTO: 334 K/UL
PMV BLD AUTO: 8.9 FL
POCT GLUCOSE: 106 MG/DL (ref 70–110)
POCT GLUCOSE: 99 MG/DL (ref 70–110)
POTASSIUM SERPL-SCNC: 3.3 MMOL/L
PROT SERPL-MCNC: 6.6 G/DL
RBC # BLD AUTO: 4.02 M/UL
SODIUM SERPL-SCNC: 140 MMOL/L
WBC # BLD AUTO: 9.2 K/UL

## 2017-09-27 PROCEDURE — 36415 COLL VENOUS BLD VENIPUNCTURE: CPT

## 2017-09-27 PROCEDURE — 25000242 PHARM REV CODE 250 ALT 637 W/ HCPCS: Performed by: PHYSICIAN ASSISTANT

## 2017-09-27 PROCEDURE — 25000003 PHARM REV CODE 250: Performed by: PHYSICIAN ASSISTANT

## 2017-09-27 PROCEDURE — 99217 PR OBSERVATION CARE DISCHARGE: CPT | Mod: ,,, | Performed by: PHYSICIAN ASSISTANT

## 2017-09-27 PROCEDURE — 80053 COMPREHEN METABOLIC PANEL: CPT

## 2017-09-27 PROCEDURE — 83690 ASSAY OF LIPASE: CPT

## 2017-09-27 PROCEDURE — G0378 HOSPITAL OBSERVATION PER HR: HCPCS

## 2017-09-27 PROCEDURE — 63600175 PHARM REV CODE 636 W HCPCS: Performed by: PHYSICIAN ASSISTANT

## 2017-09-27 PROCEDURE — 83735 ASSAY OF MAGNESIUM: CPT

## 2017-09-27 PROCEDURE — 85025 COMPLETE CBC W/AUTO DIFF WBC: CPT

## 2017-09-27 RX ORDER — ONDANSETRON 2 MG/ML
4 INJECTION INTRAMUSCULAR; INTRAVENOUS EVERY 6 HOURS PRN
Status: DISCONTINUED | OUTPATIENT
Start: 2017-09-27 | End: 2017-09-27 | Stop reason: HOSPADM

## 2017-09-27 RX ORDER — SODIUM CHLORIDE 9 MG/ML
INJECTION, SOLUTION INTRAVENOUS CONTINUOUS
Status: DISCONTINUED | OUTPATIENT
Start: 2017-09-27 | End: 2017-09-27

## 2017-09-27 RX ORDER — OXYCODONE AND ACETAMINOPHEN 10; 325 MG/1; MG/1
1 TABLET ORAL EVERY 6 HOURS PRN
Status: DISCONTINUED | OUTPATIENT
Start: 2017-09-27 | End: 2017-09-27 | Stop reason: HOSPADM

## 2017-09-27 RX ORDER — SODIUM CHLORIDE AND POTASSIUM CHLORIDE 150; 900 MG/100ML; MG/100ML
INJECTION, SOLUTION INTRAVENOUS CONTINUOUS
Status: DISCONTINUED | OUTPATIENT
Start: 2017-09-27 | End: 2017-09-27 | Stop reason: HOSPADM

## 2017-09-27 RX ORDER — PROMETHAZINE HYDROCHLORIDE 25 MG/1
25 SUPPOSITORY RECTAL EVERY 6 HOURS PRN
Qty: 12 SUPPOSITORY | Refills: 0 | Status: SHIPPED | OUTPATIENT
Start: 2017-09-27 | End: 2017-10-24 | Stop reason: SDUPTHER

## 2017-09-27 RX ADMIN — SODIUM CHLORIDE: 0.9 INJECTION, SOLUTION INTRAVENOUS at 12:09

## 2017-09-27 RX ADMIN — MORPHINE SULFATE 2 MG: 2 INJECTION, SOLUTION INTRAMUSCULAR; INTRAVENOUS at 12:09

## 2017-09-27 RX ADMIN — CARVEDILOL 25 MG: 25 TABLET, FILM COATED ORAL at 08:09

## 2017-09-27 RX ADMIN — MORPHINE SULFATE 2 MG: 2 INJECTION, SOLUTION INTRAMUSCULAR; INTRAVENOUS at 07:09

## 2017-09-27 RX ADMIN — GABAPENTIN 600 MG: 300 CAPSULE ORAL at 06:09

## 2017-09-27 RX ADMIN — LAMOTRIGINE 100 MG: 100 TABLET ORAL at 08:09

## 2017-09-27 RX ADMIN — MORPHINE SULFATE 2 MG: 2 INJECTION, SOLUTION INTRAMUSCULAR; INTRAVENOUS at 02:09

## 2017-09-27 RX ADMIN — LEVOTHYROXINE SODIUM 150 MCG: 150 TABLET ORAL at 06:09

## 2017-09-27 RX ADMIN — TIZANIDINE 12 MG: 4 TABLET ORAL at 01:09

## 2017-09-27 RX ADMIN — GABAPENTIN 600 MG: 300 CAPSULE ORAL at 02:09

## 2017-09-27 RX ADMIN — OXYCODONE HYDROCHLORIDE AND ACETAMINOPHEN 1 TABLET: 10; 325 TABLET ORAL at 12:09

## 2017-09-27 RX ADMIN — SODIUM CHLORIDE AND POTASSIUM CHLORIDE: .9; .15 SOLUTION INTRAVENOUS at 08:09

## 2017-09-27 RX ADMIN — METOCLOPRAMIDE 5 MG: 5 INJECTION, SOLUTION INTRAMUSCULAR; INTRAVENOUS at 10:09

## 2017-09-27 RX ADMIN — FLUTICASONE PROPIONATE 2 SPRAY: 50 SPRAY, METERED NASAL at 08:09

## 2017-09-27 RX ADMIN — HYDROCHLOROTHIAZIDE 25 MG: 25 TABLET ORAL at 08:09

## 2017-09-27 RX ADMIN — FLUTICASONE PROPIONATE 2 SPRAY: 50 SPRAY, METERED NASAL at 01:09

## 2017-09-27 NOTE — ASSESSMENT & PLAN NOTE
See above  Tmax 99.3, WBC 11.56->9.20.  Lactic acid, procalcitonin WNL.  On admission pt stated she still had percocet  and 5-325 at home.  RUQ probable hepatic steatosis.  LFTs WNL.  Morphine 2 mg q 6 hrs prn for 24 hrs with goal to transition back to po.  Pt discharged on percocet 5-325, take 1-2 tablets q 4 hrs prn.  Day 2 pt tolerating po meds.  Pt sitting up in bed without any signs of distress, minimal tenderness to palpation of abdomen on exam.  Pt examined with staff Dr. Santos.

## 2017-09-27 NOTE — ASSESSMENT & PLAN NOTE
"PCP note on 8/31/17:   "Pt counseled extensively that I will not prescribe benzos for her. She is on Alprazolam 1mg nightly and filled  #30 on 8/12 from [facility]. She has refills on this rx. I have verified that she has an appt with psychiatry on 9/26 at 3pm with Dr. Girard. She was offered a cancellation earlier this week but declined (bad weather.)   She becomes argumentative when I tell her I cannot fill her psychiatric medications (which I have told her on previous occasions.) . She demonstrates manipulative behavior. She states that [facility] increased her dose to 2mg and told her to get this from her PCP and therefore I must fill this. I told her no and that she must discuss with psychiatry at her appointment. She states that she is not sleeping and needs something now to help her. In addition to the benzo she is on chronic percocet 10mg of which she receives 120 pills per month. She is already on remeron 30mg nightly which she has from [facility]."  -On admission pt stated she had supply of percocet at home.  At discharge pt requesting new prescription.  Pt advised to contact PCP.  "

## 2017-09-27 NOTE — ASSESSMENT & PLAN NOTE
TDD at home 108 units (levemir 30U bid and novolog 16U tid with meals plus scale)  Will order levemir 22U bid and low dose correction scale  Hyper/hypoglycemia protocols in place  Clear liquid diabetic diet and advance to diabetic as tolerated  Day of discharge tolerating solids, resume home insulin regimen with frequent BG checks 3-4 times per day.

## 2017-09-27 NOTE — PLAN OF CARE
D/C home with family. No d/c needs identified. TANNA Ramos RN/CM    Future Appointments  Date Time Provider Department Center   10/9/2017 8:00 AM [facility]     Future Appointments  Date Time Provider Department Center   10/3/2017 10:00 AM MD [facility] Matti Bustamante [facility]   10/9/2017 8:00 AM HOME MONITOR DEVICE CHECK, [facility] Matti Bustamante          09/27/17 1243   Final Note   Assessment Type Final Discharge Note   Discharge Disposition Home   Hospital Follow Up  Appt(s) scheduled? Yes   Discharge plans and expectations educations in teach back method with documentation complete? Yes   Right Care Referral Info   Post Acute Recommendation No Care

## 2017-09-27 NOTE — DISCHARGE SUMMARY
[facility] Hospital Medicine  Discharge Summary      Patient Name: Homar Jerry  MRN: [id number]  Admission Date: 9/26/2017  Hospital Length of Stay: 0 days  Discharge Date and Time:  09/27/2017 2:13 PM  Attending Physician: KELTON Santos MD   Discharging Provider: Sonali Ricci PA-C  Primary Care Provider: Mary Cabrera MD  Hospital Medicine Team: [facility] Sonali Ricci PA-C    HPI:   43 y/o F s/p laparoscopic appendectomy 9/17/17 for non perforated appendicitis, PMH HTN, hypothyroidism, fibromyalgia, ESHA DM (managed as type 1), gastroparesis, morbid obesity s/p sleeve gastrectomy (2013) presented to the ED with c/o N/V/D, fever (102.1 F at home), and RUQ abdominal pain.  She was tolerating a diet at the time of her discharge per discharge summary however pt reports otherwise. Pt states for the past 7 days after each meal she immediately vomits or has watery diarrhea.  RUQ started 2-3 days ago and worsening.  Pt reports she cannot take percocet 10 due to vomiting.  Pt also reports last admission N/V was not controlled with phenergan suppositories.  +chills, dizziness, lightheadedness.  Pt denies CP, SOB, leg swelling.    In the ED, labs reveal WBC 11.56, Tmax 99.3 F, Phos 2.5.  Mg, K, BUN, Cr all WNL.  Urine unremarkable.  EKG sinus tach.  CT abd/pelvis normal, no evidence that her symptoms are related to her appendectomy 10 days ago.  General Surgery consulted and recommended admission for rehydration.    * No surgery found *      Indwelling Lines/Drains at time of discharge:   Lines/Drains/Airways          No matching active lines, drains, or airways        Hospital Course:   Pt admitted for N/V, abdominal pain s/p lap appendectomy 9/17/17.  On admission, pt tachycardic to 138, improved to 100 with 2L NS.   WBC 11.56 with Tmax 99.3.  CT abdomen/pelvis with postoperative surgical changes of appendectomy but otherwise unremarkable.  General Surgery consulted and did  not feel symptoms related to appendectomy.  Continuous IVF initiated.  RUQ US ordered due to pain on palpation revealing probable hepatic steatosis.  Procalcitonin (0.10), lactic acid (1.0), TSH ordered (7.757, free T4 0.90).  Strict I&Os documented overnight and no vomiting or diarrhea.  Day 2 WBC 9.20.  Pt tolerated advancement of diet to full liquids and requested solid food.  Day of discharge pt sitting up in bed comfortably listening to music without any signs of distress.  It was noted overnight pt did not request any medications for nausea, only IV pain medication.  Day of discharge pt tolerating po percocet which was prescribed at discharge last admission.  At discharge pt given prescription for phenergan suppositories prn.     Consults:   Consults         Status Ordering Provider     Inpatient consult to General Surgery  Once     Provider:  (Not yet assigned)    Completed TEMI HALL          Significant Diagnostic Studies: Labs: All labs within the past 24 hours have been reviewed    Pending Diagnostic Studies:     None        Final Active Diagnoses:    Diagnosis Date Noted POA    S/P laparoscopic appendectomy [Z90.49] 09/26/2017 Not Applicable    Abdominal pain [R10.9] 02/02/2017 Yes    Gastroparesis [K31.84] 03/28/2013 Yes    Drug-seeking behavior [Z76.5] 12/02/2016 Yes    Anxiety [F41.9] 01/12/2015 Yes     Chronic    Fibromyalgia [M79.7] 11/14/2014 Yes     Chronic    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), 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.9] 01/23/2017 Yes    Hypothyroidism, postsurgical [E89.0]  Yes     Chronic    Essential hypertension [I10]  Yes     Chronic      Problems Resolved During this Admission:    Diagnosis Date Noted Date Resolved POA    PRINCIPAL PROBLEM:  Dehydration [E86.0] 09/26/2017 09/27/2017 Yes    Non-intractable vomiting with nausea [R11.2] 09/26/2017 09/27/2017 Unknown      * Dehydration-resolved as of 9/27/2017    Hypophosphatemia  S/p appendectomy 9/17/17,  "general surgery evaluated and CT Abdomen with no acute abnormalities  Suspect post surgical syndrome however will obtain RUQ US due to pain on exam  Pt given 2L LR in the ED with some improvement in tachycardia.  Pt given continuous IVF overnight.  Supportive care, strict I&Os (+250).  Replaced K and Phos IV.  Mg WNL.          Non-intractable vomiting with nausea-resolved as of 9/27/2017    See above  Strict I&Os: no emesis documented.  Pt reports she cannot take po medications.  Phenergan IV on backorder and pt states phenergan suppositories provide no relief.  Ordered reglan IV prn however pt did not request during hospital stay.          S/P laparoscopic appendectomy    Per General Surgery: CT abd/pelvis normal, no evidence that her symptoms are related to her appendectomy 10 days ago          Abdominal pain    See above  Tmax 99.3, WBC 11.56->9.20.  Lactic acid, procalcitonin WNL.  RUQ probable hepatic steatosis.  LFTs WNL.  Morphine 2 mg q 6 hrs prn for 24 hrs with goal to transition back to po.  Pt discharged on percocet 5-325, take 1-2 tablets q 4 hrs prn.  Day 2 pt tolerating po meds.  Pt sitting up in bed without any signs of distress, minimal tenderness to palpation of abdomen on exam.  Pt examined with staff Dr. Santos.          Gastroparesis    History of gastric bypass 2013  Could consider erythromycin/reglan for improved motility.  Recommend follow up in bariatrics.          Drug-seeking behavior    PCP note on 8/31/17:   "Pt counseled extensively that I will not prescribe benzos for her. She is on Alprazolam 1mg nightly and filled  #30 on 8/12 from [facility]. She has refills on this rx. I have verified that she has an appt with psychiatry on 9/26 at 3pm with Dr. Girard. She was offered a cancellation earlier this week but declined (bad weather.)   She becomes argumentative when I tell her I cannot fill her psychiatric medications (which I have told her on previous occasions.) . She " "demonstrates manipulative behavior. She states that [facility] increased her dose to 2mg and told her to get this from her PCP and therefore I must fill this. I told her no and that she must discuss with psychiatry at her appointment. She states that she is not sleeping and needs something now to help her. In addition to the benzo she is on chronic percocet 10mg of which she receives 120 pills per month. 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        Hypothyroidism, postsurgical    Continue lt4 150 mcg daily  Noted in psych note that pt adjust home dose  TSH 7.757 (improved) with normal free T4.  Recommend repeat TSH in 6-8 weeks.          Essential hypertension    183/112 on admission  Continue HCTZ and carvedilol              Discharged Condition: good    Disposition: Home or Self Care    Follow Up:  Follow-up Information     Mary Cabrera MD.    Specialty:  Internal Medicine  Contact information:  [address] [facility] [address] [phone]                 Patient Instructions:     Diet general   Order Specific Question Answer Comments   Additional restrictions: Diabetic 1800    Additional restrictions: Bariatric Soft  "     Activity as tolerated       Medications:  Reconciled Home Medications:   Current Discharge Medication List      START taking these medications    Details   promethazine (PHENERGAN) 25 MG suppository Place 1 suppository (25 mg total) rectally every 6 (six) hours as needed for Nausea.  Qty: 12 suppository, Refills: 0         CONTINUE these medications which have NOT CHANGED    Details   doxepin (SINEQUAN) 25 MG capsule Take 1 capsule (25 mg total) by mouth nightly as needed.  Qty: 30 capsule, Refills: 11    Associated Diagnoses: Generalized anxiety disorder      albuterol 90 mcg/actuation inhaler Inhale 2 puffs into the lungs every 6 (six) hours as needed for Wheezing. Please provide cheapest brand/generic formulation  Qty: 18 g, Refills: 1    Associated Diagnoses: Uncomplicated asthma, unspecified asthma severity      alprazolam (XANAX) 1 MG tablet Take 1 tablet (1 mg total) by mouth every evening.  Qty: 30 tablet, Refills: 5    Associated Diagnoses: Panic disorder without agoraphobia      blood sugar diagnostic Strp 1 each by Misc.(Non-Drug; Combo Route) route 4 (four) times daily with meals and nightly.  Qty: 100 each, Refills: 2      blood-glucose meter kit Use as instructed  Qty: 1 each, Refills: 0      calcium carbonate (OS-MARIAN) 500 mg calcium (1,250 mg) tablet Take 2 tablets (1,000 mg total) by mouth once daily.  Refills: 0      carvedilol (COREG) 12.5 MG tablet Take 2 tablets (25 mg total) by mouth 2 (two) times daily.  Qty: 120 tablet, Refills: 11      cholecalciferol, vitamin D3, 1,000 unit capsule Take 2 capsules (2,000 Units total) by mouth once daily.  Refills: 0      docusate sodium (COLACE) 50 MG capsule Take 1 capsule (50 mg total) by mouth 2 (two) times daily.  Refills: 0      fluticasone (FLONASE) 50 mcg/actuation nasal spray 2 sprays by Nasal route 2 (two) times daily.       gabapentin (NEURONTIN) 600 MG tablet Take 600 mg by mouth 3 (three) times daily.       hydrochlorothiazide (HYDRODIURIL)  25 MG tablet Take 1 tablet (25 mg total) by mouth once daily.  Qty: 30 tablet, Refills: 11    Associated Diagnoses: Essential hypertension      insulin aspart (NOVOLOG) 100 unit/mL InPn pen Inject 16 Units into the skin 3 (three) times daily with meals.  Qty: 1 Box, Refills: 2      insulin detemir (LEVEMIR FLEXTOUCH) 100 unit/mL (3 mL) SubQ InPn pen Inject 30 Units into the skin 2 (two) times daily.  Qty: 1 Box, Refills: 1      lamotrigine (LAMICTAL) 100 MG tablet Take half tab by mouth each morning x 14 days, then 1 tab by mouth each morning  Qty: 30 tablet, Refills: 11    Associated Diagnoses: Mood disorder      lancets Misc 1 each by Misc.(Non-Drug; Combo Route) route 4 (four) times daily with meals and nightly.  Qty: 100 each, Refills: 2      levocetirizine (XYZAL) 5 MG tablet Take 1 tablet (5 mg total) by mouth every evening.  Qty: 90 tablet, Refills: 3    Associated Diagnoses: Allergic rhinitis, unspecified allergic rhinitis type      levothyroxine (SYNTHROID) 150 MCG tablet Take 1 tablet (150 mcg total) by mouth once daily.  Qty: 30 tablet, Refills: 3      meclizine (ANTIVERT) 25 mg tablet Take 1 tablet (25 mg total) by mouth 3 (three) times daily as needed for Dizziness or Nausea.  Qty: 90 tablet, Refills: 0    Associated Diagnoses: Chronic nausea      mirtazapine (REMERON) 30 MG tablet Take 1 tablet (30 mg total) by mouth every evening.  Qty: 30 tablet, Refills: 11    Associated Diagnoses: Mood disorder      ondansetron (ZOFRAN) 4 MG tablet Take 1 tablet (4 mg total) by mouth every 6 (six) hours as needed for Nausea.  Qty: 12 tablet, Refills: 0      ondansetron (ZOFRAN-ODT) 8 MG TbDL Take 1 tablet (8 mg total) by mouth every 6 (six) hours as needed (nausea).  Qty: 30 tablet, Refills: 1      oxycodone-acetaminophen (PERCOCET)  mg per tablet Take 1 tablet by mouth every 4 (four) hours as needed for Pain. [facility]      oxycodone-acetaminophen (PERCOCET) 5-325  "mg per tablet Take 1-2 tablets by mouth every 4 (four) hours as needed.  Qty: 45 tablet, Refills: 0      pen needle, diabetic 31 gauge x 5/16" Ndle 1 each by Misc.(Non-Drug; Combo Route) route 5 (five) times daily.  Qty: 200 each, Refills: 2      promethazine (PHENERGAN) 25 MG tablet TAKE 1 TABLET (25 MG TOTAL) BY MOUTH EVERY 6 HOURS AS NEEDED.  Qty: 60 tablet, Refills: 2    Associated Diagnoses: Chronic nausea      tizanidine (ZANAFLEX) 4 MG tablet TAKE 3 TABLETS (12 MG TOTAL) BY MOUTH EVERY EVENING.  Qty: 90 tablet, Refills: 2      topiramate (TOPAMAX) 25 MG tablet Take 1 tablet (25 mg total) by mouth daily as needed (migraines).  Qty: 90 tablet, Refills: 3    Associated Diagnoses: Chronic migraine without aura without status migrainosus, not intractable           Time spent on the discharge of patient: >30 minutes      Sonali Ricci PA-C  Department of Hospital Medicine  [facility]  "

## 2017-09-27 NOTE — ASSESSMENT & PLAN NOTE
History of gastric bypass 2013  Could consider erythromycin/reglan for improved motility.  Recommend follow up in bariatrics.

## 2017-09-27 NOTE — PLAN OF CARE
Payor: HU[employer] MANAGED MEDICARE / Plan: HU[employer] MEDICARE HMO / Product Type: Capitation /      Mary Cabrera MD       [facility] [place] - [address]  [address] [address]  Phone: [phone] Fax: [phone]    [facility] - [place], [place] - [address]  [address]  [place] [address]  Phone: [phone] Fax: [phone]    [facility] - [place], [place] - [address]  [address] [address]  Phone: [phone] Fax: [phone]      Extended Emergency Contact Information  Primary Emergency Contact: Girma Ramirez  Address: [address] [place] [address]  Mobile Phone: [phone]  Relation: Son  Preferred language: English  Secondary Emergency Contact: Nuzhat Barrera   [place]  Mobile Phone: [phone]  Relation:   Preferred language: English        09/27/17 0926   Discharge Assessment   Assessment Type Discharge Planning Assessment   Confirmed/corrected address and phone number on facesheet? Yes   Assessment information obtained from? Patient   Expected Length of Stay (days) 2   Communicated expected length of stay with patient/caregiver yes   Prior to hospitilization cognitive status: Alert/Oriented   Prior to hospitalization functional status: Independent   Current cognitive status: Alert/Oriented   Current Functional Status: Independent   Lives With alone   Able to Return to Prior Arrangements yes   Is patient able to care for self after discharge? Yes   Who are your caregiver(s) and their phone number(s)? Lukasz Ramirez  [phone]   Patient's perception of discharge disposition home or selfcare   Readmission Within The Last 30 Days other (see comments)   Patient currently being followed by outpatient case management? No   Patient currently receives any other outside agency services? No    Equipment Currently Used at Home walker, rolling;wheelchair;3-in-1 commode   Do you have any problems affording any of your prescribed medications? No   Is the patient taking medications as prescribed? yes   Does the patient have transportation home? Yes   Transportation Available family or friend will provide   Does the patient receive services at the [facility]? No   Discharge Plan A Home;Home with family   Discharge Plan B Home   Patient/Family In Agreement With Plan yes

## 2017-09-27 NOTE — ASSESSMENT & PLAN NOTE
Hypophosphatemia  S/p appendectomy 9/17/17, general surgery evaluated and CT Abdomen with no acute abnormalities  Suspect post surgical syndrome however will obtain RUQ US due to pain on exam  Pt given 2L LR in the ED with some improvement in tachycardia.  Pt given continuous IVF overnight.  Supportive care, strict I&Os (+250).  Replaced K and Phos IV.  Mg WNL.

## 2017-09-27 NOTE — DISCHARGE SUMMARY
[facility] Hospital Medicine  Discharge Summary      Patient Name: Homar Jerry  MRN: [id number]  Admission Date: 9/26/2017  Hospital Length of Stay: 0 days  Discharge Date and Time:  09/27/2017 2:20 PM  Attending Physician: KELTON Santos MD   Discharging Provider: Sonali Ricci PA-C  Primary Care Provider: Mary Cabrera MD  Hospital Medicine Team: [facility] Sonali Ricci PA-C    HPI:   43 y/o F s/p laparoscopic appendectomy 9/17/17 for non perforated appendicitis, PMH HTN, hypothyroidism, fibromyalgia, ESHA DM (managed as type 1), gastroparesis, morbid obesity s/p sleeve gastrectomy (2013) presented to the ED with c/o N/V/D, fever (102.1 F at home), and RUQ abdominal pain.  She was tolerating a diet at the time of her discharge per discharge summary however pt reports otherwise. Pt states for the past 7 days after each meal she immediately vomits or has watery diarrhea.  RUQ started 2-3 days ago and worsening.  Pt reports she cannot take percocet 10 due to vomiting.  Pt also reports last admission N/V was not controlled with phenergan suppositories.  +chills, dizziness, lightheadedness.  Pt denies CP, SOB, leg swelling.    In the ED, labs reveal WBC 11.56, Tmax 99.3 F, Phos 2.5.  Mg, K, BUN, Cr all WNL.  Urine unremarkable.  EKG sinus tach.  CT abd/pelvis normal, no evidence that her symptoms are related to her appendectomy 10 days ago.  General Surgery consulted and recommended admission for rehydration.    * No surgery found *      Indwelling Lines/Drains at time of discharge:   Lines/Drains/Airways          No matching active lines, drains, or airways        Hospital Course:   Pt admitted for N/V, abdominal pain s/p lap appendectomy 9/17/17.  On admission, pt tachycardic to 138, improved to 100 with 2L NS.   WBC 11.56 with Tmax 99.3.  CT abdomen/pelvis with postoperative surgical changes of appendectomy but otherwise unremarkable.  General Surgery consulted and did  not feel symptoms related to appendectomy.  Continuous IVF initiated.  RUQ US ordered due to pain on palpation revealing probable hepatic steatosis.  Procalcitonin (0.10), lactic acid (1.0), TSH ordered (7.757, free T4 0.90).  Strict I&Os documented overnight and no vomiting or diarrhea.  On admission pt stated she had not taken all of her percocet  prescribed at discharge last admission.  Day 2 WBC 9.20.  Pt tolerated advancement of diet to full liquids and requested solid food.  Day of discharge pt sitting up in bed comfortably listening to music without any signs of distress.  It was noted overnight pt did not request any medications for nausea, only IV pain medication.  Day of discharge pt tolerating po percocet which was prescribed at discharge last admission.  At discharge pt given prescription for phenergan suppositories prn.     Consults:   Consults         Status Ordering Provider     Inpatient consult to General Surgery  Once     Provider:  (Not yet assigned)    Completed TEMI HALL          Significant Diagnostic Studies: Labs: All labs within the past 24 hours have been reviewed    Pending Diagnostic Studies:     None        Final Active Diagnoses:    Diagnosis Date Noted POA    S/P laparoscopic appendectomy [Z90.49] 09/26/2017 Not Applicable    Abdominal pain [R10.9] 02/02/2017 Yes    Gastroparesis [K31.84] 03/28/2013 Yes    Drug-seeking behavior [Z76.5] 12/02/2016 Yes    Anxiety [F41.9] 01/12/2015 Yes     Chronic    Fibromyalgia [M79.7] 11/14/2014 Yes     Chronic    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), 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.9] 01/23/2017 Yes    Hypothyroidism, postsurgical [E89.0]  Yes     Chronic    Essential hypertension [I10]  Yes     Chronic      Problems Resolved During this Admission:    Diagnosis Date Noted Date Resolved POA    PRINCIPAL PROBLEM:  Dehydration [E86.0] 09/26/2017 09/27/2017 Yes    Non-intractable vomiting with nausea [R11.2] 09/26/2017  "09/27/2017 Unknown      * Dehydration-resolved as of 9/27/2017    Hypophosphatemia  S/p appendectomy 9/17/17, general surgery evaluated and CT Abdomen with no acute abnormalities  Suspect post surgical syndrome however will obtain RUQ US due to pain on exam  Pt given 2L LR in the ED with some improvement in tachycardia.  Pt given continuous IVF overnight.  Supportive care, strict I&Os (+250).  Replaced K and Phos IV.  Mg WNL.          Non-intractable vomiting with nausea-resolved as of 9/27/2017    See above  Strict I&Os: no emesis documented.  Pt reports she cannot take po medications.  Phenergan IV on backorder and pt states phenergan suppositories provide no relief.  Ordered reglan IV prn however pt did not request during hospital stay.          S/P laparoscopic appendectomy    Per General Surgery: CT abd/pelvis normal, no evidence that her symptoms are related to her appendectomy 10 days ago          Abdominal pain    See above  Tmax 99.3, WBC 11.56->9.20.  Lactic acid, procalcitonin WNL.  On admission pt stated she still had percocet  and 5-325 at home.  RUQ probable hepatic steatosis.  LFTs WNL.  Morphine 2 mg q 6 hrs prn for 24 hrs with goal to transition back to po.  Pt discharged on percocet 5-325, take 1-2 tablets q 4 hrs prn.  Day 2 pt tolerating po meds.  Pt sitting up in bed without any signs of distress, minimal tenderness to palpation of abdomen on exam.  Pt examined with staff Dr. Santos.          Gastroparesis    History of gastric bypass 2013  Could consider erythromycin/reglan for improved motility.  Recommend follow up in bariatrics.          Drug-seeking behavior    PCP note on 8/31/17:   "Pt counseled extensively that I will not prescribe benzos for her. She is on Alprazolam 1mg nightly and filled  #30 on 8/12 from [facility]. She has refills on this rx. I have verified that she has an appt with psychiatry on 9/26 at 3pm with Dr. Girard. She was offered a cancellation earlier " "this week but declined (bad weather.)   She becomes argumentative when I tell her I cannot fill her psychiatric medications (which I have told her on previous occasions.) . She demonstrates manipulative behavior. She states that [facility] increased her dose to 2mg and told her to get this from her PCP and therefore I must fill this. I told her no and that she must discuss with psychiatry at her appointment. She states that she is not sleeping and needs something now to help her. In addition to the benzo she is on chronic percocet 10mg of which she receives 120 pills per month. She is already on remeron 30mg nightly which she has from [facility]."  -On admission pt stated she had supply of percocet at home.  At discharge pt requesting new prescription.  Pt advised to contact PCP.        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        Hypothyroidism, postsurgical    Continue lt4 150 mcg daily  Noted in psych note that pt adjust home dose  TSH 7.757 (improved) with normal free T4.  Recommend repeat TSH in 6-8 weeks.          Essential hypertension    183/112 on admission  Continue HCTZ and carvedilol              Discharged Condition: good    Disposition: Home or Self Care    Follow Up:  Follow-up Information     Mary TAM" MD Rick.    Specialty:  Internal Medicine  Contact information:  [address]  [facility] [address]  [phone]                 Patient Instructions:     Diet general   Order Specific Question Answer Comments   Additional restrictions: Diabetic 1800    Additional restrictions: Bariatric Soft      Activity as tolerated       Medications:  Reconciled Home Medications:   Current Discharge Medication List      START taking these medications    Details   promethazine (PHENERGAN) 25 MG suppository Place 1 suppository (25 mg total) rectally every 6 (six) hours as needed for Nausea.  Qty: 12 suppository, Refills: 0         CONTINUE these medications which have NOT CHANGED    Details   doxepin (SINEQUAN) 25 MG capsule Take 1 capsule (25 mg total) by mouth nightly as needed.  Qty: 30 capsule, Refills: 11    Associated Diagnoses: Generalized anxiety disorder      albuterol 90 mcg/actuation inhaler Inhale 2 puffs into the lungs every 6 (six) hours as needed for Wheezing. Please provide cheapest brand/generic formulation  Qty: 18 g, Refills: 1    Associated Diagnoses: Uncomplicated asthma, unspecified asthma severity      alprazolam (XANAX) 1 MG tablet Take 1 tablet (1 mg total) by mouth every evening.  Qty: 30 tablet, Refills: 5    Associated Diagnoses: Panic disorder without agoraphobia      blood sugar diagnostic Strp 1 each by Misc.(Non-Drug; Combo Route) route 4 (four) times daily with meals and nightly.  Qty: 100 each, Refills: 2      blood-glucose meter kit Use as instructed  Qty: 1 each, Refills: 0      calcium carbonate (OS-MARIAN) 500 mg calcium (1,250 mg) tablet Take 2 tablets (1,000 mg total) by mouth once daily.  Refills: 0      carvedilol (COREG) 12.5 MG tablet Take 2 tablets (25 mg total) by mouth 2 (two) times daily.  Qty: 120 tablet, Refills: 11      cholecalciferol, vitamin D3, 1,000 unit capsule Take 2 capsules (2,000 Units total) by mouth once daily.  Refills: 0      docusate sodium (COLACE) 50 MG  capsule Take 1 capsule (50 mg total) by mouth 2 (two) times daily.  Refills: 0      fluticasone (FLONASE) 50 mcg/actuation nasal spray 2 sprays by Nasal route 2 (two) times daily.       gabapentin (NEURONTIN) 600 MG tablet Take 600 mg by mouth 3 (three) times daily.       hydrochlorothiazide (HYDRODIURIL) 25 MG tablet Take 1 tablet (25 mg total) by mouth once daily.  Qty: 30 tablet, Refills: 11    Associated Diagnoses: Essential hypertension      insulin aspart (NOVOLOG) 100 unit/mL InPn pen Inject 16 Units into the skin 3 (three) times daily with meals.  Qty: 1 Box, Refills: 2      insulin detemir (LEVEMIR FLEXTOUCH) 100 unit/mL (3 mL) SubQ InPn pen Inject 30 Units into the skin 2 (two) times daily.  Qty: 1 Box, Refills: 1      lamotrigine (LAMICTAL) 100 MG tablet Take half tab by mouth each morning x 14 days, then 1 tab by mouth each morning  Qty: 30 tablet, Refills: 11    Associated Diagnoses: Mood disorder      lancets Misc 1 each by Misc.(Non-Drug; Combo Route) route 4 (four) times daily with meals and nightly.  Qty: 100 each, Refills: 2      levocetirizine (XYZAL) 5 MG tablet Take 1 tablet (5 mg total) by mouth every evening.  Qty: 90 tablet, Refills: 3    Associated Diagnoses: Allergic rhinitis, unspecified allergic rhinitis type      levothyroxine (SYNTHROID) 150 MCG tablet Take 1 tablet (150 mcg total) by mouth once daily.  Qty: 30 tablet, Refills: 3      meclizine (ANTIVERT) 25 mg tablet Take 1 tablet (25 mg total) by mouth 3 (three) times daily as needed for Dizziness or Nausea.  Qty: 90 tablet, Refills: 0    Associated Diagnoses: Chronic nausea      mirtazapine (REMERON) 30 MG tablet Take 1 tablet (30 mg total) by mouth every evening.  Qty: 30 tablet, Refills: 11    Associated Diagnoses: Mood disorder      ondansetron (ZOFRAN) 4 MG tablet Take 1 tablet (4 mg total) by mouth every 6 (six) hours as needed for Nausea.  Qty: 12 tablet, Refills: 0      ondansetron (ZOFRAN-ODT) 8 MG TbDL Take 1 tablet (8 mg  "total) by mouth every 6 (six) hours as needed (nausea).  Qty: 30 tablet, Refills: 1      oxycodone-acetaminophen (PERCOCET)  mg per tablet Take 1 tablet by mouth every 4 (four) hours as needed for Pain. Per Dr. Villa at [facility] [place] [place]      oxycodone-acetaminophen (PERCOCET) 5-325 mg per tablet Take 1-2 tablets by mouth every 4 (four) hours as needed.  Qty: 45 tablet, Refills: 0      pen needle, diabetic 31 gauge x 5/16" Ndle 1 each by Misc.(Non-Drug; Combo Route) route 5 (five) times daily.  Qty: 200 each, Refills: 2      promethazine (PHENERGAN) 25 MG tablet TAKE 1 TABLET (25 MG TOTAL) BY MOUTH EVERY 6 HOURS AS NEEDED.  Qty: 60 tablet, Refills: 2    Associated Diagnoses: Chronic nausea      tizanidine (ZANAFLEX) 4 MG tablet TAKE 3 TABLETS (12 MG TOTAL) BY MOUTH EVERY EVENING.  Qty: 90 tablet, Refills: 2      topiramate (TOPAMAX) 25 MG tablet Take 1 tablet (25 mg total) by mouth daily as needed (migraines).  Qty: 90 tablet, Refills: 3    Associated Diagnoses: Chronic migraine without aura without status migrainosus, not intractable           Time spent on the discharge of patient: >30 minutes      Sonali Ricci PA-C  Department of Hospital Medicine  [facility]  "

## 2017-09-27 NOTE — PLAN OF CARE
SW assigned to cover IMF today 9/27/2017. Sw ready to assist team with d/c plan. No d/c needs identified at this time. Rogleio will continue to follow.      Mary Adams LMSW

## 2017-09-27 NOTE — ASSESSMENT & PLAN NOTE
See above  Strict I&Os: no emesis documented.  Pt reports she cannot take po medications.  Phenergan IV on backorder and pt states phenergan suppositories provide no relief.  Ordered reglan IV prn however pt did not request during hospital stay.

## 2017-09-27 NOTE — ASSESSMENT & PLAN NOTE
Continue lt4 150 mcg daily  Noted in psych note that pt adjust home dose  TSH 7.757 (improved) with normal free T4.  Recommend repeat TSH in 6-8 weeks.

## 2017-09-28 ENCOUNTER — TELEPHONE (OUTPATIENT)
Dept: PHARMACY | Facility: CLINIC | Age: 42
End: 2017-09-28

## 2017-09-28 ENCOUNTER — OUTPATIENT CASE MANAGEMENT (OUTPATIENT)
Dept: ADMINISTRATIVE | Facility: OTHER | Age: 42
End: 2017-09-28

## 2017-09-28 NOTE — PROGRESS NOTES
"9/28/2017:   spoke to patient in an attempt to complete assessment.  She reported that she "just got out of the hospital" and requested that  try again tomorrow.    "

## 2017-09-29 ENCOUNTER — OUTPATIENT CASE MANAGEMENT (OUTPATIENT)
Dept: ADMINISTRATIVE | Facility: OTHER | Age: 42
End: 2017-09-29

## 2017-09-29 NOTE — PROGRESS NOTES
9/29/2017:   spoke to patient via telephone in order to complete assessment.  Pt reported that she lives alone in [place].  She has a 23 year old son who lives on his own.  Pt reported that she is currently receiving psych services outside the [facility] with Dr. Tejada and has recently started taking her medications again.    Pt reported that she has applied for food stamps and medicaid and is over the income guidelines.  She reported a monthly income of about $1500.  She reported hardship with paying her co-pays.    encouraged her to complete the [facility] Patient Assistance [facility] Application that I mailed to her.    She reported that she does not need a Medical Power of .    Patient also has transportation issues.  Patient lives in [facility] and her appointments are in [facility].  She has attempted to use [employer] in the past; however, she reported that they are unreliable.  She also reported that her aunt is assisting patient's sister with transportation because her sister is receiving cancer treatments at this time.     Plan for next encounter:  1. Ensure that patient received Patient Assistance Application  2. Encourage her to complete it.  3. Assess for additional needs.

## 2017-10-01 LAB — BACTERIA BLD CULT: NORMAL

## 2017-10-03 LAB
BACTERIA BLD CULT: NORMAL

## 2017-10-06 ENCOUNTER — OUTPATIENT CASE MANAGEMENT (OUTPATIENT)
Dept: ADMINISTRATIVE | Facility: OTHER | Age: 42
End: 2017-10-06

## 2017-10-06 NOTE — PROGRESS NOTES
Good morning. My name is Brian, I work for [facility]s Outpatient case management department with . I wanted to call and review your disaster plan with you. I also wanted to go over some crucial information and provide you with emergency phone numbers for your [place].   [] Called patient, no answer, I left a message with the phone number for the [place] Office of Emergency Preparedness.   [x] Please be sure to have a supply of water, non-perishable food items, flashlights and batteries with you in your house.   [x] Please be sure you bring all of your medications with you. Bring at least a five day supply. It is best to bring your medication bottles, in case you are displaced for a longer period of time, therefore you can get your medication refilled from your temporary location.   [x] Please bring sure to bring any DME that you may need. This includes walkers, wheelchairs, shower chairs, nebulizer machine, etc.   [x] If you are a diabetic- be sure to bring your glucometer and all glucometer monitoring supplies.   [x] If you have high blood pressure- be sure to bring your blood pressure cuff so you can continue to monitor.   [] If you have CHF-be sure to bring your scale so you can continue to monitor.  [] If on PEG feeding- be sure to bring tube feedings and feeding supplies.  [] If on oxygen- be sure to bring all of your oxygen supplies: Cannula, portable tanks, concentrator, etc. Also contact you Oxygen Supply Company to find out the nearest location of an oxygen supply company to where you will be located. ([employer]: [phone], [facility]: [phone], [facility]:[phone], [employer]: [phone]).   [] If you receive hemodialysis- you should already have a plan in place with your dialysis center. If you do not know where you need to evacuate to in order to be close to a dialysis center- reach out to your dialysis center for further direction. ([facility] service line:  [phone], [facility] service line [phone])  [] If receiving treatment at an infusion center- please contact the infusion center to find out which infusion center they have a contract with. Also ask your infusion center for location of the infusion center they are in contract with, so if need be you can evacuate to that area.   Office of Emergency Preparedness Phone number:  [] [facility]: [phone]  [] [facility]: [phone]  [] [place]: [phone]  [] [place]: [phone]  [x] [place]: [phone]  [] [facility]: [phone]  [] [facility]: [phone]  [] [place]: [phone]  [] [facility]: [phone]  [] [facility]: [phone]  [] [facility]: [phone]  [] [place]: [phone]  [] [facility]: [phone]    [] [facility]:  [phone]   [] [facility]:  [phone]   [] [facility]:  [phone]   [] [facility]:  [phone]   [] [facility]:  [phone]   [] [facility]: [phone]   [] [facility]:  [phone]   [] [facility]:  [phone]   [] [facility]:  [phone]   [] [facility]:  [phone]   [] [facility]:  [phone]   [] [facility]:  [phone]   [] [facility]:  [phone]   [] [facility]:  [phone]   [] [facility]:  [phone]   [] [facility]: [phone]   [] [facility] [place]:  [phone]   [] [facility] [place]: [phone]   [] [facility]: [phone]

## 2017-10-09 ENCOUNTER — CLINICAL SUPPORT (OUTPATIENT)
Dept: ELECTROPHYSIOLOGY | Facility: CLINIC | Age: 42
End: 2017-10-09
Payer: MEDICARE

## 2017-10-09 ENCOUNTER — OUTPATIENT CASE MANAGEMENT (OUTPATIENT)
Dept: ADMINISTRATIVE | Facility: OTHER | Age: 42
End: 2017-10-09

## 2017-10-09 DIAGNOSIS — R55 SYNCOPE, UNSPECIFIED SYNCOPE TYPE: ICD-10-CM

## 2017-10-09 DIAGNOSIS — Z95.818 STATUS POST PLACEMENT OF IMPLANTABLE LOOP RECORDER: ICD-10-CM

## 2017-10-09 PROCEDURE — 93299 LOOP RECORDER REMOTE: CPT | Mod: S$GLB,,, | Performed by: INTERNAL MEDICINE

## 2017-10-09 PROCEDURE — 93297 REM INTERROG DEV EVAL ICPMS: CPT | Mod: S$GLB,,, | Performed by: INTERNAL MEDICINE

## 2017-10-09 NOTE — PROGRESS NOTES
10/9/17  Chart reviewed. Readmission to hosp 9/26- 9/28/17 for N/V, dehydration.   Verbal message left for pt requesting return call.

## 2017-10-12 ENCOUNTER — OUTPATIENT CASE MANAGEMENT (OUTPATIENT)
Dept: ADMINISTRATIVE | Facility: OTHER | Age: 42
End: 2017-10-12

## 2017-10-12 NOTE — PROGRESS NOTES
10/12/2017:   continues to attempt to locate transportation resources for this patient.  I spoke to [facility] [employer] who reported that they do not cross [place] lines.  The representative recommended that  contact the [facility] and [facility].  I emailed the director, Vani Ashley, requesting information and criteria for their program since no one answered the telephone.      Attempted to contact patient via telephone in order to follow up.  Left a message on her voicemail.  Will continue to follow.        Plan for next encounter:  1. Ensure that patient received Patient Assistance Application  2. Encourage her to complete it.  3. Follow up with [place] [facility] re: transportation  4. Assess for additional needs.

## 2017-10-14 ENCOUNTER — HOSPITAL ENCOUNTER (EMERGENCY)
Facility: HOSPITAL | Age: 42
Discharge: HOME | End: 2017-10-14
Attending: EMERGENCY MEDICINE
Payer: MEDICARE

## 2017-10-14 VITALS
OXYGEN SATURATION: 98 % | HEART RATE: 97 BPM | DIASTOLIC BLOOD PRESSURE: 71 MMHG | WEIGHT: 237 LBS | RESPIRATION RATE: 18 BRPM | TEMPERATURE: 98 F | BODY MASS INDEX: 39.49 KG/M2 | SYSTOLIC BLOOD PRESSURE: 100 MMHG | HEIGHT: 65 IN

## 2017-10-14 DIAGNOSIS — M79.605 LEFT LEG PAIN: ICD-10-CM

## 2017-10-14 DIAGNOSIS — M71.22 BAKER'S CYST OF KNEE, LEFT: Primary | ICD-10-CM

## 2017-10-14 DIAGNOSIS — M25.569 KNEE PAIN: ICD-10-CM

## 2017-10-14 DIAGNOSIS — B35.4 TINEA CORPORIS: ICD-10-CM

## 2017-10-14 DIAGNOSIS — M25.559 HIP PAIN: ICD-10-CM

## 2017-10-14 PROCEDURE — 99284 EMERGENCY DEPT VISIT MOD MDM: CPT | Mod: 25

## 2017-10-14 PROCEDURE — 96372 THER/PROPH/DIAG INJ SC/IM: CPT

## 2017-10-14 PROCEDURE — 63600175 PHARM REV CODE 636 W HCPCS: Performed by: EMERGENCY MEDICINE

## 2017-10-14 PROCEDURE — 99283 EMERGENCY DEPT VISIT LOW MDM: CPT | Mod: ,,, | Performed by: EMERGENCY MEDICINE

## 2017-10-14 PROCEDURE — 25000003 PHARM REV CODE 250: Performed by: EMERGENCY MEDICINE

## 2017-10-14 RX ORDER — CLOTRIMAZOLE 1 %
CREAM (GRAM) TOPICAL 2 TIMES DAILY
Qty: 14 G | Refills: 0 | Status: ON HOLD | OUTPATIENT
Start: 2017-10-14 | End: 2018-03-28 | Stop reason: CLARIF

## 2017-10-14 RX ORDER — LIDOCAINE 50 MG/G
1 PATCH TOPICAL DAILY PRN
Qty: 30 PATCH | Refills: 0 | Status: ON HOLD | OUTPATIENT
Start: 2017-10-14 | End: 2018-04-02

## 2017-10-14 RX ORDER — MIRTAZAPINE 45 MG/1
45 TABLET, FILM COATED ORAL NIGHTLY
Status: ON HOLD | COMMUNITY
End: 2018-04-16

## 2017-10-14 RX ORDER — DIAZEPAM 5 MG/1
5 TABLET ORAL
Status: COMPLETED | OUTPATIENT
Start: 2017-10-14 | End: 2017-10-14

## 2017-10-14 RX ORDER — ORPHENADRINE CITRATE 30 MG/ML
30 INJECTION INTRAMUSCULAR; INTRAVENOUS ONCE
Status: COMPLETED | OUTPATIENT
Start: 2017-10-14 | End: 2017-10-14

## 2017-10-14 RX ORDER — HYDROXYZINE PAMOATE 50 MG/1
50 CAPSULE ORAL 4 TIMES DAILY
Status: ON HOLD | COMMUNITY
End: 2018-04-16 | Stop reason: HOSPADM

## 2017-10-14 RX ADMIN — ORPHENADRINE CITRATE 30 MG: 30 INJECTION INTRAMUSCULAR; INTRAVENOUS at 02:10

## 2017-10-14 RX ADMIN — DIAZEPAM 5 MG: 5 TABLET ORAL at 03:10

## 2017-10-14 NOTE — ED NOTES
Patient identifiers verified and correct for Ms Jerry  C/C: Left knee pain   APPEARANCE: awake and alert in NAD.  SKIN: warm, dry and intact. No breakdown or bruising. States pulled muscle in thigh, pain to lower back and left  Knee, denies injury  MUSCULOSKELETAL: Patient moving all extremities spontaneously, no obvious swelling or deformities noted. Ambulates independently.  RESPIRATORY: Denies shortness of breath.Respirations unlabored.   CARDIAC: Denies CP, 2+ distal pulses; no peripheral edema  ABDOMEN: S/ND/NT, Denies nausea  : voids spontaneously, denies difficulty  Neurologic: AAO x 4; follows commands equal strength in all extremities; denies numbness/tingling. Positive dizziness PTA

## 2017-10-14 NOTE — ED TRIAGE NOTES
"Patient states right knee pain "locking up" x 1 week with mult falls and tension in thigh and calf, "severe muscle pain" in lower back. No new injury. States she fell last night, unable to ambulate after fall. Last Xanaflex and Percocet 10 last night. Also states discolored area to under right axilla.   "

## 2017-10-14 NOTE — ED PROVIDER NOTES
"Encounter Date: 10/14/2017       History     Chief Complaint   Patient presents with    Knee Pain     L knee keeps locking up, and spasm in my back    Arm Problem     bruise under r arm, dnies injury     43 y/o obese F with h/o chronic diffuse arthralgias and back pain, anemia, anxiety, cervical ca s/p DOT, DM p/w acute onset LLE pain and L back pain. Pt reports having been in usual state of health, taking norco and xanaflex prescribed by her bone/jt dz pain specialist MD for her chronic body pains. She was getting out of bed yest, felt a "locking up" of her L knee and calf, followed by falling onto her L hip and LE, and L lower back pain described as a "spasm." She had difficulty getting up after this and has since endorsed pain in these areas worsened with palpation and movemt. States her usual pain regimen has not helped with her pain, and she cannot see her PCP until next wk. She secondarily endorses a discoloration in her R armpit x the past week which she describes as painful. Denies midline back pain, weakness, numbness/paresth, GI/ habit changes including incontinence, subjective saddle anesth, f/c, recent illness or other complaint.                 Review of patient's allergies indicates:   Allergen Reactions    Dexamethasone Hives and Shortness Of Breath     Per pt, only steroid she is allergic to is dexamethasone    Doxepin hcl Hives, Diarrhea and Itching    Ciprofloxacin      Counteracts with seroquel, xanax, tizanidine    Compazine [prochlorperazine] Hives and Itching    Lyrica [pregabalin]      depression    Prednisone      Gastroparesis flare up     Past Medical History:   Diagnosis Date    Anemia     Anxiety     Asthma     usually with cold weather    Back pain     Bipolar 1 disorder 3/17/2015    Cervical cancer 2009    DOT    Diabetes mellitus with hyperglycemia 1/17/2017    Fibrocystic breast     Fibromyalgia     Gastroparesis 2012    s/p sleeve to cover damaged nerves; s/p " gastric pacemaker which did not help; due to nerve damage during surgery    Hypertension     Hypothyroidism, postsurgical     Morbid obesity     Non-intractable vomiting with nausea 9/26/2017    Prediabetes     Rheumatoid arthritis     S/P laparoscopic appendectomy 9/26/2017    Thyroid cancer 2003 & 2013    thyroidectomy    Urinary incontinence      Past Surgical History:   Procedure Laterality Date    ANKLE FRACTURE SURGERY Right     APPENDECTOMY      BREAST SURGERY      exploratory laparotomy due to complications of hysterectomy  2009    cervical cuff burst with air in abdomen    FRACTURE SURGERY      GASTRECTOMY      gastric pacemaker      gastric sleeve      HYSTERECTOMY  2009    total including cervix    KNEE ARTHROSCOPY W/ MENISCAL REPAIR Left 2016    KNEE SURGERY  05/12/2016    loop monitor  2016    multiple breast biopsies      TONSILLECTOMY      ureteral sling       Family History   Problem Relation Age of Onset    Heart disease Mother     Arthritis Mother     Rheum arthritis Mother     Heart attack Mother 55    Clotting disorder Mother     Hyperlipidemia Father     Clotting disorder Father     Thyroid cancer Paternal Grandmother     Diabetes Paternal Grandmother     Clotting disorder Paternal Grandfather     No Known Problems Sister     No Known Problems Brother     No Known Problems Maternal Aunt     No Known Problems Paternal Aunt     Bipolar disorder Maternal Uncle     Pancreatic cancer Maternal Uncle     No Known Problems Paternal Uncle     No Known Problems Maternal Grandfather     No Known Problems Maternal Grandmother     No Known Problems Cousin     ADD / ADHD Son     Pneumonia Brother     HIV Brother     Alcohol abuse Neg Hx     Anxiety disorder Neg Hx     Dementia Neg Hx     Depression Neg Hx     Drug abuse Neg Hx     OCD Neg Hx     Paranoid behavior Neg Hx     Physical abuse Neg Hx     Schizophrenia Neg Hx     Seizures Neg Hx     Self  injury Neg Hx     Sexual abuse Neg Hx     Suicide Neg Hx      Social History   Substance Use Topics    Smoking status: Never Smoker    Smokeless tobacco: Never Used    Alcohol use No     Review of Systems   Constitutional: Negative for chills, diaphoresis, fatigue and fever.   HENT: Negative for congestion, rhinorrhea, sinus pressure and sore throat.    Eyes: Negative for visual disturbance.   Respiratory: Negative for cough and shortness of breath.    Cardiovascular: Negative for chest pain and palpitations.   Gastrointestinal: Negative for abdominal pain, blood in stool, constipation, diarrhea, nausea and vomiting.   Genitourinary: Negative for difficulty urinating, dysuria, flank pain, frequency, hematuria, pelvic pain, urgency, vaginal bleeding and vaginal discharge.   Musculoskeletal: Positive for arthralgias, back pain, gait problem and myalgias. Negative for joint swelling, neck pain and neck stiffness.   Skin: Negative for rash.   Neurological: Negative for dizziness, syncope, weakness, light-headedness and headaches.   Hematological: Does not bruise/bleed easily.   Psychiatric/Behavioral: Negative for behavioral problems and confusion.   All other systems reviewed and are negative.      Physical Exam     Initial Vitals [10/14/17 1123]   BP Pulse Resp Temp SpO2   (!) 158/102 (!) 112 20 98.2 °F (36.8 °C) 97 %      MAP       120.67         Physical Exam    Nursing note and vitals reviewed.  Constitutional: She appears well-developed and well-nourished. She is not diaphoretic. No distress.   Non-toxic middle aged obese BF in NAD   HENT:   Head: Normocephalic and atraumatic.   Mouth/Throat: Oropharynx is clear and moist. No oropharyngeal exudate.   Eyes: EOM are normal. Pupils are equal, round, and reactive to light. No scleral icterus.   Neck: Normal range of motion. Neck supple. No tracheal deviation present. No JVD present.   Cardiovascular: Regular rhythm and intact distal pulses. Exam reveals no gallop  and no friction rub.    No murmur heard.  Tachy 100s   Pulmonary/Chest: Breath sounds normal. No stridor. She has no wheezes. She has no rhonchi. She has no rales.   R axilla with discoloration and mild TTP; no ELVIRA; FROM shoulder   Abdominal: Soft. There is no tenderness.   Musculoskeletal: She exhibits tenderness. She exhibits no edema.   LLE exam pertinent for L lateral hip TTP, TTP at medial and lateral knee, mild TTP at posterior calf and anterolat thigh area. FROM, 5/5 strength x all jts, SILT x all nerve distns, 2+ patellar DTRs, 2+DP CR brisk, no c/c/e. Able to ambulate but gingerly.  +mild L mid lumbar TTP; no spasm appreciated; no midline c/t/l spine TTP stepoff or deformity; neg SLR and crossed SLR  No overlying / dermatomal skin changes   Neurological: She is alert and oriented to person, place, and time.   Skin: Skin is warm and dry. Capillary refill takes less than 2 seconds.   Hyperpigmentation with associated pruritus and excoriation is noted to the right axilla without associated erythema or induration or abscess.  Consistent with tinea.   Psychiatric: She has a normal mood and affect. Thought content normal.       Bharathi Bah MD I PGY-4 EM  10/14/2017 1:30 PM    ED Course   Procedures  Labs Reviewed - No data to display     Imaging Results          US Lower Extremity Veins Left (Final result)  Result time 10/14/17 16:04:01    Final result by Surinder Saravia MD (10/14/17 16:04:01)                 Impression:      No evidence of DVT in the left lower extremity.    2.6 cm left Baker's cyst.  ______________________________________     Electronically signed by resident: FERNANDA SIBLEY MD  Date:     10/14/17  Time:    15:56            As the supervising and teaching physician, I personally reviewed the images and resident's interpretation and I agree with the findings.          Electronically signed by: SURINDER SARAVIA MD  Date:     10/14/17  Time:    16:04              Narrative:    ULTRASOUND VENOUS LEFT  LOWER EXTREMITIES    INDICATION: Pain in the left leg.    COMPARISON: None.  TECHNIQUE:  Ultrasonographic images of bilateral common femoral veins, greater saphenous veins, superficial femoral veins, posterior tibial veins, anterior tibial veins, peroneal veins, and popliteal veins were obtained utilizing compression technique and doppler spectral imaging.     FINDINGS:   The bilateral common femoral, left greater saphenous, left superficial femoral, left popliteal, left anterior tibial, left posterior tibial, and left peroneal veins are patent, compressible when applicable and demonstrate normal flow and waveforms.  There is a small left Baker's cyst measuring 1.7 x 1.6 x 2.6 cm                             X-Ray Knee Complete 4 or More Views Left (Final result)  Result time 10/14/17 13:55:24    Final result by Gonzalez Eaton MD (10/14/17 13:55:24)                 Impression:     DJD. No fracture identified.      Electronically signed by: GONZALEZ EATON MD  Date:     10/14/17  Time:    13:55              Narrative:    Left knee 4 views. Comparison 10/9/15.    The skeletal structures are intact. No fracture or dislocation is seen. Degenerative joint disease is again identified with marginal spurring at multiple positions. The joint spaces show no significant narrowing. A small joint effusion may be present. No erosive change or focal soft tissue swelling is detected .                             X-Ray Hip 2 View Left (Final result)  Result time 10/14/17 13:57:00    Final result by Gonzalez Eaton MD (10/14/17 13:57:00)                 Impression:     No fracture or significant arthritis      Electronically signed by: GONZALEZ EATON MD  Date:     10/14/17  Time:    13:57              Narrative:    Left hip AP lateral. Comparison 10/9/15. Skeletal structures are intact. No fracture or dislocation is identified. Hip joint spaces are satisfactory. Articular surfaces are smooth. The SI joints are  unremarkable.                                       APC / Resident Notes:   HO-IV MDM: Initial ddx incl but not limited to: MSK spasp/strain, fx, dislocn, ligt/tendon injury, DVT among others. More likely back pain strain/spasm. Given her habitus, mechanism and location of pain with reproducibility, obtaining XRs; also DVT US given her muscle pain along posterior calf and anterolat thigh. Giving IM norflex and plan to prescribe lidoderm patch to add to her current complex pain regimen and have her f/u with her PCP and pain specialist, if w/u does not reveal non-acute pathology. R axilla findings not c/w cellulitis; possibly c/w acanthosis nigrans though unilateral; also will advise pt to f/u with PCP next wk to readdress. Dispo pending imaging and reassessmt.  Case and plan d/w staff Dr. Preciado.  MD NORA Gonzales PGY-4 EM  10/14/2017 1:30 PM    HOIV update: Pt's XRs resulted with e/o degen jt dz, bone spurring but no acute abnlty. DVT US revealed baker's cyst but no other acute abnlty. She is on an extensive pain mgmt plan per her pain specialist and we believe she would benefit from addition of a topical regimen, starting with a trial of lidoderm patch PRN. She also states she has a home orthopedist appt set up for this Thurs; we advised that she bring up her baker's cyst to have it evaluated and potentially drained; and have given her our referral info for ortho as well in case she cannot f/u with her home orthopedist. Pt reassured re: our findings and encouraged to continue with her already established outpt care. Discharging with strict precautions.  MD NORA Gonzales PGY-4 EM  10/14/2017 4:51 PM           Attending Attestation:   Physician Attestation Statement for Resident:  As the supervising MD   Physician Attestation Statement: I have personally seen and examined this patient.   I agree with the above history. -: 42-year-old woman with history of fibromyalgia and chronic pain presents for evaluation of left  knee pain as well as recent fall without associated head injury or loss of consciousness.   As the supervising MD I agree with the above PE.    As the supervising MD I agree with the above treatment, course, plan, and disposition.          Physician Attestation for Scribe:      Comments: I, Dr. Po Preciado, personally performed the services described in this documentation. All medical record entries made by the scribe were at my direction and in my presence.  I have reviewed the chart and agree that the record reflects my personal performance and is accurate and complete. Po Preciado MD.  5:24 PM 10/14/2017      Attending ED Notes:   Chlortrimazole cream has been prescribed for management of tinea corporis of the right axilla.          ED Course      Clinical Impression:   The primary encounter diagnosis was Baker's cyst of knee, left. Diagnoses of Knee pain, Hip pain, Left leg pain, and Tinea corporis right axilla were also pertinent to this visit.    Disposition:   Disposition: Discharged  Condition: Stable                        Bharathi Jimbo Bah MD  Resident  10/14/17 9210       Po Preciado MD  10/14/17 9179

## 2017-10-14 NOTE — ED NOTES
Pt states she is unable to void at present. Pt also states she had a full hysterectomy, so is unable to get pregnant.

## 2017-10-14 NOTE — ED NOTES
Pt BP checked. Pt stated that that is not her norm. Pt /71. Pt informed that we will make MD aware. MD informed and stated that that blood bp is ok to be discharged.

## 2017-10-19 ENCOUNTER — OUTPATIENT CASE MANAGEMENT (OUTPATIENT)
Dept: ADMINISTRATIVE | Facility: OTHER | Age: 42
End: 2017-10-19

## 2017-10-19 NOTE — PROGRESS NOTES
10/19/2017:   received information from [facility] [employer] transportation service.  This program will provide transportation to doctor appointment in [facility]; however, this patient's reported income is over the program income guidelines.       left a message on patient's voicemail in an attempt to follow up.  Will mail letter on Monday (10/23) if patient does not return my call by then.        Plan for next encounter:  1. Ensure that patient received Patient Assistance Application  2. Encourage her to complete it.  3. Discuss [facility] transportation    4. Assess for additional needs.

## 2017-10-23 ENCOUNTER — OUTPATIENT CASE MANAGEMENT (OUTPATIENT)
Dept: ADMINISTRATIVE | Facility: OTHER | Age: 42
End: 2017-10-23

## 2017-10-23 NOTE — LETTER
October 23, 2017    Homar Jerry  [address] [facility] [place] [address]             [facility] [address] [place] [address] Dear Ms. Jerry:    It was so nice working with you while you were enrolled in the Complex Case Management Department for services.  I have attempted to contact you for follow up without success; therefore, I will be closing your case at this time.  I would like to encourage you to contact our department if any needs arise that you think we can assist with.    The Outpatient Complex Case Management Department can be reached at [phone] from 8:00am until 4:30pm Monday thru Friday.  [facility] also has a program where a nurse is available 24/7 to answer questions or provide medical advice, their number is [phone].    If you have any questions or concerns, please don't hesitate to call.    Sincerely,        Brian Orozco, [facility]   Outpatient Complex Case Managment

## 2017-10-23 NOTE — PROGRESS NOTES
10/23/2017:   did not receive a callback from this patient; therefore, I mailed letter to her home with information re: [facility] and [facility] transportation.  Will close case at this time.

## 2017-10-24 ENCOUNTER — OFFICE VISIT (OUTPATIENT)
Dept: INTERNAL MEDICINE | Facility: CLINIC | Age: 42
End: 2017-10-24
Payer: MEDICARE

## 2017-10-24 ENCOUNTER — OFFICE VISIT (OUTPATIENT)
Dept: ELECTROPHYSIOLOGY | Facility: CLINIC | Age: 42
End: 2017-10-24
Payer: MEDICARE

## 2017-10-24 VITALS
HEIGHT: 65 IN | DIASTOLIC BLOOD PRESSURE: 100 MMHG | WEIGHT: 261.44 LBS | SYSTOLIC BLOOD PRESSURE: 140 MMHG | BODY MASS INDEX: 43.56 KG/M2 | HEART RATE: 131 BPM

## 2017-10-24 DIAGNOSIS — Z95.818 STATUS POST PLACEMENT OF IMPLANTABLE LOOP RECORDER: Chronic | ICD-10-CM

## 2017-10-24 DIAGNOSIS — H00.014 HORDEOLUM EXTERNUM OF LEFT UPPER EYELID: Primary | ICD-10-CM

## 2017-10-24 DIAGNOSIS — E66.01 MORBID OBESITY: ICD-10-CM

## 2017-10-24 DIAGNOSIS — R00.0 SINUS TACHYCARDIA: ICD-10-CM

## 2017-10-24 DIAGNOSIS — E89.0 HYPOTHYROIDISM, POSTSURGICAL: Chronic | ICD-10-CM

## 2017-10-24 DIAGNOSIS — R00.0 TACHYCARDIA: ICD-10-CM

## 2017-10-24 DIAGNOSIS — B35.4 TINEA CORPORIS: ICD-10-CM

## 2017-10-24 DIAGNOSIS — I10 ESSENTIAL HYPERTENSION: Primary | Chronic | ICD-10-CM

## 2017-10-24 PROCEDURE — 99214 OFFICE O/P EST MOD 30 MIN: CPT | Mod: S$GLB,,, | Performed by: NURSE PRACTITIONER

## 2017-10-24 PROCEDURE — 99999 PR PBB SHADOW E&M-EST. PATIENT-LVL III: CPT | Mod: PBBFAC,,, | Performed by: NURSE PRACTITIONER

## 2017-10-24 PROCEDURE — 93000 ELECTROCARDIOGRAM COMPLETE: CPT | Mod: S$GLB,,, | Performed by: INTERNAL MEDICINE

## 2017-10-24 PROCEDURE — 99999 PR PBB SHADOW E&M-EST. PATIENT-LVL IV: CPT | Mod: PBBFAC,,, | Performed by: INTERNAL MEDICINE

## 2017-10-24 PROCEDURE — 99499 UNLISTED E&M SERVICE: CPT | Mod: S$GLB,,, | Performed by: NURSE PRACTITIONER

## 2017-10-24 PROCEDURE — 99214 OFFICE O/P EST MOD 30 MIN: CPT | Mod: S$GLB,,, | Performed by: INTERNAL MEDICINE

## 2017-10-24 RX ORDER — PRENATAL VIT 91/IRON/FOLIC/DHA 28-975-200
COMBINATION PACKAGE (EA) ORAL 2 TIMES DAILY
Qty: 30 G | Refills: 0 | Status: SHIPPED | OUTPATIENT
Start: 2017-10-24 | End: 2018-03-14

## 2017-10-24 RX ORDER — QUETIAPINE FUMARATE 300 MG/1
TABLET, FILM COATED ORAL
COMMUNITY
Start: 2017-09-26 | End: 2018-01-25

## 2017-10-24 RX ORDER — CARVEDILOL 12.5 MG/1
37.5 TABLET ORAL 2 TIMES DAILY
Qty: 120 TABLET | Refills: 11 | Status: ON HOLD | OUTPATIENT
Start: 2017-10-24 | End: 2018-04-16 | Stop reason: HOSPADM

## 2017-10-24 NOTE — PROGRESS NOTES
"Subjective:    Patient ID:  Homar Jerry is a 42 y.o. female who presents for ILR Check.     Homar Jerry is a patient of Dr. Johns.    HPI     I had the pleasure of seeing Homar Jerry in follow up for her hx of syncope and palpitations s/p ILR placement. She is a 42-year old female with bipolar disorder, HTN, fibromyalgia, migraines, chronic back pain, and gastroparesis status-post laparoscopic sleeve gastrectomy and gastric pacemaker, whose history of syncope dates back to 1/2016. She has had a total of 3-4 events since that time. She described experiencing a prodrome of lightheadedness, which lasted seconds to minutes. She denied exacerbating or alleviating symptoms. She denied chest pains, shortness of breath, palpitations, or other symptoms associated with these episodes (although she has had "tachycardia" which was diagnosed in 2007, and she describes episodes of intermittent palpitations in the office today). She sometimes awakens from syncope episodes with a headache. Her last episode was in 3/2016, which resulted in a hospital admission and medication adjustments.    Ms. Jerry has historically described experiencing episodes of palpitations occurring 2-3 times per week, lasting 15-20 minutes and resolving after lying down and propping her legs up. She underwent ILR placement (06/10/16) without complication. Since her last office visit, Ms. Jerry reports occasional palpitations and dizziness, per baseline. She denies chest pain, SOB/PEACOCK, or syncope.     Since her last office visit, she reports experiencing occasional CP which she describes as sharp in nature, lasting seconds-to-minutes in duration. She also notes occasional PEACOCK with minimal activity; continued fatigue (baseline). She notes occasional dizziness; improved with Antivert. BP up today in clinic 140/100; asymptomatic. Ms. Jerry states that she would like to be referred to a different PCP.     Recent cardiac " studies:  Echo (01/22/16) CONCLUSIONS:     1 - Normal left ventricular systolic function (EF 60-65%).     2 - Normal right ventricular systolic function .     3 - Trivial mitral regurgitation.     4 - Trivial tricuspid regurgitation.     5 - Trivial pericardial effusion.   ILR Transmissions (10/31/16 - 10/09/17) revealed presenting rhythms of SR/ST in the 80s-130s; 2 SYMPTOM episodes (1 corresponding w/ST, the other corresponding w/ER); no AUTO episodes noted. Battery OK.     I reviewed today's ECG which demonstrated ST at 131 bpm; , QRS 90, and QTc 392.      Review of Systems   Constitution: Negative for diaphoresis and malaise/fatigue.   HENT: Negative for nosebleeds.    Eyes: Negative for double vision.   Cardiovascular: Positive for chest pain, dyspnea on exertion and palpitations. Negative for irregular heartbeat, near-syncope and syncope.   Respiratory: Positive for shortness of breath.    Skin: Negative.    Musculoskeletal: Positive for stiffness.   Gastrointestinal: Negative for hematemesis and hematochezia.   Genitourinary: Negative for hematuria.   Neurological: Positive for dizziness and light-headedness.   Psychiatric/Behavioral: Negative for altered mental status.        Objective:    Physical Exam   Constitutional: She is oriented to person, place, and time. She appears well-developed and well-nourished.   HENT:   Head: Normocephalic and atraumatic.   Eyes: Pupils are equal, round, and reactive to light.   Cardiovascular: Regular rhythm and normal heart sounds.  Tachycardia present.    Pulmonary/Chest: Effort normal and breath sounds normal.   Musculoskeletal: Normal range of motion.   Neurological: She is alert and oriented to person, place, and time.   Skin: She is not diaphoretic.   Vitals reviewed.        Assessment:       1. Essential hypertension    2. Sinus tachycardia    3. Tachycardia    4. Morbid obesity    5. Hypothyroidism, postsurgical    6. Status post placement of implantable loop  recorder         Plan:       Increase Coreg to 37.5 mg PO BID for better rate/HTN control.   Ambulatory referral to Internal Medicine to establish care with an alternate provider; per pt request.   BP/HR log at home.   Follow up with Cardiology as directed.   Follow up in EP clinic in 3 months, sooner as needed.     KERI Estrella, APRN, FNP-C         Case discussed in detail with Dr. Tinajero (in-clinic EP) who agrees with the aforementioned plan.   (A copy of today's note was sent to Dr. Johns.)

## 2017-10-24 NOTE — PATIENT INSTRUCTIONS
Use artificial tears gel - 1-2 drops in left eye 4 times daily.       Sty (or Stye)  A sty is an infection of the oil gland of the eyelid. It may develop into a small pocket of pus (abscess). This can cause pain, redness, and swelling. In early stages, styes are treated with antibiotic cream, eye drops, or warm packs (small towels soaked in warm water). More severe cases may need to be opened and drained by a health care provider.  Home care  · Eye drops or ointment are usually prescribed to treat the infection. Use these as directed.   ¨ Artificial tears may also be used to lubricate the eye and make it more comfortable. These may be purchased without a prescription.   ¨ Talk to your health care provider before using any over-the-counter treatment for a sty.  · Apply a warm, damp towel to the affected eye for at least 5 minutes, 3 to 4 times a day for a week. Warm compresses open the pores and speed the healing. If the compresses are too hot, they may burn your eyelid.  · Sometimes the sty will drain with this treatment alone. If this happens, continue the antibiotic until all the redness and swelling are gone.  · Wash your hands before and after touching the infected eye to avoid spreading the infection.  · Do not squeeze or try to puncture the sty.  Follow-up care  Follow up with your health care provider, or as advised.   When to seek medical advice  Call your health care provider right away if you have:  · Increase in swelling or redness around the eyelid after 48 to 72 hours  · Increase in eye pain or the eyelid blisters  · Increase in warmth--the eyelid feels hot  · Drainage of blood or thick pus from the sty  · Blister on the eyelid  · Inability to open the eyelid due to swelling  · Fever  ¨ 1 degree above your normal temperature lasting for 24 to 48 hours, or  ¨ Whatever your health care provider told you to report based on your medical condition  · Vision changes  · Headache or stiff neck  · Recurrence of  the sty  Date Last Reviewed: 6/14/2015  © 0343-2584 [employer]. [address] [place] [address], [place] [address]. All rights reserved. This information is not intended as a substitute for professional medical care. Always follow your healthcare professional's instructions.

## 2017-10-24 NOTE — PROGRESS NOTES
"Subjective:       Patient ID: Homar Jerry is a 42 y.o. female.    Chief Complaint: Facial Swelling    HPI  43 y/o woman here for urgent visit for eye problem. Patient of Dr Cabrera.    Eye problem - pain and swelling started Sunday night (day before yesterday) in L upper eyelid, having some pain with eye movement, pain in upper eyelid. Having some discharge / crusting. Vision a little blurry in that eye.   Has had styes in the past when younger, doesn't recall if these were similar.  Has been using hot compresses since Sunday. Not using eye drops.    Has been taking percocet for her knee pain; doesn't feel that this helped with her eye pain.    Having persistent rash under R arm, was given clotrimazole to use BID at recent ER visit 10/14 but doesn't feel that this is improving.     Review of Systems   Constitutional: Negative for activity change and fever.   HENT: Positive for congestion (mild) and postnasal drip (mild). Negative for sinus pressure and sore throat.    Eyes: Positive for pain, discharge, redness and visual disturbance. Negative for photophobia.   Respiratory: Negative for cough and shortness of breath.    Cardiovascular: Negative for chest pain and palpitations.   Gastrointestinal: Negative for abdominal pain, diarrhea and nausea.   Musculoskeletal: Positive for arthralgias.   Skin: Negative for rash.   Neurological: Positive for headaches. Negative for light-headedness and numbness.   Psychiatric/Behavioral: The patient is nervous/anxious.          Past medical history, surgical history, and family medical history reviewed and updated as appropriate.    Medications and allergies reviewed.     Objective:          Vitals:    10/24/17 1524 10/24/17 1545   BP: (!) 134/92    Pulse: (!) 125 95   SpO2: 99%    Weight: 118.1 kg (260 lb 5.8 oz)    Height: 5' 5" (1.651 m)      Body mass index is 43.33 kg/m².  Physical Exam   Constitutional: She is oriented to person, place, and time. She appears " well-developed and well-nourished. No distress.   HENT:   Head: Normocephalic and atraumatic.   Nose: Mucosal edema (and erythema of nasal turbinates) present.   Mouth/Throat: Posterior oropharyngeal erythema (mild erythema) present. No oropharyngeal exudate.   Eyes: EOM are normal. Pupils are equal, round, and reactive to light. Right eye exhibits no discharge. Left eye exhibits hordeolum. Left eye exhibits no discharge. Right conjunctiva is not injected. Left conjunctiva is injected.       Neck: Neck supple.   Cardiovascular: Normal rate, regular rhythm and normal heart sounds.    No murmur heard.  Pulmonary/Chest: Effort normal and breath sounds normal. No respiratory distress.   Abdominal: Soft. She exhibits no distension. There is no tenderness.   Musculoskeletal: She exhibits no edema or tenderness.   Lymphadenopathy:     She has no cervical adenopathy.   Neurological: She is alert and oriented to person, place, and time. No cranial nerve deficit. Gait normal.   Skin: Skin is warm and dry.   +both axillae - area of moist pink erythema (larger on R than L), discrete border, no ulceration, no vesicles or purulence, +whitish coating.    Psychiatric:   Some anxiety; in discomfort. Speech and affect normal.   Vitals reviewed.      Lab Results   Component Value Date    WBC 9.20 09/27/2017    HGB 11.0 (L) 09/27/2017    HCT 34.7 (L) 09/27/2017     09/27/2017    CHOL 192 06/30/2015    TRIG 153 (H) 06/30/2015    HDL 76 (H) 06/30/2015    ALT 8 (L) 09/27/2017    AST 10 09/27/2017     09/27/2017    K 3.3 (L) 09/27/2017     09/27/2017    CREATININE 0.9 09/27/2017    BUN 8 09/27/2017    CO2 29 09/27/2017    TSH 7.757 (H) 09/26/2017    INR 1.1 09/26/2017    HGBA1C 7.5 (H) 09/26/2017       Assessment:       1. Hordeolum externum of left upper eyelid    2. Tinea corporis        Plan:   Homar was seen today for facial swelling.    Diagnoses and all orders for this visit:    Hordeolum externum of left upper  eyelid - continue hot compresses, artificial tears eyedrops frequently. Can schedule with optometry if any further concerns or if not resolving.    Tinea corporis - no improvement per patient with clotrimazole; will switch to terbinafine but if not improving within 1 week or if symptoms worsen, should call in & schedule with dermatology if at all possible.  -     terbinafine HCl (ANTIFUNGAL, TERBINAFINE,) 1 % cream; Apply topically 2 (two) times daily. To area under both arms for 2-4 weeks    Due for follow up with endocrinology, will give clinic # so she can call to schedule.    Health maintenance deferred to PCP.     Return if symptoms worsen or fail to improve.    Guero Lara MD  Internal Medicine  [facility]  10/24/2017

## 2017-10-25 ENCOUNTER — TELEPHONE (OUTPATIENT)
Dept: ELECTROPHYSIOLOGY | Facility: CLINIC | Age: 42
End: 2017-10-25

## 2017-10-25 DIAGNOSIS — R00.0 TACHYCARDIA: Primary | ICD-10-CM

## 2017-10-27 ENCOUNTER — OUTPATIENT CASE MANAGEMENT (OUTPATIENT)
Dept: ADMINISTRATIVE | Facility: OTHER | Age: 42
End: 2017-10-27

## 2017-11-06 VITALS
OXYGEN SATURATION: 99 % | BODY MASS INDEX: 43.38 KG/M2 | WEIGHT: 260.38 LBS | DIASTOLIC BLOOD PRESSURE: 92 MMHG | HEIGHT: 65 IN | HEART RATE: 95 BPM | SYSTOLIC BLOOD PRESSURE: 134 MMHG

## 2017-11-07 ENCOUNTER — HOSPITAL ENCOUNTER (EMERGENCY)
Facility: HOSPITAL | Age: 42
Discharge: HOME | End: 2017-11-07
Attending: EMERGENCY MEDICINE
Payer: MEDICARE

## 2017-11-07 VITALS
HEIGHT: 65 IN | SYSTOLIC BLOOD PRESSURE: 113 MMHG | OXYGEN SATURATION: 100 % | BODY MASS INDEX: 39.49 KG/M2 | TEMPERATURE: 98 F | WEIGHT: 237 LBS | DIASTOLIC BLOOD PRESSURE: 71 MMHG | HEART RATE: 74 BPM | RESPIRATION RATE: 18 BRPM

## 2017-11-07 DIAGNOSIS — R73.9 HYPERGLYCEMIA: Primary | ICD-10-CM

## 2017-11-07 LAB
ALBUMIN SERPL BCP-MCNC: 3.8 G/DL
ALP SERPL-CCNC: 156 U/L
ALT SERPL W/O P-5'-P-CCNC: 13 U/L
ANION GAP SERPL CALC-SCNC: 15 MMOL/L
AST SERPL-CCNC: 13 U/L
B-HCG UR QL: NEGATIVE
B-OH-BUTYR BLD STRIP-SCNC: 0.2 MMOL/L
BACTERIA #/AREA URNS AUTO: NORMAL /HPF
BASOPHILS # BLD AUTO: 0.01 K/UL
BASOPHILS NFR BLD: 0.1 %
BILIRUB SERPL-MCNC: 0.3 MG/DL
BILIRUB UR QL STRIP: NEGATIVE
BUN SERPL-MCNC: 10 MG/DL
CALCIUM SERPL-MCNC: 9.4 MG/DL
CHLORIDE SERPL-SCNC: 101 MMOL/L
CLARITY UR REFRACT.AUTO: CLEAR
CO2 SERPL-SCNC: 23 MMOL/L
COLOR UR AUTO: ABNORMAL
CREAT SERPL-MCNC: 1.2 MG/DL
DIFFERENTIAL METHOD: NORMAL
EOSINOPHIL # BLD AUTO: 0.1 K/UL
EOSINOPHIL NFR BLD: 1 %
ERYTHROCYTE [DISTWIDTH] IN BLOOD BY AUTOMATED COUNT: 14.3 %
EST. GFR  (AFRICAN AMERICAN): >60 ML/MIN/1.73 M^2
EST. GFR  (NON AFRICAN AMERICAN): 55.5 ML/MIN/1.73 M^2
GLUCOSE SERPL-MCNC: 575 MG/DL
GLUCOSE UR QL STRIP: ABNORMAL
HCT VFR BLD AUTO: 38.1 %
HGB BLD-MCNC: 12.2 G/DL
HGB UR QL STRIP: NEGATIVE
IMM GRANULOCYTES # BLD AUTO: 0.03 K/UL
IMM GRANULOCYTES NFR BLD AUTO: 0.4 %
KETONES UR QL STRIP: NEGATIVE
LEUKOCYTE ESTERASE UR QL STRIP: ABNORMAL
LYMPHOCYTES # BLD AUTO: 2.6 K/UL
LYMPHOCYTES NFR BLD: 35.8 %
MCH RBC QN AUTO: 27.7 PG
MCHC RBC AUTO-ENTMCNC: 32 G/DL
MCV RBC AUTO: 87 FL
MICROSCOPIC COMMENT: NORMAL
MONOCYTES # BLD AUTO: 0.4 K/UL
MONOCYTES NFR BLD: 6.1 %
NEUTROPHILS # BLD AUTO: 4.1 K/UL
NEUTROPHILS NFR BLD: 56.6 %
NITRITE UR QL STRIP: NEGATIVE
NON-SQ EPI CELLS #/AREA URNS AUTO: 0 /HPF
NRBC BLD-RTO: 0 /100 WBC
PH UR STRIP: 5 [PH] (ref 5–8)
PLATELET # BLD AUTO: 249 K/UL
PMV BLD AUTO: 10 FL
POCT GLUCOSE: 493 MG/DL (ref 70–110)
POCT GLUCOSE: >500 MG/DL (ref 70–110)
POTASSIUM SERPL-SCNC: 4.2 MMOL/L
PROT SERPL-MCNC: 8 G/DL
PROT UR QL STRIP: NEGATIVE
RBC # BLD AUTO: 4.4 M/UL
RBC #/AREA URNS AUTO: 2 /HPF (ref 0–4)
SODIUM SERPL-SCNC: 139 MMOL/L
SP GR UR STRIP: >1.03 (ref 1–1.03)
SQUAMOUS #/AREA URNS AUTO: 1 /HPF
URN SPEC COLLECT METH UR: ABNORMAL
UROBILINOGEN UR STRIP-ACNC: NEGATIVE EU/DL
WBC # BLD AUTO: 7.17 K/UL
WBC #/AREA URNS AUTO: 4 /HPF (ref 0–5)
YEAST UR QL AUTO: NORMAL

## 2017-11-07 PROCEDURE — 81001 URINALYSIS AUTO W/SCOPE: CPT

## 2017-11-07 PROCEDURE — 99284 EMERGENCY DEPT VISIT MOD MDM: CPT | Mod: 25

## 2017-11-07 PROCEDURE — 82962 GLUCOSE BLOOD TEST: CPT

## 2017-11-07 PROCEDURE — 25000003 PHARM REV CODE 250: Performed by: STUDENT IN AN ORGANIZED HEALTH CARE EDUCATION/TRAINING PROGRAM

## 2017-11-07 PROCEDURE — 96361 HYDRATE IV INFUSION ADD-ON: CPT

## 2017-11-07 PROCEDURE — 80053 COMPREHEN METABOLIC PANEL: CPT

## 2017-11-07 PROCEDURE — 85025 COMPLETE CBC W/AUTO DIFF WBC: CPT

## 2017-11-07 PROCEDURE — 63600175 PHARM REV CODE 636 W HCPCS: Performed by: STUDENT IN AN ORGANIZED HEALTH CARE EDUCATION/TRAINING PROGRAM

## 2017-11-07 PROCEDURE — 96376 TX/PRO/DX INJ SAME DRUG ADON: CPT

## 2017-11-07 PROCEDURE — 82010 KETONE BODYS QUAN: CPT

## 2017-11-07 PROCEDURE — 99284 EMERGENCY DEPT VISIT MOD MDM: CPT | Mod: ,,, | Performed by: EMERGENCY MEDICINE

## 2017-11-07 PROCEDURE — 81025 URINE PREGNANCY TEST: CPT

## 2017-11-07 PROCEDURE — 96374 THER/PROPH/DIAG INJ IV PUSH: CPT

## 2017-11-07 RX ORDER — SODIUM CHLORIDE AND POTASSIUM CHLORIDE 150; 900 MG/100ML; MG/100ML
1000 INJECTION, SOLUTION INTRAVENOUS
Status: DISCONTINUED | OUTPATIENT
Start: 2017-11-07 | End: 2017-11-07

## 2017-11-07 RX ORDER — SODIUM CHLORIDE 9 MG/ML
1000 INJECTION, SOLUTION INTRAVENOUS
Status: COMPLETED | OUTPATIENT
Start: 2017-11-07 | End: 2017-11-07

## 2017-11-07 RX ADMIN — INSULIN HUMAN 8 UNITS: 100 INJECTION, SOLUTION PARENTERAL at 09:11

## 2017-11-07 RX ADMIN — SODIUM CHLORIDE, SODIUM LACTATE, POTASSIUM CHLORIDE, AND CALCIUM CHLORIDE 1000 ML: .6; .31; .03; .02 INJECTION, SOLUTION INTRAVENOUS at 06:11

## 2017-11-07 RX ADMIN — SODIUM CHLORIDE 1000 ML: 0.9 INJECTION, SOLUTION INTRAVENOUS at 08:11

## 2017-11-08 NOTE — ED PROVIDER NOTES
Encounter Date: 11/7/2017    SCRIBE #1 NOTE: I, Latonia Pete, am scribing for, and in the presence of,  Dr. Groves. I have scribed the following portions of the note - the Resident attestation.       History     Chief Complaint   Patient presents with    Hyperglycemia     was over 600 at home, taking meds as prescribed, dizziness, glucose in triage >500     Ms Rosalino is a 41 yo female with hx of morbid obesity, HTN, Hypothyroid, gastroparesis, here for symptomatic hyperglycemia.     Patient reports having 24 hours of worsening polyurea, polydipsia, dizziness, and nausea. She reports that she takes her insulin as directed (Levemir, Novolog) and reportedly does not miss doses. She measures her blood sugars at home, and since her diagnosis of DM2 in 3/2017, her glucose levels are frequently out of control (3-600). Sometimes she wakes up in the middle of the night feeling ill and will give her self insulin after measuring glucose levels.     She has had no vomiting, and had diarrhea x1. No abdominal pain, no SOB or trouble breathing.  No chest pain, no blood in stool, no blood in urine.           Review of patient's allergies indicates:   Allergen Reactions    Dexamethasone Hives and Shortness Of Breath     Per pt, only steroid she is allergic to is dexamethasone    Doxepin hcl Hives, Diarrhea and Itching    Ciprofloxacin      Counteracts with seroquel, xanax, tizanidine    Compazine [prochlorperazine] Hives and Itching    Lyrica [pregabalin]      depression    Prednisone      Gastroparesis flare up     Past Medical History:   Diagnosis Date    Anemia     Anxiety     Asthma     usually with cold weather    Back pain     Bipolar 1 disorder 3/17/2015    Cervical cancer 2009    DOT    Diabetes mellitus with hyperglycemia 1/17/2017    Fibrocystic breast     Fibromyalgia     Gastroparesis 2012    s/p sleeve to cover damaged nerves; s/p gastric pacemaker which did not help; due to nerve damage during surgery     Hypertension     Hypothyroidism, postsurgical     Morbid obesity     Non-intractable vomiting with nausea 9/26/2017    Prediabetes     Rheumatoid arthritis     S/P laparoscopic appendectomy 9/26/2017    Thyroid cancer 2003 & 2013    thyroidectomy    Urinary incontinence      Past Surgical History:   Procedure Laterality Date    ANKLE FRACTURE SURGERY Right     APPENDECTOMY      BREAST SURGERY      exploratory laparotomy due to complications of hysterectomy  2009    cervical cuff burst with air in abdomen    FRACTURE SURGERY      GASTRECTOMY      gastric pacemaker      gastric sleeve      HYSTERECTOMY  2009    total including cervix    KNEE ARTHROSCOPY W/ MENISCAL REPAIR Left 2016    KNEE SURGERY  05/12/2016    loop monitor  2016    multiple breast biopsies      TONSILLECTOMY      ureteral sling       Family History   Problem Relation Age of Onset    Heart disease Mother     Arthritis Mother     Rheum arthritis Mother     Heart attack Mother 55    Clotting disorder Mother     Hyperlipidemia Father     Clotting disorder Father     Thyroid cancer Paternal Grandmother     Diabetes Paternal Grandmother     Clotting disorder Paternal Grandfather     No Known Problems Sister     No Known Problems Brother     No Known Problems Maternal Aunt     No Known Problems Paternal Aunt     Bipolar disorder Maternal Uncle     Pancreatic cancer Maternal Uncle     No Known Problems Paternal Uncle     No Known Problems Maternal Grandfather     No Known Problems Maternal Grandmother     No Known Problems Cousin     ADD / ADHD Son     Pneumonia Brother     HIV Brother     Alcohol abuse Neg Hx     Anxiety disorder Neg Hx     Dementia Neg Hx     Depression Neg Hx     Drug abuse Neg Hx     OCD Neg Hx     Paranoid behavior Neg Hx     Physical abuse Neg Hx     Schizophrenia Neg Hx     Seizures Neg Hx     Self injury Neg Hx     Sexual abuse Neg Hx     Suicide Neg Hx      Social  History   Substance Use Topics    Smoking status: Never Smoker    Smokeless tobacco: Never Used    Alcohol use No     Review of Systems   Constitutional: Positive for diaphoresis. Negative for activity change, appetite change and fever.   HENT: Negative for congestion, ear pain and sore throat.    Eyes: Negative for pain, discharge and itching.   Respiratory: Positive for cough. Negative for chest tightness, shortness of breath and wheezing.    Cardiovascular: Negative for chest pain and leg swelling.   Gastrointestinal: Positive for abdominal pain, diarrhea and nausea. Negative for blood in stool, constipation and vomiting.   Endocrine: Positive for polydipsia and polyuria. Negative for polyphagia.   Genitourinary: Negative for decreased urine volume, difficulty urinating, dysuria, frequency, hematuria and urgency.   Musculoskeletal: Negative for myalgias, neck pain and neck stiffness.   Skin: Positive for pallor. Negative for color change, rash and wound.   Allergic/Immunologic: Negative for environmental allergies, food allergies and immunocompromised state.   Neurological: Positive for dizziness. Negative for seizures, weakness and numbness.   Hematological: Negative for adenopathy. Does not bruise/bleed easily.       Physical Exam     Initial Vitals [11/07/17 1440]   BP Pulse Resp Temp SpO2   106/66 88 18 98.6 °F (37 °C) 97 %      MAP       79.33         Physical Exam    Constitutional: She appears well-developed. She is not diaphoretic.   Morbidly obese, appears pale   HENT:   Head: Normocephalic and atraumatic.   Mouth/Throat: No oropharyngeal exudate.   Eyes: EOM are normal. Pupils are equal, round, and reactive to light. Right eye exhibits no discharge. Left eye exhibits no discharge.   Neck: Normal range of motion. Neck supple. No JVD present.   Cardiovascular: Normal rate, regular rhythm, normal heart sounds and intact distal pulses.   No murmur heard.  Pulmonary/Chest: Breath sounds normal. No  respiratory distress. She has no wheezes.   Abdominal: Soft. Bowel sounds are normal. She exhibits no distension. There is no tenderness.   Lymphadenopathy:     She has no cervical adenopathy.   Neurological: She is alert and oriented to person, place, and time. No cranial nerve deficit.   Skin: Skin is warm and dry. No rash noted. No erythema. There is pallor.   Psychiatric: She has a normal mood and affect.         ED Course   Procedures  Labs Reviewed   COMPREHENSIVE METABOLIC PANEL - Abnormal; Notable for the following:        Result Value    Glucose 575 (*)     Alkaline Phosphatase 156 (*)     eGFR if non  55.5 (*)     All other components within normal limits   URINALYSIS, REFLEX TO URINE CULTURE - Abnormal; Notable for the following:     Specific Gravity, UA >1.030 (*)     Glucose, UA 3+ (*)     Leukocytes, UA Trace (*)     All other components within normal limits    Narrative:     Preferred Collection Type->Urine, Clean Catch   POCT GLUCOSE - Abnormal; Notable for the following:     POCT Glucose >500 (*)     All other components within normal limits   POCT GLUCOSE - Abnormal; Notable for the following:     POCT Glucose 493 (*)     All other components within normal limits   CBC W/ AUTO DIFFERENTIAL   BETA - HYDROXYBUTYRATE, SERUM   URINALYSIS MICROSCOPIC    Narrative:     Preferred Collection Type->Urine, Clean Catch   PREGNANCY TEST, URINE RAPID   PREGNANCY TEST, URINE RAPID    Narrative:     Preferred Collection Type->Urine, Clean Catch  ADD ON PREGU PER DR. FEMI MUNOZ AT  11/07/2017  19:02    PREGNANCY TEST, URINE RAPID   POCT GLUCOSE MONITORING CONTINUOUS   POCT GLUCOSE MONITORING CONTINUOUS   POCT GLUCOSE MONITORING CONTINUOUS   POCT GLUCOSE MONITORING CONTINUOUS   POCT GLUCOSE MONITORING CONTINUOUS             Medical Decision Making:   History:   Old Medical Records: I decided to obtain old medical records.  Initial Assessment:   Ms Jerry is a 43 yo female with known dx of DM2 and  previous admission for DKA here for hyperglycemia with symptoms, who is hemodynamically stable.   Differential Diagnosis:   Hyperglycemia  DKA  HHNK    Clinical Tests:   Lab Tests: Ordered and Reviewed  The following lab test(s) were unremarkable: CBC, BMP, UPT and Urinalysis       <> Summary of Lab: No anion gap evident on labs, Beta-HB is normal. No ketones in urine. Electrolytes are normal.   ED Management:  Obtain CBC, CMP, U/A, beta hydroxybutyrate  Fluid resuscitation 1L of LR followed by 1L NS  Monitor glucose and will give insulin dosing based on glucose after fluids  Follow up electrolytes and replete as needed    7:40 PM  Continue fluid resuscitation and obtain glucose levels after the 2nd liter has completed.     10:36 PM  Sugars improved to 400's with 8 U of insulin  Was reportedly eating food during this time during blood testing for glucose  Will give her another 8 U of insulin             Scribe Attestation:   Scribe #1: I performed the above scribed service and the documentation accurately describes the services I performed. I attest to the accuracy of the note.    Attending Attestation:   Physician Attestation Statement for Resident:  As the supervising MD   Physician Attestation Statement: I have personally seen and examined this patient.   I agree with the above history. -: 43 yo woman with hx of IDDM  presents with one day hx of polyuria, polydipsia, and weakness. POC glucose greater than 500. Pt has been intermittently non compliant with her insulin. Pt has no anion gap, glucose 575, b-oh 0.2. No ketones in urine. This is inconsistent with DKA orhyperosmolar state. Pt received 2 liters of IV fluids and felt she symptomatically improved. Repeat  glucose was >500.  According to another patient, patient has been eating.  Patient received 8 units of IV regular insulin with improvement of glucose to 408.  We will re-dose insulin and discharge with instructions to take insulin as prescribed and  performed blood glucose logs at home to discuss with PCP.   As the supervising MD I agree with the above PE.    As the supervising MD I agree with the above treatment, course, plan, and disposition.  I have reviewed and agree with the residents interpretation of the following: lab data.  I have reviewed the following: old records at this facility.          Physician Attestation for Scribe:      Comments: I, Dr. Akash Groves, personally performed the services described in this documentation. All medical record entries made by the scribe were at my direction and in my presence.  I have reviewed the chart and agree that the record reflects my personal performance and is accurate and complete. Akash Groves MD.  10:33 PM 11/07/2017              ED Course      Clinical Impression:   The encounter diagnosis was Hyperglycemia.    Disposition:   Disposition: Discharged  Condition: Fair                        Akash Groves MD  11/08/17 9193

## 2017-11-08 NOTE — DISCHARGE INSTRUCTIONS
Please follow up with your primary care physician for further monitoring of your insulin regimen.

## 2017-11-08 NOTE — ED TRIAGE NOTES
Presents to ER with greater than 500 CBG, weakness, frequent urination, and continuous thirst.    GENERAL: The patient is well-developed and well-nourished in no apparent distress. Alert and oriented x4.                                                HEENT: Head is normocephalic and atraumatic. Extraocular muscles are intact. Pupils are equal, round, and reactive to light and accommodation. Nares appeared normal. Mouth is well hydrated and without lesions. Mucous membranes are moist. Posterior pharynx clear of any exudate or lesions.    NECK: Supple. No carotid bruits. No lymphadenopathy or thyromegaly.    LUNGS: Clear to auscultation.    HEART: Regular rate and rhythm without murmur.     ABDOMEN: Soft, nontender, and nondistended. Positive bowel sounds. No hepatosplenomegaly was noted.     EXTREMITIES: Without any cyanosis, clubbing, rash, lesions or edema.     NEUROLOGIC: Cranial nerves II through XII are grossly intact.     PSYCHIATRIC: Flat affect, but denies suicidal or homicidal ideations.    SKIN: No ulceration or induration present.

## 2017-11-09 LAB
POCT GLUCOSE: 386 MG/DL (ref 70–110)
POCT GLUCOSE: 409 MG/DL (ref 70–110)
POCT GLUCOSE: 473 MG/DL (ref 70–110)
POCT GLUCOSE: >500 MG/DL (ref 70–110)

## 2017-11-10 ENCOUNTER — OUTPATIENT CASE MANAGEMENT (OUTPATIENT)
Dept: ADMINISTRATIVE | Facility: OTHER | Age: 42
End: 2017-11-10

## 2017-11-10 ENCOUNTER — TELEPHONE (OUTPATIENT)
Dept: INTERNAL MEDICINE | Facility: CLINIC | Age: 42
End: 2017-11-10

## 2017-11-10 NOTE — TELEPHONE ENCOUNTER
----- Message from Ying Hayes RN sent at 11/10/2017 10:31 AM CST -----  Contact: GLOIRA [address]Staff:    Ms. Jerry is s/p ED visit 11/7 for  (600 at home). This CM instructed her to call Endo at [facility] to schedule an appt, knowing it will be 2-3 mos out. Meanwhile, she understands the need for PCP appt for follow-up on her DM. She is agreeable to an appt with Dr. Cabrera as her PCP. He reports current BG = 132 ( BGs ranging ).  Please schedule an appt for Ms. Jerry--- she prefers after 12 noon due to coming from [place].   Please advise.     Thank you,  Ying Hayes, YOLIN, RN, CCM [facility]  TEL:  [phone]

## 2017-11-10 NOTE — PROGRESS NOTES
11/10/17  CM f/u to update plan of care. CM spoke by phone with Ms. Jerry. S/p ED visit 11/7 for hyperglycemia 400 (600 at home). Ms. Jerry is unaware of the cause of such high blood sugars. She reports taking her insulins Novolog and Levemir as ordered and not missing a dose. She is checking her BGs 3-4 x day. This AM BG= 132. She reports readings:  186, 284, 295, 276, 98, 126, 154. In need of ENDO appt-- referral entered April 2017 but pt did not scheduled due to earliest appt 3 mos out she states. CM provided her with ENDOCRINE's phone number to call for an appt and meanwhile, this CM will contact her PCP, Dr. Cabrera for a f/u appt. She agrees to call ENDO and CM to call PCP. She reports never eating breakfast. CM emphasized eating small portion meals frequently during the day to help with her gastroparesis and to keep hydrated with water--given h/o dehydration w/ED visit. She says that she does.  CM instructed Ms. Jerry to call [facility] with concerns or questions regarding her health. She reports having their phone number.     Plan for next encounter  Check next week that ENDO and PCP appts scheduled  F/u with the appts as needed if not scheduled.          Dr. Cabrera/Staff: PCP    Ms. Jerry is s/p ED visit 11/7 for  (600 at home). This CM instructed her to call Endo at [facility] to schedule an appt, knowing it will be 2-3 mos out. Meanwhile, she understands the need for PCP appt for follow-up on her DM. She is agreeable to an appt with Dr. Cabrera as her PCP. He reports current BG = 132 ( BGs ranging ).  Please schedule an appt for Ms. Jerry--- she prefers after 12 noon due to coming from [place].   Please advise.     Thank you,  Ying Hayes, YOLIN, RN, CCM [facility]  TEL:  [phone]

## 2017-11-10 NOTE — TELEPHONE ENCOUNTER
Spoke with pt in regards to me receiving a message from the nurse in outpatient management. She wanted me to schedule pt for a ED follow up visit with Dr Cabrera. Have pt booked for Mon 11/20 at 10 am. Pt was advised by nurse to call Endo to schedule an appt with them since her BG levels were ranging .

## 2017-11-15 ENCOUNTER — TELEPHONE (OUTPATIENT)
Dept: INTERNAL MEDICINE | Facility: CLINIC | Age: 42
End: 2017-11-15

## 2017-11-15 NOTE — TELEPHONE ENCOUNTER
----- Message from Cayetano Solitario sent at 11/15/2017  2:29 PM CST -----  Contact: Patient  [phone]  The patient said she talked to a nurse on approximately 11/10/17 and requested a prescription for diflucan and for a cream for a yeast infection. And the pharmacy [facility] in [place], as of Monday of this week said they don't have it.    Please call and advise    Thank you

## 2017-11-22 DIAGNOSIS — R11.0 CHRONIC NAUSEA: ICD-10-CM

## 2017-11-22 RX ORDER — PROMETHAZINE HYDROCHLORIDE 25 MG/1
TABLET ORAL
Qty: 60 TABLET | Refills: 0 | Status: ON HOLD | OUTPATIENT
Start: 2017-11-22 | End: 2020-07-10 | Stop reason: HOSPADM

## 2017-11-24 ENCOUNTER — OUTPATIENT CASE MANAGEMENT (OUTPATIENT)
Dept: ADMINISTRATIVE | Facility: OTHER | Age: 42
End: 2017-11-24

## 2017-11-24 NOTE — PROGRESS NOTES
11/24/17  CM f/u to update plan of care. CM called and spoke for a second with pt. She requests to be called back in a few minutes.     CM made another call to pt, Verbal message left requesting return call.

## 2017-11-29 ENCOUNTER — CLINICAL SUPPORT (OUTPATIENT)
Dept: ELECTROPHYSIOLOGY | Facility: CLINIC | Age: 42
End: 2017-11-29
Attending: INTERNAL MEDICINE
Payer: MEDICARE

## 2017-11-29 DIAGNOSIS — Z95.818 STATUS POST PLACEMENT OF IMPLANTABLE LOOP RECORDER: ICD-10-CM

## 2017-11-29 DIAGNOSIS — R55 SYNCOPE, UNSPECIFIED SYNCOPE TYPE: ICD-10-CM

## 2017-11-29 PROCEDURE — 93299 LOOP RECORDER REMOTE: CPT | Mod: S$GLB,,, | Performed by: INTERNAL MEDICINE

## 2017-11-29 PROCEDURE — 93298 REM INTERROG DEV EVAL SCRMS: CPT | Mod: S$GLB,,, | Performed by: INTERNAL MEDICINE

## 2017-12-12 ENCOUNTER — OUTPATIENT CASE MANAGEMENT (OUTPATIENT)
Dept: ADMINISTRATIVE | Facility: OTHER | Age: 42
End: 2017-12-12

## 2017-12-14 ENCOUNTER — OUTPATIENT CASE MANAGEMENT (OUTPATIENT)
Dept: ADMINISTRATIVE | Facility: OTHER | Age: 42
End: 2017-12-14

## 2017-12-14 NOTE — PROGRESS NOTES
12-14-17-- 2nd attempt to follow-up for [facility], left message requesting a return call. As this is my 2nd attempt to reach patient, I will mail a letter with my contact information and request a return call. Chart reviewed- noted patient no showed for appointment with PCP on 11-20-17.         Plan for next encounter  Check next week that ENDO and PCP appts scheduled  F/u with the appts as needed if not scheduled.      Landry CASTRO CCM

## 2017-12-14 NOTE — LETTER
December 14, 2017    Jakobshantanu Girishmala Jerry  [address] [place] [address]             [facility] [address] [place] [address] Dear ,    I work with [facility] Department. I have been unsuccessful at reaching you to follow-up to see how you have been doing. Please call me back at your earliest convenience to discuss your health care needs.      I can be reached at [phone] from 8:00AM to 4:30 PM on Monday thru Friday. [facility] also has a program where a nurse is available 24/7 to answer questions or provide medical advice, their number is [phone].    Thanks,      Tram PULIDO RN  Outpatient Case Management

## 2017-12-20 ENCOUNTER — OUTPATIENT CASE MANAGEMENT (OUTPATIENT)
Dept: ADMINISTRATIVE | Facility: OTHER | Age: 42
End: 2017-12-20

## 2017-12-20 NOTE — PROGRESS NOTES
12/20/17  3rd attempt to reach pt for f/u. Verbal message left requesting return call.   Case closed due to unable to reach x 3.   No PCP or ENDO appts scheduled.     Notified Dr. Cabrera.

## 2018-01-02 ENCOUNTER — OUTPATIENT CASE MANAGEMENT (OUTPATIENT)
Dept: ADMINISTRATIVE | Facility: OTHER | Age: 43
End: 2018-01-02

## 2018-01-04 ENCOUNTER — CLINICAL SUPPORT (OUTPATIENT)
Dept: ELECTROPHYSIOLOGY | Facility: CLINIC | Age: 43
End: 2018-01-04
Attending: INTERNAL MEDICINE
Payer: MEDICARE

## 2018-01-04 DIAGNOSIS — R55 SYNCOPE, UNSPECIFIED SYNCOPE TYPE: ICD-10-CM

## 2018-01-04 DIAGNOSIS — Z95.818 STATUS POST PLACEMENT OF IMPLANTABLE LOOP RECORDER: ICD-10-CM

## 2018-01-04 PROCEDURE — 93298 REM INTERROG DEV EVAL SCRMS: CPT | Mod: S$GLB,,, | Performed by: INTERNAL MEDICINE

## 2018-01-04 PROCEDURE — 93299 LOOP RECORDER REMOTE: CPT | Mod: S$GLB,,, | Performed by: INTERNAL MEDICINE

## 2018-01-25 ENCOUNTER — HOSPITAL ENCOUNTER (OUTPATIENT)
Dept: RADIOLOGY | Facility: HOSPITAL | Age: 43
Discharge: HOME | End: 2018-01-25
Attending: ORTHOPAEDIC SURGERY
Payer: COMMERCIAL

## 2018-01-25 ENCOUNTER — OFFICE VISIT (OUTPATIENT)
Dept: ORTHOPEDICS | Facility: CLINIC | Age: 43
End: 2018-01-25
Payer: COMMERCIAL

## 2018-01-25 VITALS — BODY MASS INDEX: 43.93 KG/M2 | HEIGHT: 65 IN | WEIGHT: 263.69 LBS

## 2018-01-25 DIAGNOSIS — M25.569 KNEE PAIN, UNSPECIFIED CHRONICITY, UNSPECIFIED LATERALITY: Primary | ICD-10-CM

## 2018-01-25 DIAGNOSIS — M25.569 KNEE PAIN, UNSPECIFIED CHRONICITY, UNSPECIFIED LATERALITY: ICD-10-CM

## 2018-01-25 DIAGNOSIS — M17.9 OSTEOARTHRITIS OF KNEE, UNSPECIFIED (CODE): ICD-10-CM

## 2018-01-25 DIAGNOSIS — M17.9 OSTEOARTHRITIS OF KNEE, UNSPECIFIED (CODE): Primary | ICD-10-CM

## 2018-01-25 PROCEDURE — 73565 X-RAY EXAM OF KNEES: CPT | Mod: TC

## 2018-01-25 PROCEDURE — 99999 PR PBB SHADOW E&M-EST. PATIENT-LVL III: CPT | Mod: PBBFAC,,, | Performed by: ORTHOPAEDIC SURGERY

## 2018-01-25 PROCEDURE — 73565 X-RAY EXAM OF KNEES: CPT | Mod: 26,,, | Performed by: RADIOLOGY

## 2018-01-25 PROCEDURE — 99499 UNLISTED E&M SERVICE: CPT | Mod: S$GLB,,, | Performed by: ORTHOPAEDIC SURGERY

## 2018-01-25 PROCEDURE — 73560 X-RAY EXAM OF KNEE 1 OR 2: CPT | Mod: 26,LT,, | Performed by: RADIOLOGY

## 2018-01-25 PROCEDURE — 99214 OFFICE O/P EST MOD 30 MIN: CPT | Mod: S$GLB,,, | Performed by: ORTHOPAEDIC SURGERY

## 2018-01-25 PROCEDURE — 73560 X-RAY EXAM OF KNEE 1 OR 2: CPT | Mod: TC,LT

## 2018-01-25 PROCEDURE — 3008F BODY MASS INDEX DOCD: CPT | Mod: S$GLB,,, | Performed by: ORTHOPAEDIC SURGERY

## 2018-01-25 RX ORDER — ALBUTEROL SULFATE 90 UG/1
AEROSOL, METERED RESPIRATORY (INHALATION)
COMMUNITY
Start: 2018-01-11 | End: 2020-07-16 | Stop reason: SDUPTHER

## 2018-01-25 RX ORDER — QUETIAPINE FUMARATE 400 MG/1
800 TABLET, FILM COATED ORAL NIGHTLY
Status: ON HOLD | COMMUNITY
Start: 2018-01-11 | End: 2018-04-16

## 2018-01-25 RX ORDER — VALSARTAN AND HYDROCHLOROTHIAZIDE 80; 12.5 MG/1; MG/1
1 TABLET, FILM COATED ORAL DAILY
COMMUNITY
Start: 2018-01-11 | End: 2018-02-05 | Stop reason: SDUPTHER

## 2018-01-25 RX ORDER — ZOLPIDEM TARTRATE 10 MG/1
10 TABLET ORAL NIGHTLY
Status: ON HOLD | COMMUNITY
Start: 2018-01-10 | End: 2018-04-16 | Stop reason: HOSPADM

## 2018-01-25 RX ORDER — BUSPIRONE HYDROCHLORIDE 10 MG/1
10 TABLET ORAL 2 TIMES DAILY
Status: ON HOLD | COMMUNITY
Start: 2018-01-16 | End: 2018-04-16

## 2018-01-25 NOTE — PROGRESS NOTES
HPI:    Homar Jerry is a 42 y.o. female who is here today for   Chief Complaint   Patient presents with    Left Knee - Pain   .     Duration: 12 months  Intensity: severe  Character of pain: sharp  Location: She reports that the pain is predominately  medial  Patient's pain increases with activity.  Pain is increased with weightbearing and interferes with activities of daily living.    She has tried conservative management including NSAIDS, injections, and activity modification without relief.    She has discussed options with her family and wishes to schedule TKA.     PMSFSH reviewed per clinic record       Past Medical History:   Diagnosis Date    Anemia     Anxiety     Asthma     usually with cold weather    Back pain     Bipolar 1 disorder 3/17/2015    Cervical cancer 2009    DOT    Diabetes mellitus with hyperglycemia 1/17/2017    Fibrocystic breast     Fibromyalgia     Gastroparesis 2012    s/p sleeve to cover damaged nerves; s/p gastric pacemaker which did not help; due to nerve damage during surgery    Hypertension     Hypothyroidism, postsurgical     Morbid obesity     Non-intractable vomiting with nausea 9/26/2017    Prediabetes     Rheumatoid arthritis     S/P laparoscopic appendectomy 9/26/2017    Thyroid cancer 2003 & 2013    thyroidectomy    Urinary incontinence           Current Outpatient Prescriptions:     alprazolam (XANAX) 1 MG tablet, Take 1 tablet (1 mg total) by mouth every evening. (Patient taking differently: Take 2 mg by mouth every evening. ), Disp: 30 tablet, Rfl: 5    blood sugar diagnostic Strp, 1 each by Misc.(Non-Drug; Combo Route) route 4 (four) times daily with meals and nightly., Disp: 100 each, Rfl: 2    busPIRone (BUSPAR) 10 MG tablet, 10 mg 2 (two) times daily. , Disp: , Rfl:     carvedilol (COREG) 12.5 MG tablet, Take 3 tablets (37.5 mg total) by mouth 2 (two) times daily., Disp: 120 tablet, Rfl: 11    cholecalciferol, vitamin D3, 1,000 unit  capsule, Take 2 capsules (2,000 Units total) by mouth once daily., Disp: , Rfl: 0    clotrimazole (LOTRIMIN) 1 % cream, Apply topically 2 (two) times daily. Apply right axilla twice daily, Disp: 14 g, Rfl: 0    docusate sodium (COLACE) 50 MG capsule, Take 1 capsule (50 mg total) by mouth 2 (two) times daily., Disp: , Rfl: 0    doxepin (SINEQUAN) 25 MG capsule, Take 1 capsule (25 mg total) by mouth nightly as needed., Disp: 30 capsule, Rfl: 11    fluticasone (FLONASE) 50 mcg/actuation nasal spray, 2 sprays by Nasal route 2 (two) times daily. , Disp: , Rfl:     gabapentin (NEURONTIN) 600 MG tablet, Take 600 mg by mouth 3 (three) times daily. , Disp: , Rfl:     hydrochlorothiazide (HYDRODIURIL) 25 MG tablet, Take 1 tablet (25 mg total) by mouth once daily., Disp: 30 tablet, Rfl: 11    hydrOXYzine pamoate (VISTARIL) 50 MG Cap, Take 50 mg by mouth 4 (four) times daily., Disp: , Rfl:     lancets Misc, 1 each by Misc.(Non-Drug; Combo Route) route 4 (four) times daily with meals and nightly., Disp: 100 each, Rfl: 2    levocetirizine (XYZAL) 5 MG tablet, Take 1 tablet (5 mg total) by mouth every evening. (Patient taking differently: Take 5 mg by mouth daily as needed for Allergies. ), Disp: 90 tablet, Rfl: 3    levothyroxine (SYNTHROID) 150 MCG tablet, Take 1 tablet (150 mcg total) by mouth once daily., Disp: 30 tablet, Rfl: 3    lidocaine (LIDODERM) 5 %, Place 1 patch onto the skin daily as needed. Remove & Discard patch within 12 hours or as directed by MD, Disp: 30 patch, Rfl: 0    meclizine (ANTIVERT) 25 mg tablet, Take 1 tablet (25 mg total) by mouth 3 (three) times daily as needed for Dizziness or Nausea., Disp: 90 tablet, Rfl: 0    mirtazapine (REMERON) 45 MG tablet, Take 45 mg by mouth every evening., Disp: , Rfl:     ondansetron (ZOFRAN-ODT) 8 MG TbDL, Take 1 tablet (8 mg total) by mouth every 6 (six) hours as needed (nausea)., Disp: 30 tablet, Rfl: 1    oxycodone-acetaminophen (PERCOCET)  mg  "per tablet, Take 1 tablet by mouth every 4 (four) hours as needed for Pain. Per Dr. Villa at [facility] [place], [place], Disp: , Rfl:     pen needle, diabetic 31 gauge x 5/16" Ndle, 1 each by Misc.(Non-Drug; Combo Route) route 5 (five) times daily., Disp: 200 each, Rfl: 2    promethazine (PHENERGAN) 25 MG tablet, TAKE 1 TABLET (25 MG TOTAL) BY MOUTH EVERY 6 HOURS AS NEEDED., Disp: 60 tablet, Rfl: 0    QUEtiapine (SEROQUEL) 400 MG tablet, 400 mg 2 (two) times daily. Pt states takes two tablets nightly, total of 800 mg, Disp: , Rfl:     terbinafine HCl (ANTIFUNGAL, TERBINAFINE,) 1 % cream, Apply topically 2 (two) times daily. To area under both arms for 2-4 weeks, Disp: 30 g, Rfl: 0    tizanidine (ZANAFLEX) 4 MG tablet, TAKE 3 TABLETS (12 MG TOTAL) BY MOUTH EVERY EVENING., Disp: 90 tablet, Rfl: 2    topiramate (TOPAMAX) 25 MG tablet, Take 1 tablet (25 mg total) by mouth daily as needed (migraines)., Disp: 90 tablet, Rfl: 3    valsartan-hydrochlorothiazide (DIOVAN-HCT) 80-12.5 mg per tablet, 1 tablet once daily. , Disp: , Rfl:     VENTOLIN HFA 90 mcg/actuation inhaler, as needed. , Disp: , Rfl:     zolpidem (AMBIEN) 10 mg Tab, 10 mg every evening. , Disp: , Rfl:     blood-glucose meter kit, Use as instructed, Disp: 1 each, Rfl: 0    calcium carbonate (OS-MARIAN) 500 mg calcium (1,250 mg) tablet, Take 2 tablets (1,000 mg total) by mouth once daily., Disp: , Rfl: 0    insulin aspart (NOVOLOG) 100 unit/mL InPn pen, Inject 16 Units into the skin 3 (three) times daily with meals., Disp: 1 Box, Rfl: 2    insulin detemir (LEVEMIR FLEXTOUCH) 100 unit/mL (3 mL) SubQ InPn pen, Inject 30 Units into the skin 2 (two) times daily., Disp: 1 Box, Rfl: 1     Review of patient's allergies indicates:   Allergen Reactions    Dexamethasone Hives and Shortness Of Breath     Per pt, only steroid she is allergic to is dexamethasone    Doxepin hcl Hives, Diarrhea and Itching    Ciprofloxacin      Counteracts with " "seroquel, xanax, tizanidine    Compazine [prochlorperazine] Hives and Itching    Lyrica [pregabalin]      depression    Prednisone      Gastroparesis flare up        ROS  Constitutional: Negative for fever, Negative for weight loss  HENT Negative for congestion  Cardiovascular: Negative chest pain  Respiratory: Negative Shortness of breath  Heme: Negative excessive bleeding  Skin:NegativeItching, Negative breakdown  Musculoskeletal:Positive for back pain, Positive for joint pain, Negative muscle pain, Negative muscle weakness  Neurological: Negative for numbness and paresthesias   Psychiatric/Behavioral: Negative altered mental status, Negative for depression    Physical Exam:   Ht 5' 5" (1.651 m)   Wt 119.6 kg (263 lb 10.7 oz)   LMP  (LMP Unknown)   BMI 43.88 kg/m²   General appearance: This is a well-developed, Well nourished female No obvious acute distress.  Psychiatric: normal mood and affect;  pleasant and conversant; judgment and thought content normal  Gait is coordinated. Patient has antalgic gait to the left  Cardiovascular: There are no swelling or varicosities present.   Respiratory: normal respiratory effort   Lymphatic: no adenopathy   Neurologic: alert and oriented to person, place, and time   Deep Tendon Reflexes are normal;  Coordination and Balance: Normal   Musculoskeletal   Neck    ROM shows normal flexion and extension and lateral rotation    Palpation: Non-tender    Stability is normal    Strength is normal    Skin is normal without masses and lesions    Sensation is intact to light touch   Back    ROM showsabnormal flexion, extension and     rotation    Palpation shows no masses    Stability is normal    Strength to flexion and extension well maintained    Core strength is diminished    Skin shows no rashes or cafe au lait spots;     Sensation is intact to light touch  Right hip   Range of motion normal    Left Hip  Range of motionnormal    Right Knee  Swelling " None  TendernessNone  Range of Motion: 120  Motion is painfulNo  Crepitance presentNo    Right Leg  Neurologic Intact  Pulses Intact    Left Knee: Swelling Mild  TendernessMedial joint line  Range of Motion: 5-115   Motion is painful Yes    Left Leg   Neurologic Intact  Pulses Intact    Radiograph: Show severe degenerative arthritis with subchondral sclerosis, periarticular osteophytes and narrowing of joint space.  Angle: mild varus    Physical therapy is contraindicated due to potential bone loss on this severe arthritic joint.    Assessment:  Knee arthritis left      She will need to be cleared in our PreOp center.         .    She  has a past medical history of Anemia; Anxiety; Asthma; Back pain; Bipolar 1 disorder (3/17/2015); Cervical cancer (2009); Diabetes mellitus with hyperglycemia (1/17/2017); Fibrocystic breast; Fibromyalgia; Gastroparesis (2012); Hypertension; Hypothyroidism, postsurgical; Morbid obesity; Non-intractable vomiting with nausea (9/26/2017); Prediabetes; Rheumatoid arthritis; S/P laparoscopic appendectomy (9/26/2017); Thyroid cancer (2003 & 2013); and Urinary incontinence. . We will have to take this into account. She  will be followed by the hospitalist service while in the hospital.       We have gone over the hospitalization and recovery with her as well.  This is typically around 2 weeks on a walker and transition to a cane after that.  She will have home health likely for 3-4 weeks and then transition to outpatient if necessary.  She is agreement with this plan of care and is ready to proceed.  I will see her back for clinical recheck at the 2-week postop marsha.  I will see her back for clinical recheck for any other questions or problems as needed and certainly for any other issues in the interim.    We have discussed risks of total knee replacement which include but are not limited to blood clots in the legs that can travel to the lungs (pulmonary embolism). Pulmonary embolism can  cause shortness of breath, chest pain, and even shock. Other risks include urinary tract infection, nausea and vomiting (usually related to pain medication), chronic knee pain and stiffness, bleeding into the knee joint, nerve damage, blood vessel injury, and infection of the knee which can require re-operation. Furthermore, the risks of anesthesia include potential heart, lung, kidney, and liver damage.    Have discussed the risks and potential complications such as infection and failure of the knee.  Patient has risk factors such as diabetes and obesity.  Patient insists that she understands these risks and wants to have the surgery.

## 2018-02-02 ENCOUNTER — TELEPHONE (OUTPATIENT)
Dept: ELECTROPHYSIOLOGY | Facility: CLINIC | Age: 43
End: 2018-02-02

## 2018-02-02 ENCOUNTER — CLINICAL SUPPORT (OUTPATIENT)
Dept: ELECTROPHYSIOLOGY | Facility: CLINIC | Age: 43
End: 2018-02-02
Attending: INTERNAL MEDICINE
Payer: COMMERCIAL

## 2018-02-02 DIAGNOSIS — Z95.818 STATUS POST PLACEMENT OF IMPLANTABLE LOOP RECORDER: Primary | ICD-10-CM

## 2018-02-02 DIAGNOSIS — Z95.818 STATUS POST PLACEMENT OF IMPLANTABLE LOOP RECORDER: ICD-10-CM

## 2018-02-02 DIAGNOSIS — R55 SYNCOPE: ICD-10-CM

## 2018-02-02 NOTE — TELEPHONE ENCOUNTER
"2.3.18 1940: called Ms Jerry but the mouse was still not charged. I will call her tomorrow between [phone] to allow more hours to charge. Ms Jerry said that would be fine and verbalized understanding. Very nice lady.        2.3.18 1830: Spoke to Ms Jerry. They are stuck in the parade in [place]. She charged the "mouse" around 2 hours. She should be home in around an hour. I'll call her back then.          2.3.18: Spoke to Ms Jerry. She was at home. We tried to send a manual transmission from her loop. It showed an error msg that the "mouse" was not charges. I will call her again by 6pm to see if it is charged enough to send a manual transmission.          I spoke w/ Ms Jerry re: reconnecting her loop home monitor. She has not been living at home and hasn't had her monitor with her. She said she will go home and send a manual transmission tomorrow and bring her monitor where she is staying.  "

## 2018-02-04 PROCEDURE — 93298 REM INTERROG DEV EVAL SCRMS: CPT | Mod: S$GLB,,, | Performed by: INTERNAL MEDICINE

## 2018-02-04 PROCEDURE — 93299 LOOP RECORDER REMOTE: CPT | Mod: S$GLB,,, | Performed by: INTERNAL MEDICINE

## 2018-02-05 ENCOUNTER — OFFICE VISIT (OUTPATIENT)
Dept: ELECTROPHYSIOLOGY | Facility: CLINIC | Age: 43
End: 2018-02-05
Payer: COMMERCIAL

## 2018-02-05 ENCOUNTER — HOSPITAL ENCOUNTER (OUTPATIENT)
Dept: CARDIOLOGY | Facility: CLINIC | Age: 43
Discharge: HOME | End: 2018-02-05
Payer: COMMERCIAL

## 2018-02-05 VITALS
SYSTOLIC BLOOD PRESSURE: 110 MMHG | BODY MASS INDEX: 44.04 KG/M2 | HEART RATE: 94 BPM | WEIGHT: 264.31 LBS | DIASTOLIC BLOOD PRESSURE: 80 MMHG | HEIGHT: 65 IN

## 2018-02-05 DIAGNOSIS — E89.0 HYPOTHYROIDISM, POSTSURGICAL: Chronic | ICD-10-CM

## 2018-02-05 DIAGNOSIS — I10 ESSENTIAL HYPERTENSION: Chronic | ICD-10-CM

## 2018-02-05 DIAGNOSIS — Z95.818 STATUS POST PLACEMENT OF IMPLANTABLE LOOP RECORDER: Primary | Chronic | ICD-10-CM

## 2018-02-05 DIAGNOSIS — E66.01 MORBID OBESITY WITH BMI OF 40.0-44.9, ADULT: ICD-10-CM

## 2018-02-05 DIAGNOSIS — Z85.850 HISTORY OF THYROID CANCER: Chronic | ICD-10-CM

## 2018-02-05 DIAGNOSIS — M79.7 FIBROMYALGIA: Chronic | ICD-10-CM

## 2018-02-05 DIAGNOSIS — R00.0 TACHYCARDIA: ICD-10-CM

## 2018-02-05 DIAGNOSIS — J45.909 UNCOMPLICATED ASTHMA, UNSPECIFIED ASTHMA SEVERITY, UNSPECIFIED WHETHER PERSISTENT: Chronic | ICD-10-CM

## 2018-02-05 PROCEDURE — 99999 PR PBB SHADOW E&M-EST. PATIENT-LVL III: CPT | Mod: PBBFAC,,, | Performed by: NURSE PRACTITIONER

## 2018-02-05 PROCEDURE — 93000 ELECTROCARDIOGRAM COMPLETE: CPT | Mod: S$GLB,,, | Performed by: INTERNAL MEDICINE

## 2018-02-05 PROCEDURE — 99214 OFFICE O/P EST MOD 30 MIN: CPT | Mod: SA,S$GLB,, | Performed by: NURSE PRACTITIONER

## 2018-02-05 PROCEDURE — 3008F BODY MASS INDEX DOCD: CPT | Mod: S$GLB,,, | Performed by: NURSE PRACTITIONER

## 2018-02-05 RX ORDER — VALSARTAN AND HYDROCHLOROTHIAZIDE 80; 12.5 MG/1; MG/1
1 TABLET, FILM COATED ORAL DAILY
Qty: 90 TABLET | Refills: 3 | Status: ON HOLD | OUTPATIENT
Start: 2018-02-05 | End: 2018-04-16

## 2018-02-05 NOTE — PROGRESS NOTES
"Subjective:    Patient ID:  Homar Jerry is a 42 y.o. female who presents for follow-up of ILR    Homar Jerry is a patient of Dr. Johns.    HPI     I had the pleasure of seeing Homar Jerry in follow up for her hx of syncope and palpitations s/p ILR placement. She is a 42-year old female with bipolar disorder, HTN, fibromyalgia, migraines, chronic back pain, and gastroparesis status-post laparoscopic sleeve gastrectomy and gastric pacemaker, whose history of syncope dates back to January of 2016. She has had a total of 3-4 events since that time. Prior to the events, she described historically experiencing a prodrome of lightheadedness>>lasting seconds-to-minutes in duration. She denied exacerbating or alleviating symptoms. In addition, she denied chest pains, shortness of breath, palpitations, or other symptoms associated with these episodes (although she has had "tachycardia" which was diagnosed in 2007>>experienced episodes of intermittent palpitations in the office today)>>had an episode 2016, which resulted in a hospital admission and medication adjustments.  Began to experience more frequent palpitations>>occurring 2-3 x/week>>w/episodes lasting 15-20 minutes in duration>>resolving after lying down and propping her legs up.   Underwent ILR placement (06/10/16) without complication.     Since her last office visit, Ms. Jerry has been under an enormous amount of stress>>her son was killed (approximately 6-weeks ago)>>she reports that she is seeing a psychiatrist, and that she has a good support system of family and friends. She has been doing fine from a cardiac standpoint>>she has been experiencing some episodes of anxiety, but denies denies dizziness or syncopal recurrence. She notes occasional (baseline) palpitations, and [place]. Her BP was elevated several weeks ago>>placed on Diovan by MD at [facility]>>noted improvement in BP after this medication was added. She is " scheduled to undergo a Left-knee TKR next month w/Dr. [facility].      Recent cardiac studies:  Echo (01/22/16) CONCLUSIONS:     1 - Normal left ventricular systolic function (EF 60-65%).     2 - Normal right ventricular systolic function .     3 - Trivial mitral regurgitation.     4 - Trivial tricuspid regurgitation.     5 - Trivial pericardial effusion.   ILR Transmissions (01/04/18 - 02/04/18) Presenting egram shows ST 100s; there were no AUTO episodes detected. There was 1 SYMPTOM episode detected (egram c/w SR in the 90s). Battery OK.     I reviewed today's ECG which demonstrated NSR at 94 bpm; , QRS 92, and QTc 450.    Review of Systems   Constitution: Positive for malaise/fatigue. Negative for diaphoresis.   HENT: Negative for nosebleeds.    Eyes: Negative for double vision.   Cardiovascular: Positive for dyspnea on exertion and palpitations. Negative for irregular heartbeat, near-syncope and syncope.   Skin: Negative.    Musculoskeletal: Positive for joint pain (L-knee) and stiffness.   Gastrointestinal: Negative for hematemesis and hematochezia.   Genitourinary: Negative for hematuria.   Neurological: Negative for dizziness and light-headedness.   Psychiatric/Behavioral: Positive for depression (situational). Negative for altered mental status.        Objective:    Physical Exam   Constitutional: She is oriented to person, place, and time. She appears well-developed and well-nourished.   HENT:   Head: Normocephalic and atraumatic.   Eyes: Pupils are equal, round, and reactive to light.   Cardiovascular: Normal rate and regular rhythm.    Pulmonary/Chest: Effort normal.   Musculoskeletal:   Antalgic gait noted   Neurological: She is alert and oriented to person, place, and time.   Skin: She is not diaphoretic.   Vitals reviewed.        Assessment:       1. Status post placement of implantable loop recorder    2. Essential hypertension    3. Uncomplicated asthma, unspecified asthma severity, unspecified  whether persistent    4. Fibromyalgia    5. History of thyroid cancer    6. Hypothyroidism, postsurgical    7. Morbid obesity with BMI of 40.0-44.9, adult         Plan:       Ms. Jerry is doing well from a device perspective with stable device function without arrhythmia noted on ILR transmissions to date>>no recurrence of syncope.     Continue current medication regimen and device settings. Will refill Diovan (per pt request)>>Recommend f/u w/PCP (Dr. Cabrera) for further management (would likely recommend consolidation of HCTZ medications (Hydrodiuril and Diovan)>>currently however, she does not exceed the recommended HTN dose of HCTZ.   Follow up in device clinic as scheduled.   Follow up in EP clinic in 1 year, sooner as needed.     KERI Estrella, APRN, FNP-C      (A copy of today's note was sent to Dr. Cabrera).

## 2018-03-04 ENCOUNTER — TELEPHONE (OUTPATIENT)
Dept: ELECTROPHYSIOLOGY | Facility: CLINIC | Age: 43
End: 2018-03-04

## 2018-03-04 NOTE — TELEPHONE ENCOUNTER
LM on Ms Jerry home #. Mr Ramirez's # is out of order. Ms Barrera confirmed Ms Rosalino # is her only phone #, She went to visit family in [place]. I asked Ms Barrera to let ms Jerry know I was  trying to contact her. Her loop home monitor is not connected.

## 2018-03-07 ENCOUNTER — ANESTHESIA EVENT (OUTPATIENT)
Dept: SURGERY | Facility: HOSPITAL | Age: 43
DRG: 470 | End: 2018-03-07
Payer: MEDICARE

## 2018-03-07 DIAGNOSIS — Z91.89 AT RISK OF UTI: ICD-10-CM

## 2018-03-07 DIAGNOSIS — M79.606 PAIN OF LOWER EXTREMITY, UNSPECIFIED LATERALITY: Primary | ICD-10-CM

## 2018-03-07 DIAGNOSIS — E11.9 DM TYPE 2, GOAL A1C TO BE DETERMINED: ICD-10-CM

## 2018-03-07 NOTE — PRE ADMISSION SCREENING
Anesthesia Assessment: Preoperative EQUATION    Planned Procedure: Procedure(s) (LRB):  REPLACEMENT-KNEE-TOTAL (Left)  Requested Anesthesia Type:Femoral Block  Surgeon: John L. [facility] Jr., MD  Service: Orthopedics  Known or anticipated Date of Surgery:3/28/2018        Plan:    Testing:  Hematology Profile, BMP, A1C, PT/INR and UA   Pre-anesthesia  visit       Visit focus: possible regional anesthesia and/or nerve block      Consultation:IM Perioperative Hospitalist     Karen Moore RN 03/07/2018

## 2018-03-07 NOTE — ANESTHESIA PREPROCEDURE EVALUATION
Anesthesia Assessment: Preoperative EQUATION    Planned Procedure: Procedure(s) (LRB):  REPLACEMENT-KNEE-TOTAL (Left)  Requested Anesthesia Type:Femoral Block  Surgeon: John L. [facility] Jr., MD  Service: Orthopedics  Known or anticipated Date of Surgery:3/28/2018        Plan:    Testing:  Hematology Profile, BMP, A1C, PT/INR and UA   Pre-anesthesia  visit       Visit focus: possible regional anesthesia and/or nerve block      Consultation:IM Perioperative Hospitalist     Karen Moore RN 03/07/2018 03/07/2018  Homar Jerry is a 42 y.o., female.    Anesthesia Evaluation    I have reviewed the Patient Summary Reports.    I have reviewed the Nursing Notes.   I have reviewed the Medications.     Review of Systems  Anesthesia Hx:  No problems with previous Anesthesia Hx of Anesthetic complications   Last sx 8/2017 -emergency appendectomy.  Patient states she is hard to sedate with every surgery    Patient's allergy listed-dexamethasone and prednisone causes gastroparesis flare History of prior surgery of interest to airway management or planning: Previous anesthesia: General Denies Family Hx of Anesthesia complications.   Denies Personal Hx of Anesthesia complications.   Social:  Non-Smoker, No Alcohol Use    Hematology/Oncology:  Hematology Normal   Oncology Normal   Oncology Comments: Thyroid-radiation and cervical     EENT/Dental:  EENT/Dental Normal Recurrent sinus infections-last 11/2017   Cardiovascular:   Exercise tolerance: good Hypertension Housework, cook, grocery shopping-uses cart, walks to appointment unassisted. Denies chest pain or sob Cardiovascular Symptoms: Syncope (-and plapitations-daily s/p S/p Loop recorder 2016;  per [facility] in arrythmia 2/2018:Ms. Jerry is doing well from a device perspective with stable device function without arrhythmia noted on ILR transmissions to  date>>no recurrence of syncope.     )   Pulmonary:   Pneumonia: 2015. Asthma (inhaler bid) mild  Possible Obstructive Sleep Apnea , (STOP/BANG) Symptoms S - Snoring (loud), B - BMI > 35, A - Age > 50, P - Pressure being treated for high BP, N - Neck circumference > 40 cms and T - Tired / daytime fatigue / somnolence    Renal/:  Renal/ Normal     Hepatic/GI:   Liver Disease, Per chart review:2012 Gastroparesis s/p gastric pacemaker which did not help  s/p sleeve     CHRONIC NAUSEA-TAKES PHENERGAN 2-3 TIMES DAILY   Musculoskeletal:   Arthritis   Muscle Disorders: Fibromyalgia  Joint Disease:  Arthritis, Rheumatoid Arthritis    Neurological:   Headaches   Pt reports chronic pain and [facility]. 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  TM Distance: Normal, at least 6 cm  Jaw/Neck Findings:  Neck ROM: Extension Decreased, Mild, Extension Painful  Neck Findings:  Thyroidectomy Scar, Girth Increased, Short Neck     Eyes/Ears/Nose:  EYES/EARS/NOSE FINDINGS: Normal   Dental:  Dental Findings: In tact   Chest/Lungs:  Chest/Lungs Findings: Clear to auscultation, Normal Respiratory Rate     Heart/Vascular:  Heart Findings: Rate: Normal  Rhythm: Regular Rhythm  Sounds: Normal  Heart murmur: negative Vascular Findings:  Vascular Exam Findings:     VERY  HARD STICK-has had IV in finger,foot, and  has needed PICC lines      Abdomen:  Abdomen Findings: Normal    Musculoskeletal:  Musculoskeletal Findings: Normal   Skin:  Skin Findings: Normal    Mental Status:  Mental Status Findings:  Cooperative, Alert and Oriented           Labs reviewed    Discharge disposition:  Patient lives alone and wants to go to SNF.  She states her insurance would pay for 20 days in SNF if needed.    Please refer to , Internal Medicine, perioperative risk assessment and recommendations.     Karen Moore RN 03/15/2018                Anesthesia Plan  Type of Anesthesia, risks & benefits discussed:  Anesthesia Type:  spinal  Patient's Preference:   Intra-op Monitoring Plan: standard ASA monitors  Intra-op Monitoring Plan Comments:   Post Op Pain Control Plan: peripheral nerve block  Post Op Pain Control Plan Comments:   Induction:   IV  Beta Blocker:  Patient is on a Beta-Blocker and has received one dose within the past 24 hours (No further documentation required).       Informed Consent: Patient understands risks and agrees with Anesthesia plan.  Questions answered. Anesthesia consent signed with patient.  ASA Score: 3     Day of Surgery Review of History & Physical:            Ready For Surgery From Anesthesia Perspective.

## 2018-03-12 ENCOUNTER — CLINICAL SUPPORT (OUTPATIENT)
Dept: ELECTROPHYSIOLOGY | Facility: CLINIC | Age: 43
End: 2018-03-12
Attending: INTERNAL MEDICINE
Payer: COMMERCIAL

## 2018-03-12 DIAGNOSIS — Z95.818 STATUS POST PLACEMENT OF IMPLANTABLE LOOP RECORDER: ICD-10-CM

## 2018-03-12 DIAGNOSIS — R55 SYNCOPE: ICD-10-CM

## 2018-03-13 ENCOUNTER — HOSPITAL ENCOUNTER (OUTPATIENT)
Dept: PREADMISSION TESTING | Facility: HOSPITAL | Age: 43
Discharge: HOME | End: 2018-03-13

## 2018-03-13 NOTE — DISCHARGE INSTRUCTIONS
Your surgery has been scheduled for:__________________________________________    You should report to:  ____[facility], located on the [place] side of the first floor of the           [facility] ([phone])  ____[facility], located on the [facility] side of the            Second floor of the [facility] ([phone])  ____3rd Floor [facility] located on the [facility] side of the [facility] [phone]  Please Note   - Tell your doctor if you take Aspirin, products containing Aspirin, herbal medications  or blood thinners, such as Coumadin, Ticlid, or Plavix.  (Consult your provider regarding holding or stopping before surgery).  - Arrange for someone to drive you home following surgery.  You will not be allowed to leave the surgical facility alone or drive yourself home following sedation and anesthesia.  Before Surgery  - Stop taking all herbal medications 14days prior to surgery  - No Motrin/Advil (Ibuprofen) 7 days before surgery  - No Aleve (Naproxen) 7 days before surgery  - Stop Taking Asprin, products containing Asprin _____days before surgery  - Stop taking blood thinners_______days before surgery  - Refrain from drinking alcoholic beverages for 24hours before and after surgery  - Stop or limit smoking _________days before surgery  Night before Surgery  - DO NOT EAT OR DRINK ANYTHING AFTER MIDNIGHT, INCLUDING GUM, HARD CANDY, MINTS, OR CHEWING TOBACCO.  - Take a shower or bath (shower is recommended).  Bathe with Hibiclens soap or an antibacterial soap from the neck down.  If not supplied by your surgeon, hibiclens soap will need to be purchased over the counter in pharmacy.  Rinse soap off thoroughly.  - Shampoo your hair with your regular shampoo  The Day of Surgery  - Take another bath or shower with hibiclens or any antibacterial soap, to reduce the chance of infection.  - Take heart and blood  pressure medications with a small sip of water, as advised by the perioperative team.  - Do not take fluid pills  - You may brush your teeth and rinse your mouth, but do not swall any additional water.   - Do not apply perfumes, powder, body lotions or deodorant on the day of surgery.  - Nail polish should be removed.  - Do not wear makeup or moisturizer  - Wear comfortable clothes, such as a button front shirt and loose fitting pants.  - Leave all jewelry, including body piercings, and valuables at home.    - Bring any devices you will neeed after surgery such as crutches or canes.  - If you have sleep apnea, please bring your CPAP machine  In the event that your physical condition changes including the onset of a cold or respiratory illness, or if you have to delay or cancel your surgery, please notify your surgeon.Anesthesia: Regional Anesthesia  Youre scheduled for surgery. During surgery, youll receive medication called anesthesia to keep you comfortable and pain-free. Your surgeon has decided that youll receive regional anesthesia. This sheet tells you what to expect with this type of anesthesia.  What Is Regional Anesthesia?  Regional anesthesia numbs one region of your body. The anesthesia may be given around nerves or into veins in your arms, neck, or legs (nerve block or Mike block). Or it may be sent into the spinal fluid (spinal anesthesia) or into the space just outside the spinal fluid (epidural anesthesia). Sedatives may also be given to relax you.  Nerve Block or Mike Block  A small area of the body, such as an arm or leg, can be numbed using a nerve block or [place] block.  · Nerve block: During a nerve block, your skin is numbed. A needle is then inserted near nerves that serve the area to be numbed. Anesthetic is sent through the needle.  · IV regional or Mike block: For this type of block, an IV line is put into a vein. The blood flow to the area to be numbed is blocked for a short time.  Anesthetic is sent through the IV.  Spinal Anesthesia  Spinal anesthesia numbs your body from about the waist down.  · Anesthetic is injected into the spinal fluid. This is a substance that surrounds the spinal cord in your spinal column. The anesthetic blocks pain traveling from the body to the brain.  · To receive the anesthetic, your skin is numbed at the injection site.  · A needle is then inserted into the spinal fluid. Anesthetic is sent through the needle.  Anesthesia Tools and Medications  · Local anesthetic given through a needle numbs one region of your body.  · Electrocardiography leads (electrodes) record your heart rate and rhythm.  · A blood pressure cuff monitors your blood pressure.  · A pulse oximeter on the end of the finger measures your blood oxygen level.  · Sedatives may be given through an IV to relax you and keep you comfortable. You may stay awake or sleep slightly.  · Oxygen may be given to you through a facemask.  Risks and Possible Complications  Regional anesthesia carries some risks. These include:  · Nausea and vomiting  · Headache  · Backache  · Decreased blood pressure  · Allergic reaction to the anesthetic  · Ongoing numbness (rare)  · Irregular heartbeat (rare)  · Cardiac arrest (rare)   © 3035-2892 [employer] [facility] [place], [address] [place] [address], [place] [address]. All rights reserved. This information is not intended as a substitute for professional medical care. Always follow your healthcare professional's instructions.

## 2018-03-15 ENCOUNTER — HOSPITAL ENCOUNTER (OUTPATIENT)
Dept: RADIOLOGY | Facility: HOSPITAL | Age: 43
Discharge: HOME | End: 2018-03-15
Attending: HOSPITALIST
Payer: COMMERCIAL

## 2018-03-15 ENCOUNTER — HOSPITAL ENCOUNTER (OUTPATIENT)
Dept: PREADMISSION TESTING | Facility: HOSPITAL | Age: 43
Discharge: HOME | End: 2018-03-15
Attending: ANESTHESIOLOGY
Payer: COMMERCIAL

## 2018-03-15 ENCOUNTER — INITIAL CONSULT (OUTPATIENT)
Dept: INTERNAL MEDICINE | Facility: CLINIC | Age: 43
End: 2018-03-15
Payer: COMMERCIAL

## 2018-03-15 VITALS
TEMPERATURE: 98 F | HEART RATE: 84 BPM | SYSTOLIC BLOOD PRESSURE: 101 MMHG | HEART RATE: 84 BPM | HEIGHT: 65 IN | HEIGHT: 65 IN | BODY MASS INDEX: 42.99 KG/M2 | OXYGEN SATURATION: 97 % | DIASTOLIC BLOOD PRESSURE: 74 MMHG | OXYGEN SATURATION: 97 % | WEIGHT: 258.25 LBS | TEMPERATURE: 98 F | SYSTOLIC BLOOD PRESSURE: 101 MMHG | RESPIRATION RATE: 16 BRPM | BODY MASS INDEX: 43.03 KG/M2 | WEIGHT: 258 LBS | DIASTOLIC BLOOD PRESSURE: 71 MMHG

## 2018-03-15 DIAGNOSIS — C53.9 MALIGNANT NEOPLASM OF CERVIX, UNSPECIFIED SITE: ICD-10-CM

## 2018-03-15 DIAGNOSIS — K76.0 FATTY LIVER: ICD-10-CM

## 2018-03-15 DIAGNOSIS — E13.9 LADA (LATENT AUTOIMMUNE DIABETES IN ADULTS), MANAGED AS TYPE 1: ICD-10-CM

## 2018-03-15 DIAGNOSIS — M06.9 RHEUMATOID ARTHRITIS, INVOLVING UNSPECIFIED SITE, UNSPECIFIED RHEUMATOID FACTOR PRESENCE: Chronic | ICD-10-CM

## 2018-03-15 DIAGNOSIS — E87.6 HYPOKALEMIA: ICD-10-CM

## 2018-03-15 DIAGNOSIS — Z95.818 STATUS POST PLACEMENT OF IMPLANTABLE LOOP RECORDER: Chronic | ICD-10-CM

## 2018-03-15 DIAGNOSIS — I10 ESSENTIAL HYPERTENSION: Chronic | ICD-10-CM

## 2018-03-15 DIAGNOSIS — E66.01 MORBID OBESITY WITH BMI OF 40.0-44.9, ADULT: ICD-10-CM

## 2018-03-15 DIAGNOSIS — Z85.850 HISTORY OF THYROID CANCER: Chronic | ICD-10-CM

## 2018-03-15 DIAGNOSIS — J45.909 UNCOMPLICATED ASTHMA, UNSPECIFIED ASTHMA SEVERITY, UNSPECIFIED WHETHER PERSISTENT: Chronic | ICD-10-CM

## 2018-03-15 DIAGNOSIS — K31.84 GASTROPARESIS: ICD-10-CM

## 2018-03-15 DIAGNOSIS — F31.9 BIPOLAR 1 DISORDER: Chronic | ICD-10-CM

## 2018-03-15 DIAGNOSIS — F11.20 CHRONIC NARCOTIC DEPENDENCE: Chronic | ICD-10-CM

## 2018-03-15 DIAGNOSIS — Z01.818 PREOP EXAMINATION: Primary | ICD-10-CM

## 2018-03-15 PROBLEM — K35.30 ACUTE APPENDICITIS WITH LOCALIZED PERITONITIS: Status: RESOLVED | Noted: 2017-09-17 | Resolved: 2018-03-15

## 2018-03-15 PROCEDURE — 72050 X-RAY EXAM NECK SPINE 4/5VWS: CPT | Mod: 26,,, | Performed by: RADIOLOGY

## 2018-03-15 PROCEDURE — 99999 PR PBB SHADOW E&M-EST. PATIENT-LVL III: CPT | Mod: PBBFAC,,, | Performed by: HOSPITALIST

## 2018-03-15 PROCEDURE — 72050 X-RAY EXAM NECK SPINE 4/5VWS: CPT | Mod: TC

## 2018-03-15 PROCEDURE — 99214 OFFICE O/P EST MOD 30 MIN: CPT | Mod: S$GLB,,, | Performed by: HOSPITALIST

## 2018-03-15 RX ORDER — TRAZODONE HYDROCHLORIDE 50 MG/1
50 TABLET ORAL NIGHTLY
Status: ON HOLD | COMMUNITY
Start: 2018-01-29 | End: 2018-04-16

## 2018-03-15 NOTE — OUTPATIENT SUBJECTIVE & OBJECTIVE
Outpatient Subjective & Objective     Chief complaint-Preoperative evaluation, Perioperative Medical management, complication reduction plan     Active cardiac conditions- none    Revised cardiac risk index predictors- insulin requiring diabetes mellitus    Functional capacity -Examples of physical activity, Limited due to knee problem , was more active 2 years ago, and used to do aquatic therapy, cardio,She can undertake all the above activities without  chest pain,making her exercise tolerance  less  than 4 Mets.   Winded, heart palpitations  on exertion, not new ,stable over time   Had a stress test about an year ago ? [facility] - no CAD to her understanding     Review of Systems   Constitutional: Negative for chills and fever.        No unusual weight changes     HENT:        STOPBANG score  3/ 8    Elevated BP  BMI> 35   Neck size over 40 CM     Eyes:        Needs eye check   Respiratory:        No cough , phlegm  No Hemoptysis   Cardiovascular:        As noted   Gastrointestinal:        No overt GI/ blood losses  Bowel movements- Regular      Endocrine:        Prednisone use > 20 mg daily for 3 weeks- none   Genitourinary: Negative for dysuria.        No urinary hesitancy    Musculoskeletal:        As above      Skin: Negative for rash.   Neurological: Negative for syncope.        No unilateral weakness   Hematological:        Current use of Anticoagulants  Current use of Antiplatelet agents  None   Psychiatric/Behavioral:        Depression,Anxiety  Depression, Anxiety - Controlled   No SI/HI     No vascular stenting       Past Surgical History:   Procedure Laterality Date    ANKLE FRACTURE SURGERY Right     APPENDECTOMY      BREAST SURGERY      exploratory laparotomy due to complications of hysterectomy  2009    cervical cuff burst with air in abdomen    FRACTURE SURGERY      GASTRECTOMY      gastric pacemaker      gastric sleeve      HYSTERECTOMY  2009    total including cervix    KNEE ARTHROSCOPY W/  "MENISCAL REPAIR Left 2016    KNEE SURGERY  2016    loop monitor  2016    multiple breast biopsies      TONSILLECTOMY      ureteral sling         No  bleeding, cardiac problems, PONV with previous surgeries/procedures.  FH- No anesthesia, bleeding ,  in family   Mother had heart disease -in her 40's  Lives alone   Son    Help not available    Medications and Allergies reviewed in epic.     Physical Exam   HENT:   Head: Normocephalic.       Physical Exam   HENT:   Head: Normocephalic.     Constitutional- Vitals - Body mass index is 42.93 kg/m².,   Vitals:    03/15/18 1049   BP: 101/74   Pulse: 84   Temp: 98 °F (36.7 °C)     General appearance-Conscious,Coherent  Eyes- No conjunctival icterus,pupils  round  and reactive to light   ENT-Oral cavity- moist  , Hearing grossly normal   Neck- No thyromegaly ,Trachea -central, No jugular venous distension,   No Carotid Bruit   Cardiovascular -Heart Sounds- Normal  and  no murmur   , No gallop rhythm   Respiratory - Normal Respiratory Effort, Normal breath sounds and  no wheeze    Peripheral pitting pedal edema-- none , no calf pain   Gastrointestinal -Soft abdomen, No palpable masses, Non Tender,Liver,Spleen not palpable. No-- free fluid and shifting dullness  Musculoskeletal- No finger Clubbing. Strength grossly normal   Lymphatic-No Palpable cervical, axillary,Inguinal lymphadenopathy   Psychiatric - normal effect,Orientation  Rt Dorsalis pedis pulses-palpable    Lt Dorsalis pedis pulses- palpable   Rt Posterior tibial pulses -palpable   Left posterior tibial pulses -palpable   Miscellaneous -  Surgical scarabdomen  ,  no suggestion of bacterial feet infection and  no renal bruit  Blood pressure 101/74, pulse 84, temperature 98 °F (36.7 °C), temperature source Oral, height 5' 5" (1.651 m), weight 117 kg (258 lb), SpO2 97 %.      Investigations  Lab and Imaging have been reviewed in [facility].    Review of Medicine tests    EKG- I had independently reviewed the EKG " from--2/5/2018   It was reported to be showing     Normal sinus rhythm  Moderate voltage criteria for LVH, may be normal variant  Abnormal ECG  When compared with ECG of 24-OCT-2017 12:53,  T wave inversion no longer evident in Anterior-lateral leads    2016      1 - Normal left ventricular systolic function (EF 60-65%).     2 - Normal right ventricular systolic function .     3 - Trivial mitral regurgitation.     4 - Trivial tricuspid regurgitation.     5 - Trivial pericardial effusion.     Review of clinical lab tests:  Lab Results   Component Value Date    CREATININE 1.2 11/07/2017    HGB 12.2 11/07/2017     11/07/2017       Review of old records- Was done and information gathered regards to events leading to surgery and health conditions of significance in the perioperative period.    Outpatient Subjective & Objective

## 2018-03-15 NOTE — LETTER
March 15, 2018      Rhonda G Leopold, MD  [address]  [place] [address]           [facility] - Pre Op Consult  [address] [facility] [address]  Phone: [phone]          Patient: Homar Jerry   MR Number: [id number]   YOB: 1975   Date of Visit: 3/15/2018       Dear Dr. Rhonda G Leopold:    Thank you for referring Homar Jerry to me for evaluation. Attached you will find relevant portions of my assessment and plan of care.    If you have questions, please do not hesitate to call me. I look forward to following Homar Jerry along with you.    Sincerely,    Jelly Baez MD    Enclosure  CC:  No Recipients    If you would like to receive this communication electronically, please contact [email or web address] or [phone] to request more information on [employer] access.    For providers and/or their staff who would like to refer a patient to [facility], please contact us through our one-stop-shop provider referral line, [facility], at [phone].    If you feel you have received this communication in error or would no longer like to receive these types of communications, please e-mail [email or web address]

## 2018-03-15 NOTE — ASSESSMENT & PLAN NOTE
Chronic continuous opioid use- In view of the opioid use, the patient may have opioid tolerance . I suggest considering the possibility of opioid tolerance  in planning post operative pain control     Reports hard to be put to sleep

## 2018-03-15 NOTE — ASSESSMENT & PLAN NOTE
Hypertension-  Blood pressure is acceptable . I suggest continuation of -Coreg- during the entire perioperative period. I suggest holding valsartan - HCTZ--- on the morning of the surgery and can continue that  post operatively under blood pressure, electrolyte and renal function monitoring as long as they are acceptable.I suggest addressing pain control as uncontrolled pain can increased blood pressure   Follow up suggested about 2 different HCTZ Medication use

## 2018-03-15 NOTE — PROGRESS NOTES
"Matti Bustamante - Pre Op Consult  Progress Note    Patient Name: Homar Jerry  MRN: [id number]  Date of Evaluation- 03/15/2018  PCP- Mary Cabrera MD    Future cases for Homar Jerry [[id number]]     Case ID Status Date Time [place] Procedure Provider Location    [id number] [facility] 3/28/2018  8:00  REPLACEMENT-KNEE-TOTAL [facility] [[id number]] [facility] OR 2ND FLR        Left  HPI:  History of present illness- I had the pleasure of meeting this pleasant 42 y.o. lady in the pre op clinic prior to her elective Orthopedic surgery. The patient is new to me . Goes by "Narcisa"  I have obtained the history by speaking to the patient and by reviewing the electronic health records.    Events leading up to surgery / History of presenting illness -    She has been troubled with severe  left knee  pain for about 3 years  . 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 . In ED found to have worsening hyperglycemia, with anion gap and acidosis. Was bolused with NS and started on insulin infusion with DKA protocol.   On treatment with  Insulin    Hemoglobin A1c- 7.5 Sept 2017  Capillary glucose check-yes  Pre breakfast -95  Pre lunch -120  Pre [id number]                              Rheumatoid arthritis 01/17/2017     Had Rheumatology evaluation in the past and was felt that   since she has fatty liver cannot do MTX.                                    Asthma      usually with cold weather      Essential hypertension      Carvedilol   HCTZ  Vlsartan- HCTZ  Usual BP- " 110/70-80       Status post placement of implantable loop recorder 07/01/2015     syncope and palpitations s/p ILR placement.                   Bipolar 1 disorder 03/17/2015     Under Psychiatrist care   Subjectively controlled            Hypothyroidism, postsurgical      thyroid cancer s/p thyroidectomy in 2013                Gastroparesis 03/28/2013     s/p lap sleeve gastrectomy   No recent problem            Subjective/ Objective:          Chief complaint-Preoperative evaluation, Perioperative Medical management, complication reduction plan     Active cardiac conditions- none    Revised cardiac risk index predictors- insulin requiring diabetes mellitus    Functional capacity -Examples of physical activity, Limited due to knee problem , was more active 2 years ago, and used to do aquatic therapy, cardio,She can undertake all the above activities without  chest pain,making her exercise tolerance  less  than 4 Mets.   Winded, heart palpitations  on exertion, not new ,stable over time   Had a stress test about an year ago ? [facility] - no CAD to her understanding     Review of Systems   Constitutional: Negative for chills and fever.        No unusual weight changes     HENT:        STOPBANG score  3/ 8    Elevated BP  BMI> 35   Neck size over 40 CM     Eyes:        Needs eye check   Respiratory:        No cough , phlegm  No Hemoptysis   Cardiovascular:        As noted   Gastrointestinal:        No overt GI/ blood losses  Bowel movements- Regular      Endocrine:        Prednisone use > 20 mg daily for 3 weeks- none   Genitourinary: Negative for dysuria.        No urinary hesitancy    Musculoskeletal:        As above      Skin: Negative for rash.   Neurological: Negative for syncope.        No unilateral weakness   Hematological:        Current use of Anticoagulants  Current use of Antiplatelet agents  None   Psychiatric/Behavioral:        Depression,Anxiety  Depression, Anxiety - Controlled   No SI/HI     No  vascular stenting       Past Surgical History:   Procedure Laterality Date    ANKLE FRACTURE SURGERY Right     APPENDECTOMY      BREAST SURGERY      exploratory laparotomy due to complications of hysterectomy  2009    cervical cuff burst with air in abdomen    FRACTURE SURGERY      GASTRECTOMY      gastric pacemaker      gastric sleeve      HYSTERECTOMY  2009    total including cervix    KNEE ARTHROSCOPY W/ MENISCAL REPAIR Left 2016    KNEE SURGERY  2016    loop monitor  2016    multiple breast biopsies      TONSILLECTOMY      ureteral sling         No  bleeding, cardiac problems, PONV with previous surgeries/procedures.  FH- No anesthesia, bleeding ,  in family   Mother had heart disease -in her 40's  Lives alone   Son    Help not available    Medications and Allergies reviewed in epic.     Physical Exam   HENT:   Head: Normocephalic.       Physical Exam   HENT:   Head: Normocephalic.     Constitutional- Vitals - Body mass index is 42.93 kg/m².,   Vitals:    03/15/18 1049   BP: 101/74   Pulse: 84   Temp: 98 °F (36.7 °C)     General appearance-Conscious,Coherent  Eyes- No conjunctival icterus,pupils  round  and reactive to light   ENT-Oral cavity- moist  , Hearing grossly normal   Neck- No thyromegaly ,Trachea -central, No jugular venous distension,   No Carotid Bruit   Cardiovascular -Heart Sounds- Normal  and  no murmur   , No gallop rhythm   Respiratory - Normal Respiratory Effort, Normal breath sounds and  no wheeze    Peripheral pitting pedal edema-- none , no calf pain   Gastrointestinal -Soft abdomen, No palpable masses, Non Tender,Liver,Spleen not palpable. No-- free fluid and shifting dullness  Musculoskeletal- No finger Clubbing. Strength grossly normal   Lymphatic-No Palpable cervical, axillary,Inguinal lymphadenopathy   Psychiatric - normal effect,Orientation  Rt Dorsalis pedis pulses-palpable    Lt Dorsalis pedis pulses- palpable   Rt Posterior tibial pulses -palpable   Left  "posterior tibial pulses -palpable   Miscellaneous -  Surgical scarabdomen  ,  no suggestion of bacterial feet infection and  no renal bruit  Blood pressure 101/74, pulse 84, temperature 98 °F (36.7 °C), temperature source Oral, height 5' 5" (1.651 m), weight 117 kg (258 lb), SpO2 97 %.      Investigations  Lab and Imaging have been reviewed in epic.    Review of Medicine tests    EKG- I had independently reviewed the EKG from--2/5/2018   It was reported to be showing     Normal sinus rhythm  Moderate voltage criteria for LVH, may be normal variant  Abnormal ECG  When compared with ECG of 24-OCT-2017 12:53,  T wave inversion no longer evident in Anterior-lateral leads    2016      1 - Normal left ventricular systolic function (EF 60-65%).     2 - Normal right ventricular systolic function .     3 - Trivial mitral regurgitation.     4 - Trivial tricuspid regurgitation.     5 - Trivial pericardial effusion.     Review of clinical lab tests:  Lab Results   Component Value Date    CREATININE 1.2 11/07/2017    HGB 12.2 11/07/2017     11/07/2017       Review of old records- Was done and information gathered regards to events leading to surgery and health conditions of significance in the perioperative period.        Preoperative cardiac risk assessment-  The patient does not have any active cardiac conditions . Revised cardiac risk index predictors- 1---.Functional capacity is less than 4 Mets. She will be undergoing a Orthopedic procedure that carries a intermediate risk     The estimated risk of the rate of adverse cardiac outcomes  0.9%    No further cardiac work up is indicated prior to proceeding with the surgery     Had appendectomy Sept 2017 with no cardiac issues to her understanding     Orders Placed This Encounter    X-Ray Cervical Spine AP Lat with Flex Ex       American Society of Anesthesiologists Physical status classification ( ASA ) class: 3     Postoperative pulmonary complication risk assessment: "      ARISCAT ( [place]et) risk index- risk class -  Low      [facility] Respiratory failure index- percentage risk of respiratory failure:0.5 %     American college of Surgeons, NSQUIP risk calculation     Risk of serous complication --5.7% ( Average risk - 3.7%), Risk of any Complication- 6.8 % ( Average risk--4.3 %)    Assessment/Plan:     Bipolar 1 disorder  suggested continuation of Psychiatric Medication ( Seroquel, Buspar, Xanax, Vistaril, Trazaodone)  I suggest monitoring the sodium as SIADH from SeroquelI  use and hypersecretion of ADH associated with surgery can reduce sodium in the perioperative period    Chronic narcotic dependence  Chronic continuous opioid use- In view of the opioid use, the patient may have opioid tolerance . I suggest considering the possibility of opioid tolerance  in planning post operative pain control     Reports hard to be put to sleep     Asthma  asthma is controlled . I suggest consideration of inhaled bronchodilator use if the patient has perioperative bronchospasm       Essential hypertension  Hypertension-  Blood pressure is acceptable . I suggest continuation of -Coreg- during the entire perioperative period. I suggest holding valsartan - HCTZ--- on the morning of the surgery and can continue that  post operatively under blood pressure, electrolyte and renal function monitoring as long as they are acceptable.I suggest addressing pain control as uncontrolled pain can increased blood pressure   Follow up suggested about 2 different HCTZ Medication use    Status post placement of implantable loop recorder  No recent problem   I suggest that the perioperative team be aware of the presence of loop recorder    Hypokalemia  On supplementation   likely from HCTZ use  Reports IV potassium to burn the veins      History of thyroid cancer  Had thyroidectomy   On Replacement   Hypothyroidism- I suggest continuation of synthroid replacement in the perioperative period        ESHA (latent  autoimmune diabetes in adults), managed as type 1    Diabetes Mellitus-I suggest monitoring the glucose in the perioperative period ( Before meals and bed time,if the patient is on oral feeds or every 6 hourly ,if the patient is NPO )  Blood glucose target in hospitalized patients is 140-180. Oral Hypoglycemic agents are generally avoided during the hospital stay . If glucose is consistently elevated ,I suggest using basal ,prandial Insulin regimen to control the glucose , as elevated glucose can be associated with adverse surgical out comes. Please consider involving Hospital Medicine or Endocrinology ,if any help is needed with Glucose control. Patient will be instructed based on the pre op clinic guidelines  about adjustment of diabetic treatment  considering the NPO status for Surgery       Morbid obesity with BMI of 40.0-44.9, adult  Suggested weight loss    Gastroparesis  I suggest that the perioperative team be aware of this  No history of ulcer disease     Rheumatoid arthritis  No recent flare up   C spine X ray 2015   Cervical spine: Pre-dens space is maintained.  Vertebral body alignment is maintained.  Disk heights are maintained.  Will repeat C spine x ray to look for atlanto axial instability given the upcoming surgery       Fatty liver    No suggestion of hepatic decompensation     Cervical cancer  Had hysterectomy , late 2000's        Preventive perioperative care    Thromboembolic prophylaxis:  Her risk factors for thrombosis include morbid obesity, surgical procedure and age.I suggest  thromboembolic prophylaxis ( mechanical/pharmacological, weighing the risk benefits of pharmacological agent use considering dony procedural bleeding )  during the perioperative period.I suggested being active in the post operative period. The patient is a candidate for extended DVT prophylaxis     Postoperative pulmonary complication prophylaxis-Risk factors for post operative pulmonary complications include ASA  class >2- I suggest incentive spirometry use, early ambulation and end tidal carbon dioxide monitoring  , oral care , head end of bed elevation      Renal complication prophylaxis-Risk factors for renal complications include diabetes mellitus and hypertension . I suggest keeping her well hydrated .I suggested drinking 2 litre's of water a day      Surgical site Infection Prophylaxis-I  suggest appropriate antibiotic for Prophylaxis against Surgical site infections     Delirium prophylaxis-Risk factors - opioid use and benzodiazepine use - I suggest avoidance / minimizing the use of  Benzodiazepines ( unless the patient has been taking it on a regular basis ),Anticholinergic medication,Antihistamines ( like  Benadryl).I suggest minimizing the use of opioid medication and use of IV tylenol,if it is appropriate. I suggest using the lowest possible dose of opioids for the shortest duration possible in the perioperative period. I suggest to Keep shades/blinds open during the day, lights off and shades closed at night to encourage normal sleep/wake cycle.I encourage the presence of the family member with the patient at all times, if at all possible as mental status changes can be picked up early by the family members and they help with reorientation. I encouraged the presence of family to help with orientation in the perioperative period. Benadryl avoidance suggested      In view of Regional anesthesia, Joint replacement  the patient  is at risk of postoperative urinary retention.  I suggest avoidance / minimizing the of  Benzodiazepines,Anticholinergic medication,antihistamines ( Benadryl) , if possible in the perioperative period. I suggest using the minimum possible use of opioids for the minimum period of time in the perioperative period. Benadryl avoidance suggested      This visit was focused on Preoperative evaluation, Perioperative Medical management, complication reduction plans. I suggest that the patient  follows up with primary care or relevant sub specialists for ongoing health care.    I appreciate the opportunity to be involved in this patients care. Please feel free to contact me if there were any questions about this consultation.    Patient is optimized     Patient was instructed to call and update me about any changes to health, changes to medication, office visits ,testing out side of the [facility] , hospitalizations between now and surgery     Jelly Baez MD  Perioperative Medicine  [facility]   Pager [phone]  ----------    3/15- 16 35     Labs from today showed mildly low Hb,HCT  A1c 7.6  UA no suggestion  Of  UTI  -------    3/16- 9 54     C spine x ray   Bones are fairly well mineralized.  No significant subluxation on flexion or extension views.  Odontoid is intact.  No significant disc space narrowing.    Stress test from Sept 2017       --------    3/27- 16 56     Called to follow up   Spoke to patient   Doing good ,No changes to Medication, Health   Pre breakfast glucose

## 2018-03-15 NOTE — ASSESSMENT & PLAN NOTE
asthma is controlled . I suggest consideration of inhaled bronchodilator use if the patient has perioperative bronchospasm

## 2018-03-15 NOTE — ASSESSMENT & PLAN NOTE
No recent flare up   C spine X ray 2015   Cervical spine: Pre-dens space is maintained.  Vertebral body alignment is maintained.  Disk heights are maintained.  Will repeat C spine x ray to look for atlanto axial instability given the upcoming surgery     3/16/2018- 9 57     C spine  Xray - No significant subluxation on flexion or extension views.  Odontoid is intact.

## 2018-03-15 NOTE — DISCHARGE INSTRUCTIONS
Your surgery has been scheduled for:__________________________________________    You should report to:  ____[facility], located on the [place] side of the first floor of the           [facility] ([phone])  ____[facility], located on the [facility] side of the            Second floor of the [facility] ([phone])  ____3rd Floor [facility] located on the [facility] side of the [facility] [phone]  Please Note   - Tell your doctor if you take Aspirin, products containing Aspirin, herbal medications  or blood thinners, such as Coumadin, Ticlid, or Plavix.  (Consult your provider regarding holding or stopping before surgery).  - Arrange for someone to drive you home following surgery.  You will not be allowed to leave the surgical facility alone or drive yourself home following sedation and anesthesia.  Before Surgery  - Stop taking all herbal medications 14days prior to surgery  - No Motrin/Advil (Ibuprofen) 7 days before surgery  - No Aleve (Naproxen) 7 days before surgery  - Stop Taking Asprin, products containing Asprin _____days before surgery  - Stop taking blood thinners_______days before surgery  - Refrain from drinking alcoholic beverages for 24hours before and after surgery  - Stop or limit smoking _________days before surgery  Night before Surgery  - DO NOT EAT OR DRINK ANYTHING AFTER MIDNIGHT, INCLUDING GUM, HARD CANDY, MINTS, OR CHEWING TOBACCO.  - Take a shower or bath (shower is recommended).  Bathe with Hibiclens soap or an antibacterial soap from the neck down.  If not supplied by your surgeon, hibiclens soap will need to be purchased over the counter in pharmacy.  Rinse soap off thoroughly.  - Shampoo your hair with your regular shampoo  The Day of Surgery  - Take another bath or shower with hibiclens or any antibacterial soap, to reduce the chance of infection.  - Take heart and blood  pressure medications with a small sip of water, as advised by the perioperative team.  - Do not take fluid pills  - You may brush your teeth and rinse your mouth, but do not swall any additional water.   - Do not apply perfumes, powder, body lotions or deodorant on the day of surgery.  - Nail polish should be removed.  - Do not wear makeup or moisturizer  - Wear comfortable clothes, such as a button front shirt and loose fitting pants.  - Leave all jewelry, including body piercings, and valuables at home.    - Bring any devices you will neeed after surgery such as crutches or canes.  - If you have sleep apnea, please bring your CPAP machine  In the event that your physical condition changes including the onset of a cold or respiratory illness, or if you have to delay or cancel your surgery, please notify your surgeon.Anesthesia: Regional Anesthesia  Youre scheduled for surgery. During surgery, youll receive medication called anesthesia to keep you comfortable and pain-free. Your surgeon has decided that youll receive regional anesthesia. This sheet tells you what to expect with this type of anesthesia.  What Is Regional Anesthesia?  Regional anesthesia numbs one region of your body. The anesthesia may be given around nerves or into veins in your arms, neck, or legs (nerve block or Mike block). Or it may be sent into the spinal fluid (spinal anesthesia) or into the space just outside the spinal fluid (epidural anesthesia). Sedatives may also be given to relax you.  Nerve Block or Mike Block  A small area of the body, such as an arm or leg, can be numbed using a nerve block or Pabellones block.  · Nerve block: During a nerve block, your skin is numbed. A needle is then inserted near nerves that serve the area to be numbed. Anesthetic is sent through the needle.  · IV regional or Mike block: For this type of block, an IV line is put into a vein. The blood flow to the area to be numbed is blocked for a short time.  Anesthetic is sent through the IV.  Spinal Anesthesia  Spinal anesthesia numbs your body from about the waist down.  · Anesthetic is injected into the spinal fluid. This is a substance that surrounds the spinal cord in your spinal column. The anesthetic blocks pain traveling from the body to the brain.  · To receive the anesthetic, your skin is numbed at the injection site.  · A needle is then inserted into the spinal fluid. Anesthetic is sent through the needle.  Anesthesia Tools and Medications  · Local anesthetic given through a needle numbs one region of your body.  · Electrocardiography leads (electrodes) record your heart rate and rhythm.  · A blood pressure cuff monitors your blood pressure.  · A pulse oximeter on the end of the finger measures your blood oxygen level.  · Sedatives may be given through an IV to relax you and keep you comfortable. You may stay awake or sleep slightly.  · Oxygen may be given to you through a facemask.  Risks and Possible Complications  Regional anesthesia carries some risks. These include:  · Nausea and vomiting  · Headache  · Backache  · Decreased blood pressure  · Allergic reaction to the anesthetic  · Ongoing numbness (rare)  · Irregular heartbeat (rare)  · Cardiac arrest (rare)   © 9843-7356 [facility], [address] [place] [address], [place] [address]. All rights reserved. This information is not intended as a substitute for professional medical care. Always follow your healthcare professional's instructions.

## 2018-03-15 NOTE — ASSESSMENT & PLAN NOTE
Had thyroidectomy   On Replacement   Hypothyroidism- I suggest continuation of synthroid replacement in the perioperative period

## 2018-03-15 NOTE — ASSESSMENT & PLAN NOTE
suggested continuation of Psychiatric Medication ( Seroquel, Buspar, Xanax, Vistaril, Trazaodone)  I suggest monitoring the sodium as SIADH from SeroquelI  use and hypersecretion of ADH associated with surgery can reduce sodium in the perioperative period

## 2018-03-15 NOTE — ASSESSMENT & PLAN NOTE
No recent problem   I suggest that the perioperative team be aware of the presence of loop recorder

## 2018-03-15 NOTE — HPI
"History of present illness- I had the pleasure of meeting this pleasant 42 y.o. lady in the pre op clinic prior to her elective Orthopedic surgery. The patient is new to me . Goes by "Narcisa"  I have obtained the history by speaking to the patient and by reviewing the electronic health records.    Events leading up to surgery / History of presenting illness -    She has been troubled with severe  left knee  pain for about 3 years  . 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 . In ED found to have worsening hyperglycemia, with anion gap and acidosis. Was bolused with NS and started on insulin infusion with DKA protocol.   On treatment with  Insulin    Hemoglobin A1c- 7.5 Sept 2017  Capillary glucose check-yes  Pre breakfast -95  Pre lunch -120  Pre [id number]                              Rheumatoid arthritis 01/17/2017     Had Rheumatology evaluation in the past and was felt that   since she has fatty liver cannot do MTX.                                    Asthma      usually with cold weather      Essential hypertension      Carvedilol   HCTZ  Vlsartan- HCTZ  Usual BP- 110/70-80       Status post placement of implantable loop recorder 07/01/2015     syncope and palpitations s/p ILR placement.                   Bipolar 1 disorder 03/17/2015     Under Psychiatrist care   Subjectively controlled            Hypothyroidism, postsurgical      thyroid cancer s/p thyroidectomy in 2013                Gastroparesis 03/28/2013     s/p lap sleeve gastrectomy   No " recent problem

## 2018-03-20 ENCOUNTER — OFFICE VISIT (OUTPATIENT)
Dept: ORTHOPEDICS | Facility: CLINIC | Age: 43
End: 2018-03-20
Payer: COMMERCIAL

## 2018-03-20 VITALS — HEIGHT: 65 IN | WEIGHT: 257.94 LBS | BODY MASS INDEX: 42.98 KG/M2

## 2018-03-20 DIAGNOSIS — M17.12 PRIMARY OSTEOARTHRITIS OF LEFT KNEE: Primary | ICD-10-CM

## 2018-03-20 DIAGNOSIS — M06.9 RHEUMATOID ARTHRITIS INVOLVING MULTIPLE SITES, UNSPECIFIED RHEUMATOID FACTOR PRESENCE: ICD-10-CM

## 2018-03-20 PROCEDURE — 99499 UNLISTED E&M SERVICE: CPT | Mod: SA,S$GLB,, | Performed by: PHYSICIAN ASSISTANT

## 2018-03-20 PROCEDURE — 99999 PR PBB SHADOW E&M-EST. PATIENT-LVL IV: CPT | Mod: PBBFAC,,, | Performed by: PHYSICIAN ASSISTANT

## 2018-03-21 RX ORDER — SODIUM CHLORIDE 9 MG/ML
INJECTION, SOLUTION INTRAVENOUS
Status: CANCELLED | OUTPATIENT
Start: 2018-03-21

## 2018-03-21 RX ORDER — FENTANYL CITRATE 50 UG/ML
25 INJECTION, SOLUTION INTRAMUSCULAR; INTRAVENOUS EVERY 5 MIN PRN
Status: CANCELLED | OUTPATIENT
Start: 2018-03-21

## 2018-03-21 RX ORDER — CELECOXIB 100 MG/1
400 CAPSULE ORAL
Status: CANCELLED | OUTPATIENT
Start: 2018-03-21

## 2018-03-21 RX ORDER — ACETAMINOPHEN 500 MG
1000 TABLET ORAL EVERY 6 HOURS
Status: CANCELLED | OUTPATIENT
Start: 2018-03-21 | End: 2018-03-23

## 2018-03-21 RX ORDER — MORPHINE SULFATE 10 MG/ML
2 INJECTION, SOLUTION INTRAMUSCULAR; INTRAVENOUS
Status: CANCELLED | OUTPATIENT
Start: 2018-03-21

## 2018-03-21 RX ORDER — MIDAZOLAM HYDROCHLORIDE 5 MG/ML
1 INJECTION INTRAMUSCULAR; INTRAVENOUS EVERY 5 MIN PRN
Status: CANCELLED | OUTPATIENT
Start: 2018-03-21

## 2018-03-21 RX ORDER — ONDANSETRON 2 MG/ML
4 INJECTION INTRAMUSCULAR; INTRAVENOUS EVERY 12 HOURS PRN
Status: CANCELLED | OUTPATIENT
Start: 2018-03-21

## 2018-03-21 RX ORDER — OXYCODONE HYDROCHLORIDE 5 MG/1
5 TABLET ORAL
Status: CANCELLED | OUTPATIENT
Start: 2018-03-21

## 2018-03-21 RX ORDER — RAMELTEON 8 MG/1
8 TABLET ORAL NIGHTLY PRN
Status: CANCELLED | OUTPATIENT
Start: 2018-03-21

## 2018-03-21 RX ORDER — LIDOCAINE HYDROCHLORIDE 10 MG/ML
1 INJECTION, SOLUTION EPIDURAL; INFILTRATION; INTRACAUDAL; PERINEURAL
Status: CANCELLED | OUTPATIENT
Start: 2018-03-21

## 2018-03-21 RX ORDER — SODIUM CHLORIDE 0.9 % (FLUSH) 0.9 %
3 SYRINGE (ML) INJECTION EVERY 8 HOURS PRN
Status: CANCELLED | OUTPATIENT
Start: 2018-03-21

## 2018-03-21 RX ORDER — PREGABALIN 25 MG/1
75 CAPSULE ORAL NIGHTLY
Status: CANCELLED | OUTPATIENT
Start: 2018-03-21

## 2018-03-21 RX ORDER — SODIUM CHLORIDE 9 MG/ML
INJECTION, SOLUTION INTRAVENOUS CONTINUOUS
Status: CANCELLED | OUTPATIENT
Start: 2018-03-21 | End: 2018-03-22

## 2018-03-21 RX ORDER — OXYCODONE HYDROCHLORIDE 5 MG/1
15 TABLET ORAL
Status: CANCELLED | OUTPATIENT
Start: 2018-03-21

## 2018-03-21 RX ORDER — OXYCODONE HYDROCHLORIDE 5 MG/1
10 TABLET ORAL
Status: CANCELLED | OUTPATIENT
Start: 2018-03-21

## 2018-03-21 RX ORDER — CELECOXIB 100 MG/1
200 CAPSULE ORAL DAILY
Status: CANCELLED | OUTPATIENT
Start: 2018-03-21

## 2018-03-21 RX ORDER — POLYETHYLENE GLYCOL 3350 17 G/17G
17 POWDER, FOR SOLUTION ORAL DAILY
Status: CANCELLED | OUTPATIENT
Start: 2018-03-21

## 2018-03-21 RX ORDER — PREGABALIN 25 MG/1
75 CAPSULE ORAL
Status: CANCELLED | OUTPATIENT
Start: 2018-03-21

## 2018-03-21 RX ORDER — FAMOTIDINE 20 MG/1
20 TABLET, FILM COATED ORAL 2 TIMES DAILY
Status: CANCELLED | OUTPATIENT
Start: 2018-03-21

## 2018-03-21 RX ORDER — NALOXONE HCL 0.4 MG/ML
0.02 VIAL (ML) INJECTION
Status: CANCELLED | OUTPATIENT
Start: 2018-03-21

## 2018-03-21 RX ORDER — AMOXICILLIN 250 MG
1 CAPSULE ORAL 2 TIMES DAILY
Status: CANCELLED | OUTPATIENT
Start: 2018-03-21

## 2018-03-21 RX ORDER — ONDANSETRON 4 MG/1
4 TABLET, ORALLY DISINTEGRATING ORAL ONCE
Status: CANCELLED | OUTPATIENT
Start: 2018-03-21 | End: 2018-03-21

## 2018-03-21 RX ORDER — BISACODYL 10 MG
10 SUPPOSITORY, RECTAL RECTAL EVERY 12 HOURS PRN
Status: CANCELLED | OUTPATIENT
Start: 2018-03-21

## 2018-03-21 RX ORDER — ASPIRIN 325 MG
325 TABLET, DELAYED RELEASE (ENTERIC COATED) ORAL 2 TIMES DAILY
Status: CANCELLED | OUTPATIENT
Start: 2018-03-21

## 2018-03-21 RX ORDER — ACETAMINOPHEN 10 MG/ML
1000 INJECTION, SOLUTION INTRAVENOUS ONCE
Status: CANCELLED | OUTPATIENT
Start: 2018-03-21 | End: 2018-03-21

## 2018-03-21 RX ORDER — MUPIROCIN 20 MG/G
1 OINTMENT TOPICAL
Status: CANCELLED | OUTPATIENT
Start: 2018-03-21

## 2018-03-21 RX ORDER — ROPIVACAINE HYDROCHLORIDE 2 MG/ML
8 INJECTION, SOLUTION EPIDURAL; INFILTRATION; PERINEURAL CONTINUOUS
Status: CANCELLED | OUTPATIENT
Start: 2018-03-28

## 2018-03-21 NOTE — PROGRESS NOTES
Homar Jerry is a 42 y.o. year old here today for a pre-operative visit in preparation for a Left total knee arthroplasty to be performed by  Dr. [facility] on 3/28/2018.  she was last seen and treated in the clinic on 1/25/2018. she will be medically optimized by the pre op center. There has been no significant change in medical status since last visit. No fever, chills, malaise, or unexplained weight change.      Allergies, Medications, past medical and surgical history reviewed.    Focused examination performed.    Dr. [facility] saw this patient today in clinic. All questions answered. Patient encouraged to call with questions. Contact information given.     Pre, dony, and post operative procedures and expectations discussed. Questions were answered. Homar Jerry has been educated and is ready to proceed with surgery. Approximately 30 minutes was spent discussing surgical outcomes, plans, procedures pre, dony, and post operative expections and care.  Surgical consent signed.    Homar Jerry will contact us if there are any questions, concerns, or changes in medical status prior to surgery.

## 2018-03-21 NOTE — H&P
CC: Left knee pain    Homar Jerry is a 42 y.o. female with 4 year history of Left knee pain. Pain is worse with activity and weight bearing.  Patient has experienced interference of activities of daily living due to decreased range of motion and an increase in joint pain and swelling.  Patient has failed non-operative treatment including NSAIDs, corticosteroid injections, viscosupplement injections, and activity modification.  Homar Jerry currently ambulates independently.  H/o left knee arthroscopy.     Past Medical History:   Diagnosis Date    Anemia     Anxiety     Asthma     usually with cold weather    Back pain     Bipolar 1 disorder 3/17/2015    Cervical cancer 2009    DOT    Diabetes mellitus with hyperglycemia 1/17/2017    Fibrocystic breast     Fibromyalgia     Gastroparesis 2012    s/p sleeve to cover damaged nerves; s/p gastric pacemaker which did not help; due to nerve damage during surgery    Hypertension     Hypothyroidism, postsurgical     Morbid obesity     Non-intractable vomiting with nausea 9/26/2017    Prediabetes     Rheumatoid arthritis     S/P laparoscopic appendectomy 9/26/2017    Thyroid cancer 2003 & 2013    thyroidectomy    Urinary incontinence        Past Surgical History:   Procedure Laterality Date    ANKLE FRACTURE SURGERY Right     APPENDECTOMY      BREAST SURGERY      exploratory laparotomy due to complications of hysterectomy  2009    cervical cuff burst with air in abdomen    FRACTURE SURGERY      ankle around 2012    GASTRECTOMY      gastric pacemaker      gastric sleeve      HYSTERECTOMY  2009    total including cervix    KNEE ARTHROSCOPY W/ MENISCAL REPAIR Left 2016    KNEE SURGERY  05/12/2016    loop monitor  2016    multiple breast biopsies      TONSILLECTOMY      ureteral sling         Family History   Problem Relation Age of Onset    Heart disease Mother     Arthritis Mother     Rheum arthritis Mother     Heart  attack Mother 55    Clotting disorder Mother      ? arterial stent     Hyperlipidemia Father     Clotting disorder Father      history of DVT,pulmonary embolism     Thyroid cancer Paternal Grandmother     Diabetes Paternal Grandmother     Clotting disorder Paternal Grandfather     No Known Problems Sister     No Known Problems Brother     No Known Problems Maternal Aunt     No Known Problems Paternal Aunt     Bipolar disorder Maternal Uncle     Pancreatic cancer Maternal Uncle     No Known Problems Paternal Uncle     No Known Problems Maternal Grandfather     No Known Problems Maternal Grandmother     No Known Problems Cousin     ADD / ADHD Son     Pneumonia Brother     HIV Brother     Alcohol abuse Neg Hx     Anxiety disorder Neg Hx     Dementia Neg Hx     Depression Neg Hx     Drug abuse Neg Hx     OCD Neg Hx     Paranoid behavior Neg Hx     Physical abuse Neg Hx     Schizophrenia Neg Hx     Seizures Neg Hx     Self injury Neg Hx     Sexual abuse Neg Hx     Suicide Neg Hx        Review of patient's allergies indicates:   Allergen Reactions    Dexamethasone Hives and Shortness Of Breath     Gastroparesis flare    Doxepin hcl Hives, Diarrhea and Itching    Compazine [prochlorperazine] Hives and Itching    Morphine Rash    Prednisone      Gastroparesis flare up    Ciprofloxacin      Counteracts with seroquel, xanax, tizanidine    Lyrica [pregabalin]      depression         Current Outpatient Prescriptions:     alprazolam (XANAX) 1 MG tablet, Take 1 tablet (1 mg total) by mouth every evening. (Patient taking differently: Take 2 mg by mouth 2 (two) times daily. ), Disp: 30 tablet, Rfl: 5    blood sugar diagnostic Strp, 1 each by Misc.(Non-Drug; Combo Route) route 4 (four) times daily with meals and nightly., Disp: 100 each, Rfl: 2    busPIRone (BUSPAR) 10 MG tablet, 10 mg 2 (two) times daily. , Disp: , Rfl:     carvedilol (COREG) 12.5 MG tablet, Take 3 tablets (37.5 mg total)  "by mouth 2 (two) times daily., Disp: 120 tablet, Rfl: 11    cholecalciferol, vitamin D3, 1,000 unit capsule, Take 2 capsules (2,000 Units total) by mouth once daily., Disp: , Rfl: 0    clotrimazole (LOTRIMIN) 1 % cream, Apply topically 2 (two) times daily. Apply right axilla twice daily, Disp: 14 g, Rfl: 0    docusate sodium (COLACE) 50 MG capsule, Take 1 capsule (50 mg total) by mouth 2 (two) times daily. (Patient taking differently: Take 50 mg by mouth 2 (two) times daily as needed. ), Disp: , Rfl: 0    fluticasone (FLONASE) 50 mcg/actuation nasal spray, 2 sprays by Nasal route 2 (two) times daily. , Disp: , Rfl:     gabapentin (NEURONTIN) 600 MG tablet, Take 600 mg by mouth 3 (three) times daily. , Disp: , Rfl:     hydrochlorothiazide (HYDRODIURIL) 25 MG tablet, Take 1 tablet (25 mg total) by mouth once daily., Disp: 30 tablet, Rfl: 11    hydrOXYzine pamoate (VISTARIL) 50 MG Cap, Take 50 mg by mouth 4 (four) times daily., Disp: , Rfl:     lancets Misc, 1 each by Misc.(Non-Drug; Combo Route) route 4 (four) times daily with meals and nightly., Disp: 100 each, Rfl: 2    levocetirizine (XYZAL) 5 MG tablet, Take 1 tablet (5 mg total) by mouth every evening. (Patient taking differently: Take 5 mg by mouth daily as needed for Allergies. ), Disp: 90 tablet, Rfl: 3    lidocaine (LIDODERM) 5 %, Place 1 patch onto the skin daily as needed. Remove & Discard patch within 12 hours or as directed by MD, Disp: 30 patch, Rfl: 0    meclizine (ANTIVERT) 25 mg tablet, Take 1 tablet (25 mg total) by mouth 3 (three) times daily as needed for Dizziness or Nausea., Disp: 90 tablet, Rfl: 0    mirtazapine (REMERON) 45 MG tablet, Take 45 mg by mouth every evening., Disp: , Rfl:     oxycodone-acetaminophen (PERCOCET)  mg per tablet, Take 1 tablet by mouth every 4 (four) hours as needed for Pain. Per Dr. Villa at [facility], Disp: , Rfl:     pen needle, diabetic 31 gauge x 5/16" Ndle, 1 each by " Misc.(Non-Drug; Combo Route) route 5 (five) times daily., Disp: 200 each, Rfl: 2    POTASSIUM ORAL, Take by mouth. Over the counter  Once daily, Disp: , Rfl:     promethazine (PHENERGAN) 25 MG tablet, TAKE 1 TABLET (25 MG TOTAL) BY MOUTH EVERY 6 HOURS AS NEEDED., Disp: 60 tablet, Rfl: 0    QUEtiapine (SEROQUEL) 400 MG tablet, 800 mg every evening. Pt states takes two tablets nightly, total of 800 mg, Disp: , Rfl:     tizanidine (ZANAFLEX) 4 MG tablet, TAKE 3 TABLETS (12 MG TOTAL) BY MOUTH EVERY EVENING. (Patient taking differently: Take 12 mg by mouth every evening. TAKE 3 TABLETS (12 MG TOTAL) BY MOUTH EVERY EVENING.), Disp: 90 tablet, Rfl: 2    topiramate (TOPAMAX) 25 MG tablet, Take 1 tablet (25 mg total) by mouth daily as needed (migraines)., Disp: 90 tablet, Rfl: 3    traZODone (DESYREL) 50 MG tablet, Take 50 mg by mouth every evening., Disp: , Rfl:     valsartan-hydrochlorothiazide (DIOVAN-HCT) 80-12.5 mg per tablet, Take 1 tablet by mouth once daily., Disp: 90 tablet, Rfl: 3    VENTOLIN HFA 90 mcg/actuation inhaler, as needed. , Disp: , Rfl:     zolpidem (AMBIEN) 10 mg Tab, 10 mg every evening. , Disp: , Rfl:     blood-glucose meter kit, Use as instructed, Disp: 1 each, Rfl: 0    calcium carbonate (OS-MARIAN) 500 mg calcium (1,250 mg) tablet, Take 2 tablets (1,000 mg total) by mouth once daily., Disp: , Rfl: 0    insulin aspart (NOVOLOG) 100 unit/mL InPn pen, Inject 16 Units into the skin 3 (three) times daily with meals., Disp: 1 Box, Rfl: 2    insulin detemir (LEVEMIR FLEXTOUCH) 100 unit/mL (3 mL) SubQ InPn pen, Inject 30 Units into the skin 2 (two) times daily. (Patient taking differently: Inject 30 Units into the skin 2 (two) times daily. PATIENT TAKES 30 UNITS BID), Disp: 1 Box, Rfl: 1    levothyroxine (SYNTHROID) 150 MCG tablet, Take 1 tablet (150 mcg total) by mouth once daily., Disp: 30 tablet, Rfl: 3    Review of Systems:  Constitutional: no fever or chills  Eyes: no visual changes  ENT:  "no nasal congestion or sore throat  Respiratory: no cough or shortness of breath  Cardiovascular: no chest pain or palpitations  Gastrointestinal: no nausea or vomiting, tolerating diet  Genitourinary: no hematuria or dysuria  Integument/Breast: no rash or pruritis  Hematologic/Lymphatic: no easy bruising or lymphadenopathy  Musculoskeletal: positive for arthralgias  Neurological: no seizures or tremors  Behavioral/Psych: no auditory or visual hallucinations  Endocrine: no heat or cold intolerance    PE:  Ht 5' 5" (1.651 m)   Wt 117 kg (257 lb 15 oz)   LMP  (LMP Unknown)   BMI 42.92 kg/m²   General: Pleasant, cooperative, NAD   Gait: antalgic  HEENT: NCAT, sclera nonicteric   Lungs: Respirations clear bilaterally; equal and unlabored.   CV: S1S2; 2+ bilateral upper and lower extremity pulses.   Skin: Intact throughout with no rashes, erythema, or lesions  Extremities: No LE edema,  no erythema or warmth of the skin in either lower extremity.    Left knee exam:  Knee Range of Motion:5-115 active, pain with passive range of motion  Effusion:none  Condition of skin:intact  Location of tenderness:Medial joint line   Strength:5 of 5 quadriceps strength and 5 of 5 hamstring strength  Stability: stable to testing    Hip Examination:full painless range of motion, without tenderness    Radiographs: Radiographs reveal advanced degenerative changes including subchondral cyst formation, subchondral sclerosis, osteophyte formation, joint space narrowing.     Knee Alignment:  Moderate varus    Diagnosis: osteoarthritis Left knee    Plan: Left total knee arthroplasty    Due to the serious nature of total joint infection and the high prevalence of community acquired MRSA, vancomycin will be used perioperatively.            "

## 2018-03-27 ENCOUNTER — TELEPHONE (OUTPATIENT)
Dept: ORTHOPEDICS | Facility: CLINIC | Age: 43
End: 2018-03-27

## 2018-03-27 NOTE — TELEPHONE ENCOUNTER
we spoke : Reminded to eat light the night before surgery, NPO after midnight, take hibclens shower the night before surgery  & again in the am , sleep on clean sheets  in clean clothes, no laxatives or stool softeners , report to the 2nd floor surgery unit for 8 am  She voiced understanding

## 2018-03-28 ENCOUNTER — ANESTHESIA (OUTPATIENT)
Dept: SURGERY | Facility: HOSPITAL | Age: 43
DRG: 470 | End: 2018-03-28
Payer: MEDICARE

## 2018-03-28 ENCOUNTER — HOSPITAL ENCOUNTER (INPATIENT)
Facility: HOSPITAL | Age: 43
LOS: 3 days | Discharge: NURSING, REHAB OR LONG-TERM CARE | DRG: 470 | End: 2018-03-31
Attending: ORTHOPAEDIC SURGERY | Admitting: ORTHOPAEDIC SURGERY
Payer: MEDICARE

## 2018-03-28 DIAGNOSIS — M17.12 PRIMARY OSTEOARTHRITIS OF LEFT KNEE: ICD-10-CM

## 2018-03-28 DIAGNOSIS — E89.0 HYPOTHYROIDISM, POSTSURGICAL: Chronic | ICD-10-CM

## 2018-03-28 DIAGNOSIS — I10 ESSENTIAL HYPERTENSION: Chronic | ICD-10-CM

## 2018-03-28 DIAGNOSIS — R00.1 BRADYCARDIA: ICD-10-CM

## 2018-03-28 DIAGNOSIS — E11.65 TYPE 2 DIABETES MELLITUS WITH HYPERGLYCEMIA, WITHOUT LONG-TERM CURRENT USE OF INSULIN: Chronic | ICD-10-CM

## 2018-03-28 DIAGNOSIS — I95.0 IDIOPATHIC HYPOTENSION: ICD-10-CM

## 2018-03-28 LAB
ANION GAP SERPL CALC-SCNC: 25 MMOL/L
BUN SERPL-MCNC: 7 MG/DL
CALCIUM SERPL-MCNC: 3.5 MG/DL
CHLORIDE SERPL-SCNC: 100 MMOL/L
CO2 SERPL-SCNC: 13 MMOL/L
CREAT SERPL-MCNC: 0.4 MG/DL
EST. GFR  (AFRICAN AMERICAN): >60 ML/MIN/1.73 M^2
EST. GFR  (NON AFRICAN AMERICAN): >60 ML/MIN/1.73 M^2
GLUCOSE SERPL-MCNC: 54 MG/DL
POCT GLUCOSE: 110 MG/DL (ref 70–110)
POCT GLUCOSE: 113 MG/DL (ref 70–110)
POCT GLUCOSE: 130 MG/DL (ref 70–110)
POCT GLUCOSE: 172 MG/DL (ref 70–110)
POTASSIUM SERPL-SCNC: 4.5 MMOL/L
SODIUM SERPL-SCNC: 138 MMOL/L

## 2018-03-28 PROCEDURE — D9220A PRA ANESTHESIA: Mod: ANES,,, | Performed by: ANESTHESIOLOGY

## 2018-03-28 PROCEDURE — 3E0T3BZ INTRODUCTION OF ANESTHETIC AGENT INTO PERIPHERAL NERVES AND PLEXI, PERCUTANEOUS APPROACH: ICD-10-PCS | Performed by: ANESTHESIOLOGY

## 2018-03-28 PROCEDURE — 25000003 PHARM REV CODE 250: Performed by: ANESTHESIOLOGY

## 2018-03-28 PROCEDURE — 36415 COLL VENOUS BLD VENIPUNCTURE: CPT

## 2018-03-28 PROCEDURE — 0SRD0J9 REPLACEMENT OF LEFT KNEE JOINT WITH SYNTHETIC SUBSTITUTE, CEMENTED, OPEN APPROACH: ICD-10-PCS | Performed by: ORTHOPAEDIC SURGERY

## 2018-03-28 PROCEDURE — 97535 SELF CARE MNGMENT TRAINING: CPT

## 2018-03-28 PROCEDURE — 88305 TISSUE EXAM BY PATHOLOGIST: CPT | Mod: 26,,,

## 2018-03-28 PROCEDURE — C1776 JOINT DEVICE (IMPLANTABLE): HCPCS | Performed by: ORTHOPAEDIC SURGERY

## 2018-03-28 PROCEDURE — 63600175 PHARM REV CODE 636 W HCPCS: Performed by: NURSE ANESTHETIST, CERTIFIED REGISTERED

## 2018-03-28 PROCEDURE — 27447 TOTAL KNEE ARTHROPLASTY: CPT | Mod: LT,,, | Performed by: ORTHOPAEDIC SURGERY

## 2018-03-28 PROCEDURE — 64448 NJX AA&/STRD FEM NRV NFS IMG: CPT | Mod: 59,LT,, | Performed by: ANESTHESIOLOGY

## 2018-03-28 PROCEDURE — C1713 ANCHOR/SCREW BN/BN,TIS/BN: HCPCS | Performed by: ORTHOPAEDIC SURGERY

## 2018-03-28 PROCEDURE — 97161 PT EVAL LOW COMPLEX 20 MIN: CPT

## 2018-03-28 PROCEDURE — 25000003 PHARM REV CODE 250: Performed by: PHYSICIAN ASSISTANT

## 2018-03-28 PROCEDURE — 25000003 PHARM REV CODE 250: Performed by: NURSE ANESTHETIST, CERTIFIED REGISTERED

## 2018-03-28 PROCEDURE — 71000039 HC RECOVERY, EACH ADD'L HOUR: Performed by: ORTHOPAEDIC SURGERY

## 2018-03-28 PROCEDURE — 88311 DECALCIFY TISSUE: CPT | Mod: 26,,,

## 2018-03-28 PROCEDURE — 25000003 PHARM REV CODE 250: Performed by: STUDENT IN AN ORGANIZED HEALTH CARE EDUCATION/TRAINING PROGRAM

## 2018-03-28 PROCEDURE — 82962 GLUCOSE BLOOD TEST: CPT | Performed by: ORTHOPAEDIC SURGERY

## 2018-03-28 PROCEDURE — 27447 TOTAL KNEE ARTHROPLASTY: CPT | Mod: AS,LT,, | Performed by: PHYSICIAN ASSISTANT

## 2018-03-28 PROCEDURE — 37000008 HC ANESTHESIA 1ST 15 MINUTES: Performed by: ORTHOPAEDIC SURGERY

## 2018-03-28 PROCEDURE — 76942 ECHO GUIDE FOR BIOPSY: CPT | Performed by: ANESTHESIOLOGY

## 2018-03-28 PROCEDURE — 63600175 PHARM REV CODE 636 W HCPCS: Performed by: ORTHOPAEDIC SURGERY

## 2018-03-28 PROCEDURE — 36000711: Performed by: ORTHOPAEDIC SURGERY

## 2018-03-28 PROCEDURE — 63600175 PHARM REV CODE 636 W HCPCS

## 2018-03-28 PROCEDURE — 64450 NJX AA&/STRD OTHER PN/BRANCH: CPT | Mod: 59,LT,, | Performed by: ANESTHESIOLOGY

## 2018-03-28 PROCEDURE — 63600175 PHARM REV CODE 636 W HCPCS: Performed by: PHYSICIAN ASSISTANT

## 2018-03-28 PROCEDURE — 63600175 PHARM REV CODE 636 W HCPCS: Performed by: ANESTHESIOLOGY

## 2018-03-28 PROCEDURE — 97530 THERAPEUTIC ACTIVITIES: CPT

## 2018-03-28 PROCEDURE — 27201423 OPTIME MED/SURG SUP & DEVICES STERILE SUPPLY: Performed by: ORTHOPAEDIC SURGERY

## 2018-03-28 PROCEDURE — 36000710: Performed by: ORTHOPAEDIC SURGERY

## 2018-03-28 PROCEDURE — 27200688 HC TRAY, SPINAL-HYPER/ ISOBARIC: Performed by: ANESTHESIOLOGY

## 2018-03-28 PROCEDURE — 97165 OT EVAL LOW COMPLEX 30 MIN: CPT

## 2018-03-28 PROCEDURE — 12000002 HC ACUTE/MED SURGE SEMI-PRIVATE ROOM

## 2018-03-28 PROCEDURE — 94799 UNLISTED PULMONARY SVC/PX: CPT

## 2018-03-28 PROCEDURE — 88305 TISSUE EXAM BY PATHOLOGIST: CPT

## 2018-03-28 PROCEDURE — 80048 BASIC METABOLIC PNL TOTAL CA: CPT

## 2018-03-28 PROCEDURE — 71000033 HC RECOVERY, INTIAL HOUR: Performed by: ORTHOPAEDIC SURGERY

## 2018-03-28 PROCEDURE — 94761 N-INVAS EAR/PLS OXIMETRY MLT: CPT

## 2018-03-28 PROCEDURE — 37000009 HC ANESTHESIA EA ADD 15 MINS: Performed by: ORTHOPAEDIC SURGERY

## 2018-03-28 PROCEDURE — 25000003 PHARM REV CODE 250: Performed by: ORTHOPAEDIC SURGERY

## 2018-03-28 PROCEDURE — 25000003 PHARM REV CODE 250

## 2018-03-28 PROCEDURE — D9220A PRA ANESTHESIA: Mod: CRNA,,, | Performed by: NURSE ANESTHETIST, CERTIFIED REGISTERED

## 2018-03-28 PROCEDURE — 63600175 PHARM REV CODE 636 W HCPCS: Performed by: STUDENT IN AN ORGANIZED HEALTH CARE EDUCATION/TRAINING PROGRAM

## 2018-03-28 DEVICE — PATELLA PSN CEM POLY 8X29MM: Type: IMPLANTABLE DEVICE | Site: KNEE | Status: FUNCTIONAL

## 2018-03-28 DEVICE — CEMENT BONE SIMPLE P INDIVID: Type: IMPLANTABLE DEVICE | Site: KNEE | Status: FUNCTIONAL

## 2018-03-28 DEVICE — PLUG BONE #5: Type: IMPLANTABLE DEVICE | Site: KNEE | Status: FUNCTIONAL

## 2018-03-28 DEVICE — FEMORAL COMP CMT SZ 6 LEFT: Type: IMPLANTABLE DEVICE | Site: KNEE | Status: FUNCTIONAL

## 2018-03-28 DEVICE — PSN TIB STM 5 DEG SZ D L: Type: IMPLANTABLE DEVICE | Site: KNEE | Status: FUNCTIONAL

## 2018-03-28 DEVICE — IMPLANTABLE DEVICE: Type: IMPLANTABLE DEVICE | Site: KNEE | Status: FUNCTIONAL

## 2018-03-28 RX ORDER — HYDROMORPHONE HYDROCHLORIDE 1 MG/ML
1 INJECTION, SOLUTION INTRAMUSCULAR; INTRAVENOUS; SUBCUTANEOUS EVERY 6 HOURS PRN
Status: DISCONTINUED | OUTPATIENT
Start: 2018-03-28 | End: 2018-03-28

## 2018-03-28 RX ORDER — KETAMINE HYDROCHLORIDE 100 MG/ML
INJECTION, SOLUTION INTRAMUSCULAR; INTRAVENOUS
Status: DISCONTINUED | OUTPATIENT
Start: 2018-03-28 | End: 2018-03-28

## 2018-03-28 RX ORDER — ONDANSETRON 4 MG/1
4 TABLET, ORALLY DISINTEGRATING ORAL ONCE
Status: DISCONTINUED | OUTPATIENT
Start: 2018-03-28 | End: 2018-03-28 | Stop reason: HOSPADM

## 2018-03-28 RX ORDER — OXYCODONE HYDROCHLORIDE 5 MG/1
TABLET ORAL
Status: COMPLETED
Start: 2018-03-28 | End: 2018-03-28

## 2018-03-28 RX ORDER — ACETAMINOPHEN 500 MG
1000 TABLET ORAL EVERY 6 HOURS
Status: DISCONTINUED | OUTPATIENT
Start: 2018-03-28 | End: 2018-03-30

## 2018-03-28 RX ORDER — BUSPIRONE HYDROCHLORIDE 10 MG/1
10 TABLET ORAL 2 TIMES DAILY
Status: DISCONTINUED | OUTPATIENT
Start: 2018-03-28 | End: 2018-03-31 | Stop reason: HOSPADM

## 2018-03-28 RX ORDER — DOCUSATE SODIUM 100 MG/1
100 CAPSULE, LIQUID FILLED ORAL 2 TIMES DAILY PRN
Qty: 60 CAPSULE | Refills: 0 | Status: ON HOLD | OUTPATIENT
Start: 2018-03-28 | End: 2018-04-16 | Stop reason: HOSPADM

## 2018-03-28 RX ORDER — GLUCAGON 1 MG
1 KIT INJECTION
Status: DISCONTINUED | OUTPATIENT
Start: 2018-03-28 | End: 2018-03-31 | Stop reason: HOSPADM

## 2018-03-28 RX ORDER — ALPRAZOLAM 1 MG/1
1 TABLET ORAL 2 TIMES DAILY PRN
Status: DISCONTINUED | OUTPATIENT
Start: 2018-03-28 | End: 2018-03-31 | Stop reason: HOSPADM

## 2018-03-28 RX ORDER — MORPHINE SULFATE 2 MG/ML
2 INJECTION, SOLUTION INTRAMUSCULAR; INTRAVENOUS
Status: DISCONTINUED | OUTPATIENT
Start: 2018-03-28 | End: 2018-03-28

## 2018-03-28 RX ORDER — ALPRAZOLAM 0.25 MG/1
1 TABLET ORAL NIGHTLY
Status: DISCONTINUED | OUTPATIENT
Start: 2018-03-28 | End: 2018-03-28

## 2018-03-28 RX ORDER — OXYCODONE HYDROCHLORIDE 5 MG/1
20 TABLET ORAL EVERY 6 HOURS PRN
Status: DISCONTINUED | OUTPATIENT
Start: 2018-03-28 | End: 2018-03-31 | Stop reason: HOSPADM

## 2018-03-28 RX ORDER — FENTANYL CITRATE 50 UG/ML
25 INJECTION, SOLUTION INTRAMUSCULAR; INTRAVENOUS EVERY 5 MIN PRN
Status: COMPLETED | OUTPATIENT
Start: 2018-03-28 | End: 2018-03-28

## 2018-03-28 RX ORDER — HYDROMORPHONE HYDROCHLORIDE 1 MG/ML
1 INJECTION, SOLUTION INTRAMUSCULAR; INTRAVENOUS; SUBCUTANEOUS
Status: DISCONTINUED | OUTPATIENT
Start: 2018-03-28 | End: 2018-03-28

## 2018-03-28 RX ORDER — MIDAZOLAM HYDROCHLORIDE 1 MG/ML
1 INJECTION INTRAMUSCULAR; INTRAVENOUS EVERY 5 MIN PRN
Status: DISCONTINUED | OUTPATIENT
Start: 2018-03-28 | End: 2018-03-28 | Stop reason: HOSPADM

## 2018-03-28 RX ORDER — QUETIAPINE FUMARATE 100 MG/1
400 TABLET, FILM COATED ORAL NIGHTLY
Status: DISCONTINUED | OUTPATIENT
Start: 2018-03-28 | End: 2018-03-31 | Stop reason: HOSPADM

## 2018-03-28 RX ORDER — GABAPENTIN 300 MG/1
600 CAPSULE ORAL ONCE
Status: COMPLETED | OUTPATIENT
Start: 2018-03-28 | End: 2018-03-28

## 2018-03-28 RX ORDER — AMOXICILLIN 250 MG
1 CAPSULE ORAL 2 TIMES DAILY
Status: DISCONTINUED | OUTPATIENT
Start: 2018-03-28 | End: 2018-03-31 | Stop reason: HOSPADM

## 2018-03-28 RX ORDER — HYDROMORPHONE HYDROCHLORIDE 2 MG/ML
1 INJECTION, SOLUTION INTRAMUSCULAR; INTRAVENOUS; SUBCUTANEOUS
Status: DISCONTINUED | OUTPATIENT
Start: 2018-03-28 | End: 2018-03-29

## 2018-03-28 RX ORDER — TIZANIDINE 4 MG/1
12 TABLET ORAL NIGHTLY
Status: DISCONTINUED | OUTPATIENT
Start: 2018-03-28 | End: 2018-03-29

## 2018-03-28 RX ORDER — FAMOTIDINE 20 MG/1
20 TABLET, FILM COATED ORAL 2 TIMES DAILY
Status: DISCONTINUED | OUTPATIENT
Start: 2018-03-28 | End: 2018-03-31 | Stop reason: HOSPADM

## 2018-03-28 RX ORDER — ONDANSETRON 8 MG/1
8 TABLET, ORALLY DISINTEGRATING ORAL EVERY 6 HOURS PRN
Status: DISCONTINUED | OUTPATIENT
Start: 2018-03-28 | End: 2018-03-31 | Stop reason: HOSPADM

## 2018-03-28 RX ORDER — GLYCOPYRROLATE 0.2 MG/ML
INJECTION INTRAMUSCULAR; INTRAVENOUS
Status: DISCONTINUED | OUTPATIENT
Start: 2018-03-28 | End: 2018-03-28

## 2018-03-28 RX ORDER — ONDANSETRON 2 MG/ML
4 INJECTION INTRAMUSCULAR; INTRAVENOUS EVERY 12 HOURS PRN
Status: DISCONTINUED | OUTPATIENT
Start: 2018-03-28 | End: 2018-03-28

## 2018-03-28 RX ORDER — IBUPROFEN 200 MG
16 TABLET ORAL
Status: DISCONTINUED | OUTPATIENT
Start: 2018-03-28 | End: 2018-03-31 | Stop reason: HOSPADM

## 2018-03-28 RX ORDER — HYDROMORPHONE HYDROCHLORIDE 1 MG/ML
1 INJECTION, SOLUTION INTRAMUSCULAR; INTRAVENOUS; SUBCUTANEOUS
Status: DISCONTINUED | OUTPATIENT
Start: 2018-03-28 | End: 2018-03-29

## 2018-03-28 RX ORDER — CEFAZOLIN SODIUM 1 G/3ML
2 INJECTION, POWDER, FOR SOLUTION INTRAMUSCULAR; INTRAVENOUS
Status: COMPLETED | OUTPATIENT
Start: 2018-03-28 | End: 2018-03-29

## 2018-03-28 RX ORDER — CELECOXIB 200 MG/1
400 CAPSULE ORAL
Status: COMPLETED | OUTPATIENT
Start: 2018-03-28 | End: 2018-03-28

## 2018-03-28 RX ORDER — SODIUM CHLORIDE 0.9 % (FLUSH) 0.9 %
3 SYRINGE (ML) INJECTION EVERY 8 HOURS PRN
Status: DISCONTINUED | OUTPATIENT
Start: 2018-03-28 | End: 2018-03-31 | Stop reason: HOSPADM

## 2018-03-28 RX ORDER — ASPIRIN 325 MG
325 TABLET ORAL 2 TIMES DAILY
Qty: 60 TABLET | Refills: 0 | Status: SHIPPED | OUTPATIENT
Start: 2018-03-28 | End: 2020-07-16

## 2018-03-28 RX ORDER — MIRTAZAPINE 15 MG/1
45 TABLET, FILM COATED ORAL NIGHTLY
Status: DISCONTINUED | OUTPATIENT
Start: 2018-03-28 | End: 2018-03-31 | Stop reason: HOSPADM

## 2018-03-28 RX ORDER — OXYCODONE HYDROCHLORIDE 5 MG/1
5 TABLET ORAL
Status: DISCONTINUED | OUTPATIENT
Start: 2018-03-28 | End: 2018-03-28

## 2018-03-28 RX ORDER — IBUPROFEN 200 MG
24 TABLET ORAL
Status: DISCONTINUED | OUTPATIENT
Start: 2018-03-28 | End: 2018-03-31 | Stop reason: HOSPADM

## 2018-03-28 RX ORDER — VALSARTAN 40 MG/1
80 TABLET ORAL DAILY
Status: DISCONTINUED | OUTPATIENT
Start: 2018-03-29 | End: 2018-03-30

## 2018-03-28 RX ORDER — TRAZODONE HYDROCHLORIDE 50 MG/1
50 TABLET ORAL NIGHTLY PRN
Status: DISCONTINUED | OUTPATIENT
Start: 2018-03-28 | End: 2018-03-31 | Stop reason: HOSPADM

## 2018-03-28 RX ORDER — PROPOFOL 10 MG/ML
VIAL (ML) INTRAVENOUS CONTINUOUS PRN
Status: DISCONTINUED | OUTPATIENT
Start: 2018-03-28 | End: 2018-03-28

## 2018-03-28 RX ORDER — ONDANSETRON 2 MG/ML
4 INJECTION INTRAMUSCULAR; INTRAVENOUS EVERY 8 HOURS PRN
Status: DISCONTINUED | OUTPATIENT
Start: 2018-03-28 | End: 2018-03-28

## 2018-03-28 RX ORDER — OXYCODONE HYDROCHLORIDE 5 MG/1
10 TABLET ORAL
Status: DISCONTINUED | OUTPATIENT
Start: 2018-03-28 | End: 2018-03-28

## 2018-03-28 RX ORDER — LEVOTHYROXINE SODIUM 150 UG/1
150 TABLET ORAL DAILY
Status: DISCONTINUED | OUTPATIENT
Start: 2018-03-28 | End: 2018-03-31 | Stop reason: HOSPADM

## 2018-03-28 RX ORDER — SODIUM CHLORIDE 9 MG/ML
INJECTION, SOLUTION INTRAVENOUS CONTINUOUS
Status: ACTIVE | OUTPATIENT
Start: 2018-03-28 | End: 2018-03-29

## 2018-03-28 RX ORDER — PHENYLEPHRINE HYDROCHLORIDE 10 MG/ML
INJECTION INTRAVENOUS
Status: DISCONTINUED | OUTPATIENT
Start: 2018-03-28 | End: 2018-03-28

## 2018-03-28 RX ORDER — OXYCODONE HYDROCHLORIDE 5 MG/1
15 TABLET ORAL
Status: DISCONTINUED | OUTPATIENT
Start: 2018-03-28 | End: 2018-03-31 | Stop reason: HOSPADM

## 2018-03-28 RX ORDER — ACETAMINOPHEN 10 MG/ML
1000 INJECTION, SOLUTION INTRAVENOUS ONCE
Status: COMPLETED | OUTPATIENT
Start: 2018-03-28 | End: 2018-03-28

## 2018-03-28 RX ORDER — SODIUM CHLORIDE 0.9 % (FLUSH) 0.9 %
3 SYRINGE (ML) INJECTION
Status: DISCONTINUED | OUTPATIENT
Start: 2018-03-28 | End: 2018-03-28 | Stop reason: HOSPADM

## 2018-03-28 RX ORDER — MUPIROCIN 20 MG/G
1 OINTMENT TOPICAL
Status: COMPLETED | OUTPATIENT
Start: 2018-03-28 | End: 2018-03-28

## 2018-03-28 RX ORDER — GABAPENTIN 300 MG/1
600 CAPSULE ORAL 3 TIMES DAILY
Status: DISCONTINUED | OUTPATIENT
Start: 2018-03-28 | End: 2018-03-31 | Stop reason: HOSPADM

## 2018-03-28 RX ORDER — NALOXONE HCL 0.4 MG/ML
0.02 VIAL (ML) INJECTION
Status: DISCONTINUED | OUTPATIENT
Start: 2018-03-28 | End: 2018-03-31 | Stop reason: HOSPADM

## 2018-03-28 RX ORDER — LIDOCAINE HCL/PF 100 MG/5ML
SYRINGE (ML) INTRAVENOUS
Status: DISCONTINUED | OUTPATIENT
Start: 2018-03-28 | End: 2018-03-28

## 2018-03-28 RX ORDER — PROMETHAZINE HYDROCHLORIDE 25 MG/1
25 TABLET ORAL EVERY 6 HOURS PRN
Status: DISCONTINUED | OUTPATIENT
Start: 2018-03-28 | End: 2018-03-31 | Stop reason: HOSPADM

## 2018-03-28 RX ORDER — LIDOCAINE HYDROCHLORIDE 10 MG/ML
1 INJECTION, SOLUTION EPIDURAL; INFILTRATION; INTRACAUDAL; PERINEURAL
Status: DISCONTINUED | OUTPATIENT
Start: 2018-03-28 | End: 2018-03-28 | Stop reason: HOSPADM

## 2018-03-28 RX ORDER — ONDANSETRON 2 MG/ML
4 INJECTION INTRAMUSCULAR; INTRAVENOUS DAILY PRN
Status: DISCONTINUED | OUTPATIENT
Start: 2018-03-28 | End: 2018-03-28

## 2018-03-28 RX ORDER — FENTANYL CITRATE 50 UG/ML
25 INJECTION, SOLUTION INTRAMUSCULAR; INTRAVENOUS EVERY 5 MIN PRN
Status: DISCONTINUED | OUTPATIENT
Start: 2018-03-28 | End: 2018-03-28 | Stop reason: HOSPADM

## 2018-03-28 RX ORDER — BISACODYL 10 MG
10 SUPPOSITORY, RECTAL RECTAL EVERY 12 HOURS PRN
Status: DISCONTINUED | OUTPATIENT
Start: 2018-03-28 | End: 2018-03-31 | Stop reason: HOSPADM

## 2018-03-28 RX ORDER — POLYETHYLENE GLYCOL 3350 17 G/17G
17 POWDER, FOR SOLUTION ORAL DAILY
Status: DISCONTINUED | OUTPATIENT
Start: 2018-03-28 | End: 2018-03-31 | Stop reason: HOSPADM

## 2018-03-28 RX ORDER — ALPRAZOLAM 0.25 MG/1
1 TABLET ORAL NIGHTLY PRN
Status: DISCONTINUED | OUTPATIENT
Start: 2018-03-28 | End: 2018-03-28

## 2018-03-28 RX ORDER — DIPHENHYDRAMINE HYDROCHLORIDE 50 MG/ML
INJECTION INTRAMUSCULAR; INTRAVENOUS
Status: DISCONTINUED | OUTPATIENT
Start: 2018-03-28 | End: 2018-03-28

## 2018-03-28 RX ORDER — GENTAMICIN SULFATE 40 MG/ML
INJECTION, SOLUTION INTRAMUSCULAR; INTRAVENOUS
Status: DISCONTINUED | OUTPATIENT
Start: 2018-03-28 | End: 2018-03-28 | Stop reason: HOSPADM

## 2018-03-28 RX ORDER — OXYCODONE HYDROCHLORIDE 5 MG/1
10 TABLET ORAL
Status: DISCONTINUED | OUTPATIENT
Start: 2018-03-28 | End: 2018-03-31 | Stop reason: HOSPADM

## 2018-03-28 RX ORDER — SODIUM CHLORIDE 9 MG/ML
INJECTION, SOLUTION INTRAVENOUS
Status: COMPLETED | OUTPATIENT
Start: 2018-03-28 | End: 2018-03-28

## 2018-03-28 RX ORDER — INSULIN ASPART 100 [IU]/ML
8 INJECTION, SOLUTION INTRAVENOUS; SUBCUTANEOUS
Status: DISCONTINUED | OUTPATIENT
Start: 2018-03-28 | End: 2018-03-31 | Stop reason: HOSPADM

## 2018-03-28 RX ORDER — PREGABALIN 75 MG/1
75 CAPSULE ORAL
Status: DISCONTINUED | OUTPATIENT
Start: 2018-03-28 | End: 2018-03-28

## 2018-03-28 RX ORDER — PROPOFOL 10 MG/ML
VIAL (ML) INTRAVENOUS
Status: DISCONTINUED | OUTPATIENT
Start: 2018-03-28 | End: 2018-03-28

## 2018-03-28 RX ORDER — INSULIN ASPART 100 [IU]/ML
1-10 INJECTION, SOLUTION INTRAVENOUS; SUBCUTANEOUS
Status: DISCONTINUED | OUTPATIENT
Start: 2018-03-28 | End: 2018-03-31 | Stop reason: HOSPADM

## 2018-03-28 RX ORDER — FENTANYL CITRATE 50 UG/ML
50 INJECTION, SOLUTION INTRAMUSCULAR; INTRAVENOUS EVERY 5 MIN PRN
Status: DISCONTINUED | OUTPATIENT
Start: 2018-03-28 | End: 2018-03-28 | Stop reason: HOSPADM

## 2018-03-28 RX ORDER — ONDANSETRON 8 MG/1
8 TABLET, ORALLY DISINTEGRATING ORAL EVERY 12 HOURS PRN
Qty: 20 TABLET | Refills: 0 | Status: ON HOLD | OUTPATIENT
Start: 2018-03-28 | End: 2018-04-02

## 2018-03-28 RX ORDER — ASPIRIN 325 MG
325 TABLET, DELAYED RELEASE (ENTERIC COATED) ORAL 2 TIMES DAILY
Status: DISCONTINUED | OUTPATIENT
Start: 2018-03-28 | End: 2018-03-31 | Stop reason: HOSPADM

## 2018-03-28 RX ORDER — ROPIVACAINE HYDROCHLORIDE 2 MG/ML
8 INJECTION, SOLUTION EPIDURAL; INFILTRATION; PERINEURAL CONTINUOUS
Status: DISCONTINUED | OUTPATIENT
Start: 2018-03-28 | End: 2018-03-30

## 2018-03-28 RX ORDER — CEFAZOLIN SODIUM 1 G/3ML
2 INJECTION, POWDER, FOR SOLUTION INTRAMUSCULAR; INTRAVENOUS
Status: COMPLETED | OUTPATIENT
Start: 2018-03-28 | End: 2018-03-28

## 2018-03-28 RX ORDER — FLUTICASONE PROPIONATE 50 MCG
2 SPRAY, SUSPENSION (ML) NASAL DAILY
Status: DISCONTINUED | OUTPATIENT
Start: 2018-03-29 | End: 2018-03-31 | Stop reason: HOSPADM

## 2018-03-28 RX ORDER — SODIUM CHLORIDE 9 MG/ML
INJECTION, SOLUTION INTRAVENOUS CONTINUOUS PRN
Status: DISCONTINUED | OUTPATIENT
Start: 2018-03-28 | End: 2018-03-28

## 2018-03-28 RX ORDER — OXYCODONE HYDROCHLORIDE 5 MG/1
15 TABLET ORAL
Status: DISCONTINUED | OUTPATIENT
Start: 2018-03-28 | End: 2018-03-28

## 2018-03-28 RX ORDER — OXYCODONE AND ACETAMINOPHEN 10; 325 MG/1; MG/1
1 TABLET ORAL
Qty: 90 TABLET | Refills: 0 | Status: ON HOLD | OUTPATIENT
Start: 2018-03-28 | End: 2018-04-16 | Stop reason: HOSPADM

## 2018-03-28 RX ORDER — RAMELTEON 8 MG/1
8 TABLET ORAL NIGHTLY PRN
Status: DISCONTINUED | OUTPATIENT
Start: 2018-03-28 | End: 2018-03-31 | Stop reason: HOSPADM

## 2018-03-28 RX ORDER — MECLIZINE HYDROCHLORIDE 25 MG/1
25 TABLET ORAL 3 TIMES DAILY PRN
Status: DISCONTINUED | OUTPATIENT
Start: 2018-03-28 | End: 2018-03-31 | Stop reason: HOSPADM

## 2018-03-28 RX ORDER — ACETAMINOPHEN 10 MG/ML
INJECTION, SOLUTION INTRAVENOUS
Status: COMPLETED
Start: 2018-03-28 | End: 2018-03-28

## 2018-03-28 RX ADMIN — PHENYLEPHRINE HYDROCHLORIDE 0.25 MCG/KG/MIN: 10 INJECTION INTRAVENOUS at 01:03

## 2018-03-28 RX ADMIN — FENTANYL CITRATE 50 MCG: 50 INJECTION, SOLUTION INTRAMUSCULAR; INTRAVENOUS at 10:03

## 2018-03-28 RX ADMIN — PROPOFOL 150 MCG/KG/MIN: 10 INJECTION, EMULSION INTRAVENOUS at 12:03

## 2018-03-28 RX ADMIN — FENTANYL CITRATE 25 MCG: 50 INJECTION, SOLUTION INTRAMUSCULAR; INTRAVENOUS at 02:03

## 2018-03-28 RX ADMIN — ROPIVACAINE HYDROCHLORIDE 8 ML/HR: 2 INJECTION, SOLUTION EPIDURAL; INFILTRATION at 02:03

## 2018-03-28 RX ADMIN — KETAMINE HYDROCHLORIDE 25 MG: 100 INJECTION, SOLUTION, CONCENTRATE INTRAMUSCULAR; INTRAVENOUS at 12:03

## 2018-03-28 RX ADMIN — PHENYLEPHRINE HYDROCHLORIDE 100 MCG: 10 INJECTION INTRAVENOUS at 01:03

## 2018-03-28 RX ADMIN — SODIUM CHLORIDE: 0.9 INJECTION, SOLUTION INTRAVENOUS at 10:03

## 2018-03-28 RX ADMIN — GABAPENTIN 600 MG: 300 CAPSULE ORAL at 04:03

## 2018-03-28 RX ADMIN — OXYCODONE HYDROCHLORIDE 20 MG: 5 TABLET ORAL at 04:03

## 2018-03-28 RX ADMIN — PHENYLEPHRINE HYDROCHLORIDE 100 MCG: 10 INJECTION INTRAVENOUS at 12:03

## 2018-03-28 RX ADMIN — MECLIZINE HYDROCHLORIDE 25 MG: 25 TABLET ORAL at 09:03

## 2018-03-28 RX ADMIN — VANCOMYCIN HYDROCHLORIDE 1500 MG: 1 INJECTION, POWDER, LYOPHILIZED, FOR SOLUTION INTRAVENOUS at 11:03

## 2018-03-28 RX ADMIN — STANDARDIZED SENNA CONCENTRATE AND DOCUSATE SODIUM 1 TABLET: 8.6; 5 TABLET, FILM COATED ORAL at 09:03

## 2018-03-28 RX ADMIN — ACETAMINOPHEN 1000 MG: 10 INJECTION, SOLUTION INTRAVENOUS at 02:03

## 2018-03-28 RX ADMIN — ALPRAZOLAM 1 MG: 1 TABLET ORAL at 09:03

## 2018-03-28 RX ADMIN — MUPIROCIN 1 G: 20 OINTMENT TOPICAL at 10:03

## 2018-03-28 RX ADMIN — CELECOXIB 400 MG: 200 CAPSULE ORAL at 10:03

## 2018-03-28 RX ADMIN — CEFAZOLIN 2 G: 330 INJECTION, POWDER, FOR SOLUTION INTRAMUSCULAR; INTRAVENOUS at 11:03

## 2018-03-28 RX ADMIN — SODIUM CHLORIDE, SODIUM GLUCONATE, SODIUM ACETATE, POTASSIUM CHLORIDE, MAGNESIUM CHLORIDE, SODIUM PHOSPHATE, DIBASIC, AND POTASSIUM PHOSPHATE: .53; .5; .37; .037; .03; .012; .00082 INJECTION, SOLUTION INTRAVENOUS at 01:03

## 2018-03-28 RX ADMIN — GABAPENTIN 600 MG: 300 CAPSULE ORAL at 09:03

## 2018-03-28 RX ADMIN — TIZANIDINE 12 MG: 4 TABLET ORAL at 09:03

## 2018-03-28 RX ADMIN — PROMETHAZINE HYDROCHLORIDE 25 MG: 25 TABLET ORAL at 09:03

## 2018-03-28 RX ADMIN — MIDAZOLAM HYDROCHLORIDE 2 MG: 1 INJECTION, SOLUTION INTRAMUSCULAR; INTRAVENOUS at 10:03

## 2018-03-28 RX ADMIN — RAMELTEON 8 MG: 8 TABLET, FILM COATED ORAL at 09:03

## 2018-03-28 RX ADMIN — ACETAMINOPHEN 1000 MG: 500 TABLET ORAL at 09:03

## 2018-03-28 RX ADMIN — FAMOTIDINE 20 MG: 20 TABLET, FILM COATED ORAL at 09:03

## 2018-03-28 RX ADMIN — INSULIN ASPART 8 UNITS: 100 INJECTION, SOLUTION INTRAVENOUS; SUBCUTANEOUS at 06:03

## 2018-03-28 RX ADMIN — GABAPENTIN 600 MG: 300 CAPSULE ORAL at 10:03

## 2018-03-28 RX ADMIN — OXYCODONE HYDROCHLORIDE 15 MG: 5 TABLET ORAL at 02:03

## 2018-03-28 RX ADMIN — LIDOCAINE HYDROCHLORIDE 50 MG: 20 INJECTION, SOLUTION INTRAVENOUS at 12:03

## 2018-03-28 RX ADMIN — GLYCOPYRROLATE 0.2 MG: 0.2 INJECTION, SOLUTION INTRAMUSCULAR; INTRAVENOUS at 12:03

## 2018-03-28 RX ADMIN — BUSPIRONE HYDROCHLORIDE 10 MG: 10 TABLET ORAL at 09:03

## 2018-03-28 RX ADMIN — ONDANSETRON 8 MG: 8 TABLET, ORALLY DISINTEGRATING ORAL at 09:03

## 2018-03-28 RX ADMIN — SODIUM CHLORIDE: 0.9 INJECTION, SOLUTION INTRAVENOUS at 02:03

## 2018-03-28 RX ADMIN — PROPOFOL 50 MG: 10 INJECTION, EMULSION INTRAVENOUS at 12:03

## 2018-03-28 RX ADMIN — QUETIAPINE FUMARATE 400 MG: 100 TABLET ORAL at 09:03

## 2018-03-28 RX ADMIN — MIDAZOLAM HYDROCHLORIDE 2 MG: 1 INJECTION, SOLUTION INTRAMUSCULAR; INTRAVENOUS at 12:03

## 2018-03-28 RX ADMIN — HYDROMORPHONE HYDROCHLORIDE 1 MG: 2 INJECTION INTRAMUSCULAR; INTRAVENOUS; SUBCUTANEOUS at 11:03

## 2018-03-28 RX ADMIN — HYDROMORPHONE HYDROCHLORIDE 1 MG: 2 INJECTION INTRAMUSCULAR; INTRAVENOUS; SUBCUTANEOUS at 07:03

## 2018-03-28 RX ADMIN — SODIUM CHLORIDE: 0.9 INJECTION, SOLUTION INTRAVENOUS at 12:03

## 2018-03-28 RX ADMIN — CEFAZOLIN 2 G: 330 INJECTION, POWDER, FOR SOLUTION INTRAMUSCULAR; INTRAVENOUS at 12:03

## 2018-03-28 RX ADMIN — MIRTAZAPINE 45 MG: 15 TABLET, FILM COATED ORAL at 09:03

## 2018-03-28 RX ADMIN — ASPIRIN 325 MG: 325 TABLET, DELAYED RELEASE ORAL at 09:03

## 2018-03-28 RX ADMIN — DIPHENHYDRAMINE HYDROCHLORIDE 25 MG: 50 INJECTION, SOLUTION INTRAMUSCULAR; INTRAVENOUS at 12:03

## 2018-03-28 RX ADMIN — OXYCODONE HYDROCHLORIDE 20 MG: 5 TABLET ORAL at 10:03

## 2018-03-28 RX ADMIN — TRAZODONE HYDROCHLORIDE 50 MG: 50 TABLET ORAL at 09:03

## 2018-03-28 NOTE — PROGRESS NOTES
Spoke with Dr. Lindsey regarding patient's medication order , Lyrica,  To be given pre op. Patient states that she takes gabapentin at home instead of lyrica- due to side effects. Verbal order received from Dr. Lindsey to discontinue lyrica and order patient's home dose of gabapentin (600mg) PO to be given once prior to surgery,

## 2018-03-28 NOTE — TRANSFER OF CARE
"Anesthesia Transfer of Care Note    Patient: Homar Jerry    Procedure(s) Performed: Procedure(s) (LRB):  REPLACEMENT-KNEE-TOTAL (Left)    Patient location: PACU    Anesthesia Type: general    Transport from OR: Transported from OR on room air with adequate spontaneous ventilation    Post pain: adequate analgesia    Post assessment: no apparent anesthetic complications and tolerated procedure well    Post vital signs: stable    Level of consciousness: awake, alert and oriented    Nausea/Vomiting: no nausea/vomiting    Complications: none    Transfer of care protocol was followed      Last vitals:   Visit Vitals  /67 (BP Location: Left arm, Patient Position: Lying)   Pulse 96   Temp 36.6 °C (97.9 °F) (Axillary)   Resp 16   Ht 5' 5" (1.651 m)   Wt 107.5 kg (237 lb)   LMP  (LMP Unknown)   SpO2 100%   BMI 39.44 kg/m²     "

## 2018-03-28 NOTE — PLAN OF CARE
Problem: Physical Therapy Goal  Goal: Physical Therapy Goal  Goals to be met by: 18     Patient will increase functional independence with mobility by performin. Supine to sit with Supervision  2. Sit to supine with Supervision  3. Sit to stand transfer with Stand-by Assistance  4. Gait  x 150 feet with Stand-by Assistance using Rolling Walker.   5. Ascend/Descend 6 inch curb step with Contact Guard Assistance using Rolling Walker.  6. Lower extremity exercise program x 30 reps per handout, with independence    Outcome: Ongoing (interventions implemented as appropriate)  PT eval complete and POC initiated.

## 2018-03-28 NOTE — PT/OT/SLP EVAL
Physical Therapy Evaluation    Patient Name:  Homar Jerry   MRN:  [id number]    Recommendations:     Discharge recommendations: Skilled Nursing Facility  Barriers to discharge: Decreased caregiver support  Equipment recommendations: none    Assessment:     Homar Jerry is a 42 y.o. female admitted with a medical diagnosis of L TKA.  She presents with the below impairments/functional limitations.  Pt tolerated evaluation well today and is motivated to do well with recent joint replacement.  Pt is a good candidate for skilled PT services to address the below deficits and to increase functional independence.      Rehab Prognosis: good; patient would benefit from acute skilled PT services to address these deficits and reach maximum level of function.      Rehab Identified impairments/functional limitations:   weakness, impaired endurance, impaired sensation, impaired self care skills, impaired functional mobilty, gait instability, impaired balance, decreased coordination, decreased lower extremity function, decreased safety awareness, pain, decreased ROM, impaired skin, edema, orthopedic precautions    Recent Surgery: Procedure(s) (LRB):  REPLACEMENT-KNEE-TOTAL (Left) Day of Surgery    Plan:     During this hospitalization, patient to be seen BID to address the above listed problems via gait training, therapeutic activity, therapeutic exercise, and neuromuscular re-education   Plan of Care Reviewed with: patient    Subjective     Communicated with RN prior to session.  Patient found supine upon PT entry, agreeable to evaluation.      Chief Complaint: pain  Patient comments/goals: to increase independence    Pain/Comfort:  Pain level prior: 10/10 in L knee  Pain level post: 10/10 in L knee    Patients cultural, spiritual, Hinduism conflicts given the current situation: none stated    Living Environment:  Pt lives alone in [facility] w/ threshold entry. Pt uses a tub/shower combo.   Previous level of  function: PTA, pt reports being mod I for both ADLs (max time to complete) and mobility   Equipment Owned: rolling walker, bedside commode, shower chair and wheelchair   Assistance Upon Discharge: Pt reports she will not have assistance upon d/c from hospital.     Objective:     Patient found supine in bed with blood pressure cuff, telemetry, perineural catheter, polar ice, peripheral IV, pulse ox, and FCD.     General Precautions: Standard, fall  Orthopedic Precautions:  LLE weight bearing as tolerated  Braces: none      Physical Exam:  Postural examination/scapula alignment: WFL    Skin integrity: Visible skin intact  Edema: Mild LLE    Sensation:   Intact    Lower Extremity Range of Motion:  Right Lower Extremity: WFL  Left Lower Extremity: WFL except knee    Lower Extremity Strength:  Right Lower Extremity: WFL  Left Lower Extremity: WFL except knee    Functional Mobility:    Bed Mobility:    Supine to sit: Min A   Sit to supine: Min A     Transfers:    Sit<>Stand: Min A with SW    Ambulation:    Pt ambulated 3 steps forward and backward and 1-2 sidesteps with SW and Min A.  Pt educated on 3 point gait pattern.  Pt able to verbalize and demonstrate understanding.       AM-PAC 6 CLICK MOBILITY  Total Score: 18     Therapeutic Activities and Exercises:  PT evaluation performed.  Pt educated on role of PT, safety with functional mobility.     Patient left supine in bed with all lines intact and RN notified.    GOALS:    Physical Therapy Goals        Problem: Physical Therapy Goal    Goal Priority Disciplines Outcome Goal Variances Interventions   Physical Therapy Goal     PT/OT, PT Ongoing (interventions implemented as appropriate)     Description:  Goals to be met by: 18     Patient will increase functional independence with mobility by performin. Supine to sit with Supervision  2. Sit to supine with Supervision  3. Sit to stand transfer with Stand-by Assistance  4. Gait  x 150 feet with Stand-by  Assistance using Rolling Walker.   5. Ascend/Descend 6 inch curb step with Contact Guard Assistance using Rolling Walker.  6. Lower extremity exercise program x 30 reps per handout, with independence                      History:     Past Medical History:   Diagnosis Date    Anemia     Anxiety     Asthma     usually with cold weather    Back pain     Bipolar 1 disorder 3/17/2015    Cervical cancer 2009    DOT    Diabetes mellitus with hyperglycemia 1/17/2017    Fibrocystic breast     Fibromyalgia     Gastroparesis 2012    s/p sleeve to cover damaged nerves; s/p gastric pacemaker which did not help; due to nerve damage during surgery    Hypertension     Hypothyroidism, postsurgical     Morbid obesity     Non-intractable vomiting with nausea 9/26/2017    Prediabetes     Rheumatoid arthritis     S/P laparoscopic appendectomy 9/26/2017    Thyroid cancer 2003 & 2013    thyroidectomy    Urinary incontinence        Past Surgical History:   Procedure Laterality Date    ANKLE FRACTURE SURGERY Right     APPENDECTOMY      BREAST SURGERY      exploratory laparotomy due to complications of hysterectomy  2009    cervical cuff burst with air in abdomen    FRACTURE SURGERY      ankle around 2012    GASTRECTOMY      gastric pacemaker      gastric sleeve      HYSTERECTOMY  2009    total including cervix    KNEE ARTHROSCOPY W/ MENISCAL REPAIR Left 2016    KNEE SURGERY  05/12/2016    loop monitor  2016    multiple breast biopsies      TONSILLECTOMY      ureteral sling         Time Tracking:     PT Received On: 03/28/18  PT Start Time: 1615     PT Stop Time: 1635  PT Total Time (min): 20 min     Billable Minutes: Evaluation 12; Therapeutic Activity 8      Petros Serrano, PT, DPT  3/28/2018   [phone]

## 2018-03-28 NOTE — BRIEF OP NOTE
[facility]  Surgery Department  Operative Note    SUMMARY     Date of Procedure: 3/28/2018     Procedure: Procedure(s) (LRB):  REPLACEMENT-KNEE-TOTAL (Left)     Surgeon(s) and Role:     * John L. [facility] Jr., MD - Primary    Assisting Surgeon: None    Pre-Operative Diagnosis: Osteoarthritis of knee, unspecified (CODE) [M17.9]    Post-Operative Diagnosis: Post-Op Diagnosis Codes:     * Osteoarthritis of knee, unspecified (CODE) [M17.9]    Anesthesia: Spinal    Technical Procedures Used:  PS       Description of the Findings of the Procedure: Varus    Significant Surgical Tasks Conducted by the Assistant(s), if Applicable: cosure    Complications: No    Estimated Blood Loss (EBL): 100cc             Implants:   Implant Name Type Inv. Item Serial No.  Lot No. LRB No. Used   PLUG BONE #5 - [id number]  PLUG BONE #5  Press SHAUN. [id number] Left 1   CEMENT BONE SIMPLE P INDIVID - [id number]  CEMENT BONE SIMPLE P INDIVID  Press SHAUN. [id number] Left 2   FEMORAL COMP CMT SZ 6 LEFT - [id number]  FEMORAL COMP CMT SZ 6 LEFT  BRIJESH,INC [id number] Left 1   PSN TIB STM 5 DEG SZ D L - [id number]  PSN TIB STM 5 DEG SZ D L  BRIJESH,INC [id number] Left 1   PATELLA PSN JOSE E POLY 8X29MM - [id number]  PATELLA PSN JOSE E POLY 8X29MM  BRIJESH,INC [id number] Left 1   SCREW HEX HEAD - [id number]  SCREW HEX HEAD  BRIJESH,INC  Left 1   DRILL PIN TROCAR 3.2MM - [id number]  DRILL PIN TROCAR 3.2MM  BRIJESH,INC  Left 2   SCREW HEX HEAD 3.5X38MM - [id number]  SCREW HEX HEAD 3.5X38MM  BRIJESH,INC  Left 2   INSERT VIVACIT E CD 6-9 10MM L - [id number]   INSERT VIVACIT E CD 6-9 10MM L   BRIJESH,INC [id number] Left 1       Specimens:   Specimen (12h ago through future)    Start     Ordered    03/28/18 1306  Specimen to Pathology - Surgery  Once     Comments:  1.  Bone and soft tissue, left knee - permanent      03/28/18 1305                  Condition: Good    Disposition: PACU - hemodynamically stable.    Attestation: I was present and  scrubbed for the entire procedure.

## 2018-03-28 NOTE — PLAN OF CARE
Problem: Occupational Therapy Goal  Goal: Occupational Therapy Goal  Goals to be met by: 7 days    Patient will increase functional independence with ADLs by performing:    UE Dressing with Supervision.  LE Dressing with Supervision with AD as needed.  Grooming while standing with Supervision.  Toileting from bedside commode with Supervision for hygiene and clothing management.   Stand pivot transfers with Supervision.  Toilet transfer to bedside commode with Supervision.       Outcome: Ongoing (interventions implemented as appropriate)  OT eval complete. Pt tolerated session well despite max pain. Her functional outcomes established based on her presenting functional level     Comments: Cont OT POC

## 2018-03-28 NOTE — OPERATIVE NOTE ADDENDUM
Certification of Assistant at Surgery       Surgery Date: 3/28/2018     Participating Surgeons:  Surgeon(s) and Role:     * [facility] L. [facility] Jr., MD - Primary    Procedures:  Procedure(s) (LRB):  REPLACEMENT-KNEE-TOTAL (Left)    Assistant Surgeon's Certification of Necessity:  I understand that section 1842 (b) (6) (d) of the Social Security Act generally prohibits Medicare Part B reasonable charge payment for the services of assistants at surgery in teaching hospitals when qualified residents are available to furnish such services. I certify that the services for which payment is claimed were medically necessary, and that no qualified resident was available to perform the services. I further understand that these services are subject to post-payment review by the Medicare carrier.      Yadira Quinones PA-C    03/28/2018  2:25 PM

## 2018-03-28 NOTE — PT/OT/SLP EVAL
Occupational Therapy   Evaluation    Name: Homar Jerry  MRN: [id number]  Admitting Diagnosis:  <principal problem not specified> Day of Surgery    Recommendations:     Discharge recommendations: Skilled Nursing Facility    Barriers to discharge: Decreased caregiver support    Equipment recommendations: None    History:     Occupational Profile:  Living Environment: Pt lives alone in [facility] w/ threshold entry. Pt uses a tub/shower combo.   Previous level of function: PTA, pt reports being mod I for both ADLs (max time to complete) and mobility   Equipment Owned: rolling walker, bedside commode, shower chair and wheelchair   Assistance Upon Discharge: Pt reports she will not have assistance upon d/c from hospital.     Past Medical History:   Diagnosis Date    Anemia     Anxiety     Asthma     usually with cold weather    Back pain     Bipolar 1 disorder 3/17/2015    [facility]ervical [facility] 2009    [facility]    Diabetes mellitus with hyperglycemia 1/17/2017    Fibrocystic breast     Fibromyalgia     Gastroparesis 2012    s/p sleeve to cover damaged nerves; s/p gastric pacemaker which did not help; due to nerve damage during surgery    Hypertension     Hypothyroidism, postsurgical     Morbid obesity     Non-intractable vomiting with nausea 9/26/2017    Prediabetes     Rheumatoid arthritis     S/P laparoscopic appendectomy 9/26/2017    Thyroid cancer 2003 & 2013    thyroidectomy    Urinary incontinence        Past Surgical History:   Procedure Laterality Date    ANKLE FRACTURE SURGERY Right     APPENDECTOMY      BREAST SURGERY      exploratory laparotomy due to complications of hysterectomy  2009    cervical cuff burst with air in abdomen    FRACTURE SURGERY      ankle around 2012    GASTRECTOMY      gastric pacemaker      gastric sleeve      HYSTERECTOMY  2009    total including cervix    KNEE ARTHROSCOPY W/ MENISCAL REPAIR Left 2016    KNEE SURGERY  05/12/2016    loop monitor  2016    multiple  breast biopsies      TONSILLECTOMY      ureteral sling         Subjective     Communicated with: RN prior to session.    Pt agreeable to Evaluation.    Pain/Comfort:  Pain level: 10/10 in L knee    Objective:     Pt found supine in bed with blood pressure cuff, kenney catheter, telemetry, PNC, PCEA, pulse ox, Peripheral IV and FCD.    Orthopedic Precautions: LLE weight bearing as tolerated    Occupational Performance:    Bed Mobility:    Supine to sit: Min A  Sit to supine: Min A    Transfers:    Sit<>Stand: CGA, SW    Ambulation:    Pt ambulated 3 steps forward, backward, and side stepped to HOB w/ CGA and SW - no signs of LOB or SOB noted.     Activities of Daily Living:  UE dressing: Maximum Assistance to osmar gown around back  LE dressing: Total Assistance to osmar socks  Pt educated on LE dressing techniques and need for AD with LE dressing      Patient left supine in bed with all lines intact and RN notified.    [facility] 6 Click: Self-care  [facility] Total Score: 16    Education:    Assessment:     Homar Jerry is a 42 y.o. female with a medical diagnosis of <principal problem not specified>.  She presents with decreased function of L LE impeding her ability to perform ADLs and functional mobility and would benefit from OT services to maximize functional (I) and safety.    Performance deficits affecting function are weakness, impaired endurance, impaired self care skills, impaired functional mobilty, gait instability, impaired balance, decreased lower extremity function, decreased safety awareness, pain, impaired skin, decreased ROM, edema, orthopedic precautions.    Rehab Prognosis:  Good    Plan:     Patient to be seen QD to address the above listed problems via self-care/home management, therapeutic activities, therapeutic exercises    Plan of Care Reviewed with: patient    This Plan of care has been discussed with the patient who was involved in its development and understands and is in agreement with  the identified goals and treatment plan    GOALS:    Occupational Therapy Goals        Problem: Occupational Therapy Goal    Goal Priority Disciplines Outcome Interventions   Occupational Therapy Goal     OT, PT/OT Ongoing (interventions implemented as appropriate)    Description:  Goals to be met by: 7 days    Patient will increase functional independence with ADLs by performing:    UE Dressing with Supervision.  LE Dressing with Supervision with AD as needed.  Grooming while standing with Supervision.  Toileting from bedside commode with Supervision for hygiene and clothing management.   Stand pivot transfers with Supervision.  Toilet transfer to bedside commode with Supervision.                         Time Tracking:     OT Received On: 3/28/2018  OT Start Time: 1615  OT Stop Time: 1635   OT Total Time: 20 minutes     Billable Minutes:  Evaluation 12 minutes  Self Care/Home Management 8 minutes    Haylee Hartley OT  3/28/2018

## 2018-03-28 NOTE — ANESTHESIA PROCEDURE NOTES
IPACK Single Injection Block    Patient location during procedure: pre-op   Block not for primary anesthetic.  Reason for block: at surgeon's request and post-op pain management   Post-op Pain Location: left knee pain  Start time: 3/28/2018 10:37 AM  Timeout: 3/28/2018 10:18 AM   End time: 3/28/2018 10:46 AM  Staffing  Anesthesiologist: YANE ZAMORA  Resident/CRNA: VALERIE ARGUETA  Other anesthesia staff: POONAM ARSHAD  Performed: other anesthesia staff   Preanesthetic Checklist  Completed: patient identified, site marked, surgical consent, pre-op evaluation, timeout performed, IV checked, risks and benefits discussed and monitors and equipment checked  Peripheral Block  Patient position: supine  Prep: ChloraPrep  Patient monitoring: heart rate, cardiac monitor, continuous pulse ox, continuous capnometry and frequent blood pressure checks  Block type: I PACK  Laterality: left  Injection technique: single shot  Needle  Needle type: Stimuplex   Needle gauge: 21 G  Needle length: 4 in  Needle localization: anatomical landmarks and ultrasound guidance   -ultrasound image captured on disc.  Assessment  Injection assessment: negative aspiration, negative parasthesia and local visualized surrounding nerve  Paresthesia pain: none  Heart rate change: no  Slow fractionated injection: yes  Medications:  Bolus administered: 20 mL of 0.25 ropivacaine  Epinephrine added: 3.75 mcg/mL (1/300,000)  Additional Notes  VSS.  [facility] RN monitoring vitals throughout procedure.  Patient tolerated procedure well.

## 2018-03-28 NOTE — PROGRESS NOTES
No family/friend's available to take patient belongings while in surgery- plan is for patient to be admitted post-op. Patient belongings including 1 large overnight black and red bag and a clear plastic belonging bag with clothing placed in [facility] locker #2 while patient in surgery. Will notify PACU charge nurse of location of belongings and to take with patient when transferred to room.

## 2018-03-28 NOTE — ANESTHESIA POSTPROCEDURE EVALUATION
"Anesthesia Post Evaluation    Patient: Homar Jerry    Procedure(s) Performed: Procedure(s) (LRB):  REPLACEMENT-KNEE-TOTAL (Left)    Final Anesthesia Type: spinal  Patient location during evaluation: PACU  Patient participation: Yes- Able to Participate  Level of consciousness: awake and alert  Post-procedure vital signs: reviewed and stable  Pain management: adequate  Airway patency: patent  PONV status at discharge: No PONV  Anesthetic complications: no      Cardiovascular status: blood pressure returned to baseline  Respiratory status: unassisted  Hydration status: euvolemic  Follow-up not needed.        Visit Vitals  /67 (BP Location: Left arm, Patient Position: Lying)   Pulse 85   Temp 36.6 °C (97.9 °F) (Axillary)   Resp 15   Ht 5' 5" (1.651 m)   Wt 107.5 kg (237 lb)   LMP  (LMP Unknown)   SpO2 (!) 94%   BMI 39.44 kg/m²       Pain/Shar Score: Pain Assessment Performed: Yes (3/28/2018  2:30 PM)  Presence of Pain: complains of pain/discomfort (3/28/2018  2:30 PM)  Pain Rating Prior to Med Admin: 10 (3/28/2018  2:35 PM)  Shar Score: 10 (3/28/2018  2:30 PM)      "

## 2018-03-28 NOTE — ANESTHESIA PROCEDURE NOTES
Adductor Canal Catheter    Patient location during procedure: pre-op   Block not for primary anesthetic.  Reason for block: at surgeon's request and post-op pain management   Post-op Pain Location: left knee pain  Start time: 3/28/2018 10:19 AM  Timeout: 3/28/2018 10:18 AM   End time: 3/28/2018 10:26 AM  Staffing  Anesthesiologist: YANE ZAMORA  Other anesthesia staff: POONAM ARSHAD  Performed: other anesthesia staff   Preanesthetic Checklist  Completed: patient identified, site marked, surgical consent, pre-op evaluation, timeout performed, IV checked, risks and benefits discussed and monitors and equipment checked  Peripheral Block  Patient position: supine  Prep: ChloraPrep and site prepped and draped  Patient monitoring: heart rate, cardiac monitor, continuous pulse ox, continuous capnometry and frequent blood pressure checks  Block type: adductor canal  Laterality: left  Injection technique: continuous  Needle  Needle type: Tuohy   Needle gauge: 17 G  Needle length: 3.5 in  Needle localization: anatomical landmarks and ultrasound guidance  Catheter type: spring wound  Catheter size: 19 G  Test dose: lidocaine 1.5% with Epi 1-to-200,000 and negative   -ultrasound image captured on disc.  Assessment  Injection assessment: negative aspiration, negative parasthesia and local visualized surrounding nerve  Paresthesia pain: none  Heart rate change: no  Slow fractionated injection: yes  Medications:  Bolus administered: 20 mL of 0.25 ropivacaine  Epinephrine added: 3.75 mcg/mL (1/300,000)  Additional Notes  VSS.  [facility] RN monitoring vitals throughout procedure.  Patient tolerated procedure well.

## 2018-03-28 NOTE — ANESTHESIA PROCEDURE NOTES
Spinal    Diagnosis: Left knee osteoarthritis  Patient location during procedure: OR  Start time: 3/28/2018 12:10 PM  Timeout: 3/28/2018 12:10 PM  End time: 3/28/2018 12:18 PM  Staffing  Anesthesiologist: DINORA COPELAND  Resident/CRNA: DEIDRA TORRES  Performed: anesthesiologist   Preanesthetic Checklist  Completed: patient identified, site marked, surgical consent, pre-op evaluation, timeout performed, IV checked, risks and benefits discussed and monitors and equipment checked  Spinal Block  Patient position: sitting  Prep: ChloraPrep  Patient monitoring: continuous pulse ox  Approach: midline  Location: L3-4  Injection technique: single shot  CSF Fluid: clear free-flowing CSF  Needle  Needle type: Eliseo   Needle gauge: 25 G  Needle length: 5 in  Additional Documentation: negative aspiration for heme and no paresthesia on injection  Needle localization: anatomical landmarks  Assessment  Sensory level: T6   Dermatomal levels determined by alcohol wipe  Ease of block: easy  Patient's tolerance of the procedure: comfortable throughout block  Medications:  Bolus administered: 2.2 mL of 2.0 mepivacaine

## 2018-03-29 LAB
POCT GLUCOSE: 118 MG/DL (ref 70–110)
POCT GLUCOSE: 129 MG/DL (ref 70–110)
POCT GLUCOSE: 140 MG/DL (ref 70–110)
POCT GLUCOSE: 189 MG/DL (ref 70–110)

## 2018-03-29 PROCEDURE — 63600175 PHARM REV CODE 636 W HCPCS: Performed by: ANESTHESIOLOGY

## 2018-03-29 PROCEDURE — 25000003 PHARM REV CODE 250: Performed by: STUDENT IN AN ORGANIZED HEALTH CARE EDUCATION/TRAINING PROGRAM

## 2018-03-29 PROCEDURE — 63600175 PHARM REV CODE 636 W HCPCS: Performed by: STUDENT IN AN ORGANIZED HEALTH CARE EDUCATION/TRAINING PROGRAM

## 2018-03-29 PROCEDURE — 99231 SBSQ HOSP IP/OBS SF/LOW 25: CPT | Mod: ,,, | Performed by: ANESTHESIOLOGY

## 2018-03-29 PROCEDURE — 97530 THERAPEUTIC ACTIVITIES: CPT

## 2018-03-29 PROCEDURE — 94761 N-INVAS EAR/PLS OXIMETRY MLT: CPT

## 2018-03-29 PROCEDURE — 94760 N-INVAS EAR/PLS OXIMETRY 1: CPT

## 2018-03-29 PROCEDURE — 12000002 HC ACUTE/MED SURGE SEMI-PRIVATE ROOM

## 2018-03-29 PROCEDURE — 97116 GAIT TRAINING THERAPY: CPT

## 2018-03-29 PROCEDURE — 97535 SELF CARE MNGMENT TRAINING: CPT

## 2018-03-29 PROCEDURE — 25000003 PHARM REV CODE 250: Performed by: ANESTHESIOLOGY

## 2018-03-29 PROCEDURE — 25000003 PHARM REV CODE 250: Performed by: PHYSICIAN ASSISTANT

## 2018-03-29 PROCEDURE — 63600175 PHARM REV CODE 636 W HCPCS: Performed by: PHYSICIAN ASSISTANT

## 2018-03-29 RX ORDER — HYDROMORPHONE HYDROCHLORIDE 1 MG/ML
1 INJECTION, SOLUTION INTRAMUSCULAR; INTRAVENOUS; SUBCUTANEOUS
Status: DISCONTINUED | OUTPATIENT
Start: 2018-03-29 | End: 2018-03-29

## 2018-03-29 RX ORDER — HYDROMORPHONE HYDROCHLORIDE 2 MG/ML
1 INJECTION, SOLUTION INTRAMUSCULAR; INTRAVENOUS; SUBCUTANEOUS
Status: DISCONTINUED | OUTPATIENT
Start: 2018-03-29 | End: 2018-03-31 | Stop reason: HOSPADM

## 2018-03-29 RX ORDER — TIZANIDINE 4 MG/1
12 TABLET ORAL EVERY 8 HOURS
Status: DISCONTINUED | OUTPATIENT
Start: 2018-03-29 | End: 2018-03-31 | Stop reason: HOSPADM

## 2018-03-29 RX ORDER — CELECOXIB 200 MG/1
200 CAPSULE ORAL DAILY
Status: DISCONTINUED | OUTPATIENT
Start: 2018-03-29 | End: 2018-03-31 | Stop reason: HOSPADM

## 2018-03-29 RX ADMIN — QUETIAPINE FUMARATE 400 MG: 100 TABLET ORAL at 08:03

## 2018-03-29 RX ADMIN — TIZANIDINE 12 MG: 4 TABLET ORAL at 09:03

## 2018-03-29 RX ADMIN — POLYETHYLENE GLYCOL 3350 17 G: 17 POWDER, FOR SOLUTION ORAL at 08:03

## 2018-03-29 RX ADMIN — INSULIN ASPART 8 UNITS: 100 INJECTION, SOLUTION INTRAVENOUS; SUBCUTANEOUS at 08:03

## 2018-03-29 RX ADMIN — INSULIN ASPART 1 UNITS: 100 INJECTION, SOLUTION INTRAVENOUS; SUBCUTANEOUS at 08:03

## 2018-03-29 RX ADMIN — LEVOTHYROXINE SODIUM 150 MCG: 150 TABLET ORAL at 08:03

## 2018-03-29 RX ADMIN — CEFAZOLIN 2 G: 330 INJECTION, POWDER, FOR SOLUTION INTRAMUSCULAR; INTRAVENOUS at 07:03

## 2018-03-29 RX ADMIN — GABAPENTIN 600 MG: 300 CAPSULE ORAL at 04:03

## 2018-03-29 RX ADMIN — OXYCODONE HYDROCHLORIDE 15 MG: 5 TABLET ORAL at 07:03

## 2018-03-29 RX ADMIN — MIRTAZAPINE 45 MG: 15 TABLET, FILM COATED ORAL at 08:03

## 2018-03-29 RX ADMIN — OXYCODONE HYDROCHLORIDE 15 MG: 5 TABLET ORAL at 03:03

## 2018-03-29 RX ADMIN — TRAZODONE HYDROCHLORIDE 50 MG: 50 TABLET ORAL at 09:03

## 2018-03-29 RX ADMIN — HYDROMORPHONE HYDROCHLORIDE 1 MG: 2 INJECTION INTRAMUSCULAR; INTRAVENOUS; SUBCUTANEOUS at 06:03

## 2018-03-29 RX ADMIN — BUSPIRONE HYDROCHLORIDE 10 MG: 10 TABLET ORAL at 08:03

## 2018-03-29 RX ADMIN — CARVEDILOL 37.5 MG: 25 TABLET, FILM COATED ORAL at 08:03

## 2018-03-29 RX ADMIN — STANDARDIZED SENNA CONCENTRATE AND DOCUSATE SODIUM 1 TABLET: 8.6; 5 TABLET, FILM COATED ORAL at 08:03

## 2018-03-29 RX ADMIN — ROPIVACAINE HYDROCHLORIDE 8 ML/HR: 2 INJECTION, SOLUTION EPIDURAL; INFILTRATION at 01:03

## 2018-03-29 RX ADMIN — ACETAMINOPHEN 1000 MG: 500 TABLET ORAL at 06:03

## 2018-03-29 RX ADMIN — GABAPENTIN 600 MG: 300 CAPSULE ORAL at 08:03

## 2018-03-29 RX ADMIN — VANCOMYCIN HYDROCHLORIDE 1500 MG: 1 INJECTION, POWDER, LYOPHILIZED, FOR SOLUTION INTRAVENOUS at 01:03

## 2018-03-29 RX ADMIN — OXYCODONE HYDROCHLORIDE 15 MG: 5 TABLET ORAL at 04:03

## 2018-03-29 RX ADMIN — ALPRAZOLAM 1 MG: 1 TABLET ORAL at 09:03

## 2018-03-29 RX ADMIN — VALSARTAN 80 MG: 40 TABLET ORAL at 08:03

## 2018-03-29 RX ADMIN — OXYCODONE HYDROCHLORIDE 15 MG: 5 TABLET ORAL at 10:03

## 2018-03-29 RX ADMIN — CELECOXIB 200 MG: 200 CAPSULE ORAL at 08:03

## 2018-03-29 RX ADMIN — ASPIRIN 325 MG: 325 TABLET, DELAYED RELEASE ORAL at 08:03

## 2018-03-29 RX ADMIN — FAMOTIDINE 20 MG: 20 TABLET, FILM COATED ORAL at 08:03

## 2018-03-29 RX ADMIN — OXYCODONE HYDROCHLORIDE 15 MG: 5 TABLET ORAL at 01:03

## 2018-03-29 RX ADMIN — HYDROMORPHONE HYDROCHLORIDE 1 MG: 2 INJECTION INTRAMUSCULAR; INTRAVENOUS; SUBCUTANEOUS at 03:03

## 2018-03-29 RX ADMIN — INSULIN ASPART 8 UNITS: 100 INJECTION, SOLUTION INTRAVENOUS; SUBCUTANEOUS at 01:03

## 2018-03-29 RX ADMIN — TIZANIDINE 12 MG: 4 TABLET ORAL at 11:03

## 2018-03-29 RX ADMIN — ACETAMINOPHEN 1000 MG: 500 TABLET ORAL at 11:03

## 2018-03-29 RX ADMIN — ROPIVACAINE HYDROCHLORIDE: 2 INJECTION, SOLUTION EPIDURAL; INFILTRATION at 01:03

## 2018-03-29 RX ADMIN — INSULIN ASPART 8 UNITS: 100 INJECTION, SOLUTION INTRAVENOUS; SUBCUTANEOUS at 06:03

## 2018-03-29 RX ADMIN — ALPRAZOLAM 1 MG: 1 TABLET ORAL at 08:03

## 2018-03-29 NOTE — PROGRESS NOTES
Perioperative Surgical Home Progress Note      Surgery:  Procedure(s) (LRB):  REPLACEMENT-KNEE-TOTAL (Left)    Post Op Day #: 1     HPI  Homar Jerry is a 42 y.o. female w/ a significant PMHx of CARMEN, T1BD, fibromyalgia, RA, HTN, T2DM (insulin dependent), morbid obesity, asthma, and primary OA of left knee not amenable to conservative medical management.    Interval Hx: Patient endorsing significant amount of pain overnight, 10/10. Patient endorsing constant 10/10 pain with inability to sleep. Upon assessment this AM patient resting comfortably in bed on cell phone. Bolus dose to PNC given.    Catheter type: Perineural Adductor Canal    Infusion type: Ropivacaine 0.2% 8 ml/hr basal    Problem List:  Active Hospital Problems    Diagnosis  POA    *Primary osteoarthritis of left knee [M17.12]  Yes      Resolved Hospital Problems    Diagnosis Date Resolved POA   No resolved problems to display.       Subjective:  General appearance of alert, oriented, no complaints  Pain with rest: 10    Numbers  Pain with movement: 10    Numbers  Side Effects:   1. Pruritis: No   2. Nausea: No   3. Motor Blockade: No, 0=Ability to raise lower extremities off bed   4. Sedation: No, 1=awake and alert    Schedule Medications:    acetaminophen  1,000 mg Oral Q6H    aspirin  325 mg Oral BID    busPIRone  10 mg Oral BID    carvedilol  37.5 mg Oral BID    celecoxib  200 mg Oral Daily    famotidine  20 mg Oral BID    fluticasone  2 spray Each Nare Daily    gabapentin  600 mg Oral TID    insulin aspart U-100  8 Units Subcutaneous TIDWM    levothyroxine  150 mcg Oral Daily    mirtazapine  45 mg Oral QHS    polyethylene glycol  17 g Oral Daily    QUEtiapine  400 mg Oral QHS    senna-docusate 8.6-50 mg  1 tablet Oral BID    tiZANidine  12 mg Oral QHS    valsartan  80 mg Oral Daily        Continuous Infusions:   sodium chloride 0.9% 150 mL/hr at 03/28/18 1439    ropivacaine (PF) 2 mg/ml (0.2%) 8 mL/hr (03/29/18 0100)     ropivacaine          PRN Medications:  ALPRAZolam, bisacodyl, dextrose 50%, dextrose 50%, glucagon (human recombinant), glucose, glucose, HYDROmorphone, insulin aspart U-100, meclizine, naloxone, ondansetron, oxyCODONE, oxyCODONE, oxyCODONE, promethazine, ramelteon, ropivacaine, sodium chloride 0.9%, traZODone       Antibiotics:  Antibiotics     None           Objective:    Catheter site: clean, dry, intact    Vital Signs (Most Recent):  Temp: 36.3 °C (97.3 °F) (03/29/18 0740)  Pulse: 96 (03/29/18 0740)  Resp: 16 (03/29/18 0740)  BP: 111/65 (03/29/18 0740)  SpO2: 97 % (03/29/18 0740) Vital Signs Range (Last 24H):  Temp:  [35.6 °C (96 °F)-36.7 °C (98 °F)]   Pulse:  []   Resp:  [15-27]   BP: ()/()   SpO2:  [92 %-100 %]      I & O (Last 24H):  Intake/Output Summary (Last 24 hours) at 03/29/18 0827  Last data filed at 03/29/18 0455   Gross per 24 hour   Intake          7229.18 ml   Output                0 ml   Net          7229.18 ml       Physical Exam:  GA: Alert, comfortable, no acute distress.   Pulmonary: Clear to auscultation A/P/L. No wheezing, crackles, or rhonchi.  Cardiac: RRR S1 & S2 w/o rubs/murmurs/gallops.   Abdominal:Bowel sounds present. No tenderness to palpation or distension. No appreciable hepatosplenomegaly.   Skin: No jaundice, rashes, or visible lesions.    Laboratory:  CBC: No results for input(s): WBC, RBC, HGB, HCT, PLT, MCV, MCH, MCHC in the last 72 hours.    BMP: Recent Labs      03/28/18   1436   NA  138   K  4.5   CO2  13*   CL  100   BUN  7   CREATININE  0.4*   GLU  54*   CALCIUM  3.5*       No results for input(s): PT, INR, PROTIME, APTT in the last 72 hours.    Anticoagulation:  mg PO BID    Assessment:    Pain control: adequate    Plan:  - Pain: Adductor canal perineural catheter in place and infusing. Good level. Multimodal pain regimen with acetaminophen, Celebrex, Lyrica, and PRN oxycodone given. Will continue to monitor. Plan to D/C perineural catheter in AM  or home with On-Q if patient discharged today.  - HTN: continue home valsartan  - T1BD/CARMEN/fibromyalgia: continue home meds  - Insulin dependent T2DM: Moderate dose SSI, 8 units Aspart TIDWM   - FEN/GI: Tolerating liquids, advance diet as tolerated. + flatus. No BM.    Dispo: Pt working well with PT/OT. Case management and SW for placement. Plan for D/C tomorrow morning or possibly today if patient works well with PT and meets goals.        Naveen Scott MD  CA-1  [phone]    Pt seen and examined with Dr. Scott and Megan Wyatt.  Dr. Scott's note reviewed.  Agree with assessment and plan.    Alex Lindsey M.D.  Staff Anesthesiologist

## 2018-03-29 NOTE — ADDENDUM NOTE
Addendum  created 03/29/18 4435 by Megan Sol RN    Order list changed, Sign clinical note, Visit Navigator SmartForm Flowsheet section accepted

## 2018-03-29 NOTE — PROGRESS NOTES
Pt converted to right adductor OnQ PNC.  Dressing CDI.  Educated regarding signs/symptoms of toxicity as well as pain management.  Understanding verbalized.  OnQ to be discontinued 3/31 @ 1330 and monitored by OSNF.

## 2018-03-29 NOTE — PT/OT/SLP PROGRESS
Occupational Therapy   Treatment    Name: Homar Jerry  MRN: [id number]  Admitting Diagnosis:  Primary osteoarthritis of left knee  1 Day Post-Op    Recommendations:     Discharge Recommendations: home health OT  Discharge Equipment Recommendations:  none  Barriers to discharge:  None    Subjective     Communicated with: RN prior to session.  Pain/Comfort:  · Pain Rating 1: 9/10    Patients cultural, spiritual, Synagogue conflicts given the current situation: None    Objective:     Patient found with: perineural catheter, FCD, cryotherapy    General Precautions: Standard, fall   Orthopedic Precautions:LLE weight bearing as tolerated   Braces: N/A     Occupational Performance:    Bed Mobility:    · Patient completed Supine to Sit with stand by assistance     Functional Mobility/Transfers:  · Patient completed Sit <> Stand Transfer with stand by assistance  with  rolling walker   · Patient completed Bed <> Chair Transfer using Stand Pivot technique with stand by assistance with rolling walker  · Patient completed Toilet Transfer Stand Pivot technique with stand by assistance with  rolling walker  · Functional Mobility: Pt was able to walk to bathroom c SBA and RW.    Activities of Daily Living:  · UB Dressing: modified independence to don shirt.  · LB Dressing: modified independence to don underwear, pants, socks, and shoes.  · Toileting: modified independence for toilet hygiene.    Patient left up in chair with all lines intact, call button in reach and RN notified    [facility] 6 Click:  AMPA Total Score: 23    Treatment & Education:  Educated pt on bathing an car T/F's.  Education:    Assessment:     Homar Jerry is a 42 y.o. female with a medical diagnosis of Primary osteoarthritis of left knee.  She was able to perform supine/sit, sit/stand, and bed/chair T/F c SBA and RW.  Able to perform UB/LB dressing c mod I and toilet hygiene c mod I.  Pt is progressing well.  Performance deficits affecting  function are impaired self care skills, impaired functional mobilty, orthopedic precautions.      Rehab Prognosis:  Good; patient would benefit from acute skilled OT services to address these deficits and reach maximum level of function.       Plan:     Patient to be seen daily to address the above listed problems via self-care/home management, therapeutic activities, therapeutic exercises  · Plan of Care Expires:    · Plan of Care Reviewed with: patient    This Plan of care has been discussed with the patient who was involved in its development and understands and is in agreement with the identified goals and treatment plan    GOALS:    Occupational Therapy Goals        Problem: Occupational Therapy Goal    Goal Priority Disciplines Outcome Interventions   Occupational Therapy Goal     OT, PT/OT Ongoing (interventions implemented as appropriate)    Description:  Goals to be met by: 7 days    Patient will increase functional independence with ADLs by performing:    UE Dressing with Supervision.  LE Dressing with Supervision with AD as needed.  Grooming while standing with Supervision.  Toileting from bedside commode with Supervision for hygiene and clothing management.   Stand pivot transfers with Supervision.  Toilet transfer to bedside commode with Supervision.                         Time Tracking:     OT Date of Treatment: 03/29/18  OT Start Time: 1000  OT Stop Time: 1030  OT Total Time (min): 30 min    Billable Minutes:Self Care/Home Management SUZIE Lucio  3/29/2018

## 2018-03-29 NOTE — PLAN OF CARE
[facility]    HOME HEALTH ORDERS  FACE TO FACE ENCOUNTER    Patient Name: Homar Jerry  YOB: 1975    PCP: Mary Cabrera MD   PCP Address: [address] / [place] [address]  PCP Phone Number: [phone]  PCP Fax: [phone]    Encounter Date: 03/28/2018    Admit to Home Health    Diagnoses:  Active Hospital Problems    Diagnosis  POA    Primary osteoarthritis of left knee [M17.12]  Yes      Resolved Hospital Problems    Diagnosis Date Resolved POA   No resolved problems to display.       Future Appointments  Date Time Provider Department Center   4/10/2018 1:00 PM Yadira Quinones PA-C [facility]     Follow-up Information     Yadira Quinones PA-C. Go on 4/10/2018.    Specialty:  Orthopedic Surgery  Why:  For wound re-check  Contact information:  [address]  [facility] [address]  [phone]                     I have seen and examined this patient face to face today. My clinical findings that support the need for the home health skilled services and home bound status are the following:  Weakness/numbness causing balance and gait disturbance due to Joint Replacement and Surgery making it taxing to leave home.  Requiring assistive device to leave home due to unsteady gait caused by Joint Replacement and Surgery.    Allergies:  Review of patient's allergies indicates:   Allergen Reactions    Dexamethasone Hives and Shortness Of Breath     Gastroparesis flare    Doxepin hcl Hives, Diarrhea and Itching    Compazine [prochlorperazine] Hives and Itching    Morphine Rash    Prednisone      Gastroparesis flare up    Ciprofloxacin      Counteracts with seroquel, xanax, tizanidine    Lyrica [pregabalin]      depression       Diet: diabetic diet: 2000 calorie    Activities: activity as tolerated    Nursing:   SN to complete comprehensive assessment including routine vital signs. Instruct on disease process and s/s of complications to  report to MD. Follow specific home health arthoplasty protocol. Review/verify medication list sent home with the patient at time of discharge  and instruct patient/caregiver as needed.    Notify MD if SBP > 160 or < 90; DBP > 90 or < 50; HR > 120 or < 50; Temp > 101    Home Medical Equipment:  Walker, 3-1 bedside commode, transfer tub bench    CONSULTS:    Physical Therapy to evaluate and treat. Evaluate for home safety and equipment needs; Establish/upgrade home exercise program. Perform / instruct on therapeutic exercises, gait training, transfer training, and Range of Motion.    WOUND CARE ORDERS  Do not remove surgical dressing for 2 weeks post-op. This will be done only by MD at initial post-op visit. If dressing is completely saturated, replace with identical dressing - silver-impregnated hydrocolloid dressing.     Do not get dressings wet. Do not shower.     If dressing continues to be saturated or there are signs of infection, please call [facility] [phone] for further instructions and to make appt to be seen.       Medications: Review discharge medications with patient and family and provide education.      Current Discharge Medication List      START taking these medications    Details   aspirin 325 MG tablet Take 1 tablet (325 mg total) by mouth 2 (two) times daily.  Qty: 60 tablet, Refills: 0      !! docusate sodium (COLACE) 100 MG capsule Take 1 capsule (100 mg total) by mouth 2 (two) times daily as needed for Constipation.  Qty: 60 capsule, Refills: 0      ondansetron (ZOFRAN-ODT) 8 MG TbDL Take 1 tablet (8 mg total) by mouth every 12 (twelve) hours as needed (nausea).  Qty: 20 tablet, Refills: 0      !! oxyCODONE-acetaminophen (PERCOCET)  mg per tablet Take 1 tablet by mouth every 4 to 6 hours as needed for Pain.  Qty: 90 tablet, Refills: 0       !! - Potential duplicate medications found. Please discuss with provider.      CONTINUE these medications which have NOT CHANGED    Details    alprazolam (XANAX) 1 MG tablet Take 1 tablet (1 mg total) by mouth every evening.  Qty: 30 tablet, Refills: 5    Associated Diagnoses: Panic disorder without agoraphobia      busPIRone (BUSPAR) 10 MG tablet 10 mg 2 (two) times daily.       carvedilol (COREG) 12.5 MG tablet Take 3 tablets (37.5 mg total) by mouth 2 (two) times daily.  Qty: 120 tablet, Refills: 11    Associated Diagnoses: Essential hypertension; Sinus tachycardia      fluticasone (FLONASE) 50 mcg/actuation nasal spray 2 sprays by Nasal route 2 (two) times daily.       gabapentin (NEURONTIN) 600 MG tablet Take 600 mg by mouth 3 (three) times daily.       hydrochlorothiazide (HYDRODIURIL) 25 MG tablet Take 1 tablet (25 mg total) by mouth once daily.  Qty: 30 tablet, Refills: 11    Associated Diagnoses: Essential hypertension      hydrOXYzine pamoate (VISTARIL) 50 MG Cap Take 50 mg by mouth 4 (four) times daily.      insulin aspart (NOVOLOG) 100 unit/mL InPn pen Inject 16 Units into the skin 3 (three) times daily with meals.  Qty: 1 Box, Refills: 2      insulin detemir (LEVEMIR FLEXTOUCH) 100 unit/mL (3 mL) SubQ InPn pen Inject 30 Units into the skin 2 (two) times daily.  Qty: 1 Box, Refills: 1      levocetirizine (XYZAL) 5 MG tablet Take 1 tablet (5 mg total) by mouth every evening.  Qty: 90 tablet, Refills: 3    Associated Diagnoses: Allergic rhinitis, unspecified allergic rhinitis type      levothyroxine (SYNTHROID) 150 MCG tablet Take 1 tablet (150 mcg total) by mouth once daily.  Qty: 30 tablet, Refills: 3      mirtazapine (REMERON) 45 MG tablet Take 45 mg by mouth every evening.      !! oxycodone-acetaminophen (PERCOCET)  mg per tablet Take 1 tablet by mouth every 4 (four) hours as needed for Pain. Per Dr. Villa [facility]      promethazine (PHENERGAN) 25 MG tablet TAKE 1 TABLET (25 MG TOTAL) BY MOUTH EVERY 6 HOURS AS NEEDED.  Qty: 60 tablet, Refills: 0    Associated Diagnoses: Chronic nausea      QUEtiapine  "(SEROQUEL) 400 MG tablet 800 mg every evening. Pt states takes two tablets nightly, total of 800 mg      tizanidine (ZANAFLEX) 4 MG tablet TAKE 3 TABLETS (12 MG TOTAL) BY MOUTH EVERY EVENING.  Qty: 90 tablet, Refills: 2      traZODone (DESYREL) 50 MG tablet Take 50 mg by mouth every evening.      valsartan-hydrochlorothiazide (DIOVAN-HCT) 80-12.5 mg per tablet Take 1 tablet by mouth once daily.  Qty: 90 tablet, Refills: 3    Associated Diagnoses: Essential hypertension      VENTOLIN HFA 90 mcg/actuation inhaler as needed.       zolpidem (AMBIEN) 10 mg Tab 10 mg every evening.       blood sugar diagnostic Strp 1 each by Misc.(Non-Drug; Combo Route) route 4 (four) times daily with meals and nightly.  Qty: 100 each, Refills: 2      blood-glucose meter kit Use as instructed  Qty: 1 each, Refills: 0      calcium carbonate (OS-MARIAN) 500 mg calcium (1,250 mg) tablet Take 2 tablets (1,000 mg total) by mouth once daily.  Refills: 0      cholecalciferol, vitamin D3, 1,000 unit capsule Take 2 capsules (2,000 Units total) by mouth once daily.  Refills: 0      !! docusate sodium (COLACE) 50 MG capsule Take 1 capsule (50 mg total) by mouth 2 (two) times daily.  Refills: 0      lancets Misc 1 each by Misc.(Non-Drug; Combo Route) route 4 (four) times daily with meals and nightly.  Qty: 100 each, Refills: 2      lidocaine (LIDODERM) 5 % Place 1 patch onto the skin daily as needed. Remove & Discard patch within 12 hours or as directed by MD  Qty: 30 patch, Refills: 0      meclizine (ANTIVERT) 25 mg tablet Take 1 tablet (25 mg total) by mouth 3 (three) times daily as needed for Dizziness or Nausea.  Qty: 90 tablet, Refills: 0    Associated Diagnoses: Chronic nausea      pen needle, diabetic 31 gauge x 5/16" Ndle 1 each by Misc.(Non-Drug; Combo Route) route 5 (five) times daily.  Qty: 200 each, Refills: 2      POTASSIUM ORAL Take by mouth. Over the counter   Once daily      topiramate (TOPAMAX) 25 MG tablet Take 1 tablet (25 mg total) " by mouth daily as needed (migraines).  Qty: 90 tablet, Refills: 3    Associated Diagnoses: Chronic migraine without aura without status migrainosus, not intractable       !! - Potential duplicate medications found. Please discuss with provider.          I certify that this patient is confined to her home and needs intermittent skilled nursing care and physical therapy.

## 2018-03-29 NOTE — OP NOTE
DATE OF PROCEDURE:  03/28/2018    SURGEON:  John Lockwood [facility] Jr., M.D.    ASSISTANT:  JOSE ROBERTO Roman    OPERATION PERFORMED:  Left total knee arthroplasty.    PREOPERATIVE DIAGNOSIS:  Osteoarthritis, left knee.    POSTOPERATIVE DIAGNOSIS:  Osteoarthritis, left knee.    COMPONENTS USED:  Braeden Persona knee system.  We used a size 6 femur narrow   left posterior stabilized, a size D left tibia 5-degree stem, a 10 mm articular   insert, posterior stabilized, vitamin E, highly cross-linked poly and a 29 mm   patella.    OPERATIVE TECHNIQUE:  The patient was placed supine on the operating table.    Spinal anesthesia had been introduced.  The left leg was prepped and draped in   sterile fashion.  The room was laminar airflow.  The patient was given   preoperative antibiotics and the OR team wore the sterile exhaust suits in order   to minimize risk for infection.  A foot pump was placed on the right foot to   help prevent DVT.  Left leg was exsanguinated and the tourniquet was inflated to   300 pounds of pressure.  A straight anterior incision was made.  Sharp   dissection was carried down to the extensor mechanism.  The extensor mechanism   was opened in a straight Insall approach.  Partial release of the medial   collateral ligament was performed to correct some varus deformity.  The   intramedullary guide was placed in the femur.  Distal cut was made at 5 degrees   of valgus on a +2 setting.  The proximal tibial cut was performed removing about   3 to 4 from the medial side and about 6 to 7 from the lateral side.  The femur   was measured for a 6 and we cut it posteriorly for a 6.  The flexion gap, I was   afraid I was going to notch it, so I took an extra two on that and so the   flexion gap came out a little bigger than the extension gap, so I took another   two off the extension gap.  That balanced things nicely.  At that point, I went   ahead and did the notch and chamfer-plasty on the femur, sized the  tibia to a D   and drilled and prepared it with the same rotation as the femur.  I put the   trial components in, cut the patella, the patella was 24, I cut it at 17.  I   then Pulsavac'd and dried the surfaces, cemented the tibial baseplate, then the   femoral component, removed the excess cement, put the 10 mm trial in, put the   knee out in extension and cemented the patella.  While the cement was hardening,   I bathed the knee in the Betadine solution and then Pulsavac'd cleared.  I put   the actual 10 insert in.  We closed the extensor mechanism with 1 Vicryl over   tranexamic acid.  Closed the subcutaneous tissues with 0 and 3-0 Vicryl and the   skin with running 3-0 Monocryl and skin adhesive.  There were no residents   available.  JOSE ROBERTO Roman, scrubbed on this case and was necessary.    ESTIMATED BLOOD LOSS:  100 mL.    SPECIMEN:  Resected bone and tissue sent to Pathology for routine examination.      JOSÉ  dd: 03/28/2018 14:34:21 (CDT)  td: 03/28/2018 22:30:53 (CDT)  Doc ID   #[id number]  Job ID #[id number]    CC:

## 2018-03-29 NOTE — PROGRESS NOTES
[facility]  Orthopedics  Progress Note    Patient Name: Homar Jerry  MRN: [id number]  Admission Date: 3/28/2018  Hospital Length of Stay: 1 days  Attending Provider: John L. [facility] Jr., MD  Primary Care Provider: Mary Cabrera MD  Follow-up For: Procedure(s) (LRB):  REPLACEMENT-KNEE-TOTAL (Left)    Post-Operative Day: 1 Day Post-Op  Subjective:     Principal Problem:<principal problem not specified>    Principal Orthopedic Problem: s/p L TKA    Interval History: Patient seen and examined at bedside.  No acute events overnight.  Pain overnight, now controlled.     Review of patient's allergies indicates:   Allergen Reactions    Dexamethasone Hives and Shortness Of Breath     Gastroparesis flare    Doxepin hcl Hives, Diarrhea and Itching    Compazine [prochlorperazine] Hives and Itching    Morphine Rash    Prednisone      Gastroparesis flare up    Ciprofloxacin      Counteracts with seroquel, xanax, tizanidine    Lyrica [pregabalin]      depression       Current Facility-Administered Medications   Medication    0.9%  NaCl infusion    acetaminophen tablet 1,000 mg    ALPRAZolam tablet 1 mg    aspirin EC tablet 325 mg    bisacodyl suppository 10 mg    busPIRone tablet 10 mg    carvedilol tablet 37.5 mg    ceFAZolin injection 2 g    dextrose 50% injection 12.5 g    dextrose 50% injection 25 g    famotidine tablet 20 mg    fluticasone 50 mcg/actuation nasal spray 100 mcg    gabapentin capsule 600 mg    glucagon (human recombinant) injection 1 mg    glucose chewable tablet 16 g    glucose chewable tablet 24 g    hydromorphone (PF) injection 1 mg    HYDROmorphone injection 1 mg    insulin aspart U-100 pen 1-10 Units    insulin aspart U-100 pen 8 Units    levothyroxine tablet 150 mcg    meclizine tablet 25 mg    mirtazapine tablet 45 mg    naloxone 0.4 mg/mL injection 0.02 mg    ondansetron disintegrating tablet 8 mg    oxyCODONE immediate release tablet 10  "mg    oxyCODONE immediate release tablet 15 mg    oxyCODONE immediate release tablet 20 mg    polyethylene glycol packet 17 g    promethazine tablet 25 mg    QUEtiapine tablet 400 mg    ramelteon tablet 8 mg    ropivacaine (PF) 2 mg/ml (0.2%) infusion    senna-docusate 8.6-50 mg per tablet 1 tablet    sodium chloride 0.9% flush 3 mL    tiZANidine tablet 12 mg    traZODone tablet 50 mg    valsartan tablet 80 mg    vancomycin (VANCOCIN) 1,500 mg in sodium chloride 0.9% 250 mL IVPB     Objective:     Vital Signs (Most Recent):  Temp: 97.8 °F (36.6 °C) (03/28/18 2314)  Pulse: 86 (03/28/18 2314)  Resp: 18 (03/28/18 2314)  BP: (!) 98/51 (03/28/18 2314)  SpO2: 99 % (03/28/18 2314) Vital Signs (24h Range):  Temp:  [96 °F (35.6 °C)-97.9 °F (36.6 °C)] 97.8 °F (36.6 °C)  Pulse:  [] 86  Resp:  [15-27] 18  SpO2:  [92 %-100 %] 99 %  BP: ()/() 98/51     Weight: 107.5 kg (237 lb)  Height: 5' 5" (165.1 cm)  Body mass index is 39.44 kg/m².      Intake/Output Summary (Last 24 hours) at 03/29/18 0059  Last data filed at 03/28/18 1439   Gross per 24 hour   Intake             2700 ml   Output                0 ml   Net             2700 ml       Ortho/SPM Exam   AAOx4  NAD  RRR  No increased WOB  Aquacel c/d/i  Polar ice in place  SILT and motor intact T/SP/DP  WWP extremities  FCDs in place and functioning      Significant Labs:   BMP:   Recent Labs  Lab 03/28/18  1436   GLU 54*      K 4.5      CO2 13*   BUN 7   CREATININE 0.4*   CALCIUM 3.5*     CBC: No results for input(s): WBC, HGB, HCT, PLT in the last 48 hours.  All pertinent labs within the past 24 hours have been reviewed.    Significant Imaging: I have reviewed and interpreted all pertinent imaging results/findings.    Assessment/Plan:     * Primary osteoarthritis of left knee    42 y.o. female POD1 s/p L TKA    Pain control: multimodal  PT/OT: WBAT LLE  DVT PPx:  BID, FCDs at all times when not ambulating  Abx: postop " Ancef    Dispo: f/u PT recs. Patient desires SNF placement.                Michael J. Casale, MD  Orthopedics  [facility]

## 2018-03-29 NOTE — PLAN OF CARE
2:50 PM  KIRK received notification that pt insurance is not in network with [facility]. KIRK and RAFI met with pt at bedside who stated she does not want to go to a nursing home. Called [facility] who stated they are in network but it will probably not be authorized until Monday as the insurance companies are closed tomorrow. Faxed referral to [facility].     Arpita Pickard LMSW   [facility]  Ext [phone]

## 2018-03-29 NOTE — PROGRESS NOTES
Pt had witnessed disconnect left adductor PNC at alligator clamp.  Dressing remains CDI.  With sterile gloves, cleaned and clipped catheter, replaced alligator clamp and re-dressed site.  Delivered 8ml bolus, as pt was c/o 9/10 anterior knee pain.  Pt tolerated well. Will follow up on rounds.

## 2018-03-29 NOTE — SUBJECTIVE & OBJECTIVE
Principal Problem:<principal problem not specified>    Principal Orthopedic Problem: s/p L TKA    Interval History: Patient seen and examined at bedside.  No acute events overnight.  Pain overnight, now controlled.     Review of patient's allergies indicates:   Allergen Reactions    Dexamethasone Hives and Shortness Of Breath     Gastroparesis flare    Doxepin hcl Hives, Diarrhea and Itching    Compazine [prochlorperazine] Hives and Itching    Morphine Rash    Prednisone      Gastroparesis flare up    Ciprofloxacin      Counteracts with seroquel, xanax, tizanidine    Lyrica [pregabalin]      depression       Current Facility-Administered Medications   Medication    0.9%  NaCl infusion    acetaminophen tablet 1,000 mg    ALPRAZolam tablet 1 mg    aspirin EC tablet 325 mg    bisacodyl suppository 10 mg    busPIRone tablet 10 mg    carvedilol tablet 37.5 mg    ceFAZolin injection 2 g    dextrose 50% injection 12.5 g    dextrose 50% injection 25 g    famotidine tablet 20 mg    fluticasone 50 mcg/actuation nasal spray 100 mcg    gabapentin capsule 600 mg    glucagon (human recombinant) injection 1 mg    glucose chewable tablet 16 g    glucose chewable tablet 24 g    hydromorphone (PF) injection 1 mg    HYDROmorphone injection 1 mg    insulin aspart U-100 pen 1-10 Units    insulin aspart U-100 pen 8 Units    levothyroxine tablet 150 mcg    meclizine tablet 25 mg    mirtazapine tablet 45 mg    naloxone 0.4 mg/mL injection 0.02 mg    ondansetron disintegrating tablet 8 mg    oxyCODONE immediate release tablet 10 mg    oxyCODONE immediate release tablet 15 mg    oxyCODONE immediate release tablet 20 mg    polyethylene glycol packet 17 g    promethazine tablet 25 mg    QUEtiapine tablet 400 mg    ramelteon tablet 8 mg    ropivacaine (PF) 2 mg/ml (0.2%) infusion    senna-docusate 8.6-50 mg per tablet 1 tablet    sodium chloride 0.9% flush 3 mL    tiZANidine tablet 12 mg    traZODone  "tablet 50 mg    valsartan tablet 80 mg    vancomycin (VANCOCIN) 1,500 mg in sodium chloride 0.9% 250 mL IVPB     Objective:     Vital Signs (Most Recent):  Temp: 97.8 °F (36.6 °C) (03/28/18 2314)  Pulse: 86 (03/28/18 2314)  Resp: 18 (03/28/18 2314)  BP: (!) 98/51 (03/28/18 2314)  SpO2: 99 % (03/28/18 2314) Vital Signs (24h Range):  Temp:  [96 °F (35.6 °C)-97.9 °F (36.6 °C)] 97.8 °F (36.6 °C)  Pulse:  [] 86  Resp:  [15-27] 18  SpO2:  [92 %-100 %] 99 %  BP: ()/() 98/51     Weight: 107.5 kg (237 lb)  Height: 5' 5" (165.1 cm)  Body mass index is 39.44 kg/m².      Intake/Output Summary (Last 24 hours) at 03/29/18 0059  Last data filed at 03/28/18 1439   Gross per 24 hour   Intake             2700 ml   Output                0 ml   Net             2700 ml       Ortho/SPM Exam   AAOx4  NAD  RRR  No increased WOB  Aquacel c/d/i  Polar ice in place  SILT and motor intact T/SP/DP  WWP extremities  FCDs in place and functioning      Significant Labs:   BMP:   Recent Labs  Lab 03/28/18  1436   GLU 54*      K 4.5      CO2 13*   BUN 7   CREATININE 0.4*   CALCIUM 3.5*     CBC: No results for input(s): WBC, HGB, HCT, PLT in the last 48 hours.  All pertinent labs within the past 24 hours have been reviewed.    Significant Imaging: I have reviewed and interpreted all pertinent imaging results/findings.  "

## 2018-03-29 NOTE — PT/OT/SLP PROGRESS
Physical Therapy Treatment    Patient Name:  Homar Jerry   MRN:  [id number]    Recommendations:     Discharge Recommendations:  SNF  Discharge Equipment Recommendations: none   Barriers to discharge: None    Assessment:     Homar Jerry is a 42 y.o. female admitted with a medical diagnosis of Primary osteoarthritis of left knee.  She presents with the below impairments/functional limitations.  Pt tolerated treatment fairly.  Pt reports 9-10/10 pain at all times and appears lethargic from pain medicine.  Pt only agreeable to ambulation today and declined exercises per knee protocol 2/2 pain and fatigue.  Pt will continue to benefit from skilled PT services to address the below deficits and to increase functional independence.      Rehab Prognosis:  good; patient would benefit from acute skilled PT services to address these deficits and reach maximum level of function.      Rehab Identified impairments/functional limitations:   weakness, impaired functional mobilty, impaired endurance, gait instability, impaired balance, decreased lower extremity function, decreased ROM, orthopedic precautions, edema, impaired skin, pain, decreased safety awareness    Recent Surgery: Procedure(s) (LRB):  REPLACEMENT-KNEE-TOTAL (Left) 1 Day Post-Op    Plan:     During this hospitalization, patient to be seen daily to address the above listed problems via gait training, therapeutic activities, therapeutic exercises, neuromuscular re-education  · Plan of Care Expires:  04/27/18   Plan of Care Reviewed with: patient    Subjective     Communicated with RN prior to session.  Patient found supine in bed upon PT entry to room, agreeable to treatment.      Chief Complaint: pain and fatigue     Pain/Comfort:  · Pain Rating 1: 9/10  · Location - Side 1: Left  · Location - Orientation 1: generalized  · Location 1: knee  · Pain Addressed 1: Reposition, Distraction, Cessation of Activity  · Pain Rating Post-Intervention 1:  10/10    Patients cultural, spiritual, Muslim conflicts given the current situation: none noted    Objective:     Patient found with: perineural catheter, FCD, cryotherapy     General Precautions: Standard, fall   Orthopedic Precautions:LLE weight bearing as tolerated   Braces: N/A     Functional Mobility:    Bed Mobility:    · Patient completed Scooting/Bridging with supervision  · Patient completed Supine to Sit with stand by assistance  · Patient completed Sit to Supine with stand by assistance     Transfers:  · Patient completed Sit <> Stand Transfer with contact guard assistance  with  rolling walker   · Patient completed Bed <> Chair Transfer using Stand Pivot technique with contact guard assistance with rolling walker    Gait:  Pt ambulated ~45 feet with RW and CGA.  Pt given cueing for RW management.  Pt unable to fully extend L knee during stance phase and demonstrated anteriorly flexed trunk and anterior lean.      AM-PAC 6 CLICK MOBILITY  Turning over in bed (including adjusting bedclothes, sheets and blankets)?: 4  Sitting down on and standing up from a chair with arms (e.g., wheelchair, bedside commode, etc.): 3  Moving from lying on back to sitting on the side of the bed?: 4  Moving to and from a bed to a chair (including a wheelchair)?: 3  Need to walk in hospital room?: 3  Climbing 3-5 steps with a railing?: 2  Total Score: 19     Therapeutic Activities and Exercises:  Pt declined supine therex per handout (Knee protocol) 2/2 pain and fatigue, but agreed to perform the following throughout the day.    · Ankle pumps  · Quad sets  · Glute squeezes      Pt educated on:  · DME Management   · Common signs and symptoms associated with TKA  · Importance of protecting surgical incision during functional tasks to reduce the risk of bleeding/infection    Pt safe to transfer with RN staff    Patient left supine with all lines intact, call button in reach and RN notified..    GOALS:    Physical Therapy Goals         Problem: Physical Therapy Goal    Goal Priority Disciplines Outcome Goal Variances Interventions   Physical Therapy Goal     PT/OT, PT Ongoing (interventions implemented as appropriate)     Description:  Goals to be met by: 18     Patient will increase functional independence with mobility by performin. Supine to sit with Supervision  2. Sit to supine with Supervision  3. Sit to stand transfer with Stand-by Assistance  4. Gait  x 150 feet with Stand-by Assistance using Rolling Walker.   5. Ascend/Descend 6 inch curb step with Contact Guard Assistance using Rolling Walker.  6. Lower extremity exercise program x 30 reps per handout, with independence                      Time Tracking:     PT Received On: 18  PT Start Time: 1410     PT Stop Time: 1435  PT Total Time (min): 25 min     Billable Minutes: Gait Training 15 and Therapeutic Activity 10    Petros Serrano, PT, DPT  3/29/2018   [phone]

## 2018-03-29 NOTE — PLAN OF CARE
9:48 AM  SW received notification that pt will need SNF. [facility] Informed [facility] with [facility] of referral.     Arpita Pickard LMSW   [facility]  Ext [phone]

## 2018-03-29 NOTE — PLAN OF CARE
Problem: Patient Care Overview  Goal: Plan of Care Review  Outcome: Ongoing (interventions implemented as appropriate)  Pt AAOX4, VSS. Surgical site CDI - ace/ice intact. PNC infusing. family at bedside. Pt is resting quietly. Pt complains of constant severe pain. Pain managed with PRN medications. deep breathing encouraged. FCD's on and functioning. Fall precautions maintained. Will monitor

## 2018-03-29 NOTE — CONSULTS
SNF consult patient is not in network with insurance. Will need to find a SNF facility that is in network

## 2018-03-29 NOTE — PROGRESS NOTES
Anesthesia notified of PNC catheter disconnected as pt was up with OT. PNC paused. Anesthesia will come assess. No new orders. Will cont to monitor.

## 2018-03-29 NOTE — CONSULTS
Inpatient consult to Physical Medicine Rehab  Consult performed by: BIN RAGLAND  Consult ordered by: [facility], JOHN L. JR  Reason for consult: rehab evaluation       Reviewed patient history and current admission.  Rehab team following.  Full consult to follow.    CATHY James, FNP-C  Physical Medicine & Rehabilitation   03/29/2018  Spectralink: [id number]

## 2018-03-29 NOTE — PLAN OF CARE
[facility] stated they are not in network with patient's insurance plan [facility]. CM called [facility] to inquire about SNF in network list. [facility] stated in network list includes: [facility], [facility], and [facility]. [facility] representation stated [facility] will be in network. CM updated SW. CM placed PM&R consult. SW contacted Darryl at [facility] to notify of [facility] consult.    CM and SW updated patient. Patient verbalized understanding of not being able to discharge to [facility]. Patient stated she does not want to discharge to a nursing home SNF.     SW to follow up on rehab consult.    Kathy Diaz, RN, CM [facility]  [phone] -[phone]

## 2018-03-29 NOTE — ADDENDUM NOTE
Addendum  created 03/29/18 1242 by Alex Lindsey MD    Charge Capture section accepted, Sign clinical note

## 2018-03-29 NOTE — PROGRESS NOTES
Anesthesia noted of pt BP 87/50. Pt c/o 10/10 pain to left knee. Per anesthesia they will come see pt. Dilaudid held for now. No new orders. Will cont to monitor.

## 2018-03-29 NOTE — PLAN OF CARE
CM inquired about SNF preference. Patient requested [facility] [place]. Patient and patient's aunt Joyce present at the bedside.    POD 1 s/p left TKA. PT/OT ordered to eval and treat. PT/OT recommended SNF placement. Patient's family present at the bedside. CM completed discharge assessment and planning with patient and family. Patient and family verbalized understanding. All questions and concerns addressed. SW and CM will continue to follow for any additional needs. Plan A to discharge to SNF as soon as medically stable. Plan B to discharge home with home health.    PCP: Mary Cabrera MD    Pharmacy:   [facility] [place] - [address]  [address] [place] [address]  Phone: [phone] Fax: [phone]    Payor: [facility] / Plan: [facility] INDEMNITY / Product Type: FFS /      03/29/18 0900   Discharge Assessment   Assessment Type Discharge Planning Assessment   Confirmed/corrected address and phone number on facesheet? Yes   Assessment information obtained from? Patient;Caregiver;Medical Record   Expected Length of Stay (days) 2   Communicated expected length of stay with patient/caregiver yes   Prior to hospitilization cognitive status: Alert/Oriented   Prior to hospitalization functional status: Assistive Equipment   Current cognitive status: Alert/Oriented   Current Functional Status: Assistive Equipment   Lives With other relative(s)  (Aunt- Joyce Powell Paul)   Able to Return to Prior Arrangements no   Is patient able to care for self after discharge? No   Who are your caregiver(s) and their phone number(s)? Aunt- Joyce Powell Paul [phone]; sister- Nuzhat Barrera [phone]   Patient's perception of discharge disposition skilled nursing facility   Readmission Within The Last 30 Days no previous admission in last 30 days   Patient currently being followed by outpatient case management? No   Patient currently receives any other outside agency services? No    Equipment Currently Used at Home bedside commode;shower chair;walker, rolling   Do you have any problems affording any of your prescribed medications? No   Is the patient taking medications as prescribed? yes   Does the patient have transportation home? Yes   Transportation Available family or friend will provide   Does the patient receive services at the [facility]? No   Discharge Plan A Skilled Nursing Facility   Discharge Plan B Home Health;Home with family   Patient/Family In Agreement With Plan yes

## 2018-03-30 PROBLEM — E87.6 HYPOKALEMIA: Status: RESOLVED | Noted: 2017-01-18 | Resolved: 2018-03-30

## 2018-03-30 PROBLEM — E83.42 HYPOMAGNESEMIA: Status: RESOLVED | Noted: 2017-01-21 | Resolved: 2018-03-30

## 2018-03-30 PROBLEM — R10.9 ABDOMINAL PAIN: Status: RESOLVED | Noted: 2017-02-02 | Resolved: 2018-03-30

## 2018-03-30 PROBLEM — Z90.49 S/P LAPAROSCOPIC APPENDECTOMY: Status: RESOLVED | Noted: 2017-09-26 | Resolved: 2018-03-30

## 2018-03-30 LAB
ALBUMIN SERPL BCP-MCNC: 2.3 G/DL
ALP SERPL-CCNC: 86 U/L
ALT SERPL W/O P-5'-P-CCNC: 11 U/L
ANION GAP SERPL CALC-SCNC: 6 MMOL/L
AST SERPL-CCNC: 17 U/L
BASOPHILS # BLD AUTO: 0.01 K/UL
BASOPHILS NFR BLD: 0.1 %
BILIRUB SERPL-MCNC: 0.1 MG/DL
BUN SERPL-MCNC: 15 MG/DL
CALCIUM SERPL-MCNC: 7 MG/DL
CHLORIDE SERPL-SCNC: 113 MMOL/L
CO2 SERPL-SCNC: 21 MMOL/L
CREAT SERPL-MCNC: 0.8 MG/DL
DIFFERENTIAL METHOD: ABNORMAL
EOSINOPHIL # BLD AUTO: 0.1 K/UL
EOSINOPHIL NFR BLD: 1.7 %
ERYTHROCYTE [DISTWIDTH] IN BLOOD BY AUTOMATED COUNT: 15.2 %
EST. GFR  (AFRICAN AMERICAN): >60 ML/MIN/1.73 M^2
EST. GFR  (NON AFRICAN AMERICAN): >60 ML/MIN/1.73 M^2
GLUCOSE SERPL-MCNC: 177 MG/DL
HCT VFR BLD AUTO: 29.7 %
HGB BLD-MCNC: 9.1 G/DL
IMM GRANULOCYTES # BLD AUTO: 0.03 K/UL
IMM GRANULOCYTES NFR BLD AUTO: 0.4 %
LYMPHOCYTES # BLD AUTO: 1.1 K/UL
LYMPHOCYTES NFR BLD: 14.8 %
MCH RBC QN AUTO: 28.2 PG
MCHC RBC AUTO-ENTMCNC: 30.6 G/DL
MCV RBC AUTO: 92 FL
MONOCYTES # BLD AUTO: 0.5 K/UL
MONOCYTES NFR BLD: 6.5 %
NEUTROPHILS # BLD AUTO: 5.4 K/UL
NEUTROPHILS NFR BLD: 76.5 %
NRBC BLD-RTO: 0 /100 WBC
PLATELET # BLD AUTO: 208 K/UL
PMV BLD AUTO: 10.1 FL
POCT GLUCOSE: 117 MG/DL (ref 70–110)
POCT GLUCOSE: 148 MG/DL (ref 70–110)
POCT GLUCOSE: 171 MG/DL (ref 70–110)
POCT GLUCOSE: 242 MG/DL (ref 70–110)
POTASSIUM SERPL-SCNC: 3.8 MMOL/L
PROT SERPL-MCNC: 4.9 G/DL
RBC # BLD AUTO: 3.23 M/UL
SODIUM SERPL-SCNC: 140 MMOL/L
TSH SERPL DL<=0.005 MIU/L-ACNC: 0.6 UIU/ML
WBC # BLD AUTO: 7.09 K/UL

## 2018-03-30 PROCEDURE — 85025 COMPLETE CBC W/AUTO DIFF WBC: CPT

## 2018-03-30 PROCEDURE — 99222 1ST HOSP IP/OBS MODERATE 55: CPT | Mod: ,,, | Performed by: INTERNAL MEDICINE

## 2018-03-30 PROCEDURE — 63600175 PHARM REV CODE 636 W HCPCS: Performed by: ORTHOPAEDIC SURGERY

## 2018-03-30 PROCEDURE — 97116 GAIT TRAINING THERAPY: CPT

## 2018-03-30 PROCEDURE — 63600175 PHARM REV CODE 636 W HCPCS: Performed by: ANESTHESIOLOGY

## 2018-03-30 PROCEDURE — 63600175 PHARM REV CODE 636 W HCPCS: Performed by: INTERNAL MEDICINE

## 2018-03-30 PROCEDURE — 80053 COMPREHEN METABOLIC PANEL: CPT

## 2018-03-30 PROCEDURE — 25000003 PHARM REV CODE 250: Performed by: ANESTHESIOLOGY

## 2018-03-30 PROCEDURE — 99222 1ST HOSP IP/OBS MODERATE 55: CPT | Mod: SA,,, | Performed by: NURSE PRACTITIONER

## 2018-03-30 PROCEDURE — 25000003 PHARM REV CODE 250: Performed by: STUDENT IN AN ORGANIZED HEALTH CARE EDUCATION/TRAINING PROGRAM

## 2018-03-30 PROCEDURE — 94761 N-INVAS EAR/PLS OXIMETRY MLT: CPT

## 2018-03-30 PROCEDURE — 84443 ASSAY THYROID STIM HORMONE: CPT

## 2018-03-30 PROCEDURE — 97110 THERAPEUTIC EXERCISES: CPT

## 2018-03-30 PROCEDURE — 25000003 PHARM REV CODE 250: Performed by: PHYSICIAN ASSISTANT

## 2018-03-30 PROCEDURE — 12000002 HC ACUTE/MED SURGE SEMI-PRIVATE ROOM

## 2018-03-30 PROCEDURE — 36415 COLL VENOUS BLD VENIPUNCTURE: CPT

## 2018-03-30 PROCEDURE — 97530 THERAPEUTIC ACTIVITIES: CPT

## 2018-03-30 PROCEDURE — 25000242 PHARM REV CODE 250 ALT 637 W/ HCPCS: Performed by: STUDENT IN AN ORGANIZED HEALTH CARE EDUCATION/TRAINING PROGRAM

## 2018-03-30 RX ORDER — ROPIVACAINE HYDROCHLORIDE 2 MG/ML
8 INJECTION, SOLUTION EPIDURAL; INFILTRATION; PERINEURAL CONTINUOUS
Status: DISCONTINUED | OUTPATIENT
Start: 2018-03-30 | End: 2018-03-31 | Stop reason: HOSPADM

## 2018-03-30 RX ADMIN — CARVEDILOL 37.5 MG: 25 TABLET, FILM COATED ORAL at 09:03

## 2018-03-30 RX ADMIN — TIZANIDINE 12 MG: 4 TABLET ORAL at 02:03

## 2018-03-30 RX ADMIN — FAMOTIDINE 20 MG: 20 TABLET, FILM COATED ORAL at 09:03

## 2018-03-30 RX ADMIN — OXYCODONE HYDROCHLORIDE 15 MG: 5 TABLET ORAL at 06:03

## 2018-03-30 RX ADMIN — ASPIRIN 325 MG: 325 TABLET, DELAYED RELEASE ORAL at 08:03

## 2018-03-30 RX ADMIN — OXYCODONE HYDROCHLORIDE 15 MG: 5 TABLET ORAL at 04:03

## 2018-03-30 RX ADMIN — FLUTICASONE PROPIONATE 100 MCG: 50 SPRAY, METERED NASAL at 11:03

## 2018-03-30 RX ADMIN — TIZANIDINE 12 MG: 4 TABLET ORAL at 09:03

## 2018-03-30 RX ADMIN — ROPIVACAINE HYDROCHLORIDE 8 ML/HR: 2 INJECTION, SOLUTION EPIDURAL; INFILTRATION at 09:03

## 2018-03-30 RX ADMIN — HYDROMORPHONE HYDROCHLORIDE: 2 INJECTION INTRAMUSCULAR; INTRAVENOUS; SUBCUTANEOUS at 09:03

## 2018-03-30 RX ADMIN — ROPIVACAINE HYDROCHLORIDE 8 ML/HR: 2 INJECTION, SOLUTION EPIDURAL; INFILTRATION at 11:03

## 2018-03-30 RX ADMIN — STANDARDIZED SENNA CONCENTRATE AND DOCUSATE SODIUM 1 TABLET: 8.6; 5 TABLET, FILM COATED ORAL at 09:03

## 2018-03-30 RX ADMIN — GABAPENTIN 600 MG: 300 CAPSULE ORAL at 08:03

## 2018-03-30 RX ADMIN — VALSARTAN 80 MG: 40 TABLET ORAL at 09:03

## 2018-03-30 RX ADMIN — POLYETHYLENE GLYCOL 3350 17 G: 17 POWDER, FOR SOLUTION ORAL at 09:03

## 2018-03-30 RX ADMIN — OXYCODONE HYDROCHLORIDE 15 MG: 5 TABLET ORAL at 12:03

## 2018-03-30 RX ADMIN — OXYCODONE HYDROCHLORIDE 15 MG: 5 TABLET ORAL at 07:03

## 2018-03-30 RX ADMIN — GABAPENTIN 600 MG: 300 CAPSULE ORAL at 02:03

## 2018-03-30 RX ADMIN — INSULIN ASPART 4 UNITS: 100 INJECTION, SOLUTION INTRAVENOUS; SUBCUTANEOUS at 11:03

## 2018-03-30 RX ADMIN — MIRTAZAPINE 45 MG: 15 TABLET, FILM COATED ORAL at 08:03

## 2018-03-30 RX ADMIN — INSULIN ASPART 8 UNITS: 100 INJECTION, SOLUTION INTRAVENOUS; SUBCUTANEOUS at 12:03

## 2018-03-30 RX ADMIN — BUSPIRONE HYDROCHLORIDE 10 MG: 10 TABLET ORAL at 08:03

## 2018-03-30 RX ADMIN — OXYCODONE HYDROCHLORIDE 20 MG: 5 TABLET ORAL at 09:03

## 2018-03-30 RX ADMIN — INSULIN DETEMIR 20 UNITS: 100 INJECTION, SOLUTION SUBCUTANEOUS at 08:03

## 2018-03-30 RX ADMIN — INSULIN ASPART 8 UNITS: 100 INJECTION, SOLUTION INTRAVENOUS; SUBCUTANEOUS at 09:03

## 2018-03-30 RX ADMIN — GABAPENTIN 600 MG: 300 CAPSULE ORAL at 09:03

## 2018-03-30 RX ADMIN — ALPRAZOLAM 1 MG: 1 TABLET ORAL at 08:03

## 2018-03-30 RX ADMIN — SODIUM CHLORIDE, POTASSIUM CHLORIDE, SODIUM LACTATE AND CALCIUM CHLORIDE 1000 ML: 600; 310; 30; 20 INJECTION, SOLUTION INTRAVENOUS at 02:03

## 2018-03-30 RX ADMIN — FAMOTIDINE 20 MG: 20 TABLET, FILM COATED ORAL at 08:03

## 2018-03-30 RX ADMIN — STANDARDIZED SENNA CONCENTRATE AND DOCUSATE SODIUM 1 TABLET: 8.6; 5 TABLET, FILM COATED ORAL at 08:03

## 2018-03-30 RX ADMIN — ACETAMINOPHEN 1000 MG: 500 TABLET ORAL at 12:03

## 2018-03-30 RX ADMIN — BUSPIRONE HYDROCHLORIDE 10 MG: 10 TABLET ORAL at 09:03

## 2018-03-30 RX ADMIN — RAMELTEON 8 MG: 8 TABLET, FILM COATED ORAL at 08:03

## 2018-03-30 RX ADMIN — LEVOTHYROXINE SODIUM 150 MCG: 150 TABLET ORAL at 09:03

## 2018-03-30 RX ADMIN — ACETAMINOPHEN 1000 MG: 500 TABLET ORAL at 06:03

## 2018-03-30 RX ADMIN — ASPIRIN 325 MG: 325 TABLET, DELAYED RELEASE ORAL at 09:03

## 2018-03-30 RX ADMIN — CELECOXIB 200 MG: 200 CAPSULE ORAL at 09:03

## 2018-03-30 RX ADMIN — QUETIAPINE FUMARATE 400 MG: 100 TABLET ORAL at 08:03

## 2018-03-30 NOTE — ASSESSMENT & PLAN NOTE
42 y.o. female POD2 s/p L TKA    Pain control: multimodal  PT/OT: WBAT LLE  DVT PPx:  BID, FCDs at all times when not ambulating  Abx: postop Ancef    Dispo: f/u PT recs. Patient desires SNF placement however insurance will not approve and SNF is not currently accepting patient.  Patient says she has no help at home and does not want to go home.

## 2018-03-30 NOTE — SUBJECTIVE & OBJECTIVE
Principal Problem:Primary osteoarthritis of left knee    Principal Orthopedic Problem: s/p L TKA    Interval History: Patient seen and examined at bedside.  No acute events overnight.  Patient complaining of pain still.    Review of patient's allergies indicates:   Allergen Reactions    Dexamethasone Hives and Shortness Of Breath     Gastroparesis flare    Doxepin hcl Hives, Diarrhea and Itching    Compazine [prochlorperazine] Hives and Itching    Morphine Rash    Prednisone      Gastroparesis flare up    Ciprofloxacin      Counteracts with seroquel, xanax, tizanidine    Lyrica [pregabalin]      depression       Current Facility-Administered Medications   Medication    acetaminophen tablet 1,000 mg    ALPRAZolam tablet 1 mg    aspirin EC tablet 325 mg    bisacodyl suppository 10 mg    busPIRone tablet 10 mg    carvedilol tablet 37.5 mg    celecoxib capsule 200 mg    dextrose 50% injection 12.5 g    dextrose 50% injection 25 g    famotidine tablet 20 mg    fluticasone 50 mcg/actuation nasal spray 100 mcg    gabapentin capsule 600 mg    glucagon (human recombinant) injection 1 mg    glucose chewable tablet 16 g    glucose chewable tablet 24 g    hydromorphone (PF) injection 1 mg    insulin aspart U-100 pen 1-10 Units    insulin aspart U-100 pen 8 Units    levothyroxine tablet 150 mcg    meclizine tablet 25 mg    mirtazapine tablet 45 mg    naloxone 0.4 mg/mL injection 0.02 mg    ondansetron disintegrating tablet 8 mg    oxyCODONE immediate release tablet 10 mg    oxyCODONE immediate release tablet 15 mg    oxyCODONE immediate release tablet 20 mg    polyethylene glycol packet 17 g    promethazine tablet 25 mg    QUEtiapine tablet 400 mg    ramelteon tablet 8 mg    ropivacaine (PF) 2 mg/ml (0.2%) infusion    ropivacaine 0.2% ON-Q C-BLOC 400 ML (SELECT A FLOW)    senna-docusate 8.6-50 mg per tablet 1 tablet    sodium chloride 0.9% flush 3 mL    tiZANidine tablet 12 mg     "traZODone tablet 50 mg    valsartan tablet 80 mg     Objective:     Vital Signs (Most Recent):  Temp: 96.7 °F (35.9 °C) (03/30/18 0400)  Pulse: 75 (03/30/18 0400)  Resp: 18 (03/30/18 0400)  BP: (!) 96/54 (03/30/18 0400)  SpO2: 100 % (03/30/18 0400) Vital Signs (24h Range):  Temp:  [96.6 °F (35.9 °C)-97.5 °F (36.4 °C)] 96.7 °F (35.9 °C)  Pulse:  [69-82] 75  Resp:  [18-20] 18  SpO2:  [97 %-100 %] 100 %  BP: ()/(50-67) 96/54     Weight: 108.9 kg (240 lb)  Height: 5' 5" (165.1 cm)  Body mass index is 39.94 kg/m².      Intake/Output Summary (Last 24 hours) at 03/30/18 0909  Last data filed at 03/29/18 1829   Gross per 24 hour   Intake             1210 ml   Output                0 ml   Net             1210 ml       Ortho/SPM Exam     AAOx4  NAD  RRR  No increased WOB  Aquacel c/d/i  Polar ice in place  SILT and motor intact T/SP/DP  WWP extremities  FCDs in place and functioning      Significant Labs:   BMP:     Recent Labs  Lab 03/28/18  1436   GLU 54*      K 4.5      CO2 13*   BUN 7   CREATININE 0.4*   CALCIUM 3.5*     CBC: No results for input(s): WBC, HGB, HCT, PLT in the last 48 hours.  All pertinent labs within the past 24 hours have been reviewed.    Significant Imaging: I have reviewed and interpreted all pertinent imaging results/findings.  "

## 2018-03-30 NOTE — CONSULTS
[facility] Hospital Medicine  Consult Note    Patient Name: Homar Jerry  MRN: [id number]  Admission Date: 3/28/2018  Hospital Length of Stay: 2 days  Attending Physician: Cristina Nesbitt MD  Primary Care Provider: Mary Cabrera MD     [facility] Medicine Team: Networked reference to record PCT  Cristina Nesbitt MD      Patient information was obtained from patient and past medical records.     Inpatient consult to Hospital Medicine-Ortho Comanagement Only  Consult performed by: CRISTINA NESBITT  Consult ordered by: KARSON GUZMÁN  Reason for consult: Hypotension        Subjective:     Principal Problem: Primary osteoarthritis of left knee      HPI: 41 y/o female with Type 2 diabetes on long term insulin therapy, postsurgical hypothyroidism s/p thyroidectomy for thyroid cancer, Bipolar 1 Disorder, morbid obesity and essential HTN was admitted to orthopedic service on 3/28 and underwent left total knee replacement for primary osteoarthritis. Patient doing well post-op until this afternoon on 3/30 when patient had drop in BP to 80/44 at 1200. Patient was asymptomatic but rapid response team responded to low BP and came to bedside and recommended to obtain stat labs and give fluid bolus and medicine consult for evaluation for hypotension.   Patient reports she is feeling fine at present and present BP is 96/56 at 1430 when I saw patient. Patent sitting up in chair and texting on her cell phone. Patient denies any chest pain, SOB, dizziness or lightheadedness. Patient has had no fevers since admit and there is minimal drainage on bandage at left knee incision and no obvious signs of bleeding. Patient denies any melena or BRBPR. Patient has been receiving IV Dilaudid for pain control and did receive her home BP medications of Carvedilol 37.5 mg po x 1 and Valsartan 80 mg po x 1 dose this am at 0900.       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 . In ED found to have worsening hyperglycemia, with anion gap and acidosis. Was bolused with NS and started on insulin infusion with DKA protocol.  On treatment with  Insulin   Hemoglobin A1c- 7.5 Sept 2017 Capillary glucose check-yes Pre breakfast -95 Pre lunch -120 Pre [id number]      Morbid obesity with BMI of 40.0-44.9, adult 1/18/2017    Non-intractable vomiting with nausea 9/26/2017    Rheumatoid arthritis     S/P laparoscopic appendectomy 9/26/2017    Status post placement of implantable loop recorder 7/1/2015    syncope and palpitations s/p ILR placement.      Thyroid cancer 2003 & 2013    thyroidectomy    Type 2 diabetes mellitus with hyperglycemia, without long-term current use of insulin 1/17/2017    Urinary incontinence     Vitamin D deficiency 1/21/2017       Past Surgical History:   Procedure Laterality Date    ANKLE FRACTURE SURGERY Right     APPENDECTOMY      BREAST SURGERY      exploratory laparotomy due to complications of hysterectomy  2009    cervical cuff burst with air in abdomen    FRACTURE SURGERY      ankle around 2012    GASTRECTOMY      gastric pacemaker      gastric sleeve      HYSTERECTOMY  2009    total including cervix    KNEE ARTHROSCOPY W/ MENISCAL REPAIR  Left 2016    KNEE SURGERY  05/12/2016    loop monitor  2016    multiple breast biopsies      TONSILLECTOMY      ureteral sling         Review of patient's allergies indicates:   Allergen Reactions    Dexamethasone Hives and Shortness Of Breath     Gastroparesis flare    Doxepin hcl Hives, Diarrhea and Itching    Compazine [prochlorperazine] Hives and Itching    Morphine Rash    Prednisone      Gastroparesis flare up    Ciprofloxacin      Counteracts with seroquel, xanax, tizanidine    Lyrica [pregabalin]      depression       No current facility-administered medications on file prior to encounter.      Current Outpatient Prescriptions on File Prior to Encounter   Medication Sig    alprazolam (XANAX) 1 MG tablet Take 1 tablet (1 mg total) by mouth every evening. (Patient taking differently: Take 2 mg by mouth 2 (two) times daily. )    busPIRone (BUSPAR) 10 MG tablet 10 mg 2 (two) times daily.     carvedilol (COREG) 12.5 MG tablet Take 3 tablets (37.5 mg total) by mouth 2 (two) times daily.    fluticasone (FLONASE) 50 mcg/actuation nasal spray 2 sprays by Nasal route 2 (two) times daily.     gabapentin (NEURONTIN) 600 MG tablet Take 600 mg by mouth 3 (three) times daily.     hydrochlorothiazide (HYDRODIURIL) 25 MG tablet Take 1 tablet (25 mg total) by mouth once daily.    hydrOXYzine pamoate (VISTARIL) 50 MG Cap Take 50 mg by mouth 4 (four) times daily.    insulin aspart (NOVOLOG) 100 unit/mL InPn pen Inject 16 Units into the skin 3 (three) times daily with meals.    insulin detemir (LEVEMIR FLEXTOUCH) 100 unit/mL (3 mL) SubQ InPn pen Inject 30 Units into the skin 2 (two) times daily. (Patient taking differently: Inject 30 Units into the skin 2 (two) times daily. PATIENT TAKES 30 UNITS BID)    levocetirizine (XYZAL) 5 MG tablet Take 1 tablet (5 mg total) by mouth every evening. (Patient taking differently: Take 5 mg by mouth daily as needed for Allergies. )    levothyroxine (SYNTHROID) 150 MCG  "tablet Take 1 tablet (150 mcg total) by mouth once daily.    mirtazapine (REMERON) 45 MG tablet Take 45 mg by mouth every evening.    oxycodone-acetaminophen (PERCOCET)  mg per tablet Take 1 tablet by mouth every 4 (four) hours as needed for Pain. Per Dr. Villa [facility]    promethazine (PHENERGAN) 25 MG tablet TAKE 1 TABLET (25 MG TOTAL) BY MOUTH EVERY 6 HOURS AS NEEDED.    QUEtiapine (SEROQUEL) 400 MG tablet 800 mg every evening. Pt states takes two tablets nightly, total of 800 mg    tizanidine (ZANAFLEX) 4 MG tablet TAKE 3 TABLETS (12 MG TOTAL) BY MOUTH EVERY EVENING. (Patient taking differently: Take 12 mg by mouth every evening. TAKE 3 TABLETS (12 MG TOTAL) BY MOUTH EVERY EVENING.)    VENTOLIN HFA 90 mcg/actuation inhaler as needed.     zolpidem (AMBIEN) 10 mg Tab 10 mg every evening.     blood sugar diagnostic Strp 1 each by Misc.(Non-Drug; Combo Route) route 4 (four) times daily with meals and nightly.    blood-glucose meter kit Use as instructed    calcium carbonate (OS-MARIAN) 500 mg calcium (1,250 mg) tablet Take 2 tablets (1,000 mg total) by mouth once daily.    cholecalciferol, vitamin D3, 1,000 unit capsule Take 2 capsules (2,000 Units total) by mouth once daily.    docusate sodium (COLACE) 50 MG capsule Take 1 capsule (50 mg total) by mouth 2 (two) times daily. (Patient taking differently: Take 50 mg by mouth 2 (two) times daily as needed. )    lancets Misc 1 each by Misc.(Non-Drug; Combo Route) route 4 (four) times daily with meals and nightly.    lidocaine (LIDODERM) 5 % Place 1 patch onto the skin daily as needed. Remove & Discard patch within 12 hours or as directed by MD    meclizine (ANTIVERT) 25 mg tablet Take 1 tablet (25 mg total) by mouth 3 (three) times daily as needed for Dizziness or Nausea.    pen needle, diabetic 31 gauge x 5/16" Ndle 1 each by Misc.(Non-Drug; Combo Route) route 5 (five) times daily.    topiramate (TOPAMAX) 25 MG tablet " Take 1 tablet (25 mg total) by mouth daily as needed (migraines).     Family History     Problem Relation (Age of Onset)    ADD / ADHD Son    Arthritis Mother    Bipolar disorder Maternal Uncle    Clotting disorder Mother, Father, Paternal Grandfather    Diabetes Paternal Grandmother    HIV Brother    Heart attack Mother (55)    Heart disease Mother    Hyperlipidemia Father    No Known Problems Sister, Brother, Maternal Aunt, Paternal Aunt, Paternal Uncle, Maternal Grandfather, Maternal Grandmother, Cousin    Pancreatic cancer Maternal Uncle    Pneumonia Brother    Rheum arthritis Mother    Thyroid cancer Paternal Grandmother        Social History Main Topics    Smoking status: Never Smoker    Smokeless tobacco: Never Used    Alcohol use No    Drug use: No    Sexual activity: Not Currently     Review of Systems   Constitutional: Negative for chills and fever.   HENT: Negative for sore throat.    Eyes: Negative for visual disturbance.   Respiratory: Negative for cough and shortness of breath.    Cardiovascular: Negative for chest pain, palpitations and leg swelling.   Gastrointestinal: Negative for abdominal pain, blood in stool, diarrhea, nausea and vomiting.   Genitourinary: Negative for dysuria.   Musculoskeletal: Positive for arthralgias (Left knee).   Skin: Negative for rash.   Neurological: Negative for dizziness, syncope, weakness, light-headedness and headaches.   Hematological: Does not bruise/bleed easily.   Psychiatric/Behavioral: Negative for agitation, confusion and hallucinations.     Objective:     Vital Signs (Most Recent):  Temp: (!) 95.8 °F (35.4 °C) (03/30/18 1521)  Pulse: 66 (03/30/18 1521)  Resp: 16 (03/30/18 1521)  BP: 104/62 (03/30/18 1521)  SpO2: 99 % (03/30/18 1521) Vital Signs (24h Range):  Temp:  [95.8 °F (35.4 °C)-97 °F (36.1 °C)] 95.8 °F (35.4 °C)  Pulse:  [60-87] 66  Resp:  [16-18] 16  SpO2:  [95 %-100 %] 99 %  BP: ()/(42-67) 104/62     Weight: 108.9 kg (240 lb)  Body mass  index is 39.94 kg/m².    Physical Exam   Constitutional: She is oriented to person, place, and time. She appears well-developed and well-nourished. No distress.   Morbidly obese   HENT:   Head: Normocephalic and atraumatic.   Mouth/Throat: Oropharynx is clear and moist.   Eyes: Conjunctivae are normal.   Neck: Neck supple. No JVD present. Carotid bruit is not present.   Cardiovascular: Normal rate, regular rhythm and normal heart sounds.  Exam reveals no gallop and no friction rub.    No murmur heard.  Pulses:       Dorsalis pedis pulses are 2+ on the right side, and 2+ on the left side.        Posterior tibial pulses are 2+ on the right side, and 2+ on the left side.   Pulmonary/Chest: Effort normal and breath sounds normal. No accessory muscle usage. She has no wheezes. She has no rales.   Abdominal: Soft. Normal appearance and bowel sounds are normal. She exhibits no distension. There is no hepatosplenomegaly. There is no tenderness. There is no rebound and no guarding.   Musculoskeletal: She exhibits no edema.   Neurological: She is alert and oriented to person, place, and time.   Skin: Skin is warm. Capillary refill takes less than 2 seconds. No erythema.   Psychiatric: She has a normal mood and affect. Her behavior is normal. Thought content normal.   Vitals reviewed.      Significant Labs:   CBC:   Recent Labs  Lab 03/30/18  1426   WBC 7.09   HGB 9.1*   HCT 29.7*        CMP:   Recent Labs  Lab 03/30/18  1426      K 3.8   *   CO2 21*   *   BUN 15   CREATININE 0.8   CALCIUM 7.0*   PROT 4.9*   ALBUMIN 2.3*   BILITOT 0.1   ALKPHOS 86   AST 17   ALT 11   ANIONGAP 6*   EGFRNONAA >60.0     Blood Sugars (AccuCheck):  Recent Labs      03/29/18   1301  03/29/18   1840  03/29/18   2020  03/30/18   0906  03/30/18   1119  03/30/18   1322   POCTGLUCOSE  118*  140*  189*  117*  242*  171*      TSH   Date Value Ref Range Status   03/30/2018 0.602 0.400 - 4.000 uIU/mL Final        Significant  Imaging: I have reviewed all pertinent imaging results/findings within the past 24 hours.    Assessment/Plan:     Idiopathic hypotension    · Patient with post-op hypotension. Cause unclear but no signs to suggest active bleeding or infection such as sepsis as cause. Patient afebrile with normal WBC and no tachycardia. Would not start any antibiotics.   · Hypotension most likely combination of post-op anemia, pain meds and mild dehydration. Patient with anemia post-op with Hgb 9 but no indication to give blood transfusion as doubt cause of patient's hypotension alone and patient with no signs of active bleeding.   · For now would recommend to hold patient's chronic BP medications of Carvedilol and valsartan and limit IV narcotics but okay to give oral pain meds and narcotics. .           Type 2 diabetes mellitus with hyperglycemia, without long-term current use of insulin    · Patient reports she does not always take her insulin at home as prescribed. Patient states she is supposed to be on Novolog 16 units with meals and Levemir 30 units BID but does not always take.   · Patient currently on Levemir 8 units with meals and would continue for now and add Levemir 20 units at night as patient starting to eat more and cover with moderate dose SSI with goal of POCT 140-180.           Essential hypertension    · For now due to hypotension would hold Carvedilol and Valsartan as treatment for her chronic HTN. Patient states she has been on these medications to treat her HTN for many years and are not new meds.   · Goal for BP < 140/90.          Hypothyroidism, postsurgical    Controlled. Patient with normal TSH and would continue levothyroxine at current dosing of 150 mcg po daily.             VTE Risk Mitigation         Ordered     IP VTE HIGH RISK PATIENT  Once      03/29/18 1216              Thank you for your consult. I will follow-up with patient. Please contact us if you have any additional questions.    Cristina ONEIL  MD Johnathan  Department of Hospital Medicine   [facility]

## 2018-03-30 NOTE — ASSESSMENT & PLAN NOTE
· Patient with post-op hypotension. Cause unclear but no signs to suggest active bleeding or infection such as sepsis as cause. Patient afebrile with normal WBC and no tachycardia. Would not start any antibiotics.   · Hypotension most likely combination of post-op anemia, pain meds and mild dehydration. Patient with anemia post-op with Hgb 9 but no indication to give blood transfusion as doubt cause of patient's hypotension alone and patient with no signs of active bleeding.   · For now would recommend to hold patient's chronic BP medications of Carvedilol and valsartan and limit IV narcotics but okay to give oral pain meds and narcotics. .

## 2018-03-30 NOTE — PLAN OF CARE
Problem: Pain, Acute (Adult)  Goal: Identify Related Risk Factors and Signs and Symptoms  Related risk factors and signs and symptoms are identified upon initiation of Human Response Clinical Practice Guideline (CPG)   Outcome: Ongoing (interventions implemented as appropriate)  Pt plan of care reviewed and updated. Pt frequently assessed for pain and safety issues. Pt receives prn pain meds when needed. Vs as charted. Will continue to monitor.

## 2018-03-30 NOTE — ASSESSMENT & PLAN NOTE
· Patient reports she does not always take her insulin at home as prescribed. Patient states she is supposed to be on Novolog 16 units with meals and Levemir 30 units BID but does not always take.   · Patient currently on Levemir 8 units with meals and would continue for now and add Levemir 20 units at night as patient starting to eat more and cover with moderate dose SSI with goal of POCT 140-180.

## 2018-03-30 NOTE — PROGRESS NOTES
Pt requesting IV dilaudid, MD aware of pt low BP. No s/sx of hypotension. Per md hold iv pain meds.

## 2018-03-30 NOTE — ASSESSMENT & PLAN NOTE
Controlled. Patient with normal TSH and would continue levothyroxine at current dosing of 150 mcg po daily.

## 2018-03-30 NOTE — PROGRESS NOTES
[facility]  Orthopedics  Progress Note    Patient Name: Homar Jerry  MRN: [id number]  Admission Date: 3/28/2018  Hospital Length of Stay: 2 days  Attending Provider: [facility] Jr., MD  Primary Care Provider: Mary Cabrera MD  Follow-up For: Procedure(s) (LRB):  REPLACEMENT-KNEE-TOTAL (Left)    Post-Operative Day: 2 Days Post-Op  Subjective:     Principal Problem:Primary osteoarthritis of left knee    Principal Orthopedic Problem: s/p L TKA    Interval History: Patient seen and examined at bedside.  No acute events overnight.  Patient complaining of pain still.    Review of patient's allergies indicates:   Allergen Reactions    Dexamethasone Hives and Shortness Of Breath     Gastroparesis flare    Doxepin hcl Hives, Diarrhea and Itching    Compazine [prochlorperazine] Hives and Itching    Morphine Rash    Prednisone      Gastroparesis flare up    Ciprofloxacin      Counteracts with seroquel, xanax, tizanidine    Lyrica [pregabalin]      depression       Current Facility-Administered Medications   Medication    acetaminophen tablet 1,000 mg    ALPRAZolam tablet 1 mg    aspirin EC tablet 325 mg    bisacodyl suppository 10 mg    busPIRone tablet 10 mg    carvedilol tablet 37.5 mg    celecoxib capsule 200 mg    dextrose 50% injection 12.5 g    dextrose 50% injection 25 g    famotidine tablet 20 mg    fluticasone 50 mcg/actuation nasal spray 100 mcg    gabapentin capsule 600 mg    glucagon (human recombinant) injection 1 mg    glucose chewable tablet 16 g    glucose chewable tablet 24 g    hydromorphone (PF) injection 1 mg    insulin aspart U-100 pen 1-10 Units    insulin aspart U-100 pen 8 Units    levothyroxine tablet 150 mcg    meclizine tablet 25 mg    mirtazapine tablet 45 mg    naloxone 0.4 mg/mL injection 0.02 mg    ondansetron disintegrating tablet 8 mg    oxyCODONE immediate release tablet 10 mg    oxyCODONE immediate release tablet 15 mg     "oxyCODONE immediate release tablet 20 mg    polyethylene glycol packet 17 g    promethazine tablet 25 mg    QUEtiapine tablet 400 mg    ramelteon tablet 8 mg    ropivacaine (PF) 2 mg/ml (0.2%) infusion    ropivacaine 0.2% ON-Q C-BLOC 400 ML (SELECT A FLOW)    senna-docusate 8.6-50 mg per tablet 1 tablet    sodium chloride 0.9% flush 3 mL    tiZANidine tablet 12 mg    traZODone tablet 50 mg    valsartan tablet 80 mg     Objective:     Vital Signs (Most Recent):  Temp: 96.7 °F (35.9 °C) (03/30/18 0400)  Pulse: 75 (03/30/18 0400)  Resp: 18 (03/30/18 0400)  BP: (!) 96/54 (03/30/18 0400)  SpO2: 100 % (03/30/18 0400) Vital Signs (24h Range):  Temp:  [96.6 °F (35.9 °C)-97.5 °F (36.4 °C)] 96.7 °F (35.9 °C)  Pulse:  [69-82] 75  Resp:  [18-20] 18  SpO2:  [97 %-100 %] 100 %  BP: ()/(50-67) 96/54     Weight: 108.9 kg (240 lb)  Height: 5' 5" (165.1 cm)  Body mass index is 39.94 kg/m².      Intake/Output Summary (Last 24 hours) at 03/30/18 0909  Last data filed at 03/29/18 1829   Gross per 24 hour   Intake             1210 ml   Output                0 ml   Net             1210 ml       Ortho/SPM Exam     AAOx4  NAD  RRR  No increased WOB  Aquacel c/d/i  Polar ice in place  SILT and motor intact T/SP/DP  WWP extremities  FCDs in place and functioning      Significant Labs:   BMP:     Recent Labs  Lab 03/28/18  1436   GLU 54*      K 4.5      CO2 13*   BUN 7   CREATININE 0.4*   CALCIUM 3.5*     CBC: No results for input(s): WBC, HGB, HCT, PLT in the last 48 hours.  All pertinent labs within the past 24 hours have been reviewed.    Significant Imaging: I have reviewed and interpreted all pertinent imaging results/findings.    Assessment/Plan:     * Primary osteoarthritis of left knee    42 y.o. female POD2 s/p L TKA    Pain control: multimodal  PT/OT: WBAT LLE  DVT PPx:  BID, FCDs at all times when not ambulating  Abx: postop Ancef    Dispo: f/u PT recs. Patient desires SNF placement however " insurance will not approve and SNF is not currently accepting patient.  Patient says she has no help at home and does not want to go home.                Carmelo Reilly MD  Orthopedics  [facility]

## 2018-03-30 NOTE — ASSESSMENT & PLAN NOTE
· For now due to hypotension would hold Carvedilol and Valsartan as treatment for her chronic HTN. Patient states she has been on these medications to treat her HTN for many years and are not new meds.   · Goal for BP < 140/90.

## 2018-03-30 NOTE — ANESTHESIA POST-OP PAIN MANAGEMENT
Acute Pain Service and Perioperative Surgical Home Progress Note    HPI  Homar Jerry is a 42 y.o., female w/ a significant PMHx of CARMEN, T1BD, fibromyalgia, RA, HTN, T2DM (insulin dependent), morbid obesity, asthma, and primary OA of left knee not amenable to conservative medical management.    Interval history  Patient was supposed to be DC'd to [facility] SNF and On-Q was attached yesterday evening but insurance denied her. Pt cont to endorse significant amount of pain overnight, 10/10. However, pt states she was satya to sleep ~3h. Upon assessment this AM patient working w/ PT and doing the movements well.     Surgery:  Procedure(s) (LRB):  REPLACEMENT-KNEE-TOTAL (Left)    Post Op Day #: 2    Catheter type: Perineural Adductor Canal    Infusion type: Ropivacaine 0.2%  8 ml/hr basal    Problem List:    Active Hospital Problems    Diagnosis  POA    *Primary osteoarthritis of left knee [M17.12]  Yes      Resolved Hospital Problems    Diagnosis Date Resolved POA   No resolved problems to display.       Subjective:       General appearance of alert, oriented, no complaints   Pain with rest: 10    Numbers   Pain with movement: 10    Numbers   Side Effects    1. Pruritis No    2. Nausea No    3. Motor Blockade No, 0=Ability to raise lower extremities off bed    4. Sedation No, 1=awake and alert    Schedule Medications:    acetaminophen  1,000 mg Oral Q6H    aspirin  325 mg Oral BID    busPIRone  10 mg Oral BID    carvedilol  37.5 mg Oral BID    celecoxib  200 mg Oral Daily    famotidine  20 mg Oral BID    fluticasone  2 spray Each Nare Daily    gabapentin  600 mg Oral TID    insulin aspart U-100  8 Units Subcutaneous TIDWM    levothyroxine  150 mcg Oral Daily    mirtazapine  45 mg Oral QHS    polyethylene glycol  17 g Oral Daily    QUEtiapine  400 mg Oral QHS    senna-docusate 8.6-50 mg  1 tablet Oral BID    tiZANidine  12 mg Oral Q8H    valsartan  80 mg Oral Daily        Continuous Infusions:    ropivacaine 8 mL/hr at 03/29/18 1335        PRN Medications:  ALPRAZolam, bisacodyl, dextrose 50%, dextrose 50%, glucagon (human recombinant), glucose, glucose, hydromorphone (PF), insulin aspart U-100, meclizine, naloxone, ondansetron, oxyCODONE, oxyCODONE, oxyCODONE, promethazine, ramelteon, ropivacaine, sodium chloride 0.9%, traZODone       Antibiotics:  Antibiotics     None             Objective:     Catheter site clean, dry, intact          Vital Signs (Most Recent):  Temp: 35.8 °C (96.5 °F) (03/30/18 0912)  Pulse: 87 (03/30/18 0912)  Resp: 18 (03/30/18 0912)  BP: 115/65 (03/30/18 0912)  SpO2: 99 % (03/30/18 0912) Vital Signs Range (Last 24H):  Temp:  [35.8 °C (96.5 °F)-36.4 °C (97.5 °F)]   Pulse:  [69-87]   Resp:  [18-20]   BP: ()/(50-67)   SpO2:  [97 %-100 %]          I & O (Last 24H):  Intake/Output Summary (Last 24 hours) at 03/30/18 1007  Last data filed at 03/29/18 1829   Gross per 24 hour   Intake             1210 ml   Output                0 ml   Net             1210 ml       Physical Exam:    GA: Alert, comfortable, no acute distress.   Pulmonary: Clear to auscultation A/P/L. No wheezing, crackles, or rhonchi.  Cardiac: RRR S1 & S2 w/o rubs/murmurs/gallops.   Abdominal:Bowel sounds present. No tenderness to palpation or distension. No appreciable hepatosplenomegaly.   Skin: No jaundice, rashes, or visible lesions.         Laboratory:  CBC: No results for input(s): WBC, RBC, HGB, HCT, PLT, MCV, MCH, MCHC in the last 72 hours.    BMP: Recent Labs      03/28/18   1436   NA  138   K  4.5   CO2  13*   CL  100   BUN  7   CREATININE  0.4*   GLU  54*   CALCIUM  3.5*       No results for input(s): PT, INR, PROTIME, APTT in the last 72 hours.      Anticoagulant dose ASA at 325.    Assessment:         Pain control adequate    Plan:     - Pain: Adductor canal perineural catheter in place and infusing via On-Q. Good level. Multimodal pain regimen with acetaminophen, Celebrex, Lyrica, and PRN oxycodone and  dilaudid. Will continue to monitor. Plan to D/C On-Q and restart infusion via pump to allow for bolus med administration. Pt likely to be DC'd on Monday or later per ortho.  - HTN: continue home valsartan  - T1BD/CARMEN/fibromyalgia: continue home meds  - Insulin dependent T2DM: Moderate dose SSI, 8 units Aspart TIDWM   - FEN/GI: Tolerating liquids, advance diet as tolerated. + flatus. No BM.     Dispo: Pt working well with PT/OT. Case management and SW for placement.      Evaluator Noe Joe MD

## 2018-03-30 NOTE — PT/OT/SLP PROGRESS
Occupational Therapy      Patient Name:  Homar Jerry   MRN:  [id number]    Patient not seen today secondary to RN hold due to pt's consistently low BP: AM - 79/42 and PM - 89/52. Will follow-up tomorrow if pt appropriate.    Haylee Hartley, OT  3/30/2018

## 2018-03-30 NOTE — HPI
41 y/o female with Type 2 diabetes on long term insulin therapy, postsurgical hypothyroidism s/p thyroidectomy for thyroid cancer, Bipolar 1 Disorder, morbid obesity and essential HTN was admitted to orthopedic service on 3/28 and underwent left total knee replacement for primary osteoarthritis. Patient doing well post-op until this afternoon on 3/30 when patient had drop in BP to 80/44 at 1200. Patient was asymptomatic but rapid response team responded to low BP and came to bedside and recommended to obtain stat labs and give fluid bolus and medicine consult for evaluation for hypotension.   Patient reports she is feeling fine at present and present BP is 96/56 at 1430 when I saw patient. Patent sitting up in chair and texting on her cell phone. Patient denies any chest pain, SOB, dizziness or lightheadedness. Patient has had no fevers since admit and there is minimal drainage on bandage at left knee incision and no obvious signs of bleeding. Patient denies any melena or BRBPR. Patient has been receiving IV Dilaudid for pain control and did receive her home BP medications of Carvedilol 37.5 mg po x 1 and Valsartan 80 mg po x 1 dose this am at 0900.

## 2018-03-30 NOTE — SUBJECTIVE & OBJECTIVE
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 . In ED found to have worsening hyperglycemia, with anion gap and acidosis. Was bolused with NS and started on insulin infusion with DKA protocol.  On treatment with  Insulin   Hemoglobin A1c- 7.5 Sept 2017 Capillary glucose check-yes Pre breakfast -95 Pre lunch -120 Pre [id number]      Morbid obesity with BMI of 40.0-44.9, adult 1/18/2017    Non-intractable vomiting with nausea 9/26/2017    Rheumatoid arthritis     S/P laparoscopic appendectomy 9/26/2017    Status post placement of implantable loop recorder 7/1/2015    syncope and palpitations s/p ILR placement.      Thyroid cancer 2003 & 2013    thyroidectomy    Type 2 diabetes mellitus with hyperglycemia, without long-term current use of insulin 1/17/2017    Urinary incontinence     Vitamin D deficiency 1/21/2017       Past Surgical History:   Procedure Laterality Date    ANKLE FRACTURE SURGERY Right     APPENDECTOMY      BREAST SURGERY      exploratory laparotomy due to complications of hysterectomy  2009    cervical cuff burst with air in abdomen    FRACTURE SURGERY      ankle around 2012    GASTRECTOMY      gastric pacemaker      gastric sleeve      HYSTERECTOMY   2009    total including cervix    KNEE ARTHROSCOPY W/ MENISCAL REPAIR Left 2016    KNEE SURGERY  05/12/2016    loop monitor  2016    multiple breast biopsies      TONSILLECTOMY      ureteral sling         Review of patient's allergies indicates:   Allergen Reactions    Dexamethasone Hives and Shortness Of Breath     Gastroparesis flare    Doxepin hcl Hives, Diarrhea and Itching    Compazine [prochlorperazine] Hives and Itching    Morphine Rash    Prednisone      Gastroparesis flare up    Ciprofloxacin      Counteracts with seroquel, xanax, tizanidine    Lyrica [pregabalin]      depression       No current facility-administered medications on file prior to encounter.      Current Outpatient Prescriptions on File Prior to Encounter   Medication Sig    alprazolam (XANAX) 1 MG tablet Take 1 tablet (1 mg total) by mouth every evening. (Patient taking differently: Take 2 mg by mouth 2 (two) times daily. )    busPIRone (BUSPAR) 10 MG tablet 10 mg 2 (two) times daily.     carvedilol (COREG) 12.5 MG tablet Take 3 tablets (37.5 mg total) by mouth 2 (two) times daily.    fluticasone (FLONASE) 50 mcg/actuation nasal spray 2 sprays by Nasal route 2 (two) times daily.     gabapentin (NEURONTIN) 600 MG tablet Take 600 mg by mouth 3 (three) times daily.     hydrochlorothiazide (HYDRODIURIL) 25 MG tablet Take 1 tablet (25 mg total) by mouth once daily.    hydrOXYzine pamoate (VISTARIL) 50 MG Cap Take 50 mg by mouth 4 (four) times daily.    insulin aspart (NOVOLOG) 100 unit/mL InPn pen Inject 16 Units into the skin 3 (three) times daily with meals.    insulin detemir (LEVEMIR FLEXTOUCH) 100 unit/mL (3 mL) SubQ InPn pen Inject 30 Units into the skin 2 (two) times daily. (Patient taking differently: Inject 30 Units into the skin 2 (two) times daily. PATIENT TAKES 30 UNITS BID)    levocetirizine (XYZAL) 5 MG tablet Take 1 tablet (5 mg total) by mouth every evening. (Patient taking differently: Take 5 mg by mouth  "daily as needed for Allergies. )    levothyroxine (SYNTHROID) 150 MCG tablet Take 1 tablet (150 mcg total) by mouth once daily.    mirtazapine (REMERON) 45 MG tablet Take 45 mg by mouth every evening.    oxycodone-acetaminophen (PERCOCET)  mg per tablet Take 1 tablet by mouth every 4 (four) hours as needed for Pain. Per Dr. Villa at [facility]    promethazine (PHENERGAN) 25 MG tablet TAKE 1 TABLET (25 MG TOTAL) BY MOUTH EVERY 6 HOURS AS NEEDED.    QUEtiapine (SEROQUEL) 400 MG tablet 800 mg every evening. Pt states takes two tablets nightly, total of 800 mg    tizanidine (ZANAFLEX) 4 MG tablet TAKE 3 TABLETS (12 MG TOTAL) BY MOUTH EVERY EVENING. (Patient taking differently: Take 12 mg by mouth every evening. TAKE 3 TABLETS (12 MG TOTAL) BY MOUTH EVERY EVENING.)    VENTOLIN HFA 90 mcg/actuation inhaler as needed.     zolpidem (AMBIEN) 10 mg Tab 10 mg every evening.     blood sugar diagnostic Strp 1 each by Misc.(Non-Drug; Combo Route) route 4 (four) times daily with meals and nightly.    blood-glucose meter kit Use as instructed    calcium carbonate (OS-MARIAN) 500 mg calcium (1,250 mg) tablet Take 2 tablets (1,000 mg total) by mouth once daily.    cholecalciferol, vitamin D3, 1,000 unit capsule Take 2 capsules (2,000 Units total) by mouth once daily.    docusate sodium (COLACE) 50 MG capsule Take 1 capsule (50 mg total) by mouth 2 (two) times daily. (Patient taking differently: Take 50 mg by mouth 2 (two) times daily as needed. )    lancets Misc 1 each by Misc.(Non-Drug; Combo Route) route 4 (four) times daily with meals and nightly.    lidocaine (LIDODERM) 5 % Place 1 patch onto the skin daily as needed. Remove & Discard patch within 12 hours or as directed by MD    meclizine (ANTIVERT) 25 mg tablet Take 1 tablet (25 mg total) by mouth 3 (three) times daily as needed for Dizziness or Nausea.    pen needle, diabetic 31 gauge x 5/16" Ndle 1 each by Misc.(Non-Drug; Combo " Route) route 5 (five) times daily.    topiramate (TOPAMAX) 25 MG tablet Take 1 tablet (25 mg total) by mouth daily as needed (migraines).     Family History     Problem Relation (Age of Onset)    ADD / ADHD Son    Arthritis Mother    Bipolar disorder Maternal Uncle    Clotting disorder Mother, Father, Paternal Grandfather    Diabetes Paternal Grandmother    HIV Brother    Heart attack Mother (55)    Heart disease Mother    Hyperlipidemia Father    No Known Problems Sister, Brother, Maternal Aunt, Paternal Aunt, Paternal Uncle, Maternal Grandfather, Maternal Grandmother, Cousin    Pancreatic cancer Maternal Uncle    Pneumonia Brother    Rheum arthritis Mother    Thyroid cancer Paternal Grandmother        Social History Main Topics    Smoking status: Never Smoker    Smokeless tobacco: Never Used    Alcohol use No    Drug use: No    Sexual activity: Not Currently     Review of Systems   Constitutional: Negative for chills and fever.   HENT: Negative for sore throat.    Eyes: Negative for visual disturbance.   Respiratory: Negative for cough and shortness of breath.    Cardiovascular: Negative for chest pain, palpitations and leg swelling.   Gastrointestinal: Negative for abdominal pain, blood in stool, diarrhea, nausea and vomiting.   Genitourinary: Negative for dysuria.   Musculoskeletal: Positive for arthralgias (Left knee).   Skin: Negative for rash.   Neurological: Negative for dizziness, syncope, weakness, light-headedness and headaches.   Hematological: Does not bruise/bleed easily.   Psychiatric/Behavioral: Negative for agitation, confusion and hallucinations.     Objective:     Vital Signs (Most Recent):  Temp: (!) 95.8 °F (35.4 °C) (03/30/18 1521)  Pulse: 66 (03/30/18 1521)  Resp: 16 (03/30/18 1521)  BP: 104/62 (03/30/18 1521)  SpO2: 99 % (03/30/18 1521) Vital Signs (24h Range):  Temp:  [95.8 °F (35.4 °C)-97 °F (36.1 °C)] 95.8 °F (35.4 °C)  Pulse:  [60-87] 66  Resp:  [16-18] 16  SpO2:  [95 %-100 %] 99  %  BP: ()/(42-67) 104/62     Weight: 108.9 kg (240 lb)  Body mass index is 39.94 kg/m².    Physical Exam   Constitutional: She is oriented to person, place, and time. She appears well-developed and well-nourished. No distress.   Morbidly obese   HENT:   Head: Normocephalic and atraumatic.   Mouth/Throat: Oropharynx is clear and moist.   Eyes: Conjunctivae are normal.   Neck: Neck supple. No JVD present. Carotid bruit is not present.   Cardiovascular: Normal rate, regular rhythm and normal heart sounds.  Exam reveals no gallop and no friction rub.    No murmur heard.  Pulses:       Dorsalis pedis pulses are 2+ on the right side, and 2+ on the left side.        Posterior tibial pulses are 2+ on the right side, and 2+ on the left side.   Pulmonary/Chest: Effort normal and breath sounds normal. No accessory muscle usage. She has no wheezes. She has no rales.   Abdominal: Soft. Normal appearance and bowel sounds are normal. She exhibits no distension. There is no hepatosplenomegaly. There is no tenderness. There is no rebound and no guarding.   Musculoskeletal: She exhibits no edema.   Neurological: She is alert and oriented to person, place, and time.   Skin: Skin is warm. Capillary refill takes less than 2 seconds. No erythema.   Psychiatric: She has a normal mood and affect. Her behavior is normal. Thought content normal.   Vitals reviewed.      Significant Labs:   CBC:   Recent Labs  Lab 03/30/18  1426   WBC 7.09   HGB 9.1*   HCT 29.7*        CMP:   Recent Labs  Lab 03/30/18  1426      K 3.8   *   CO2 21*   *   BUN 15   CREATININE 0.8   CALCIUM 7.0*   PROT 4.9*   ALBUMIN 2.3*   BILITOT 0.1   ALKPHOS 86   AST 17   ALT 11   ANIONGAP 6*   EGFRNONAA >60.0     Blood Sugars (AccuCheck):  Recent Labs      03/29/18   1301  03/29/18   1840  03/29/18   2020  03/30/18   0906  03/30/18   1119  03/30/18   1322   POCTGLUCOSE  118*  140*  189*  117*  242*  171*      TSH   Date Value Ref Range Status    03/30/2018 0.602 0.400 - 4.000 uIU/mL Final        Significant Imaging: I have reviewed all pertinent imaging results/findings within the past 24 hours.

## 2018-03-30 NOTE — PT/OT/SLP PROGRESS
Physical Therapy Treatment    Patient Name:  Homar Jerry   MRN:  [id number]    Recommendations:     Discharge Recommendations:  nursing facility, skilled   Discharge Equipment Recommendations: none   Barriers to discharge: Inaccessible home and Decreased caregiver support    Assessment:     Homar Jerry is a 42 y.o. female admitted with a medical diagnosis of Primary osteoarthritis of left knee.  She presents with the following impairments/functional limitations:  weakness, impaired self care skills, impaired functional mobilty, gait instability, impaired balance, pain, decreased ROM, decreased lower extremity function, orthopedic precautions, edema . Patient limited with OOB mobility secondary to dizziness and pain. Noted fair L knee stability with gait using RW.  Noted decrease strength and ROM with L LE. Patient progressing well toward goals.  Patient would benefit from further IP therapy.        Rehab Prognosis:  Good; patient would benefit from acute skilled PT services to address these deficits and reach maximum level of function.      Recent Surgery: Procedure(s) (LRB):  REPLACEMENT-KNEE-TOTAL (Left) 2 Days Post-Op    Plan:     During this hospitalization, patient to be seen daily to address the above listed problems via gait training, therapeutic activities, therapeutic exercises, neuromuscular re-education  · Plan of Care Expires:  04/27/18   Plan of Care Reviewed with: patient    Subjective     Communicated with NSG prior to session.  Patient found supine upon PT entry to room, agreeable to treatment.      Chief Complaint: L knee pain  Patient comments/goals: I need to use the bathroom  Pain/Comfort:  · Pain Rating 1: 10/10  · Location - Side 1: Left  · Location 1: knee    Patients cultural, spiritual, Protestant conflicts given the current situation:      Objective:     Patient found with: perineural catheter, FCD, cryotherapy     General Precautions: Standard, fall   Orthopedic  Precautions:LLE weight bearing as tolerated   Braces:       Functional Mobility:  · Bed Mobility:     · Supine to Sit: stand by assistance  · Sit to Supine: stand by assistance  ·   · Transfers:   VC's for hand placement and technique  · Sit to Stand:  stand by assistance and contact guard assistance with rolling walker  ·   · Gait: Amb 15 feet x2 trials and 20 feet x2 trials with RW CGA.  No LOB or L knee buckling.  VC's for heel strike, walker mgmnt, sequence and safety.      AM-PAC 6 CLICK MOBILITY  Turning over in bed (including adjusting bedclothes, sheets and blankets)?: 4  Sitting down on and standing up from a chair with arms (e.g., wheelchair, bedside commode, etc.): 3  Moving from lying on back to sitting on the side of the bed?: 4  Moving to and from a bed to a chair (including a wheelchair)?: 3  Need to walk in hospital room?: 3  Climbing 3-5 steps with a railing?: 2  Total Score: 19       Therapeutic Activities and Exercises:   Patient performed TKR protocol exercises x15-20 reps  Patient given HEP  L knee PROM 88 degrees flexion        Patient left up in chair with all lines intact..    GOALS:    Physical Therapy Goals        Problem: Physical Therapy Goal    Goal Priority Disciplines Outcome Goal Variances Interventions   Physical Therapy Goal     PT/OT, PT Ongoing (interventions implemented as appropriate)     Description:  Goals to be met by: 18     Patient will increase functional independence with mobility by performin. Supine to sit with Supervision  2. Sit to supine with Supervision  3. Sit to stand transfer with Stand-by Assistance  4. Gait  x 150 feet with Stand-by Assistance using Rolling Walker.   5. Ascend/Descend 6 inch curb step with Contact Guard Assistance using Rolling Walker.  6. Lower extremity exercise program x 30 reps per handout, with independence                      Time Tracking:     PT Received On: 18  PT Start Time: 07     PT Stop Time: 0800  PT Total Time  (min): 55 min     Billable Minutes: Gait Training 23, Therapeutic Activity 23 and Therapeutic Exercise 9    Treatment Type: Treatment  PT/PTA: PTA     PTA Visit Number: 1     Matty Valderrama II, PTA  03/30/2018

## 2018-03-30 NOTE — SIGNIFICANT EVENT
Artificial Intelligence Deep      Admit Date: 3/28/2018  LOS: 2  Code Status: Prior   Date of Consult: 2018  : 1975  Age: 42 y.o.  Weight:   Wt Readings from Last 1 Encounters:   18 108.9 kg (240 lb)     Sex: female  Bed: 553/553 B:   MRN: [id number]  Attending Physician: John L. [facility] Jr., MD  Primary Service: Networked reference to record PCT   Time AI Alert Received: 12:20  Time at Bedside: 13:00           Patient found resting comfortably in bedside chair. Patient alert, oriented to person, place, time and situation and conversant.     She is complaining of generalized fatigue and L knee pain. Lungs are clear to auscultation, no murmur noted. ABD is soft, non tender and bowel sounds x 4 quadrants present. L knee and thigh swelling noted and area is tender to palpation mildly.    Chart reviewed, and it appears that patient's blood pressure reading at 11 am was 79/42; per bedside RN, anesthesia resident managing perineural adductor canal anesthesia catheter ordered 1 L crystalloid bolus, which was infusing at the time of my evaluation.     Chart review conducted, and it appears that patient is POD 2 of left total knee replacement. She has a history significant for thyroid cancer s/p thyroidectomy (on synthroid), HTN (on both valsartan and coreg), IDDM (on insulin as outpatient), reported asthma and RA. She was normotensive upon admission for surgery, and blood pressure has been low normal to hypotensive since 3/29 with home coreg and valsartan continued. Additionally, she has had difficulties with pain control and is requiring both perineural catheter along with PO and IV opiates. No recent labwork obtained other than POCT glucose over past 2 days, however CMP obtained on the afternoon of 3/28 was significant for an anion gap metabolic acidosis, hypoglycemia, and hypocalcemia. It appears that the patient was planned to go to SNF vs home post operatively, however discharge has been  delayed due insurance clearance issues.    I discussed the above issues with the orthopedic resident on call and made the following recommendations:    1. Hypotension  --etiology not clear, but agree with crystalloid bolus for now and reassessment. Recommend holding home coreg and valsartan at this time. Suspect that narcotics are contributing, however recommend obtaining CBC, CMP, mag, phos, TSH today for further evaluation  --given history of hypothyroidism, and presentation with hypoglycemia, hypothermia, hypotension, endocrine etiology could be contributing. Continue synthroid for now  --Low threshold to obtain blood cultures and initiation of empiric Abx if sepsis suspected   --Given multiple medical problems, consider Hospital Medicine co management consult to assist with blood pressure, DM, hypothyroid management.     Vital Signs (Most Recent):  Temp: 96.8 °F (36 °C) (03/30/18 1230)  Pulse: 68 (03/30/18 1230)  Resp: 18 (03/30/18 1230)  BP: (!) 89/52 (03/30/18 1230)  SpO2: 98 % (03/30/18 1230) Vital Signs (24h Range):  Temp:  [96.4 °F (35.8 °C)-97 °F (36.1 °C)] 96.8 °F (36 °C)  Pulse:  [65-87] 68  Resp:  [16-18] 18  SpO2:  [95 %-100 %] 98 %  BP: ()/(42-67) 89/52         This is an  Artificial Intelligence Notification.     Artificial Intelligence alert discussed with Primary team:  Dr. Reilly    Please place a Critical Care consult if evaluation/consultation is needed  The Critical Care Fellow can be reached at x [phone]    I spent >50 minutes reviewing patient records, examining, and counseling the patient with greater than 50% of the time spent with direct patient care and coordination.

## 2018-03-31 ENCOUNTER — HOSPITAL ENCOUNTER (INPATIENT)
Facility: HOSPITAL | Age: 43
LOS: 16 days | Discharge: HOME HEALTH | DRG: 560 | End: 2018-04-16
Attending: INTERNAL MEDICINE | Admitting: INTERNAL MEDICINE
Payer: COMMERCIAL

## 2018-03-31 ENCOUNTER — ANESTHESIA EVENT (OUTPATIENT)
Dept: SKILLED NURSING FACILITY | Facility: HOSPITAL | Age: 43
DRG: 470 | End: 2018-03-31
Payer: MEDICARE

## 2018-03-31 VITALS
WEIGHT: 240 LBS | HEART RATE: 68 BPM | DIASTOLIC BLOOD PRESSURE: 71 MMHG | RESPIRATION RATE: 16 BRPM | SYSTOLIC BLOOD PRESSURE: 109 MMHG | TEMPERATURE: 97 F | HEIGHT: 65 IN | OXYGEN SATURATION: 99 % | BODY MASS INDEX: 39.99 KG/M2

## 2018-03-31 DIAGNOSIS — M79.7 FIBROMYALGIA: Chronic | ICD-10-CM

## 2018-03-31 DIAGNOSIS — E11.65 TYPE 2 DIABETES MELLITUS WITH HYPERGLYCEMIA, WITH LONG-TERM CURRENT USE OF INSULIN: ICD-10-CM

## 2018-03-31 DIAGNOSIS — Z95.818 STATUS POST PLACEMENT OF IMPLANTABLE LOOP RECORDER: Chronic | ICD-10-CM

## 2018-03-31 DIAGNOSIS — E89.0 HYPOTHYROIDISM, POSTSURGICAL: Chronic | ICD-10-CM

## 2018-03-31 DIAGNOSIS — J45.909 UNCOMPLICATED ASTHMA, UNSPECIFIED ASTHMA SEVERITY, UNSPECIFIED WHETHER PERSISTENT: Chronic | ICD-10-CM

## 2018-03-31 DIAGNOSIS — R53.81 DEBILITY: Chronic | ICD-10-CM

## 2018-03-31 DIAGNOSIS — M17.12 PRIMARY OSTEOARTHRITIS OF LEFT KNEE: ICD-10-CM

## 2018-03-31 DIAGNOSIS — Z79.4 TYPE 2 DIABETES MELLITUS WITH HYPERGLYCEMIA, WITH LONG-TERM CURRENT USE OF INSULIN: ICD-10-CM

## 2018-03-31 DIAGNOSIS — G89.29 CHRONIC LOW BACK PAIN, UNSPECIFIED BACK PAIN LATERALITY, WITH SCIATICA PRESENCE UNSPECIFIED: Chronic | ICD-10-CM

## 2018-03-31 DIAGNOSIS — E66.01 MORBID OBESITY WITH BMI OF 45.0-49.9, ADULT: ICD-10-CM

## 2018-03-31 DIAGNOSIS — G43.909 MIGRAINE WITHOUT STATUS MIGRAINOSUS, NOT INTRACTABLE, UNSPECIFIED MIGRAINE TYPE: Chronic | ICD-10-CM

## 2018-03-31 DIAGNOSIS — F41.9 ANXIETY: Chronic | ICD-10-CM

## 2018-03-31 DIAGNOSIS — I10 ESSENTIAL HYPERTENSION: Chronic | ICD-10-CM

## 2018-03-31 DIAGNOSIS — F41.0 PANIC DISORDER WITHOUT AGORAPHOBIA: ICD-10-CM

## 2018-03-31 DIAGNOSIS — M54.5 CHRONIC LOW BACK PAIN, UNSPECIFIED BACK PAIN LATERALITY, WITH SCIATICA PRESENCE UNSPECIFIED: Chronic | ICD-10-CM

## 2018-03-31 DIAGNOSIS — Z96.652 STATUS POST TOTAL LEFT KNEE REPLACEMENT: Primary | ICD-10-CM

## 2018-03-31 DIAGNOSIS — F31.9 BIPOLAR 1 DISORDER: Chronic | ICD-10-CM

## 2018-03-31 DIAGNOSIS — K31.84 GASTROPARESIS: ICD-10-CM

## 2018-03-31 DIAGNOSIS — J30.9 CHRONIC ALLERGIC RHINITIS, UNSPECIFIED SEASONALITY, UNSPECIFIED TRIGGER: Chronic | ICD-10-CM

## 2018-03-31 DIAGNOSIS — M06.9 RHEUMATOID ARTHRITIS INVOLVING MULTIPLE SITES, UNSPECIFIED RHEUMATOID FACTOR PRESENCE: Chronic | ICD-10-CM

## 2018-03-31 LAB
POCT GLUCOSE: 121 MG/DL (ref 70–110)
POCT GLUCOSE: 122 MG/DL (ref 70–110)
POCT GLUCOSE: 190 MG/DL (ref 70–110)
POCT GLUCOSE: 210 MG/DL (ref 70–110)

## 2018-03-31 PROCEDURE — 97116 GAIT TRAINING THERAPY: CPT

## 2018-03-31 PROCEDURE — 25000003 PHARM REV CODE 250: Performed by: INTERNAL MEDICINE

## 2018-03-31 PROCEDURE — 93005 ELECTROCARDIOGRAM TRACING: CPT

## 2018-03-31 PROCEDURE — 25000003 PHARM REV CODE 250: Performed by: PHYSICIAN ASSISTANT

## 2018-03-31 PROCEDURE — 93010 ELECTROCARDIOGRAM REPORT: CPT | Mod: ,,, | Performed by: INTERNAL MEDICINE

## 2018-03-31 PROCEDURE — 11000004 HC SNF PRIVATE

## 2018-03-31 PROCEDURE — 25000003 PHARM REV CODE 250: Performed by: ORTHOPAEDIC SURGERY

## 2018-03-31 PROCEDURE — 97110 THERAPEUTIC EXERCISES: CPT

## 2018-03-31 PROCEDURE — 99231 SBSQ HOSP IP/OBS SF/LOW 25: CPT | Mod: ,,, | Performed by: ANESTHESIOLOGY

## 2018-03-31 PROCEDURE — 25000003 PHARM REV CODE 250: Performed by: STUDENT IN AN ORGANIZED HEALTH CARE EDUCATION/TRAINING PROGRAM

## 2018-03-31 PROCEDURE — 25000003 PHARM REV CODE 250: Performed by: ANESTHESIOLOGY

## 2018-03-31 PROCEDURE — 97530 THERAPEUTIC ACTIVITIES: CPT

## 2018-03-31 PROCEDURE — 63600175 PHARM REV CODE 636 W HCPCS: Performed by: ANESTHESIOLOGY

## 2018-03-31 PROCEDURE — 63600175 PHARM REV CODE 636 W HCPCS: Performed by: ORTHOPAEDIC SURGERY

## 2018-03-31 PROCEDURE — 94799 UNLISTED PULMONARY SVC/PX: CPT

## 2018-03-31 RX ORDER — FLUTICASONE PROPIONATE 50 MCG
2 SPRAY, SUSPENSION (ML) NASAL DAILY
Status: DISCONTINUED | OUTPATIENT
Start: 2018-04-01 | End: 2018-04-16 | Stop reason: HOSPADM

## 2018-03-31 RX ORDER — BUSPIRONE HYDROCHLORIDE 10 MG/1
10 TABLET ORAL 2 TIMES DAILY
Status: DISCONTINUED | OUTPATIENT
Start: 2018-03-31 | End: 2018-04-16 | Stop reason: HOSPADM

## 2018-03-31 RX ORDER — LEVOTHYROXINE SODIUM 150 UG/1
150 TABLET ORAL DAILY
Status: CANCELLED | OUTPATIENT
Start: 2018-04-01

## 2018-03-31 RX ORDER — RAMELTEON 8 MG/1
8 TABLET ORAL NIGHTLY PRN
Status: CANCELLED | OUTPATIENT
Start: 2018-03-31

## 2018-03-31 RX ORDER — HYDROMORPHONE HYDROCHLORIDE 1 MG/ML
0.5 INJECTION, SOLUTION INTRAMUSCULAR; INTRAVENOUS; SUBCUTANEOUS ONCE
Status: DISCONTINUED | OUTPATIENT
Start: 2018-03-31 | End: 2018-03-31 | Stop reason: HOSPADM

## 2018-03-31 RX ORDER — SODIUM CHLORIDE 0.9 % (FLUSH) 0.9 %
3 SYRINGE (ML) INJECTION EVERY 8 HOURS PRN
Status: CANCELLED | OUTPATIENT
Start: 2018-03-31

## 2018-03-31 RX ORDER — GLUCAGON 1 MG
1 KIT INJECTION
Status: DISCONTINUED | OUTPATIENT
Start: 2018-03-31 | End: 2018-03-31 | Stop reason: HOSPADM

## 2018-03-31 RX ORDER — ALPRAZOLAM 0.5 MG/1
1 TABLET ORAL 2 TIMES DAILY PRN
Status: DISCONTINUED | OUTPATIENT
Start: 2018-03-31 | End: 2018-04-16 | Stop reason: HOSPADM

## 2018-03-31 RX ORDER — IBUPROFEN 200 MG
16 TABLET ORAL
Status: DISCONTINUED | OUTPATIENT
Start: 2018-03-31 | End: 2018-03-31 | Stop reason: HOSPADM

## 2018-03-31 RX ORDER — GLUCAGON 1 MG
1 KIT INJECTION
Status: DISCONTINUED | OUTPATIENT
Start: 2018-03-31 | End: 2018-03-31

## 2018-03-31 RX ORDER — INSULIN ASPART 100 [IU]/ML
1-10 INJECTION, SOLUTION INTRAVENOUS; SUBCUTANEOUS
Status: DISCONTINUED | OUTPATIENT
Start: 2018-03-31 | End: 2018-04-16 | Stop reason: HOSPADM

## 2018-03-31 RX ORDER — NALOXONE HCL 0.4 MG/ML
0.02 VIAL (ML) INJECTION
Status: CANCELLED | OUTPATIENT
Start: 2018-03-31

## 2018-03-31 RX ORDER — MIRTAZAPINE 15 MG/1
45 TABLET, FILM COATED ORAL NIGHTLY
Status: DISCONTINUED | OUTPATIENT
Start: 2018-03-31 | End: 2018-04-16 | Stop reason: HOSPADM

## 2018-03-31 RX ORDER — OXYCODONE HYDROCHLORIDE 5 MG/1
20 TABLET ORAL EVERY 6 HOURS PRN
Status: CANCELLED | OUTPATIENT
Start: 2018-03-31

## 2018-03-31 RX ORDER — CALCIUM CARBONATE 200(500)MG
500 TABLET,CHEWABLE ORAL 2 TIMES DAILY PRN
Status: CANCELLED | OUTPATIENT
Start: 2018-03-31

## 2018-03-31 RX ORDER — QUETIAPINE FUMARATE 100 MG/1
400 TABLET, FILM COATED ORAL NIGHTLY
Status: CANCELLED | OUTPATIENT
Start: 2018-03-31

## 2018-03-31 RX ORDER — IBUPROFEN 200 MG
16 TABLET ORAL
Status: DISCONTINUED | OUTPATIENT
Start: 2018-03-31 | End: 2018-03-31

## 2018-03-31 RX ORDER — INSULIN ASPART 100 [IU]/ML
1-10 INJECTION, SOLUTION INTRAVENOUS; SUBCUTANEOUS
Status: CANCELLED | OUTPATIENT
Start: 2018-03-31

## 2018-03-31 RX ORDER — ONDANSETRON 8 MG/1
8 TABLET, ORALLY DISINTEGRATING ORAL EVERY 6 HOURS PRN
Status: DISCONTINUED | OUTPATIENT
Start: 2018-03-31 | End: 2018-04-16 | Stop reason: HOSPADM

## 2018-03-31 RX ORDER — ACETAMINOPHEN 325 MG/1
650 TABLET ORAL EVERY 6 HOURS PRN
Status: DISCONTINUED | OUTPATIENT
Start: 2018-03-31 | End: 2018-04-16 | Stop reason: HOSPADM

## 2018-03-31 RX ORDER — OXYCODONE HYDROCHLORIDE 5 MG/1
10 TABLET ORAL
Status: CANCELLED | OUTPATIENT
Start: 2018-03-31

## 2018-03-31 RX ORDER — AMOXICILLIN 250 MG
1 CAPSULE ORAL 2 TIMES DAILY
Status: DISCONTINUED | OUTPATIENT
Start: 2018-03-31 | End: 2018-03-31

## 2018-03-31 RX ORDER — IBUPROFEN 200 MG
24 TABLET ORAL
Status: CANCELLED | OUTPATIENT
Start: 2018-03-31

## 2018-03-31 RX ORDER — ALPRAZOLAM 1 MG/1
1 TABLET ORAL 2 TIMES DAILY PRN
Status: CANCELLED | OUTPATIENT
Start: 2018-03-31

## 2018-03-31 RX ORDER — GABAPENTIN 300 MG/1
600 CAPSULE ORAL 3 TIMES DAILY
Status: DISCONTINUED | OUTPATIENT
Start: 2018-03-31 | End: 2018-04-16 | Stop reason: HOSPADM

## 2018-03-31 RX ORDER — OXYCODONE HYDROCHLORIDE 5 MG/1
20 TABLET ORAL EVERY 6 HOURS PRN
Status: DISCONTINUED | OUTPATIENT
Start: 2018-03-31 | End: 2018-04-13

## 2018-03-31 RX ORDER — GLUCAGON 1 MG
1 KIT INJECTION
Status: DISCONTINUED | OUTPATIENT
Start: 2018-03-31 | End: 2018-04-16 | Stop reason: HOSPADM

## 2018-03-31 RX ORDER — FLUTICASONE PROPIONATE 50 MCG
2 SPRAY, SUSPENSION (ML) NASAL DAILY
Status: CANCELLED | OUTPATIENT
Start: 2018-04-01

## 2018-03-31 RX ORDER — IBUPROFEN 200 MG
24 TABLET ORAL
Status: DISCONTINUED | OUTPATIENT
Start: 2018-03-31 | End: 2018-03-31 | Stop reason: HOSPADM

## 2018-03-31 RX ORDER — QUETIAPINE FUMARATE 200 MG/1
400 TABLET, FILM COATED ORAL NIGHTLY
Status: DISCONTINUED | OUTPATIENT
Start: 2018-03-31 | End: 2018-03-31

## 2018-03-31 RX ORDER — TIZANIDINE 4 MG/1
12 TABLET ORAL EVERY 8 HOURS
Status: CANCELLED | OUTPATIENT
Start: 2018-03-31

## 2018-03-31 RX ORDER — GLUCAGON 1 MG
1 KIT INJECTION
Status: CANCELLED | OUTPATIENT
Start: 2018-03-31

## 2018-03-31 RX ORDER — RAMELTEON 8 MG/1
8 TABLET ORAL NIGHTLY PRN
Status: DISCONTINUED | OUTPATIENT
Start: 2018-03-31 | End: 2018-04-16 | Stop reason: HOSPADM

## 2018-03-31 RX ORDER — TRAZODONE HYDROCHLORIDE 50 MG/1
50 TABLET ORAL NIGHTLY PRN
Status: CANCELLED | OUTPATIENT
Start: 2018-03-31

## 2018-03-31 RX ORDER — QUETIAPINE FUMARATE 300 MG/1
600 TABLET, FILM COATED ORAL NIGHTLY
Status: DISCONTINUED | OUTPATIENT
Start: 2018-03-31 | End: 2018-04-01

## 2018-03-31 RX ORDER — NALOXONE HCL 0.4 MG/ML
0.02 VIAL (ML) INJECTION
Status: DISCONTINUED | OUTPATIENT
Start: 2018-03-31 | End: 2018-04-16 | Stop reason: HOSPADM

## 2018-03-31 RX ORDER — CALCIUM CARBONATE 200(500)MG
500 TABLET,CHEWABLE ORAL 2 TIMES DAILY PRN
Status: DISCONTINUED | OUTPATIENT
Start: 2018-03-31 | End: 2018-04-16 | Stop reason: HOSPADM

## 2018-03-31 RX ORDER — MECLIZINE HYDROCHLORIDE 25 MG/1
25 TABLET ORAL 3 TIMES DAILY PRN
Status: CANCELLED | OUTPATIENT
Start: 2018-03-31

## 2018-03-31 RX ORDER — INSULIN ASPART 100 [IU]/ML
8 INJECTION, SOLUTION INTRAVENOUS; SUBCUTANEOUS
Status: DISCONTINUED | OUTPATIENT
Start: 2018-03-31 | End: 2018-04-05

## 2018-03-31 RX ORDER — OXYCODONE HYDROCHLORIDE 5 MG/1
10 TABLET ORAL
Status: DISCONTINUED | OUTPATIENT
Start: 2018-03-31 | End: 2018-04-13

## 2018-03-31 RX ORDER — OXYCODONE HYDROCHLORIDE 5 MG/1
15 TABLET ORAL
Status: DISCONTINUED | OUTPATIENT
Start: 2018-03-31 | End: 2018-04-13

## 2018-03-31 RX ORDER — INSULIN ASPART 100 [IU]/ML
8 INJECTION, SOLUTION INTRAVENOUS; SUBCUTANEOUS
Status: CANCELLED | OUTPATIENT
Start: 2018-03-31

## 2018-03-31 RX ORDER — AMOXICILLIN 250 MG
1 CAPSULE ORAL 2 TIMES DAILY
Status: CANCELLED | OUTPATIENT
Start: 2018-03-31

## 2018-03-31 RX ORDER — GABAPENTIN 300 MG/1
600 CAPSULE ORAL 3 TIMES DAILY
Status: CANCELLED | OUTPATIENT
Start: 2018-03-31

## 2018-03-31 RX ORDER — TIZANIDINE 4 MG/1
12 TABLET ORAL EVERY 8 HOURS
Status: DISCONTINUED | OUTPATIENT
Start: 2018-03-31 | End: 2018-04-01

## 2018-03-31 RX ORDER — ONDANSETRON 8 MG/1
8 TABLET, ORALLY DISINTEGRATING ORAL EVERY 6 HOURS PRN
Status: CANCELLED | OUTPATIENT
Start: 2018-03-31

## 2018-03-31 RX ORDER — MIRTAZAPINE 15 MG/1
45 TABLET, FILM COATED ORAL NIGHTLY
Status: CANCELLED | OUTPATIENT
Start: 2018-03-31

## 2018-03-31 RX ORDER — AMOXICILLIN 250 MG
1 CAPSULE ORAL 2 TIMES DAILY
Status: DISCONTINUED | OUTPATIENT
Start: 2018-03-31 | End: 2018-04-01

## 2018-03-31 RX ORDER — MECLIZINE HCL 12.5 MG 12.5 MG/1
25 TABLET ORAL 3 TIMES DAILY PRN
Status: DISCONTINUED | OUTPATIENT
Start: 2018-03-31 | End: 2018-04-16 | Stop reason: HOSPADM

## 2018-03-31 RX ORDER — INSULIN ASPART 100 [IU]/ML
1-10 INJECTION, SOLUTION INTRAVENOUS; SUBCUTANEOUS
Status: DISCONTINUED | OUTPATIENT
Start: 2018-03-31 | End: 2018-03-31

## 2018-03-31 RX ORDER — FAMOTIDINE 20 MG/1
20 TABLET, FILM COATED ORAL 2 TIMES DAILY
Status: CANCELLED | OUTPATIENT
Start: 2018-03-31

## 2018-03-31 RX ORDER — BISACODYL 10 MG
10 SUPPOSITORY, RECTAL RECTAL EVERY 12 HOURS PRN
Status: CANCELLED | OUTPATIENT
Start: 2018-03-31

## 2018-03-31 RX ORDER — IBUPROFEN 200 MG
16 TABLET ORAL
Status: DISCONTINUED | OUTPATIENT
Start: 2018-03-31 | End: 2018-04-16 | Stop reason: HOSPADM

## 2018-03-31 RX ORDER — FAMOTIDINE 20 MG/1
20 TABLET, FILM COATED ORAL 2 TIMES DAILY
Status: DISCONTINUED | OUTPATIENT
Start: 2018-03-31 | End: 2018-04-16 | Stop reason: HOSPADM

## 2018-03-31 RX ORDER — TRAZODONE HYDROCHLORIDE 50 MG/1
50 TABLET ORAL NIGHTLY PRN
Status: DISCONTINUED | OUTPATIENT
Start: 2018-03-31 | End: 2018-04-01

## 2018-03-31 RX ORDER — PROMETHAZINE HYDROCHLORIDE 25 MG/1
25 TABLET ORAL EVERY 6 HOURS PRN
Status: CANCELLED | OUTPATIENT
Start: 2018-03-31

## 2018-03-31 RX ORDER — CELECOXIB 200 MG/1
200 CAPSULE ORAL DAILY
Status: CANCELLED | OUTPATIENT
Start: 2018-04-01

## 2018-03-31 RX ORDER — ASPIRIN 325 MG
325 TABLET, DELAYED RELEASE (ENTERIC COATED) ORAL 2 TIMES DAILY
Status: CANCELLED | OUTPATIENT
Start: 2018-03-31

## 2018-03-31 RX ORDER — ASPIRIN 325 MG
325 TABLET, DELAYED RELEASE (ENTERIC COATED) ORAL 2 TIMES DAILY
Status: DISCONTINUED | OUTPATIENT
Start: 2018-03-31 | End: 2018-04-16 | Stop reason: HOSPADM

## 2018-03-31 RX ORDER — IBUPROFEN 200 MG
24 TABLET ORAL
Status: DISCONTINUED | OUTPATIENT
Start: 2018-03-31 | End: 2018-03-31

## 2018-03-31 RX ORDER — POLYETHYLENE GLYCOL 3350 17 G/17G
17 POWDER, FOR SOLUTION ORAL DAILY
Status: CANCELLED | OUTPATIENT
Start: 2018-04-01

## 2018-03-31 RX ORDER — PROMETHAZINE HYDROCHLORIDE 25 MG/1
25 TABLET ORAL EVERY 6 HOURS PRN
Status: DISCONTINUED | OUTPATIENT
Start: 2018-03-31 | End: 2018-04-16 | Stop reason: HOSPADM

## 2018-03-31 RX ORDER — IBUPROFEN 200 MG
24 TABLET ORAL
Status: DISCONTINUED | OUTPATIENT
Start: 2018-03-31 | End: 2018-04-16 | Stop reason: HOSPADM

## 2018-03-31 RX ORDER — ACETAMINOPHEN 325 MG/1
650 TABLET ORAL EVERY 6 HOURS PRN
Status: CANCELLED | OUTPATIENT
Start: 2018-03-31

## 2018-03-31 RX ORDER — POLYETHYLENE GLYCOL 3350 17 G/17G
17 POWDER, FOR SOLUTION ORAL DAILY
Status: DISCONTINUED | OUTPATIENT
Start: 2018-04-01 | End: 2018-04-16 | Stop reason: HOSPADM

## 2018-03-31 RX ORDER — IBUPROFEN 200 MG
16 TABLET ORAL
Status: CANCELLED | OUTPATIENT
Start: 2018-03-31

## 2018-03-31 RX ORDER — BUSPIRONE HYDROCHLORIDE 10 MG/1
10 TABLET ORAL 2 TIMES DAILY
Status: CANCELLED | OUTPATIENT
Start: 2018-03-31

## 2018-03-31 RX ORDER — CELECOXIB 200 MG/1
200 CAPSULE ORAL DAILY
Status: DISCONTINUED | OUTPATIENT
Start: 2018-04-01 | End: 2018-04-01

## 2018-03-31 RX ORDER — LEVOTHYROXINE SODIUM 75 UG/1
150 TABLET ORAL DAILY
Status: DISCONTINUED | OUTPATIENT
Start: 2018-04-01 | End: 2018-04-16 | Stop reason: HOSPADM

## 2018-03-31 RX ORDER — SODIUM CHLORIDE 0.9 % (FLUSH) 0.9 %
3 SYRINGE (ML) INJECTION EVERY 8 HOURS PRN
Status: DISCONTINUED | OUTPATIENT
Start: 2018-03-31 | End: 2018-04-01

## 2018-03-31 RX ORDER — BISACODYL 10 MG
10 SUPPOSITORY, RECTAL RECTAL EVERY 12 HOURS PRN
Status: DISCONTINUED | OUTPATIENT
Start: 2018-03-31 | End: 2018-04-16 | Stop reason: HOSPADM

## 2018-03-31 RX ORDER — OXYCODONE HYDROCHLORIDE 5 MG/1
15 TABLET ORAL
Status: CANCELLED | OUTPATIENT
Start: 2018-03-31

## 2018-03-31 RX ADMIN — OXYCODONE HYDROCHLORIDE 15 MG: 5 TABLET ORAL at 02:03

## 2018-03-31 RX ADMIN — HYDROMORPHONE HYDROCHLORIDE 1 MG: 2 INJECTION INTRAMUSCULAR; INTRAVENOUS; SUBCUTANEOUS at 09:03

## 2018-03-31 RX ADMIN — GABAPENTIN 600 MG: 300 CAPSULE ORAL at 03:03

## 2018-03-31 RX ADMIN — TIZANIDINE 12 MG: 4 TABLET ORAL at 08:03

## 2018-03-31 RX ADMIN — REGULAR STRENGTH 325 MG: 325 TABLET ORAL at 08:03

## 2018-03-31 RX ADMIN — QUETIAPINE FUMARATE 600 MG: 200 TABLET, FILM COATED ORAL at 08:03

## 2018-03-31 RX ADMIN — INSULIN ASPART 8 UNITS: 100 INJECTION, SOLUTION INTRAVENOUS; SUBCUTANEOUS at 08:03

## 2018-03-31 RX ADMIN — TIZANIDINE 12 MG: 4 TABLET ORAL at 05:03

## 2018-03-31 RX ADMIN — MIRTAZAPINE 45 MG: 15 TABLET, FILM COATED ORAL at 08:03

## 2018-03-31 RX ADMIN — RAMELTEON 8 MG: 8 TABLET, FILM COATED ORAL at 08:03

## 2018-03-31 RX ADMIN — INSULIN ASPART 4 UNITS: 100 INJECTION, SOLUTION INTRAVENOUS; SUBCUTANEOUS at 05:03

## 2018-03-31 RX ADMIN — OXYCODONE HYDROCHLORIDE 20 MG: 5 TABLET ORAL at 12:03

## 2018-03-31 RX ADMIN — POLYETHYLENE GLYCOL 3350 17 G: 17 POWDER, FOR SOLUTION ORAL at 09:03

## 2018-03-31 RX ADMIN — ACETAMINOPHEN 650 MG: 325 TABLET ORAL at 01:03

## 2018-03-31 RX ADMIN — INSULIN DETEMIR 20 UNITS: 100 INJECTION, SOLUTION SUBCUTANEOUS at 08:03

## 2018-03-31 RX ADMIN — CELECOXIB 200 MG: 200 CAPSULE ORAL at 09:03

## 2018-03-31 RX ADMIN — TIZANIDINE 12 MG: 4 TABLET ORAL at 01:03

## 2018-03-31 RX ADMIN — BUSPIRONE HYDROCHLORIDE 10 MG: 10 TABLET ORAL at 08:03

## 2018-03-31 RX ADMIN — BUSPIRONE HYDROCHLORIDE 10 MG: 10 TABLET ORAL at 09:03

## 2018-03-31 RX ADMIN — FAMOTIDINE 20 MG: 20 TABLET, FILM COATED ORAL at 08:03

## 2018-03-31 RX ADMIN — GABAPENTIN 600 MG: 300 CAPSULE ORAL at 08:03

## 2018-03-31 RX ADMIN — PROMETHAZINE HYDROCHLORIDE 25 MG: 12.5 TABLET ORAL at 02:03

## 2018-03-31 RX ADMIN — INSULIN ASPART 8 UNITS: 100 INJECTION, SOLUTION INTRAVENOUS; SUBCUTANEOUS at 05:03

## 2018-03-31 RX ADMIN — OXYCODONE HYDROCHLORIDE 15 MG: 5 TABLET ORAL at 06:03

## 2018-03-31 RX ADMIN — GABAPENTIN 600 MG: 300 CAPSULE ORAL at 09:03

## 2018-03-31 RX ADMIN — ALPRAZOLAM 1 MG: 0.5 TABLET ORAL at 01:03

## 2018-03-31 RX ADMIN — ASPIRIN 325 MG: 325 TABLET, DELAYED RELEASE ORAL at 09:03

## 2018-03-31 RX ADMIN — FAMOTIDINE 20 MG: 20 TABLET, FILM COATED ORAL at 09:03

## 2018-03-31 RX ADMIN — OXYCODONE HYDROCHLORIDE 20 MG: 5 TABLET ORAL at 06:03

## 2018-03-31 RX ADMIN — OXYCODONE HYDROCHLORIDE 15 MG: 5 TABLET ORAL at 10:03

## 2018-03-31 RX ADMIN — LEVOTHYROXINE SODIUM 150 MCG: 150 TABLET ORAL at 09:03

## 2018-03-31 RX ADMIN — STANDARDIZED SENNA CONCENTRATE AND DOCUSATE SODIUM 1 TABLET: 8.6; 5 TABLET, FILM COATED ORAL at 08:03

## 2018-03-31 NOTE — PLAN OF CARE
Problem: Physical Therapy Goal  Goal: Physical Therapy Goal  Goals to be met by: 18     Patient will increase functional independence with mobility by performin. Supine to sit with Supervision met  2. Sit to supine with Supervision met  3. Sit to stand transfer with Stand-by Assistance met  4. Gait  x 150 feet with Stand-by Assistance using Rolling Walker.   5. Ascend/Descend 6 inch curb step with Contact Guard Assistance using Rolling Walker.  6. Lower extremity exercise program x 30 reps per handout, with independence     Patient goals remain appropriate.  Patient will continue to benefit from further PT services.  Matty Valderrama, PTA

## 2018-03-31 NOTE — PLAN OF CARE
Problem: Patient Care Overview  Goal: Plan of Care Review  Outcome: Ongoing (interventions implemented as appropriate)  POC reviewed with patient.  Interventions documented in flow sheet.

## 2018-03-31 NOTE — NURSING
Patient arrived to the unit via  [facility] transportation at 1245. Patients VS were taken and a full assessment was performed.  Patient was given a new admission packet and oriented to the room.  New admission paperwork was signed by the patient and placed in her blue folder.

## 2018-03-31 NOTE — PT/OT/SLP PROGRESS
Physical Therapy Treatment    Patient Name:  Homar Jerry   MRN:  [id number]    Recommendations:     Discharge Recommendations:  nursing facility, skilled   Discharge Equipment Recommendations: none   Barriers to discharge: Inaccessible home and Decreased caregiver support    Assessment:     Homar Jerry is a 42 y.o. female admitted with a medical diagnosis of Primary osteoarthritis of left knee.  She presents with the following impairments/functional limitations:  weakness, impaired self care skills, impaired functional mobilty, gait instability, impaired balance, decreased lower extremity function, pain, decreased ROM, orthopedic precautions .Patient limited with OOB mobility secondary to pain and dizziness.  Continue to note decrease strength and ROM with L LE.  Patient demonstrated fair stability with L LE with OOB mobility using RW.  Patient met 3/6 goals, but still requires assistance with all OOB mobility.  Patient would benefit from further IP therapy.    Rehab Prognosis:  Good; patient would benefit from acute skilled PT services to address these deficits and reach maximum level of function.      Recent Surgery: Procedure(s) (LRB):  REPLACEMENT-KNEE-TOTAL (Left) 3 Days Post-Op    Plan:     During this hospitalization, patient to be seen daily to address the above listed problems via gait training, therapeutic activities, therapeutic exercises, neuromuscular re-education  · Plan of Care Expires:  04/27/18   Plan of Care Reviewed with: patient    Subjective     Communicated with NSg prior to session.  Patient found supine upon PT entry to room, agreeable to treatment.      Chief Complaint: L knee pain and dizziness with standing  Patient comments/goals: Patient stated the perineural catheter wasn't working and that Anesthesia was going to pull it later.   Pain/Comfort:  · Pain Rating 1: 10/10  · Location - Side 1: Left  · Location 1: knee    Patients cultural, spiritual, Uatsdin conflicts  given the current situation:      Objective:     Patient found with: perineural catheter, FCD, cryotherapy     General Precautions: Standard, fall   Orthopedic Precautions:LLE weight bearing as tolerated   Braces:       Functional Mobility:  · Bed Mobility:     · Supine to Sit: modified independence   · Sit to Supine: modified independence   ·   · Transfers:     · Sit to Stand:  stand by assistance and contact guard assistance with rolling walker  · Stand Pivot transfer from bed to BS chair with RW CGA.  ·   · Gait: amb 12 feet with RW CGA/min assistance.  No LOB, but required mod VC's for sequence, heel strike, and walker mgmnt.  Gait limited secondary to pain and dizziness.      AM-PAC 6 CLICK MOBILITY  Turning over in bed (including adjusting bedclothes, sheets and blankets)?: 4  Sitting down on and standing up from a chair with arms (e.g., wheelchair, bedside commode, etc.): 3  Moving from lying on back to sitting on the side of the bed?: 4  Moving to and from a bed to a chair (including a wheelchair)?: 3  Need to walk in hospital room?: 3  Climbing 3-5 steps with a railing?: 2  Total Score: 19       Therapeutic Activities and Exercises:   Patient performed TKR protocol exercises x15-20 reps   L knee PROM 88 degrees flexion  Patient BP seated 116/65, standing 107/59 and BP seated after performing gait 113/71. NSG notified and aware.      Patient left up in chair with all lines intact and call button in reach..    GOALS:    Physical Therapy Goals        Problem: Physical Therapy Goal    Goal Priority Disciplines Outcome Goal Variances Interventions   Physical Therapy Goal     PT/OT, PT Ongoing (interventions implemented as appropriate)     Description:  Goals to be met by: 18     Patient will increase functional independence with mobility by performin. Supine to sit with Supervision met  2. Sit to supine with Supervision met  3. Sit to stand transfer with Stand-by Assistance met  4. Gait  x 150 feet  with Stand-by Assistance using Rolling Walker.   5. Ascend/Descend 6 inch curb step with Contact Guard Assistance using Rolling Walker.  6. Lower extremity exercise program x 30 reps per handout, with independence                       Time Tracking:     PT Received On: 03/31/18  PT Start Time: 0800     PT Stop Time: 0845  PT Total Time (min): 45 min     Billable Minutes: Gait Training 8, Therapeutic Activity 28 and Therapeutic Exercise 9    Treatment Type: Treatment  PT/PTA: PTA     PTA Visit Number: 2     Matty Valderrama II, PTA  03/31/2018

## 2018-03-31 NOTE — ANESTHESIA POST-OP PAIN MANAGEMENT
Acute Pain Service and Perioperative Surgical Home Progress Note    HPI  Homar Jerry is a 42 y.o., female w/ a significant PMHx of CARMEN, T1BD, fibromyalgia, RA, HTN, T2DM (insulin dependent), morbid obesity, asthma, and primary OA of left knee not amenable to conservative medical management.     Interval history  Patient was supposed to be DC'd to [facility] SNF and On-Q was attached 3/29 evening but insurance denied her. On-Q detached 3/30 and infusion restarted via pump. On assessment this AM, patient complains of throbbing pain all around her knee and into her lower back. She does not think the PNC is working as she can feel sharp pain along her leg. Will continue PNC for now. Gave her a 5 cc bolus and will reassess later today.        Surgery:  Procedure(s) (LRB):  REPLACEMENT-KNEE-TOTAL (Left)    Post Op Day #: 3    Catheter type: Perineural Adductor Canal    Infusion type: Ropivacaine 0.2%  8 ml/hr basal    Problem List:    Active Hospital Problems    Diagnosis  POA    *Primary osteoarthritis of left knee [M17.12]  Yes    Idiopathic hypotension [I95.0]  No    Morbid obesity with BMI of 40.0-44.9, adult [E66.01, Z68.41]  Not Applicable    Type 2 diabetes mellitus with hyperglycemia, without long-term current use of insulin [E11.65]  Yes     Chronic    Essential hypertension [I10]  Yes     Chronic     Carvedilol   HCTZ  Vlsartan- HCTZ  Usual BP- 110/70-80       Hypothyroidism, postsurgical [E89.0]  Yes     Chronic     thyroid cancer s/p thyroidectomy in 2013        Resolved Hospital Problems    Diagnosis Date Resolved POA   No resolved problems to display.       Subjective:       General appearance of alert, oriented, no complaints   Pain with rest: 8    Numbers   Pain with movement: 9    Numbers   Side Effects    1. Pruritis No    2. Nausea No    3. Motor Blockade No, 0=Ability to raise lower extremities off bed    4. Sedation Yes, 2=slightly drowsy, easily aroused    Schedule Medications:    aspirin   325 mg Oral BID    busPIRone  10 mg Oral BID    celecoxib  200 mg Oral Daily    famotidine  20 mg Oral BID    fluticasone  2 spray Each Nare Daily    gabapentin  600 mg Oral TID    HYDROmorphone  0.5 mg Intravenous Once    insulin aspart U-100  8 Units Subcutaneous TIDWM    insulin detemir U-100  20 Units Subcutaneous QHS    levothyroxine  150 mcg Oral Daily    mirtazapine  45 mg Oral QHS    polyethylene glycol  17 g Oral Daily    QUEtiapine  400 mg Oral QHS    senna-docusate 8.6-50 mg  1 tablet Oral BID    sodium chloride 0.9%  1,000 mL Intravenous Once    tiZANidine  12 mg Oral Q8H        Continuous Infusions:   ropivacaine (PF) 2 mg/ml (0.2%) 8 mL/hr (03/30/18 2130)    ropivacaine 8 mL/hr at 03/29/18 1335        PRN Medications:  ALPRAZolam, bisacodyl, dextrose 50%, dextrose 50%, dextrose 50%, dextrose 50%, glucagon (human recombinant), glucagon (human recombinant), glucose, glucose, glucose, glucose, hydromorphone (PF), insulin aspart U-100, meclizine, naloxone, ondansetron, oxyCODONE, oxyCODONE, oxyCODONE, promethazine, ramelteon, ropivacaine, sodium chloride 0.9%, traZODone       Antibiotics:  Antibiotics     None             Objective:     Catheter site clean, dry, intact          Vital Signs (Most Recent):  Temp: 36.9 °C (98.5 °F) (03/31/18 0715)  Pulse: (!) 58 (03/31/18 0715)  Resp: 18 (03/31/18 0715)  BP: (!) 102/56 (03/31/18 0715)  SpO2: 95 % (03/31/18 0715) Vital Signs Range (Last 24H):  Temp:  [35.4 °C (95.8 °F)-36.9 °C (98.5 °F)]   Pulse:  [58-87]   Resp:  [16-18]   BP: ()/(42-73)   SpO2:  [95 %-100 %]          I & O (Last 24H):  Intake/Output Summary (Last 24 hours) at 03/31/18 0731  Last data filed at 03/30/18 1426   Gross per 24 hour   Intake              368 ml   Output                0 ml   Net              368 ml       Physical Exam:    GA: Alert, comfortable, no acute distress.   Pulmonary: Clear to auscultation A/P/L. No wheezing, crackles, or rhonchi.  Cardiac: RRR S1 &  "S2 w/o rubs/murmurs/gallops.   Abdominal:Bowel sounds present. No tenderness to palpation or distension. No appreciable hepatosplenomegaly.   Skin: No jaundice, rashes, or visible lesions.         Laboratory:  CBC: Recent Labs      03/30/18   1426   WBC  7.09   RBC  3.23*   HGB  9.1*   HCT  29.7*   PLT  208   MCV  92   MCH  28.2   MCHC  30.6*       BMP: Recent Labs      03/28/18   1436  03/30/18   1426   NA  138  140   K  4.5  3.8   CO2  13*  21*   CL  100  113*   BUN  7  15   CREATININE  0.4*  0.8   GLU  54*  177*   CALCIUM  3.5*  7.0*       No results for input(s): PT, INR, PROTIME, APTT in the last 72 hours.    Assessment:         Pain control adequate    Plan:                - Pain: Adductor canal perineural catheter in place and infusing via pump. Plan to remove today as it doesn't seem to be working. Multimodal pain regimen with acetaminophen, Celebrex, Lyrica, and PRN oxycodone and dilaudid. Will continue to monitor. Plan to D/C On-Q and restart infusion via pump to allow for bolus med administration. Pt likely to be DC'd on Monday or later per ortho.  - HTN: continue home valsartan  - T1BD/CARMEN/fibromyalgia: continue home meds  - Insulin dependent T2DM: Moderate dose SSI, 8 units Aspart TIDWM   - FEN/GI: Tolerating liquids, advance diet as tolerated. + flatus. No BM.    Dispo: Pt working well with PT/OT. Case management and SW for placement.      Evaluator Deven Abrams MD    I saw Ms. Jerry with Dr. Abrams and agree with his evaluation and plan.  I confirmed history and care circumstances.  I agree with discontinuing the catheter and transitioning to postacute care on oral pain medications.  The patient indicated that no matter what we do "it won't work" but is eager to move to the next phase of care.     Kwasi Polanco MD              "

## 2018-03-31 NOTE — PLAN OF CARE
Transportation arranged with [facility] to transfer pt to [facility]. ETA: 1-1.5 hrs.    Chhaya Gage LMSW  On call

## 2018-03-31 NOTE — CONSULTS
"  [facility]  Adult Nutrition  Consult Note    SUMMARY     Recommendations  1) 2000 calorie Diabetic, Cardiac diet 2) Provide diet education to promote weight loss 3) Monitor oral intake 4) Monitor weight, BMI    Goals: 1) Promote weight loss  Nutrition Goal Status: new  Communication of RD Recs: other (comment) (care plan/notes, sign and hold order)    Reason for Assessment    Reason for Assessment: consult  Diagnosis: other (see comments) (primary osteoarthritis of left knee)  Relevant Medical History: DM, HTN, morbid obesity    General Information Comments: RD covering remotely. Patient oral intake varies from 0 - 100%.     Nutrition Discharge Planning: Patient to discharge on a Cardiac, Diabetic diet.     Nutrition Risk Screen     (none recorded)    Nutrition/Diet History    Food Preferences: aden    Anthropometrics    Temp: 97.6 °F (36.4 °C)  Height Method: Stated  Height: 5' 5" (165.1 cm)  Height (inches): 65 in  Weight Method: Standard Scale  Weight: 123.8 kg (272 lb 14.9 oz)  Weight (lb): 272.93 lb  Ideal Body Weight (IBW), Female: 125 lb  % Ideal Body Weight, Female (lb): 218.34 lb  BMI (Calculated): 45.5  BMI Grade: greater than 40 - morbid obesity    Anthropometrics Special Consideration         Lab/Procedures/Meds    Pertinent Labs Reviewed: reviewed  Pertinent Labs Comments: Hemoglobin A1C 7.6%  Pertinent Medications Reviewed: reviewed  Pertinent Medications Comments: insulin    Physical Findings/Assessment    Overall Physical Appearance: obese  Skin: incision(s) (Colt Score 20)    Estimated/Assessed Needs    Weight Used For Calorie Calculations: 123.8 kg (272 lb 14.9 oz)  Height: 5' 5" (165.1 cm)  Energy Need Method: Kcal/kg (1733 - 1980)  RMR ([place]-St. Jeor Equation): 1893.88  Protein Requirements: 85 - 90 g  Weight Used For Protein Calculations: 56.8 kg (125 lb 3.5 oz) (Ideal Body Weight)  Fluid Need Method: RDA Method, other (see comments) (or per MD)  CHO Requirement: 180 g "       Nutrition Prescription Ordered    Current Diet Order: Regular    Evaluation of Received Nutrient/Fluid Intake    I/O: not recorded  Energy Calories Required: not meeting needs  Protein Required: not meeting needs  Fluid Required: meeting needs  % Intake of Estimated Energy Needs: Other: 0 - 100%  % Meal Intake: Other: 0 - 100%    Nutrition Risk    Level of Risk/Frequency of Follow-up:  (f/u 2x weekly)     Assessment and Plan    Morbid obesity with BMI of 45.0-49.9, adult      Related to (etiology):   Excessive caloric intake    Signs and Symptoms (as evidenced by):   BMI = 45.5 kg/m2     Interventions/Recommendations (treatment strategy):  1) 2000 calorie Diabetic, Cardiac diet 2) Provide diet education to promote weight loss 3) Monitor oral intake 4) Monitor weight, BMI    Nutrition Diagnosis Status:   New               Monitor and Evaluation    Food and Nutrient Intake: energy intake, food and beverage intake  Food and Nutrient Adminstration: diet order  Knowledge/Beliefs/Attitudes: food and nutrition knowledge/skill, beliefs and attitudes  Physical Activity and Function: nutrition-related ADLs and IADLs  Anthropometric Measurements: weight, weight change, body mass index  Biochemical Data, Medical Tests and Procedures: electrolyte and renal panel, gastrointestinal profile, glucose/endocrine profile  Nutrition-Focused Physical Findings: overall appearance, extremities, muscles and bones, skin     Nutrition Follow-Up    RD Follow-up?: Yes

## 2018-03-31 NOTE — PLAN OF CARE
Problem: Patient Care Overview  Goal: Plan of Care Review  Outcome: Ongoing (interventions implemented as appropriate)  Pt AAOx4 and VSS. Pt is progressing with plan of care. Free of skin breakdown as the pt positioned/repositioned well independently. Clean, dry, and intact dressing noted on L knee. SCDs in place at all times when not ambulating. Incentive spirometer at bedside and pt instructed on its use. Pain controlled well with PRN PO med. Pain controlled with PNC Ropivacaine infusing at 8 mL/hr.Frequent rounds made to assess pain and safety and no complaints at this time noted. Side rails up x 2. Bed locked. Call light within reach. No falls noted. Will continue to monitor.

## 2018-03-31 NOTE — PROGRESS NOTES
"I saw Ms. Jerry with Dr. Johnson and agree with his evaluation and plan.  I confirmed history and care circumstances.  I agree with discontinuing the catheter and transitioning to postacute care on oral pain medications.  The patient indicated that no matter what we do "it won't work" but is eager to move to the next phase of care.    Kwasi Polanco MD  "

## 2018-03-31 NOTE — PROGRESS NOTES
[facility]  Orthopedics  Progress Note    Patient Name: Homar Jerry  MRN: [id number]  Admission Date: 3/31/2018  Hospital Length of Stay: 0 days  Attending Provider: Rowena Rodgers MD  Primary Care Provider: Mary Cabrera MD    Subjective:     Principal Problem:Primary osteoarthritis of left knee    Principal Orthopedic Problem: s/p L TKA    Interval History: Patient seen and examined at bedside.  No acute events overnight.  Patient complaining of pain still.    Review of patient's allergies indicates:   Allergen Reactions    Dexamethasone Hives and Shortness Of Breath     Gastroparesis flare    Doxepin hcl Hives, Diarrhea and Itching    Compazine [prochlorperazine] Hives and Itching    Morphine Rash    Prednisone      Gastroparesis flare up    Ciprofloxacin      Counteracts with seroquel, xanax, tizanidine    Lyrica [pregabalin]      depression       Current Facility-Administered Medications   Medication    acetaminophen tablet 650 mg    ALPRAZolam tablet 1 mg    aspirin EC tablet 325 mg    bisacodyl suppository 10 mg    busPIRone tablet 10 mg    calcium carbonate 200 mg calcium (500 mg) chewable tablet 500 mg    [START ON 4/1/2018] celecoxib capsule 200 mg    dextrose 50% injection 12.5 g    dextrose 50% injection 12.5 g    dextrose 50% injection 25 g    dextrose 50% injection 25 g    famotidine tablet 20 mg    [START ON 4/1/2018] fluticasone 50 mcg/actuation nasal spray 100 mcg    gabapentin capsule 600 mg    glucagon (human recombinant) injection 1 mg    glucagon (human recombinant) injection 1 mg    glucose chewable tablet 16 g    glucose chewable tablet 16 g    glucose chewable tablet 24 g    glucose chewable tablet 24 g    insulin aspart U-100 pen 1-10 Units    insulin aspart U-100 pen 8 Units    insulin detemir U-100 pen 20 Units    [START ON 4/1/2018] levothyroxine tablet 150 mcg    meclizine tablet 25 mg    mirtazapine tablet 45 mg     "naloxone 0.4 mg/mL injection 0.02 mg    ondansetron disintegrating tablet 8 mg    oxyCODONE immediate release tablet 10 mg    oxyCODONE immediate release tablet 15 mg    oxyCODONE immediate release tablet 20 mg    [START ON 4/1/2018] polyethylene glycol packet 17 g    promethazine tablet 25 mg    QUEtiapine tablet 400 mg    ramelteon tablet 8 mg    senna-docusate 8.6-50 mg per tablet 1 tablet    senna-docusate 8.6-50 mg per tablet 1 tablet    sodium chloride 0.9% flush 3 mL    tiZANidine tablet 12 mg    traZODone tablet 50 mg     Objective:     Vital Signs (Most Recent):  Temp: 97.6 °F (36.4 °C) (03/31/18 1300)  Pulse: 86 (03/31/18 1300)  Resp: 18 (03/31/18 1300)  BP: 124/76 (03/31/18 1300)  SpO2: 100 % (03/31/18 1333) Vital Signs (24h Range):  Temp:  [96.8 °F (36 °C)-98.5 °F (36.9 °C)] 97.6 °F (36.4 °C)  Pulse:  [58-86] 86  Resp:  [16-18] 18  SpO2:  [95 %-100 %] 100 %  BP: (102-130)/(56-76) 124/76     Weight: 123.8 kg (272 lb 14.9 oz)  Height: 5' 5" (165.1 cm)  Body mass index is 45.42 kg/m².    No intake or output data in the 24 hours ending 03/31/18 1529    Ortho/SPM Exam     AAOx4  NAD  RRR  No increased WOB  Aquacel c/d/i  Polar ice in place  SILT and motor intact T/SP/DP  WWP extremities  FCDs in place and functioning      Significant Labs:   BMP:     Recent Labs  Lab 03/30/18  1426   *      K 3.8   *   CO2 21*   BUN 15   CREATININE 0.8   CALCIUM 7.0*     CBC:     Recent Labs  Lab 03/30/18  1426   WBC 7.09   HGB 9.1*   HCT 29.7*        All pertinent labs within the past 24 hours have been reviewed.    Significant Imaging: I have reviewed and interpreted all pertinent imaging results/findings.    Assessment/Plan:     * Primary osteoarthritis of left knee    42 y.o. female POD3 s/p L TKA    Pain control: multimodal  PT/OT: WBAT LLE  DVT PPx:  BID, FCDs at all times when not ambulating  Abx: postop Ancef    Dispo: SNF today                Carmelo Reilly, " MD  Orthopedics  [facility]

## 2018-03-31 NOTE — PT/OT/SLP PROGRESS
Occupational Therapy   Treatment    Name: Homar Jerry  MRN: [id number]  Admitting Diagnosis:  Primary osteoarthritis of left knee  3 Days Post-Op    Recommendations:     Discharge Recommendations: nursing facility, skilled  Discharge Equipment Recommendations:     Barriers to discharge:       Subjective     Communicated with: RN prior to session.  Pain/Comfort:  · Pain Rating 1: 10/10  · Location - Side 1: Left  · Location - Orientation 1: generalized  · Location 1: knee  · Pain Addressed 1: Reposition, Distraction, Cessation of Activity    Patients cultural, spiritual, Moravian conflicts given the current situation: none stated    Objective:     Patient found with: perineural catheter    General Precautions: Standard, fall   Orthopedic Precautions:LLE weight bearing as tolerated   Braces: N/A     Occupational Performance:      Functional Mobility/Transfers:  · Functional Mobility: Pt refused     Activities of Daily Living:  · Pt states she is able to perform.  Bladder system provided     Patient left up in chair with all lines intact    AMPA 6 Click:  AMPAC Total Score: 17    Treatment & Education:  Pt educated on safety, role of OT, importance of increased participation in self care for gains , expectations for participation, expectations for gains, POC, energy conservation, caregiver strain.   - patient provided bladder program and education  _ [facility] transfer education and training  Education:    Assessment:     Homar Jerry is a 42 y.o. female with a medical diagnosis of Primary osteoarthritis of left knee.  She presents up ijn chair and with agitation and stating she wants to be discharged to SNF.  Pt with limited tolerance of activity   Performance deficits affecting function are weakness, impaired self care skills, impaired functional mobilty, impaired endurance, gait instability, impaired balance.      Rehab Prognosis: good ; patient would benefit from acute skilled OT services to address  these deficits and reach maximum level of function.       Plan:     Patient to be seen daily to address the above listed problems via self-care/home management, therapeutic activities, therapeutic exercises  · Plan of Care Expires:    · Plan of Care Reviewed with: patient    This Plan of care has been discussed with the patient who was involved in its development and understands and is in agreement with the identified goals and treatment plan    GOALS:    Occupational Therapy Goals        Problem: Occupational Therapy Goal    Goal Priority Disciplines Outcome Interventions   Occupational Therapy Goal     OT, PT/OT Ongoing (interventions implemented as appropriate)    Description:  Goals to be met by: 7 days    Patient will increase functional independence with ADLs by performing:    UE Dressing with Supervision.  LE Dressing with Supervision with AD as needed.  Grooming while standing with Supervision.  Toileting from bedside commode with Supervision for hygiene and clothing management.   Stand pivot transfers with Supervision.  Toilet transfer to bedside commode with Supervision.                         Time Tracking:     OT Date of Treatment: 03/31/18  OT Start Time: 0952  OT Stop Time: 1005  OT Total Time (min): 13 min    Billable Minutes:Therapeutic Activity 13    Susanne Rider, OT  3/31/2018

## 2018-03-31 NOTE — NURSING
Spoke with this patient on charge rounds, regarding dosage for serouquel 400mg nightly. Informed patient that Dr. Rodgers was made aware of request to increase to 800mg but prefers to review record and will address in am. Patient stated understood and oncoming nurse made aware and this nurse will inform oncoming charge nurse.

## 2018-03-31 NOTE — PLAN OF CARE
Problem: Patient Care Overview  Goal: Plan of Care Review  03/31/2018    Recommendations  1) 2000 calorie Diabetic, Cardiac diet 2) Provide diet education to promote weight loss 3) Monitor oral intake 4) Monitor weight, BMI    Goals: 1) Promote weight loss  Nutrition Goal Status: new  Communication of RD Recs: other (comment) (care plan/notes, sign and hold order)    Diann Holliday, MPH, RD, LDN

## 2018-03-31 NOTE — PLAN OF CARE
RAFI rec'd call from Dr Rodgers; she has spoken with Dr Donis who rec'd financial approval from Joseph KAMARA for patient to go to [facility]. Dr Rodgers concerned about pain mgmt. RAFI paged Dr Abrams with Anesthesiology. Will follow.     10:43am - RAFI spoke with Dr Abrams. Pain pump did not work well for patient, he feels patient can be managed with PO pain meds. RAFI met with patient at bedside. She is in agreement with going to [facility] and states that PO meds should manage her pain. Wheelchair transport arranged by KIRK Shaver. Chayo CASTRO notified and will call report to [phone]; Ameena CASTRO, OC also aware of pending discharge.

## 2018-03-31 NOTE — ASSESSMENT & PLAN NOTE
42 y.o. female POD3 s/p L TKA    Pain control: multimodal  PT/OT: WBAT LLE  DVT PPx:  BID, FCDs at all times when not ambulating  Abx: postop Ancef    Dispo: SNF today

## 2018-03-31 NOTE — ASSESSMENT & PLAN NOTE
Related to (etiology):   Excessive caloric intake    Signs and Symptoms (as evidenced by):   BMI = 45.5 kg/m2     Interventions/Recommendations (treatment strategy):  1) 2000 calorie Diabetic, Cardiac diet 2) Provide diet education to promote weight loss 3) Monitor oral intake 4) Monitor weight, BMI    Nutrition Diagnosis Status:   New

## 2018-03-31 NOTE — SUBJECTIVE & OBJECTIVE
Principal Problem:Primary osteoarthritis of left knee    Principal Orthopedic Problem: s/p L TKA    Interval History: Patient seen and examined at bedside.  No acute events overnight.  Patient complaining of pain still.    Review of patient's allergies indicates:   Allergen Reactions    Dexamethasone Hives and Shortness Of Breath     Gastroparesis flare    Doxepin hcl Hives, Diarrhea and Itching    Compazine [prochlorperazine] Hives and Itching    Morphine Rash    Prednisone      Gastroparesis flare up    Ciprofloxacin      Counteracts with seroquel, xanax, tizanidine    Lyrica [pregabalin]      depression       Current Facility-Administered Medications   Medication    acetaminophen tablet 650 mg    ALPRAZolam tablet 1 mg    aspirin EC tablet 325 mg    bisacodyl suppository 10 mg    busPIRone tablet 10 mg    calcium carbonate 200 mg calcium (500 mg) chewable tablet 500 mg    [START ON 4/1/2018] celecoxib capsule 200 mg    dextrose 50% injection 12.5 g    dextrose 50% injection 12.5 g    dextrose 50% injection 25 g    dextrose 50% injection 25 g    famotidine tablet 20 mg    [START ON 4/1/2018] fluticasone 50 mcg/actuation nasal spray 100 mcg    gabapentin capsule 600 mg    glucagon (human recombinant) injection 1 mg    glucagon (human recombinant) injection 1 mg    glucose chewable tablet 16 g    glucose chewable tablet 16 g    glucose chewable tablet 24 g    glucose chewable tablet 24 g    insulin aspart U-100 pen 1-10 Units    insulin aspart U-100 pen 8 Units    insulin detemir U-100 pen 20 Units    [START ON 4/1/2018] levothyroxine tablet 150 mcg    meclizine tablet 25 mg    mirtazapine tablet 45 mg    naloxone 0.4 mg/mL injection 0.02 mg    ondansetron disintegrating tablet 8 mg    oxyCODONE immediate release tablet 10 mg    oxyCODONE immediate release tablet 15 mg    oxyCODONE immediate release tablet 20 mg    [START ON 4/1/2018] polyethylene glycol packet 17 g     "promethazine tablet 25 mg    QUEtiapine tablet 400 mg    ramelteon tablet 8 mg    senna-docusate 8.6-50 mg per tablet 1 tablet    senna-docusate 8.6-50 mg per tablet 1 tablet    sodium chloride 0.9% flush 3 mL    tiZANidine tablet 12 mg    traZODone tablet 50 mg     Objective:     Vital Signs (Most Recent):  Temp: 97.6 °F (36.4 °C) (03/31/18 1300)  Pulse: 86 (03/31/18 1300)  Resp: 18 (03/31/18 1300)  BP: 124/76 (03/31/18 1300)  SpO2: 100 % (03/31/18 1333) Vital Signs (24h Range):  Temp:  [96.8 °F (36 °C)-98.5 °F (36.9 °C)] 97.6 °F (36.4 °C)  Pulse:  [58-86] 86  Resp:  [16-18] 18  SpO2:  [95 %-100 %] 100 %  BP: (102-130)/(56-76) 124/76     Weight: 123.8 kg (272 lb 14.9 oz)  Height: 5' 5" (165.1 cm)  Body mass index is 45.42 kg/m².    No intake or output data in the 24 hours ending 03/31/18 1529    Ortho/SPM Exam     AAOx4  NAD  RRR  No increased WOB  Aquacel c/d/i  Polar ice in place  SILT and motor intact T/SP/DP  WWP extremities  FCDs in place and functioning      Significant Labs:   BMP:     Recent Labs  Lab 03/30/18  1426   *      K 3.8   *   CO2 21*   BUN 15   CREATININE 0.8   CALCIUM 7.0*     CBC:     Recent Labs  Lab 03/30/18  1426   WBC 7.09   HGB 9.1*   HCT 29.7*        All pertinent labs within the past 24 hours have been reviewed.    Significant Imaging: I have reviewed and interpreted all pertinent imaging results/findings.  "

## 2018-04-01 PROBLEM — Z79.4 TYPE 2 DIABETES MELLITUS WITH HYPERGLYCEMIA, WITH LONG-TERM CURRENT USE OF INSULIN: Status: ACTIVE | Noted: 2017-01-17

## 2018-04-01 PROBLEM — Z96.652 STATUS POST TOTAL LEFT KNEE REPLACEMENT: Status: ACTIVE | Noted: 2018-04-01

## 2018-04-01 LAB
POCT GLUCOSE: 104 MG/DL (ref 70–110)
POCT GLUCOSE: 134 MG/DL (ref 70–110)
POCT GLUCOSE: 143 MG/DL (ref 70–110)
POCT GLUCOSE: 215 MG/DL (ref 70–110)

## 2018-04-01 PROCEDURE — 25000003 PHARM REV CODE 250: Performed by: INTERNAL MEDICINE

## 2018-04-01 PROCEDURE — 99305 1ST NF CARE MODERATE MDM 35: CPT | Mod: ,,, | Performed by: INTERNAL MEDICINE

## 2018-04-01 PROCEDURE — 11000004 HC SNF PRIVATE

## 2018-04-01 PROCEDURE — 25000003 PHARM REV CODE 250: Performed by: ORTHOPAEDIC SURGERY

## 2018-04-01 PROCEDURE — 25000242 PHARM REV CODE 250 ALT 637 W/ HCPCS: Performed by: ORTHOPAEDIC SURGERY

## 2018-04-01 RX ORDER — ALBUTEROL SULFATE 90 UG/1
2 AEROSOL, METERED RESPIRATORY (INHALATION) EVERY 4 HOURS PRN
Status: DISCONTINUED | OUTPATIENT
Start: 2018-04-01 | End: 2018-04-16 | Stop reason: HOSPADM

## 2018-04-01 RX ORDER — TIZANIDINE 4 MG/1
12 TABLET ORAL EVERY 12 HOURS
Status: DISCONTINUED | OUTPATIENT
Start: 2018-04-01 | End: 2018-04-16 | Stop reason: HOSPADM

## 2018-04-01 RX ORDER — SYRING-NEEDL,DISP,INSUL,0.3 ML 29 G X1/2"
296 SYRINGE, EMPTY DISPOSABLE MISCELLANEOUS
Status: DISCONTINUED | OUTPATIENT
Start: 2018-04-01 | End: 2018-04-16 | Stop reason: HOSPADM

## 2018-04-01 RX ORDER — QUETIAPINE FUMARATE 200 MG/1
800 TABLET, FILM COATED ORAL NIGHTLY
Status: DISCONTINUED | OUTPATIENT
Start: 2018-04-01 | End: 2018-04-16 | Stop reason: HOSPADM

## 2018-04-01 RX ORDER — TRAZODONE HYDROCHLORIDE 50 MG/1
50 TABLET ORAL NIGHTLY
Status: DISCONTINUED | OUTPATIENT
Start: 2018-04-01 | End: 2018-04-16 | Stop reason: HOSPADM

## 2018-04-01 RX ORDER — AMOXICILLIN 250 MG
2 CAPSULE ORAL 2 TIMES DAILY
Status: DISCONTINUED | OUTPATIENT
Start: 2018-04-01 | End: 2018-04-16 | Stop reason: HOSPADM

## 2018-04-01 RX ORDER — TOPIRAMATE 25 MG/1
25 TABLET ORAL DAILY PRN
Status: DISCONTINUED | OUTPATIENT
Start: 2018-04-01 | End: 2018-04-16 | Stop reason: HOSPADM

## 2018-04-01 RX ADMIN — BUSPIRONE HYDROCHLORIDE 10 MG: 10 TABLET ORAL at 08:04

## 2018-04-01 RX ADMIN — STANDARDIZED SENNA CONCENTRATE AND DOCUSATE SODIUM 1 TABLET: 8.6; 5 TABLET, FILM COATED ORAL at 08:04

## 2018-04-01 RX ADMIN — REGULAR STRENGTH 325 MG: 325 TABLET ORAL at 09:04

## 2018-04-01 RX ADMIN — INSULIN ASPART 8 UNITS: 100 INJECTION, SOLUTION INTRAVENOUS; SUBCUTANEOUS at 05:04

## 2018-04-01 RX ADMIN — OXYCODONE HYDROCHLORIDE 15 MG: 5 TABLET ORAL at 09:04

## 2018-04-01 RX ADMIN — RAMELTEON 8 MG: 8 TABLET, FILM COATED ORAL at 09:04

## 2018-04-01 RX ADMIN — OXYCODONE HYDROCHLORIDE 15 MG: 5 TABLET ORAL at 06:04

## 2018-04-01 RX ADMIN — BUSPIRONE HYDROCHLORIDE 10 MG: 10 TABLET ORAL at 09:04

## 2018-04-01 RX ADMIN — CELECOXIB 200 MG: 200 CAPSULE ORAL at 08:04

## 2018-04-01 RX ADMIN — TIZANIDINE 12 MG: 4 TABLET ORAL at 05:04

## 2018-04-01 RX ADMIN — GABAPENTIN 600 MG: 300 CAPSULE ORAL at 09:04

## 2018-04-01 RX ADMIN — TIZANIDINE 12 MG: 4 TABLET ORAL at 02:04

## 2018-04-01 RX ADMIN — STANDARDIZED SENNA CONCENTRATE AND DOCUSATE SODIUM 2 TABLET: 8.6; 5 TABLET, FILM COATED ORAL at 09:04

## 2018-04-01 RX ADMIN — TIZANIDINE 12 MG: 4 TABLET ORAL at 09:04

## 2018-04-01 RX ADMIN — QUETIAPINE FUMARATE 800 MG: 200 TABLET, FILM COATED ORAL at 09:04

## 2018-04-01 RX ADMIN — LEVOTHYROXINE SODIUM 150 MCG: 75 TABLET ORAL at 07:04

## 2018-04-01 RX ADMIN — OXYCODONE HYDROCHLORIDE 15 MG: 5 TABLET ORAL at 04:04

## 2018-04-01 RX ADMIN — REGULAR STRENGTH 325 MG: 325 TABLET ORAL at 08:04

## 2018-04-01 RX ADMIN — OXYCODONE HYDROCHLORIDE 15 MG: 5 TABLET ORAL at 08:04

## 2018-04-01 RX ADMIN — TRAZODONE HYDROCHLORIDE 50 MG: 50 TABLET ORAL at 09:04

## 2018-04-01 RX ADMIN — OXYCODONE HYDROCHLORIDE 15 MG: 5 TABLET ORAL at 11:04

## 2018-04-01 RX ADMIN — FAMOTIDINE 20 MG: 20 TABLET, FILM COATED ORAL at 08:04

## 2018-04-01 RX ADMIN — OXYCODONE HYDROCHLORIDE 15 MG: 5 TABLET ORAL at 03:04

## 2018-04-01 RX ADMIN — ALPRAZOLAM 1 MG: 0.5 TABLET ORAL at 08:04

## 2018-04-01 RX ADMIN — INSULIN ASPART 8 UNITS: 100 INJECTION, SOLUTION INTRAVENOUS; SUBCUTANEOUS at 07:04

## 2018-04-01 RX ADMIN — MIRTAZAPINE 45 MG: 15 TABLET, FILM COATED ORAL at 09:04

## 2018-04-01 RX ADMIN — GABAPENTIN 600 MG: 300 CAPSULE ORAL at 08:04

## 2018-04-01 RX ADMIN — FAMOTIDINE 20 MG: 20 TABLET, FILM COATED ORAL at 09:04

## 2018-04-01 RX ADMIN — GABAPENTIN 600 MG: 300 CAPSULE ORAL at 02:04

## 2018-04-01 RX ADMIN — OXYCODONE HYDROCHLORIDE 15 MG: 5 TABLET ORAL at 01:04

## 2018-04-01 RX ADMIN — INSULIN DETEMIR 20 UNITS: 100 INJECTION, SOLUTION SUBCUTANEOUS at 09:04

## 2018-04-01 RX ADMIN — INSULIN ASPART 8 UNITS: 100 INJECTION, SOLUTION INTRAVENOUS; SUBCUTANEOUS at 12:04

## 2018-04-01 RX ADMIN — FLUTICASONE PROPIONATE 100 MCG: 50 SPRAY, METERED NASAL at 11:04

## 2018-04-01 RX ADMIN — ALPRAZOLAM 1 MG: 0.5 TABLET ORAL at 09:04

## 2018-04-01 RX ADMIN — INSULIN ASPART 4 UNITS: 100 INJECTION, SOLUTION INTRAVENOUS; SUBCUTANEOUS at 07:04

## 2018-04-01 NOTE — ASSESSMENT & PLAN NOTE
Body mass index is 45.64 kg/m².    Patient on 2000 alfonso diet and will encourage modeling of portion sizes at home.

## 2018-04-01 NOTE — ASSESSMENT & PLAN NOTE
Chronic and stable  Continue therapy with levothyroxine  F/U with PCP for ongoing monitoring and adjustment of levothyroxine dose as indicated

## 2018-04-01 NOTE — HOSPITAL COURSE
"The patient was admitted at [facility] from 3/28 to 3/31/2018.    4/2/18  Patient seen for the first time by me, she reports she has pain to her left knee.  States her pain medication gives her relief for about 1 hour after taking.  She reports she suffers from chronic pain and is treated by Dr. Villa.  She reports she is eating, drinking, voiding and having BM without issues.  She currently lives alone and plans to return to [facility] upon discharge.  She reports she has no family assistance.      At 1150 notified by nursing that patient stated she had heard a "pop" while working with therapy and then notice blood on her dressing.  She is c/o pain to the left knee.  Will send for x-ray and notify Ortho.  There is scant blood underneath dressing no bigger than a pencil eraser, site outlined.      4/5/18  Patient seen resting in bed, has complaints of ongoing intermittent pain and her blood sugar is "getting low". Xray from 4/2 reviewed without acute findings. She is participating in therapies. Blood pressure noted to be elevated, home diovan resumed.     4/10/18  Patient seen at bedside, she reports having pain to her surgical knee but feels pain medication helps control.  In addition, she states her left hip feels much better since getting the lidoderm patch.  She has no additional complaints at this time.    4/16/18  Patient seen prior to discharge, she reports her pain is controlled with Percocet.  She is ready to go home.  She is asking if I can refill her other Buspar, Seroquel, Xanax and Remeron until she can see her PCP.  No other issues at this time.  "

## 2018-04-01 NOTE — H&P
[facility]  Department of Hospital Medicine  History & Physical    Patient Name: Homar Jerry  MRN: [id number]  Code Status: Full Code  Admission Date: 3/31/2018  Attending Physician: Rowena Rodgers MD   Primary Care Provider: Mary Cabrera MD    Subjective:     Principal Problem:Primary osteoarthritis of left knee     HPI: Chief Complaint/Reason for Admission: Primary osteoarthritis of left knee    History of Present Illness:  Patient is a 42 y.o. female DM2, RA, bipolar disorder who presents to SNF after left TKR on 3/28/2018 by Dr. [facility] to treat osteoarthritis.  She currently has 9/10 pain and states that at its best it will be 8/10. She acknowledges a high tolerance to pain meds and takes oxycodone/APAP 10 mg for left knee and back pain chronically.   Polar ice and oxycodone have been best at relieving but ambulation worsens.  She has a chronic poor appetite and gastroparesis with flares every 6-9 months since having gastric pacer and gastric sleeve surgery.  She has additional stressors of her only son's death in the recent past.     The patient has been admitted to SNF for ongoing PT/OT due to insufficient progress to go home safely from the hospital.    Records from last hospital stay reviewed and summarized above.     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 . In ED found to have worsening hyperglycemia, with anion gap and acidosis. Was bolused with NS and started on insulin infusion with DKA protocol.  On treatment with  Insulin   Hemoglobin A1c- 7.5 Sept 2017 Capillary glucose check-yes Pre breakfast -95 Pre lunch -120 Pre [id number]      Morbid obesity with BMI of 40.0-44.9, adult 1/18/2017    Non-intractable vomiting with nausea 9/26/2017    Rheumatoid arthritis     S/P laparoscopic appendectomy 9/26/2017    Status post placement of implantable loop recorder 7/1/2015    syncope and palpitations s/p ILR placement.      Thyroid cancer 2003 & 2013    thyroidectomy    Type 2 diabetes mellitus with hyperglycemia, without long-term current use of insulin 1/17/2017    Urinary incontinence     Vitamin D deficiency 1/21/2017       Past Surgical History:   Procedure Laterality Date    ANKLE FRACTURE SURGERY Right     APPENDECTOMY      BREAST SURGERY      exploratory laparotomy due to complications of hysterectomy  2009    cervical cuff burst with air in abdomen    FRACTURE SURGERY      ankle around 2012    GASTRECTOMY      gastric pacemaker      gastric sleeve      HYSTERECTOMY  2009    total including cervix    KNEE ARTHROSCOPY W/ MENISCAL REPAIR Left 2016    KNEE SURGERY  05/12/2016    loop monitor  2016    multiple breast biopsies      TONSILLECTOMY      ureteral sling         Review of patient's allergies indicates:   Allergen Reactions    Dexamethasone Hives and Shortness Of Breath     Gastroparesis flare    Doxepin hcl Hives, Diarrhea and Itching    Compazine [prochlorperazine] Hives and Itching    Morphine Rash    Prednisone      Gastroparesis flare up    Ciprofloxacin      Counteracts with seroquel, xanax, tizanidine    Lyrica [pregabalin]      depression       No current facility-administered medications on file  prior to encounter.      Current Outpatient Prescriptions on File Prior to Encounter   Medication Sig    alprazolam (XANAX) 1 MG tablet Take 1 tablet (1 mg total) by mouth every evening. (Patient taking differently: Take 2 mg by mouth 2 (two) times daily. )    aspirin 325 MG tablet Take 1 tablet (325 mg total) by mouth 2 (two) times daily.    blood sugar diagnostic Strp 1 each by Misc.(Non-Drug; Combo Route) route 4 (four) times daily with meals and nightly.    blood-glucose meter kit Use as instructed    busPIRone (BUSPAR) 10 MG tablet 10 mg 2 (two) times daily.     calcium carbonate (OS-MARIAN) 500 mg calcium (1,250 mg) tablet Take 2 tablets (1,000 mg total) by mouth once daily.    carvedilol (COREG) 12.5 MG tablet Take 3 tablets (37.5 mg total) by mouth 2 (two) times daily.    cholecalciferol, vitamin D3, 1,000 unit capsule Take 2 capsules (2,000 Units total) by mouth once daily.    docusate sodium (COLACE) 100 MG capsule Take 1 capsule (100 mg total) by mouth 2 (two) times daily as needed for Constipation.    docusate sodium (COLACE) 50 MG capsule Take 1 capsule (50 mg total) by mouth 2 (two) times daily. (Patient taking differently: Take 50 mg by mouth 2 (two) times daily as needed. )    fluticasone (FLONASE) 50 mcg/actuation nasal spray 2 sprays by Nasal route 2 (two) times daily.     gabapentin (NEURONTIN) 600 MG tablet Take 600 mg by mouth 3 (three) times daily.     hydrochlorothiazide (HYDRODIURIL) 25 MG tablet Take 1 tablet (25 mg total) by mouth once daily.    hydrOXYzine pamoate (VISTARIL) 50 MG Cap Take 50 mg by mouth 4 (four) times daily.    insulin aspart (NOVOLOG) 100 unit/mL InPn pen Inject 16 Units into the skin 3 (three) times daily with meals.    insulin detemir (LEVEMIR FLEXTOUCH) 100 unit/mL (3 mL) SubQ InPn pen Inject 30 Units into the skin 2 (two) times daily. (Patient taking differently: Inject 30 Units into the skin 2 (two) times daily. PATIENT TAKES 30 UNITS BID)    lancets  "Misc 1 each by Misc.(Non-Drug; Combo Route) route 4 (four) times daily with meals and nightly.    levocetirizine (XYZAL) 5 MG tablet Take 1 tablet (5 mg total) by mouth every evening. (Patient taking differently: Take 5 mg by mouth daily as needed for Allergies. )    levothyroxine (SYNTHROID) 150 MCG tablet Take 1 tablet (150 mcg total) by mouth once daily.    lidocaine (LIDODERM) 5 % Place 1 patch onto the skin daily as needed. Remove & Discard patch within 12 hours or as directed by MD    meclizine (ANTIVERT) 25 mg tablet Take 1 tablet (25 mg total) by mouth 3 (three) times daily as needed for Dizziness or Nausea.    mirtazapine (REMERON) 45 MG tablet Take 45 mg by mouth every evening.    ondansetron (ZOFRAN-ODT) 8 MG TbDL Take 1 tablet (8 mg total) by mouth every 12 (twelve) hours as needed (nausea).    oxycodone-acetaminophen (PERCOCET)  mg per tablet Take 1 tablet by mouth every 4 (four) hours as needed for Pain. Per Dr. Villa at [facility] [place] [place]    oxyCODONE-acetaminophen (PERCOCET)  mg per tablet Take 1 tablet by mouth every 4 to 6 hours as needed for Pain.    pen needle, diabetic 31 gauge x 5/16" Ndle 1 each by Misc.(Non-Drug; Combo Route) route 5 (five) times daily.    POTASSIUM ORAL Take by mouth. Over the counter   Once daily    promethazine (PHENERGAN) 25 MG tablet TAKE 1 TABLET (25 MG TOTAL) BY MOUTH EVERY 6 HOURS AS NEEDED.    QUEtiapine (SEROQUEL) 400 MG tablet 800 mg every evening. Pt states takes two tablets nightly, total of 800 mg    tizanidine (ZANAFLEX) 4 MG tablet TAKE 3 TABLETS (12 MG TOTAL) BY MOUTH EVERY EVENING. (Patient taking differently: Take 12 mg by mouth every evening. TAKE 3 TABLETS (12 MG TOTAL) BY MOUTH EVERY EVENING.)    topiramate (TOPAMAX) 25 MG tablet Take 1 tablet (25 mg total) by mouth daily as needed (migraines).    traZODone (DESYREL) 50 MG tablet Take 50 mg by mouth every evening.    valsartan-hydrochlorothiazide (DIOVAN-HCT) " 80-12.5 mg per tablet Take 1 tablet by mouth once daily.    VENTOLIN HFA 90 mcg/actuation inhaler as needed.     zolpidem (AMBIEN) 10 mg Tab 10 mg every evening.      Family History     Problem Relation (Age of Onset)    ADD / ADHD Son    Arthritis Mother    Bipolar disorder Maternal Uncle    Clotting disorder Mother, Father, Paternal Grandfather    Diabetes Paternal Grandmother    HIV Brother    Heart attack Mother (55)    Heart disease Mother    Hyperlipidemia Father    No Known Problems Sister, Brother, Maternal Aunt, Paternal Aunt, Paternal Uncle, Maternal Grandfather, Maternal Grandmother, Cousin    Pancreatic cancer Maternal Uncle    Pneumonia Brother    Rheum arthritis Mother    Thyroid cancer Paternal Grandmother        Social History Main Topics    Smoking status: Never Smoker    Smokeless tobacco: Never Used    Alcohol use No    Drug use: No    Sexual activity: Not Currently     Review of Systems   Constitutional: no fever or chills  Eyes: no visual changes  ENT: no nasal congestion or sore throat  Respiratory: no cough or shortness of breath  Cardiovascular: no chest pain or palpitations  Gastrointestinal: no nausea or vomiting, no abdominal pain; last BM: 3/28; uses Mag citrate for constipation at home  Genitourinary: no hematuria or dysuria  Integument/Breast: no rash or pruritis  Hematologic/Lymphatic: no easy bruising or lymphadenopathy  Allergy/Immunology: no postnasal drip  Musculoskeletal: + left knee pain and chronic back pain  Neurological: no seizures or tremors  Behavioral/Psych: no auditory or visual hallucinations  Endocrine: no heat or cold intolerance      Objective:     Vital Signs (Most Recent):  Temp: 98.3 °F (36.8 °C) (04/01/18 0810)  Pulse: 68 (04/01/18 0810)  Resp: 18 (04/01/18 0810)  BP: 130/86 (04/01/18 0810)  SpO2: 99 % (04/01/18 0810) Vital Signs (24h Range):  Temp:  [97.3 °F (36.3 °C)-98.3 °F (36.8 °C)] 98.3 °F (36.8 °C)  Pulse:  [68-81] 68  Resp:  [18] 18  SpO2:  [99 %]  99 %  BP: (130-132)/(85-86) 130/86     Weight: 124.4 kg (274 lb 4 oz)  Body mass index is 45.64 kg/m².    Physical Exam  General: well developed, obese, no distress  HENT: Head:normocephalic, atraumatic. Ears:bilateral external ear canals normal. Nose: Nares normal. Septum midline. Mucosa normal. Throat: lips, mucosa, and tongue normal and no throat erythema.  Eyes: conjunctivae/corneas clear.  EOM normal  Neck: supple, symmetrical, trachea midline, no JVD and thyroid not enlarged.   Lungs:  clear to auscultation bilaterally and normal respiratory effort  Cardiovascular: Heart: regular rate and rhythm, S1, S2 normal, no murmur, click, rub or gallop. Chest Wall: no tenderness. Extremities: no cyanosis, 1+ LLE edema, no clubbing. Pulses: 2+ and symmetric.  Abdomen/Rectal: Abdomen: soft, non-tender non-distended; bowel sounds normal; no masses,  no organomegaly.   Skin: Skin color, texture, turgor normal. Aquacel dressing to left knee with good adhesion and no drainage evident  Musculoskeletal:no clubbing, cyanosis  Lymph Nodes: No cervical or supraclavicular adenopathy  Neurologic: Normal strength and tone except left leg not tested. No focal numbness or weakness  Psych/Behavioral:  Alert and oriented, appropriate affect.    Significant Labs:     Recent Labs  Lab 03/30/18  1426   WBC 7.09   HGB 9.1*   HCT 29.7*          Recent Labs  Lab 03/28/18  1436 03/30/18  1426    140   K 4.5 3.8    113*   CO2 13* 21*   BUN 7 15   CREATININE 0.4* 0.8   CALCIUM 3.5* 7.0*   PROT  --  4.9*   BILITOT  --  0.1   ALKPHOS  --  86   ALT  --  11   AST  --  17           Assessment/Plan:     * Primary osteoarthritis of left knee    S/p Left knee replacement  See plan below.         Status post total left knee replacement    -continue PT/OT to increase ambulation, ADL performance and endurance  -WBAT  -continue oxycodone for pain control prn  -keep polar ice in place when not in therapy  -continue ASA for DVT prophylaxis;  continue famotidine while taking high dose ASA  -Ortho NP to assess surgical wound and remove dressing as indicated at next appt; will notify Ortho team for any leakage or excessive drainage evident on Aquacel dressing  -continue miralax and senokot-s to treat/prevent constipation; Mag citrate prn if scheduled regimen ineffective          Morbid obesity with BMI of 45.0-49.9, adult    Body mass index is 45.64 kg/m².    Patient on 2000 alfonso diet and will encourage modeling of portion sizes at home.         Type 2 diabetes mellitus with hyperglycemia, with long-term current use of insulin    Hemoglobin A1C   Date Value Ref Range Status   03/15/2018 7.6 (H) 4.0 - 5.6 % Final     Comment:   09/26/2017 7.5 (H) 4.0 - 5.6 % Final     Comment:   03/03/2017 10.3 (H) 4.5 - 6.2 % Final     Comment:     Patient does not take stated dose of insulin on MAR as she says she misses several meals; usually Levemir 16 units BID and novology 4 units before meals  Continue current levemir and novolog doses; adequate control currently  Continue diabetic diet therapy with glucose monitoring AC and HS.    Hyperglycemia to be treated with SSNI prn.  Glucose goal is < 180mg/dl and avoidance of hypoglycemia.  Will continue to monitor and adjust regimen as necessary to achieve goals.            Rheumatoid arthritis    No chronic meds currently  No joint pain other than the surgical site        Asthma    No wheezing or dyspnea currently  Continue therapy with albuterol inhaler prn  Will continue to monitor        Essential hypertension    Normotensive currently  BP meds (coreg and diovan HCT) held due to hypotension in hospital  Will continue to monitor and resume meds gradually for SBP > 140        Allergic rhinitis    Controlled with chronic flonase which will be [facility] at [facility]        Migraines    Had an episode today  Resume topamax 25 mg daily prn which will be resumed  Will continue to monitor        Chronic low back pain    Controlled  currently  Continue chronic therapy with gabapentin and tizanidine with oxycodone prn        Hypothyroidism, postsurgical    Chronic and stable  Continue therapy with levothyroxine  F/U with PCP for ongoing monitoring and adjustment of levothyroxine dose as indicated        Bipolar 1 disorder    Not labile currently and cooperative with exam  Continue chronic therapy with quetiapine, mirtazipine, buspirone, trazodone  Continue alprazolam prn anxiety        Gastroparesis    No chronic meds since surgery  Will continue to monitor          Future Appointments  Date Time Provider Department Center   4/10/2018 1:00 PM [facility]     Patient's care plan and discharge planning will be discussed by the SNF team in IDT meeting weekly. Medications to be reviewed and discussed with the SNF unit clinical pharmacist.    Rowena Rodgers MD  Department of Hospital Medicine  [facility]

## 2018-04-01 NOTE — ASSESSMENT & PLAN NOTE
Had an episode today  Resume topamax 25 mg daily prn which will be resumed  Will continue to monitor

## 2018-04-01 NOTE — HPI
Chief Complaint/Reason for Admission: Primary osteoarthritis of left knee    History of Present Illness:  Patient is a 42 y.o. female DM2, RA, bipolar disorder who presents to SNF after left TKR on 3/28/2018 by Dr[facility] to treat osteoarthritis.  She currently has 9/10 pain and states that at its best it will be 8/10. She acknowledges a high tolerance to pain meds and takes oxycodone/APAP 10 mg for left knee and back pain chronically.   Polar ice and oxycodone have been best at relieving but ambulation worsens.  She has a chronic poor appetite and gastroparesis with flares every 6-9 months since having gastric pacer and gastric sleeve surgery.  She has additional stressors of her only son's death in the recent past.     The patient has been admitted to SNF for ongoing PT/OT due to insufficient progress to go home safely from the hospital.    Records from last hospital stay reviewed and summarized above.

## 2018-04-01 NOTE — ASSESSMENT & PLAN NOTE
Hemoglobin A1C   Date Value Ref Range Status   03/15/2018 7.6 (H) 4.0 - 5.6 % Final     Comment:   09/26/2017 7.5 (H) 4.0 - 5.6 % Final     Comment:   03/03/2017 10.3 (H) 4.5 - 6.2 % Final     Comment:     Patient does not take stated dose of insulin on MAR as she says she misses several meals; usually Levemir 16 units BID and novology 4 units before meals  Continue current levemir and novolog doses; adequate control currently  Continue diabetic diet therapy with glucose monitoring AC and HS.    Hyperglycemia to be treated with SSNI prn.  Glucose goal is < 180mg/dl and avoidance of hypoglycemia.  Will continue to monitor and adjust regimen as necessary to achieve goals.

## 2018-04-01 NOTE — ASSESSMENT & PLAN NOTE
-continue PT/OT to increase ambulation, ADL performance and endurance  -WBAT  -continue oxycodone for pain control prn  -keep polar ice in place when not in therapy  -continue ASA for DVT prophylaxis; continue famotidine while taking high dose ASA  -Ortho NP to assess surgical wound and remove dressing as indicated at next appt; will notify Ortho team for any leakage or excessive drainage evident on Aquacel dressing  -continue miralax and senokot-s to treat/prevent constipation; Mag citrate prn if scheduled regimen ineffective

## 2018-04-01 NOTE — SUBJECTIVE & OBJECTIVE
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 . In ED found to have worsening hyperglycemia, with anion gap and acidosis. Was bolused with NS and started on insulin infusion with DKA protocol.  On treatment with  Insulin   Hemoglobin A1c- 7.5 Sept 2017 Capillary glucose check-yes Pre breakfast -95 Pre lunch -120 Pre [id number]      Morbid obesity with BMI of 40.0-44.9, adult 1/18/2017    Non-intractable vomiting with nausea 9/26/2017    Rheumatoid arthritis     S/P laparoscopic appendectomy 9/26/2017    Status post placement of implantable loop recorder 7/1/2015    syncope and palpitations s/p ILR placement.      Thyroid cancer 2003 & 2013    thyroidectomy    Type 2 diabetes mellitus with hyperglycemia, without long-term current use of insulin 1/17/2017    Urinary incontinence     Vitamin D deficiency 1/21/2017       Past Surgical History:   Procedure Laterality Date    ANKLE FRACTURE SURGERY Right     APPENDECTOMY      BREAST SURGERY      exploratory laparotomy due to complications of hysterectomy  2009    cervical cuff burst with air in abdomen    FRACTURE SURGERY      ankle around 2012    GASTRECTOMY      gastric pacemaker      gastric sleeve      HYSTERECTOMY   2009    total including cervix    KNEE ARTHROSCOPY W/ MENISCAL REPAIR Left 2016    KNEE SURGERY  05/12/2016    loop monitor  2016    multiple breast biopsies      TONSILLECTOMY      ureteral sling         Review of patient's allergies indicates:   Allergen Reactions    Dexamethasone Hives and Shortness Of Breath     Gastroparesis flare    Doxepin hcl Hives, Diarrhea and Itching    Compazine [prochlorperazine] Hives and Itching    Morphine Rash    Prednisone      Gastroparesis flare up    Ciprofloxacin      Counteracts with seroquel, xanax, tizanidine    Lyrica [pregabalin]      depression       No current facility-administered medications on file prior to encounter.      Current Outpatient Prescriptions on File Prior to Encounter   Medication Sig    alprazolam (XANAX) 1 MG tablet Take 1 tablet (1 mg total) by mouth every evening. (Patient taking differently: Take 2 mg by mouth 2 (two) times daily. )    aspirin 325 MG tablet Take 1 tablet (325 mg total) by mouth 2 (two) times daily.    blood sugar diagnostic Strp 1 each by Misc.(Non-Drug; Combo Route) route 4 (four) times daily with meals and nightly.    blood-glucose meter kit Use as instructed    busPIRone (BUSPAR) 10 MG tablet 10 mg 2 (two) times daily.     calcium carbonate (OS-MARIAN) 500 mg calcium (1,250 mg) tablet Take 2 tablets (1,000 mg total) by mouth once daily.    carvedilol (COREG) 12.5 MG tablet Take 3 tablets (37.5 mg total) by mouth 2 (two) times daily.    cholecalciferol, vitamin D3, 1,000 unit capsule Take 2 capsules (2,000 Units total) by mouth once daily.    docusate sodium (COLACE) 100 MG capsule Take 1 capsule (100 mg total) by mouth 2 (two) times daily as needed for Constipation.    docusate sodium (COLACE) 50 MG capsule Take 1 capsule (50 mg total) by mouth 2 (two) times daily. (Patient taking differently: Take 50 mg by mouth 2 (two) times daily as needed. )    fluticasone (FLONASE) 50 mcg/actuation nasal spray 2 sprays  "by Nasal route 2 (two) times daily.     gabapentin (NEURONTIN) 600 MG tablet Take 600 mg by mouth 3 (three) times daily.     hydrochlorothiazide (HYDRODIURIL) 25 MG tablet Take 1 tablet (25 mg total) by mouth once daily.    hydrOXYzine pamoate (VISTARIL) 50 MG Cap Take 50 mg by mouth 4 (four) times daily.    insulin aspart (NOVOLOG) 100 unit/mL InPn pen Inject 16 Units into the skin 3 (three) times daily with meals.    insulin detemir (LEVEMIR FLEXTOUCH) 100 unit/mL (3 mL) SubQ InPn pen Inject 30 Units into the skin 2 (two) times daily. (Patient taking differently: Inject 30 Units into the skin 2 (two) times daily. PATIENT TAKES 30 UNITS BID)    lancets Misc 1 each by Misc.(Non-Drug; Combo Route) route 4 (four) times daily with meals and nightly.    levocetirizine (XYZAL) 5 MG tablet Take 1 tablet (5 mg total) by mouth every evening. (Patient taking differently: Take 5 mg by mouth daily as needed for Allergies. )    levothyroxine (SYNTHROID) 150 MCG tablet Take 1 tablet (150 mcg total) by mouth once daily.    lidocaine (LIDODERM) 5 % Place 1 patch onto the skin daily as needed. Remove & Discard patch within 12 hours or as directed by MD    meclizine (ANTIVERT) 25 mg tablet Take 1 tablet (25 mg total) by mouth 3 (three) times daily as needed for Dizziness or Nausea.    mirtazapine (REMERON) 45 MG tablet Take 45 mg by mouth every evening.    ondansetron (ZOFRAN-ODT) 8 MG TbDL Take 1 tablet (8 mg total) by mouth every 12 (twelve) hours as needed (nausea).    oxycodone-acetaminophen (PERCOCET)  mg per tablet Take 1 tablet by mouth every 4 (four) hours as needed for Pain. Per Dr. Villa at [facility]    oxyCODONE-acetaminophen (PERCOCET)  mg per tablet Take 1 tablet by mouth every 4 to 6 hours as needed for Pain.    pen needle, diabetic 31 gauge x 5/16" Ndle 1 each by Misc.(Non-Drug; Combo Route) route 5 (five) times daily.    POTASSIUM ORAL Take by mouth. Over the " counter   Once daily    promethazine (PHENERGAN) 25 MG tablet TAKE 1 TABLET (25 MG TOTAL) BY MOUTH EVERY 6 HOURS AS NEEDED.    QUEtiapine (SEROQUEL) 400 MG tablet 800 mg every evening. Pt states takes two tablets nightly, total of 800 mg    tizanidine (ZANAFLEX) 4 MG tablet TAKE 3 TABLETS (12 MG TOTAL) BY MOUTH EVERY EVENING. (Patient taking differently: Take 12 mg by mouth every evening. TAKE 3 TABLETS (12 MG TOTAL) BY MOUTH EVERY EVENING.)    topiramate (TOPAMAX) 25 MG tablet Take 1 tablet (25 mg total) by mouth daily as needed (migraines).    traZODone (DESYREL) 50 MG tablet Take 50 mg by mouth every evening.    valsartan-hydrochlorothiazide (DIOVAN-HCT) 80-12.5 mg per tablet Take 1 tablet by mouth once daily.    VENTOLIN HFA 90 mcg/actuation inhaler as needed.     zolpidem (AMBIEN) 10 mg Tab 10 mg every evening.      Family History     Problem Relation (Age of Onset)    ADD / ADHD Son    Arthritis Mother    Bipolar disorder Maternal Uncle    Clotting disorder Mother, Father, Paternal Grandfather    Diabetes Paternal Grandmother    HIV Brother    Heart attack Mother (55)    Heart disease Mother    Hyperlipidemia Father    No Known Problems Sister, Brother, Maternal Aunt, Paternal Aunt, Paternal Uncle, Maternal Grandfather, Maternal Grandmother, Cousin    Pancreatic cancer Maternal Uncle    Pneumonia Brother    Rheum arthritis Mother    Thyroid cancer Paternal Grandmother        Social History Main Topics    Smoking status: Never Smoker    Smokeless tobacco: Never Used    Alcohol use No    Drug use: No    Sexual activity: Not Currently     Review of Systems   Constitutional: no fever or chills  Eyes: no visual changes  ENT: no nasal congestion or sore throat  Respiratory: no cough or shortness of breath  Cardiovascular: no chest pain or palpitations  Gastrointestinal: no nausea or vomiting, no abdominal pain; last BM: 3/28; uses Mag citrate for constipation at home  Genitourinary: no hematuria or  dysuria  Integument/Breast: no rash or pruritis  Hematologic/Lymphatic: no easy bruising or lymphadenopathy  Allergy/Immunology: no postnasal drip  Musculoskeletal: + left knee pain and chronic back pain  Neurological: no seizures or tremors  Behavioral/Psych: no auditory or visual hallucinations  Endocrine: no heat or cold intolerance      Objective:     Vital Signs (Most Recent):  Temp: 98.3 °F (36.8 °C) (04/01/18 0810)  Pulse: 68 (04/01/18 0810)  Resp: 18 (04/01/18 0810)  BP: 130/86 (04/01/18 0810)  SpO2: 99 % (04/01/18 0810) Vital Signs (24h Range):  Temp:  [97.3 °F (36.3 °C)-98.3 °F (36.8 °C)] 98.3 °F (36.8 °C)  Pulse:  [68-81] 68  Resp:  [18] 18  SpO2:  [99 %] 99 %  BP: (130-132)/(85-86) 130/86     Weight: 124.4 kg (274 lb 4 oz)  Body mass index is 45.64 kg/m².    Physical Exam  General: well developed, obese, no distress  HENT: Head:normocephalic, atraumatic. Ears:bilateral external ear canals normal. Nose: Nares normal. Septum midline. Mucosa normal. Throat: lips, mucosa, and tongue normal and no throat erythema.  Eyes: conjunctivae/corneas clear.  EOM normal  Neck: supple, symmetrical, trachea midline, no JVD and thyroid not enlarged.   Lungs:  clear to auscultation bilaterally and normal respiratory effort  Cardiovascular: Heart: regular rate and rhythm, S1, S2 normal, no murmur, click, rub or gallop. Chest Wall: no tenderness. Extremities: no cyanosis, 1+ LLE edema, no clubbing. Pulses: 2+ and symmetric.  Abdomen/Rectal: Abdomen: soft, non-tender non-distended; bowel sounds normal; no masses,  no organomegaly.   Skin: Skin color, texture, turgor normal. Aquacel dressing to left knee with good adhesion and no drainage evident  Musculoskeletal:no clubbing, cyanosis  Lymph Nodes: No cervical or supraclavicular adenopathy  Neurologic: Normal strength and tone except left leg not tested. No focal numbness or weakness  Psych/Behavioral:  Alert and oriented, appropriate affect.    Significant Labs:     Recent  Labs  Lab 03/30/18  1426   WBC 7.09   HGB 9.1*   HCT 29.7*          Recent Labs  Lab 03/28/18  1436 03/30/18  1426    140   K 4.5 3.8    113*   CO2 13* 21*   BUN 7 15   CREATININE 0.4* 0.8   CALCIUM 3.5* 7.0*   PROT  --  4.9*   BILITOT  --  0.1   ALKPHOS  --  86   ALT  --  11   AST  --  17

## 2018-04-01 NOTE — PLAN OF CARE
Problem: Patient Care Overview  Goal: Plan of Care Review  Outcome: Ongoing (interventions implemented as appropriate)   04/01/18 0231   Coping/Psychosocial   Plan Of Care Reviewed With patient       Problem: Fall Risk (Adult)  Goal: Identify Related Risk Factors and Signs and Symptoms  Related risk factors and signs and symptoms are identified upon initiation of Human Response Clinical Practice Guideline (CPG)   Outcome: Ongoing (interventions implemented as appropriate)   04/01/18 0231   Fall Risk   Related Risk Factors (Fall Risk) fatigue/slow reaction;depression/anxiety;gait/mobility problems;polypharmacy

## 2018-04-01 NOTE — ASSESSMENT & PLAN NOTE
Normotensive currently  BP meds (coreg and diovan HCT) held due to hypotension in hospital  Will continue to monitor and resume meds gradually for SBP > 140

## 2018-04-01 NOTE — ASSESSMENT & PLAN NOTE
No wheezing or dyspnea currently  Continue therapy with albuterol inhaler prn  Will continue to monitor

## 2018-04-01 NOTE — ASSESSMENT & PLAN NOTE
Not labile currently and cooperative with exam  Continue chronic therapy with quetiapine, mirtazipine, buspirone, trazodone  Continue alprazolam prn anxiety

## 2018-04-02 ENCOUNTER — TELEPHONE (OUTPATIENT)
Dept: BARIATRICS | Facility: CLINIC | Age: 43
End: 2018-04-02

## 2018-04-02 ENCOUNTER — PATIENT OUTREACH (OUTPATIENT)
Dept: ADMINISTRATIVE | Facility: CLINIC | Age: 43
End: 2018-04-02

## 2018-04-02 LAB
ANION GAP SERPL CALC-SCNC: 12 MMOL/L
BASOPHILS # BLD AUTO: 0.01 K/UL
BASOPHILS NFR BLD: 0.1 %
BUN SERPL-MCNC: 10 MG/DL
CALCIUM SERPL-MCNC: 8.1 MG/DL
CHLORIDE SERPL-SCNC: 107 MMOL/L
CO2 SERPL-SCNC: 24 MMOL/L
CREAT SERPL-MCNC: 0.8 MG/DL
DIFFERENTIAL METHOD: ABNORMAL
EOSINOPHIL # BLD AUTO: 0.2 K/UL
EOSINOPHIL NFR BLD: 2.8 %
ERYTHROCYTE [DISTWIDTH] IN BLOOD BY AUTOMATED COUNT: 15.2 %
EST. GFR  (AFRICAN AMERICAN): >60 ML/MIN/1.73 M^2
EST. GFR  (NON AFRICAN AMERICAN): >60 ML/MIN/1.73 M^2
GLUCOSE SERPL-MCNC: 105 MG/DL
HCT VFR BLD AUTO: 31.7 %
HGB BLD-MCNC: 9.6 G/DL
IMM GRANULOCYTES # BLD AUTO: 0.05 K/UL
IMM GRANULOCYTES NFR BLD AUTO: 0.7 %
LYMPHOCYTES # BLD AUTO: 2.5 K/UL
LYMPHOCYTES NFR BLD: 37.4 %
MAGNESIUM SERPL-MCNC: 2 MG/DL
MCH RBC QN AUTO: 27.3 PG
MCHC RBC AUTO-ENTMCNC: 30.3 G/DL
MCV RBC AUTO: 90 FL
MONOCYTES # BLD AUTO: 0.4 K/UL
MONOCYTES NFR BLD: 6 %
NEUTROPHILS # BLD AUTO: 3.6 K/UL
NEUTROPHILS NFR BLD: 53 %
NRBC BLD-RTO: 0 /100 WBC
PHOSPHATE SERPL-MCNC: 3.8 MG/DL
PLATELET # BLD AUTO: 353 K/UL
PMV BLD AUTO: 9.6 FL
POCT GLUCOSE: 105 MG/DL (ref 70–110)
POCT GLUCOSE: 123 MG/DL (ref 70–110)
POCT GLUCOSE: 169 MG/DL (ref 70–110)
POCT GLUCOSE: 179 MG/DL (ref 70–110)
POTASSIUM SERPL-SCNC: 3.8 MMOL/L
RBC # BLD AUTO: 3.52 M/UL
SODIUM SERPL-SCNC: 143 MMOL/L
WBC # BLD AUTO: 6.79 K/UL

## 2018-04-02 PROCEDURE — 97161 PT EVAL LOW COMPLEX 20 MIN: CPT

## 2018-04-02 PROCEDURE — 80048 BASIC METABOLIC PNL TOTAL CA: CPT

## 2018-04-02 PROCEDURE — 97165 OT EVAL LOW COMPLEX 30 MIN: CPT

## 2018-04-02 PROCEDURE — 97535 SELF CARE MNGMENT TRAINING: CPT

## 2018-04-02 PROCEDURE — 25000003 PHARM REV CODE 250: Performed by: NURSE PRACTITIONER

## 2018-04-02 PROCEDURE — 97110 THERAPEUTIC EXERCISES: CPT

## 2018-04-02 PROCEDURE — 25000003 PHARM REV CODE 250: Performed by: INTERNAL MEDICINE

## 2018-04-02 PROCEDURE — 97116 GAIT TRAINING THERAPY: CPT

## 2018-04-02 PROCEDURE — 84100 ASSAY OF PHOSPHORUS: CPT

## 2018-04-02 PROCEDURE — 97803 MED NUTRITION INDIV SUBSEQ: CPT

## 2018-04-02 PROCEDURE — 36415 COLL VENOUS BLD VENIPUNCTURE: CPT

## 2018-04-02 PROCEDURE — 83735 ASSAY OF MAGNESIUM: CPT

## 2018-04-02 PROCEDURE — 85025 COMPLETE CBC W/AUTO DIFF WBC: CPT

## 2018-04-02 PROCEDURE — 25000003 PHARM REV CODE 250: Performed by: ORTHOPAEDIC SURGERY

## 2018-04-02 PROCEDURE — 97530 THERAPEUTIC ACTIVITIES: CPT

## 2018-04-02 PROCEDURE — 11000004 HC SNF PRIVATE

## 2018-04-02 PROCEDURE — 99309 SBSQ NF CARE MODERATE MDM 30: CPT | Mod: SA,,, | Performed by: NURSE PRACTITIONER

## 2018-04-02 RX ORDER — ACETAMINOPHEN 500 MG
500 TABLET ORAL 3 TIMES DAILY
Status: DISCONTINUED | OUTPATIENT
Start: 2018-04-02 | End: 2018-04-14

## 2018-04-02 RX ORDER — CARVEDILOL 3.12 MG/1
3.12 TABLET ORAL 2 TIMES DAILY
Status: DISCONTINUED | OUTPATIENT
Start: 2018-04-02 | End: 2018-04-04

## 2018-04-02 RX ADMIN — QUETIAPINE FUMARATE 800 MG: 200 TABLET, FILM COATED ORAL at 09:04

## 2018-04-02 RX ADMIN — INSULIN ASPART 8 UNITS: 100 INJECTION, SOLUTION INTRAVENOUS; SUBCUTANEOUS at 08:04

## 2018-04-02 RX ADMIN — LEVOTHYROXINE SODIUM 150 MCG: 75 TABLET ORAL at 08:04

## 2018-04-02 RX ADMIN — INSULIN ASPART 2 UNITS: 100 INJECTION, SOLUTION INTRAVENOUS; SUBCUTANEOUS at 05:04

## 2018-04-02 RX ADMIN — MIRTAZAPINE 45 MG: 15 TABLET, FILM COATED ORAL at 09:04

## 2018-04-02 RX ADMIN — POLYETHYLENE GLYCOL 3350 17 G: 17 POWDER, FOR SOLUTION ORAL at 08:04

## 2018-04-02 RX ADMIN — OXYCODONE HYDROCHLORIDE 15 MG: 5 TABLET ORAL at 04:04

## 2018-04-02 RX ADMIN — INSULIN ASPART 2 UNITS: 100 INJECTION, SOLUTION INTRAVENOUS; SUBCUTANEOUS at 11:04

## 2018-04-02 RX ADMIN — GABAPENTIN 600 MG: 300 CAPSULE ORAL at 09:04

## 2018-04-02 RX ADMIN — CARVEDILOL 3.12 MG: 3.12 TABLET, FILM COATED ORAL at 09:04

## 2018-04-02 RX ADMIN — TIZANIDINE 12 MG: 4 TABLET ORAL at 08:04

## 2018-04-02 RX ADMIN — INSULIN ASPART 8 UNITS: 100 INJECTION, SOLUTION INTRAVENOUS; SUBCUTANEOUS at 05:04

## 2018-04-02 RX ADMIN — FLUTICASONE PROPIONATE 100 MCG: 50 SPRAY, METERED NASAL at 08:04

## 2018-04-02 RX ADMIN — BUSPIRONE HYDROCHLORIDE 10 MG: 10 TABLET ORAL at 09:04

## 2018-04-02 RX ADMIN — STANDARDIZED SENNA CONCENTRATE AND DOCUSATE SODIUM 2 TABLET: 8.6; 5 TABLET, FILM COATED ORAL at 08:04

## 2018-04-02 RX ADMIN — BUSPIRONE HYDROCHLORIDE 10 MG: 10 TABLET ORAL at 10:04

## 2018-04-02 RX ADMIN — ACETAMINOPHEN 500 MG: 500 TABLET ORAL at 02:04

## 2018-04-02 RX ADMIN — TIZANIDINE 12 MG: 4 TABLET ORAL at 09:04

## 2018-04-02 RX ADMIN — INSULIN DETEMIR 20 UNITS: 100 INJECTION, SOLUTION SUBCUTANEOUS at 09:04

## 2018-04-02 RX ADMIN — GABAPENTIN 600 MG: 300 CAPSULE ORAL at 08:04

## 2018-04-02 RX ADMIN — FAMOTIDINE 20 MG: 20 TABLET, FILM COATED ORAL at 09:04

## 2018-04-02 RX ADMIN — OXYCODONE HYDROCHLORIDE 15 MG: 5 TABLET ORAL at 11:04

## 2018-04-02 RX ADMIN — FAMOTIDINE 20 MG: 20 TABLET, FILM COATED ORAL at 08:04

## 2018-04-02 RX ADMIN — OXYCODONE HYDROCHLORIDE 15 MG: 5 TABLET ORAL at 01:04

## 2018-04-02 RX ADMIN — OXYCODONE HYDROCHLORIDE 15 MG: 5 TABLET ORAL at 03:04

## 2018-04-02 RX ADMIN — INSULIN ASPART 8 UNITS: 100 INJECTION, SOLUTION INTRAVENOUS; SUBCUTANEOUS at 11:04

## 2018-04-02 RX ADMIN — REGULAR STRENGTH 325 MG: 325 TABLET ORAL at 09:04

## 2018-04-02 RX ADMIN — ALPRAZOLAM 1 MG: 0.5 TABLET ORAL at 09:04

## 2018-04-02 RX ADMIN — STANDARDIZED SENNA CONCENTRATE AND DOCUSATE SODIUM 2 TABLET: 8.6; 5 TABLET, FILM COATED ORAL at 09:04

## 2018-04-02 RX ADMIN — REGULAR STRENGTH 325 MG: 325 TABLET ORAL at 08:04

## 2018-04-02 RX ADMIN — ACETAMINOPHEN 500 MG: 500 TABLET ORAL at 09:04

## 2018-04-02 RX ADMIN — ALPRAZOLAM 1 MG: 0.5 TABLET ORAL at 08:04

## 2018-04-02 RX ADMIN — TRAZODONE HYDROCHLORIDE 50 MG: 50 TABLET ORAL at 09:04

## 2018-04-02 RX ADMIN — PROMETHAZINE HYDROCHLORIDE 25 MG: 12.5 TABLET ORAL at 08:04

## 2018-04-02 RX ADMIN — GABAPENTIN 600 MG: 300 CAPSULE ORAL at 02:04

## 2018-04-02 RX ADMIN — OXYCODONE HYDROCHLORIDE 15 MG: 5 TABLET ORAL at 08:04

## 2018-04-02 RX ADMIN — OXYCODONE HYDROCHLORIDE 15 MG: 5 TABLET ORAL at 07:04

## 2018-04-02 NOTE — ASSESSMENT & PLAN NOTE
S/p Left knee replacement  See plan below.     4/2/18  Pain minimally controlled.    Continue OxyIR 10-20 mg PRN as ordered.  Tizanidine 12 mg bid for muscle spasms.  Will add scheduled tylenol to assist with pain control.  Narcan PRN for over sedation.  Follow up with ortho.  Will do x-ray of knee for complain of pop followed by pain while working in therapy.  ASA for DVT prevention.  Pepcid for gastric protection while on high dose ASA.

## 2018-04-02 NOTE — PROGRESS NOTES
" [facility]  Adult Nutrition  Progress Note    SUMMARY       Recommendations    Goals: promote weight loss after surgical recovery, low sugar ONS bid, RD to follow   Nutrition Goal Status: progressing towards goal  Communication of RD Recs: other (comment) (care plan/notes, sign and hold order)  Educated on sources of protein for wound healing  Reason for Assessment    Reason for Assessment: RD follow-up  Diagnosis: other (see comments) (primary osteoarthritis of left knee)  Relevant Medical History: DM, HTN, morbid obesity, gastroparesis, gastric stimulator  Interdisciplinary Rounds: attended  General Information Comments: pt states she avods starch in her diet, eats more fruits and milk one serving daily, some protein shakes at home with low sugar content  Nutrition Discharge Planning: Patient to discharge on a Cardiac, Diabetic diet.     Nutrition Risk Screen    Nutrition Risk Screen: no indicators present    Nutrition/Diet History    Food Preferences: aden  Do you have any cultural, spiritual, Gnosticism conflicts, given your current situation?: none stated    Anthropometrics    Temp: 97.9 °F (36.6 °C)  Height Method: Stated  Height: 5' 5" (165.1 cm)  Height (inches): 65 in  Weight Method: Standard Scale  Weight: 124.4 kg (274 lb 4 oz)  Weight (lb): 274.25 lb  Ideal Body Weight (IBW), Female: 125 lb  % Ideal Body Weight, Female (lb): 218.34 lb  BMI (Calculated): 45.5  BMI Grade: greater than 40 - morbid obesity     Lab/Procedures/Meds    Pertinent Labs Reviewed: reviewed  Pertinent Labs Comments: Hemoglobin A1C 7.6%  Pertinent Medications Reviewed: reviewed  Pertinent Medications Comments: insulin    Physical Findings/Assessment    Overall Physical Appearance: obese  Skin: incision(s) (Colt Score 20)    Estimated/Assessed Needs    Weight Used For Calorie Calculations: 123.8 kg (272 lb 14.9 oz)  Height: 5' 5" (165.1 cm)  Energy Need Method: Kcal/kg (1733 - 1980)  20 kcal/kg (kcal): 2476  25 " kcal/kg (kcal): 3095  30 kcal/kg (kcal): 3714  35 kcal/kg (kcal): 4333  40 kcal/kg (kcal): 4952  45 kcal/kg (kcal): 5571  50 kcal/kg (kcal): 6190  RMR (Montgomery-St. Jeor Equation): 1893.88  Protein Requirements: 85 - 90 g  Weight Used For Protein Calculations: 56.8 kg (125 lb 3.5 oz) (Ideal Body Weight)  0.6 gm Protein (gm): 34.15  0.7 gm Protein (gm): 39.84  0.8 gm Protein (gm): 45.54  0.9 gm Protein (gm): 51.23  1.0 gm Protein (gm): 56.92  1.1 gm Protein (gm): 62.61  1.2 gm Protein (gm): 68.3  1.3 gm Protein (gm): 73.99  1.4 gm Protein (gm): 79.69  1.5 gm Protein (gm): 85.38  1.6 gm Protein (gm): 91.07  1.7 gm Protein (gm): 96.76  1.8 gm Protein (gm): 102.45  1.9 gm Protein (gm): 108.15  2.0 gm Protein (gm): 113.84  2.1 gm Protein (gm): 119.53  2.2 gm Protein (gm): 125.22  2.3 gm Protein (gm): 130.91  2.4 gm Protein (gm): 136.6  2.5 gm Protein (gm): 142.3  Fluid Need Method: RDA Method, other (see comments) (or per MD)  CHO Requirement: 180 g       Nutrition Prescription Ordered    Current Diet Order: 2000 diabetic   Nutrition Order Comments: preferences noted  Oral Nutrition Supplement: none    Evaluation of Received Nutrient/Fluid Intake    I/O: not recorded  Energy Calories Required: not meeting needs  Protein Required: meeting needs  Fluid Required: meeting needs  Comments: PO 75%  Tolerance: tolerating  % Intake of Estimated Energy Needs: 50 - 75 %  % Meal Intake: 75 - 100 %    Nutrition Risk    Level of Risk/Frequency of Follow-up: low     Assessment and Plan    Morbid obesity with BMI of 45.0-49.9, adult      Related to (etiology):   Excessive caloric intake and physiological changes 2/2 thyroid cancer    Signs and Symptoms (as evidenced by):   BMI = 45.5 kg/m2     Interventions/Recommendations (treatment strategy):  1) 2000 calorie Diabetic, Cardiac diet 2) Provide diet education to promote weight loss 3) Monitor oral intake 4) Monitor weight, BMI    Nutrition Diagnosis Status:   New               Monitor  and Evaluation    Food and Nutrient Intake: energy intake, food and beverage intake  Food and Nutrient Adminstration: diet order  Knowledge/Beliefs/Attitudes: food and nutrition knowledge/skill, beliefs and attitudes  Physical Activity and Function: nutrition-related ADLs and IADLs  Anthropometric Measurements: weight, weight change, body mass index  Biochemical Data, Medical Tests and Procedures: electrolyte and renal panel, gastrointestinal profile, glucose/endocrine profile  Nutrition-Focused Physical Findings: overall appearance, extremities, muscles and bones, skin     Nutrition Follow-Up    RD Follow-up?: Yes

## 2018-04-02 NOTE — PLAN OF CARE
Problem: Patient Care Overview  Goal: Plan of Care Review  Outcome: Ongoing (interventions implemented as appropriate)   04/02/18 0119   Coping/Psychosocial   Plan Of Care Reviewed With patient       Problem: Fall Risk (Adult)  Goal: Identify Related Risk Factors and Signs and Symptoms  Related risk factors and signs and symptoms are identified upon initiation of Human Response Clinical Practice Guideline (CPG)   Outcome: Ongoing (interventions implemented as appropriate)   04/02/18 0119   Fall Risk   Related Risk Factors (Fall Risk) gait/mobility problems;fatigue/slow reaction;homeostatic imbalance;neuro disease/injury

## 2018-04-02 NOTE — PROGRESS NOTES
"[facility]  Department of Hospital Medicine  Progress Note    Patient Name: Homar Jerry  MRN: [id number]  Code Status: Full Code  Admission Date: 3/31/2018  Length of Stay: 2 days  Attending Physician: Rowena Rodgers MD  Primary Care Provider: Mary Cabrera MD    Subjective:     Principal Problem:Primary osteoarthritis of left knee    HPI:  Chief Complaint/Reason for Admission: Primary osteoarthritis of left knee    History of Present Illness:  Patient is a 42 y.o. female DM2, RA, bipolar disorder who presents to SNF after left TKR on 3/28/2018 by Dr. [facility] to treat osteoarthritis.  She currently has 9/10 pain and states that at its best it will be 8/10. She acknowledges a high tolerance to pain meds and takes oxycodone/APAP 10 mg for left knee and back pain chronically.   Polar ice and oxycodone have been best at relieving but ambulation worsens.  She has a chronic poor appetite and gastroparesis with flares every 6-9 months since having gastric pacer and gastric sleeve surgery.  She has additional stressors of her only son's death in the recent past.     The patient has been admitted to SNF for ongoing PT/OT due to insufficient progress to go home safely from the hospital.    Records from last hospital stay reviewed and summarized above.     Interval History:   The patient was admitted at [facility] from 3/28 to 3/31/2018.    4/2/18  Patient seen for the first time by me, she reports she has pain to her left knee.  States her pain medication gives her relief for about 1 hour after taking.  She reports she suffers from chronic pain and is treated by Dr. Villa.  She reports she is eating, drinking, voiding and having BM without issues.  She currently lives alone and plans to return to [facility] upon discharge.  She reports she has no family assistance.      At 1150 notified by nursing that patient stated she had heard a "pop" while working with therapy and then notice blood on her " dressing.  She is c/o pain to the left knee.  Will send for x-ray and notify Ortho.  There is scant blood underneath dressing no bigger than a pencil eraser, site outlined.          Review of Systems   Constitutional: Negative for appetite change, fatigue and fever.   Respiratory: Negative for cough and shortness of breath.    Cardiovascular: Negative for chest pain, palpitations and leg swelling.   Gastrointestinal: Negative for abdominal pain, constipation, diarrhea, nausea and vomiting.   Endocrine: Negative for polydipsia, polyphagia and polyuria.   Genitourinary: Negative for dysuria and frequency.   Musculoskeletal: Positive for arthralgias and myalgias.   Skin: Negative for rash.   Psychiatric/Behavioral: Negative for confusion and hallucinations.     Objective:     Vital Signs (Most Recent):  Temp: 97.9 °F (36.6 °C) (04/02/18 0848)  Pulse: 97 (04/02/18 0848)  Resp: 18 (04/02/18 0848)  BP: (!) 136/98 (04/02/18 0848)  SpO2: 100 % (04/02/18 0848) Vital Signs (24h Range):  Temp:  [97.7 °F (36.5 °C)-97.9 °F (36.6 °C)] 97.9 °F (36.6 °C)  Pulse:  [77-97] 97  Resp:  [18] 18  SpO2:  [100 %] 100 %  BP: (136-159)/(97-98) 136/98     Weight: 124.4 kg (274 lb 4 oz)  Body mass index is 45.64 kg/m².    Intake/Output Summary (Last 24 hours) at 04/02/18 1159  Last data filed at 04/01/18 1300   Gross per 24 hour   Intake              300 ml   Output                0 ml   Net              300 ml      Physical Exam   Constitutional: She is oriented to person, place, and time. She appears well-developed and well-nourished.   Cardiovascular: Normal rate, regular rhythm, normal heart sounds and intact distal pulses.    No murmur heard.  Pulmonary/Chest: Effort normal and breath sounds normal. No respiratory distress. She has no wheezes.   Abdominal: Soft. Normal appearance and bowel sounds are normal. She exhibits no distension. There is no tenderness.   Musculoskeletal: Normal range of motion. She exhibits edema and tenderness.  She exhibits no deformity.   Neurological: She is alert and oriented to person, place, and time. She has normal strength.   Skin: Skin is warm, dry and intact. Capillary refill takes less than 2 seconds. No erythema.   Surgical aquacel dressing in place.  No redness or drainage seen.   Psychiatric: She has a normal mood and affect.       Significant Labs:   BMP:   Recent Labs  Lab 04/02/18  0452         K 3.8      CO2 24   BUN 10   CREATININE 0.8   CALCIUM 8.1*   MG 2.0     CBC:   Recent Labs  Lab 04/02/18  0452   WBC 6.79   HGB 9.6*   HCT 31.7*   *       Significant Imaging: n/a    Assessment/Plan:      * Primary osteoarthritis of left knee    S/p Left knee replacement  See plan below.     4/2/18  Pain minimally controlled.    Continue OxyIR 10-20 mg PRN as ordered.  Tizanidine 12 mg bid for muscle spasms.  Will add scheduled tylenol to assist with pain control.  Narcan PRN for over sedation.  Follow up with ortho.  Will do x-ray of knee for complain of pop followed by pain while working in therapy.  ASA for DVT prevention.  Pepcid for gastric protection while on high dose ASA.        Status post total left knee replacement    -continue PT/OT to increase ambulation, ADL performance and endurance  -WBAT  -continue oxycodone for pain control prn  -keep polar ice in place when not in therapy  -continue ASA for DVT prophylaxis; continue famotidine while taking high dose ASA  -Ortho NP to assess surgical wound and remove dressing as indicated at next appt; will notify Ortho team for any leakage or excessive drainage evident on Aquacel dressing  -continue miralax and senokot-s to treat/prevent constipation; Mag citrate prn if scheduled regimen ineffective    4/2/18  Treatment plan as above.  Continue therapy to build functional strength  Lives alone, no family support        Morbid obesity with BMI of 45.0-49.9, adult    Body mass index is 45.64 kg/m².    Patient on 2000 alfonso diet and will  encourage modeling of portion sizes at home.     4/2/18  Diet modifications.  Follow up with PCP for weight loss options.        Type 2 diabetes mellitus with hyperglycemia, with long-term current use of insulin    Hemoglobin A1C   Date Value Ref Range Status   03/15/2018 7.6 (H) 4.0 - 5.6 % Final     Comment:   09/26/2017 7.5 (H) 4.0 - 5.6 % Final     Comment:   03/03/2017 10.3 (H) 4.5 - 6.2 % Final     Comment:     Patient does not take stated dose of insulin on MAR as she says she misses several meals; usually Levemir 16 units BID and novology 4 units before meals  Continue current levemir and novolog doses; adequate control currently  Continue diabetic diet therapy with glucose monitoring AC and HS.    Hyperglycemia to be treated with SSNI prn.  Glucose goal is < 180mg/dl and avoidance of hypoglycemia.  Will continue to monitor and adjust regimen as necessary to achieve goals.    4/2/18  POCT Glucose   Date Value Ref Range Status   04/02/2018 169 (H) 70 - 110 mg/dL Final   04/02/2018 123 (H) 70 - 110 mg/dL Final   04/01/2018 104 70 - 110 mg/dL Final   04/01/2018 134 (H) 70 - 110 mg/dL Final   04/01/2018 143 (H) 70 - 110 mg/dL Final   04/01/2018 215 (H) 70 - 110 mg/dL Final   03/31/2018 121 (H) 70 - 110 mg/dL Final   03/31/2018 210 (H) 70 - 110 mg/dL Final   03/31/2018 122 (H) 70 - 110 mg/dL Final   03/31/2018 190 (H) 70 - 110 mg/dL Final   03/30/2018 148 (H) 70 - 110 mg/dL Final   03/30/2018 171 (H) 70 - 110 mg/dL Final     CBG is stable,   Continue Levemir 20 units qhs and Aspart 8 units with meals and per SSI.  Continue diabetic diet as ordered and monitor.        Rheumatoid arthritis    No chronic meds currently  No joint pain other than the surgical site    4/2/18  Chronic, no complaints.  Per MD on no chronic meds.        Asthma    No wheezing or dyspnea currently  Continue therapy with albuterol inhaler prn  Will continue to monitor    4/2/18  No wheezing or complaints of SOB.  Continue albuterol inh PRN  and monitor.        Essential hypertension    Normotensive currently  BP meds (coreg and diovan HCT) held due to hypotension in hospital  Will continue to monitor and resume meds gradually for SBP > 140    4/2/18  BP is 136/98 and HR is 97  Takes coreg 12.5 mg bid at home but currently not ordered.  Per MD note, coreg and diovan HCT held due to hypotension.  Will restart Coreg at lower dose and monitor.        Allergic rhinitis    Controlled with chronic flonase which will be continued at [facility]    4/2/18  Chronic, no complaints.  Continue flonase as ordered        Migraines    Had an episode today  Resume topamax 25 mg daily prn which will be resumed  Will continue to monitor    4/2/18  No complaints today.  Continue Topamax 25 mg PRN.        Chronic low back pain    Controlled currently  Continue chronic therapy with gabapentin and tizanidine with oxycodone prn    4/2/18  Chronic, follow up with Dr. Villa after discharge for management.        Hypothyroidism, postsurgical    Chronic and stable  Continue therapy with levothyroxine  F/U with PCP for ongoing monitoring and adjustment of levothyroxine dose as indicated    4/2/18  Continue Synthroid for chronic management and follow up with PCP.        Bipolar 1 disorder    Not labile currently and cooperative with exam  Continue chronic therapy with quetiapine, mirtazipine, buspirone, trazodone  Continue alprazolam prn anxiety    4/2/18  Chronic and stable.  Continue chronic therapy with Seroquel, Mirtazapine, Buspirone and trazodone.  Xanax PRN for anxiety.        Gastroparesis    No chronic meds since surgery  Will continue to monitor    4/2/18  No acute issues, continue to monitor.            Francisco Majano NP  Department of Hospital Medicine  [facility]

## 2018-04-02 NOTE — ASSESSMENT & PLAN NOTE
No wheezing or dyspnea currently  Continue therapy with albuterol inhaler prn  Will continue to monitor    4/2/18  No wheezing or complaints of SOB.  Continue albuterol inh PRN and monitor.

## 2018-04-02 NOTE — ASSESSMENT & PLAN NOTE
Controlled with chronic flonase which will be continued at [facility]    4/2/18  Chronic, no complaints.  Continue flonase as ordered

## 2018-04-02 NOTE — PLAN OF CARE
Problem: Physical Therapy Goal  Goal: Physical Therapy Goal  Goals to be met by: 14 days     Patient will increase functional independence with mobility by performin. Supine to sit with Modified [place]  2. Sit to supine with Modified [place]  3. Sit to stand transfer with Modified [place] with Rolling walker  4. Bed to chair transfer with Supervision using Rolling Walker  5. Gait  x 150 feet with Supervision using Rolling Walker.   6. Wheelchair propulsion x100 feet with Modified [place] using bilateral upper extremities  7. Ascend/Descend 4 inch curb step with Stand-by Assistance using Rolling Walker.  8. Lower extremity exercise program x20 reps per handout, with assistance as needed and gym therex, per TKA protocol    PT navjot completed. Gila Mott, PT 2018

## 2018-04-02 NOTE — PLAN OF CARE
Problem: Patient Care Overview  Goal: Plan of Care Review  Outcome: Revised  Repositions independently, no new skin breakdowns noted. Left knee surgical aquacel in place with scant marked bloody drainage, NP aware. Will monitor. Afebrile. monitored for pain and safety. Safety maintained. X-ray done today pt c/o increased pain.

## 2018-04-02 NOTE — ASSESSMENT & PLAN NOTE
Controlled currently  Continue chronic therapy with gabapentin and tizanidine with oxycodone prn    4/2/18  Chronic, follow up with Dr. Villa after discharge for management.

## 2018-04-02 NOTE — DISCHARGE SUMMARY
[facility]  Orthopedics  Discharge Summary      Patient Name: Homar Jerry  MRN: [id number]  Admission Date: 3/28/2018  Hospital Length of Stay: 3 days  Discharge Date and Time: 3/31/2018 12:21 PM  Attending Physician: No att. providers found   Discharging Provider: Hannah Post MD  Primary Care Provider: Mary Cabrera MD    HPI:   Homar Jerry is a 42 y.o. female with left knee osteoarthritis.    Procedure(s) (LRB):  REPLACEMENT-KNEE-TOTAL (Left)      Hospital Course:  Patient presented for above procedure.  Tolerated it well and was discharged to SNF on POD3 after voiding, tolerating diet, ambulating, pain controlled.  Discharge instructions, follow-up appointment, and med rec are below.      Consults         Status Ordering Provider     Inpatient consult to Hospital Medicine-Ortho Comanagement Only  Once     Provider:  (Not yet assigned)    Completed KARSON GUZMÁN     Inpatient consult to Physical Medicine Rehab  Once     Provider:  (Not yet assigned)    Completed [facility]          Significant Diagnostic Studies: No pertinent studies.    Pending Diagnostic Studies:     None        Final Active Diagnoses:    Diagnosis Date Noted POA    PRINCIPAL PROBLEM:  Primary osteoarthritis of left knee [M17.12] 03/28/2018 Yes    Idiopathic hypotension [I95.0]  No    Morbid obesity with BMI of 45.0-49.9, adult [E66.01, Z68.42] 01/18/2017 Not Applicable    Type 2 diabetes mellitus with hyperglycemia, with long-term current use of insulin [E11.65, Z79.4] 01/17/2017 Yes    Essential hypertension [I10]  Yes     Chronic    Hypothyroidism, postsurgical [E89.0]  Yes     Chronic      Problems Resolved During this Admission:    Diagnosis Date Noted Date Resolved POA      Discharged Condition: good    Disposition: Skilled Nursing Facility    Follow Up:  Follow-up Information     Yadira Quinones PA-C. Go on 4/10/2018.    Specialty:  Orthopedic Surgery  Why:  For  "wound re-check  Contact information:  [address]  [facility] [id number]  [phone]                 Patient Instructions:     3 IN 1 COMMODE FOR HOME USE   Order Specific Question Answer Comments   Type: Standard    Height: 5' 5" (1.651 m)    Weight: 117 kg (257 lb 15 oz)    Length of need (1-99 months): 99      TRANSFER TUB BENCH FOR HOME USE   Order Specific Question Answer Comments   Type of Transfer Tub Bench: Unpadded    Height: 5' 5" (1.651 m)    Weight: 117 kg (257 lb 15 oz)    Length of need (1-99 months): 99      WALKER FOR HOME USE   Order Specific Question Answer Comments   Type of Walker: Adult (5'4"-6'6")    With wheels? No    Height: 5' 5" (1.651 m)    Weight: 117 kg (257 lb 15 oz)    Length of need (1-99 months): 99    Please check all that apply: Walker will be used for gait training.      Activity as tolerated     Call MD for:  difficulty breathing, headache or visual disturbances     Call MD for:  extreme fatigue     Call MD for:  hives     Call MD for:  persistent dizziness or light-headedness     Call MD for:  persistent nausea and vomiting     Call MD for:  redness, tenderness, or signs of infection (pain, swelling, redness, odor or green/yellow discharge around incision site)     Call MD for:  severe uncontrolled pain     Call MD for:  temperature >100.4     Keep surgical extremity elevated     Leave dressing on - Keep it clean, dry, and intact until clinic visit   Order Comments: Do not remove surgical dressing for 2 weeks post-op. This will be done only by MD at initial post-op visit. If dressing is completely saturated, replace with identical dressing - silver-impregnated hydrocolloid dressing.     Do not get dressings wet. Do not shower.     If dressing continues to be saturated or there are signs of infection, please call [facility] [phone] for further instructions and to make appt to be seen.     Lifting restrictions   Order Comments: No strenuous exercise or lifting of > " 10 lbs     No driving, operating heavy equipment or signing legal documents while taking pain medication     Sponge bath only until clinic visit     Weight bearing as tolerated       Medications:  Reconciled Home Medications:      Medication List      START taking these medications    aspirin 325 MG tablet  Take 1 tablet (325 mg total) by mouth 2 (two) times daily.     ondansetron 8 MG Tbdl  Commonly known as:  ZOFRAN-ODT  Take 1 tablet (8 mg total) by mouth every 12 (twelve) hours as needed (nausea).        CHANGE how you take these medications    ALPRAZolam 1 MG tablet  Commonly known as:  XANAX  Take 1 tablet (1 mg total) by mouth every evening.  What changed:  · how much to take  · when to take this     * docusate sodium 50 MG capsule  Commonly known as:  COLACE  Take 1 capsule (50 mg total) by mouth 2 (two) times daily.  What changed:  · when to take this  · reasons to take this     * docusate sodium 100 MG capsule  Commonly known as:  COLACE  Take 1 capsule (100 mg total) by mouth 2 (two) times daily as needed for Constipation.  What changed:  You were already taking a medication with the same name, and this prescription was added. Make sure you understand how and when to take each.     insulin detemir U-100 100 unit/mL (3 mL) Inpn pen  Commonly known as:  LEVEMIR FLEXTOUCH  Inject 30 Units into the skin 2 (two) times daily.  What changed:  additional instructions     levocetirizine 5 MG tablet  Commonly known as:  XYZAL  Take 1 tablet (5 mg total) by mouth every evening.  What changed:  · when to take this  · reasons to take this     * oxyCODONE-acetaminophen  mg per tablet  Commonly known as:  PERCOCET  What changed:  Another medication with the same name was added. Make sure you understand how and when to take each.     * oxyCODONE-acetaminophen  mg per tablet  Commonly known as:  PERCOCET  Take 1 tablet by mouth every 4 to 6 hours as needed for Pain.  What changed:  You were already taking a  medication with the same name, and this prescription was added. Make sure you understand how and when to take each.     tiZANidine 4 MG tablet  Commonly known as:  ZANAFLEX  TAKE 3 TABLETS (12 MG TOTAL) BY MOUTH EVERY EVENING.  What changed:  · how much to take  · how to take this  · when to take this  · additional instructions        * This list has 4 medication(s) that are the same as other medications prescribed for you. Read the directions carefully, and ask your doctor or other care provider to review them with you.            CONTINUE taking these medications    blood sugar diagnostic Strp  1 each by Misc.(Non-Drug; Combo Route) route 4 (four) times daily with meals and nightly.     blood-glucose meter kit  Use as instructed     busPIRone 10 MG tablet  Commonly known as:  BUSPAR     calcium carbonate 500 mg calcium (1,250 mg) tablet  Commonly known as:  OS-MARIAN  Take 2 tablets (1,000 mg total) by mouth once daily.     carvedilol 12.5 MG tablet  Commonly known as:  COREG  Take 3 tablets (37.5 mg total) by mouth 2 (two) times daily.     cholecalciferol (vitamin D3) 1,000 unit capsule  Take 2 capsules (2,000 Units total) by mouth once daily.     fluticasone 50 mcg/actuation nasal spray  Commonly known as:  FLONASE     gabapentin 600 MG tablet  Commonly known as:  NEURONTIN     hydroCHLOROthiazide 25 MG tablet  Commonly known as:  HYDRODIURIL  Take 1 tablet (25 mg total) by mouth once daily.     hydrOXYzine pamoate 50 MG Cap  Commonly known as:  VISTARIL     insulin aspart U-100 100 unit/mL Inpn pen  Commonly known as:  NovoLOG  Inject 16 Units into the skin 3 (three) times daily with meals.     lancets Misc  1 each by Misc.(Non-Drug; Combo Route) route 4 (four) times daily with meals and nightly.     levothyroxine 150 MCG tablet  Commonly known as:  SYNTHROID  Take 1 tablet (150 mcg total) by mouth once daily.     lidocaine 5 %  Commonly known as:  LIDODERM  Place 1 patch onto the skin daily as needed. Remove &  "Discard patch within 12 hours or as directed by MD     meclizine 25 mg tablet  Commonly known as:  ANTIVERT  Take 1 tablet (25 mg total) by mouth 3 (three) times daily as needed for Dizziness or Nausea.     mirtazapine 45 MG tablet  Commonly known as:  REMERON     pen needle, diabetic 31 gauge x 5/16" Ndle  1 each by Misc.(Non-Drug; Combo Route) route 5 (five) times daily.     POTASSIUM ORAL     promethazine 25 MG tablet  Commonly known as:  PHENERGAN  TAKE 1 TABLET (25 MG TOTAL) BY MOUTH EVERY 6 HOURS AS NEEDED.     QUEtiapine 400 MG tablet  Commonly known as:  SEROQUEL     topiramate 25 MG tablet  Commonly known as:  TOPAMAX  Take 1 tablet (25 mg total) by mouth daily as needed (migraines).     traZODone 50 MG tablet  Commonly known as:  DESYREL     valsartan-hydrochlorothiazide 80-12.5 mg per tablet  Commonly known as:  DIOVAN-HCT  Take 1 tablet by mouth once daily.     VENTOLIN HFA 90 mcg/actuation inhaler  Generic drug:  albuterol     zolpidem 10 mg Tab  Commonly known as:  AMBIEN           Where to Get Your Medications      You can get these medications from any pharmacy    Bring a paper prescription for each of these medications  · aspirin 325 MG tablet  · docusate sodium 100 MG capsule  · ondansetron 8 MG Tbdl  · oxyCODONE-acetaminophen  mg per tablet         Hannah Post MD  Orthopedics  [facility]  "

## 2018-04-02 NOTE — SUBJECTIVE & OBJECTIVE
"Interval History:   The patient was [place] [facility] from 3/28 to 3/31/2018.    4/2/18  Patient seen for the first time by me, she reports she has pain to her left knee.  States her pain medication gives her relief for about 1 hour after taking.  She reports she suffers from chronic pain and is treated by Dr. Villa.  She reports she is eating, drinking, voiding and having BM without issues.  She currently lives alone and plans to return to [facility] upon discharge.  She reports she has no family assistance.      At 1150 notified by nursing that patient stated she had heard a "pop" while working with therapy and then notice blood on her dressing.  She is c/o pain to the left knee.  Will send for x-ray and notify Ortho.  There is scant blood underneath dressing no bigger than a pencil eraser, site outlined.          Review of Systems   Constitutional: Negative for appetite change, fatigue and fever.   Respiratory: Negative for cough and shortness of breath.    Cardiovascular: Negative for chest pain, palpitations and leg swelling.   Gastrointestinal: Negative for abdominal pain, constipation, diarrhea, nausea and vomiting.   Endocrine: Negative for polydipsia, polyphagia and polyuria.   Genitourinary: Negative for dysuria and frequency.   Musculoskeletal: Positive for arthralgias and myalgias.   Skin: Negative for rash.   Psychiatric/Behavioral: Negative for confusion and hallucinations.     Objective:     Vital Signs (Most Recent):  Temp: 97.9 °F (36.6 °C) (04/02/18 0848)  Pulse: 97 (04/02/18 0848)  Resp: 18 (04/02/18 0848)  BP: (!) 136/98 (04/02/18 0848)  SpO2: 100 % (04/02/18 0848) Vital Signs (24h Range):  Temp:  [97.7 °F (36.5 °C)-97.9 °F (36.6 °C)] 97.9 °F (36.6 °C)  Pulse:  [77-97] 97  Resp:  [18] 18  SpO2:  [100 %] 100 %  BP: (136-159)/(97-98) 136/98     Weight: 124.4 kg (274 lb 4 oz)  Body mass index is 45.64 kg/m².    Intake/Output Summary (Last 24 hours) at 04/02/18 1159  Last data filed at 04/01/18 " 1300   Gross per 24 hour   Intake              300 ml   Output                0 ml   Net              300 ml      Physical Exam   Constitutional: She is oriented to person, place, and time. She appears well-developed and well-nourished.   Cardiovascular: Normal rate, regular rhythm, normal heart sounds and intact distal pulses.    No murmur heard.  Pulmonary/Chest: Effort normal and breath sounds normal. No respiratory distress. She has no wheezes.   Abdominal: Soft. Normal appearance and bowel sounds are normal. She exhibits no distension. There is no tenderness.   Musculoskeletal: Normal range of motion. She exhibits edema and tenderness. She exhibits no deformity.   Neurological: She is alert and oriented to person, place, and time. She has normal strength.   Skin: Skin is warm, dry and intact. Capillary refill takes less than 2 seconds. No erythema.   Surgical aquacel dressing in place.  No redness or drainage seen.   Psychiatric: She has a normal mood and affect.       Significant Labs:   BMP:   Recent Labs  Lab 04/02/18  0452         K 3.8      CO2 24   BUN 10   CREATININE 0.8   CALCIUM 8.1*   MG 2.0     CBC:   Recent Labs  Lab 04/02/18  0452   WBC 6.79   HGB 9.6*   HCT 31.7*   *       Significant Imaging: n/a

## 2018-04-02 NOTE — ASSESSMENT & PLAN NOTE
-continue PT/OT to increase ambulation, ADL performance and endurance  -WBAT  -continue oxycodone for pain control prn  -keep polar ice in place when not in therapy  -continue ASA for DVT prophylaxis; continue famotidine while taking high dose ASA  -Ortho NP to assess surgical wound and remove dressing as indicated at next appt; will notify Ortho team for any leakage or excessive drainage evident on Aquacel dressing  -continue miralax and senokot-s to treat/prevent constipation; Mag citrate prn if scheduled regimen ineffective    4/2/18  Treatment plan as above.  Continue therapy to build functional strength  Lives alone, no family support

## 2018-04-02 NOTE — PT/OT/SLP EVAL
Occupational Therapy  Evaluation / Treatment  Homar Jerry   MRN: [id number]   Admitting Diagnosis: Primary osteoarthritis of left knee     OT Date of Treatment: 04/02/18   OT Start Time: 0730  OT Stop Time: 0815  OT Total Time (min): 45 min    Billable Minutes:  Evaluation 20  Self Care/Home Management 25    Diagnosis: Primary osteoarthritis of left knee    Past Medical History:   Diagnosis Date    Anxiety     Asthma     usually with cold weather    Bipolar 1 disorder 3/17/2015    Cervical cancer 2009    DOT    Chronic low back pain 3/17/2015    Chronic narcotic dependence 1/17/2017    Essential hypertension     Carvedilol  HCTZ Vlsartan- HCTZ Usual BP- 110/70-80     Fatty liver 3/15/2018    Fibrocystic breast     Fibromyalgia     Gastroparesis 2012    s/p sleeve to cover damaged nerves; s/p gastric pacemaker which did not help; due to nerve damage during surgery    Hypothyroidism, postsurgical     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 . In ED found to have worsening hyperglycemia, with anion gap and acidosis. Was bolused with NS and started on insulin infusion with DKA protocol.  On treatment with  Insulin   Hemoglobin A1c- 7.5 Sept 2017 Capillary glucose check-yes Pre breakfast -95 Pre lunch -120 Pre [id number]      Morbid obesity with BMI of 40.0-44.9, adult 1/18/2017    Non-intractable vomiting with nausea 9/26/2017    Rheumatoid arthritis     S/P laparoscopic appendectomy 9/26/2017    Status post placement of implantable loop recorder 7/1/2015    syncope and palpitations s/p ILR placement.      Thyroid cancer 2003 & 2013    thyroidectomy    Type 2 diabetes mellitus with hyperglycemia, without long-term current use of insulin 1/17/2017    Urinary incontinence     Vitamin D deficiency 1/21/2017       Past Surgical History:   Procedure Laterality Date    ANKLE FRACTURE SURGERY Right     APPENDECTOMY      BREAST SURGERY      exploratory laparotomy due to complications of hysterectomy  2009    cervical cuff burst with air in abdomen    FRACTURE SURGERY      ankle around 2012    GASTRECTOMY      gastric pacemaker      gastric sleeve      HYSTERECTOMY  2009    total including cervix    KNEE ARTHROSCOPY W/ MENISCAL REPAIR Left 2016    KNEE SURGERY  05/12/2016    loop monitor  2016    multiple breast biopsies      TONSILLECTOMY      ureteral sling           General Precautions: Standard, fall  Orthopedic Precautions: LLE weight bearing as tolerated  Braces: N/A    Do you have any cultural, spiritual, Islam conflicts, given your current situation?: none stated     Patient History:  Lives With: other (see comments) (Lives with aunt )  Living Arrangements: house  Home Accessibility:  (no concerns)  Stair Railings at Home: none (threshold to enter.)  Transportation Available: family or friend will provide  Living Environment Comment: Pt lives in single story home with threshold to enter. She was Mod (I) with functional ADLs and Functional mobility and transfers. Pt has son who does IADLs but is away at school and work during the day.  Equipment Currently Used at Home: bedside commode, walker, rolling, shower chair, wheelchair    Prior level of function:   Bed Mobility/Transfers: needs device (RW)  Grooming: independent  Bathing: needs device (Shower chair)  Upper Body Dressing: independent  Lower Body Dressing: independent  Toileting: needs device (BSC)  Home Management Skills: independent  Driving License: Yes  Mode of Transportation: Car, Family, Friends     Dominant hand: right    Subjective:  Communicated with nurse prior to session.    Chief Complaint: Pt reporting (L) knee pain with movement.  Patient/Family stated goals: Pt would like to return to PLOF    Pain/Comfort  Pain Rating 1:  9/10  Location - Side 1: Left  Location - Orientation 1: generalized  Location 1: knee  Pain Addressed 1: Pre-medicate for activity, Reposition, Distraction  Pain Rating Post-Intervention 1: 8/10    Objective:   Patient found with:  (no lines )    Cognitive Exam:  Oriented to: Person, Place, Time and Situation  Follows Commands/attention: Follows multistep  commands  Communication: clear/fluent  Memory:  No Deficits noted  Safety awareness/insight to disability: intact  Coping skills/emotional control: Appropriate to situation    Visual/perceptual:  Intact    Physical Exam:  Postural examination/scapula alignment:    -       No postural abnormalities identified  Skin integrity: Visible skin intact  Edema: None noted (B) UEs    Sensation:      -       Intact (B) UEs    Upper Extremity Range of Motion:  Right Upper Extremity: WFL  Left Upper Extremity: WFL    Upper Extremity Strength:  Right Upper Extremity: WFL  Left Upper Extremity: WFL   Strength: WFLs    Fine motor coordination:      -       Intact (B) UEs    Gross motor coordination: WFL    Functional Status:  MDS G  Bed Mobility Functional Status: -Min (A) (Min (A) (L) LE in and out of bed.)    Transfer Functional Status: S-SBA (SPT from EOB to W/C and W/C to [facility] using RW.)    Walk in Room Functional Status: S-SBA (Pt ambulated 13 ft from EOB to restroom to sink with RW  and (S).)    Dressing Functional Status: -Min (A) (UB performed with set up only, LBD needing Min (A) to don (L) sock and start Pants over (L) foot. Donned pants ,brief )    Eating Functional Status: Mod(I) (no assist)    Toilet Use Functional Status: S-SBA (supervision from [facility] with RW to stand.)    Personal Hygiene Functional Status: Mod(I) (seated. Grooming to be assessed in standing.)    Bathing Functional Status: S-SBA (supervision / set up to sponge bathe EOB.)      Moving from seated to standing position: Not steady, only able to stabilize with staff assistance  Walking (with  assistive device if used): Not steady, only able to stabilize with staff assistance  Turning around and facing the opposite direction while walking: Not steady, only able to stabilize with staff assistance  Moving on and off the toilet: Not steady, only able to stabilize with staff assistance  Surface-to-surface transfer (transfer between bed and chair or wheelchair): Not steady, only able to stabilize with staff assistance         [facility] 6 Click:  AMPA Total Score: 20    Additional Treatment:  - Pt completed functional mobility, transfers and self care tasks as listed above.  - OT received POC with Patient    Patient left up in chair with call button in reach and nurse notified    Assessment:  Homar Jerry is a 42 y.o. female with a medical diagnosis of Primary osteoarthritis of left knee . Pt presents with performance deficits of Physical skills including impaired standing balance, functional mobility, strength, functional endurance, and  functional use of (L ) LE .  Pt is motivated and would benefit from OT intervention to further her functional (I)ce and safety.    Pt presented with a low complexity OT evaluation.  Pt required a brief an expanded review of medical history and occupational profile.  Pt demod 1-3 performance deficits (physical, cognitive, or psychosocial) resulting in limitations and engagement restrictions.  Clinical decision making required low analytical complexity with limited treatment options.  Pt with no cormorbidities and required no modification of task/assistance with assessment.      Physical- skills refer to impairments of body structure or functions, balance, mobility; strength, endurance, FMC, GMC, sensation, dexterity, and posture.  Cognitive- skills refer to ability to attend, communicate, perceive, think, understand, problem solve, mentally sequence, learn, and remember resulting in ability to organize occupational performance in timely and safe manner.    Psychosocial-  skills refer to interpersonal interactions, habits, routines, and behaviors, active use of coping strategies, and environmental adaptations to appropriately participate in everyday tasks and situations.       Rehab identified problem list/impairments: weakness, impaired endurance, impaired self care skills, impaired functional mobilty, gait instability    Rehab potential is good    Activity tolerance: Good    Discharge recommendations: home with home health, home health OT     Barriers to discharge: None     Equipment recommendations: none     GOALS:    Occupational Therapy Goals        Problem: Occupational Therapy Goal    Goal Priority Disciplines Outcome Interventions   Occupational Therapy Goal     OT, PT/OT Ongoing (interventions implemented as appropriate)    Description:  Goals to be met by: 7 days     Patient will increase functional independence with ADLs by performing:    UE Dressing with Modified [place].  LE Dressing with Modified [place].  Grooming while seated with Modified [place].  Toileting from bedside commode with Modified [place] for hygiene and clothing management.   Bathing from  edge of bed with Set-up Assistance.  Supine to sit with Modified [place].  Toilet transfer to bedside commode with Modified [place].  Upper extremity exercise program x10  reps per set, with supervision.  Pt will tolerate up to 10 minutes of functional standing activity with (S) and RW   in prep for ADLs in standing.                      PLAN: Patient to be seen 5 x/week to address the above listed problems via self-care/home management, therapeutic activities, therapeutic exercises  Plan of Care expires: 05/02/18  Plan of Care reviewed with: patient    Brodie Mock OTR/L  04/02/2018

## 2018-04-02 NOTE — PT/OT/SLP EVAL
PhysicalTherapy   Evaluation / treatment    Homar Jerry   MRN: [id number]     PT Received On: 04/02/18  PT Start Time: 0959     PT Stop Time: 1056    PT Total Time (min): 57 min       Billable Minutes:  Evaluation 14, Gait Training 15, Therapeutic Activity 8 and Therapeutic Exercise [id number]    Diagnosis: Primary osteoarthritis of left knee  Past Medical History:   Diagnosis Date    Anxiety     Asthma     usually with cold weather    Bipolar 1 disorder 3/17/2015    Cervical cancer 2009    DOT    Chronic low back pain 3/17/2015    Chronic narcotic dependence 1/17/2017    Essential hypertension     Carvedilol  HCTZ Vlsartan- HCTZ Usual BP- 110/70-80     Fatty liver 3/15/2018    Fibrocystic breast     Fibromyalgia     Gastroparesis 2012    s/p sleeve to cover damaged nerves; s/p gastric pacemaker which did not help; due to nerve damage during surgery    Hypothyroidism, postsurgical     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 . In ED found to have worsening hyperglycemia, with anion gap and acidosis. Was bolused with NS and started on insulin infusion with DKA protocol.  On treatment with  Insulin   Hemoglobin A1c- 7.5 Sept 2017 Capillary glucose check-yes Pre breakfast -95 Pre lunch -120 Pre [id number]      Morbid obesity with BMI of 40.0-44.9, adult 1/18/2017    Non-intractable vomiting with nausea 9/26/2017    Rheumatoid arthritis     S/P laparoscopic appendectomy 9/26/2017    Status post placement of implantable loop recorder 7/1/2015    syncope and palpitations s/p ILR placement.      Thyroid cancer 2003 & 2013    thyroidectomy    Type 2 diabetes mellitus with hyperglycemia, without long-term current use of insulin 1/17/2017    Urinary incontinence     Vitamin D deficiency 1/21/2017      Past Surgical  History:   Procedure Laterality Date    ANKLE FRACTURE SURGERY Right     APPENDECTOMY      BREAST SURGERY      exploratory laparotomy due to complications of hysterectomy  2009    cervical cuff burst with air in abdomen    FRACTURE SURGERY      ankle around 2012    GASTRECTOMY      gastric pacemaker      gastric sleeve      HYSTERECTOMY  2009    total including cervix    KNEE ARTHROSCOPY W/ MENISCAL REPAIR Left 2016    KNEE SURGERY  2016    loop monitor  2016    multiple breast biopsies      TONSILLECTOMY      ureteral sling           General Precautions: Standard, fall  Orthopedic Precautions: LUE non weight bearing, LLE weight bearing as tolerated   Braces: N/A         Patient History:  Lives With: alone  Living Arrangements: house  Home Accessibility: stairs to enter home  Number of Stairs to Enter Home: 1  Transportation Available: car (patient frives)  Living Environment Comment: Patient drives when able. Lives alone w/ . Her adult son  in December and patient reports she does not have any assistance available. Reports frequent falls on tile floors in her home but w/o major injuries, and able to get up w/o assist.  Equipment Currently Used at Home: walker, rolling, wheelchair, bedside commode, bath bench  DME owned (not currently used): none    Previous Level of Function:  Ambulation Skills: needs device (uses RW as needed and w/c as needed)  Transfer Skills: needs device  ADL Skills: needs device    Subjective:  Communicated with patient prior to session.  Agreeable to session  Chief Complaint: pain  Patient goals: get well in time to go to the beach for birthday    Pain/Comfort  Pain Rating 1: 9/10  Location - Side 1: Left  Location - Orientation 1: anterior  Location 1: knee  Pain Addressed 1: Pre-medicate for activity, Reposition, Distraction (had meds prior to session)  Pain Rating Post-Intervention 1: 9/10    Objective:  Patient found seated in w/c with      Cognitive  Exam:  Oriented to: Person, Place, Time and Situation  Follows Commands/attention: Follows multistep  commands  Communication: clear/fluent  Safety awareness/insight to disability: intact    Physical Exam:  Postural examination/scapula alignment:    -       No postural abnormalities identified    Skin integrity: intact surgical dressing left knee  Edema: Mild LLE     Sensation:      -       Intact grossly intact light touch    Upper Extremity Range of Motion:  Right Upper Extremity: WFL  Left Upper Extremity: WFL    Upper Extremity Strength:  Right Upper Extremity: WFL  Left Upper Extremity: WFL    Lower Extremity Range of Motion:  Right Lower Extremity: WFL  Left Lower Extremity: WFL except knee    Lower Extremity Strength:  Right Lower Extremity: WFL  Left Lower Extremity: WFL except knee      AAROM extension -7* and flexion 78*  LLE at rest: noted PF and positioning medially (pigeon toed- patient reports pre-existing for this LE)      Gross motor coordination: WFL    Functional Status:  MDS G  Bed Mobility Functional Status: S-SBA  Transfer Functional Status: S-SBA  Walk in Room Functional Status: S-SBA  Walk in Corridor Functional Status: CGA-Min (A)  Locomotion on Unit Functional Status: CGA-Min (A)  Locomotion Off Unit Functional Status: CGA-Min (A)  Eval Only: Number of L/E limb <4/5 MMT: 1  Moving from seated to standing position: Not steady, only able to stabilize with staff assistance  Walking (with assistive device if used): Not steady, only able to stabilize with staff assistance  Turning around and facing the opposite direction while walking: Not steady, only able to stabilize with staff assistance  Surface-to-surface transfer (transfer between bed and chair or wheelchair): Not steady, only able to stabilize with staff assistance         AM-PAC 6 CLICK MOBILITY  Total Score:18    Bed Mobility:  Sit>Supine:SBA on mat  Supine>Sit: SBA on mat    Transfers:  Sit<>Stand: w/ RW and SBA to/from w/c; to/from  mat  Stand Pivot Transfer: w/ RW and CGA wc<>mat      Gait:  Amb w/ RW and CGA w/ w/c closely follow 60 feet. Antalgic gait w/ decreased weight acceptance on LLE. Achieves a heelstrike on LLE.     Wheelchair Mobility:  Patient propels w/c w/ BUEs and supervision 75 feet     Therex:  Pt performed TKA protocol exercises x20 reps of:  Ankle pumps  Glute sets  Quad sets  Short arc quads  Heel slides  Hip abd/add  SLR  Seated exercises:    LAQ    Hip flexion    Ankle pumps seated    Knee flexion  LLE per knee protocol  With left knee flexion, noted slight 'pop'. Patient reported pain. No redness or additional swelling noted. No drainage w/ observation of surgical dressing. (may have been an adhesion as patient had a decrease in flexion of 10* since last measurement, or may have been stretching of surgical dressing.)     Balance:  Stands w/ RW and SBA  Sits EOM w/ balance static and dynamic WFL    Additional Treatment:  Educated in TKA protocol,  Gait and trf technique  Verbal instruction w/ polar ice    Patient left up in chair with call button in reach.    Assessment:  Homar Jerry is a 42 y.o. female with a medical diagnosis of Primary osteoarthritis of left knee. She is s/p left TKA. Patient lives alone and reports frequent falls recently, uses RW and w/c intermittently. Additional dx of fibromyalgia and RA are somewhat limiting and patient reports typical high pain and decreased response to pain meds. Patient w/ decline in left knee AAROM today over previous measurements but she did miss therapy w/ transfer to SNF unit and holiday. She participated well and good SNF candidate. Patient will benefit from skilled PT to address deficits and functional mobilty and allow patient to progress to safe discharge home. Patient may need light assistance at time of discharge.     Eval of Body Systems: Cardiopulmonary, musculoskeletal, neuromuscular, cognition  Functional Outcome Tools: AMPAC, MMT, ROM   Clinical  Presentation:  stable  Eval Complexity: low   .    Rehab identified problem list/impairments: weakness, impaired endurance, impaired functional mobilty, gait instability, impaired balance, decreased lower extremity function, pain    Rehab potential is good.    Activity tolerance: Good    Discharge recommendations: home with home health     Barriers to discharge: Decreased caregiver support    Equipment recommendations: none     GOALS:    Physical Therapy Goals        Problem: Physical Therapy Goal    Goal Priority Disciplines Outcome Goal Variances Interventions   Physical Therapy Goal     PT/OT, PT      Description:  Goals to be met by: 14 days     Patient will increase functional independence with mobility by performin. Supine to sit with Modified [place]  2. Sit to supine with Modified [place]  3. Sit to stand transfer with Modified [place] with Rolling walker  4. Bed to chair transfer with Supervision using Rolling Walker  5. Gait  x 150 feet with Supervision using Rolling Walker.   6. Wheelchair propulsion x100 feet with Modified [place] using bilateral upper extremities  7. Ascend/Descend 4 inch curb step with Stand-by Assistance using Rolling Walker.  8. Lower extremity exercise program x20 reps per handout, with assistance as needed and gym therex, per TKA protocol                      PLAN:    Patient to be seen 5 x/week  to address the above listed problems via gait training, therapeutic activities, therapeutic exercises, wheelchair management/training  Plan of Care Expires: 18    Gila Mott, PT 2018

## 2018-04-02 NOTE — ASSESSMENT & PLAN NOTE
Had an episode today  Resume topamax 25 mg daily prn which will be resumed  Will continue to monitor    4/2/18  No complaints today.  Continue Topamax 25 mg PRN.

## 2018-04-02 NOTE — ASSESSMENT & PLAN NOTE
Hemoglobin A1C   Date Value Ref Range Status   03/15/2018 7.6 (H) 4.0 - 5.6 % Final     Comment:   09/26/2017 7.5 (H) 4.0 - 5.6 % Final     Comment:   03/03/2017 10.3 (H) 4.5 - 6.2 % Final     Comment:     Patient does not take stated dose of insulin on MAR as she says she misses several meals; usually Levemir 16 units BID and novology 4 units before meals  Continue current levemir and novolog doses; adequate control currently  Continue diabetic diet therapy with glucose monitoring AC and HS.    Hyperglycemia to be treated with SSNI prn.  Glucose goal is < 180mg/dl and avoidance of hypoglycemia.  Will continue to monitor and adjust regimen as necessary to achieve goals.    4/2/18  POCT Glucose   Date Value Ref Range Status   04/02/2018 169 (H) 70 - 110 mg/dL Final   04/02/2018 123 (H) 70 - 110 mg/dL Final   04/01/2018 104 70 - 110 mg/dL Final   04/01/2018 134 (H) 70 - 110 mg/dL Final   04/01/2018 143 (H) 70 - 110 mg/dL Final   04/01/2018 215 (H) 70 - 110 mg/dL Final   03/31/2018 121 (H) 70 - 110 mg/dL Final   03/31/2018 210 (H) 70 - 110 mg/dL Final   03/31/2018 122 (H) 70 - 110 mg/dL Final   03/31/2018 190 (H) 70 - 110 mg/dL Final   03/30/2018 148 (H) 70 - 110 mg/dL Final   03/30/2018 171 (H) 70 - 110 mg/dL Final     CBG is stable,   Continue Levemir 20 units qhs and Aspart 8 units with meals and per SSI.  Continue diabetic diet as ordered and monitor.

## 2018-04-02 NOTE — ASSESSMENT & PLAN NOTE
Body mass index is 45.64 kg/m².    Patient on 2000 alfonso diet and will encourage modeling of portion sizes at home.     4/2/18  Diet modifications.  Follow up with PCP for weight loss options.

## 2018-04-02 NOTE — ASSESSMENT & PLAN NOTE
No chronic meds since surgery  Will continue to monitor    4/2/18  No acute issues, continue to monitor.

## 2018-04-02 NOTE — ASSESSMENT & PLAN NOTE
Normotensive currently  BP meds (coreg and diovan HCT) held due to hypotension in hospital  Will continue to monitor and resume meds gradually for SBP > 140    4/2/18  BP is 136/98 and HR is 97  Takes coreg 12.5 mg bid at home but currently not ordered.  Per MD note, coreg and diovan HCT held due to hypotension.  Will restart Coreg at lower dose and monitor.

## 2018-04-02 NOTE — ASSESSMENT & PLAN NOTE
Not labile currently and cooperative with exam  Continue chronic therapy with quetiapine, mirtazipine, buspirone, trazodone  Continue alprazolam prn anxiety    4/2/18  Chronic and stable.  Continue chronic therapy with Seroquel, Mirtazapine, Buspirone and trazodone.  Xanax PRN for anxiety.

## 2018-04-02 NOTE — ASSESSMENT & PLAN NOTE
No chronic meds currently  No joint pain other than the surgical site    4/2/18  Chronic, no complaints.  Per MD on no chronic meds.

## 2018-04-02 NOTE — ASSESSMENT & PLAN NOTE
Chronic and stable  Continue therapy with levothyroxine  F/U with PCP for ongoing monitoring and adjustment of levothyroxine dose as indicated    4/2/18  Continue Synthroid for chronic management and follow up with PCP.

## 2018-04-02 NOTE — PLAN OF CARE
Problem: Patient Care Overview  Goal: Plan of Care Review  Outcome: Ongoing (interventions implemented as appropriate)     Morbid obesity with BMI of 45.0-49.9, adult        Related to (etiology):   Excessive caloric intake and physiological changes 2/2 thyroid cancer     Signs and Symptoms (as evidenced by):   BMI = 45.5 kg/m2      Interventions/Recommendations (treatment strategy):  1) 2000 calorie Diabetic, Cardiac diet 2) Provide diet education to promote weight loss 3) Monitor oral intake 4) Monitor weight, BMI     Nutrition Diagnosis Status: New       Goals: promote weight loss after surgical recovery, low sugar ONS bid, RD to follow   Nutrition Goal Status: progressing towards goal

## 2018-04-02 NOTE — PLAN OF CARE
Problem: Occupational Therapy Goal  Goal: Occupational Therapy Goal  Goals to be met by: 7 days     Patient will increase functional independence with ADLs by performing:    UE Dressing with Modified [place].  LE Dressing with Modified [place].  Grooming while seated with Modified [place].  Toileting from bedside commode with Modified [place] for hygiene and clothing management.   Bathing from  edge of bed with Set-up Assistance.  Supine to sit with Modified [place].  Toilet transfer to bedside commode with Modified [place].  Upper extremity exercise program x10  reps per set, with supervision.  Pt will tolerate up to 10 minutes of functional standing activity with (S) and RW   in prep for ADLs in standing.    Outcome: Ongoing (interventions implemented as appropriate)  PHILIP Asher/LULI      4/2/2018

## 2018-04-03 LAB
POCT GLUCOSE: 102 MG/DL (ref 70–110)
POCT GLUCOSE: 108 MG/DL (ref 70–110)
POCT GLUCOSE: 131 MG/DL (ref 70–110)
POCT GLUCOSE: 248 MG/DL (ref 70–110)

## 2018-04-03 PROCEDURE — 97110 THERAPEUTIC EXERCISES: CPT

## 2018-04-03 PROCEDURE — 97116 GAIT TRAINING THERAPY: CPT

## 2018-04-03 PROCEDURE — 25000003 PHARM REV CODE 250: Performed by: ORTHOPAEDIC SURGERY

## 2018-04-03 PROCEDURE — 25000003 PHARM REV CODE 250: Performed by: INTERNAL MEDICINE

## 2018-04-03 PROCEDURE — 97530 THERAPEUTIC ACTIVITIES: CPT

## 2018-04-03 PROCEDURE — 11000004 HC SNF PRIVATE

## 2018-04-03 PROCEDURE — 25000003 PHARM REV CODE 250: Performed by: NURSE PRACTITIONER

## 2018-04-03 RX ADMIN — ALPRAZOLAM 1 MG: 0.5 TABLET ORAL at 10:04

## 2018-04-03 RX ADMIN — STANDARDIZED SENNA CONCENTRATE AND DOCUSATE SODIUM 2 TABLET: 8.6; 5 TABLET, FILM COATED ORAL at 10:04

## 2018-04-03 RX ADMIN — BUSPIRONE HYDROCHLORIDE 10 MG: 10 TABLET ORAL at 09:04

## 2018-04-03 RX ADMIN — OXYCODONE HYDROCHLORIDE 15 MG: 5 TABLET ORAL at 09:04

## 2018-04-03 RX ADMIN — CARVEDILOL 3.12 MG: 3.12 TABLET, FILM COATED ORAL at 09:04

## 2018-04-03 RX ADMIN — REGULAR STRENGTH 325 MG: 325 TABLET ORAL at 09:04

## 2018-04-03 RX ADMIN — ACETAMINOPHEN 500 MG: 500 TABLET ORAL at 02:04

## 2018-04-03 RX ADMIN — OXYCODONE HYDROCHLORIDE 15 MG: 5 TABLET ORAL at 12:04

## 2018-04-03 RX ADMIN — STANDARDIZED SENNA CONCENTRATE AND DOCUSATE SODIUM 2 TABLET: 8.6; 5 TABLET, FILM COATED ORAL at 09:04

## 2018-04-03 RX ADMIN — GABAPENTIN 600 MG: 300 CAPSULE ORAL at 09:04

## 2018-04-03 RX ADMIN — INSULIN DETEMIR 20 UNITS: 100 INJECTION, SOLUTION SUBCUTANEOUS at 09:04

## 2018-04-03 RX ADMIN — POLYETHYLENE GLYCOL 3350 17 G: 17 POWDER, FOR SOLUTION ORAL at 09:04

## 2018-04-03 RX ADMIN — GABAPENTIN 600 MG: 300 CAPSULE ORAL at 10:04

## 2018-04-03 RX ADMIN — MIRTAZAPINE 45 MG: 15 TABLET, FILM COATED ORAL at 09:04

## 2018-04-03 RX ADMIN — QUETIAPINE FUMARATE 800 MG: 200 TABLET, FILM COATED ORAL at 10:04

## 2018-04-03 RX ADMIN — OXYCODONE HYDROCHLORIDE 15 MG: 5 TABLET ORAL at 04:04

## 2018-04-03 RX ADMIN — GABAPENTIN 600 MG: 300 CAPSULE ORAL at 02:04

## 2018-04-03 RX ADMIN — FAMOTIDINE 20 MG: 20 TABLET, FILM COATED ORAL at 10:04

## 2018-04-03 RX ADMIN — ACETAMINOPHEN 500 MG: 500 TABLET ORAL at 10:04

## 2018-04-03 RX ADMIN — INSULIN ASPART 8 UNITS: 100 INJECTION, SOLUTION INTRAVENOUS; SUBCUTANEOUS at 05:04

## 2018-04-03 RX ADMIN — OXYCODONE HYDROCHLORIDE 15 MG: 5 TABLET ORAL at 01:04

## 2018-04-03 RX ADMIN — PROMETHAZINE HYDROCHLORIDE 25 MG: 12.5 TABLET ORAL at 10:04

## 2018-04-03 RX ADMIN — INSULIN ASPART 6 UNITS: 100 INJECTION, SOLUTION INTRAVENOUS; SUBCUTANEOUS at 12:04

## 2018-04-03 RX ADMIN — CARVEDILOL 3.12 MG: 3.12 TABLET, FILM COATED ORAL at 10:04

## 2018-04-03 RX ADMIN — ACETAMINOPHEN 500 MG: 500 TABLET ORAL at 09:04

## 2018-04-03 RX ADMIN — TIZANIDINE 12 MG: 4 TABLET ORAL at 09:04

## 2018-04-03 RX ADMIN — LEVOTHYROXINE SODIUM 150 MCG: 75 TABLET ORAL at 06:04

## 2018-04-03 RX ADMIN — INSULIN ASPART 8 UNITS: 100 INJECTION, SOLUTION INTRAVENOUS; SUBCUTANEOUS at 12:04

## 2018-04-03 RX ADMIN — BUSPIRONE HYDROCHLORIDE 10 MG: 10 TABLET ORAL at 10:04

## 2018-04-03 RX ADMIN — REGULAR STRENGTH 325 MG: 325 TABLET ORAL at 10:04

## 2018-04-03 RX ADMIN — TIZANIDINE 12 MG: 4 TABLET ORAL at 10:04

## 2018-04-03 RX ADMIN — TRAZODONE HYDROCHLORIDE 50 MG: 50 TABLET ORAL at 09:04

## 2018-04-03 RX ADMIN — FLUTICASONE PROPIONATE 100 MCG: 50 SPRAY, METERED NASAL at 10:04

## 2018-04-03 RX ADMIN — OXYCODONE HYDROCHLORIDE 15 MG: 5 TABLET ORAL at 10:04

## 2018-04-03 RX ADMIN — FAMOTIDINE 20 MG: 20 TABLET, FILM COATED ORAL at 09:04

## 2018-04-03 NOTE — PT/OT/SLP PROGRESS
Occupational Therapy  Treatment    Homar Jerry   MRN: [id number]   Admitting Diagnosis: Primary osteoarthritis of left knee    OT Date of Treatment: 04/03/18  Total Time (min): 53 min    Billable Minutes:  Therapeutic Activity 28 and Therapeutic Exercise 25    General Precautions: Standard, fall  Orthopedic Precautions: LLE weight bearing as tolerated  Braces: N/A    Do you have any cultural, spiritual, [employer]yb[place]ian conflicts, given your current situation?: none stated    Subjective:  Communicated with nsg prior to session.  My knee feels tight when I stand on it  Pain/Comfort  Pain Rating 1: 10/10  Location - Side 1: Left  Location - Orientation 1: generalized  Location 1: knee  Pain Addressed 1: Pre-medicate for activity  Pain Rating Post-Intervention 1: 10/10    Objective:    Pt. With PT in gym on arrival  Functional Status:  MDS G  Bed Mobility Functional Status: S-SBA (from sit to supine)  Transfer Functional Status: S-SBA (from w/c with RW  and from w/c to EOB with SPT)  Dressing Functional Status: 2:CGA-Min (A) for LLE sock management   Eating Functional status Mod I  Moving from seated to standing position: Not steady, but able to stabilize without staff assistance  Walking (with assistive device if used): Not steady, but able to stabilize without staff assistance  Turning around and facing the opposite direction while walking: Not steady, but able to stabilize without staff assistance  Surface-to-surface transfer (transfer between bed and chair or wheelchair): Not steady, but able to stabilize without staff assistance           AMPA 6 Click:  AMPA Total Score: 20    OT Exercises: UE Ergometer 15 min  [place] 15# 4x 25 reps    Additional Treatment:  Pt. With standing act on this day. Pt with limited standing standing tolerance on this day. Pt. With mild complaint of Knee pain upon standing and tightness with task. Pt. With standing and therex performed to increase ROM, endurance selfcare task and fxl  mobility for independence.    Patient left supine with all lines intact, call button in reach and polar ice on LLE    ASSESSMENT:  Homar Jerry is a 42 y.o. female with a medical diagnosis of Primary osteoarthritis of left knee Pt. participated well with session on this day.Pt demos physical deficits with balance  functional mobility, UB strength, endurance  level of functional indep with daily tasks and activities and selfcare skills .Pt. Will continue to benefit from continued OT to progress towards goals.      Rehab identified problem list/impairments: weakness, impaired endurance, impaired self care skills, impaired functional mobilty, gait instability    Rehab potential is fair    Activity tolerance: Fair    Discharge recommendations: home with home health, home health OT     Barriers to discharge: None     Equipment recommendations: none     GOALS:    Occupational Therapy Goals        Problem: Occupational Therapy Goal    Goal Priority Disciplines Outcome Interventions   Occupational Therapy Goal     OT, PT/OT Ongoing (interventions implemented as appropriate)    Description:  Goals to be met by: 7 days     Patient will increase functional independence with ADLs by performing:    UE Dressing with Modified [place].  LE Dressing with Modified [place].  Grooming while seated with Modified [place].  Toileting from bedside commode with Modified [place] for hygiene and clothing management.   Bathing from  edge of bed with Set-up Assistance.  Supine to sit with Modified [place].  Toilet transfer to bedside commode with Modified [place].  Upper extremity exercise program x10  reps per set, with supervision.  Pt will tolerate up to 10 minutes of functional standing activity with (S) and RW   in prep for ADLs in standing.                  Plan:  Patient to be seen 5 x/week to address the above listed problems via self-care/home management, therapeutic activities, therapeutic  exercises  Plan of Care expires: 05/02/18  Plan of Care reviewed with: patient   A client care conference was performed between the LOTR and GRIGSBY, prior to treatment by GRIGSBY, to discuss the patient's status, treatment plan and established goals.     CALISTA Arreola/LULI 4/3/2018

## 2018-04-03 NOTE — PLAN OF CARE
Problem: Occupational Therapy Goal  Goal: Occupational Therapy Goal  Goals to be met by: 7 days     Patient will increase functional independence with ADLs by performing:    UE Dressing with Modified [place].  LE Dressing with Modified [place].  Grooming while seated with Modified [place].  Toileting from bedside commode with Modified [place] for hygiene and clothing management.   Bathing from  edge of bed with Set-up Assistance.  Supine to sit with Modified [place].  Toilet transfer to bedside commode with Modified [place].  Upper extremity exercise program x10  reps per set, with supervision.  Pt will tolerate up to 10 minutes of functional standing activity with (S) and RW   in prep for ADLs in standing.     Outcome: Ongoing (interventions implemented as appropriate)  .

## 2018-04-03 NOTE — PT/OT/SLP PROGRESS
"Physical Therapy  Treatment    Homar Jerry   MRN: [id number]   Admitting Diagnosis: Primary osteoarthritis of left knee    PT Received On: 04/03/18          Billable Minutes:  Gait Training 15, Therapeutic Activity 10 and Therapeutic Exercise 17=42    Treatment Type: Treatment  PT/PTA: PT     PTA Visit Number: 0       General Precautions: Standard, fall  Orthopedic Precautions: LUE non weight bearing, LLE weight bearing as tolerated   Braces: N/A         Subjective:  Communicated with patient prior to session.  Patient agreeable to session; reports she hurt after yesterday's session and "I can't do that again." . Patient educated on TKA protocol and need to restore flexion and assured that we would work within her tolerance. Patient verbalized understanding.     Pain/Comfort  Pain Rating 1: 10/10  Location - Side 1: Left  Location - Orientation 1: generalized  Location 1: knee  Pain Addressed 1: Pre-medicate for activity, Reposition, Distraction, Cessation of Activity (pt had pain meds at 1030 per patient)  Pain Rating Post-Intervention 1: 10/10    Objective:  Patient found supine in bed w/ HOB elevated. Left knee straight. Polar ice not in use. (patient reports she used it earlier) with         Functional Status:  MDS G  Bed Mobility Functional Status: mod(I) - (I)  Transfer Functional Status: S-SBA  Walk in Room Functional Status: S-SBA  Walk in Corridor Functional Status: S-SBA  Locomotion on Unit Functional Status: S-SBA  Locomotion Off Unit Functional Status: mod(A)-Max(A)  Moving from seated to standing position: Not steady, but able to stabilize without staff assistance  Walking (with assistive device if used): Not steady, but able to stabilize without staff assistance  Turning around and facing the opposite direction while walking: Not steady, only able to stabilize with staff assistance  Surface-to-surface transfer (transfer between bed and chair or wheelchair): Not steady, but able to stabilize " without staff assistance          AM-PAC 6 CLICK MOBILITY  Total Score:19    Bed Mobility:  Sit>Supine:mod(I) on mat  Supine>Sit: mod(I) on mat, bed    Transfers:  Sit<>Stand: w/ RW and SBA from bed, mat, to/from w/c  Stand Pivot Transfer: bed>w/c w/ RW and SBA w/ cues to use RW in transfer       Gait:  Amb w/ RW and SBA w/ w/c in tow 42 feet. Step-to gait pattern. Slow pace. Decreased weight acceptance on LLE in stance, decreaed Left knee flexion in swing. Patient did achieve a heel strike on LLE.   Therex:  Pt performed TKA protocol exercises x20 reps of:  Ankle pumps  Glute sets  Quad sets  Short arc quads  Heel slides  Hip abd/add  SLR  Seated exercises:    LAQ    Hip flexion    Ankle pumps seated    Knee flexion   Left knee AAROM extension -3*   AROM flexion 74*.    Additional Treatment:    Patient left seated EOM with OT present.    Assessment:  Homar Jerry is a 42 y.o. female with a medical diagnosis of Primary osteoarthritis of left knee.  She is s/p left TKA. Patient had pain 10/10 left knee t/o session . She did participate well in therapy but was limited by pain. No active assistance used for left knee flexion other than a light touch for measuring per her c/o pain. Patient educated on TKA protocol and need to elevate LLE and use polar ice and need to work to restore flexion. Patient will benefit from continued physical therapy to address deficits and improve safety and functional mobility. Continue with physical therapy plan of care. .    Rehab identified problem list/impairments: weakness, impaired endurance, impaired functional mobilty, gait instability, impaired balance, decreased lower extremity function, pain    Rehab potential is good.    Activity tolerance: Fair    Discharge recommendations: home with home health     Barriers to discharge: Decreased caregiver support    Equipment recommendations: none     GOALS:    Physical Therapy Goals        Problem: Physical Therapy Goal    Goal  Priority Disciplines Outcome Goal Variances Interventions   Physical Therapy Goal     PT/OT, PT      Description:  Goals to be met by: 14 days     Patient will increase functional independence with mobility by performin. Supine to sit with Modified [place]= met 4/3/2018  2. Sit to supine with Modified [place]= met 4/3/2018  3. Sit to stand transfer with Modified [place] with Rolling walker  4. Bed to chair transfer with Supervision using Rolling Walker  5. Gait  x 150 feet with Supervision using Rolling Walker.   6. Wheelchair propulsion x100 feet with Modified [place] using bilateral upper extremities  7. Ascend/Descend 4 inch curb step with Stand-by Assistance using Rolling Walker.  8. Lower extremity exercise program x20 reps per handout, with assistance as needed and gym therex, per TKA protocol                       PLAN:    Patient to be seen 5 x/week  to address the above listed problems via gait training, therapeutic activities, therapeutic exercises, wheelchair management/training  Plan of Care expires: 18  Plan of Care reviewed with: patient    Gila Mott, PT  2018

## 2018-04-03 NOTE — CLINICAL REVIEW
Clinical Pharmacy Chart Review Note      Admit Date: 3/31/2018   LOS: 3 days       Homar Jerry is a 42 y.o. female admitted to [facility] for PT/OT after hospitalization for primary osteoarthritis of left knee.    Active Hospital Problems    Diagnosis  POA    *Primary osteoarthritis of left knee [M17.12]  Yes    Status post total left knee replacement [Z96.652]  Not Applicable    Morbid obesity with BMI of 45.0-49.9, adult [E66.01, Z68.42]  Not Applicable    Rheumatoid arthritis [M06.9]  Yes     Chronic     Had Rheumatology evaluation in the past and was felt that   since she has fatty liver cannot do MTX.      Type 2 diabetes mellitus with hyperglycemia, with long-term current use of insulin [E11.65, Z79.4]  Not Applicable    Asthma [J45.909]  Yes     Chronic     usually with cold weather      Essential hypertension [I10]  Yes     Chronic     Carvedilol   HCTZ  Vlsartan- HCTZ  Usual BP- 110/70-80       Allergic rhinitis [J30.9]  Yes     Chronic    Migraines [G43.909]  Yes     Chronic    Chronic low back pain [M54.5, G89.29]  Yes     Chronic    Bipolar 1 disorder [F31.9]  Yes     Chronic     Under Psychiatrist care   Subjectively controlled       Hypothyroidism, postsurgical [E89.0]  Yes     Chronic     thyroid cancer s/p thyroidectomy in 2013      Gastroparesis [K31.84]  Yes     s/p lap sleeve gastrectomy   No recent problem         Resolved Hospital Problems    Diagnosis Date Resolved POA   No resolved problems to display.     Review of patient's allergies indicates:   Allergen Reactions    Dexamethasone Hives and Shortness Of Breath     Gastroparesis flare    Doxepin hcl Hives, Diarrhea and Itching    Compazine [prochlorperazine] Hives and Itching    Morphine Rash    Prednisone      Gastroparesis flare up    Ciprofloxacin      Counteracts with seroquel, xanax, tizanidine    Lyrica [pregabalin]      depression     Patient Active Problem List    Diagnosis Date Noted    Status post total  left knee replacement 04/01/2018    Idiopathic hypotension     Primary osteoarthritis of left knee 03/28/2018    Fatty liver 03/15/2018    Cervical cancer 03/15/2018    Sinus tachycardia 10/24/2017    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), managed as type 1 01/23/2017    Vitamin D deficiency 01/21/2017    Morbid obesity with BMI of 45.0-49.9, adult 01/18/2017    Rheumatoid arthritis 01/17/2017    Chronic narcotic dependence 01/17/2017    Type 2 diabetes mellitus with hyperglycemia, with long-term current use of insulin 01/17/2017    Drug-seeking behavior 12/02/2016    Primary osteoarthritis of both knees 10/12/2016    Debility 02/01/2016    Asthma     Essential hypertension     Status post placement of implantable loop recorder 07/01/2015    Allergic rhinitis 07/01/2015    Migraines 06/30/2015    Bipolar 1 disorder 03/17/2015    Chronic low back pain 03/17/2015    Hypothyroidism, postsurgical     Anxiety 01/12/2015    Fibromyalgia 11/14/2014    Gastroparesis 03/28/2013       Scheduled Meds:    acetaminophen  500 mg Oral TID    aspirin  325 mg Oral BID    busPIRone  10 mg Oral BID    carvedilol  3.125 mg Oral BID    famotidine  20 mg Oral BID    fluticasone  2 spray Each Nare Daily    gabapentin  600 mg Oral TID    insulin aspart U-100  8 Units Subcutaneous TIDWM    insulin detemir U-100  20 Units Subcutaneous QHS    levothyroxine  150 mcg Oral Daily    mirtazapine  45 mg Oral QHS    polyethylene glycol  17 g Oral Daily    QUEtiapine  800 mg Oral QHS    senna-docusate 8.6-50 mg  2 tablet Oral BID    tiZANidine  12 mg Oral Q12H    traZODone  50 mg Oral QHS     Continuous Infusions:   PRN Meds: acetaminophen, albuterol, ALPRAZolam, bisacodyl, calcium carbonate, dextrose 50%, dextrose 50%, glucagon (human recombinant), glucose, glucose, insulin aspart U-100, magnesium citrate, meclizine, naloxone, ondansetron, oxyCODONE, oxyCODONE, oxyCODONE, promethazine, ramelteon,  topiramate    OBJECTIVE:     Vital Signs (Last 24H)  Temp:  [97.9 °F (36.6 °C)-98.2 °F (36.8 °C)]   Pulse:  [89-97]   Resp:  [16-20]   BP: (133-189)/()   SpO2:  [98 %]     Laboratory:  CBC:   Recent Labs  Lab 03/30/18  1426 04/02/18  0452   WBC 7.09 6.79   RBC 3.23* 3.52*   HGB 9.1* 9.6*   HCT 29.7* 31.7*    353*   MCV 92 90   MCH 28.2 27.3   MCHC 30.6* 30.3*     BMP:   Recent Labs  Lab 03/28/18  1436 03/30/18  1426 04/02/18  0452   GLU 54* 177* 105    140 143   K 4.5 3.8 3.8    113* 107   CO2 13* 21* 24   BUN 7 15 10   CREATININE 0.4* 0.8 0.8   CALCIUM 3.5* 7.0* 8.1*   MG  --   --  2.0     CMP:   Recent Labs  Lab 03/28/18  1436 03/30/18  1426 04/02/18  0452   GLU 54* 177* 105   CALCIUM 3.5* 7.0* 8.1*   ALBUMIN  --  2.3*  --    PROT  --  4.9*  --     140 143   K 4.5 3.8 3.8   CO2 13* 21* 24    113* 107   BUN 7 15 10   CREATININE 0.4* 0.8 0.8   ALKPHOS  --  86  --    ALT  --  11  --    AST  --  17  --    BILITOT  --  0.1  --      LFTs:   Recent Labs  Lab 03/30/18  1426   ALT 11   AST 17   ALKPHOS 86   BILITOT 0.1   PROT 4.9*   ALBUMIN 2.3*     Coagulation: No results for input(s): PT, INR, APTT in the last 168 hours.  Cardiac markers: No results for input(s): CKMB, TROPONINT, MYOGLOBIN in the last 168 hours.  ABGs: No results for input(s): PH, PCO2, PO2, HCO3, POCSATURATED, BE in the last 168 hours.  Microbiology Results (last 7 days)     ** No results found for the last 168 hours. **        Specimen (12h ago through future)    None        No results for input(s): COLORU, CLARITYU, SPECGRAV, PHUR, PROTEINUA, GLUCOSEU, BILIRUBINCON, BLOODU, WBCU, RBCU, BACTERIA, MUCUS, NITRITE, LEUKOCYTESUR, UROBILINOGEN, HYALINECASTS in the last 168 hours.  Others:   Recent Labs  Lab 03/30/18  1426   TSH 0.602         ASSESSMENT/PLAN:     Active Hospital Problems    Diagnosis    *Primary osteoarthritis of left knee  S/P total left knee replacement  --PT/OT: APAP 500mg three times daily; Oxycodone  10mg q3hrs prn mild pain, 15mg prn moderate pain, 20mg prn severe pain; Tizanidine 12mg q12hrs   --bowel regimen for constipation; hold for loose or frequent stools: Senna-dcusate twice daily; Miralax daily; Bisacodyl 10 mg supp q12hrs prn; Mg citrate q48hrs prn  --DVT prophylaxis:  mg twice daily  --Famotidine 20 mg twice daily for GI prophylaxis      Rheumatoid arthritis   --No chronic meds at this time      Type 2 diabetes mellitus with hyperglycemia, with long-term current use of insulin   **Monitor blood glucose  --SSI  --Novolog 8u three times daily with meals  --Levemir 20u nightly  Lab Results   Component Value Date    HGBA1C 7.6 (H) 03/15/2018     POCT Glucose   Date Value Ref Range Status   04/03/2018 248 (H) 70 - 110 mg/dL Final   04/03/2018 108 70 - 110 mg/dL Final   04/02/2018 105 70 - 110 mg/dL Final   04/02/2018 179 (H) 70 - 110 mg/dL Final   04/02/2018 169 (H) 70 - 110 mg/dL Final   04/02/2018 123 (H) 70 - 110 mg/dL Final   04/01/2018 104 70 - 110 mg/dL Final   04/01/2018 134 (H) 70 - 110 mg/dL Final   04/01/2018 143 (H) 70 - 110 mg/dL Final   04/01/2018 215 (H) 70 - 110 mg/dL Final   03/31/2018 121 (H) 70 - 110 mg/dL Final   03/31/2018 210 (H) 70 - 110 mg/dL Final         Asthma   --Albuterol inhaler 2 puffs q4hrs prn wheezing, SOB      Essential hypertension   **Monitor BP and pulse  --Carvedilol 3.125 mg twice daily  --Takes Valsartan-HCTZ at home; will resume meds gradually for SBP>140  BP Readings from Last 3 Encounters:   04/03/18 136/74   03/31/18 109/71   03/15/18 101/71     Pulse Readings from Last 3 Encounters:   04/03/18 97   03/31/18 68   03/15/18 84         Allergic rhinitis   --Fluticasone nasal spray 2 spray each nare daily      Migraines   --Topiramate 25 mg daily prn migraines      Chronic low back pain   --Gabapentin 600 mg three times daily  --Cont Tizanidine and prn Oxycodone for pain control      Bipolar 1 disorder  --Quetiapine 800 mg nightly   --Mirtazapine 45 mg  nightly  --Trazodone 50 mg nightly  --Alprazolam 1 mg twice daily prn anxiety, insomnia  Monitor behaviors and for potential side effects associated with medications      Hypothyroidism, postsurgical   --Levothyroxine 150 mcg daily  Lab Results   Component Value Date    TSH 0.602 03/30/2018         Gastroparesis  --No chronic meds, cont to monitor          I have reviewed the medications in compliance with CMS Regulation F329 of the [place] Appendix PP.      Mary Heredia, Pharm. D.  Clinical Pharmacist  [facility]

## 2018-04-03 NOTE — PLAN OF CARE
Problem: Physical Therapy Goal  Goal: Physical Therapy Goal  Goals to be met by: 14 days     Patient will increase functional independence with mobility by performin. Supine to sit with Modified [place]  2. Sit to supine with Modified [place]  3. Sit to stand transfer with Modified [place] with Rolling walker  4. Bed to chair transfer with Supervision using Rolling Walker  5. Gait  x 150 feet with Supervision using Rolling Walker.   6. Wheelchair propulsion x100 feet with Modified [place] using bilateral upper extremities  7. Ascend/Descend 4 inch curb step with Stand-by Assistance using Rolling Walker.  8. Lower extremity exercise program x20 reps per handout, with assistance as needed and gym therex, per TKA protocol     Goals remain appropriate. Continue with Physical therapy Plan of Care. Gila Mott, PT 4/3/2018

## 2018-04-04 LAB
POCT GLUCOSE: 111 MG/DL (ref 70–110)
POCT GLUCOSE: 122 MG/DL (ref 70–110)
POCT GLUCOSE: 128 MG/DL (ref 70–110)
POCT GLUCOSE: 134 MG/DL (ref 70–110)

## 2018-04-04 PROCEDURE — 25000003 PHARM REV CODE 250: Performed by: INTERNAL MEDICINE

## 2018-04-04 PROCEDURE — 97110 THERAPEUTIC EXERCISES: CPT

## 2018-04-04 PROCEDURE — 11000004 HC SNF PRIVATE

## 2018-04-04 PROCEDURE — 97116 GAIT TRAINING THERAPY: CPT

## 2018-04-04 PROCEDURE — 97530 THERAPEUTIC ACTIVITIES: CPT

## 2018-04-04 PROCEDURE — 25000003 PHARM REV CODE 250: Performed by: NURSE PRACTITIONER

## 2018-04-04 PROCEDURE — 97535 SELF CARE MNGMENT TRAINING: CPT

## 2018-04-04 PROCEDURE — 25000003 PHARM REV CODE 250: Performed by: ORTHOPAEDIC SURGERY

## 2018-04-04 RX ORDER — CARVEDILOL 6.25 MG/1
6.25 TABLET ORAL 2 TIMES DAILY
Status: DISCONTINUED | OUTPATIENT
Start: 2018-04-04 | End: 2018-04-16 | Stop reason: HOSPADM

## 2018-04-04 RX ADMIN — TIZANIDINE 12 MG: 4 TABLET ORAL at 08:04

## 2018-04-04 RX ADMIN — OXYCODONE HYDROCHLORIDE 15 MG: 5 TABLET ORAL at 12:04

## 2018-04-04 RX ADMIN — FAMOTIDINE 20 MG: 20 TABLET, FILM COATED ORAL at 09:04

## 2018-04-04 RX ADMIN — OXYCODONE HYDROCHLORIDE 15 MG: 5 TABLET ORAL at 02:04

## 2018-04-04 RX ADMIN — BUSPIRONE HYDROCHLORIDE 10 MG: 10 TABLET ORAL at 09:04

## 2018-04-04 RX ADMIN — STANDARDIZED SENNA CONCENTRATE AND DOCUSATE SODIUM 2 TABLET: 8.6; 5 TABLET, FILM COATED ORAL at 08:04

## 2018-04-04 RX ADMIN — INSULIN DETEMIR 20 UNITS: 100 INJECTION, SOLUTION SUBCUTANEOUS at 08:04

## 2018-04-04 RX ADMIN — TRAZODONE HYDROCHLORIDE 50 MG: 50 TABLET ORAL at 08:04

## 2018-04-04 RX ADMIN — OXYCODONE HYDROCHLORIDE 15 MG: 5 TABLET ORAL at 04:04

## 2018-04-04 RX ADMIN — INSULIN ASPART 8 UNITS: 100 INJECTION, SOLUTION INTRAVENOUS; SUBCUTANEOUS at 12:04

## 2018-04-04 RX ADMIN — QUETIAPINE FUMARATE 800 MG: 200 TABLET, FILM COATED ORAL at 08:04

## 2018-04-04 RX ADMIN — RAMELTEON 8 MG: 8 TABLET, FILM COATED ORAL at 08:04

## 2018-04-04 RX ADMIN — OXYCODONE HYDROCHLORIDE 15 MG: 5 TABLET ORAL at 09:04

## 2018-04-04 RX ADMIN — ACETAMINOPHEN 500 MG: 500 TABLET ORAL at 04:04

## 2018-04-04 RX ADMIN — CARVEDILOL 3.12 MG: 3.12 TABLET, FILM COATED ORAL at 09:04

## 2018-04-04 RX ADMIN — GABAPENTIN 600 MG: 300 CAPSULE ORAL at 08:04

## 2018-04-04 RX ADMIN — STANDARDIZED SENNA CONCENTRATE AND DOCUSATE SODIUM 2 TABLET: 8.6; 5 TABLET, FILM COATED ORAL at 09:04

## 2018-04-04 RX ADMIN — PROMETHAZINE HYDROCHLORIDE 25 MG: 12.5 TABLET ORAL at 09:04

## 2018-04-04 RX ADMIN — ACETAMINOPHEN 500 MG: 500 TABLET ORAL at 09:04

## 2018-04-04 RX ADMIN — TIZANIDINE 12 MG: 4 TABLET ORAL at 09:04

## 2018-04-04 RX ADMIN — REGULAR STRENGTH 325 MG: 325 TABLET ORAL at 08:04

## 2018-04-04 RX ADMIN — LEVOTHYROXINE SODIUM 150 MCG: 75 TABLET ORAL at 06:04

## 2018-04-04 RX ADMIN — OXYCODONE HYDROCHLORIDE 15 MG: 5 TABLET ORAL at 08:04

## 2018-04-04 RX ADMIN — BUSPIRONE HYDROCHLORIDE 10 MG: 10 TABLET ORAL at 08:04

## 2018-04-04 RX ADMIN — OXYCODONE HYDROCHLORIDE 15 MG: 5 TABLET ORAL at 06:04

## 2018-04-04 RX ADMIN — MIRTAZAPINE 45 MG: 15 TABLET, FILM COATED ORAL at 08:04

## 2018-04-04 RX ADMIN — REGULAR STRENGTH 325 MG: 325 TABLET ORAL at 09:04

## 2018-04-04 RX ADMIN — GABAPENTIN 600 MG: 300 CAPSULE ORAL at 04:04

## 2018-04-04 RX ADMIN — ACETAMINOPHEN 500 MG: 500 TABLET ORAL at 08:04

## 2018-04-04 RX ADMIN — CARVEDILOL 6.25 MG: 12.5 TABLET, FILM COATED ORAL at 08:04

## 2018-04-04 RX ADMIN — INSULIN ASPART 8 UNITS: 100 INJECTION, SOLUTION INTRAVENOUS; SUBCUTANEOUS at 05:04

## 2018-04-04 RX ADMIN — INSULIN ASPART 8 UNITS: 100 INJECTION, SOLUTION INTRAVENOUS; SUBCUTANEOUS at 09:04

## 2018-04-04 RX ADMIN — FLUTICASONE PROPIONATE 100 MCG: 50 SPRAY, METERED NASAL at 09:04

## 2018-04-04 RX ADMIN — ALPRAZOLAM 1 MG: 0.5 TABLET ORAL at 09:04

## 2018-04-04 RX ADMIN — GABAPENTIN 600 MG: 300 CAPSULE ORAL at 09:04

## 2018-04-04 RX ADMIN — FAMOTIDINE 20 MG: 20 TABLET, FILM COATED ORAL at 08:04

## 2018-04-04 NOTE — PT/OT/SLP PROGRESS
Occupational Therapy  Treatment    Homar Jerry   MRN: [id number]   Admitting Diagnosis: Primary osteoarthritis of left knee    OT Date of Treatment: 04/04/18  Total Time (min): 54 min    Billable Minutes:  Self Care/Home Management 29  and Therapeutic Exercise 25    General Precautions: Standard, fall  Orthopedic Precautions: LLE weight bearing as tolerated  Braces: N/A    Do you have any cultural, spiritual, Yazdanism conflicts, given your current situation?: none stated    Subjective:  Communicated with nsg  prior to session.  I am doing well today    Pain/Comfort  Pain Rating 1: 10/10  Location - Side 1: Left  Location - Orientation 1: generalized  Location 1: knee  Pain Addressed 1: Pre-medicate for activity    Objective:   Pt. Supine on arrival    Functional Status:  MDS G  Bed Mobility Functional Status: S-SBA Supine <> sit  Transfer Functional Status: S-SBA from EOB with RW  Eating Functional Status Mod I   Walk in Room Functional Status: S-SBA from EOB to inroom bath   Dressing Functional Status: 2:CGA-Min (A) for LB dressing with L sock management   Toilet Use Functional Status: S-SBA from standard toilet  Personal Hygiene Functional Status: S-SBA standing at sink level   Moving from seated to standing position: Not steady, but able to stabilize without staff assistance  Walking (with assistive device if used): Not steady, but able to stabilize without staff assistance  Turning around and facing the opposite direction while walking: Not steady, but able to stabilize without staff assistance  Moving on and off the toilet: Not steady, but able to stabilize without staff assistance  Surface-to-surface transfer (transfer between bed and chair or wheelchair): Not steady, but able to stabilize without staff assistance           AMPA 6 Click:  AMPAC Total Score: 20    OT Exercises: UE Ergometer 15 min  Rickshaw 15# 4 x 25 reps    Patient left supine with all lines intact, call button in reach and polar  ice on LLE    ASSESSMENT:  Homar Jerry is a 42 y.o. female with a medical diagnosis of Primary osteoarthritis of left knee Pt. participated well with session on this day. Still continue to complain about pain level of 10 in LLE during task. Pt. encourage to ice and elevate heels while in bed  And takes increase time to maneuver through task. Pt demos physical deficits with balance  functional mobility, UB strength, endurance  level of functional indep with daily tasks and activities and selfcare skills .Pt. Will continue to benefit from continued OT to progress towards goals  .    Rehab identified problem list/impairments: weakness, impaired endurance, impaired self care skills, impaired functional mobilty, gait instability    Rehab potential is fair    Activity tolerance: Good    Discharge recommendations: home with home health, home health OT     Barriers to discharge: None     Equipment recommendations: none     GOALS:    Occupational Therapy Goals        Problem: Occupational Therapy Goal    Goal Priority Disciplines Outcome Interventions   Occupational Therapy Goal     OT, PT/OT Ongoing (interventions implemented as appropriate)    Description:  Goals to be met by: 7 days     Patient will increase functional independence with ADLs by performing:    UE Dressing with Modified [place].  LE Dressing with Modified [place].  Grooming while seated with Modified [place].  Toileting from bedside commode with Modified [place] for hygiene and clothing management.   Bathing from  edge of bed with Set-up Assistance.  Supine to sit with Modified [place].  Toilet transfer to bedside commode with Modified [place].  Upper extremity exercise program x10  reps per set, with supervision.  Pt will tolerate up to 10 minutes of functional standing activity with (S) and RW   in prep for ADLs in standing.                    Plan:  Patient to be seen 5 x/week to address the above listed problems  via self-care/home management, therapeutic activities, therapeutic exercises  Plan of Care expires: 05/02/18  Plan of Care reviewed with: patient    CALISTA Arreola/LULI  04/04/2018

## 2018-04-04 NOTE — PT/OT/SLP PROGRESS
"Physical Therapy  Treatment    Homar Jerry   MRN: [id number]   Admitting Diagnosis: Primary osteoarthritis of left knee    PT Received On: 04/04/18  Total Time (min): 55       Billable Minutes:  Gait Training 12, Therapeutic Activity 13 and Therapeutic Exercise 30    Treatment Type: Treatment  PT/PTA: PTA     PTA Visit Number: 1       General Precautions: Standard, fall  Orthopedic Precautions: LLE weight bearing as tolerated   Braces: N/A         Subjective:  Communicated with NSG prior to session.  Pt agreeable to therapy. "I'm hurting pretty bad and I just got my medicine."    Pain/Comfort  Pain Rating 1: 10/10  Location - Side 1: Left  Location - Orientation 1: generalized  Location 1: knee  Pain Addressed 1: Reposition, Distraction, Other (see comments) (pt received pain medicine ~10 min. prior to tx)  Pain Rating Post-Intervention 1: 10/10    Objective:  Patient found HOB elevated with father present.          Functional Status:  MDS G  Bed Mobility Functional Status: mod(I) - (I)  Transfer Functional Status: S-SBA  Walk in Room Functional Status: S-SBA  Walk in Corridor Functional Status: S-SBA  Moving from seated to standing position: Not steady, but able to stabilize without staff assistance  Walking (with assistive device if used): Not steady, but able to stabilize without staff assistance  Turning around and facing the opposite direction while walking: Not steady, but able to stabilize without staff assistance  Surface-to-surface transfer (transfer between bed and chair or wheelchair): Not steady, but able to stabilize without staff assistance          AM-PAC 6 CLICK MOBILITY  Total Score:19    Bed Mobility:  Sit<>Supine: Mod-I, pt assists LLE by hooking it at the ankle with RLE to raise/lower as needed.     Transfers:  Sit<>Stand: SBA with RW, from bed,w/c, and mat. VC's for hand placement.   Stand Pivot Transfer: SBA with RW, bed <> w/c and w/c <> mat.     Gait:  Amb: Pt ambulated 50 ft with RW " and SBA. Pt with slow larry, decreased weight shift to L side, increased weight bearing through BUE. Pt with heel strike on LLE. Slight flexed L knee in stance, improved with cueing. Pt limited by pain in left knee and report of cramping in back.      Advanced Gait:  Curb Step: NP d/t pt with c/o high level of pain and difficulty managing pain this session.     Therex:  Pt performed LLE Therex in sitting and supine per TKA protocol x 25 reps with AAROM as needed for assistance and improving quality. Pt with many rest breaks between sets and exercises to attempt to manage high level of pain.   AP,LAQ, Hip ABD/ADD, GS, QS, Heel slides, SLR, SAQ.   Slight extension lag present with SLR.   L knee AROM: -3* extension and 79* flexion    Balance:  Pt with no LOB with all mobility this session.       Patient left HOB elevated with call button in reach and BLE elevated with polar ice on L knee..    Assessment:  Homar Jerry is a 42 y.o. female with a medical diagnosis of Primary osteoarthritis of left knee.  Pt tolerated tx well with focus on gait training, therex, transfers, and pain management. Pt progressing with mobility, improving ambulation distance slightly despite high level of pain, with difficulty managing this session. Pt educated on continued efforts to manage pain and improve L knee ROM. Pt will continue to benefit from skilled therapy to improve impairments listed below.     Rehab identified problem list/impairments: weakness, impaired endurance, impaired functional mobilty, gait instability, impaired balance, decreased lower extremity function, pain    Rehab potential is fair.    Activity tolerance: Fair    Discharge recommendations: home with home health     Barriers to discharge: Decreased caregiver support    Equipment recommendations: none     GOALS:    Physical Therapy Goals        Problem: Physical Therapy Goal    Goal Priority Disciplines Outcome Goal Variances Interventions   Physical  Therapy Goal     PT/OT, PT Ongoing (interventions implemented as appropriate)     Description:  Goals to be met by: 14 days     Patient will increase functional independence with mobility by performin. Supine to sit with Modified [place]= met 4/3/2018  2. Sit to supine with Modified [place]= met 4/3/2018  3. Sit to stand transfer with Modified [place] with Rolling walker  4. Bed to chair transfer with Supervision using Rolling Walker  5. Gait  x 150 feet with Supervision using Rolling Walker.   6. Wheelchair propulsion x100 feet with Modified [place] using bilateral upper extremities  7. Ascend/Descend 4 inch curb step with Stand-by Assistance using Rolling Walker.  8. Lower extremity exercise program x20 reps per handout, with assistance as needed and gym therex, per TKA protocol                       PLAN:    Patient to be seen 5 x/week  to address the above listed problems via gait training, therapeutic activities, therapeutic exercises, wheelchair management/training  Plan of Care expires: 18  Plan of Care reviewed with: patient    Jasbir Ochoa, PTA  2018

## 2018-04-04 NOTE — PLAN OF CARE
Problem: Patient Care Overview  Goal: Plan of Care Review  Outcome: Ongoing (interventions implemented as appropriate)   04/04/18 0650   Coping/Psychosocial   Plan Of Care Reviewed With patient       Problem: Fall Risk (Adult)  Goal: Absence of Falls  Patient will demonstrate the desired outcomes by discharge/transition of care.   Outcome: Ongoing (interventions implemented as appropriate)   04/04/18 0650   Fall Risk (Adult)   Absence of Falls making progress toward outcome       Problem: Pressure Ulcer Risk (Colt Scale) (Adult,Obstetrics,Pediatric)  Goal: Skin Integrity  Patient will demonstrate the desired outcomes by discharge/transition of care.   Outcome: Ongoing (interventions implemented as appropriate)   04/04/18 0650   Pressure Ulcer Risk (Colt Scale) (Adult,Obstetrics,Pediatric)   Skin Integrity making progress toward outcome

## 2018-04-04 NOTE — PLAN OF CARE
Problem: Occupational Therapy Goal  Goal: Occupational Therapy Goal  Goals to be met by: 7 days     Patient will increase functional independence with ADLs by performing:    UE Dressing with Modified [place].  LE Dressing with Modified [place].  Grooming while seated with Modified [place].  Toileting from bedside commode with Modified [place] for hygiene and clothing management.   Bathing from  edge of bed with Set-up Assistance.  Supine to sit with Modified [place].  Toilet transfer to bedside commode with Modified [place].  Upper extremity exercise program x10  reps per set, with supervision.  Pt will tolerate up to 10 minutes of functional standing activity with (S) and RW   in prep for ADLs in standing.     Outcome: Ongoing (interventions implemented as appropriate)  .

## 2018-04-04 NOTE — PLAN OF CARE
Problem: Patient Care Overview  Goal: Plan of Care Review  Outcome: Ongoing (interventions implemented as appropriate)  Ms Jerry received 15 mg of Oxycodone IR for c/o lt knee and back pain rated at 8 on a pain scale of 0-10. Polar ice therapy is in progress to lt knee. Safety measures maintained. Call light is wihtin reach. Will continue with plan of care.

## 2018-04-05 LAB
ANION GAP SERPL CALC-SCNC: 7 MMOL/L
BASOPHILS # BLD AUTO: 0.01 K/UL
BASOPHILS NFR BLD: 0.2 %
BUN SERPL-MCNC: 9 MG/DL
CALCIUM SERPL-MCNC: 8.2 MG/DL
CHLORIDE SERPL-SCNC: 107 MMOL/L
CO2 SERPL-SCNC: 30 MMOL/L
CREAT SERPL-MCNC: 0.8 MG/DL
DIFFERENTIAL METHOD: ABNORMAL
EOSINOPHIL # BLD AUTO: 0.2 K/UL
EOSINOPHIL NFR BLD: 4.1 %
ERYTHROCYTE [DISTWIDTH] IN BLOOD BY AUTOMATED COUNT: 15.4 %
EST. GFR  (AFRICAN AMERICAN): >60 ML/MIN/1.73 M^2
EST. GFR  (NON AFRICAN AMERICAN): >60 ML/MIN/1.73 M^2
GLUCOSE SERPL-MCNC: 109 MG/DL
HCT VFR BLD AUTO: 28.5 %
HGB BLD-MCNC: 8.8 G/DL
IMM GRANULOCYTES # BLD AUTO: 0.06 K/UL
IMM GRANULOCYTES NFR BLD AUTO: 1.1 %
LYMPHOCYTES # BLD AUTO: 2.2 K/UL
LYMPHOCYTES NFR BLD: 39.1 %
MAGNESIUM SERPL-MCNC: 2.2 MG/DL
MCH RBC QN AUTO: 27.8 PG
MCHC RBC AUTO-ENTMCNC: 30.9 G/DL
MCV RBC AUTO: 90 FL
MONOCYTES # BLD AUTO: 0.4 K/UL
MONOCYTES NFR BLD: 6.8 %
NEUTROPHILS # BLD AUTO: 2.7 K/UL
NEUTROPHILS NFR BLD: 48.7 %
NRBC BLD-RTO: 0 /100 WBC
PHOSPHATE SERPL-MCNC: 4.6 MG/DL
PLATELET # BLD AUTO: 315 K/UL
PMV BLD AUTO: 9 FL
POCT GLUCOSE: 103 MG/DL (ref 70–110)
POCT GLUCOSE: 114 MG/DL (ref 70–110)
POCT GLUCOSE: 84 MG/DL (ref 70–110)
POCT GLUCOSE: 99 MG/DL (ref 70–110)
POTASSIUM SERPL-SCNC: 4.1 MMOL/L
RBC # BLD AUTO: 3.17 M/UL
SODIUM SERPL-SCNC: 144 MMOL/L
WBC # BLD AUTO: 5.58 K/UL

## 2018-04-05 PROCEDURE — 25000003 PHARM REV CODE 250: Performed by: NURSE PRACTITIONER

## 2018-04-05 PROCEDURE — 85025 COMPLETE CBC W/AUTO DIFF WBC: CPT

## 2018-04-05 PROCEDURE — 11000004 HC SNF PRIVATE

## 2018-04-05 PROCEDURE — 97110 THERAPEUTIC EXERCISES: CPT

## 2018-04-05 PROCEDURE — 25000003 PHARM REV CODE 250: Performed by: INTERNAL MEDICINE

## 2018-04-05 PROCEDURE — 80048 BASIC METABOLIC PNL TOTAL CA: CPT

## 2018-04-05 PROCEDURE — 36415 COLL VENOUS BLD VENIPUNCTURE: CPT

## 2018-04-05 PROCEDURE — 97530 THERAPEUTIC ACTIVITIES: CPT

## 2018-04-05 PROCEDURE — 83735 ASSAY OF MAGNESIUM: CPT

## 2018-04-05 PROCEDURE — 84100 ASSAY OF PHOSPHORUS: CPT

## 2018-04-05 PROCEDURE — 99307 SBSQ NF CARE SF MDM 10: CPT | Mod: SA,,, | Performed by: NURSE PRACTITIONER

## 2018-04-05 PROCEDURE — 25000003 PHARM REV CODE 250: Performed by: ORTHOPAEDIC SURGERY

## 2018-04-05 PROCEDURE — 97116 GAIT TRAINING THERAPY: CPT

## 2018-04-05 RX ORDER — HYDROCHLOROTHIAZIDE 12.5 MG/1
12.5 TABLET ORAL DAILY
Status: DISCONTINUED | OUTPATIENT
Start: 2018-04-05 | End: 2018-04-08

## 2018-04-05 RX ORDER — VALSARTAN 80 MG/1
80 TABLET ORAL DAILY
Status: DISCONTINUED | OUTPATIENT
Start: 2018-04-05 | End: 2018-04-16 | Stop reason: HOSPADM

## 2018-04-05 RX ORDER — INSULIN ASPART 100 [IU]/ML
6 INJECTION, SOLUTION INTRAVENOUS; SUBCUTANEOUS
Status: DISCONTINUED | OUTPATIENT
Start: 2018-04-05 | End: 2018-04-16 | Stop reason: HOSPADM

## 2018-04-05 RX ADMIN — RAMELTEON 8 MG: 8 TABLET, FILM COATED ORAL at 09:04

## 2018-04-05 RX ADMIN — OXYCODONE HYDROCHLORIDE 15 MG: 5 TABLET ORAL at 03:04

## 2018-04-05 RX ADMIN — ALPRAZOLAM 1 MG: 0.5 TABLET ORAL at 10:04

## 2018-04-05 RX ADMIN — BUSPIRONE HYDROCHLORIDE 10 MG: 10 TABLET ORAL at 09:04

## 2018-04-05 RX ADMIN — OXYCODONE HYDROCHLORIDE 15 MG: 5 TABLET ORAL at 08:04

## 2018-04-05 RX ADMIN — QUETIAPINE FUMARATE 800 MG: 200 TABLET, FILM COATED ORAL at 09:04

## 2018-04-05 RX ADMIN — LEVOTHYROXINE SODIUM 150 MCG: 75 TABLET ORAL at 05:04

## 2018-04-05 RX ADMIN — TIZANIDINE 12 MG: 4 TABLET ORAL at 09:04

## 2018-04-05 RX ADMIN — FAMOTIDINE 20 MG: 20 TABLET, FILM COATED ORAL at 09:04

## 2018-04-05 RX ADMIN — FLUTICASONE PROPIONATE 100 MCG: 50 SPRAY, METERED NASAL at 08:04

## 2018-04-05 RX ADMIN — POLYETHYLENE GLYCOL 3350 17 G: 17 POWDER, FOR SOLUTION ORAL at 08:04

## 2018-04-05 RX ADMIN — INSULIN ASPART 8 UNITS: 100 INJECTION, SOLUTION INTRAVENOUS; SUBCUTANEOUS at 07:04

## 2018-04-05 RX ADMIN — OXYCODONE HYDROCHLORIDE 15 MG: 5 TABLET ORAL at 12:04

## 2018-04-05 RX ADMIN — MIRTAZAPINE 45 MG: 15 TABLET, FILM COATED ORAL at 09:04

## 2018-04-05 RX ADMIN — OXYCODONE HYDROCHLORIDE 15 MG: 5 TABLET ORAL at 06:04

## 2018-04-05 RX ADMIN — OXYCODONE HYDROCHLORIDE 15 MG: 5 TABLET ORAL at 01:04

## 2018-04-05 RX ADMIN — ACETAMINOPHEN 500 MG: 500 TABLET ORAL at 09:04

## 2018-04-05 RX ADMIN — STANDARDIZED SENNA CONCENTRATE AND DOCUSATE SODIUM 2 TABLET: 8.6; 5 TABLET, FILM COATED ORAL at 08:04

## 2018-04-05 RX ADMIN — GABAPENTIN 600 MG: 300 CAPSULE ORAL at 08:04

## 2018-04-05 RX ADMIN — REGULAR STRENGTH 325 MG: 325 TABLET ORAL at 08:04

## 2018-04-05 RX ADMIN — GABAPENTIN 600 MG: 300 CAPSULE ORAL at 09:04

## 2018-04-05 RX ADMIN — OXYCODONE HYDROCHLORIDE 15 MG: 5 TABLET ORAL at 09:04

## 2018-04-05 RX ADMIN — ACETAMINOPHEN 500 MG: 500 TABLET ORAL at 02:04

## 2018-04-05 RX ADMIN — VALSARTAN 80 MG: 80 TABLET ORAL at 01:04

## 2018-04-05 RX ADMIN — FAMOTIDINE 20 MG: 20 TABLET, FILM COATED ORAL at 08:04

## 2018-04-05 RX ADMIN — PROMETHAZINE HYDROCHLORIDE 25 MG: 12.5 TABLET ORAL at 01:04

## 2018-04-05 RX ADMIN — STANDARDIZED SENNA CONCENTRATE AND DOCUSATE SODIUM 2 TABLET: 8.6; 5 TABLET, FILM COATED ORAL at 09:04

## 2018-04-05 RX ADMIN — REGULAR STRENGTH 325 MG: 325 TABLET ORAL at 09:04

## 2018-04-05 RX ADMIN — ALPRAZOLAM 1 MG: 0.5 TABLET ORAL at 08:04

## 2018-04-05 RX ADMIN — HYDROCHLOROTHIAZIDE 12.5 MG: 12.5 TABLET ORAL at 01:04

## 2018-04-05 RX ADMIN — CARVEDILOL 6.25 MG: 12.5 TABLET, FILM COATED ORAL at 09:04

## 2018-04-05 RX ADMIN — ACETAMINOPHEN 500 MG: 500 TABLET ORAL at 08:04

## 2018-04-05 RX ADMIN — TOPIRAMATE 25 MG: 25 TABLET, FILM COATED ORAL at 01:04

## 2018-04-05 RX ADMIN — TRAZODONE HYDROCHLORIDE 50 MG: 50 TABLET ORAL at 09:04

## 2018-04-05 RX ADMIN — GABAPENTIN 600 MG: 300 CAPSULE ORAL at 02:04

## 2018-04-05 RX ADMIN — CARVEDILOL 6.25 MG: 12.5 TABLET, FILM COATED ORAL at 08:04

## 2018-04-05 RX ADMIN — BUSPIRONE HYDROCHLORIDE 10 MG: 10 TABLET ORAL at 08:04

## 2018-04-05 RX ADMIN — TIZANIDINE 12 MG: 4 TABLET ORAL at 08:04

## 2018-04-05 RX ADMIN — OXYCODONE HYDROCHLORIDE 15 MG: 5 TABLET ORAL at 05:04

## 2018-04-05 NOTE — PROGRESS NOTES
04/05/2018  2:51 PM    Discharge Planning-Met with patient in room to inform of new discharge date of 4/16/2018 Discharge date written on dry erase board in room. Patient stated understanding to discharge date.  Jo Ann Gomez RN, CM Skilled  [id number]

## 2018-04-05 NOTE — ASSESSMENT & PLAN NOTE
No chronic meds since surgery  Will continue to monitor    4/2/18  No acute issues, continue to monitor.    4/5/18  No acute issues, continue to monitor.

## 2018-04-05 NOTE — ASSESSMENT & PLAN NOTE
Body mass index is 45.64 kg/m².    Patient on 2000 alfonso diet and will encourage modeling of portion sizes at home.     4/2/18  Diet modifications.  Follow up with PCP for weight loss options.    4/5/18  Diet modifications encouraged.  Follow up with PCP for weight loss options.

## 2018-04-05 NOTE — PT/OT/SLP PROGRESS
Physical Therapy  Treatment    Homar Jerry   MRN: [id number]   Admitting Diagnosis: Primary osteoarthritis of left knee    PT Received On: 04/05/18  Total Time (min): 60       Billable Minutes:  Gait Training 20, Therapeutic Activity 10 and Therapeutic Exercise 30    Treatment Type: Treatment  PT/PTA: PTA     PTA Visit Number: 2       General Precautions: Standard, fall  Orthopedic Precautions: LLE weight bearing as tolerated   Braces: N/A         Subjective:  Communicated with NSG prior to session.  Pt agreeable to therapy.     Pain/Comfort  Pain Rating 1: 8/10  Location - Side 1: Left  Location - Orientation 1: generalized  Location 1: knee  Pain Addressed 1: Pre-medicate for activity, Reposition, Distraction  Pain Rating Post-Intervention 1: 10/10    Objective:  Patient found supine in bed with HOB elevated.         Functional Status:  MDS G  Bed Mobility Functional Status: mod(I) - (I)  Transfer Functional Status: S-SBA  Walk in Room Functional Status: S-SBA  Walk in Corridor Functional Status: S-SBA  Moving from seated to standing position: Not steady, but able to stabilize without staff assistance  Walking (with assistive device if used): Not steady, but able to stabilize without staff assistance  Turning around and facing the opposite direction while walking: Not steady, but able to stabilize without staff assistance  Surface-to-surface transfer (transfer between bed and chair or wheelchair): Not steady, but able to stabilize without staff assistance          AM-PAC 6 CLICK MOBILITY  Total Score:19    Bed Mobility:  Sit>Supine: Mod-I, performed on mat. Pt assists LLE by hooking around her ankle with RLE.   Supine>Sit: Mod-I, performed on bed and mat.     Transfers:  Sit<>Stand: SBA with RW, from bed, w/c, and mat. Pt requires cueing for safety and hand placement.   Stand Pivot Transfer: SBA/CGA with RW, bed > w/c and w/c <> mat.     Gait:  Amb: Pt ambulated 67 ft and 102 ft with RW and SBA/CGA. Pt  "with slow, step to larry with occasional posterior lean without LOB. Pt cued on correcting posture and gait mechanics to improve weight shift evenly and to improve L knee stability. Occasional L knee buckling. Seated rest between trials.     Advanced Gait:  Curb Step: Pt asc/dec 4" curb with RW and CGA. Pt cued for AD mgmt, technique, sequence, and safety.      Therex:  Pt performs LLE seated/supine therex per TKA protocol x 25 reps. AAROM as needed. Frequent rest breaks to manage pain.   Pt L knee AROM: -2* extension and 79* flexion.     Balance:  Pt with no LOB with all mobility this session.     Additional Treatment:  Pt educated on pain mgmt with OOB mobility and therex.     Patient left up in chair in gym with OT notified pt is waiting to begin next session.     Assessment:  Homar eJrry is a 42 y.o. female with a medical diagnosis of Primary osteoarthritis of left knee.  Pt tolerated tx well with focus on gait and curb training, transfers, therex, and pain mgmt. Pt progressing well with improvement in ambulation distance and increased tasks tolerated despite high level of pain. Pts ROM still remains limited, with pt educated on importance of avoiding flexed positions for prolonged periods of time. Pt will continue to benefit from skilled therapy to improve impairments listed below.     Rehab identified problem list/impairments: weakness, impaired endurance, impaired functional mobilty, gait instability, impaired balance, decreased lower extremity function, pain    Rehab potential is good.    Activity tolerance: Fair    Discharge recommendations: home with home health     Barriers to discharge: Decreased caregiver support    Equipment recommendations: none     GOALS:    Physical Therapy Goals        Problem: Physical Therapy Goal    Goal Priority Disciplines Outcome Goal Variances Interventions   Physical Therapy Goal     PT/OT, PT Ongoing (interventions implemented as appropriate)     Description:  " Goals to be met by: 14 days     Patient will increase functional independence with mobility by performin. Supine to sit with Modified [place]= met 4/3/2018  2. Sit to supine with Modified [place]= met 4/3/2018  3. Sit to stand transfer with Modified [place] with Rolling walker  4. Bed to chair transfer with Supervision using Rolling Walker  5. Gait  x 150 feet with Supervision using Rolling Walker.   6. Wheelchair propulsion x100 feet with Modified [place] using bilateral upper extremities  7. Ascend/Descend 4 inch curb step with Stand-by Assistance using Rolling Walker.  8. Lower extremity exercise program x20 reps per handout, with assistance as needed and gym therex, per TKA protocol                       PLAN:    Patient to be seen 5 x/week  to address the above listed problems via gait training, therapeutic activities, therapeutic exercises, wheelchair management/training  Plan of Care expires: 18  Plan of Care reviewed with: patient    Jasbir Ochoa, PTA  2018

## 2018-04-05 NOTE — ASSESSMENT & PLAN NOTE
Normotensive currently  BP meds (coreg and diovan HCT) held due to hypotension in hospital  Will continue to monitor and resume meds gradually for SBP > 140    4/2/18  BP is 136/98 and HR is 97  Takes coreg 12.5 mg bid at home but currently not ordered.  Per MD note, coreg and diovan HCT held due to hypotension.  Will restart Coreg at lower dose and monitor.    4/5/18  BP remains elevated  Continue lower coreg dose, up titrate if indicated later in course  Resumed home diovan, will monitor

## 2018-04-05 NOTE — ASSESSMENT & PLAN NOTE
Had an episode today  Resume topamax 25 mg daily prn which will be resumed  Will continue to monitor    4/2/18  No complaints today.  Continue Topamax 25 mg PRN.    4/5/18  Has complaints of nausea associated with knee pain and occasional headache, not migraine like  Continue PRN antiemetics and pain medications.

## 2018-04-05 NOTE — ASSESSMENT & PLAN NOTE
No wheezing or dyspnea currently  Continue therapy with albuterol inhaler prn  Will continue to monitor    4/2/18  No wheezing or complaints of SOB.  Continue albuterol inh PRN and monitor.    4/5/18  No wheezing or complaints of SOB.  Continue albuterol inh PRN and monitor.

## 2018-04-05 NOTE — ASSESSMENT & PLAN NOTE
No chronic meds currently  No joint pain other than the surgical site    4/2/18  Chronic, no complaints.  Per MD on no chronic meds.    4/5/18  Chronic, no complaints.  Per MD on no chronic meds.

## 2018-04-05 NOTE — ASSESSMENT & PLAN NOTE
-continue PT/OT to increase ambulation, ADL performance and endurance  -WBAT  -continue oxycodone for pain control prn  -keep polar ice in place when not in therapy  -continue ASA for DVT prophylaxis; continue famotidine while taking high dose ASA  -Ortho NP to assess surgical wound and remove dressing as indicated at next appt; will notify Ortho team for any leakage or excessive drainage evident on Aquacel dressing  -continue miralax and senokot-s to treat/prevent constipation; Mag citrate prn if scheduled regimen ineffective    4/2/18  Treatment plan as above.  Continue therapy to build functional strength  Lives alone, no family support    4/5/18  Treatment plan as above.  Continue therapy to build functional strength  Lives alone, no family support

## 2018-04-05 NOTE — PT/OT/SLP PROGRESS
Occupational Therapy  Treatment    Homar Jerry   MRN: [id number]   Admitting Diagnosis: Primary osteoarthritis of left knee    OT Date of Treatment: 04/05/18  Total Time (min): 48 min    Billable Minutes:  Therapeutic Activity 23  and Therapeutic Exercise 25    General Precautions: Standard, fall  Orthopedic Precautions: LLE weight bearing as tolerated  Braces: N/A    Do you have any cultural, spiritual, Jewish conflicts, given your current situation?: none stated    Subjective:  Communicated with nsg  prior to session.  I am doing well as can be today    Pain/Comfort  Pain Rating 1: 10/10  Location - Side 1: Left  Location 1: knee  Pain Addressed 1: Pre-medicate for activity, Reposition, Distraction  Pain Rating Post-Intervention 1: 10/10    Objective:   Pt. Seated up in w/c on arrival    Functional Status:  MDS G  Bed Mobility Functional Status: mod(I) - (I) Sit to supine and polar ice placement  Transfer Functional Status: S-SBA  From w/c with AD and then to [facility] cues for safety   Walk in Room Functional Status: S-SBA   Eating Functional Status: mod(I) - (I)   Toilet Use Functional Status: S-SBA from 3n1 level  Personal Hygiene Functional Status: S-SBA at sink level  Moving from seated to standing position: Not steady, but able to stabilize without staff assistance  Walking (with assistive device if used): Not steady, but able to stabilize without staff assistance  Turning around and facing the opposite direction while walking: Not steady, but able to stabilize without staff assistance  Moving on and off the toilet: Not steady, but able to stabilize without staff assistance  Surface-to-surface transfer (transfer between bed and chair or wheelchair): Not steady, but able to stabilize without staff assistance           [facility] 6 Click:  [facility] Total Score: 20    OT Exercises: UE Ergometer 15  Rickshaw 15# 4 x 25 reps    Additional Treatment:  Pt. With simple standing task on this day Pt. Able to tolerate x  3-4 min to complete task due to pain level in LLE with SBA to complete with holding on to table top for bal. Pt. With standing and therex performed to increase ROM, endurance self care task and fxl mobility for independence. Home management     Patient left UE Ergometer 15 min  Rickshaw 15# 4 x 25 reps with all lines intact and call button in reach and polar ice    ASSESSMENT:  Homar Jerry is a 42 y.o. female with a medical diagnosis of Primary osteoarthritis of left knee Pt. participated well with session on this day Pt. Still continue to have 10/10 pain  during session .Pt demos physical deficits with balance  functional mobility, UB strength, endurance  level of functional indep with daily tasks and activities and selfcare skills .Pt. Will continue to benefit from continued OT to progress towards goals      Rehab identified problem list/impairments: weakness, impaired endurance, impaired self care skills, impaired functional mobilty, gait instability    Rehab potential is fair    Activity tolerance: Fair    Discharge recommendations: home with home health, home health OT     Barriers to discharge: None     Equipment recommendations: none     GOALS:    Occupational Therapy Goals        Problem: Occupational Therapy Goal    Goal Priority Disciplines Outcome Interventions   Occupational Therapy Goal     OT, PT/OT Ongoing (interventions implemented as appropriate)    Description:  Goals to be met by: 7 days     Patient will increase functional independence with ADLs by performing:    UE Dressing with Modified [place].  LE Dressing with Modified [place].  Grooming while seated with Modified [place].  Toileting from bedside commode with Modified [place] for hygiene and clothing management.   Bathing from  edge of bed with Set-up Assistance.  Supine to sit with Modified [place].  Toilet transfer to bedside commode with Modified [place].  Upper extremity exercise program x10  reps  per set, with supervision.  Pt will tolerate up to 10 minutes of functional standing activity with (S) and RW   in prep for ADLs in standing.                    Plan:  Patient to be seen 5 x/week to address the above listed problems via self-care/home management, therapeutic activities, therapeutic exercises  Plan of Care expires: 05/02/18  Plan of Care reviewed with: patient    BERTRAM Arreola  04/05/2018

## 2018-04-05 NOTE — PLAN OF CARE
Problem: Occupational Therapy Goal  Goal: Occupational Therapy Goal  Goals to be met by: 7 days     Patient will increase functional independence with ADLs by performing:    UE Dressing with Modified [place].  LE Dressing with Modified [place].  Grooming while seated with Modified [place].  Toileting from bedside commode with Modified [place] for hygiene and clothing management.   Bathing from  edge of bed with Set-up Assistance.  Supine to sit with Modified [place].  Toilet transfer to bedside commode with Modified [place].  Upper extremity exercise program x10  reps per set, with supervision.  Pt will tolerate up to 10 minutes of functional standing activity with (S) and RW   in prep for ADLs in standing.     Outcome: Ongoing (interventions implemented as appropriate)  .

## 2018-04-05 NOTE — PLAN OF CARE
Problem: Occupational Therapy Goal  Goal: Occupational Therapy Goal  Goals to be met by: 7 days     Patient will increase functional independence with ADLs by performing:    UE Dressing with Modified [place].  LE Dressing with Modified [place].  Grooming while seated with Modified [place].  Toileting from bedside commode with Modified [place] for hygiene and clothing management.   Bathing from  edge of bed with Set-up Assistance.  Supine to sit with Modified [place].  Toilet transfer to bedside commode with Modified [place].  Upper extremity exercise program x10  reps per set, with supervision.  Pt will tolerate up to 10 minutes of functional standing activity with (S) and RW   in prep for ADLs in standing.     Outcome: Ongoing (interventions implemented as appropriate)  .

## 2018-04-05 NOTE — ASSESSMENT & PLAN NOTE
Controlled with chronic flonase which will be continued at [facility]    4/2/18  Chronic, no complaints.  Continue flonase as ordered    4/5/18  As above, chronic without complaints, continue flonase

## 2018-04-05 NOTE — ASSESSMENT & PLAN NOTE
Hemoglobin A1C   Date Value Ref Range Status   03/15/2018 7.6 (H) 4.0 - 5.6 % Final     Comment:   09/26/2017 7.5 (H) 4.0 - 5.6 % Final     Comment:   03/03/2017 10.3 (H) 4.5 - 6.2 % Final     Comment:     Patient does not take stated dose of insulin on MAR as she says she misses several meals; usually Levemir 16 units BID and novology 4 units before meals  Continue current levemir and novolog doses; adequate control currently  Continue diabetic diet therapy with glucose monitoring AC and HS.    Hyperglycemia to be treated with SSNI prn.  Glucose goal is < 180mg/dl and avoidance of hypoglycemia.  Will continue to monitor and adjust regimen as necessary to achieve goals.    4/2/18  POCT Glucose   Date Value Ref Range Status   04/05/2018 84 70 - 110 mg/dL Final   04/05/2018 114 (H) 70 - 110 mg/dL Final   04/04/2018 122 (H) 70 - 110 mg/dL Final   04/04/2018 134 (H) 70 - 110 mg/dL Final   04/04/2018 111 (H) 70 - 110 mg/dL Final   04/04/2018 128 (H) 70 - 110 mg/dL Final   04/03/2018 102 70 - 110 mg/dL Final   04/03/2018 131 (H) 70 - 110 mg/dL Final   04/03/2018 248 (H) 70 - 110 mg/dL Final   04/03/2018 108 70 - 110 mg/dL Final   04/02/2018 105 70 - 110 mg/dL Final   04/02/2018 179 (H) 70 - 110 mg/dL Final     CBG is stable,   Continue Levemir 20 units qhs and Aspart 8 units with meals and per SSI.  Continue diabetic diet as ordered and monitor.    4/5/18  Blood sugar beginning to decrease below 100, patient wishes for mealtime insulin to be decreased  Mealtime insulin decreased from 8 units to 6 units, will monitor for toleration  Continue levemir 20 units qHS and SSI

## 2018-04-05 NOTE — ASSESSMENT & PLAN NOTE
Chronic and stable  Continue therapy with levothyroxine  F/U with PCP for ongoing monitoring and adjustment of levothyroxine dose as indicated    4/2/18  Continue Synthroid for chronic management and follow up with PCP.    4/5/18  Continue Synthroid for chronic management and follow up with PCP.

## 2018-04-05 NOTE — ASSESSMENT & PLAN NOTE
Controlled currently  Continue chronic therapy with gabapentin and tizanidine with oxycodone prn    4/2/18  Chronic, follow up with Dr. Villa after discharge for management.    4/5/18  Chronic, follow up with Dr. Villa after discharge for management. Denies increase in pain level from baseline.

## 2018-04-05 NOTE — PLAN OF CARE
Problem: Physical Therapy Goal  Goal: Physical Therapy Goal  Goals to be met by: 14 days     Patient will increase functional independence with mobility by performin. Supine to sit with Modified [place]= met 4/3/2018  2. Sit to supine with Modified [place]= met 4/3/2018  3. Sit to stand transfer with Modified [place] with Rolling walker  4. Bed to chair transfer with Supervision using Rolling Walker  5. Gait  x 150 feet with Supervision using Rolling Walker.   6. Wheelchair propulsion x100 feet with Modified [place] using bilateral upper extremities  7. Ascend/Descend 4 inch curb step with Stand-by Assistance using Rolling Walker.  8. Lower extremity exercise program x20 reps per handout, with assistance as needed and gym therex, per TKA protocol      Outcome: Ongoing (interventions implemented as appropriate)  Goals remain appropriate.

## 2018-04-05 NOTE — PLAN OF CARE
Problem: Patient Care Overview  Goal: Plan of Care Review  Outcome: Ongoing (interventions implemented as appropriate)  POC reviewed with patient.  Interventions documented in flow sheet and on MAR.

## 2018-04-05 NOTE — ASSESSMENT & PLAN NOTE
S/p Left knee replacement  See plan below.     4/2/18  Pain minimally controlled.    Continue OxyIR 10-20 mg PRN as ordered.  Tizanidine 12 mg bid for muscle spasms.  Will add scheduled tylenol to assist with pain control.  Narcan PRN for over sedation.  Follow up with ortho.  Will do x-ray of knee for complain of pop followed by pain while working in therapy.  ASA for DVT prevention.  Pepcid for gastric protection while on high dose ASA.    4/5/18  Pain minimally controlled.    Continue OxyIR 10-20 mg PRN as ordered.  Tizanidine 12 mg bid for muscle spasms.  Will add scheduled tylenol to assist with pain control.  Narcan PRN for over sedation.  Follow up with ortho.  ASA for DVT prevention.  Pepcid for gastric protection while on high dose ASA.

## 2018-04-05 NOTE — SUBJECTIVE & OBJECTIVE
"Interval History:   The patient [place] [facility] from [facility] to 3/31/2018.    4/2/18  Patient seen for the first time by me, she reports she has pain to her left knee.  States her pain medication gives her relief for about 1 hour after taking.  She reports she suffers from chronic pain and is treated by Dr. Villa.  She reports she is eating, drinking, voiding and having BM without issues.  She currently lives alone and plans to return to [facility] upon discharge.  She reports she has no family assistance.      At 1150 notified by nursing that patient stated she had heard a "pop" while working with therapy and then notice blood on her dressing.  She is c/o pain to the left knee.  Will send for x-ray and notify Ortho.  There is scant blood underneath dressing no bigger than a pencil eraser, site outlined.      4/5/18  Patient seen resting in bed, has complaints of ongoing intermittent pain and her blood sugar is "getting low". Xray from 4/2 reviewed without acute findings. She is participating in therapies. Blood pressure noted to be elevated, home diovan resumed.       Review of Systems   Constitutional: Negative for appetite change, fatigue and fever.   Respiratory: Negative for cough and shortness of breath.    Cardiovascular: Negative for chest pain, palpitations and leg swelling.   Gastrointestinal: Positive for nausea. Negative for abdominal pain, constipation, diarrhea and vomiting.   Endocrine: Negative for polydipsia, polyphagia and polyuria.   Genitourinary: Negative for dysuria and frequency.   Musculoskeletal: Positive for arthralgias and myalgias.   Skin: Negative for rash.   Psychiatric/Behavioral: Negative for confusion and hallucinations.     Objective:     Vital Signs (Most Recent):  Temp: 97.9 °F (36.6 °C) (04/05/18 0754)  Pulse: 89 (04/05/18 0754)  Resp: 18 (04/05/18 0754)  BP: (!) 153/94 (04/05/18 1317)  SpO2: 100 % (04/05/18 0754) Vital Signs (24h Range):  Temp:  [97.8 °F (36.6 °C)-97.9 °F " (36.6 °C)] 97.9 °F (36.6 °C)  Pulse:  [89-92] 89  Resp:  [18] 18  SpO2:  [99 %-100 %] 100 %  BP: (153-170)/(92-99) 153/94     Weight: 124.4 kg (274 lb 4 oz)  Body mass index is 45.64 kg/m².  No intake or output data in the 24 hours ending 04/05/18 1602     Physical Exam   Constitutional: She is oriented to person, place, and time. She appears well-developed and well-nourished.   Cardiovascular: Normal rate, regular rhythm, normal heart sounds and intact distal pulses.    No murmur heard.  Pulmonary/Chest: Effort normal and breath sounds normal. No respiratory distress. She has no wheezes.   Abdominal: Soft. Normal appearance and bowel sounds are normal. She exhibits no distension. There is no tenderness.   Musculoskeletal: Normal range of motion. She exhibits edema and tenderness. She exhibits no deformity.   Neurological: She is alert and oriented to person, place, and time. She has normal strength.   Skin: Skin is warm, dry and intact. Capillary refill takes less than 2 seconds. No erythema.   Surgical aquacel dressing in place.  No redness or drainage seen.   Psychiatric: She has a normal mood and affect.       Significant Labs:   BMP:     Recent Labs  Lab 04/05/18  0524         K 4.1      CO2 30*   BUN 9   CREATININE 0.8   CALCIUM 8.2*   MG 2.2     CBC:     Recent Labs  Lab 04/05/18  0524   WBC 5.58   HGB 8.8*   HCT 28.5*          Significant Imaging: n/a

## 2018-04-05 NOTE — ASSESSMENT & PLAN NOTE
Not labile currently and cooperative with exam  Continue chronic therapy with quetiapine, mirtazipine, buspirone, trazodone  Continue alprazolam prn anxiety    4/2/18  Chronic and stable.  Continue chronic therapy with Seroquel, Mirtazapine, Buspirone and trazodone.  Xanax PRN for anxiety.    4/5/18  Chronic and stable.   Continue chronic therapy with Seroquel, Mirtazapine, Buspirone and trazodone.  Xanax PRN for anxiety.

## 2018-04-05 NOTE — PROGRESS NOTES
"[facility]  Department of Hospital Medicine  Progress Note    Patient Name: Homar Jerry  MRN: [id number]  Code Status: Full Code  Admission Date: 3/31/2018  Length of Stay: 5 days  Attending Physician: Rowena Rodgers MD  Primary Care Provider: Mary Cabrera MD    Subjective:     Principal Problem:Primary osteoarthritis of left knee    HPI:  Chief Complaint/Reason for Admission: Primary osteoarthritis of left knee    History of Present Illness:  Patient is a 42 y.o. female DM2, RA, bipolar disorder who presents to SNF after left TKR on 3/28/2018 by Dr. [facility] to treat osteoarthritis.  She currently has 9/10 pain and states that at its best it will be 8/10. She acknowledges a high tolerance to pain meds and takes oxycodone/APAP 10 mg for left knee and back pain chronically.   Polar ice and oxycodone have been best at relieving but ambulation worsens.  She has a chronic poor appetite and gastroparesis with flares every 6-9 months since having gastric pacer and gastric sleeve surgery.  She has additional stressors of her only son's death in the recent past.     The patient has been admitted to SNF for ongoing PT/OT due to insufficient progress to go home safely from the hospital.    Records from last hospital stay reviewed and summarized above.     Interval History:   The patient was admitted at [facility] from 3/28 to 3/31/2018.    4/2/18  Patient seen for the first time by me, she reports she has pain to her left knee.  States her pain medication gives her relief for about 1 hour after taking.  She reports she suffers from chronic pain and is treated by Dr. Villa.  She reports she is eating, drinking, voiding and having BM without issues.  She currently lives alone and plans to return to [facility] upon discharge.  She reports she has no family assistance.      At 1150 notified by nursing that patient stated she had heard a "pop" while working with therapy and then notice blood on her " "dressing.  She is c/o pain to the left knee.  Will send for x-ray and notify Ortho.  There is scant blood underneath dressing no bigger than a pencil eraser, site outlined.      4/5/18  Patient seen resting in bed, has complaints of ongoing intermittent pain and her blood sugar is "getting low". Xray from 4/2 reviewed without acute findings. She is participating in therapies. Blood pressure noted to be elevated, home diovan resumed.       Review of Systems   Constitutional: Negative for appetite change, fatigue and fever.   Respiratory: Negative for cough and shortness of breath.    Cardiovascular: Negative for chest pain, palpitations and leg swelling.   Gastrointestinal: Positive for nausea. Negative for abdominal pain, constipation, diarrhea and vomiting.   Endocrine: Negative for polydipsia, polyphagia and polyuria.   Genitourinary: Negative for dysuria and frequency.   Musculoskeletal: Positive for arthralgias and myalgias.   Skin: Negative for rash.   Psychiatric/Behavioral: Negative for confusion and hallucinations.     Objective:     Vital Signs (Most Recent):  Temp: 97.9 °F (36.6 °C) (04/05/18 0754)  Pulse: 89 (04/05/18 0754)  Resp: 18 (04/05/18 0754)  BP: (!) 153/94 (04/05/18 1317)  SpO2: 100 % (04/05/18 0754) Vital Signs (24h Range):  Temp:  [97.8 °F (36.6 °C)-97.9 °F (36.6 °C)] 97.9 °F (36.6 °C)  Pulse:  [89-92] 89  Resp:  [18] 18  SpO2:  [99 %-100 %] 100 %  BP: (153-170)/(92-99) 153/94     Weight: 124.4 kg (274 lb 4 oz)  Body mass index is 45.64 kg/m².  No intake or output data in the 24 hours ending 04/05/18 1602     Physical Exam   Constitutional: She is oriented to person, place, and time. She appears well-developed and well-nourished.   Cardiovascular: Normal rate, regular rhythm, normal heart sounds and intact distal pulses.    No murmur heard.  Pulmonary/Chest: Effort normal and breath sounds normal. No respiratory distress. She has no wheezes.   Abdominal: Soft. Normal appearance and bowel sounds " are normal. She exhibits no distension. There is no tenderness.   Musculoskeletal: Normal range of motion. She exhibits edema and tenderness. She exhibits no deformity.   Neurological: She is alert and oriented to person, place, and time. She has normal strength.   Skin: Skin is warm, dry and intact. Capillary refill takes less than 2 seconds. No erythema.   Surgical aquacel dressing in place.  No redness or drainage seen.   Psychiatric: She has a normal mood and affect.       Significant Labs:   BMP:     Recent Labs  Lab 04/05/18  0524         K 4.1      CO2 30*   BUN 9   CREATININE 0.8   CALCIUM 8.2*   MG 2.2     CBC:     Recent Labs  Lab 04/05/18 0524   WBC 5.58   HGB 8.8*   HCT 28.5*          Significant Imaging: n/a    Assessment/Plan:      * Primary osteoarthritis of left knee    S/p Left knee replacement  See plan below.     4/2/18  Pain minimally controlled.    Continue OxyIR 10-20 mg PRN as ordered.  Tizanidine 12 mg bid for muscle spasms.  Will add scheduled tylenol to assist with pain control.  Narcan PRN for over sedation.  Follow up with ortho.  Will do x-ray of knee for complain of pop followed by pain while working in therapy.  ASA for DVT prevention.  Pepcid for gastric protection while on high dose ASA.    4/5/18  Pain minimally controlled.    Continue OxyIR 10-20 mg PRN as ordered.  Tizanidine 12 mg bid for muscle spasms.  Will add scheduled tylenol to assist with pain control.  Narcan PRN for over sedation.  Follow up with ortho.  ASA for DVT prevention.  Pepcid for gastric protection while on high dose ASA.          Status post total left knee replacement    -continue PT/OT to increase ambulation, ADL performance and endurance  -WBAT  -continue oxycodone for pain control prn  -keep polar ice in place when not in therapy  -continue ASA for DVT prophylaxis; continue famotidine while taking high dose ASA  -Ortho NP to assess surgical wound and remove dressing as indicated  at next appt; will notify Ortho team for any leakage or excessive drainage evident on Aquacel dressing  -continue miralax and senokot-s to treat/prevent constipation; Mag citrate prn if scheduled regimen ineffective    4/2/18  Treatment plan as above.  Continue therapy to build functional strength  Lives alone, no family support    4/5/18  Treatment plan as above.  Continue therapy to build functional strength  Lives alone, no family support        Morbid obesity with BMI of 45.0-49.9, adult    Body mass index is 45.64 kg/m².    Patient on 2000 alfonso diet and will encourage modeling of portion sizes at home.     4/2/18  Diet modifications.  Follow up with PCP for weight loss options.    4/5/18  Diet modifications encouraged.  Follow up with PCP for weight loss options.        Type 2 diabetes mellitus with hyperglycemia, with long-term current use of insulin    Hemoglobin A1C   Date Value Ref Range Status   03/15/2018 7.6 (H) 4.0 - 5.6 % Final     Comment:   09/26/2017 7.5 (H) 4.0 - 5.6 % Final     Comment:   03/03/2017 10.3 (H) 4.5 - 6.2 % Final     Comment:     Patient does not take stated dose of insulin on MAR as she says she misses several meals; usually Levemir 16 units BID and novology 4 units before meals  Continue current levemir and novolog doses; adequate control currently  Continue diabetic diet therapy with glucose monitoring AC and HS.    Hyperglycemia to be treated with SSNI prn.  Glucose goal is < 180mg/dl and avoidance of hypoglycemia.  Will continue to monitor and adjust regimen as necessary to achieve goals.    4/2/18  POCT Glucose   Date Value Ref Range Status   04/05/2018 84 70 - 110 mg/dL Final   04/05/2018 114 (H) 70 - 110 mg/dL Final   04/04/2018 122 (H) 70 - 110 mg/dL Final   04/04/2018 134 (H) 70 - 110 mg/dL Final   04/04/2018 111 (H) 70 - 110 mg/dL Final   04/04/2018 128 (H) 70 - 110 mg/dL Final   04/03/2018 102 70 - 110 mg/dL Final   04/03/2018 131 (H) 70 - 110 mg/dL Final   04/03/2018 248  (H) 70 - 110 mg/dL Final   04/03/2018 108 70 - 110 mg/dL Final   04/02/2018 105 70 - 110 mg/dL Final   04/02/2018 179 (H) 70 - 110 mg/dL Final     CBG is stable,   Continue Levemir 20 units qhs and Aspart 8 units with meals and per SSI.  Continue diabetic diet as ordered and monitor.    4/5/18  Blood sugar beginning to decrease below 100, patient wishes for mealtime insulin to be decreased  Mealtime insulin decreased from 8 units to 6 units, will monitor for toleration  Continue levemir 20 units qHS and SSI        Rheumatoid arthritis    No chronic meds currently  No joint pain other than the surgical site    4/2/18  Chronic, no complaints.  Per MD on no chronic meds.    4/5/18  Chronic, no complaints.  Per MD on no chronic meds.        Asthma    No wheezing or dyspnea currently  Continue therapy with albuterol inhaler prn  Will continue to monitor    4/2/18  No wheezing or complaints of SOB.  Continue albuterol inh PRN and monitor.    4/5/18  No wheezing or complaints of SOB.  Continue albuterol inh PRN and monitor.        Essential hypertension    Normotensive currently  BP meds (coreg and diovan HCT) held due to hypotension in hospital  Will continue to monitor and resume meds gradually for SBP > 140    4/2/18  BP is 136/98 and HR is 97  Takes coreg 12.5 mg bid at home but currently not ordered.  Per MD note, coreg and diovan HCT held due to hypotension.  Will restart Coreg at lower dose and monitor.    4/5/18  BP remains elevated  Continue lower coreg dose, up titrate if indicated later in course  Resumed home diovan, will monitor         Allergic rhinitis    Controlled with chronic flonase which will be continued at [facility]    4/2/18  Chronic, no complaints.  Continue flonase as ordered    4/5/18  As above, chronic without complaints, continue flonase        Migraines    Had an episode today  Resume topamax 25 mg daily prn which will be resumed  Will continue to monitor    4/2/18  No complaints today.  Continue  Topamax 25 mg PRN.    4/5/18  Has complaints of nausea associated with knee pain and occasional headache, not migraine like  Continue PRN antiemetics and pain medications.         Chronic low back pain    Controlled currently  Continue chronic therapy with gabapentin and tizanidine with oxycodone prn    4/2/18  Chronic, follow up with Dr. Villa after discharge for management.    4/5/18  Chronic, follow up with Dr. Villa after discharge for management. Denies increase in pain level from baseline.         Hypothyroidism, postsurgical    Chronic and stable  Continue therapy with levothyroxine  F/U with PCP for ongoing monitoring and adjustment of levothyroxine dose as indicated    4/2/18  Continue Synthroid for chronic management and follow up with PCP.    4/5/18  Continue Synthroid for chronic management and follow up with PCP.        Bipolar 1 disorder    Not labile currently and cooperative with exam  Continue chronic therapy with quetiapine, mirtazipine, buspirone, trazodone  Continue alprazolam prn anxiety    4/2/18  Chronic and stable.  Continue chronic therapy with Seroquel, Mirtazapine, Buspirone and trazodone.  Xanax PRN for anxiety.    4/5/18  Chronic and stable.   Continue chronic therapy with Seroquel, Mirtazapine, Buspirone and trazodone.  Xanax PRN for anxiety.        Gastroparesis    No chronic meds since surgery  Will continue to monitor    4/2/18  No acute issues, continue to monitor.    4/5/18  No acute issues, continue to monitor.            Gemma Beltran NP  Department of Hospital Medicine  [facility]

## 2018-04-06 LAB
POCT GLUCOSE: 128 MG/DL (ref 70–110)
POCT GLUCOSE: 129 MG/DL (ref 70–110)
POCT GLUCOSE: 130 MG/DL (ref 70–110)
POCT GLUCOSE: 191 MG/DL (ref 70–110)

## 2018-04-06 PROCEDURE — 97116 GAIT TRAINING THERAPY: CPT

## 2018-04-06 PROCEDURE — 25000003 PHARM REV CODE 250: Performed by: NURSE PRACTITIONER

## 2018-04-06 PROCEDURE — 11000004 HC SNF PRIVATE

## 2018-04-06 PROCEDURE — 97530 THERAPEUTIC ACTIVITIES: CPT

## 2018-04-06 PROCEDURE — 97110 THERAPEUTIC EXERCISES: CPT

## 2018-04-06 PROCEDURE — 25000003 PHARM REV CODE 250: Performed by: INTERNAL MEDICINE

## 2018-04-06 PROCEDURE — 25000003 PHARM REV CODE 250: Performed by: ORTHOPAEDIC SURGERY

## 2018-04-06 RX ADMIN — OXYCODONE HYDROCHLORIDE 15 MG: 5 TABLET ORAL at 02:04

## 2018-04-06 RX ADMIN — MIRTAZAPINE 45 MG: 15 TABLET, FILM COATED ORAL at 09:04

## 2018-04-06 RX ADMIN — LEVOTHYROXINE SODIUM 150 MCG: 75 TABLET ORAL at 05:04

## 2018-04-06 RX ADMIN — ACETAMINOPHEN 500 MG: 500 TABLET ORAL at 08:04

## 2018-04-06 RX ADMIN — STANDARDIZED SENNA CONCENTRATE AND DOCUSATE SODIUM 2 TABLET: 8.6; 5 TABLET, FILM COATED ORAL at 08:04

## 2018-04-06 RX ADMIN — REGULAR STRENGTH 325 MG: 325 TABLET ORAL at 08:04

## 2018-04-06 RX ADMIN — FAMOTIDINE 20 MG: 20 TABLET, FILM COATED ORAL at 09:04

## 2018-04-06 RX ADMIN — OXYCODONE HYDROCHLORIDE 15 MG: 5 TABLET ORAL at 08:04

## 2018-04-06 RX ADMIN — ACETAMINOPHEN 500 MG: 500 TABLET ORAL at 09:04

## 2018-04-06 RX ADMIN — OXYCODONE HYDROCHLORIDE 15 MG: 5 TABLET ORAL at 07:04

## 2018-04-06 RX ADMIN — FLUTICASONE PROPIONATE 100 MCG: 50 SPRAY, METERED NASAL at 08:04

## 2018-04-06 RX ADMIN — GABAPENTIN 600 MG: 300 CAPSULE ORAL at 09:04

## 2018-04-06 RX ADMIN — STANDARDIZED SENNA CONCENTRATE AND DOCUSATE SODIUM 2 TABLET: 8.6; 5 TABLET, FILM COATED ORAL at 09:04

## 2018-04-06 RX ADMIN — FAMOTIDINE 20 MG: 20 TABLET, FILM COATED ORAL at 08:04

## 2018-04-06 RX ADMIN — TRAZODONE HYDROCHLORIDE 50 MG: 50 TABLET ORAL at 09:04

## 2018-04-06 RX ADMIN — VALSARTAN 80 MG: 80 TABLET ORAL at 08:04

## 2018-04-06 RX ADMIN — GABAPENTIN 600 MG: 300 CAPSULE ORAL at 08:04

## 2018-04-06 RX ADMIN — RAMELTEON 8 MG: 8 TABLET, FILM COATED ORAL at 09:04

## 2018-04-06 RX ADMIN — ACETAMINOPHEN 500 MG: 500 TABLET ORAL at 02:04

## 2018-04-06 RX ADMIN — CARVEDILOL 6.25 MG: 12.5 TABLET, FILM COATED ORAL at 09:04

## 2018-04-06 RX ADMIN — TIZANIDINE 12 MG: 4 TABLET ORAL at 08:04

## 2018-04-06 RX ADMIN — BUSPIRONE HYDROCHLORIDE 10 MG: 10 TABLET ORAL at 08:04

## 2018-04-06 RX ADMIN — OXYCODONE HYDROCHLORIDE 15 MG: 5 TABLET ORAL at 05:04

## 2018-04-06 RX ADMIN — REGULAR STRENGTH 325 MG: 325 TABLET ORAL at 09:04

## 2018-04-06 RX ADMIN — TIZANIDINE 12 MG: 4 TABLET ORAL at 09:04

## 2018-04-06 RX ADMIN — INSULIN ASPART 2 UNITS: 100 INJECTION, SOLUTION INTRAVENOUS; SUBCUTANEOUS at 12:04

## 2018-04-06 RX ADMIN — BUSPIRONE HYDROCHLORIDE 10 MG: 10 TABLET ORAL at 09:04

## 2018-04-06 RX ADMIN — INSULIN ASPART 6 UNITS: 100 INJECTION, SOLUTION INTRAVENOUS; SUBCUTANEOUS at 08:04

## 2018-04-06 RX ADMIN — ALPRAZOLAM 1 MG: 0.5 TABLET ORAL at 09:04

## 2018-04-06 RX ADMIN — CARVEDILOL 6.25 MG: 12.5 TABLET, FILM COATED ORAL at 08:04

## 2018-04-06 RX ADMIN — QUETIAPINE FUMARATE 800 MG: 200 TABLET, FILM COATED ORAL at 09:04

## 2018-04-06 RX ADMIN — TOPIRAMATE 25 MG: 25 TABLET, FILM COATED ORAL at 01:04

## 2018-04-06 RX ADMIN — HYDROCHLOROTHIAZIDE 12.5 MG: 12.5 TABLET ORAL at 08:04

## 2018-04-06 RX ADMIN — GABAPENTIN 600 MG: 300 CAPSULE ORAL at 02:04

## 2018-04-06 RX ADMIN — OXYCODONE HYDROCHLORIDE 15 MG: 5 TABLET ORAL at 11:04

## 2018-04-06 RX ADMIN — INSULIN ASPART 6 UNITS: 100 INJECTION, SOLUTION INTRAVENOUS; SUBCUTANEOUS at 12:04

## 2018-04-06 RX ADMIN — OXYCODONE HYDROCHLORIDE 15 MG: 5 TABLET ORAL at 09:04

## 2018-04-06 NOTE — NURSING
[facility] at [facility].

## 2018-04-06 NOTE — PLAN OF CARE
Problem: Physical Therapy Goal  Goal: Physical Therapy Goal  Goals to be met by: 14 days     Patient will increase functional independence with mobility by performin. Supine to sit with Modified [place]= met 4/3/2018  2. Sit to supine with Modified [place]= met 4/3/2018  3. Sit to stand transfer with Modified [place] with Rolling walker  4. Bed to chair transfer with Supervision using Rolling Walker  5. Gait  x 150 feet with Supervision using Rolling Walker.   6. Wheelchair propulsion x100 feet with Modified [place] using bilateral upper extremities  7. Ascend/Descend 4 inch curb step with Stand-by Assistance using Rolling Walker.  8. Lower extremity exercise program x20 reps per handout, with assistance as needed and gym therex, per TKA protocol      Outcome: Ongoing (interventions implemented as appropriate)  Goals remain appropriate.

## 2018-04-06 NOTE — PT/OT/SLP PROGRESS
Physical Therapy  Treatment    Homar Jerry   MRN: [id number]   Admitting Diagnosis: Primary osteoarthritis of left knee    PT Received On: 04/06/18  Total Time (min): 40       Billable Minutes:  Gait Training 25 and Therapeutic Activity 15    Treatment Type: Treatment  PT/PTA: PTA     PTA Visit Number: 3       General Precautions: Standard, fall  Orthopedic Precautions: LLE weight bearing as tolerated   Braces: N/A         Subjective:  Communicated with NSG/NP during session.  Pt agreeable to therapy.   Pt reports pain in calf after ambulation completed. NP/NSG notified and pt returned to supine in bed.     Pain/Comfort  Pain Rating 1: 9/10  Location - Side 1: Left  Location - Orientation 1: generalized  Location 1: knee  Pain Addressed 1: Reposition, Distraction, Other (see comments) (received pain medicine during session. )  Pain Rating Post-Intervention 1: 10/10  Pain Rating 2: 7/10  Location - Side 2: Left  Location - Orientation 2: lateral  Location 2: calf  Pain Addressed 2: Reposition, Distraction, Nurse notified, Other (see comments) (NSG and NP notified and aware. )  Pain Rating Post-Intervention 2: 7/10    Objective:  Patient found supine in bed with HOB elevated.         Functional Status:  MDS G  Bed Mobility Functional Status: mod(I) - (I)  Transfer Functional Status: S-SBA  Walk in Room Functional Status: S-SBA  Walk in Corridor Functional Status: S-SBA  Toilet Use Functional Status: S-SBA  Moving from seated to standing position: Not steady, but able to stabilize without staff assistance  Walking (with assistive device if used): Not steady, but able to stabilize without staff assistance  Turning around and facing the opposite direction while walking: Not steady, but able to stabilize without staff assistance  Moving on and off the toilet: Not steady, but able to stabilize without staff assistance  Surface-to-surface transfer (transfer between bed and chair or wheelchair): Not steady, but able  "to stabilize without staff assistance          AM-PAC 6 CLICK MOBILITY  Total Score:19    Bed Mobility:  Sit>Supine: Mod-I  Supine>Sit: Mod-I    Transfers:  Sit<>Stand: SBA with RW, from bed, w/c, and toilet. VC's for hand placement for improved transition to standing.   Stand Pivot Transfer: SBA with RW, bed <> w/c.     Gait:  Amb: Pt ambulated 70 ft, 12ft, and 100 ft with RW and SBA. Pt with difficulty progressing LLE and limited knee flexion in swing phase. Pt with increased weight bearing through BUE. Pt limited by pain and fatigue. Pt with c/o calf pain upon completion of gait training. Seated rest between trials.     Advanced Gait:  Curb Step: Pt asc/dec 4" curb step with RW and CGA. Pt cued for proper technique, sequence, and AD mgmt.     Therex:  Held per c/o L calf pain, mild warmth and NP notified.     Balance:  Pt with no LOB with all mobility this session. No LOB despite mild L knee buckling.     Additional Tx:  Pt assisted to bathroom commode in gym. Pt toilets with supervision assistance.     Patient left HOB elevated with call button in reach and NP present.    Assessment:  Homar Jerry is a 42 y.o. female with a medical diagnosis of Primary osteoarthritis of left knee.  Pt tolerated tx well with focus on gait training and transfers. Pt session limited d/t c/o calf pain with mild warmth present after completion of gait training. NSG and NP made aware and pt returned to bed for NP to evaluate. Therex and measurements held this session. Pt will continue to benefit from skilled therapy to improve impairments listed below.     Rehab identified problem list/impairments: weakness, impaired endurance, impaired functional mobilty, gait instability, impaired balance, decreased lower extremity function, pain    Rehab potential is good.    Activity tolerance: Fair    Discharge recommendations: home with home health     Barriers to discharge: Decreased caregiver support    Equipment recommendations: " none     GOALS:    Physical Therapy Goals        Problem: Physical Therapy Goal    Goal Priority Disciplines Outcome Goal Variances Interventions   Physical Therapy Goal     PT/OT, PT Ongoing (interventions implemented as appropriate)     Description:  Goals to be met by: 14 days     Patient will increase functional independence with mobility by performin. Supine to sit with Modified [place]= met 4/3/2018  2. Sit to supine with Modified [place]= met 4/3/2018  3. Sit to stand transfer with Modified [place] with Rolling walker  4. Bed to chair transfer with Supervision using Rolling Walker  5. Gait  x 150 feet with Supervision using Rolling Walker.   6. Wheelchair propulsion x100 feet with Modified [place] using bilateral upper extremities  7. Ascend/Descend 4 inch curb step with Stand-by Assistance using Rolling Walker.  8. Lower extremity exercise program x20 reps per handout, with assistance as needed and gym therex, per TKA protocol                       PLAN:    Patient to be seen 5 x/week  to address the above listed problems via gait training, therapeutic activities, therapeutic exercises, wheelchair management/training  Plan of Care expires: 18  Plan of Care reviewed with: patient    Jasbir Ochoa, PTA  2018

## 2018-04-06 NOTE — PLAN OF CARE
Problem: Occupational Therapy Goal  Goal: Occupational Therapy Goal  Goals to be met by: 7 days     Patient will increase functional independence with ADLs by performing:    UE Dressing with Modified [place].  LE Dressing with Modified [place].  Grooming while seated with Modified [place].  Toileting from bedside commode with Modified [place] for hygiene and clothing management.   Bathing from  edge of bed with Set-up Assistance.  Supine to sit with Modified [place].  Toilet transfer to bedside commode with Modified [place].  Upper extremity exercise program x10  reps per set, with supervision.  Pt will tolerate up to 10 minutes of functional standing activity with (S) and RW   in prep for ADLs in standing.     Outcome: Ongoing (interventions implemented as appropriate)  .

## 2018-04-06 NOTE — PLAN OF CARE
Problem: Patient Care Overview  Goal: Plan of Care Review  Outcome: Ongoing (interventions implemented as appropriate)  POC reviewed with patient.  Interventions documented on MAR and flow sheet.

## 2018-04-06 NOTE — PT/OT/SLP PROGRESS
Occupational Therapy  Treatment    Homar Jerry   MRN: [id number]   Admitting Diagnosis: Primary osteoarthritis of left knee    OT Date of Treatment: 04/06/18  Total Time (min): 45 min    Billable Minutes:  Therapeutic Activity 15 and Therapeutic Exercise 30    General Precautions: Standard, fall  Orthopedic Precautions: LLE weight bearing as tolerated  Braces: N/A    Do you have any cultural, spiritual, Jehovah's witness conflicts, given your current situation?: none stated    Subjective:  Communicated with nsg prior to session.  I have soreness around my knee Pt. Stated nsg notified     Pain/Comfort  Pain Rating 1: 10/10  Location - Side 1: Left  Location - Orientation 1: generalized  Location 1: knee  Pain Addressed 1: Pre-medicate for activity, Reposition, Distraction    Objective:   Pt. Supine on arrival     Functional Status:  MDS G  Bed Mobility Functional Status: mod(I) - (I)  Transfer Functional Status: S-SBA safety with t/f's from EOB level  Dressing Functional Status: 2:CGA-Min (A) to osmar BLE socks (A) with LLE  Toilet Use Functional Status: S-SBA from 3n1 level  Moving from seated to standing position: Not steady, but able to stabilize without staff assistance  Walking (with assistive device if used): Not steady, but able to stabilize without staff assistance  Turning around and facing the opposite direction while walking: Not steady, but able to stabilize without staff assistance  Moving on and off the toilet: Not steady, but able to stabilize without staff assistance  Surface-to-surface transfer (transfer between bed and chair or wheelchair): Not steady, but able to stabilize without staff assistance           [facility] 6 Click:  [facility] Total Score: 20    OT Exercises: UE Ergometer 10 min  Rickshaw 10# 4 x 25 reps  Rowing 20# 4 x 25 reps    Additional Treatment:  Pt. With standing task on this day. Pt.with visual spatical  Relation task on this day. Pt. Able to stand x 10 min to complete task with SBA with  no seated rest break. Pt. With standing and therex performed to increase ROM, endurance selfcare task and fxl mobility for independence     Patient left supine with all lines intact and call button in reach    ASSESSMENT:  Homar Jerry is a 42 y.o. female with a medical diagnosis of Primary osteoarthritis of left knee Pt. participated well with session on this day.Pt demos physical deficits with balance  functional mobility, UB strength, endurance  level of functional indep with daily tasks and activities and selfcare skills .Pt. Will continue to benefit from continued OT to progress towards goals    Rehab identified problem list/impairments: weakness, impaired endurance, impaired self care skills, impaired functional mobilty, gait instability    Rehab potential is fair    Activity tolerance: Fair    Discharge recommendations: home with home health, home health OT     Barriers to discharge: None     Equipment recommendations: none     GOALS:    Occupational Therapy Goals        Problem: Occupational Therapy Goal    Goal Priority Disciplines Outcome Interventions   Occupational Therapy Goal     OT, PT/OT Ongoing (interventions implemented as appropriate)    Description:  Goals to be met by: 7 days     Patient will increase functional independence with ADLs by performing:    UE Dressing with Modified [place].  LE Dressing with Modified [place].  Grooming while seated with Modified [place].  Toileting from bedside commode with Modified [place] for hygiene and clothing management.   Bathing from  edge of bed with Set-up Assistance.  Supine to sit with Modified [place].  Toilet transfer to bedside commode with Modified [place].  Upper extremity exercise program x10  reps per set, with supervision.  Pt will tolerate up to 10 minutes of functional standing activity with (S) and RW   in prep for ADLs in standing.                  Plan:  Patient to be seen 5 x/week to address the above  listed problems via self-care/home management, therapeutic activities, therapeutic exercises  Plan of Care expires: 05/02/18  Plan of Care reviewed with: patient    CALISTA Arreola/LULI  04/06/2018

## 2018-04-07 LAB
POCT GLUCOSE: 107 MG/DL (ref 70–110)
POCT GLUCOSE: 133 MG/DL (ref 70–110)
POCT GLUCOSE: 146 MG/DL (ref 70–110)
POCT GLUCOSE: 181 MG/DL (ref 70–110)

## 2018-04-07 PROCEDURE — 25000003 PHARM REV CODE 250: Performed by: INTERNAL MEDICINE

## 2018-04-07 PROCEDURE — 99900058 HC 022 PAID UNDER SNF PPS

## 2018-04-07 PROCEDURE — 97530 THERAPEUTIC ACTIVITIES: CPT

## 2018-04-07 PROCEDURE — 25000003 PHARM REV CODE 250: Performed by: NURSE PRACTITIONER

## 2018-04-07 PROCEDURE — 25000003 PHARM REV CODE 250: Performed by: HOSPITALIST

## 2018-04-07 PROCEDURE — 11000004 HC SNF PRIVATE

## 2018-04-07 PROCEDURE — 25000003 PHARM REV CODE 250: Performed by: ORTHOPAEDIC SURGERY

## 2018-04-07 PROCEDURE — 97110 THERAPEUTIC EXERCISES: CPT

## 2018-04-07 PROCEDURE — 97116 GAIT TRAINING THERAPY: CPT

## 2018-04-07 RX ADMIN — ALPRAZOLAM 1 MG: 0.5 TABLET ORAL at 09:04

## 2018-04-07 RX ADMIN — OXYCODONE HYDROCHLORIDE 15 MG: 5 TABLET ORAL at 03:04

## 2018-04-07 RX ADMIN — TRAZODONE HYDROCHLORIDE 50 MG: 50 TABLET ORAL at 09:04

## 2018-04-07 RX ADMIN — RAMELTEON 8 MG: 8 TABLET, FILM COATED ORAL at 09:04

## 2018-04-07 RX ADMIN — OXYCODONE HYDROCHLORIDE 15 MG: 5 TABLET ORAL at 09:04

## 2018-04-07 RX ADMIN — TIZANIDINE 12 MG: 4 TABLET ORAL at 08:04

## 2018-04-07 RX ADMIN — BUSPIRONE HYDROCHLORIDE 10 MG: 10 TABLET ORAL at 08:04

## 2018-04-07 RX ADMIN — BUSPIRONE HYDROCHLORIDE 10 MG: 10 TABLET ORAL at 09:04

## 2018-04-07 RX ADMIN — OXYCODONE HYDROCHLORIDE 15 MG: 5 TABLET ORAL at 06:04

## 2018-04-07 RX ADMIN — ACETAMINOPHEN 500 MG: 500 TABLET ORAL at 02:04

## 2018-04-07 RX ADMIN — CARVEDILOL 6.25 MG: 12.5 TABLET, FILM COATED ORAL at 09:04

## 2018-04-07 RX ADMIN — FAMOTIDINE 20 MG: 20 TABLET, FILM COATED ORAL at 08:04

## 2018-04-07 RX ADMIN — INSULIN ASPART 6 UNITS: 100 INJECTION, SOLUTION INTRAVENOUS; SUBCUTANEOUS at 08:04

## 2018-04-07 RX ADMIN — STANDARDIZED SENNA CONCENTRATE AND DOCUSATE SODIUM 2 TABLET: 8.6; 5 TABLET, FILM COATED ORAL at 09:04

## 2018-04-07 RX ADMIN — HYDROCHLOROTHIAZIDE 12.5 MG: 12.5 TABLET ORAL at 09:04

## 2018-04-07 RX ADMIN — FAMOTIDINE 20 MG: 20 TABLET, FILM COATED ORAL at 09:04

## 2018-04-07 RX ADMIN — REGULAR STRENGTH 325 MG: 325 TABLET ORAL at 08:04

## 2018-04-07 RX ADMIN — ACETAMINOPHEN 500 MG: 500 TABLET ORAL at 08:04

## 2018-04-07 RX ADMIN — FLUTICASONE PROPIONATE 100 MCG: 50 SPRAY, METERED NASAL at 08:04

## 2018-04-07 RX ADMIN — MIRTAZAPINE 45 MG: 15 TABLET, FILM COATED ORAL at 09:04

## 2018-04-07 RX ADMIN — GABAPENTIN 600 MG: 300 CAPSULE ORAL at 09:04

## 2018-04-07 RX ADMIN — STANDARDIZED SENNA CONCENTRATE AND DOCUSATE SODIUM 2 TABLET: 8.6; 5 TABLET, FILM COATED ORAL at 08:04

## 2018-04-07 RX ADMIN — ACETAMINOPHEN 500 MG: 500 TABLET ORAL at 09:04

## 2018-04-07 RX ADMIN — GABAPENTIN 600 MG: 300 CAPSULE ORAL at 02:04

## 2018-04-07 RX ADMIN — INSULIN ASPART 6 UNITS: 100 INJECTION, SOLUTION INTRAVENOUS; SUBCUTANEOUS at 12:04

## 2018-04-07 RX ADMIN — VALSARTAN 80 MG: 80 TABLET ORAL at 12:04

## 2018-04-07 RX ADMIN — TIZANIDINE 12 MG: 4 TABLET ORAL at 09:04

## 2018-04-07 RX ADMIN — OXYCODONE HYDROCHLORIDE 15 MG: 5 TABLET ORAL at 12:04

## 2018-04-07 RX ADMIN — LEVOTHYROXINE SODIUM 150 MCG: 75 TABLET ORAL at 05:04

## 2018-04-07 RX ADMIN — REGULAR STRENGTH 325 MG: 325 TABLET ORAL at 09:04

## 2018-04-07 RX ADMIN — INSULIN ASPART 6 UNITS: 100 INJECTION, SOLUTION INTRAVENOUS; SUBCUTANEOUS at 05:04

## 2018-04-07 RX ADMIN — QUETIAPINE FUMARATE 800 MG: 200 TABLET, FILM COATED ORAL at 10:04

## 2018-04-07 NOTE — PT/OT/SLP PROGRESS
"Physical Therapy  Treatment    Homar Jerry   MRN: [id number]   Admitting Diagnosis: Primary osteoarthritis of left knee    PT Received On: 04/07/18  Total Time (min): 45       Billable Minutes:  Gait Training 20, Therapeutic Activity 10 and Therapeutic Exercise 15    Treatment Type: Treatment  PT/PTA: PTA     PTA Visit Number: 4       General Precautions: Standard, fall  Orthopedic Precautions: LLE weight bearing as tolerated   Braces: N/A         Subjective:  Pt agreeable to therapy. "I wasn't feeling well this morning. My blood pressure was all over the place."    Pain/Comfort  Pain Rating 1: 10/10  Location - Side 1: Left  Location - Orientation 1: generalized  Location 1: knee  Pain Addressed 1: Pre-medicate for activity, Reposition, Distraction  Pain Rating Post-Intervention 1: 10/10    Objective:  Patient found supine with HOB elevated with polar ice applied to L knee.     Checked pts blood pressure prior to beginning any mobility or therex d/t pt report of fluctuating BP earlier in day. BP :122/77.     Functional Status:  MDS G  Bed Mobility Functional Status: mod(I) - (I)  Transfer Functional Status: S-SBA  Walk in Room Functional Status: S-SBA  Walk in Corridor Functional Status: S-SBA  Moving from seated to standing position: Not steady, but able to stabilize without staff assistance  Walking (with assistive device if used): Not steady, but able to stabilize without staff assistance  Turning around and facing the opposite direction while walking: Not steady, but able to stabilize without staff assistance  Surface-to-surface transfer (transfer between bed and chair or wheelchair): Not steady, but able to stabilize without staff assistance          AM-PAC 6 CLICK MOBILITY  Total Score:19    Bed Mobility:  Sit>Supine: Mod-I, performed on mat  Supine>Sit: Mod-I, performed on bed and on mat.     Transfers:  Sit<>Stand: SBA with RW, from bed, w/c, and mat.   Stand Pivot Transfer: SBA with RW, bed > w/c " and w/c <> mat.     Gait:  Amb: Pt ambulated 140 ft and 150 ft with RW and SBA. Pt cued for improving weight shift/acceptance to L side. Pt also cued for engaging L quadriceps for better L knee stability. Minimal L knee buckling observed. W/c in tow behind pt. Limited by pain/fatigue.      Advanced Gait:  Curb Step: Pt asc/dec 4: curb step x 1 trial with RW and CGA/SBA. Pt with slight posterior lean when elevating walker to curb requiring CGA for balance. Pt cued for proper sequence and safety.     Therex:  Pt performed LLE Seated/Supine Therex per TKA protocol x 30 reps. AAROM for SLR, SAQ, HS, and LAQ for improved quality/ROM.    Pt L knee AROM: 0* ext. - 79* (81* slight assist provided by PTA to pts [place]) flexion.     Balance:  Pt with no LOB with all mobility this session.     Patient left up in chair in room with call button in reach.    Assessment:  Homar Jerry is a 42 y.o. female with a medical diagnosis of Primary osteoarthritis of left knee.  Pt toelrated tx well with focus on gait training, transfers, and therex. Pt progressing well with less rest breaks needed for pain mgmt and improvements in ambulation distance, therex independence, and ROM. Pt will continue to benefit from skilled therapy to improve impairments listed below.     Rehab identified problem list/impairments: weakness, impaired endurance, impaired functional mobilty, gait instability, impaired balance, decreased lower extremity function, pain    Rehab potential is good.    Activity tolerance: Good    Discharge recommendations: home with home health     Barriers to discharge: Decreased caregiver support    Equipment recommendations: none     GOALS:    Physical Therapy Goals        Problem: Physical Therapy Goal    Goal Priority Disciplines Outcome Goal Variances Interventions   Physical Therapy Goal     PT/OT, PT Ongoing (interventions implemented as appropriate)     Description:  Goals to be met by: 14 days     Patient will  increase functional independence with mobility by performin. Supine to sit with Modified [place]= met 4/3/2018  2. Sit to supine with Modified [place]= met 4/3/2018  3. Sit to stand transfer with Modified [place] with Rolling walker  4. Bed to chair transfer with Supervision using Rolling Walker  5. Gait  x 150 feet with Supervision using Rolling Walker.   6. Wheelchair propulsion x100 feet with Modified [place] using bilateral upper extremities  7. Ascend/Descend 4 inch curb step with Stand-by Assistance using Rolling Walker.  8. Lower extremity exercise program x20 reps per handout, with assistance as needed and gym therex, per TKA protocol                       PLAN:    Patient to be seen 5 x/week  to address the above listed problems via gait training, therapeutic activities, therapeutic exercises, wheelchair management/training  Plan of Care expires: 18  Plan of Care reviewed with: patient    Jasbir Don, PTA  2018

## 2018-04-07 NOTE — PLAN OF CARE
Problem: Physical Therapy Goal  Goal: Physical Therapy Goal  Goals to be met by: 14 days     Patient will increase functional independence with mobility by performin. Supine to sit with Modified [place]= met 4/3/2018  2. Sit to supine with Modified [place]= met 4/3/2018  3. Sit to stand transfer with Modified [place] with Rolling walker  4. Bed to chair transfer with Supervision using Rolling Walker  5. Gait  x 150 feet with Supervision using Rolling Walker.   6. Wheelchair propulsion x100 feet with Modified [place] using bilateral upper extremities  7. Ascend/Descend 4 inch curb step with Stand-by Assistance using Rolling Walker.  8. Lower extremity exercise program x20 reps per handout, with assistance as needed and gym therex, per TKA protocol      Outcome: Ongoing (interventions implemented as appropriate)  Goals remain appropriate.

## 2018-04-08 LAB
POCT GLUCOSE: 106 MG/DL (ref 70–110)
POCT GLUCOSE: 126 MG/DL (ref 70–110)
POCT GLUCOSE: 147 MG/DL (ref 70–110)
POCT GLUCOSE: 178 MG/DL (ref 70–110)

## 2018-04-08 PROCEDURE — 25000003 PHARM REV CODE 250: Performed by: NURSE PRACTITIONER

## 2018-04-08 PROCEDURE — 11000004 HC SNF PRIVATE

## 2018-04-08 PROCEDURE — 25000003 PHARM REV CODE 250: Performed by: INTERNAL MEDICINE

## 2018-04-08 PROCEDURE — 25000003 PHARM REV CODE 250: Performed by: ORTHOPAEDIC SURGERY

## 2018-04-08 PROCEDURE — 25000003 PHARM REV CODE 250: Performed by: HOSPITALIST

## 2018-04-08 RX ORDER — HYDROCHLOROTHIAZIDE 12.5 MG/1
12.5 TABLET ORAL DAILY
Status: DISCONTINUED | OUTPATIENT
Start: 2018-04-10 | End: 2018-04-16 | Stop reason: HOSPADM

## 2018-04-08 RX ADMIN — INSULIN ASPART 6 UNITS: 100 INJECTION, SOLUTION INTRAVENOUS; SUBCUTANEOUS at 08:04

## 2018-04-08 RX ADMIN — STANDARDIZED SENNA CONCENTRATE AND DOCUSATE SODIUM 2 TABLET: 8.6; 5 TABLET, FILM COATED ORAL at 08:04

## 2018-04-08 RX ADMIN — OXYCODONE HYDROCHLORIDE 15 MG: 5 TABLET ORAL at 02:04

## 2018-04-08 RX ADMIN — TRAZODONE HYDROCHLORIDE 50 MG: 50 TABLET ORAL at 09:04

## 2018-04-08 RX ADMIN — REGULAR STRENGTH 325 MG: 325 TABLET ORAL at 09:04

## 2018-04-08 RX ADMIN — INSULIN ASPART 2 UNITS: 100 INJECTION, SOLUTION INTRAVENOUS; SUBCUTANEOUS at 12:04

## 2018-04-08 RX ADMIN — REGULAR STRENGTH 325 MG: 325 TABLET ORAL at 08:04

## 2018-04-08 RX ADMIN — ALPRAZOLAM 1 MG: 0.5 TABLET ORAL at 09:04

## 2018-04-08 RX ADMIN — MIRTAZAPINE 45 MG: 15 TABLET, FILM COATED ORAL at 09:04

## 2018-04-08 RX ADMIN — TIZANIDINE 12 MG: 4 TABLET ORAL at 09:04

## 2018-04-08 RX ADMIN — STANDARDIZED SENNA CONCENTRATE AND DOCUSATE SODIUM 2 TABLET: 8.6; 5 TABLET, FILM COATED ORAL at 09:04

## 2018-04-08 RX ADMIN — OXYCODONE HYDROCHLORIDE 15 MG: 5 TABLET ORAL at 09:04

## 2018-04-08 RX ADMIN — BUSPIRONE HYDROCHLORIDE 10 MG: 10 TABLET ORAL at 08:04

## 2018-04-08 RX ADMIN — ACETAMINOPHEN 500 MG: 500 TABLET ORAL at 08:04

## 2018-04-08 RX ADMIN — HYDROCHLOROTHIAZIDE 12.5 MG: 12.5 TABLET ORAL at 08:04

## 2018-04-08 RX ADMIN — RAMELTEON 8 MG: 8 TABLET, FILM COATED ORAL at 09:04

## 2018-04-08 RX ADMIN — OXYCODONE HYDROCHLORIDE 15 MG: 5 TABLET ORAL at 10:04

## 2018-04-08 RX ADMIN — POLYETHYLENE GLYCOL 3350 17 G: 17 POWDER, FOR SOLUTION ORAL at 08:04

## 2018-04-08 RX ADMIN — FLUTICASONE PROPIONATE 100 MCG: 50 SPRAY, METERED NASAL at 08:04

## 2018-04-08 RX ADMIN — INSULIN ASPART 6 UNITS: 100 INJECTION, SOLUTION INTRAVENOUS; SUBCUTANEOUS at 12:04

## 2018-04-08 RX ADMIN — ACETAMINOPHEN 500 MG: 500 TABLET ORAL at 09:04

## 2018-04-08 RX ADMIN — GABAPENTIN 600 MG: 300 CAPSULE ORAL at 02:04

## 2018-04-08 RX ADMIN — GABAPENTIN 600 MG: 300 CAPSULE ORAL at 09:04

## 2018-04-08 RX ADMIN — OXYCODONE HYDROCHLORIDE 15 MG: 5 TABLET ORAL at 05:04

## 2018-04-08 RX ADMIN — FAMOTIDINE 20 MG: 20 TABLET, FILM COATED ORAL at 09:04

## 2018-04-08 RX ADMIN — TIZANIDINE 12 MG: 4 TABLET ORAL at 08:04

## 2018-04-08 RX ADMIN — BUSPIRONE HYDROCHLORIDE 10 MG: 10 TABLET ORAL at 09:04

## 2018-04-08 RX ADMIN — VALSARTAN 80 MG: 80 TABLET ORAL at 08:04

## 2018-04-08 RX ADMIN — GABAPENTIN 600 MG: 300 CAPSULE ORAL at 08:04

## 2018-04-08 RX ADMIN — ALPRAZOLAM 1 MG: 0.5 TABLET ORAL at 10:04

## 2018-04-08 RX ADMIN — LEVOTHYROXINE SODIUM 150 MCG: 75 TABLET ORAL at 05:04

## 2018-04-08 RX ADMIN — FAMOTIDINE 20 MG: 20 TABLET, FILM COATED ORAL at 08:04

## 2018-04-08 RX ADMIN — CARVEDILOL 6.25 MG: 12.5 TABLET, FILM COATED ORAL at 09:04

## 2018-04-08 RX ADMIN — QUETIAPINE FUMARATE 800 MG: 200 TABLET, FILM COATED ORAL at 09:04

## 2018-04-08 RX ADMIN — OXYCODONE HYDROCHLORIDE 15 MG: 5 TABLET ORAL at 07:04

## 2018-04-08 RX ADMIN — CARVEDILOL 6.25 MG: 12.5 TABLET, FILM COATED ORAL at 08:04

## 2018-04-08 RX ADMIN — ACETAMINOPHEN 500 MG: 500 TABLET ORAL at 02:04

## 2018-04-09 LAB
ANION GAP SERPL CALC-SCNC: 12 MMOL/L
BASOPHILS # BLD AUTO: 0.02 K/UL
BASOPHILS NFR BLD: 0.3 %
BUN SERPL-MCNC: 13 MG/DL
CALCIUM SERPL-MCNC: 8.7 MG/DL
CHLORIDE SERPL-SCNC: 104 MMOL/L
CO2 SERPL-SCNC: 24 MMOL/L
CREAT SERPL-MCNC: 0.8 MG/DL
DIFFERENTIAL METHOD: ABNORMAL
EOSINOPHIL # BLD AUTO: 0.2 K/UL
EOSINOPHIL NFR BLD: 3.4 %
ERYTHROCYTE [DISTWIDTH] IN BLOOD BY AUTOMATED COUNT: 15.5 %
EST. GFR  (AFRICAN AMERICAN): >60 ML/MIN/1.73 M^2
EST. GFR  (NON AFRICAN AMERICAN): >60 ML/MIN/1.73 M^2
GLUCOSE SERPL-MCNC: 149 MG/DL
HCT VFR BLD AUTO: 30.2 %
HGB BLD-MCNC: 9.2 G/DL
IMM GRANULOCYTES # BLD AUTO: 0.08 K/UL
IMM GRANULOCYTES NFR BLD AUTO: 1.2 %
LYMPHOCYTES # BLD AUTO: 2.6 K/UL
LYMPHOCYTES NFR BLD: 39.6 %
MAGNESIUM SERPL-MCNC: 2 MG/DL
MCH RBC QN AUTO: 27.6 PG
MCHC RBC AUTO-ENTMCNC: 30.5 G/DL
MCV RBC AUTO: 91 FL
MONOCYTES # BLD AUTO: 0.4 K/UL
MONOCYTES NFR BLD: 6.7 %
NEUTROPHILS # BLD AUTO: 3.2 K/UL
NEUTROPHILS NFR BLD: 48.8 %
NRBC BLD-RTO: 0 /100 WBC
PHOSPHATE SERPL-MCNC: 4.8 MG/DL
PLATELET # BLD AUTO: 370 K/UL
PMV BLD AUTO: 9 FL
POCT GLUCOSE: 112 MG/DL (ref 70–110)
POCT GLUCOSE: 131 MG/DL (ref 70–110)
POCT GLUCOSE: 159 MG/DL (ref 70–110)
POCT GLUCOSE: 170 MG/DL (ref 70–110)
POTASSIUM SERPL-SCNC: 4 MMOL/L
RBC # BLD AUTO: 3.33 M/UL
SODIUM SERPL-SCNC: 140 MMOL/L
WBC # BLD AUTO: 6.54 K/UL

## 2018-04-09 PROCEDURE — 25000003 PHARM REV CODE 250: Performed by: INTERNAL MEDICINE

## 2018-04-09 PROCEDURE — 25000003 PHARM REV CODE 250: Performed by: NURSE PRACTITIONER

## 2018-04-09 PROCEDURE — 25000003 PHARM REV CODE 250: Performed by: HOSPITALIST

## 2018-04-09 PROCEDURE — 97110 THERAPEUTIC EXERCISES: CPT

## 2018-04-09 PROCEDURE — 80048 BASIC METABOLIC PNL TOTAL CA: CPT

## 2018-04-09 PROCEDURE — 84100 ASSAY OF PHOSPHORUS: CPT

## 2018-04-09 PROCEDURE — 11000004 HC SNF PRIVATE

## 2018-04-09 PROCEDURE — 36415 COLL VENOUS BLD VENIPUNCTURE: CPT

## 2018-04-09 PROCEDURE — 25000003 PHARM REV CODE 250: Performed by: ORTHOPAEDIC SURGERY

## 2018-04-09 PROCEDURE — 83735 ASSAY OF MAGNESIUM: CPT

## 2018-04-09 PROCEDURE — 97803 MED NUTRITION INDIV SUBSEQ: CPT

## 2018-04-09 PROCEDURE — 85025 COMPLETE CBC W/AUTO DIFF WBC: CPT

## 2018-04-09 PROCEDURE — 97530 THERAPEUTIC ACTIVITIES: CPT

## 2018-04-09 RX ORDER — LIDOCAINE 50 MG/G
1 PATCH TOPICAL
Status: DISCONTINUED | OUTPATIENT
Start: 2018-04-09 | End: 2018-04-16 | Stop reason: HOSPADM

## 2018-04-09 RX ADMIN — OXYCODONE HYDROCHLORIDE 15 MG: 5 TABLET ORAL at 02:04

## 2018-04-09 RX ADMIN — QUETIAPINE FUMARATE 800 MG: 200 TABLET, FILM COATED ORAL at 09:04

## 2018-04-09 RX ADMIN — LIDOCAINE 1 PATCH: 50 PATCH TOPICAL at 04:04

## 2018-04-09 RX ADMIN — GABAPENTIN 600 MG: 300 CAPSULE ORAL at 09:04

## 2018-04-09 RX ADMIN — ACETAMINOPHEN 500 MG: 500 TABLET ORAL at 09:04

## 2018-04-09 RX ADMIN — REGULAR STRENGTH 325 MG: 325 TABLET ORAL at 09:04

## 2018-04-09 RX ADMIN — BUSPIRONE HYDROCHLORIDE 10 MG: 10 TABLET ORAL at 09:04

## 2018-04-09 RX ADMIN — INSULIN ASPART 6 UNITS: 100 INJECTION, SOLUTION INTRAVENOUS; SUBCUTANEOUS at 11:04

## 2018-04-09 RX ADMIN — FAMOTIDINE 20 MG: 20 TABLET, FILM COATED ORAL at 09:04

## 2018-04-09 RX ADMIN — MIRTAZAPINE 45 MG: 15 TABLET, FILM COATED ORAL at 09:04

## 2018-04-09 RX ADMIN — STANDARDIZED SENNA CONCENTRATE AND DOCUSATE SODIUM 2 TABLET: 8.6; 5 TABLET, FILM COATED ORAL at 09:04

## 2018-04-09 RX ADMIN — INSULIN ASPART 6 UNITS: 100 INJECTION, SOLUTION INTRAVENOUS; SUBCUTANEOUS at 06:04

## 2018-04-09 RX ADMIN — INSULIN ASPART 6 UNITS: 100 INJECTION, SOLUTION INTRAVENOUS; SUBCUTANEOUS at 09:04

## 2018-04-09 RX ADMIN — CARVEDILOL 6.25 MG: 12.5 TABLET, FILM COATED ORAL at 09:04

## 2018-04-09 RX ADMIN — RAMELTEON 8 MG: 8 TABLET, FILM COATED ORAL at 09:04

## 2018-04-09 RX ADMIN — OXYCODONE HYDROCHLORIDE 15 MG: 5 TABLET ORAL at 05:04

## 2018-04-09 RX ADMIN — TRAZODONE HYDROCHLORIDE 50 MG: 50 TABLET ORAL at 09:04

## 2018-04-09 RX ADMIN — TIZANIDINE 12 MG: 4 TABLET ORAL at 09:04

## 2018-04-09 RX ADMIN — FLUTICASONE PROPIONATE 100 MCG: 50 SPRAY, METERED NASAL at 09:04

## 2018-04-09 RX ADMIN — ACETAMINOPHEN 500 MG: 500 TABLET ORAL at 04:04

## 2018-04-09 RX ADMIN — OXYCODONE HYDROCHLORIDE 20 MG: 5 TABLET ORAL at 04:04

## 2018-04-09 RX ADMIN — ALPRAZOLAM 1 MG: 0.5 TABLET ORAL at 09:04

## 2018-04-09 RX ADMIN — VALSARTAN 80 MG: 80 TABLET ORAL at 09:04

## 2018-04-09 RX ADMIN — OXYCODONE HYDROCHLORIDE 15 MG: 5 TABLET ORAL at 12:04

## 2018-04-09 RX ADMIN — PROMETHAZINE HYDROCHLORIDE 25 MG: 12.5 TABLET ORAL at 09:04

## 2018-04-09 RX ADMIN — GABAPENTIN 600 MG: 300 CAPSULE ORAL at 04:04

## 2018-04-09 RX ADMIN — LEVOTHYROXINE SODIUM 150 MCG: 75 TABLET ORAL at 05:04

## 2018-04-09 RX ADMIN — OXYCODONE HYDROCHLORIDE 15 MG: 5 TABLET ORAL at 09:04

## 2018-04-09 RX ADMIN — OXYCODONE HYDROCHLORIDE 20 MG: 5 TABLET ORAL at 09:04

## 2018-04-09 NOTE — PLAN OF CARE
Problem: Physical Therapy Goal  Goal: Physical Therapy Goal  Goals to be met by: 14 days     Patient will increase functional independence with mobility by performin. Supine to sit with Modified [place]= met 4/3/2018  2. Sit to supine with Modified [place]= met 4/3/2018  3. Sit to stand transfer with Modified [place] with Rolling walker  4. Bed to chair transfer with Supervision using Rolling Walker  5. Gait  x 150 feet with Supervision using Rolling Walker.   6. Wheelchair propulsion x100 feet with Modified [place] using bilateral upper extremities Met 18  7. Ascend/Descend 4 inch curb step with Stand-by Assistance using Rolling Walker.  8. Lower extremity exercise program x20 reps per handout, with assistance as needed and gym therex, per TKA protocol      Outcome: Ongoing (interventions implemented as appropriate)  1 goal met today

## 2018-04-09 NOTE — PLAN OF CARE
Problem: Occupational Therapy Goal  Goal: Occupational Therapy Goal  Goals to be met by: 7 days     Patient will increase functional independence with ADLs by performing:    UE Dressing with Modified [place].  LE Dressing with Modified [place].  Grooming while seated with Modified [place].  Toileting from bedside commode with Modified [place] for hygiene and clothing management.   Bathing from  edge of bed with Set-up Assistance.  Supine to sit with Modified [place].  Toilet transfer to bedside commode with Modified [place].  Upper extremity exercise program x10  reps per set, with supervision.  Pt will tolerate up to 10 minutes of functional standing activity with (S) and RW   in prep for ADLs in standing.     Outcome: Ongoing (interventions implemented as appropriate)  .

## 2018-04-09 NOTE — PT/OT/SLP PROGRESS
Occupational Therapy  Treatment    Homar Jerry   MRN: [id number]   Admitting Diagnosis: Primary osteoarthritis of left knee    OT Date of Treatment: 04/09/18  Total Time (min): 45 min    Billable Minutes:  Therapeutic Activity 35 and Therapeutic Exercise 10    General Precautions: Standard, fall  Orthopedic Precautions: LLE weight bearing as tolerated  Braces: N/A    Do you have any cultural, spiritual, Lutheran conflicts, given your current situation?: none stated    Subjective:  Communicated with nsg prior to session.  My leg feels lie it burning and My pressure has been all over the place    Pain/Comfort  Pain Rating 1: 10/10  Location - Side 1: Left  Location 1: knee  Pain Addressed 1: Pre-medicate for activity, Reposition  Pain Rating Post-Intervention 1: 10/10    Objective:   Pt. Supine on arrival    Functional Status:  MDS G  Bed Mobility Functional Status: mod(I) - (I) Supine to sit  Transfer Functional Status: S-SBA from EOB 3n1 and then to w/c  Dressing Functional Status: 2:CGA-Min (A) for LE dressing and (S) for UE dressing  Toilet Use Functional Status: S-SBA from 3n1 level  Personal Hygiene Functional Status: S-SBA  Moving from seated to standing position: Not steady, but able to stabilize without staff assistance  Walking (with assistive device if used): Not steady, but able to stabilize without staff assistance  Turning around and facing the opposite direction while walking: Not steady, but able to stabilize without staff assistance  Moving on and off the toilet: Not steady, but able to stabilize without staff assistance  Surface-to-surface transfer (transfer between bed and chair or wheelchair): Not steady, but able to stabilize without staff assistance           AMPA 6 Click:  AMPAC Total Score: 20    OT Exercises: UE Ergometer 10 min    Additional Treatment:  Pt. With  Craft activity on this day with standing initially with close SBA. Pt. Began complaining of LLE burning sensation up on  standing and slight sway. Pt. Was asked to sit to or take a break then complete activity. Pt. Then sat to complete and Np to check in on Pt. During session. Pt. With standing and therex performed to increase ROM, endurance selfcare task and fxl mobility for independence     Patient left up in chair with PT present    ASSESSMENT:  Homar Jerry is a 42 y.o. female with a medical diagnosis of Primary osteoarthritis of left knee .Pt. participated well with session on this day.Pt demos physical deficits with balance  functional mobility, UB strength, endurance  level of functional indep with daily tasks and activities and selfcare skills .Pt. Will continue to benefit from continued OT to progress towards goals      Rehab identified problem list/impairments: weakness, impaired endurance, impaired self care skills, impaired functional mobilty, gait instability    Rehab potential is fair    Activity tolerance: Fair    Discharge recommendations: home with home health, home health OT     Barriers to discharge: None     Equipment recommendations: none     GOALS:    Occupational Therapy Goals        Problem: Occupational Therapy Goal    Goal Priority Disciplines Outcome Interventions   Occupational Therapy Goal     OT, PT/OT Ongoing (interventions implemented as appropriate)    Description:  Goals to be met by: 7 days     Patient will increase functional independence with ADLs by performing:    UE Dressing with Modified [place].  LE Dressing with Modified [place].  Grooming while seated with Modified [place].  Toileting from bedside commode with Modified [place] for hygiene and clothing management.   Bathing from  edge of bed with Set-up Assistance.  Supine to sit with Modified [place].  Toilet transfer to bedside commode with Modified [place].  Upper extremity exercise program x10  reps per set, with supervision.  Pt will tolerate up to 10 minutes of functional standing activity with (S)  and RW   in prep for ADLs in standing.                    Plan:  Patient to be seen 5 x/week to address the above listed problems via self-care/home management, therapeutic activities, therapeutic exercises  Plan of Care expires: 05/02/18  Plan of Care reviewed with: patient    BERTRAM Arreola  04/09/2018

## 2018-04-09 NOTE — PROGRESS NOTES
" [facility]  Adult Nutrition  Progress Note    SUMMARY       Recommendations    Recommendation/Intervention: Continue diabetic 2000, cardiac diet, continue boost glucose bid, RD to follow  Goals: promote weight loss after surgical recovery, low sugar ONS bid, RD to follow   Nutrition Goal Status: progressing towards goal  Communication of RD Recs: other (comment) (care plan/notes, sign and hold order)    Reason for Assessment    Reason for Assessment: RD follow-up  Diagnosis: other (see comments) (primary osteoarthritis of left knee)  Relevant Medical History: DM, HTN, morbid obesity  Interdisciplinary Rounds: attended  General Information Comments: pt not getting what she orders feels she may not be getting enough protein as we planned  Nutrition Discharge Planning: Patient to discharge on a Cardiac, Diabetic diet.     Nutrition Risk Screen    Nutrition Risk Screen: no indicators present    Nutrition/Diet History    Food Preferences: aden  Do you have any cultural, spiritual, Christian conflicts, given your current situation?: none stated    Anthropometrics    Temp: 98.1 °F (36.7 °C)  Height Method: Stated  Height: 5' 5" (165.1 cm)  Height (inches): 65 in  Weight Method: Standard Scale  Weight: 124.3 kg (274 lb 0.5 oz)  Weight (lb): 274.03 lb  Ideal Body Weight (IBW), Female: 125 lb  % Ideal Body Weight, Female (lb): 218.34 lb  BMI (Calculated): 45.5  BMI Grade: greater than 40 - morbid obesity       Lab/Procedures/Meds    Pertinent Labs Reviewed: reviewed  Pertinent Labs Comments: PO4 4.8  Pertinent Medications Reviewed: reviewed  Pertinent Medications Comments: insulin    Estimated/Assessed Needs    Weight Used For Calorie Calculations: 123.8 kg (272 lb 14.9 oz)     Energy Need Method: Kcal/kg (1733 - 1980)  Protein Requirements: 85 - 90 g  Weight Used For Protein Calculations: 56.8 kg (125 lb 3.5 oz) (Ideal Body Weight)     Fluid Need Method: RDA Method, other (see comments) (or per MD)   "   CHO Requirement: 180 g      Nutrition Prescription Ordered    Current Diet Order: 2000 diabetic   Nutrition Order Comments: preferences noted  Oral Nutrition Supplement: pt wants change from boost plus to boost glucose to reduce sugar    Evaluation of Received Nutrient/Fluid Intake    I/O: not recorded  Energy Calories Required: meeting needs  Protein Required: not meeting needs  Fluid Required: meeting needs  Comments: PO 75%    % Intake of Estimated Energy Needs: 75 - 100 %  % Meal Intake: 50 - 75 %    Nutrition Risk    Level of Risk/Frequency of Follow-up: low     Assessment and Plan    Morbid obesity with BMI of 45.0-49.9, adult      Related to (etiology):   Excessive caloric intake    Signs and Symptoms (as evidenced by):   BMI = 45.5 kg/m2     Interventions/Recommendations (treatment strategy):  1) 2000 calorie Diabetic, Cardiac diet 2) Provide diet education to promote weight loss 3) Monitor oral intake 4) Monitor weight, BMI    Nutrition Diagnosis Status:   Progressing towards goal               Monitor and Evaluation    Food and Nutrient Intake: energy intake, food and beverage intake  Food and Nutrient Adminstration: diet order  Knowledge/Beliefs/Attitudes: food and nutrition knowledge/skill, beliefs and attitudes  Physical Activity and Function: nutrition-related ADLs and IADLs  Anthropometric Measurements: weight, weight change, body mass index  Biochemical Data, Medical Tests and Procedures: electrolyte and renal panel, gastrointestinal profile, glucose/endocrine profile  Nutrition-Focused Physical Findings: overall appearance, extremities, muscles and bones, skin     Nutrition Follow-Up    RD Follow-up?: Yes

## 2018-04-09 NOTE — PT/OT/SLP PROGRESS
Physical Therapy  Treatment    Homar Jerry   MRN: [id number]   Admitting Diagnosis: Primary osteoarthritis of left knee    PT Received On: 04/09/18  Total Time (min): 45       Billable Minutes:  Gait Training 15, Therapeutic Activity 15 and Therapeutic Exercise 15    Treatment Type: Treatment  PT/PTA: PT     PTA Visit Number: 0       General Precautions: Standard, fall  Orthopedic Precautions: LLE weight bearing as tolerated   Braces: N/A         Subjective:  Communicated with pt prior to session.  Pt agreeable to PT but c/o burning in L knee    Pain/Comfort  Pain Rating 1: 10/10  Location - Side 1: Left  Location - Orientation 1: generalized  Location 1: knee  Pain Addressed 1: Pre-medicate for activity, Reposition, Distraction, Cessation of Activity  Pain Rating Post-Intervention 1: 10/10    Objective:  Patient found sitting in wheelchair in gym with  OT    AAROM: L knee extension 0 deg and flexion 80 deg       Functional Status:  MDS G  Bed Mobility Functional Status: mod(I) - (I)  Transfer Functional Status: S-SBA  Walk in Room Functional Status: S-SBA  Walk in Corridor Functional Status: S-SBA  Locomotion on Unit Functional Status: S-SBA  Locomotion Off Unit Functional Status: CGA-Min (A)  Moving from seated to standing position: Not steady, but able to stabilize without staff assistance  Walking (with assistive device if used): Not steady, but able to stabilize without staff assistance  Turning around and facing the opposite direction while walking: Not steady, but able to stabilize without staff assistance  Surface-to-surface transfer (transfer between bed and chair or wheelchair): Not steady, but able to stabilize without staff assistance          AM-PAC 6 CLICK MOBILITY  Total Score:19    Bed Mobility:  Sit>Supine:mod I on mat and bed  Supine>Sit: mod I on mat    Transfers:  Sit<>Stand: SBA from wheelchair and mat using RW  Stand Pivot Transfer: SBA wheelchair <> mat using RW and wheelchair to bed  without AD    Gait:  Amb 169 feet and 92 feet using RW with SBA and occasional CGA due to slight increase in sway from pain in L knee. Pt amb with slow antalgic gait pattern     Wheelchair Mobility:  Patient propels w/c 150 feet using BUE with mod I     Therex:  Supine therex including: self directed using handout for LLE only  Ankle pumps 20  Heel slides 20 with assistance  Quad sets 20  Glute sets 20  SLR 2x10 with assistance  Hip abduction and adduction 20  SAQ 20      Balance:  Pt is able to stand to adjust clothing without UE support with weight shift to the R to decrease weight bearing on LLE  Pt continues to require use of RW for transfers and ambulation due to pain and instability      Patient left upright sitting in bed with call button in reach and polar ice applied.    Assessment:  Homar Jerry is a 42 y.o. female with a medical diagnosis of Primary osteoarthritis of left knee.  Pt demonstrates improving activity tolerance and stability with improving safety awareness. Pt will benefit from continued PT treatment to address remaining impairments and improve functional mobility and independence.       Rehab identified problem list/impairments: weakness, impaired endurance, impaired functional mobilty, gait instability, impaired balance, decreased lower extremity function, pain    Rehab potential is good.    Activity tolerance: Good    Discharge recommendations: home with home health     Barriers to discharge: Decreased caregiver support    Equipment recommendations: none     GOALS:    Physical Therapy Goals        Problem: Physical Therapy Goal    Goal Priority Disciplines Outcome Goal Variances Interventions   Physical Therapy Goal     PT/OT, PT Ongoing (interventions implemented as appropriate)     Description:  Goals to be met by: 14 days     Patient will increase functional independence with mobility by performin. Supine to sit with Modified [place]= met 4/3/2018  2. Sit to supine  with Modified [place]= met 4/3/2018  3. Sit to stand transfer with Modified [place] with Rolling walker  4. Bed to chair transfer with Supervision using Rolling Walker  5. Gait  x 150 feet with Supervision using Rolling Walker.   6. Wheelchair propulsion x100 feet with Modified [place] using bilateral upper extremities Met 4/9/18  7. Ascend/Descend 4 inch curb step with Stand-by Assistance using Rolling Walker.  8. Lower extremity exercise program x20 reps per handout, with assistance as needed and gym therex, per TKA protocol                        PLAN:    Patient to be seen 5 x/week  to address the above listed problems via gait training, therapeutic activities, therapeutic exercises, wheelchair management/training  Plan of Care expires: 05/02/18  Plan of Care reviewed with: patient    Haylee GINA Estevez, PT  04/09/2018

## 2018-04-10 ENCOUNTER — OFFICE VISIT (OUTPATIENT)
Dept: ORTHOPEDICS | Facility: CLINIC | Age: 43
DRG: 560 | End: 2018-04-10
Attending: INTERNAL MEDICINE
Payer: COMMERCIAL

## 2018-04-10 VITALS — BODY MASS INDEX: 45.66 KG/M2 | WEIGHT: 274.06 LBS | HEIGHT: 65 IN

## 2018-04-10 DIAGNOSIS — Z96.652 STATUS POST TOTAL LEFT KNEE REPLACEMENT: Primary | ICD-10-CM

## 2018-04-10 LAB
POCT GLUCOSE: 110 MG/DL (ref 70–110)
POCT GLUCOSE: 118 MG/DL (ref 70–110)
POCT GLUCOSE: 128 MG/DL (ref 70–110)
POCT GLUCOSE: 180 MG/DL (ref 70–110)

## 2018-04-10 PROCEDURE — 25000003 PHARM REV CODE 250: Performed by: HOSPITALIST

## 2018-04-10 PROCEDURE — 25000003 PHARM REV CODE 250: Performed by: NURSE PRACTITIONER

## 2018-04-10 PROCEDURE — 97535 SELF CARE MNGMENT TRAINING: CPT

## 2018-04-10 PROCEDURE — 97110 THERAPEUTIC EXERCISES: CPT

## 2018-04-10 PROCEDURE — 25000003 PHARM REV CODE 250: Performed by: ORTHOPAEDIC SURGERY

## 2018-04-10 PROCEDURE — 25000003 PHARM REV CODE 250: Performed by: INTERNAL MEDICINE

## 2018-04-10 PROCEDURE — 99308 SBSQ NF CARE LOW MDM 20: CPT | Mod: SA,,, | Performed by: NURSE PRACTITIONER

## 2018-04-10 PROCEDURE — 11000004 HC SNF PRIVATE

## 2018-04-10 PROCEDURE — 97116 GAIT TRAINING THERAPY: CPT

## 2018-04-10 PROCEDURE — 99999 PR PBB SHADOW E&M-EST. PATIENT-LVL IV: CPT | Mod: PBBFAC,,, | Performed by: PHYSICIAN ASSISTANT

## 2018-04-10 PROCEDURE — 99024 POSTOP FOLLOW-UP VISIT: CPT | Mod: S$GLB,,, | Performed by: PHYSICIAN ASSISTANT

## 2018-04-10 RX ORDER — BUSPIRONE HYDROCHLORIDE 15 MG/1
TABLET ORAL
Status: ON HOLD | COMMUNITY
Start: 2018-03-26 | End: 2018-04-16 | Stop reason: HOSPADM

## 2018-04-10 RX ORDER — MIRTAZAPINE 15 MG/1
TABLET, FILM COATED ORAL
Status: ON HOLD | COMMUNITY
Start: 2018-01-11 | End: 2018-04-16 | Stop reason: HOSPADM

## 2018-04-10 RX ORDER — TRAZODONE HYDROCHLORIDE 100 MG/1
TABLET ORAL
Status: ON HOLD | COMMUNITY
Start: 2018-03-26 | End: 2018-04-16 | Stop reason: HOSPADM

## 2018-04-10 RX ADMIN — QUETIAPINE FUMARATE 800 MG: 200 TABLET, FILM COATED ORAL at 09:04

## 2018-04-10 RX ADMIN — FLUTICASONE PROPIONATE 100 MCG: 50 SPRAY, METERED NASAL at 09:04

## 2018-04-10 RX ADMIN — REGULAR STRENGTH 325 MG: 325 TABLET ORAL at 09:04

## 2018-04-10 RX ADMIN — POLYETHYLENE GLYCOL 3350 17 G: 17 POWDER, FOR SOLUTION ORAL at 09:04

## 2018-04-10 RX ADMIN — ALPRAZOLAM 1 MG: 0.5 TABLET ORAL at 09:04

## 2018-04-10 RX ADMIN — LEVOTHYROXINE SODIUM 150 MCG: 75 TABLET ORAL at 05:04

## 2018-04-10 RX ADMIN — TRAZODONE HYDROCHLORIDE 50 MG: 50 TABLET ORAL at 09:04

## 2018-04-10 RX ADMIN — GABAPENTIN 600 MG: 300 CAPSULE ORAL at 02:04

## 2018-04-10 RX ADMIN — RAMELTEON 8 MG: 8 TABLET, FILM COATED ORAL at 09:04

## 2018-04-10 RX ADMIN — FAMOTIDINE 20 MG: 20 TABLET, FILM COATED ORAL at 10:04

## 2018-04-10 RX ADMIN — OXYCODONE HYDROCHLORIDE 15 MG: 5 TABLET ORAL at 05:04

## 2018-04-10 RX ADMIN — MIRTAZAPINE 45 MG: 15 TABLET, FILM COATED ORAL at 09:04

## 2018-04-10 RX ADMIN — TIZANIDINE 12 MG: 4 TABLET ORAL at 09:04

## 2018-04-10 RX ADMIN — VALSARTAN 80 MG: 80 TABLET ORAL at 10:04

## 2018-04-10 RX ADMIN — REGULAR STRENGTH 325 MG: 325 TABLET ORAL at 10:04

## 2018-04-10 RX ADMIN — CARVEDILOL 6.25 MG: 12.5 TABLET, FILM COATED ORAL at 09:04

## 2018-04-10 RX ADMIN — GABAPENTIN 600 MG: 300 CAPSULE ORAL at 09:04

## 2018-04-10 RX ADMIN — BUSPIRONE HYDROCHLORIDE 10 MG: 10 TABLET ORAL at 09:04

## 2018-04-10 RX ADMIN — ACETAMINOPHEN 500 MG: 500 TABLET ORAL at 09:04

## 2018-04-10 RX ADMIN — OXYCODONE HYDROCHLORIDE 20 MG: 5 TABLET ORAL at 10:04

## 2018-04-10 RX ADMIN — STANDARDIZED SENNA CONCENTRATE AND DOCUSATE SODIUM 2 TABLET: 8.6; 5 TABLET, FILM COATED ORAL at 10:04

## 2018-04-10 RX ADMIN — ACETAMINOPHEN 500 MG: 500 TABLET ORAL at 02:04

## 2018-04-10 RX ADMIN — STANDARDIZED SENNA CONCENTRATE AND DOCUSATE SODIUM 2 TABLET: 8.6; 5 TABLET, FILM COATED ORAL at 09:04

## 2018-04-10 RX ADMIN — OXYCODONE HYDROCHLORIDE 20 MG: 5 TABLET ORAL at 11:04

## 2018-04-10 RX ADMIN — HYDROCHLOROTHIAZIDE 12.5 MG: 12.5 TABLET ORAL at 10:04

## 2018-04-10 RX ADMIN — LIDOCAINE 1 PATCH: 50 PATCH TOPICAL at 02:04

## 2018-04-10 RX ADMIN — TIZANIDINE 12 MG: 4 TABLET ORAL at 10:04

## 2018-04-10 RX ADMIN — FAMOTIDINE 20 MG: 20 TABLET, FILM COATED ORAL at 09:04

## 2018-04-10 RX ADMIN — ACETAMINOPHEN 500 MG: 500 TABLET ORAL at 10:04

## 2018-04-10 RX ADMIN — CARVEDILOL 6.25 MG: 12.5 TABLET, FILM COATED ORAL at 10:04

## 2018-04-10 RX ADMIN — OXYCODONE HYDROCHLORIDE 20 MG: 5 TABLET ORAL at 04:04

## 2018-04-10 RX ADMIN — INSULIN ASPART 6 UNITS: 100 INJECTION, SOLUTION INTRAVENOUS; SUBCUTANEOUS at 10:04

## 2018-04-10 NOTE — ASSESSMENT & PLAN NOTE
-continue PT/OT to increase ambulation, ADL performance and endurance  -WBAT  -continue oxycodone for pain control prn  -keep polar ice in place when not in therapy  -continue ASA for DVT prophylaxis; continue famotidine while taking high dose ASA  -Ortho NP to assess surgical wound and remove dressing as indicated at next appt; will notify Ortho team for any leakage or excessive drainage evident on Aquacel dressing  -continue miralax and senokot-s to treat/prevent constipation; Mag citrate prn if scheduled regimen ineffective    4/2/18  Treatment plan as above.  Continue therapy to build functional strength  Lives alone, no family support    4/5/18  Treatment plan as above.  Continue therapy to build functional strength  Lives alone, no family support    4/10/18  Participating and progressing with therapy.  Set to follow up with Ortho this afternoon.  Ultrasound ordered on Friday, no evidence for DVT.

## 2018-04-10 NOTE — ASSESSMENT & PLAN NOTE
S/p Left knee replacement  See plan below.     4/2/18  Pain minimally controlled.    Continue OxyIR 10-20 mg PRN as ordered.  Tizanidine 12 mg bid for muscle spasms.  Will add scheduled tylenol to assist with pain control.  Narcan PRN for over sedation.  Follow up with ortho.  Will do x-ray of knee for complain of pop followed by pain while working in therapy.  ASA for DVT prevention.  Pepcid for gastric protection while on high dose ASA.    4/5/18  Pain minimally controlled.    Continue OxyIR 10-20 mg PRN as ordered.  Tizanidine 12 mg bid for muscle spasms.  Will add scheduled tylenol to assist with pain control.  Narcan PRN for over sedation.  Follow up with ortho.  ASA for DVT prevention.  Pepcid for gastric protection while on high dose ASA.    4/10/18  Patient reported left hip pain yesterday secondary to how her body was positioned in the bed.  Lidoderm patch was placed and gave her relief of her pain.  Her left knee pain is controlled, continue OxyIR 10-20 mg PRN as ordered, Tizanidine 12 mg bid and Tylenol tid as ordered.  Continue ASA for DVT prevention and Pepcid for gastric protection.

## 2018-04-10 NOTE — PLAN OF CARE
Problem: Occupational Therapy Goal  Goal: Occupational Therapy Goal  Goals to be met by: 7 days     Patient will increase functional independence with ADLs by performing:    UE Dressing with Modified [place].  LE Dressing with Modified [place].  Grooming while seated with Modified [place].  Toileting from bedside commode with Modified [place] for hygiene and clothing management.   Bathing from  edge of bed with Set-up Assistance.  Supine to sit with Modified [place].  Toilet transfer to bedside commode with Modified [place].  Upper extremity exercise program x10  reps per set, with supervision.  Pt will tolerate up to 10 minutes of functional standing activity with (S) and RW   in prep for ADLs in standing.     Outcome: Ongoing (interventions implemented as appropriate)  Brodie Mock, PHILIP/LULI      4/10/2018

## 2018-04-10 NOTE — PROGRESS NOTES
"[facility] Department of Hospital Medicine  Progress Note    Patient Name: Homar Jerry  MRN: [id number]  Code Status: Full Code  Admission Date: 3/31/2018  Length of Stay: 10 days  Attending Physician: Brielle Degroot MD  Primary Care Provider: Mary Cabrera MD    Subjective:     Principal Problem:Primary osteoarthritis of left knee    HPI:  Chief Complaint/Reason for Admission: Primary osteoarthritis of left knee    History of Present Illness:  Patient is a 42 y.o. female DM2, RA, bipolar disorder who presents to SNF after left TKR on 3/28/2018 by Dr. [facility] to treat osteoarthritis.  She currently has 9/10 pain and states that at its best it will be 8/10. She acknowledges a high tolerance to pain meds and takes oxycodone/APAP 10 mg for left knee and back pain chronically.   Polar ice and oxycodone have been best at relieving but ambulation worsens.  She has a chronic poor appetite and gastroparesis with flares every 6-9 months since having gastric pacer and gastric sleeve surgery.  She has additional stressors of her only son's death in the recent past.     The patient has been admitted to SNF for ongoing PT/OT due to insufficient progress to go home safely from the hospital.    Records from last hospital stay reviewed and summarized above.     Interval History:   The patient was admitted at [facility] from 3/28 to 3/31/2018.    4/2/18  Patient seen for the first time by me, she reports she has pain to her left knee.  States her pain medication gives her relief for about 1 hour after taking.  She reports she suffers from chronic pain and is treated by Dr. Villa.  She reports she is eating, drinking, voiding and having BM without issues.  She currently lives alone and plans to return to [facility] upon discharge.  She reports she has no family assistance.      At 1150 notified by nursing that patient stated she had heard a "pop" while working with therapy and then notice blood on her " "dressing.  She is c/o pain to the left knee.  Will send for x-ray and notify Ortho.  There is scant blood underneath dressing no bigger than a pencil eraser, site outlined.      4/5/18  Patient seen resting in bed, has complaints of ongoing intermittent pain and her blood sugar is "getting low". Xray from 4/2 reviewed without acute findings. She is participating in therapies. Blood pressure noted to be elevated, home diovan resumed.     4/10/18  Patient seen at bedside, she reports having pain to her surgical knee but feels pain medication helps control.  In addition, she states her left hip feels much better since getting the lidoderm patch.  She has no additional complaints at this time.        Review of Systems   Constitutional: Negative for fever.   Respiratory: Negative for cough and shortness of breath.    Cardiovascular: Negative for chest pain, palpitations and leg swelling.   Gastrointestinal: Negative for abdominal pain, constipation, diarrhea, nausea and vomiting.   Genitourinary: Negative for dysuria.   Musculoskeletal: Positive for arthralgias.     Objective:     Vital Signs (Most Recent):  Temp: 98.3 °F (36.8 °C) (04/10/18 0955)  Pulse: 108 (04/10/18 0955)  Resp: 18 (04/10/18 0955)  BP: (!) 141/89 (04/10/18 0955)  SpO2: 98 % (04/10/18 0955) Vital Signs (24h Range):  Temp:  [98.2 °F (36.8 °C)-98.3 °F (36.8 °C)] 98.3 °F (36.8 °C)  Pulse:  [] 108  Resp:  [18-19] 18  SpO2:  [97 %-98 %] 98 %  BP: (114-157)/(81-98) 141/89     Weight: 124.3 kg (274 lb 0.5 oz)  Body mass index is 45.6 kg/m².  No intake or output data in the 24 hours ending 04/10/18 1051   Physical Exam   Constitutional: She is oriented to person, place, and time. She appears well-developed and well-nourished.   Cardiovascular: Normal rate, regular rhythm, normal heart sounds and intact distal pulses.    No murmur heard.  Pulmonary/Chest: Effort normal and breath sounds normal. No respiratory distress.   Abdominal: Soft. Normal appearance " and bowel sounds are normal. She exhibits no distension. There is no tenderness.   Musculoskeletal: Normal range of motion. She exhibits edema and tenderness. She exhibits no deformity.   Neurological: She is alert and oriented to person, place, and time. She has normal strength.   Skin: Skin is warm, dry and intact. Capillary refill takes less than 2 seconds. No erythema.   Surgical aquacel dressing in place.  There is no redness, scant drainage to dressing has not extended beyond the outline boarder that was placed on my last visit.       Significant Labs:   BMP:   Recent Labs  Lab 04/09/18  0506   *      K 4.0      CO2 24   BUN 13   CREATININE 0.8   CALCIUM 8.7   MG 2.0     CBC:   Recent Labs  Lab 04/09/18  0506   WBC 6.54   HGB 9.2*   HCT 30.2*   *     POCT Glucose:   Recent Labs  Lab 04/09/18  1636 04/09/18  2138 04/10/18  0744   POCTGLUCOSE 170* 112* 128*       Significant Imaging: n/a    Assessment/Plan:      * Primary osteoarthritis of left knee    S/p Left knee replacement  See plan below.     4/2/18  Pain minimally controlled.    Continue OxyIR 10-20 mg PRN as ordered.  Tizanidine 12 mg bid for muscle spasms.  Will add scheduled tylenol to assist with pain control.  Narcan PRN for over sedation.  Follow up with ortho.  Will do x-ray of knee for complain of pop followed by pain while working in therapy.  ASA for DVT prevention.  Pepcid for gastric protection while on high dose ASA.    4/5/18  Pain minimally controlled.    Continue OxyIR 10-20 mg PRN as ordered.  Tizanidine 12 mg bid for muscle spasms.  Will add scheduled tylenol to assist with pain control.  Narcan PRN for over sedation.  Follow up with ortho.  ASA for DVT prevention.  Pepcid for gastric protection while on high dose ASA.    4/10/18  Patient reported left hip pain yesterday secondary to how her body was positioned in the bed.  Lidoderm patch was placed and gave her relief of her pain.  Her left knee pain is  controlled, continue OxyIR 10-20 mg PRN as ordered, Tizanidine 12 mg bid and Tylenol tid as ordered.  Continue ASA for DVT prevention and Pepcid for gastric protection.          Status post total left knee replacement    -continue PT/OT to increase ambulation, ADL performance and endurance  -WBAT  -continue oxycodone for pain control prn  -keep polar ice in place when not in therapy  -continue ASA for DVT prophylaxis; continue famotidine while taking high dose ASA  -Ortho NP to assess surgical wound and remove dressing as indicated at next appt; will notify Ortho team for any leakage or excessive drainage evident on Aquacel dressing  -continue miralax and senokot-s to treat/prevent constipation; Mag citrate prn if scheduled regimen ineffective    4/2/18  Treatment plan as above.  Continue therapy to build functional strength  Lives alone, no family support    4/5/18  Treatment plan as above.  Continue therapy to build functional strength  Lives alone, no family support    4/10/18  Participating and progressing with therapy.  Set to follow up with Ortho this afternoon.  Ultrasound ordered on Friday, no evidence for DVT.        Type 2 diabetes mellitus with hyperglycemia, with long-term current use of insulin    Hemoglobin A1C   Date Value Ref Range Status   03/15/2018 7.6 (H) 4.0 - 5.6 % Final     Comment:   09/26/2017 7.5 (H) 4.0 - 5.6 % Final     Comment:   03/03/2017 10.3 (H) 4.5 - 6.2 % Final     Comment:     Patient does not take stated dose of insulin on MAR as she says she misses several meals; usually Levemir 16 units BID and novology 4 units before meals  Continue current levemir and novolog doses; adequate control currently  Continue diabetic diet therapy with glucose monitoring AC and HS.    Hyperglycemia to be treated with SSNI prn.  Glucose goal is < 180mg/dl and avoidance of hypoglycemia.  Will continue to monitor and adjust regimen as necessary to achieve goals.    4/2/18  POCT Glucose   Date Value Ref  Range Status   04/10/2018 128 (H) 70 - 110 mg/dL Final   04/09/2018 112 (H) 70 - 110 mg/dL Final   04/09/2018 170 (H) 70 - 110 mg/dL Final   04/09/2018 131 (H) 70 - 110 mg/dL Final   04/09/2018 159 (H) 70 - 110 mg/dL Final   04/08/2018 126 (H) 70 - 110 mg/dL Final   04/08/2018 106 70 - 110 mg/dL Final   04/08/2018 178 (H) 70 - 110 mg/dL Final   04/08/2018 147 (H) 70 - 110 mg/dL Final   04/07/2018 107 70 - 110 mg/dL Final   04/07/2018 133 (H) 70 - 110 mg/dL Final   04/07/2018 181 (H) 70 - 110 mg/dL Final     CBG is stable,   Continue Levemir 20 units qhs and Aspart 8 units with meals and per SSI.  Continue diabetic diet as ordered and monitor.    4/5/18  Blood sugar beginning to decrease below 100, patient wishes for mealtime insulin to be decreased  Mealtime insulin decreased from 8 units to 6 units, will monitor for toleration  Continue levemir 20 units qHS and SSI    4/10/18  CBG is stable.    Continue Novolog 6 units with meals and 20 units of levemir qhs.  Novolog per SSI PRN.  Maintain current diet and continue to monitor.        Rheumatoid arthritis    No chronic meds currently  No joint pain other than the surgical site    4/2/18  Chronic, no complaints.  Per MD on no chronic meds.    4/5/18  Chronic, no complaints.  Per MD on no chronic meds.    4/10/18  Chronic no new complaints.        Essential hypertension    Normotensive currently  BP meds (coreg and diovan HCT) held due to hypotension in hospital  Will continue to monitor and resume meds gradually for SBP > 140    4/2/18  BP is 136/98 and HR is 97  Takes coreg 12.5 mg bid at home but currently not ordered.  Per MD note, coreg and diovan HCT held due to hypotension.  Will restart Coreg at lower dose and monitor.    4/5/18  BP remains elevated  Continue lower coreg dose, up titrate if indicated later in course  Resumed home diovan, will monitor     4/10/18  BP is 141/89 and .  Continue Coreg and Diovan-HCT as ordered            Francisco K Melecio,  NP  Department of Hospital Medicine  [facility]

## 2018-04-10 NOTE — LETTER
April 10, 2018      [facility]  [address]  [facility] [address]           [facility]  [address]  [facility] [address]  Phone: [phone]          Patient: Homar Jerry   MR Number: [id number]   YOB: 1975   Date of Visit: 4/10/2018       Dear Dr. [facility]    Thank you for referring Homar Jerry to me for evaluation. Attached you will find relevant portions of my assessment and plan of care.    If you have questions, please do not hesitate to call me. I look forward to following Homar Jerry along with you.    Sincerely,    Yadira Quinones PA-C    Enclosure  CC:  No Recipients    If you would like to receive this communication electronically, please contact [email or web address] or [phone] to request more information on [facility] [employer] access.    For providers and/or their staff who would like to refer a patient to [facility], please contact us through our one-stop-shop provider referral line, [facility], at [phone].    If you feel you have received this communication in error or would no longer like to receive these types of communications, please e-mail [email or web address]

## 2018-04-10 NOTE — ASSESSMENT & PLAN NOTE
Normotensive currently  BP meds (coreg and diovan HCT) held due to hypotension in hospital  Will continue to monitor and resume meds gradually for SBP > 140    4/2/18  BP is 136/98 and HR is 97  Takes coreg 12.5 mg bid at home but currently not ordered.  Per MD note, coreg and diovan HCT held due to hypotension.  Will restart Coreg at lower dose and monitor.    4/5/18  BP remains elevated  Continue lower coreg dose, up titrate if indicated later in course  Resumed home diovan, will monitor     4/10/18  BP is 141/89 and .  Continue Coreg and Diovan-HCT as ordered

## 2018-04-10 NOTE — ASSESSMENT & PLAN NOTE
No chronic meds currently  No joint pain other than the surgical site    4/2/18  Chronic, no complaints.  Per MD on no chronic meds.    4/5/18  Chronic, no complaints.  Per MD on no chronic meds.    4/10/18  Chronic no new complaints.

## 2018-04-10 NOTE — ASSESSMENT & PLAN NOTE
Hemoglobin A1C   Date Value Ref Range Status   03/15/2018 7.6 (H) 4.0 - 5.6 % Final     Comment:   09/26/2017 7.5 (H) 4.0 - 5.6 % Final     Comment:   03/03/2017 10.3 (H) 4.5 - 6.2 % Final     Comment:     Patient does not take stated dose of insulin on MAR as she says she misses several meals; usually Levemir 16 units BID and novology 4 units before meals  Continue current levemir and novolog doses; adequate control currently  Continue diabetic diet therapy with glucose monitoring AC and HS.    Hyperglycemia to be treated with SSNI prn.  Glucose goal is < 180mg/dl and avoidance of hypoglycemia.  Will continue to monitor and adjust regimen as necessary to achieve goals.    4/2/18  POCT Glucose   Date Value Ref Range Status   04/10/2018 128 (H) 70 - 110 mg/dL Final   04/09/2018 112 (H) 70 - 110 mg/dL Final   04/09/2018 170 (H) 70 - 110 mg/dL Final   04/09/2018 131 (H) 70 - 110 mg/dL Final   04/09/2018 159 (H) 70 - 110 mg/dL Final   04/08/2018 126 (H) 70 - 110 mg/dL Final   04/08/2018 106 70 - 110 mg/dL Final   04/08/2018 178 (H) 70 - 110 mg/dL Final   04/08/2018 147 (H) 70 - 110 mg/dL Final   04/07/2018 107 70 - 110 mg/dL Final   04/07/2018 133 (H) 70 - 110 mg/dL Final   04/07/2018 181 (H) 70 - 110 mg/dL Final     CBG is stable,   Continue Levemir 20 units qhs and Aspart 8 units with meals and per SSI.  Continue diabetic diet as ordered and monitor.    4/5/18  Blood sugar beginning to decrease below 100, patient wishes for mealtime insulin to be decreased  Mealtime insulin decreased from 8 units to 6 units, will monitor for toleration  Continue levemir 20 units qHS and SSI    4/10/18  CBG is stable.    Continue Novolog 6 units with meals and 20 units of levemir qhs.  Novolog per SSI PRN.  Maintain current diet and continue to monitor.

## 2018-04-10 NOTE — PLAN OF CARE
Problem: Physical Therapy Goal  Goal: Physical Therapy Goal  Goals to be met by: 14 days     Patient will increase functional independence with mobility by performin. Supine to sit with Modified [place]= met 4/3/2018  2. Sit to supine with Modified [place]= met 4/3/2018  3. Sit to stand transfer with Modified [place] with Rolling walker  4. Bed to chair transfer with Supervision using Rolling Walker  5. Gait  x 150 feet with Supervision using Rolling Walker.   6. Wheelchair propulsion x100 feet with Modified [place] using bilateral upper extremities Met 18  7. Ascend/Descend 4 inch curb step with Stand-by Assistance using Rolling Walker.  8. Lower extremity exercise program x20 reps per handout, with assistance as needed and gym therex, per TKA protocol       Outcome: Ongoing (interventions implemented as appropriate)  Goals remain appropriate

## 2018-04-10 NOTE — PT/OT/SLP PROGRESS
Occupational Therapy  Treatment    Homar Jerry   MRN: [id number]   Admitting Diagnosis: Primary osteoarthritis of left knee    OT Date of Treatment: 04/10/18  Total Time (min): 30 min    Billable Minutes:  Self Care/Home Management 20 and Therapeutic Exercise 10    General Precautions: Standard, fall  Orthopedic Precautions: LLE weight bearing as tolerated  Braces: N/A    Do you have any cultural, spiritual, Buddhism conflicts, given your current situation?: none stated    Subjective:  Communicated with nurse prior to session.      Pain/Comfort  Pain Rating 1: 9/10  Location - Side 1: Left  Location - Orientation 1: generalized  Location 1: knee  Pain Addressed 1: Pre-medicate for activity, Reposition, Distraction  Pain Rating Post-Intervention 1: 8/10    Objective:       Functional Status:  MDS G  Bed Mobility Functional Status: Mod(I) (no assist)    Transfer Functional Status: S-SBA (with SPT from EOB and W/C with RW as assist.)    Walk in Room Functional Status: S-SBA (Pt ambulated from EOB to restroom to perform grooming standing sinkside. Pt ambulating 14ft with (S))    Dressing Functional Status:  S-SBA (set up provided with Pt donning blouse, pants, brief , socks and slip on shoes with (S) only.)    Toilet Use Functional Status: Mod(I)  (no assist needed with Pt using BSC and RW.)    Personal Hygiene Functional Status: S-SBA (performed grooming standing sinkside with RW.)    Bathing Functional Status: S-SBA (set up provided with (S) for safety.)    Moving from seated to standing position: Not steady, but able to stabilize without staff assistance  Walking (with assistive device if used): Not steady, but able to stabilize without staff assistance  Turning around and facing the opposite direction while walking: Not steady, but able to stabilize without staff assistance  Moving on and off the toilet: Not steady, but able to stabilize without staff assistance  Surface-to-surface transfer (transfer  between bed and chair or wheelchair): Not steady, but able to stabilize without staff assistance           [facility] 6 Click:  [facility] Total Score: 21    OT Exercises: Miguelangel performed 5 set 20 reps on miguelangel with 10 pounds    Additional Treatment:  - Pt completed functional mobility, transfers and self care tasks as listed above.  - OT received POC with Patient    Patient left up in chair with call button in reach and nurse notified    ASSESSMENT:  Homar Jerry is a 42 y.o. female with a medical diagnosis of Primary osteoarthritis of left knee . Pt was agreeable to OT and tolerated Tx without incident but continues to require (A) to perform functional activities to completion. OT intervention required to address performance deficits affecting performance of self care tasks, functional mobility, functional transfers, functional strength and endurance.  Pt is making progress but continues to require OT Intervention to perform functional transfers, functional standing activities, and self care tasks with standing component more independently. OT is recommended to further her functional (I)ce and safety. Goals remain appropriate and continued OT is recommended.      Rehab identified problem list/impairments: weakness, impaired endurance, impaired self care skills, impaired functional mobilty, gait instability    Rehab potential is good    Activity tolerance: Good    Discharge recommendations: home with home health, home health OT     Barriers to discharge: None     Equipment recommendations: none     GOALS:    Occupational Therapy Goals        Problem: Occupational Therapy Goal    Goal Priority Disciplines Outcome Interventions   Occupational Therapy Goal     OT, PT/OT Ongoing (interventions implemented as appropriate)    Description:  Goals to be met by: 7 days     Patient will increase functional independence with ADLs by performing:    UE Dressing with Modified [place].  LE Dressing with Modified  [place].  Grooming while seated with Modified [place].  Toileting from bedside commode with Modified [place] for hygiene and clothing management.   Bathing from  edge of bed with Set-up Assistance.  Supine to sit with Modified [place].  Toilet transfer to bedside commode with Modified [place].  Upper extremity exercise program x10  reps per set, with supervision.  Pt will tolerate up to 10 minutes of functional standing activity with (S) and RW   in prep for ADLs in standing.                      Plan:  Patient to be seen 5 x/week to address the above listed problems via self-care/home management, therapeutic activities, therapeutic exercises  Plan of Care expires: 05/02/18  Plan of Care reviewed with: patient    Brodie Mock, OTR/LULI  04/10/2018

## 2018-04-10 NOTE — SUBJECTIVE & OBJECTIVE
"Interval History:   The patient was admitted at [facility] from 3/28 to 3/31/2018.    4/2/18  Patient seen for the first time by me, she reports she has pain to her left knee.  States her pain medication gives her relief for about 1 hour after taking.  She reports she suffers from chronic pain and is treated by Dr. Villa.  She reports she is eating, drinking, voiding and having BM without issues.  She currently lives alone and plans to return to [facility] upon discharge.  She reports she has no family assistance.      At 1150 notified by nursing that patient stated she had heard a "pop" while working with therapy and then notice blood on her dressing.  She is c/o pain to the left knee.  Will send for x-ray and notify Ortho.  There is scant blood underneath dressing no bigger than a pencil eraser, site outlined.      4/5/18  Patient seen resting in bed, has complaints of ongoing intermittent pain and her blood sugar is "getting low". Xray from 4/2 reviewed without acute findings. She is participating in therapies. Blood pressure noted to be elevated, home diovan resumed.     4/10/18  Patient seen at bedside, she reports having pain to her surgical knee but feels pain medication helps control.  In addition, she states her left hip feels much better since getting the lidoderm patch.  She has no additional complaints at this time.        Review of Systems   Constitutional: Negative for fever.   Respiratory: Negative for cough and shortness of breath.    Cardiovascular: Negative for chest pain, palpitations and leg swelling.   Gastrointestinal: Negative for abdominal pain, constipation, diarrhea, nausea and vomiting.   Genitourinary: Negative for dysuria.   Musculoskeletal: Positive for arthralgias.     Objective:     Vital Signs (Most Recent):  Temp: 98.3 °F (36.8 °C) (04/10/18 0955)  Pulse: 108 (04/10/18 0955)  Resp: 18 (04/10/18 0955)  BP: (!) 141/89 (04/10/18 0955)  SpO2: 98 % (04/10/18 0955) Vital Signs (24h " Range):  Temp:  [98.2 °F (36.8 °C)-98.3 °F (36.8 °C)] 98.3 °F (36.8 °C)  Pulse:  [] 108  Resp:  [18-19] 18  SpO2:  [97 %-98 %] 98 %  BP: (114-157)/(81-98) 141/89     Weight: 124.3 kg (274 lb 0.5 oz)  Body mass index is 45.6 kg/m².  No intake or output data in the 24 hours ending 04/10/18 1051   Physical Exam   Constitutional: She is oriented to person, place, and time. She appears well-developed and well-nourished.   Cardiovascular: Normal rate, regular rhythm, normal heart sounds and intact distal pulses.    No murmur heard.  Pulmonary/Chest: Effort normal and breath sounds normal. No respiratory distress.   Abdominal: Soft. Normal appearance and bowel sounds are normal. She exhibits no distension. There is no tenderness.   Musculoskeletal: Normal range of motion. She exhibits edema and tenderness. She exhibits no deformity.   Neurological: She is alert and oriented to person, place, and time. She has normal strength.   Skin: Skin is warm, dry and intact. Capillary refill takes less than 2 seconds. No erythema.   Surgical aquacel dressing in place.  There is no redness, scant drainage to dressing has not extended beyond the outline boarder that was placed on my last visit.       Significant Labs:   BMP:   Recent Labs  Lab 04/09/18  0506   *      K 4.0      CO2 24   BUN 13   CREATININE 0.8   CALCIUM 8.7   MG 2.0     CBC:   Recent Labs  Lab 04/09/18  0506   WBC 6.54   HGB 9.2*   HCT 30.2*   *     POCT Glucose:   Recent Labs  Lab 04/09/18  1636 04/09/18  2138 04/10/18  0744   POCTGLUCOSE 170* 112* 128*       Significant Imaging: n/a

## 2018-04-10 NOTE — PROGRESS NOTES
"Homar Jerry presents for initial post-operative visit following a left total knee arthroplasty performed by Dr. [facility]ner on 3/28/2018. Pt currently at [facility] with planned d/c 4/16.     Exam:   Height 5' 5" (1.651 m), weight 124.3 kg (274 lb 0.5 oz).   Ambulating well with assistive device.  Incision is clean and dry without drainage or erythema. PROM: 0-95. AROM: difficulty with active extension. Able to flex to 90 actively.    Initial post-operative radiographs reviewed today revealing a well fixed and aligned prosthesis.    A/P:  2 weeks s/p left total knee replacement  Dr. [facility] interviewed and examined patient today and agrees with plan.   - Patient needs to elevate legs above level of heart 30 min 3-4 times daily.  - The patient was advised to keep the incision clean and dry for the next 24 hours after which she may wash the area with antibacterial soap in the shower. Will not submerge until the incision is completely healed.   - Continue ASA for 1 month from surgery.  - Follow up in 4 weeks with Dr. [facility]. Pt will call clinic with problems/concerns.     "

## 2018-04-10 NOTE — PT/OT/SLP PROGRESS
"Physical Therapy  Treatment    Homar Jerry   MRN: [id number]   Admitting Diagnosis: Primary osteoarthritis of left knee    PT Received On: 04/10/18  Total Time (min): 25       Billable Minutes:25 (decrease time 2* to transport coming early for p/u to go to appt)    Gait Training 10 and Therapeutic Exercise 15    Treatment Type: Treatment  PT/PTA: PTA     PTA Visit Number: 1       General Precautions: Standard, fall  Orthopedic Precautions: LLE weight bearing as tolerated   Braces: N/A         Subjective:  "don't think I can walk today its really hurting" agreeable to walk to mat      Pain/Comfort  Pain Rating 1: 9/10  Location - Side 1: Left  Location - Orientation 1: generalized  Location 1: knee  Pain Addressed 1: Pre-medicate for activity, Reposition, Distraction  Pain Rating Post-Intervention 1:  (inc with mobility)    Objective:   Patient found with:  (in wc)       Functional Status:  MDS G  Bed Mobility Functional Status: mod(I) - (I)  Transfer Functional Status: S-SBA  Walk in Corridor Functional Status: S-SBA          AM-PAC 6 CLICK MOBILITY  Total Score:20    Bed Mobility:  Sit>Supine:mod I on mat  Supine>Sit: mod I on mat    Transfers:  Sit<>Stand: SBA with RW  Stand Pivot Transfer: SBA with RW      Gait:  Amb with RW CG/SBA ~ 60 ft no LOB, vcs for knee flex during swing and extension with heel strike    Therex:  2x10 reps A/AA as needed AP,GS,QS,HS,SAQ,abd/add    Patient left up in chair with nsg present and transport to take to appt.    Assessment:  Homar Jerry is a 42 y.o. female with a medical diagnosis of Primary osteoarthritis of left knee.  Pt tolerated well, pt would continue to benefit from skilled PT services to improve overall functional mobility, strength and endurance.  .    Rehab identified problem list/impairments: weakness, impaired endurance, impaired functional mobilty, gait instability, impaired balance, decreased lower extremity function, pain    Rehab potential is " good.    Activity tolerance: Fair    Discharge recommendations: home with home health     Barriers to discharge: Decreased caregiver support    Equipment recommendations: none     GOALS:    Physical Therapy Goals        Problem: Physical Therapy Goal    Goal Priority Disciplines Outcome Goal Variances Interventions   Physical Therapy Goal     PT/OT, PT Ongoing (interventions implemented as appropriate)     Description:  Goals to be met by: 14 days     Patient will increase functional independence with mobility by performin. Supine to sit with Modified [place]= met 4/3/2018  2. Sit to supine with Modified [place]= met 4/3/2018  3. Sit to stand transfer with Modified [place] with Rolling walker  4. Bed to chair transfer with Supervision using Rolling Walker  5. Gait  x 150 feet with Supervision using Rolling Walker.   6. Wheelchair propulsion x100 feet with Modified [place] using bilateral upper extremities Met 18  7. Ascend/Descend 4 inch curb step with Stand-by Assistance using Rolling Walker.  8. Lower extremity exercise program x20 reps per handout, with assistance as needed and gym therex, per TKA protocol                        PLAN:    Patient to be seen 5 x/week  to address the above listed problems via gait training, therapeutic activities, therapeutic exercises, wheelchair management/training  Plan of Care expires: 18  Plan of Care reviewed with: patient    Lauren Mae, PTA  04/10/2018

## 2018-04-11 LAB
POCT GLUCOSE: 100 MG/DL (ref 70–110)
POCT GLUCOSE: 118 MG/DL (ref 70–110)
POCT GLUCOSE: 128 MG/DL (ref 70–110)
POCT GLUCOSE: 172 MG/DL (ref 70–110)

## 2018-04-11 PROCEDURE — 25000003 PHARM REV CODE 250: Performed by: HOSPITALIST

## 2018-04-11 PROCEDURE — 97110 THERAPEUTIC EXERCISES: CPT

## 2018-04-11 PROCEDURE — 97530 THERAPEUTIC ACTIVITIES: CPT

## 2018-04-11 PROCEDURE — 97535 SELF CARE MNGMENT TRAINING: CPT

## 2018-04-11 PROCEDURE — 25000003 PHARM REV CODE 250: Performed by: NURSE PRACTITIONER

## 2018-04-11 PROCEDURE — 97116 GAIT TRAINING THERAPY: CPT

## 2018-04-11 PROCEDURE — 25000003 PHARM REV CODE 250: Performed by: INTERNAL MEDICINE

## 2018-04-11 PROCEDURE — 11000004 HC SNF PRIVATE

## 2018-04-11 PROCEDURE — 25000003 PHARM REV CODE 250: Performed by: ORTHOPAEDIC SURGERY

## 2018-04-11 RX ADMIN — OXYCODONE HYDROCHLORIDE 20 MG: 5 TABLET ORAL at 03:04

## 2018-04-11 RX ADMIN — ALPRAZOLAM 1 MG: 0.5 TABLET ORAL at 09:04

## 2018-04-11 RX ADMIN — RAMELTEON 8 MG: 8 TABLET, FILM COATED ORAL at 09:04

## 2018-04-11 RX ADMIN — TIZANIDINE 12 MG: 4 TABLET ORAL at 09:04

## 2018-04-11 RX ADMIN — LIDOCAINE 1 PATCH: 50 PATCH TOPICAL at 03:04

## 2018-04-11 RX ADMIN — OXYCODONE HYDROCHLORIDE 15 MG: 5 TABLET ORAL at 06:04

## 2018-04-11 RX ADMIN — OXYCODONE HYDROCHLORIDE 20 MG: 5 TABLET ORAL at 09:04

## 2018-04-11 RX ADMIN — FAMOTIDINE 20 MG: 20 TABLET, FILM COATED ORAL at 09:04

## 2018-04-11 RX ADMIN — ACETAMINOPHEN 500 MG: 500 TABLET ORAL at 03:04

## 2018-04-11 RX ADMIN — TRAZODONE HYDROCHLORIDE 50 MG: 50 TABLET ORAL at 09:04

## 2018-04-11 RX ADMIN — CARVEDILOL 6.25 MG: 12.5 TABLET, FILM COATED ORAL at 09:04

## 2018-04-11 RX ADMIN — LEVOTHYROXINE SODIUM 150 MCG: 75 TABLET ORAL at 06:04

## 2018-04-11 RX ADMIN — INSULIN ASPART 6 UNITS: 100 INJECTION, SOLUTION INTRAVENOUS; SUBCUTANEOUS at 11:04

## 2018-04-11 RX ADMIN — REGULAR STRENGTH 325 MG: 325 TABLET ORAL at 09:04

## 2018-04-11 RX ADMIN — QUETIAPINE FUMARATE 800 MG: 200 TABLET, FILM COATED ORAL at 09:04

## 2018-04-11 RX ADMIN — FLUTICASONE PROPIONATE 100 MCG: 50 SPRAY, METERED NASAL at 09:04

## 2018-04-11 RX ADMIN — STANDARDIZED SENNA CONCENTRATE AND DOCUSATE SODIUM 2 TABLET: 8.6; 5 TABLET, FILM COATED ORAL at 09:04

## 2018-04-11 RX ADMIN — VALSARTAN 80 MG: 80 TABLET ORAL at 09:04

## 2018-04-11 RX ADMIN — GABAPENTIN 600 MG: 300 CAPSULE ORAL at 09:04

## 2018-04-11 RX ADMIN — ACETAMINOPHEN 500 MG: 500 TABLET ORAL at 09:04

## 2018-04-11 RX ADMIN — HYDROCHLOROTHIAZIDE 12.5 MG: 12.5 TABLET ORAL at 09:04

## 2018-04-11 RX ADMIN — BUSPIRONE HYDROCHLORIDE 10 MG: 10 TABLET ORAL at 09:04

## 2018-04-11 RX ADMIN — INSULIN ASPART 6 UNITS: 100 INJECTION, SOLUTION INTRAVENOUS; SUBCUTANEOUS at 09:04

## 2018-04-11 RX ADMIN — GABAPENTIN 600 MG: 300 CAPSULE ORAL at 03:04

## 2018-04-11 RX ADMIN — MIRTAZAPINE 45 MG: 15 TABLET, FILM COATED ORAL at 09:04

## 2018-04-11 NOTE — PT/OT/SLP PROGRESS
Physical Therapy  Treatment    Homar Jerry   MRN: [id number]   Admitting Diagnosis: Primary osteoarthritis of left knee    PT Received On: 04/11/18  Total Time (min): 55       Billable Minutes:  Gait Training 25, Therapeutic Activity 15 and Therapeutic Exercise 15    Treatment Type: Treatment  PT/PTA: PTA     PTA Visit Number: 2       General Precautions: Standard, fall  Orthopedic Precautions: LLE weight bearing as tolerated   Braces: N/A         Subjective:  Communicated with NSG prior to session.  Pt agreeable to therapy.     Pain/Comfort  Pain Rating 1: 9/10  Location - Side 1: Left  Location - Orientation 1: generalized  Location 1: knee  Pain Addressed 1: Pre-medicate for activity, Distraction, Reposition  Pain Rating Post-Intervention 1: 9/10    Objective:  Patient found sitting up in w/c in OT gym with OT present and pt completing OT session.         Functional Status:  MDS G  Bed Mobility Functional Status: mod(I) - (I)  Transfer Functional Status: S-SBA  Walk in Room Functional Status: S-SBA  Walk in Corridor Functional Status: S-SBA  Moving from seated to standing position: Not steady, but able to stabilize without staff assistance  Walking (with assistive device if used): Not steady, but able to stabilize without staff assistance  Turning around and facing the opposite direction while walking: Not steady, but able to stabilize without staff assistance  Moving on and off the toilet: Not steady, but able to stabilize without staff assistance  Surface-to-surface transfer (transfer between bed and chair or wheelchair): Not steady, but able to stabilize without staff assistance          AM-PAC 6 CLICK MOBILITY  Total Score:20    Bed Mobility:  Sit>Supine: Mod-I     Transfers:  Sit<>Stand: SBA with RW, from w/c and commode  Stand Pivot Transfer: SBA with RW, bedside commode > w/c.     Gait:  Amb: Pt ambulated 135 ft and 145 ft with RW and SBA. Slow larry. Pt cued for improved extension in stance  "phase and knee flexion in swing phase on her LLE. Pt with no noted L knee buckling this session. Seated rest between trials with pt limited by pain > fatigue.     Advanced Gait:  Curb Step: Pt asc/dec 4" curb with RW and SBA. Pt with cueing only for AD mgmt on ascent. Good L knee stability on descent.     Therex:  Seated/Supine Therex per TKA protocol x 25 reps. AAROM as needed.   L knee AROM: 0* - 80*.     Balance:  Pt with no LOB with all mobility this session.     Additional Treatment:  Pt assisted with elevating LLE in supine and donning [place] to L knee.  Pt educated on gait mechanics and current POC.     Patient left supine with call button in reach [place] donned.    Assessment:  Homar Jerry is a 42 y.o. female with a medical diagnosis of Primary osteoarthritis of left knee.  Pt tolerated tx well with pt demonstrating good progress in ambulation distance and gait mechanics. Pt with less cueing needed overall for all mobility. Pt will continue to benefit from skilled therapy to improve impairments listed below.     Rehab identified problem list/impairments: weakness, impaired endurance, impaired functional mobilty, gait instability, impaired balance, decreased lower extremity function, pain    Rehab potential is good.    Activity tolerance: Good    Discharge recommendations: home with home health     Barriers to discharge: Decreased caregiver support    Equipment recommendations: none     GOALS:    Physical Therapy Goals        Problem: Physical Therapy Goal    Goal Priority Disciplines Outcome Goal Variances Interventions   Physical Therapy Goal     PT/OT, PT Ongoing (interventions implemented as appropriate)     Description:  Goals to be met by: 14 days     Patient will increase functional independence with mobility by performin. Supine to sit with Modified [place]= met 4/3/2018  2. Sit to supine with Modified [place]= met 4/3/2018  3. Sit to stand transfer with Modified " [place] with Rolling walker  4. Bed to chair transfer with Supervision using Rolling Walker  5. Gait  x 150 feet with Supervision using Rolling Walker.   6. Wheelchair propulsion x100 feet with Modified [place] using bilateral upper extremities Met 4/9/18  7. Ascend/Descend 4 inch curb step with Stand-by Assistance using Rolling Walker.  8. Lower extremity exercise program x20 reps per handout, with assistance as needed and gym therex, per TKA protocol                        PLAN:    Patient to be seen 5 x/week  to address the above listed problems via gait training, therapeutic activities, therapeutic exercises, wheelchair management/training  Plan of Care expires: 05/02/18  Plan of Care reviewed with: patient    Jasbir Ochoa, PTA  04/11/2018

## 2018-04-11 NOTE — PT/OT/SLP PROGRESS
Occupational Therapy  Treatment    Homar Jerry   MRN: [id number]   Admitting Diagnosis: Primary osteoarthritis of left knee    OT Date of Treatment: 04/11/18  Total Time (min): 50 min    Billable Minutes:  Self Care/Home Management 20, Therapeutic Activity 10 and Therapeutic Exercise 20    General Precautions: Standard, fall  Orthopedic Precautions: LLE weight bearing as tolerated  Braces: N/A    Do you have any cultural, spiritual, Druze conflicts, given your current situation?: none stated    Subjective:  Communicated with nurse prior to session.      Pain/Comfort  Pain Rating 1: 8/10  Location - Side 1: Left  Location - Orientation 1: generalized  Location 1: knee  Pain Addressed 1: Pre-medicate for activity, Reposition, Distraction  Pain Rating Post-Intervention 1: 8/10    Objective:  Patient found with:  (no lines)    Functional Status:  MDS G  Bed Mobility Functional Status: Mod(I)  (supine to sit and sit to supine.)    Transfer Functional Status: S-SBA (with RW to perform SPTs EOB <> W/C and W/C <> BSC)    Walk in Corridor Functional Status: S-SBA (Pt ambulated 76ft from EOB to therapy gym with RW and (S).)    Dressing Functional Status: S-SBA (UBD performed at Mod (I) level, LBD needing Set up and (S).)    Personal Hygiene Functional Status: Mod(I) (with RW to steady when standing to perform grooming.)      Moving from seated to standing position: Not steady, but able to stabilize without staff assistance  Walking (with assistive device if used): Not steady, but able to stabilize without staff assistance  Turning around and facing the opposite direction while walking: Not steady, but able to stabilize without staff assistance  Moving on and off the toilet: Not steady, but able to stabilize without staff assistance  Surface-to-surface transfer (transfer between bed and chair or wheelchair): Not steady, but able to stabilize without staff assistance           AMPA 6 Click:  AMPAC Total Score:  21    OT Exercises: UE Ergometer performed 15 minutes on UE UBE with Mod resistance.  Miguelangel performed 5 sets 20 reps with 15 pounds. UE exercises performed to increase functional endurance and strength.  Strengthening required in order increase independence when performing self care tasks, W/C propulsion , functional standing activities as well as when performing functional tasks.      Additional Treatment:  Pt worked on functional standing activity consisting of standing with RW  while reaching in all planes , crossing of midline and reaching to varying heights to facilitate (B) wt shifting and stability in standing to increase (I)ce when performing self care, and functional activities with standing component.  Pt tolerated up to 5 Min. 15 sec In standing with (S) and RW to steady.    - Pt completed functional mobility, transfers and self care tasks as listed above.  - OT received POC with Patient    Patient left up in chair with call button in reach and nurse notified    ASSESSMENT:  Homar Jerry is a 42 y.o. female with a medical diagnosis of Primary osteoarthritis of left knee .    Rehab identified problem list/impairments: weakness, impaired endurance, impaired self care skills, impaired functional mobilty, gait instability    Rehab potential is good    Activity tolerance: Good    Discharge recommendations: Home with home health, home health OT     Barriers to discharge: None     Equipment recommendations: none     GOALS:    Occupational Therapy Goals        Problem: Occupational Therapy Goal    Goal Priority Disciplines Outcome Interventions   Occupational Therapy Goal     OT, PT/OT Ongoing (interventions implemented as appropriate)    Description:  Goals to be met by: 7 days     Patient will increase functional independence with ADLs by performing:    UE Dressing with Modified [place].   Met  LE Dressing with Modified [place].     Grooming while seated with Modified [place].    Met  Toileting from bedside commode with Modified [place] for hygiene and clothing management. Met  Bathing from  edge of bed with Set-up Assistance.   Met  Supine to sit with Modified [place].   Met  Toilet transfer to bedside commode with Modified [place].  Upper extremity exercise program x10  reps per set, with supervision.   Ongoing  Pt will tolerate up to 10 minutes of functional standing activity with (S) and RW   in prep for ADLs in standing.                       Plan:  Patient to be seen 5 x/week to address the above listed problems via self-care/home management, therapeutic activities, therapeutic exercises  Plan of Care expires: 05/02/18  Plan of Care reviewed with: patient    Brodie Mock, OTR/L  04/11/2018

## 2018-04-11 NOTE — PLAN OF CARE
Problem: Physical Therapy Goal  Goal: Physical Therapy Goal  Goals to be met by: 14 days     Patient will increase functional independence with mobility by performin. Supine to sit with Modified [place]= met 4/3/2018  2. Sit to supine with Modified [place]= met 4/3/2018  3. Sit to stand transfer with Modified [place] with Rolling walker  4. Bed to chair transfer with Supervision using Rolling Walker  5. Gait  x 150 feet with Supervision using Rolling Walker.   6. Wheelchair propulsion x100 feet with Modified [place] using bilateral upper extremities Met 18  7. Ascend/Descend 4 inch curb step with Stand-by Assistance using Rolling Walker.  8. Lower extremity exercise program x20 reps per handout, with assistance as needed and gym therex, per TKA protocol       Outcome: Ongoing (interventions implemented as appropriate)  Goals remain appropriate.

## 2018-04-11 NOTE — PLAN OF CARE
Problem: Occupational Therapy Goal  Goal: Occupational Therapy Goal  Goals to be met by: 7 days     Patient will increase functional independence with ADLs by performing:    UE Dressing with Modified [place].   Met  LE Dressing with Modified [place].     Grooming while seated with Modified [place].   Met  Toileting from bedside commode with Modified [place] for hygiene and clothing management. Met  Bathing from  edge of bed with Set-up Assistance.   Met  Supine to sit with Modified [place].   Met  Toilet transfer to bedside commode with Modified [place].  Upper extremity exercise program x10  reps per set, with supervision.   Ongoing  Pt will tolerate up to 10 minutes of functional standing activity with (S) and RW   in prep for ADLs in standing.     Outcome: Ongoing (interventions implemented as appropriate)  Brodie Mock OTR/L      4/11/2018

## 2018-04-12 LAB
ANION GAP SERPL CALC-SCNC: 9 MMOL/L
BASOPHILS # BLD AUTO: 0.02 K/UL
BASOPHILS NFR BLD: 0.3 %
BUN SERPL-MCNC: 12 MG/DL
CALCIUM SERPL-MCNC: 8.9 MG/DL
CHLORIDE SERPL-SCNC: 104 MMOL/L
CO2 SERPL-SCNC: 27 MMOL/L
CREAT SERPL-MCNC: 0.8 MG/DL
DIFFERENTIAL METHOD: ABNORMAL
EOSINOPHIL # BLD AUTO: 0.3 K/UL
EOSINOPHIL NFR BLD: 4.5 %
ERYTHROCYTE [DISTWIDTH] IN BLOOD BY AUTOMATED COUNT: 15.3 %
EST. GFR  (AFRICAN AMERICAN): >60 ML/MIN/1.73 M^2
EST. GFR  (NON AFRICAN AMERICAN): >60 ML/MIN/1.73 M^2
GLUCOSE SERPL-MCNC: 116 MG/DL
HCT VFR BLD AUTO: 30.8 %
HGB BLD-MCNC: 9.5 G/DL
IMM GRANULOCYTES # BLD AUTO: 0.09 K/UL
IMM GRANULOCYTES NFR BLD AUTO: 1.3 %
LYMPHOCYTES # BLD AUTO: 2.5 K/UL
LYMPHOCYTES NFR BLD: 35.8 %
MAGNESIUM SERPL-MCNC: 2.1 MG/DL
MCH RBC QN AUTO: 28 PG
MCHC RBC AUTO-ENTMCNC: 30.8 G/DL
MCV RBC AUTO: 91 FL
MONOCYTES # BLD AUTO: 0.5 K/UL
MONOCYTES NFR BLD: 7.2 %
NEUTROPHILS # BLD AUTO: 3.5 K/UL
NEUTROPHILS NFR BLD: 50.9 %
NRBC BLD-RTO: 0 /100 WBC
PHOSPHATE SERPL-MCNC: 4.7 MG/DL
PLATELET # BLD AUTO: 324 K/UL
PMV BLD AUTO: 9.4 FL
POCT GLUCOSE: 133 MG/DL (ref 70–110)
POCT GLUCOSE: 135 MG/DL (ref 70–110)
POCT GLUCOSE: 168 MG/DL (ref 70–110)
POCT GLUCOSE: 176 MG/DL (ref 70–110)
POTASSIUM SERPL-SCNC: 3.8 MMOL/L
RBC # BLD AUTO: 3.39 M/UL
SODIUM SERPL-SCNC: 140 MMOL/L
WBC # BLD AUTO: 6.95 K/UL

## 2018-04-12 PROCEDURE — 25000003 PHARM REV CODE 250: Performed by: HOSPITALIST

## 2018-04-12 PROCEDURE — 25000003 PHARM REV CODE 250: Performed by: ORTHOPAEDIC SURGERY

## 2018-04-12 PROCEDURE — 97116 GAIT TRAINING THERAPY: CPT

## 2018-04-12 PROCEDURE — 97530 THERAPEUTIC ACTIVITIES: CPT

## 2018-04-12 PROCEDURE — 36415 COLL VENOUS BLD VENIPUNCTURE: CPT

## 2018-04-12 PROCEDURE — 83735 ASSAY OF MAGNESIUM: CPT

## 2018-04-12 PROCEDURE — 25000003 PHARM REV CODE 250: Performed by: NURSE PRACTITIONER

## 2018-04-12 PROCEDURE — 80048 BASIC METABOLIC PNL TOTAL CA: CPT

## 2018-04-12 PROCEDURE — 11000004 HC SNF PRIVATE

## 2018-04-12 PROCEDURE — 97110 THERAPEUTIC EXERCISES: CPT

## 2018-04-12 PROCEDURE — 97535 SELF CARE MNGMENT TRAINING: CPT

## 2018-04-12 PROCEDURE — 85025 COMPLETE CBC W/AUTO DIFF WBC: CPT

## 2018-04-12 PROCEDURE — 25000003 PHARM REV CODE 250: Performed by: INTERNAL MEDICINE

## 2018-04-12 PROCEDURE — 84100 ASSAY OF PHOSPHORUS: CPT

## 2018-04-12 RX ADMIN — INSULIN ASPART 1 UNITS: 100 INJECTION, SOLUTION INTRAVENOUS; SUBCUTANEOUS at 09:04

## 2018-04-12 RX ADMIN — TRAZODONE HYDROCHLORIDE 50 MG: 50 TABLET ORAL at 09:04

## 2018-04-12 RX ADMIN — FAMOTIDINE 20 MG: 20 TABLET, FILM COATED ORAL at 08:04

## 2018-04-12 RX ADMIN — OXYCODONE HYDROCHLORIDE 20 MG: 5 TABLET ORAL at 09:04

## 2018-04-12 RX ADMIN — GABAPENTIN 600 MG: 300 CAPSULE ORAL at 08:04

## 2018-04-12 RX ADMIN — LIDOCAINE 1 PATCH: 50 PATCH TOPICAL at 04:04

## 2018-04-12 RX ADMIN — FAMOTIDINE 20 MG: 20 TABLET, FILM COATED ORAL at 09:04

## 2018-04-12 RX ADMIN — BUSPIRONE HYDROCHLORIDE 10 MG: 10 TABLET ORAL at 09:04

## 2018-04-12 RX ADMIN — FLUTICASONE PROPIONATE 100 MCG: 50 SPRAY, METERED NASAL at 08:04

## 2018-04-12 RX ADMIN — RAMELTEON 8 MG: 8 TABLET, FILM COATED ORAL at 09:04

## 2018-04-12 RX ADMIN — LEVOTHYROXINE SODIUM 150 MCG: 75 TABLET ORAL at 05:04

## 2018-04-12 RX ADMIN — QUETIAPINE FUMARATE 800 MG: 200 TABLET, FILM COATED ORAL at 09:04

## 2018-04-12 RX ADMIN — ACETAMINOPHEN 500 MG: 500 TABLET ORAL at 04:04

## 2018-04-12 RX ADMIN — HYDROCHLOROTHIAZIDE 12.5 MG: 12.5 TABLET ORAL at 08:04

## 2018-04-12 RX ADMIN — ACETAMINOPHEN 500 MG: 500 TABLET ORAL at 08:04

## 2018-04-12 RX ADMIN — OXYCODONE HYDROCHLORIDE 20 MG: 5 TABLET ORAL at 04:04

## 2018-04-12 RX ADMIN — MIRTAZAPINE 45 MG: 15 TABLET, FILM COATED ORAL at 09:04

## 2018-04-12 RX ADMIN — STANDARDIZED SENNA CONCENTRATE AND DOCUSATE SODIUM 2 TABLET: 8.6; 5 TABLET, FILM COATED ORAL at 09:04

## 2018-04-12 RX ADMIN — INSULIN ASPART 6 UNITS: 100 INJECTION, SOLUTION INTRAVENOUS; SUBCUTANEOUS at 12:04

## 2018-04-12 RX ADMIN — INSULIN ASPART 6 UNITS: 100 INJECTION, SOLUTION INTRAVENOUS; SUBCUTANEOUS at 08:04

## 2018-04-12 RX ADMIN — TIZANIDINE 12 MG: 4 TABLET ORAL at 09:04

## 2018-04-12 RX ADMIN — REGULAR STRENGTH 325 MG: 325 TABLET ORAL at 08:04

## 2018-04-12 RX ADMIN — BUSPIRONE HYDROCHLORIDE 10 MG: 10 TABLET ORAL at 08:04

## 2018-04-12 RX ADMIN — ACETAMINOPHEN 500 MG: 500 TABLET ORAL at 09:04

## 2018-04-12 RX ADMIN — OXYCODONE HYDROCHLORIDE 10 MG: 5 TABLET ORAL at 09:04

## 2018-04-12 RX ADMIN — STANDARDIZED SENNA CONCENTRATE AND DOCUSATE SODIUM 2 TABLET: 8.6; 5 TABLET, FILM COATED ORAL at 08:04

## 2018-04-12 RX ADMIN — CARVEDILOL 6.25 MG: 12.5 TABLET, FILM COATED ORAL at 08:04

## 2018-04-12 RX ADMIN — VALSARTAN 80 MG: 80 TABLET ORAL at 08:04

## 2018-04-12 RX ADMIN — GABAPENTIN 600 MG: 300 CAPSULE ORAL at 03:04

## 2018-04-12 RX ADMIN — ALPRAZOLAM 1 MG: 0.5 TABLET ORAL at 09:04

## 2018-04-12 RX ADMIN — INSULIN ASPART 2 UNITS: 100 INJECTION, SOLUTION INTRAVENOUS; SUBCUTANEOUS at 12:04

## 2018-04-12 RX ADMIN — OXYCODONE HYDROCHLORIDE 10 MG: 5 TABLET ORAL at 10:04

## 2018-04-12 RX ADMIN — OXYCODONE HYDROCHLORIDE 20 MG: 5 TABLET ORAL at 03:04

## 2018-04-12 RX ADMIN — INSULIN DETEMIR 20 UNITS: 100 INJECTION, SOLUTION SUBCUTANEOUS at 09:04

## 2018-04-12 RX ADMIN — INSULIN ASPART 6 UNITS: 100 INJECTION, SOLUTION INTRAVENOUS; SUBCUTANEOUS at 05:04

## 2018-04-12 NOTE — PLAN OF CARE
04/12/2018  3:34 PM    SW met with pt in room to discuss d/c planning.  SW informed pt of SNF d/c date schedule for 4/16/18.  Pt expressed understanding and satisfaction regarding date.  Pt states plans of returning home upon SNF d/c where she lives alone in a H with threshold ROSENDO.  Pt has a tub/shower and owns a RW, WC, BSC, and TTB.  Pt agreeable to home health placement upon SNF d/c and requests placement with [facility].  Pt reports having no family support upon d/c.  Pt reports closest sources of support are her aunt who is not able to physically assist pt and her sister who will be moving from [place] to [place] soon for her job.  Pt tearful throughout conversation as she discussed the loss of her only son and primary caretaker in December 2017.  Pt reports not having a specific long-term plan post SNF d/c.  Pt expressed being overwhelmed and needing to take things one day at a time.  Pt states plans for now will be to pay for personal transportation via Uber when her relatives are unavailable.  Pt reports having someone whom she pays to keep up with household tasks like laundry and cleaning.  Pt also reports having a person who assists with meal planning and delivery.  Pt plans to contact her insurance provider ([employer]) to inquire about home delivered meals post-d/c.  KIRK provided emotional support and counseling to pt during visit as she described grief associated with loss her son.  Pt expressed hope and motivation to carry out son's wish for her to gain more functional independence to enjoy life.  SW encouraged pt to maintain contact with her mental health providers and explore other available community resources (support groups, spiritual/Congregation organizations, etc.) for additional support.  Pt thanked KIRK for support and assistance.  Pt had question about having her prescriptions filled and delivered.  SW notified pharmacist Mary of same and Mary will f/u with pt prior to d/c.  Pt  denies having any further questions or concerns regarding d/c at this time.    Miller Stratton, [facility]  [phone]

## 2018-04-12 NOTE — PT/OT/SLP PROGRESS
Occupational Therapy  Treatment    Homar Jerry   MRN: [id number]   Admitting Diagnosis: Primary osteoarthritis of left knee    OT Date of Treatment: 04/12/18  Total Time (min): 45 min    Billable Minutes:  Self Care/Home Management 25 and Therapeutic Activity 20    General Precautions: Standard, fall  Orthopedic Precautions: LLE weight bearing as tolerated  Braces: N/A    Do you have any cultural, spiritual, Jain conflicts, given your current situation?: none stated    Subjective:  Communicated with nurse prior to session.      Pain/Comfort  Pain Rating 1: 9/10  Location - Side 1: Left  Location - Orientation 1: generalized  Location 1: knee  Pain Addressed 1: Pre-medicate for activity, Reposition, Distraction  Pain Rating Post-Intervention 1: 9/10    Objective:  Patient found with:  (no lines)    Functional Status:  MDS G  Bed Mobility Functional Status: Mod(I) (no assist)    Transfer Functional Status: Mod(I) (Mod (I) EOB to BSC and EOB <> W/C with SPT using RW)    Walk in Corridor Functional Status: S-SBA (Pt ambulating from EOB to therapy gym 90 ft with (S) and RW)    Dressing Functional Status:  S-SBA (Mod (I) with UBD, LBD performed with (S) donning pants,  panties, socks and slip on shoes with RW when standing.)    Toilet Use Functional Status: Mod(I)  (using BSC and RW for safety.)    Bathing Functional Status: S-SBA (set up provided for sponge bathing.)    Moving from seated to standing position: Not steady, but able to stabilize without staff assistance  Walking (with assistive device if used): Not steady, but able to stabilize without staff assistance  Turning around and facing the opposite direction while walking: Not steady, but able to stabilize without staff assistance  Moving on and off the toilet: Not steady, but able to stabilize without staff assistance  Surface-to-surface transfer (transfer between bed and chair or wheelchair): Not steady, but able to stabilize without staff  assistance           [facility] 6 Click:  [facility] Total Score: 22      Additional Treatment:  Pt worked on functional standing activity consisting of standing with RW while reaching in all planes , crossing of midline and reaching to varying heights to facilitate (B) wt shifting and stability in standing to increase (I)ce when performing self care, and functional activities with standing component.  Pt tolerated up to 12 Min. 3 sec and then 5 min 17 sec  In standing with (S) and RW  to steady.    Patient left up in chair with call button in reach and nurse notified    ASSESSMENT:  Homar Jerry is a 42 y.o. female with a medical diagnosis of Primary osteoarthritis of left knee .  Pt was agreeable to OT and tolerated Tx without incident but continues to require (A) to perform functional activities to completion. OT intervention required to address performance deficits affecting performance of self care tasks, functional mobility, functional transfers, functional strength and endurance.  Pt is making progress but continues to require OT Intervention to perform functional transfers, functional standing activities, and self care tasks with standing component more independently. OT is recommended to further her functional (I)ce and safety. Goals remain appropriate and continued OT is recommended.      Rehab identified problem list/impairments: weakness, impaired endurance, impaired self care skills, impaired functional mobilty, gait instability    Rehab potential is good    Activity tolerance: Good    Discharge recommendations: home with home health, home health OT     Barriers to discharge: None     Equipment recommendations: none     GOALS:    Occupational Therapy Goals        Problem: Occupational Therapy Goal    Goal Priority Disciplines Outcome Interventions   Occupational Therapy Goal     OT, PT/OT Ongoing (interventions implemented as appropriate)    Description:  Goals to be met by: 7 days     Patient will  increase functional independence with ADLs by performing:    UE Dressing with Modified [place].   Met  LE Dressing with Modified [place].     Grooming while seated with Modified [place].   Met  Toileting from bedside commode with Modified [place] for hygiene and clothing management. Met  Bathing from  edge of bed with Set-up Assistance.   Met  Supine to sit with Modified [place].   Met  Toilet transfer to bedside commode with Modified [place].  Upper extremity exercise program x10  reps per set, with supervision.   Ongoing  Pt will tolerate up to 10 minutes of functional standing activity with (S) and RW   in prep for ADLs in standing.                       Plan:  Patient to be seen 5 x/week to address the above listed problems via self-care/home management, therapeutic activities, therapeutic exercises  Plan of Care expires: 05/02/18  Plan of Care reviewed with: patient    Brodie Mock OTR/L  04/12/2018

## 2018-04-12 NOTE — PT/OT/SLP PROGRESS
Physical Therapy  Treatment    Homar Jerry   MRN: [id number]   Admitting Diagnosis: Primary osteoarthritis of left knee    PT Received On: 04/12/18  Total Time (min): 54       Billable Minutes:  Gait Training 25, Therapeutic Activity 15 and Therapeutic Exercise 14.    Treatment Type: Treatment  PT/PTA: PTA     PTA Visit Number: 3       General Precautions: Standard, fall  Orthopedic Precautions: LLE weight bearing as tolerated   Braces: N/A         Subjective:  Pt agreeable to therapy.     Pain/Comfort  Pain Rating 1: 9/10  Location - Side 1: Left  Location - Orientation 1: generalized  Location 1: knee  Pain Addressed 1: Pre-medicate for activity, Reposition, Distraction  Pain Rating Post-Intervention 1: 9/10    Objective:  Patient found sitting up in w/c with OT having just completed OT session.      Pt L knee AROM: 0* ext. - 86* flex.    Functional Status:  MDS G  Bed Mobility Functional Status: mod(I) - (I)  Transfer Functional Status: mod(I) - (I)  Walk in Room Functional Status: S-SBA  Walk in Corridor Functional Status: S-SBA  Moving from seated to standing position: Not steady, but able to stabilize without staff assistance  Walking (with assistive device if used): Not steady, but able to stabilize without staff assistance  Turning around and facing the opposite direction while walking: Not steady, but able to stabilize without staff assistance  Surface-to-surface transfer (transfer between bed and chair or wheelchair): Not steady, but able to stabilize without staff assistance          AM-PAC 6 CLICK MOBILITY  Total Score:22    Bed Mobility:  Supine<>Sit: Mod-I, performed on mat and pts bed.     Transfers:  Sit<>Stand: Mod-I with RW from w/c and mat.   Stand Pivot Transfer: Mod-I with RW, w/c <> mat and w/c > bed.     Gait:  Amb: Pt ambulated 280 and 125 ft with RW and SBA. Pt with improved L knee stability and flexion with swing. Pt with adequate heel strike and improved weight shift. Pt limited by  c/o fatigue and pain. Seated rest between trials.      Therex:  Seated/Supine LLE Therex per TKA protocol x 30 reps with AAROM as needed. Pt with improved quad activation decreasing extension lag on L SLR.     Balance:  Pt with no LOB with all mobility this session.     Patient left seated at EOB with call button in reach.    Assessment:  Homar Jerry is a 42 y.o. female with a medical diagnosis of Primary osteoarthritis of left knee.  Pt tolerated tx well with focus on gait training, transfers, and therex. Pt progressing well with improvements in assistance needed for transfers, L knee ROM, and ambulation distance. Pt remain highly motivated but limited by c/o pain and fatigue. Pt will continue to benefit from skilled therapy to improve impairments listed below.     Rehab identified problem list/impairments: weakness, impaired endurance, impaired functional mobilty, gait instability, impaired balance, decreased lower extremity function, pain    Rehab potential is good.    Activity tolerance: Fair    Discharge recommendations: home with home health     Barriers to discharge: Decreased caregiver support    Equipment recommendations: none     GOALS:    Physical Therapy Goals        Problem: Physical Therapy Goal    Goal Priority Disciplines Outcome Goal Variances Interventions   Physical Therapy Goal     PT/OT, PT Ongoing (interventions implemented as appropriate)     Description:  Goals to be met by: 14 days     Patient will increase functional independence with mobility by performin. Supine to sit with Modified [place]= met 4/3/2018  2. Sit to supine with Modified [place]= met 4/3/2018  3. Sit to stand transfer with Modified [place] with Rolling walker  4. Bed to chair transfer with Supervision using Rolling Walker  5. Gait  x 150 feet with Supervision using Rolling Walker.   6. Wheelchair propulsion x100 feet with Modified [place] using bilateral upper extremities Met 18  7.  Ascend/Descend 4 inch curb step with Stand-by Assistance using Rolling Walker.  8. Lower extremity exercise program x20 reps per handout, with assistance as needed and gym therex, per TKA protocol                        PLAN:    Patient to be seen 5 x/week  to address the above listed problems via gait training, therapeutic activities, therapeutic exercises, wheelchair management/training  Plan of Care expires: 05/02/18  Plan of Care reviewed with: patient    Jasbir Ochoa, PTA  04/12/2018

## 2018-04-12 NOTE — PLAN OF CARE
Problem: Occupational Therapy Goal  Goal: Occupational Therapy Goal  Goals to be met by: 7 days     Patient will increase functional independence with ADLs by performing:    UE Dressing with Modified [place].   Met  LE Dressing with Modified [place].     Grooming while seated with Modified [place].   Met  Toileting from bedside commode with Modified [place] for hygiene and clothing management. Met  Bathing from  edge of bed with Set-up Assistance.   Met  Supine to sit with Modified [place].   Met  Toilet transfer to bedside commode with Modified [place].  Upper extremity exercise program x10  reps per set, with supervision.   Ongoing  Pt will tolerate up to 10 minutes of functional standing activity with (S) and RW   in prep for ADLs in standing.      Outcome: Ongoing (interventions implemented as appropriate)  Brodie Mock, OTR/L      4/12/2018

## 2018-04-12 NOTE — PLAN OF CARE
Problem: Physical Therapy Goal  Goal: Physical Therapy Goal  Goals to be met by: 14 days     Patient will increase functional independence with mobility by performin. Supine to sit with Modified [place]= met 4/3/2018  2. Sit to supine with Modified [place]= met 4/3/2018  3. Sit to stand transfer with Modified [place] with Rolling walker  4. Bed to chair transfer with Supervision using Rolling Walker  5. Gait  x 150 feet with Supervision using Rolling Walker.   6. Wheelchair propulsion x100 feet with Modified [place] using bilateral upper extremities Met 18  7. Ascend/Descend 4 inch curb step with Stand-by Assistance using Rolling Walker.  8. Lower extremity exercise program x20 reps per handout, with assistance as needed and gym therex, per TKA protocol       Outcome: Ongoing (interventions implemented as appropriate)  Goals remain appropriate.

## 2018-04-12 NOTE — PLAN OF CARE
Problem: Patient Care Overview  Goal: Plan of Care Review  Outcome: Ongoing (interventions implemented as appropriate)   04/12/18 0212   Coping/Psychosocial   Plan Of Care Reviewed With patient       Problem: Fall Risk (Adult)  Goal: Identify Related Risk Factors and Signs and Symptoms  Related risk factors and signs and symptoms are identified upon initiation of Human Response Clinical Practice Guideline (CPG)   Outcome: Ongoing (interventions implemented as appropriate)   04/12/18 0212   Fall Risk   Related Risk Factors (Fall Risk) fatigue/slow reaction;fear of falling;gait/mobility problems

## 2018-04-13 LAB
POCT GLUCOSE: 122 MG/DL (ref 70–110)
POCT GLUCOSE: 132 MG/DL (ref 70–110)
POCT GLUCOSE: 144 MG/DL (ref 70–110)
POCT GLUCOSE: 231 MG/DL (ref 70–110)

## 2018-04-13 PROCEDURE — 99900058 HC 022 PAID UNDER SNF PPS

## 2018-04-13 PROCEDURE — 11000004 HC SNF PRIVATE

## 2018-04-13 PROCEDURE — 25000003 PHARM REV CODE 250: Performed by: HOSPITALIST

## 2018-04-13 PROCEDURE — 97110 THERAPEUTIC EXERCISES: CPT

## 2018-04-13 PROCEDURE — 97116 GAIT TRAINING THERAPY: CPT

## 2018-04-13 PROCEDURE — 25000003 PHARM REV CODE 250: Performed by: NURSE PRACTITIONER

## 2018-04-13 PROCEDURE — 97530 THERAPEUTIC ACTIVITIES: CPT

## 2018-04-13 PROCEDURE — 25000003 PHARM REV CODE 250: Performed by: INTERNAL MEDICINE

## 2018-04-13 PROCEDURE — 97535 SELF CARE MNGMENT TRAINING: CPT

## 2018-04-13 PROCEDURE — 25000003 PHARM REV CODE 250: Performed by: ORTHOPAEDIC SURGERY

## 2018-04-13 RX ORDER — OXYCODONE AND ACETAMINOPHEN 7.5; 325 MG/1; MG/1
2 TABLET ORAL EVERY 4 HOURS PRN
Status: DISCONTINUED | OUTPATIENT
Start: 2018-04-13 | End: 2018-04-16 | Stop reason: HOSPADM

## 2018-04-13 RX ADMIN — INSULIN ASPART 4 UNITS: 100 INJECTION, SOLUTION INTRAVENOUS; SUBCUTANEOUS at 05:04

## 2018-04-13 RX ADMIN — OXYCODONE HYDROCHLORIDE AND ACETAMINOPHEN 2 TABLET: 7.5; 325 TABLET ORAL at 01:04

## 2018-04-13 RX ADMIN — GABAPENTIN 600 MG: 300 CAPSULE ORAL at 08:04

## 2018-04-13 RX ADMIN — TIZANIDINE 12 MG: 4 TABLET ORAL at 09:04

## 2018-04-13 RX ADMIN — STANDARDIZED SENNA CONCENTRATE AND DOCUSATE SODIUM 2 TABLET: 8.6; 5 TABLET, FILM COATED ORAL at 08:04

## 2018-04-13 RX ADMIN — REGULAR STRENGTH 325 MG: 325 TABLET ORAL at 09:04

## 2018-04-13 RX ADMIN — ACETAMINOPHEN 500 MG: 500 TABLET ORAL at 09:04

## 2018-04-13 RX ADMIN — TIZANIDINE 12 MG: 4 TABLET ORAL at 08:04

## 2018-04-13 RX ADMIN — FAMOTIDINE 20 MG: 20 TABLET, FILM COATED ORAL at 08:04

## 2018-04-13 RX ADMIN — LIDOCAINE 1 PATCH: 50 PATCH TOPICAL at 02:04

## 2018-04-13 RX ADMIN — FAMOTIDINE 20 MG: 20 TABLET, FILM COATED ORAL at 09:04

## 2018-04-13 RX ADMIN — BUSPIRONE HYDROCHLORIDE 10 MG: 10 TABLET ORAL at 08:04

## 2018-04-13 RX ADMIN — HYDROCHLOROTHIAZIDE 12.5 MG: 12.5 TABLET ORAL at 08:04

## 2018-04-13 RX ADMIN — RAMELTEON 8 MG: 8 TABLET, FILM COATED ORAL at 09:04

## 2018-04-13 RX ADMIN — ACETAMINOPHEN 500 MG: 500 TABLET ORAL at 02:04

## 2018-04-13 RX ADMIN — TRAZODONE HYDROCHLORIDE 50 MG: 50 TABLET ORAL at 09:04

## 2018-04-13 RX ADMIN — STANDARDIZED SENNA CONCENTRATE AND DOCUSATE SODIUM 2 TABLET: 8.6; 5 TABLET, FILM COATED ORAL at 09:04

## 2018-04-13 RX ADMIN — FLUTICASONE PROPIONATE 100 MCG: 50 SPRAY, METERED NASAL at 08:04

## 2018-04-13 RX ADMIN — MIRTAZAPINE 45 MG: 15 TABLET, FILM COATED ORAL at 09:04

## 2018-04-13 RX ADMIN — ACETAMINOPHEN 500 MG: 500 TABLET ORAL at 08:04

## 2018-04-13 RX ADMIN — VALSARTAN 80 MG: 80 TABLET ORAL at 08:04

## 2018-04-13 RX ADMIN — GABAPENTIN 600 MG: 300 CAPSULE ORAL at 09:04

## 2018-04-13 RX ADMIN — GABAPENTIN 600 MG: 300 CAPSULE ORAL at 02:04

## 2018-04-13 RX ADMIN — INSULIN ASPART 6 UNITS: 100 INJECTION, SOLUTION INTRAVENOUS; SUBCUTANEOUS at 05:04

## 2018-04-13 RX ADMIN — QUETIAPINE FUMARATE 800 MG: 200 TABLET, FILM COATED ORAL at 09:04

## 2018-04-13 RX ADMIN — LEVOTHYROXINE SODIUM 150 MCG: 75 TABLET ORAL at 06:04

## 2018-04-13 RX ADMIN — INSULIN ASPART 6 UNITS: 100 INJECTION, SOLUTION INTRAVENOUS; SUBCUTANEOUS at 08:04

## 2018-04-13 RX ADMIN — CARVEDILOL 6.25 MG: 12.5 TABLET, FILM COATED ORAL at 08:04

## 2018-04-13 RX ADMIN — BUSPIRONE HYDROCHLORIDE 10 MG: 10 TABLET ORAL at 09:04

## 2018-04-13 RX ADMIN — CARVEDILOL 6.25 MG: 12.5 TABLET, FILM COATED ORAL at 09:04

## 2018-04-13 RX ADMIN — OXYCODONE HYDROCHLORIDE 15 MG: 5 TABLET ORAL at 08:04

## 2018-04-13 RX ADMIN — INSULIN ASPART 6 UNITS: 100 INJECTION, SOLUTION INTRAVENOUS; SUBCUTANEOUS at 12:04

## 2018-04-13 RX ADMIN — REGULAR STRENGTH 325 MG: 325 TABLET ORAL at 08:04

## 2018-04-13 RX ADMIN — INSULIN DETEMIR 20 UNITS: 100 INJECTION, SOLUTION SUBCUTANEOUS at 09:04

## 2018-04-13 RX ADMIN — OXYCODONE HYDROCHLORIDE AND ACETAMINOPHEN 2 TABLET: 7.5; 325 TABLET ORAL at 05:04

## 2018-04-13 RX ADMIN — OXYCODONE HYDROCHLORIDE 20 MG: 5 TABLET ORAL at 04:04

## 2018-04-13 RX ADMIN — OXYCODONE HYDROCHLORIDE AND ACETAMINOPHEN 2 TABLET: 7.5; 325 TABLET ORAL at 09:04

## 2018-04-13 NOTE — PLAN OF CARE
Problem: Physical Therapy Goal  Goal: Physical Therapy Goal  Goals to be met by: 14 days     Patient will increase functional independence with mobility by performin. Supine to sit with Modified [place]= met 4/3/2018  2. Sit to supine with Modified [place]= met 4/3/2018  3. Sit to stand transfer with Modified [place] with Rolling walker  4. Bed to chair transfer with Supervision using Rolling Walker  5. Gait  x 150 feet with Supervision using Rolling Walker.   6. Wheelchair propulsion x100 feet with Modified [place] using bilateral upper extremities Met 18  7. Ascend/Descend 4 inch curb step with Stand-by Assistance using Rolling Walker. Met 18  8. Lower extremity exercise program x20 reps per handout, with assistance as needed and gym therex, per TKA protocol       Outcome: Ongoing (interventions implemented as appropriate)  1 goal met this session. Goals remain appropriate.

## 2018-04-13 NOTE — PLAN OF CARE
Problem: Occupational Therapy Goal  Goal: Occupational Therapy Goal  Goals to be met by: 7 days     Patient will increase functional independence with ADLs by performing:    UE Dressing with Modified [place].   Met  LE Dressing with Modified [place].     Grooming while seated with Modified [place].   Met  Toileting from bedside commode with Modified [place] for hygiene and clothing management. Met  Bathing from  edge of bed with Set-up Assistance.   Met  Supine to sit with Modified [place].   Met  Toilet transfer to bedside commode with Modified [place].  Met  Upper extremity exercise program x10  reps per set, with supervision.   Ongoing  Pt will tolerate up to 10 minutes of functional standing activity with (S) and RW   in prep for ADLs in standing.  ongoing      Outcome: Ongoing (interventions implemented as appropriate)  PHILIP Asher/LULI      4/13/2018

## 2018-04-13 NOTE — PT/OT/SLP PROGRESS
Occupational Therapy  Treatment    Homar Jerry   MRN: [id number]   Admitting Diagnosis: Primary osteoarthritis of left knee    OT Date of Treatment: 04/13/18  Total Time (min): 50 min    Billable Minutes:  Self Care/Home Management 15, Therapeutic Activity 15 and Therapeutic Exercise 20    General Precautions: Standard, fall  Orthopedic Precautions: LLE weight bearing as tolerated  Braces: N/A    Do you have any cultural, spiritual, Scientology conflicts, given your current situation?: none stated    Subjective:  Communicated with nurse prior to session.      Pain/Comfort  Pain Rating 1: 9/10  Location - Side 1: Left  Location - Orientation 1: generalized  Location 1: knee  Pain Addressed 1: Pre-medicate for activity, Reposition, Distraction  Pain Rating Post-Intervention 1: 9/10    Objective:  Patient found with:  (no lines)    Functional Status:  MDS G  Bed Mobility Functional Status:  - (I) (no assist needed.)    Transfer Functional Status: Mod(I) (EOB<> W/C and W/C<> BSC with RW)    Dressing Functional Status: S-SBA (UBD performed Mod (I)ly, LBD (S) only. Pt donning pants, panties and socks with RW when standing.)      Moving from seated to standing position: Not steady, but able to stabilize without staff assistance  Turning around and facing the opposite direction while walking: Not steady, but able to stabilize without staff assistance  Moving on and off the toilet: Not steady, but able to stabilize without staff assistance  Surface-to-surface transfer (transfer between bed and chair or wheelchair): Not steady, but able to stabilize without staff assistance           AMPA 6 Click:  AMPAC Total Score: 22    OT Exercises: UE Ergometer performed UE UBE exercises with Mod resistance  [place]uel[place] Performed 5 set 20 reps with 20 pounds    Additional Treatment:  - Walk in Room Functional Status: S-SBA (ambulated 12ft EOB to restroom     with RW Pt ambulated from EOB to therapy gym 90 ft with (S) and RW)    - Pt  completed functional mobility, transfers and self care tasks as listed above.  - OT received POC with Patient    Patient left up in chair with call button in reach and nurse notified    ASSESSMENT:  Homar Jerry is a 42 y.o. female with a medical diagnosis of Primary osteoarthritis of left knee .Pt was agreeable to OT and tolerated Tx without incident but continues to require (A) to perform functional activities to completion. OT intervention required to address performance deficits affecting performance of self care tasks, functional mobility, functional transfers, functional strength and endurance.  Pt is making progress but continues to require OT Intervention to perform functional transfers, functional standing activities, and self care tasks with standing component more independently. OT is recommended to further her functional (I)ce and safety. Goals remain appropriate and continued OT is recommended.      Rehab identified problem list/impairments: weakness, impaired endurance, impaired self care skills, impaired functional mobilty, gait instability    Rehab potential is good    Activity tolerance: Good    Discharge recommendations: home with home health, home health OT     Barriers to discharge: None     Equipment recommendations: none     GOALS:    Occupational Therapy Goals        Problem: Occupational Therapy Goal    Goal Priority Disciplines Outcome Interventions   Occupational Therapy Goal     OT, PT/OT Ongoing (interventions implemented as appropriate)    Description:  Goals to be met by: 7 days     Patient will increase functional independence with ADLs by performing:    UE Dressing with Modified [place].   Met  LE Dressing with Modified [place].     Grooming while seated with Modified [place].   Met  Toileting from bedside commode with Modified [place] for hygiene and clothing management. Met  Bathing from  edge of bed with Set-up Assistance.   Met  Supine to sit with  Modified [place].   Met  Toilet transfer to bedside commode with Modified [place].  Met  Upper extremity exercise program x10  reps per set, with supervision.   Ongoing  Pt will tolerate up to 10 minutes of functional standing activity with (S) and RW   in prep for ADLs in standing.  ongoing                        Plan:  Patient to be seen 5 x/week to address the above listed problems via self-care/home management, therapeutic activities, therapeutic exercises  Plan of Care expires: 05/02/18  Plan of Care reviewed with: patient    Brodie Mock OTR/L  04/13/2018

## 2018-04-13 NOTE — PT/OT/SLP PROGRESS
Physical Therapy  Treatment    Homar Jerry   MRN: [id number]   Admitting Diagnosis: Primary osteoarthritis of left knee    PT Received On: 04/13/18  Total Time (min): 62       Billable Minutes:  Gait Training 25, Therapeutic Activity 15 and Therapeutic Exercise 22    Treatment Type: Treatment  PT/PTA: PTA     PTA Visit Number: 4       General Precautions: Standard, fall  Orthopedic Precautions: LLE weight bearing as tolerated   Braces: N/A         Subjective:  Pt agreeable to therapy.     Pain/Comfort  Pain Rating 1: 9/10  Location - Side 1: Left  Location - Orientation 1: generalized  Location 1: knee  Pain Addressed 1: Pre-medicate for activity, Reposition, Distraction  Pain Rating Post-Intervention 1: 9/10    Objective:  Patient found sitting up in w/c in gym with OT present having just completed session.   Pt L knee AROM: 0* ext. - 87* flex.     Functional Status:  MDS G  Bed Mobility Functional Status: mod(I) - (I)  Transfer Functional Status: mod(I) - (I)  Walk in Room Functional Status: S-SBA  Walk in Corridor Functional Status: S-SBA  Moving from seated to standing position: Not steady, but able to stabilize without staff assistance  Walking (with assistive device if used): Not steady, but able to stabilize without staff assistance  Turning around and facing the opposite direction while walking: Not steady, but able to stabilize without staff assistance  Surface-to-surface transfer (transfer between bed and chair or wheelchair): Not steady, but able to stabilize without staff assistance          AM-PAC 6 CLICK MOBILITY  Total Score:22    Bed Mobility:  Sit>Supine: Mod-I  Supine>Sit: Mod-I     Transfers:  Sit<>Stand: Mod-I with RW, from w/c, mat , and bed.   Stand Pivot Transfer: Supervision with RW, w/c <> mat and w/c > bed.     Gait:  Amb: Pt ambulated 300 ft, 140 ft, and 100 ft with RW and SBA/Supervision. Pt given minimal cueing for posture and L quad activation for improved L knee stability and  "stance phase. Seated rest between trials.      Advanced Gait:  Curb Step: Pt asc/dec 4" curb with RW and SBA/Supervision. Pt with minimal cueing for AD mgmt.     Wheelchair Mobility:  Patient propels w/c 100 ft with BUE and Mod-I.     Therex:  Seated/Supine LLE therex per TKA protocol x 30 reps with AAROM as needed. Assist primarily needed for improving quality of reps, with active assist for improved ROM/stability on HS and SLR.   Pt with tactile cueing to engage L quad activation during SAQ, LAQ, SLR.     Balance:  Pt stands for 8 minutes with RW and alternating DONOVAN/BUE support while performing block puzzle to simulate functional tabletop activity in standing.     Additional Treatment:  Pt educated on continuing POC and d/c recommendations as pt nears scheduled d/c date.     Patient left HOB elevated with call button in reach.    Assessment:  Homar Jerry is a 42 y.o. female with a medical diagnosis of Primary osteoarthritis of left knee.  Pt tolerated tx well with focus on gait training therex, and transfers. Pt progressing well and is beginning to reach SBA/Supervision level with ambulation. Pt ROM is near 90 degrees of flexion and pt has reached 0 degrees extension and has maintained it for many sessions now. Pt will continue to benefit from skilled therapy to improve impairments listed below.     Rehab identified problem list/impairments: weakness, impaired endurance, impaired functional mobilty, gait instability, impaired balance, decreased lower extremity function, pain    Rehab potential is good.    Activity tolerance: Fair    Discharge recommendations: home with home health     Barriers to discharge: Decreased caregiver support    Equipment recommendations: none     GOALS:    Physical Therapy Goals        Problem: Physical Therapy Goal    Goal Priority Disciplines Outcome Goal Variances Interventions   Physical Therapy Goal     PT/OT, PT Ongoing (interventions implemented as appropriate)   "   Description:  Goals to be met by: 14 days     Patient will increase functional independence with mobility by performin. Supine to sit with Modified [place]= met 4/3/2018  2. Sit to supine with Modified [place]= met 4/3/2018  3. Sit to stand transfer with Modified [place] with Rolling walker  4. Bed to chair transfer with Supervision using Rolling Walker  5. Gait  x 150 feet with Supervision using Rolling Walker.   6. Wheelchair propulsion x100 feet with Modified [place] using bilateral upper extremities Met 18  7. Ascend/Descend 4 inch curb step with Stand-by Assistance using Rolling Walker. Met 18  8. Lower extremity exercise program x20 reps per handout, with assistance as needed and gym therex, per TKA protocol                         PLAN:    Patient to be seen 5 x/week  to address the above listed problems via gait training, therapeutic activities, therapeutic exercises, wheelchair management/training  Plan of Care expires: 18  Plan of Care reviewed with: patient    Jasbir Ochoa, PTA  2018

## 2018-04-14 LAB
POCT GLUCOSE: 104 MG/DL (ref 70–110)
POCT GLUCOSE: 183 MG/DL (ref 70–110)
POCT GLUCOSE: 189 MG/DL (ref 70–110)
POCT GLUCOSE: 209 MG/DL (ref 70–110)

## 2018-04-14 PROCEDURE — 25000003 PHARM REV CODE 250: Performed by: HOSPITALIST

## 2018-04-14 PROCEDURE — 25000003 PHARM REV CODE 250: Performed by: INTERNAL MEDICINE

## 2018-04-14 PROCEDURE — 25000003 PHARM REV CODE 250: Performed by: NURSE PRACTITIONER

## 2018-04-14 PROCEDURE — 11000004 HC SNF PRIVATE

## 2018-04-14 PROCEDURE — 25000003 PHARM REV CODE 250: Performed by: ORTHOPAEDIC SURGERY

## 2018-04-14 PROCEDURE — 63600175 PHARM REV CODE 636 W HCPCS: Performed by: NURSE PRACTITIONER

## 2018-04-14 RX ADMIN — FAMOTIDINE 20 MG: 20 TABLET, FILM COATED ORAL at 09:04

## 2018-04-14 RX ADMIN — QUETIAPINE FUMARATE 800 MG: 200 TABLET, FILM COATED ORAL at 09:04

## 2018-04-14 RX ADMIN — MIRTAZAPINE 45 MG: 15 TABLET, FILM COATED ORAL at 09:04

## 2018-04-14 RX ADMIN — VALSARTAN 80 MG: 80 TABLET ORAL at 09:04

## 2018-04-14 RX ADMIN — HYDROCHLOROTHIAZIDE 12.5 MG: 12.5 TABLET ORAL at 08:04

## 2018-04-14 RX ADMIN — PROMETHAZINE HYDROCHLORIDE 25 MG: 12.5 TABLET ORAL at 04:04

## 2018-04-14 RX ADMIN — STANDARDIZED SENNA CONCENTRATE AND DOCUSATE SODIUM 2 TABLET: 8.6; 5 TABLET, FILM COATED ORAL at 09:04

## 2018-04-14 RX ADMIN — INSULIN ASPART 6 UNITS: 100 INJECTION, SOLUTION INTRAVENOUS; SUBCUTANEOUS at 09:04

## 2018-04-14 RX ADMIN — REGULAR STRENGTH 325 MG: 325 TABLET ORAL at 09:04

## 2018-04-14 RX ADMIN — OXYCODONE HYDROCHLORIDE AND ACETAMINOPHEN 2 TABLET: 7.5; 325 TABLET ORAL at 01:04

## 2018-04-14 RX ADMIN — GABAPENTIN 600 MG: 300 CAPSULE ORAL at 09:04

## 2018-04-14 RX ADMIN — LEVOTHYROXINE SODIUM 150 MCG: 75 TABLET ORAL at 05:04

## 2018-04-14 RX ADMIN — TIZANIDINE 12 MG: 4 TABLET ORAL at 09:04

## 2018-04-14 RX ADMIN — BUSPIRONE HYDROCHLORIDE 10 MG: 10 TABLET ORAL at 08:04

## 2018-04-14 RX ADMIN — INSULIN DETEMIR 20 UNITS: 100 INJECTION, SOLUTION SUBCUTANEOUS at 09:04

## 2018-04-14 RX ADMIN — RAMELTEON 8 MG: 8 TABLET, FILM COATED ORAL at 09:04

## 2018-04-14 RX ADMIN — FAMOTIDINE 20 MG: 20 TABLET, FILM COATED ORAL at 08:04

## 2018-04-14 RX ADMIN — OXYCODONE HYDROCHLORIDE AND ACETAMINOPHEN 2 TABLET: 7.5; 325 TABLET ORAL at 09:04

## 2018-04-14 RX ADMIN — OXYCODONE HYDROCHLORIDE AND ACETAMINOPHEN 2 TABLET: 7.5; 325 TABLET ORAL at 04:04

## 2018-04-14 RX ADMIN — TRAZODONE HYDROCHLORIDE 50 MG: 50 TABLET ORAL at 09:04

## 2018-04-14 RX ADMIN — INSULIN ASPART 1 UNITS: 100 INJECTION, SOLUTION INTRAVENOUS; SUBCUTANEOUS at 09:04

## 2018-04-14 RX ADMIN — CARVEDILOL 6.25 MG: 12.5 TABLET, FILM COATED ORAL at 09:04

## 2018-04-14 RX ADMIN — FLUTICASONE PROPIONATE 100 MCG: 50 SPRAY, METERED NASAL at 08:04

## 2018-04-14 RX ADMIN — ALPRAZOLAM 1 MG: 0.5 TABLET ORAL at 09:04

## 2018-04-14 RX ADMIN — BUSPIRONE HYDROCHLORIDE 10 MG: 10 TABLET ORAL at 09:04

## 2018-04-14 RX ADMIN — OXYCODONE HYDROCHLORIDE AND ACETAMINOPHEN 2 TABLET: 7.5; 325 TABLET ORAL at 05:04

## 2018-04-14 RX ADMIN — ALPRAZOLAM 1 MG: 0.5 TABLET ORAL at 08:04

## 2018-04-14 RX ADMIN — INSULIN ASPART 2 UNITS: 100 INJECTION, SOLUTION INTRAVENOUS; SUBCUTANEOUS at 09:04

## 2018-04-14 RX ADMIN — INSULIN ASPART 2 UNITS: 100 INJECTION, SOLUTION INTRAVENOUS; SUBCUTANEOUS at 12:04

## 2018-04-14 RX ADMIN — LIDOCAINE 1 PATCH: 50 PATCH TOPICAL at 03:04

## 2018-04-14 RX ADMIN — GABAPENTIN 600 MG: 300 CAPSULE ORAL at 03:04

## 2018-04-14 NOTE — PT/OT/SLP PROGRESS
Physical Therapy      Patient Name:  Homar Jerry   MRN:  [id number]    Patient not seen today secondary to pt polite refusal stating that she was having increased pain and soreness. Will follow-up tomorrow.    Haylee Estevez, PT   4/14/18

## 2018-04-14 NOTE — PLAN OF CARE
Problem: Patient Care Overview  Goal: Plan of Care Review  Outcome: Ongoing (interventions implemented as appropriate)  Ms Jerry is lying in bed with eys closed; she is easily awaken by voice. Pt current CBG is 209. Pt ahs not eaten her lunch at this time; she stated she may eat it later. Scheduled 6 units of Aspart insulin held, but 2 unit of s/s Aspart insulin administered per MD order. Pt received verbal teaching what can occur if she takes her scheduled Aspart insulin and does not eat. Safety measures maintained. Will continue with plan of care.

## 2018-04-15 LAB
POCT GLUCOSE: 128 MG/DL (ref 70–110)
POCT GLUCOSE: 232 MG/DL (ref 70–110)
POCT GLUCOSE: 94 MG/DL (ref 70–110)

## 2018-04-15 PROCEDURE — 11000004 HC SNF PRIVATE

## 2018-04-15 PROCEDURE — 97110 THERAPEUTIC EXERCISES: CPT

## 2018-04-15 PROCEDURE — 25000003 PHARM REV CODE 250: Performed by: ORTHOPAEDIC SURGERY

## 2018-04-15 PROCEDURE — 25000003 PHARM REV CODE 250: Performed by: INTERNAL MEDICINE

## 2018-04-15 PROCEDURE — 25000003 PHARM REV CODE 250: Performed by: NURSE PRACTITIONER

## 2018-04-15 PROCEDURE — 97116 GAIT TRAINING THERAPY: CPT

## 2018-04-15 RX ADMIN — ALPRAZOLAM 1 MG: 0.5 TABLET ORAL at 10:04

## 2018-04-15 RX ADMIN — TIZANIDINE 12 MG: 4 TABLET ORAL at 09:04

## 2018-04-15 RX ADMIN — LIDOCAINE 1 PATCH: 50 PATCH TOPICAL at 01:04

## 2018-04-15 RX ADMIN — OXYCODONE HYDROCHLORIDE AND ACETAMINOPHEN 2 TABLET: 7.5; 325 TABLET ORAL at 05:04

## 2018-04-15 RX ADMIN — BUSPIRONE HYDROCHLORIDE 10 MG: 10 TABLET ORAL at 09:04

## 2018-04-15 RX ADMIN — FLUTICASONE PROPIONATE 100 MCG: 50 SPRAY, METERED NASAL at 09:04

## 2018-04-15 RX ADMIN — LEVOTHYROXINE SODIUM 150 MCG: 75 TABLET ORAL at 05:04

## 2018-04-15 RX ADMIN — CARVEDILOL 6.25 MG: 12.5 TABLET, FILM COATED ORAL at 09:04

## 2018-04-15 RX ADMIN — QUETIAPINE FUMARATE 800 MG: 200 TABLET, FILM COATED ORAL at 09:04

## 2018-04-15 RX ADMIN — INSULIN ASPART 6 UNITS: 100 INJECTION, SOLUTION INTRAVENOUS; SUBCUTANEOUS at 12:04

## 2018-04-15 RX ADMIN — OXYCODONE HYDROCHLORIDE AND ACETAMINOPHEN 2 TABLET: 7.5; 325 TABLET ORAL at 10:04

## 2018-04-15 RX ADMIN — GABAPENTIN 600 MG: 300 CAPSULE ORAL at 09:04

## 2018-04-15 RX ADMIN — FAMOTIDINE 20 MG: 20 TABLET, FILM COATED ORAL at 09:04

## 2018-04-15 RX ADMIN — REGULAR STRENGTH 325 MG: 325 TABLET ORAL at 09:04

## 2018-04-15 RX ADMIN — GABAPENTIN 600 MG: 300 CAPSULE ORAL at 01:04

## 2018-04-15 RX ADMIN — INSULIN DETEMIR 20 UNITS: 100 INJECTION, SOLUTION SUBCUTANEOUS at 09:04

## 2018-04-15 RX ADMIN — OXYCODONE HYDROCHLORIDE AND ACETAMINOPHEN 2 TABLET: 7.5; 325 TABLET ORAL at 06:04

## 2018-04-15 RX ADMIN — STANDARDIZED SENNA CONCENTRATE AND DOCUSATE SODIUM 2 TABLET: 8.6; 5 TABLET, FILM COATED ORAL at 09:04

## 2018-04-15 RX ADMIN — ALPRAZOLAM 1 MG: 0.5 TABLET ORAL at 09:04

## 2018-04-15 RX ADMIN — OXYCODONE HYDROCHLORIDE AND ACETAMINOPHEN 2 TABLET: 7.5; 325 TABLET ORAL at 02:04

## 2018-04-15 RX ADMIN — TRAZODONE HYDROCHLORIDE 50 MG: 50 TABLET ORAL at 09:04

## 2018-04-15 RX ADMIN — RAMELTEON 8 MG: 8 TABLET, FILM COATED ORAL at 07:04

## 2018-04-15 RX ADMIN — OXYCODONE HYDROCHLORIDE AND ACETAMINOPHEN 2 TABLET: 7.5; 325 TABLET ORAL at 01:04

## 2018-04-15 RX ADMIN — MIRTAZAPINE 45 MG: 15 TABLET, FILM COATED ORAL at 09:04

## 2018-04-15 RX ADMIN — INSULIN ASPART 4 UNITS: 100 INJECTION, SOLUTION INTRAVENOUS; SUBCUTANEOUS at 12:04

## 2018-04-15 NOTE — PLAN OF CARE
Problem: Patient Care Overview  Goal: Plan of Care Review  Outcome: Ongoing (interventions implemented as appropriate)  Ms Jerry received two 7.5/325 mg Oxycodone for c/o lt leg pain rated at 10 on a pain scale of 0-10. Pt encouraged to use polar ice therapy to aide the reduction of pain and swelling. Ms Jerry verbalized understanding of information. Call light is within reach. Will continue with plan of care.

## 2018-04-15 NOTE — PLAN OF CARE
Problem: Diabetes, Type 2 (Adult)  Goal: Signs and Symptoms of Listed Potential Problems Will be Absent, Minimized or Managed (Diabetes, Type 2)  Signs and symptoms of listed potential problems will be absent, minimized or managed by discharge/transition of care (reference Diabetes, Type 2 (Adult) CPG).   Outcome: Ongoing (interventions implemented as appropriate)   04/15/18 0407   Diabetes, Type 2   Problems Assessed (Type 2 Diabetes) hyperglycemia;hypoglycemia;situational response       Problem: Fall Risk (Adult)  Goal: Identify Related Risk Factors and Signs and Symptoms  Related risk factors and signs and symptoms are identified upon initiation of Human Response Clinical Practice Guideline (CPG)   Outcome: Ongoing (interventions implemented as appropriate)   04/15/18 0407   Fall Risk   Related Risk Factors (Fall Risk) fatigue/slow reaction;gait/mobility problems

## 2018-04-15 NOTE — PLAN OF CARE
Problem: Physical Therapy Goal  Goal: Physical Therapy Goal  Goals to be met by: 14 days     Patient will increase functional independence with mobility by performin. Supine to sit with Modified [place]= met 4/3/2018  2. Sit to supine with Modified [place]= met 4/3/2018  3. Sit to stand transfer with Modified [place] with Rolling walker met 4/15/18  4. Bed to chair transfer with Supervision using Rolling Walker Met 4/15/18  5. Gait  x 150 feet with Supervision using Rolling Walker. Met 4/15/18  6. Wheelchair propulsion x100 feet with Modified [place] using bilateral upper extremities Met 18  7. Ascend/Descend 4 inch curb step with Stand-by Assistance using Rolling Walker. Met 18  8. Lower extremity exercise program x20 reps per handout, with assistance as needed and gym therex, per TKA protocol         Outcome: Ongoing (interventions implemented as appropriate)  2 goals met today
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
## 2018-04-16 VITALS
HEIGHT: 65 IN | TEMPERATURE: 98 F | SYSTOLIC BLOOD PRESSURE: 126 MMHG | OXYGEN SATURATION: 99 % | BODY MASS INDEX: 45.66 KG/M2 | DIASTOLIC BLOOD PRESSURE: 82 MMHG | WEIGHT: 274.06 LBS | HEART RATE: 100 BPM | RESPIRATION RATE: 18 BRPM

## 2018-04-16 LAB
ANION GAP SERPL CALC-SCNC: 9 MMOL/L
BASOPHILS # BLD AUTO: 0.03 K/UL
BASOPHILS NFR BLD: 0.5 %
BUN SERPL-MCNC: 17 MG/DL
CALCIUM SERPL-MCNC: 8.6 MG/DL
CHLORIDE SERPL-SCNC: 104 MMOL/L
CO2 SERPL-SCNC: 27 MMOL/L
CREAT SERPL-MCNC: 0.8 MG/DL
DIFFERENTIAL METHOD: ABNORMAL
EOSINOPHIL # BLD AUTO: 0.2 K/UL
EOSINOPHIL NFR BLD: 3.6 %
ERYTHROCYTE [DISTWIDTH] IN BLOOD BY AUTOMATED COUNT: 15.1 %
EST. GFR  (AFRICAN AMERICAN): >60 ML/MIN/1.73 M^2
EST. GFR  (NON AFRICAN AMERICAN): >60 ML/MIN/1.73 M^2
GLUCOSE SERPL-MCNC: 112 MG/DL
HCT VFR BLD AUTO: 31.9 %
HGB BLD-MCNC: 9.7 G/DL
IMM GRANULOCYTES # BLD AUTO: 0.03 K/UL
IMM GRANULOCYTES NFR BLD AUTO: 0.5 %
LYMPHOCYTES # BLD AUTO: 3 K/UL
LYMPHOCYTES NFR BLD: 47.5 %
MAGNESIUM SERPL-MCNC: 2.1 MG/DL
MCH RBC QN AUTO: 28 PG
MCHC RBC AUTO-ENTMCNC: 30.4 G/DL
MCV RBC AUTO: 92 FL
MONOCYTES # BLD AUTO: 0.5 K/UL
MONOCYTES NFR BLD: 7.5 %
NEUTROPHILS # BLD AUTO: 2.6 K/UL
NEUTROPHILS NFR BLD: 40.4 %
NRBC BLD-RTO: 0 /100 WBC
PHOSPHATE SERPL-MCNC: 4.9 MG/DL
PLATELET # BLD AUTO: 297 K/UL
PMV BLD AUTO: 9.5 FL
POCT GLUCOSE: 132 MG/DL (ref 70–110)
POCT GLUCOSE: 133 MG/DL (ref 70–110)
POCT GLUCOSE: 167 MG/DL (ref 70–110)
POTASSIUM SERPL-SCNC: 3.8 MMOL/L
RBC # BLD AUTO: 3.47 M/UL
SODIUM SERPL-SCNC: 140 MMOL/L
WBC # BLD AUTO: 6.38 K/UL

## 2018-04-16 PROCEDURE — 99315 NF DSCHRG MGMT 30 MIN/LESS: CPT | Mod: ,,, | Performed by: INTERNAL MEDICINE

## 2018-04-16 PROCEDURE — 25000003 PHARM REV CODE 250: Performed by: NURSE PRACTITIONER

## 2018-04-16 PROCEDURE — 84100 ASSAY OF PHOSPHORUS: CPT

## 2018-04-16 PROCEDURE — 83735 ASSAY OF MAGNESIUM: CPT

## 2018-04-16 PROCEDURE — 25000003 PHARM REV CODE 250: Performed by: HOSPITALIST

## 2018-04-16 PROCEDURE — 85025 COMPLETE CBC W/AUTO DIFF WBC: CPT

## 2018-04-16 PROCEDURE — 80048 BASIC METABOLIC PNL TOTAL CA: CPT

## 2018-04-16 PROCEDURE — 36415 COLL VENOUS BLD VENIPUNCTURE: CPT

## 2018-04-16 PROCEDURE — 25000003 PHARM REV CODE 250: Performed by: INTERNAL MEDICINE

## 2018-04-16 PROCEDURE — 25000003 PHARM REV CODE 250: Performed by: ORTHOPAEDIC SURGERY

## 2018-04-16 RX ORDER — OXYCODONE AND ACETAMINOPHEN 7.5; 325 MG/1; MG/1
1-2 TABLET ORAL EVERY 4 HOURS PRN
Qty: 75 TABLET | Refills: 0 | Status: SHIPPED | OUTPATIENT
Start: 2018-04-16 | End: 2018-04-24

## 2018-04-16 RX ORDER — LEVOTHYROXINE SODIUM 150 UG/1
150 TABLET ORAL DAILY
Qty: 30 TABLET | Refills: 3 | Status: ON HOLD | OUTPATIENT
Start: 2018-04-17 | End: 2020-07-10

## 2018-04-16 RX ORDER — FAMOTIDINE 20 MG/1
20 TABLET, FILM COATED ORAL 2 TIMES DAILY
Qty: 60 TABLET | Refills: 1 | Status: SHIPPED | OUTPATIENT
Start: 2018-04-16 | End: 2020-07-16

## 2018-04-16 RX ORDER — QUETIAPINE FUMARATE 400 MG/1
800 TABLET, FILM COATED ORAL NIGHTLY
Qty: 60 TABLET | Refills: 0 | Status: ON HOLD | OUTPATIENT
Start: 2018-04-16 | End: 2020-07-10 | Stop reason: HOSPADM

## 2018-04-16 RX ORDER — CARVEDILOL 6.25 MG/1
6.25 TABLET ORAL 2 TIMES DAILY
Qty: 60 TABLET | Refills: 3 | Status: ON HOLD | OUTPATIENT
Start: 2018-04-16 | End: 2020-07-10

## 2018-04-16 RX ORDER — TRAZODONE HYDROCHLORIDE 50 MG/1
50 TABLET ORAL NIGHTLY
Qty: 30 TABLET | Refills: 0 | Status: SHIPPED | OUTPATIENT
Start: 2018-04-16 | End: 2018-06-19 | Stop reason: SDUPTHER

## 2018-04-16 RX ORDER — ALPRAZOLAM 1 MG/1
1 TABLET ORAL 2 TIMES DAILY PRN
Qty: 30 TABLET | Refills: 0 | Status: ON HOLD | OUTPATIENT
Start: 2018-04-16 | End: 2020-07-10 | Stop reason: SDUPTHER

## 2018-04-16 RX ORDER — INSULIN ASPART 100 [IU]/ML
6 INJECTION, SOLUTION INTRAVENOUS; SUBCUTANEOUS 3 TIMES DAILY
Qty: 5.4 ML | Refills: 3 | Status: SHIPPED | OUTPATIENT
Start: 2018-04-16 | End: 2020-07-14

## 2018-04-16 RX ORDER — BUSPIRONE HYDROCHLORIDE 10 MG/1
10 TABLET ORAL 2 TIMES DAILY
Qty: 60 TABLET | Refills: 0 | Status: SHIPPED | OUTPATIENT
Start: 2018-04-16 | End: 2018-06-19 | Stop reason: SDUPTHER

## 2018-04-16 RX ORDER — VALSARTAN AND HYDROCHLOROTHIAZIDE 80; 12.5 MG/1; MG/1
1 TABLET, FILM COATED ORAL DAILY
Qty: 90 TABLET | Refills: 0 | Status: ON HOLD | OUTPATIENT
Start: 2018-04-16 | End: 2020-07-10 | Stop reason: SDUPTHER

## 2018-04-16 RX ORDER — MIRTAZAPINE 45 MG/1
45 TABLET, FILM COATED ORAL NIGHTLY
Qty: 30 TABLET | Refills: 0 | Status: ON HOLD | OUTPATIENT
Start: 2018-04-16 | End: 2020-07-10 | Stop reason: SDUPTHER

## 2018-04-16 RX ORDER — AMOXICILLIN 250 MG
2 CAPSULE ORAL 2 TIMES DAILY
Status: ON HOLD | COMMUNITY
Start: 2018-04-16 | End: 2020-07-10 | Stop reason: HOSPADM

## 2018-04-16 RX ADMIN — OXYCODONE HYDROCHLORIDE AND ACETAMINOPHEN 2 TABLET: 7.5; 325 TABLET ORAL at 06:04

## 2018-04-16 RX ADMIN — TIZANIDINE 12 MG: 4 TABLET ORAL at 08:04

## 2018-04-16 RX ADMIN — REGULAR STRENGTH 325 MG: 325 TABLET ORAL at 08:04

## 2018-04-16 RX ADMIN — LEVOTHYROXINE SODIUM 150 MCG: 75 TABLET ORAL at 06:04

## 2018-04-16 RX ADMIN — CARVEDILOL 6.25 MG: 12.5 TABLET, FILM COATED ORAL at 08:04

## 2018-04-16 RX ADMIN — INSULIN ASPART 2 UNITS: 100 INJECTION, SOLUTION INTRAVENOUS; SUBCUTANEOUS at 08:04

## 2018-04-16 RX ADMIN — FAMOTIDINE 20 MG: 20 TABLET, FILM COATED ORAL at 08:04

## 2018-04-16 RX ADMIN — BUSPIRONE HYDROCHLORIDE 10 MG: 10 TABLET ORAL at 08:04

## 2018-04-16 RX ADMIN — INSULIN ASPART 6 UNITS: 100 INJECTION, SOLUTION INTRAVENOUS; SUBCUTANEOUS at 01:04

## 2018-04-16 RX ADMIN — FLUTICASONE PROPIONATE 100 MCG: 50 SPRAY, METERED NASAL at 08:04

## 2018-04-16 RX ADMIN — OXYCODONE HYDROCHLORIDE AND ACETAMINOPHEN 2 TABLET: 7.5; 325 TABLET ORAL at 02:04

## 2018-04-16 RX ADMIN — OXYCODONE HYDROCHLORIDE AND ACETAMINOPHEN 2 TABLET: 7.5; 325 TABLET ORAL at 10:04

## 2018-04-16 RX ADMIN — GABAPENTIN 600 MG: 300 CAPSULE ORAL at 08:04

## 2018-04-16 RX ADMIN — VALSARTAN 80 MG: 80 TABLET ORAL at 08:04

## 2018-04-16 RX ADMIN — HYDROCHLOROTHIAZIDE 12.5 MG: 12.5 TABLET ORAL at 08:04

## 2018-04-16 RX ADMIN — ALPRAZOLAM 1 MG: 0.5 TABLET ORAL at 10:04

## 2018-04-16 NOTE — DISCHARGE SUMMARY
"[facility]  Department of Hospital Medicine  Discharge Summary      Patient Name: Homar Jerry  MRN: [id number]  Admission Date: 3/31/2018  Hospital Length of Stay: 16 days  Discharge Date and Time:  04/16/2018 3:24 PM  Attending Physician: Rowena Rodgers MD   Discharging Provider: Francisco Majano NP  Primary Care Provider: Mary Cabrera MD    HPI:   Chief Complaint/Reason for Admission: Primary osteoarthritis of left knee    History of Present Illness:  Patient is a 42 y.o. female DM2, RA, bipolar disorder who presents to SNF after left TKR on 3/28/2018 by Dr. [facility] to treat osteoarthritis.  She currently has 9/10 pain and states that at its best it will be 8/10. She acknowledges a high tolerance to pain meds and takes oxycodone/APAP 10 mg for left knee and back pain chronically.   Polar ice and oxycodone have been best at relieving but ambulation worsens.  She has a chronic poor appetite and gastroparesis with flares every 6-9 months since having gastric pacer and gastric sleeve surgery.  She has additional stressors of her only son's death in the recent past.     The patient has been admitted to SNF for ongoing PT/OT due to insufficient progress to go home safely from the hospital.    Records from last hospital stay reviewed and summarized above.     * No surgery found *      Hospital Course:   The patient was admitted at [facility] from 3/28 to 3/31/2018.    4/2/18  Patient seen for the first time by me, she reports she has pain to her left knee.  States her pain medication gives her relief for about 1 hour after taking.  She reports she suffers from chronic pain and is treated by Dr. Villa.  She reports she is eating, drinking, voiding and having BM without issues.  She currently lives alone and plans to return to [facility] upon discharge.  She reports she has no family assistance.      At 1150 notified by nursing that patient stated she had heard a "pop" while working with " "therapy and then notice blood on her dressing.  She is c/o pain to the left knee.  Will send for x-ray and notify Ortho.  There is scant blood underneath dressing no bigger than a pencil eraser, site outlined.      4/5/18  Patient seen resting in bed, has complaints of ongoing intermittent pain and her blood sugar is "getting low". Xray from 4/2 reviewed without acute findings. She is participating in therapies. Blood pressure noted to be elevated, home diovan resumed.     4/10/18  Patient seen at bedside, she reports having pain to her surgical knee but feels pain medication helps control.  In addition, she states her left hip feels much better since getting the lidoderm patch.  She has no additional complaints at this time.    4/16/18  Patient seen prior to discharge, she reports her pain is controlled with Percocet.  She is ready to go home.  She is asking if I can refill her other Buspar, Seroquel, Xanax and Remeron until she can see her PCP.  No other issues at this time.     Consults         Status Ordering Provider     Inpatient consult to Registered Dietitian/Nutritionist  Once     Provider:  (Not yet assigned)    Completed OMEGA TINAJERO     Inpatient consult to Social Work  Once     Provider:  (Not yet assigned)    Acknowledged OMEGA TINAJERO          Significant Diagnostic Studies: Labs:   BMP:   Recent Labs  Lab 04/16/18  0532   *      K 3.8      CO2 27   BUN 17   CREATININE 0.8   CALCIUM 8.6*   MG 2.1    and CBC   Recent Labs  Lab 04/16/18  0531   WBC 6.38   HGB 9.7*   HCT 31.9*          Pending Diagnostic Studies:     None        Final Active Diagnoses:    Diagnosis Date Noted POA    PRINCIPAL PROBLEM:  Primary osteoarthritis of left knee [M17.12] 03/28/2018 Yes    Status post total left knee replacement [Z96.652] 04/01/2018 Not Applicable    Morbid obesity with BMI of 45.0-49.9, adult [E66.01, Z68.42] 01/18/2017 Not Applicable    Rheumatoid arthritis " [M06.9] 01/17/2017 Yes     Chronic    Type 2 diabetes mellitus with hyperglycemia, with long-term current use of insulin [E11.65, Z79.4] 01/17/2017 Not Applicable    Asthma [J45.909]  Yes     Chronic    Essential hypertension [I10]  Yes     Chronic    Allergic rhinitis [J30.9] 07/01/2015 Yes     Chronic    Migraines [G43.909] 06/30/2015 Yes     Chronic    Chronic low back pain [M54.5, G89.29] 03/17/2015 Yes     Chronic    Bipolar 1 disorder [F31.9] 03/17/2015 Yes     Chronic    Hypothyroidism, postsurgical [E89.0]  Yes     Chronic    Gastroparesis [K31.84] 03/28/2013 Yes      Problems Resolved During this Admission:    Diagnosis Date Noted Date Resolved POA      * Primary osteoarthritis of left knee    S/p Left knee replacement  See plan below.     4/2/18  Pain minimally controlled.    Continue OxyIR 10-20 mg PRN as ordered.  Tizanidine 12 mg bid for muscle spasms.  Will add scheduled tylenol to assist with pain control.  Narcan PRN for over sedation.  Follow up with ortho.  Will do x-ray of knee for complain of pop followed by pain while working in therapy.  ASA for DVT prevention.  Pepcid for gastric protection while on high dose ASA.    4/5/18  Pain minimally controlled.    Continue OxyIR 10-20 mg PRN as ordered.  Tizanidine 12 mg bid for muscle spasms.  Will add scheduled tylenol to assist with pain control.  Narcan PRN for over sedation.  Follow up with ortho.  ASA for DVT prevention.  Pepcid for gastric protection while on high dose ASA.    4/10/18  Patient reported left hip pain yesterday secondary to how her body was positioned in the bed.  Lidoderm patch was placed and gave her relief of her pain.  Her left knee pain is controlled, continue OxyIR 10-20 mg PRN as ordered, Tizanidine 12 mg bid and Tylenol tid as ordered.  Continue ASA for DVT prevention and Pepcid for gastric protection.    4/16/18  Pain well controlled with Percocet 7.5/325 mg 2 tabs q4h PRN, will continue at discharge.  Continue  ASA for DVT prevention with estimated stop date on 4/28.  Follow up with Ortho on 5-8.          Status post total left knee replacement    -continue PT/OT to increase ambulation, ADL performance and endurance  -WBAT  -continue oxycodone for pain control prn  -keep polar ice in place when not in therapy  -continue ASA for DVT prophylaxis; continue famotidine while taking high dose ASA  -Ortho NP to assess surgical wound and remove dressing as indicated at next appt; will notify Ortho team for any leakage or excessive drainage evident on Aquacel dressing  -continue miralax and senokot-s to treat/prevent constipation; Mag citrate prn if scheduled regimen ineffective    4/2/18  Treatment plan as above.  Continue therapy to build functional strength  Lives alone, no family support    4/5/18  Treatment plan as above.  Continue therapy to build functional strength  Lives alone, no family support    4/10/18  Participating and progressing with therapy.  Set to follow up with Ortho this afternoon.  Ultrasound ordered on Friday, no evidence for DVT.    4/16/18  Patient progressed and participated in therapy sessions.  Follow up with Ortho as stated above.        Type 2 diabetes mellitus with hyperglycemia, with long-term current use of insulin    Hemoglobin A1C   Date Value Ref Range Status   03/15/2018 7.6 (H) 4.0 - 5.6 % Final     Comment:   09/26/2017 7.5 (H) 4.0 - 5.6 % Final     Comment:   03/03/2017 10.3 (H) 4.5 - 6.2 % Final     Comment:     Patient does not take stated dose of insulin on MAR as she says she misses several meals; usually Levemir 16 units BID and novology 4 units before meals  Continue current levemir and novolog doses; adequate control currently  Continue diabetic diet therapy with glucose monitoring AC and HS.    Hyperglycemia to be treated with SSNI prn.  Glucose goal is < 180mg/dl and avoidance of hypoglycemia.  Will continue to monitor and adjust regimen as necessary to achieve  goals.    4/2/18  POCT Glucose   Date Value Ref Range Status   04/16/2018 167 (H) 70 - 110 mg/dL Final   04/16/2018 132 (H) 70 - 110 mg/dL Final   04/15/2018 133 (H) 70 - 110 mg/dL Final   04/15/2018 94 70 - 110 mg/dL Final   04/15/2018 232 (H) 70 - 110 mg/dL Final   04/15/2018 128 (H) 70 - 110 mg/dL Final   04/14/2018 183 (H) 70 - 110 mg/dL Final   04/14/2018 104 70 - 110 mg/dL Final   04/14/2018 209 (H) 70 - 110 mg/dL Final   04/14/2018 189 (H) 70 - 110 mg/dL Final   04/13/2018 144 (H) 70 - 110 mg/dL Final   04/13/2018 231 (H) 70 - 110 mg/dL Final     CBG is stable,   Continue Levemir 20 units qhs and Aspart 8 units with meals and per SSI.  Continue diabetic diet as ordered and monitor.    4/5/18  Blood sugar beginning to decrease below 100, patient wishes for mealtime insulin to be decreased  Mealtime insulin decreased from 8 units to 6 units, will monitor for toleration  Continue levemir 20 units qHS and SSI    4/10/18  CBG is stable.    Continue Novolog 6 units with meals and 20 units of levemir qhs.  Novolog per SSI PRN.  Maintain current diet and continue to monitor.    4/16/18  CBG has remained stable.  Will continue Novolog 6 units with meals and 20 units of levemir daily.  Patient to follow up with PCP.        Rheumatoid arthritis    No chronic meds currently  No joint pain other than the surgical site    4/2/18  Chronic, no complaints.  Per MD on no chronic meds.    4/5/18  Chronic, no complaints.  Per MD on no chronic meds.    4/10/18  Chronic no new complaints.    4/16/18  Chronic no acute issues during admission.        Asthma    No wheezing or dyspnea currently  Continue therapy with albuterol inhaler prn  Will continue to monitor    4/2/18  No wheezing or complaints of SOB.  Continue albuterol inh PRN and monitor.    4/5/18  No wheezing or complaints of SOB.  Continue albuterol inh PRN and monitor.    4/16/18  Chronic and stable.  Continue home albuterol inh as needed.        Essential hypertension     Normotensive currently  BP meds (coreg and diovan HCT) held due to hypotension in hospital  Will continue to monitor and resume meds gradually for SBP > 140    4/2/18  BP is 136/98 and HR is 97  Takes coreg 12.5 mg bid at home but currently not ordered.  Per MD note, coreg and diovan HCT held due to hypotension.  Will restart Coreg at lower dose and monitor.    4/5/18  BP remains elevated  Continue lower coreg dose, up titrate if indicated later in course  Resumed home diovan, will monitor     4/10/18  BP is 141/89 and .  Continue Coreg and Diovan-HCT as ordered    4/16/18  BP is 126/82.  Continue home dosing of Coreg and Diovan-HCT.        Allergic rhinitis    Controlled with chronic flonase which will be continued at [facility]    4/2/18  Chronic, no complaints.  Continue flonase as ordered    4/5/18  As above, chronic without complaints, continue flonase    4/16/18  Chronic and stable, continue flonase as ordered.        Migraines    Had an episode today  Resume topamax 25 mg daily prn which will be resumed  Will continue to monitor    4/2/18  No complaints today.  Continue Topamax 25 mg PRN.    4/5/18  Has complaints of nausea associated with knee pain and occasional headache, not migraine like  Continue PRN antiemetics and pain medications.     4/16/18  Chronic, no acute issues.  Continue Topamax as previously ordered.        Chronic low back pain    Controlled currently  Continue chronic therapy with gabapentin and tizanidine with oxycodone prn    4/2/18  Chronic, follow up with Dr. Villa after discharge for management.    4/5/18  Chronic, follow up with Dr. Villa after discharge for management. Denies increase in pain level from baseline.     4/16/18  Chronic, she will need to follow up with her PMR provider for chronic management.        Hypothyroidism, postsurgical    Chronic and stable  Continue therapy with levothyroxine  F/U with PCP for ongoing monitoring and adjustment of levothyroxine dose as  indicated    4/2/18  Continue Synthroid for chronic management and follow up with PCP.    4/5/18  Continue Synthroid for chronic management and follow up with PCP.    4/16/18  Chronic and stable, continue home dosing of Synthroid.        Bipolar 1 disorder    Not labile currently and cooperative with exam  Continue chronic therapy with quetiapine, mirtazipine, buspirone, trazodone  Continue alprazolam prn anxiety    4/2/18  Chronic and stable.  Continue chronic therapy with Seroquel, Mirtazapine, Buspirone and trazodone.  Xanax PRN for anxiety.    4/5/18  Chronic and stable.   Continue chronic therapy with Seroquel, Mirtazapine, Buspirone and trazodone.  Xanax PRN for anxiety.    4/16/18  Chronic and stable.  Continue Seroquel Mirtazapine, Buspirone and Trazodone and Xanax.  Will give her a 1 month supply of her home meds until she can see her PCP for long-term management.        Gastroparesis    No chronic meds since surgery  Will continue to monitor    4/2/18  No acute issues, continue to monitor.    4/5/18  No acute issues, continue to monitor.    4/16/18  Chronic, no acute issues during admission.            Discharged Condition: stable    Disposition: Home-Health Care [facility]    Follow Up:  Follow-up Information     Mary Cabrera MD. Go on 4/30/2018.    Specialty:  Internal Medicine  Why:  Hospital follow up  Contact information:  [address]  [place] [address]  [phone]             Yadira Quinones PA-C. Go on 5/8/2018.    Specialty:  Orthopedic Surgery  Why:  For wound re-check  Contact information:  [address] [id number] [phone]             [facility]  [place].    Specialty:  Home Health Services  Why:  Agency will call pt to schedule a home health assessment.  Contact information:  [address]  [address]  [place] [address] [phone]                 Patient Instructions:     Activity as tolerated     Notify your health care provider if you  experience any of the following:  increased confusion or weakness     Notify your health care provider if you experience any of the following:  persistent dizziness, light-headedness, or visual disturbances     Notify your health care provider if you experience any of the following:  worsening rash     Notify your health care provider if you experience any of the following:  severe persistent headache     Notify your health care provider if you experience any of the following:  difficulty breathing or increased cough     Notify your health care provider if you experience any of the following:  redness, tenderness, or signs of infection (pain, swelling, redness, odor or green/yellow discharge around incision site)     Notify your health care provider if you experience any of the following:  severe uncontrolled pain     Notify your health care provider if you experience any of the following:  persistent nausea and vomiting or diarrhea     Notify your health care provider if you experience any of the following:  temperature >100.4       Medications:  Reconciled Home Medications:      Medication List      START taking these medications    famotidine 20 MG tablet  Commonly known as:  PEPCID  Take 1 tablet (20 mg total) by mouth 2 (two) times daily.     oxyCODONE-acetaminophen 7.5-325 mg per tablet  Commonly known as:  PERCOCET  Take 1-2 tablets by mouth every 4 (four) hours as needed for Pain.  Replaces:  oxyCODONE-acetaminophen  mg per tablet     senna-docusate 8.6-50 mg 8.6-50 mg per tablet  Commonly known as:  PERICOLACE  Take 2 tablets by mouth 2 (two) times daily.        CHANGE how you take these medications    ALPRAZolam 1 MG tablet  Commonly known as:  XANAX  Take 1 tablet (1 mg total) by mouth 2 (two) times daily as needed for Anxiety.  What changed:  · when to take this  · reasons to take this     busPIRone 10 MG tablet  Commonly known as:  BUSPAR  Take 1 tablet (10 mg total) by mouth 2 (two) times  daily.  What changed:  · how to take this  · Another medication with the same name was removed. Continue taking this medication, and follow the directions you see here.     carvedilol 6.25 MG tablet  Commonly known as:  COREG  Take 1 tablet (6.25 mg total) by mouth 2 (two) times daily.  What changed:  · medication strength  · how much to take     insulin aspart U-100 100 unit/mL Inpn pen  Commonly known as:  NovoLOG  Inject 6 Units into the skin 3 (three) times daily.  What changed:  · how much to take  · when to take this     insulin detemir U-100 100 unit/mL (3 mL) Inpn pen  Commonly known as:  LEVEMIR FLEXTOUCH  Inject 20 Units into the skin every evening.  What changed:  · how much to take  · when to take this     levocetirizine 5 MG tablet  Commonly known as:  XYZAL  Take 1 tablet (5 mg total) by mouth every evening.  What changed:  · when to take this  · reasons to take this     mirtazapine 45 MG tablet  Commonly known as:  REMERON  Take 1 tablet (45 mg total) by mouth every evening.  What changed:  Another medication with the same name was removed. Continue taking this medication, and follow the directions you see here.     QUEtiapine 400 MG tablet  Commonly known as:  SEROQUEL  Take 2 tablets (800 mg total) by mouth every evening. Pt states takes two tablets nightly, total of 800 mg  What changed:  how to take this     tiZANidine 4 MG tablet  Commonly known as:  ZANAFLEX  TAKE 3 TABLETS (12 MG TOTAL) BY MOUTH EVERY EVENING.  What changed:  · how much to take  · how to take this  · when to take this  · additional instructions     traZODone 50 MG tablet  Commonly known as:  DESYREL  Take 1 tablet (50 mg total) by mouth every evening.  What changed:  Another medication with the same name was removed. Continue taking this medication, and follow the directions you see here.        CONTINUE taking these medications    aspirin 325 MG tablet  Take 1 tablet (325 mg total) by mouth 2 (two) times daily.     blood sugar  "diagnostic Strp  1 each by Misc.(Non-Drug; Combo Route) route 4 (four) times daily with meals and nightly.     blood-glucose meter kit  Use as instructed     cholecalciferol (vitamin D3) 1,000 unit capsule  Take 2 capsules (2,000 Units total) by mouth once daily.     fluticasone 50 mcg/actuation nasal spray  Commonly known as:  FLONASE  2 sprays by Nasal route 2 (two) times daily.     gabapentin 600 MG tablet  Commonly known as:  NEURONTIN  Take 600 mg by mouth 3 (three) times daily.     lancets Misc  1 each by Misc.(Non-Drug; Combo Route) route 4 (four) times daily with meals and nightly.     levothyroxine 150 MCG tablet  Commonly known as:  SYNTHROID  Take 1 tablet (150 mcg total) by mouth once daily.  Start taking on:  4/17/2018     meclizine 25 mg tablet  Commonly known as:  ANTIVERT  Take 1 tablet (25 mg total) by mouth 3 (three) times daily as needed for Dizziness or Nausea.     pen needle, diabetic 31 gauge x 5/16" Ndle  1 each by Misc.(Non-Drug; Combo Route) route 5 (five) times daily.     promethazine 25 MG tablet  Commonly known as:  PHENERGAN  TAKE 1 TABLET (25 MG TOTAL) BY MOUTH EVERY 6 HOURS AS NEEDED.     topiramate 25 MG tablet  Commonly known as:  TOPAMAX  Take 1 tablet (25 mg total) by mouth daily as needed (migraines).     valsartan-hydrochlorothiazide 80-12.5 mg per tablet  Commonly known as:  DIOVAN-HCT  Take 1 tablet by mouth once daily.     VENTOLIN HFA 90 mcg/actuation inhaler  Generic drug:  albuterol  as needed.        STOP taking these medications    docusate sodium 100 MG capsule  Commonly known as:  COLACE     docusate sodium 50 MG capsule  Commonly known as:  COLACE     hydroCHLOROthiazide 25 MG tablet  Commonly known as:  HYDRODIURIL     hydrOXYzine pamoate 50 MG Cap  Commonly known as:  VISTARIL     oxyCODONE-acetaminophen  mg per tablet  Commonly known as:  PERCOCET  Replaced by:  oxyCODONE-acetaminophen 7.5-325 mg per tablet     POTASSIUM ORAL     zolpidem 10 mg Tab  Commonly " known as:  AMBIEN          Time spent on the discharge of patient: 35 minutes    Francisco Majano NP  Department of [facility] Medicine  [facility]

## 2018-04-16 NOTE — ASSESSMENT & PLAN NOTE
Controlled with chronic flonase which will be continued at [facility]    4/2/18  Chronic, no complaints.  Continue flonase as ordered    4/5/18  As above, chronic without complaints, continue flonase    4/16/18  Chronic and stable, continue flonase as ordered.

## 2018-04-16 NOTE — ASSESSMENT & PLAN NOTE
Had an episode today  Resume topamax 25 mg daily prn which will be resumed  Will continue to monitor    4/2/18  No complaints today.  Continue Topamax 25 mg PRN.    4/5/18  Has complaints of nausea associated with knee pain and occasional headache, not migraine like  Continue PRN antiemetics and pain medications.     4/16/18  Chronic, no acute issues.  Continue Topamax as previously ordered.

## 2018-04-16 NOTE — ASSESSMENT & PLAN NOTE
No chronic meds currently  No joint pain other than the surgical site    4/2/18  Chronic, no complaints.  Per MD on no chronic meds.    4/5/18  Chronic, no complaints.  Per MD on no chronic meds.    4/10/18  Chronic no new complaints.    4/16/18  Chronic no acute issues during admission.

## 2018-04-16 NOTE — TREATMENT PLAN
Rehab Services' DME recommendations    Elissamantha Girishmala Jerry  MRN: [id number]    [x]  No DME needed    [x] Home health PT and OT    Jasbir Ochoa, PTA 4/16/2018

## 2018-04-16 NOTE — ASSESSMENT & PLAN NOTE
No chronic meds since surgery  Will continue to monitor    4/2/18  No acute issues, continue to monitor.    4/5/18  No acute issues, continue to monitor.    4/16/18  Chronic, no acute issues during admission.

## 2018-04-16 NOTE — PROGRESS NOTES
Instructed per nurse practitioner that is okay to discharge patient home.home health orders finished per .

## 2018-04-16 NOTE — ASSESSMENT & PLAN NOTE
Not labile currently and cooperative with exam  Continue chronic therapy with quetiapine, mirtazipine, buspirone, trazodone  Continue alprazolam prn anxiety    4/2/18  Chronic and stable.  Continue chronic therapy with Seroquel, Mirtazapine, Buspirone and trazodone.  Xanax PRN for anxiety.    4/5/18  Chronic and stable.   Continue chronic therapy with Seroquel, Mirtazapine, Buspirone and trazodone.  Xanax PRN for anxiety.    4/16/18  Chronic and stable.  Continue Seroquel Mirtazapine, Buspirone and Trazodone and Xanax.  Will give her a 1 month supply of her home meds until she can see her PCP for long-term management.

## 2018-04-16 NOTE — ADDENDUM NOTE
Addendum  created 04/16/18 0949 by Kwasi Polanco MD    Charge Capture section accepted, Sign clinical note

## 2018-04-16 NOTE — PLAN OF CARE
Problem: Diabetes, Type 1 (Adult)  Goal: Signs and Symptoms of Listed Potential Problems Will be Absent, Minimized or Managed (Diabetes, Type 1)  Signs and symptoms of listed potential problems will be absent, minimized or managed by discharge/transition of care (reference Diabetes, Type 1 (Adult) CPG).   Outcome: Ongoing (interventions implemented as appropriate)   04/16/18 0115   Diabetes, Type 1   Problems Assessed (Type 1 Diabetes) hyperglycemia;hypoglycemia;situational response       Problem: Fall Risk (Adult)  Goal: Identify Related Risk Factors and Signs and Symptoms  Related risk factors and signs and symptoms are identified upon initiation of Human Response Clinical Practice Guideline (CPG)   Outcome: Ongoing (interventions implemented as appropriate)   04/16/18 0115   Fall Risk   Related Risk Factors (Fall Risk) fatigue/slow reaction;gait/mobility problems

## 2018-04-16 NOTE — NURSING
Patient discharged home as ordered. Discharge instructions given to patient per this nurse on medications, follow up appointments, home health, diet, signs or symptoms to report to doctor,orto call 911 in case of emergency, patient verbalized understanding and copy of all discharge orders given to patient. belongins taken per patient. Vials stable, afebrile. Patient discharged home at this time via w/c to main entrance to pt'sister vehicle assisted per two pct's (pt had a lot of belongins). ndn

## 2018-04-16 NOTE — ASSESSMENT & PLAN NOTE
Controlled currently  Continue chronic therapy with gabapentin and tizanidine with oxycodone prn    4/2/18  Chronic, follow up with Dr. Villa after discharge for management.    4/5/18  Chronic, follow up with Dr. Villa after discharge for management. Denies increase in pain level from baseline.     4/16/18  Chronic, she will need to follow up with her PMR provider for chronic management.

## 2018-04-16 NOTE — ASSESSMENT & PLAN NOTE
S/p Left knee replacement  See plan below.     4/2/18  Pain minimally controlled.    Continue OxyIR 10-20 mg PRN as ordered.  Tizanidine 12 mg bid for muscle spasms.  Will add scheduled tylenol to assist with pain control.  Narcan PRN for over sedation.  Follow up with ortho.  Will do x-ray of knee for complain of pop followed by pain while working in therapy.  ASA for DVT prevention.  Pepcid for gastric protection while on high dose ASA.    4/5/18  Pain minimally controlled.    Continue OxyIR 10-20 mg PRN as ordered.  Tizanidine 12 mg bid for muscle spasms.  Will add scheduled tylenol to assist with pain control.  Narcan PRN for over sedation.  Follow up with ortho.  ASA for DVT prevention.  Pepcid for gastric protection while on high dose ASA.    4/10/18  Patient reported left hip pain yesterday secondary to how her body was positioned in the bed.  Lidoderm patch was placed and gave her relief of her pain.  Her left knee pain is controlled, continue OxyIR 10-20 mg PRN as ordered, Tizanidine 12 mg bid and Tylenol tid as ordered.  Continue ASA for DVT prevention and Pepcid for gastric protection.    4/16/18  Pain well controlled with Percocet 7.5/325 mg 2 tabs q4h PRN, will continue at discharge.  Continue ASA for DVT prevention with estimated stop date on 4/28.  Follow up with Ortho on 5-8.

## 2018-04-16 NOTE — ASSESSMENT & PLAN NOTE
Chronic and stable  Continue therapy with levothyroxine  F/U with PCP for ongoing monitoring and adjustment of levothyroxine dose as indicated    4/2/18  Continue Synthroid for chronic management and follow up with PCP.    4/5/18  Continue Synthroid for chronic management and follow up with PCP.    4/16/18  Chronic and stable, continue home dosing of Synthroid.

## 2018-04-16 NOTE — ASSESSMENT & PLAN NOTE
-continue PT/OT to increase ambulation, ADL performance and endurance  -WBAT  -continue oxycodone for pain control prn  -keep polar ice in place when not in therapy  -continue ASA for DVT prophylaxis; continue famotidine while taking high dose ASA  -Ortho NP to assess surgical wound and remove dressing as indicated at next appt; will notify Ortho team for any leakage or excessive drainage evident on Aquacel dressing  -continue miralax and senokot-s to treat/prevent constipation; Mag citrate prn if scheduled regimen ineffective    4/2/18  Treatment plan as above.  Continue therapy to build functional strength  Lives alone, no family support    4/5/18  Treatment plan as above.  Continue therapy to build functional strength  Lives alone, no family support    4/10/18  Participating and progressing with therapy.  Set to follow up with Ortho this afternoon.  Ultrasound ordered on Friday, no evidence for DVT.    4/16/18  Patient progressed and participated in therapy sessions.  Follow up with Ortho as stated above.

## 2018-04-16 NOTE — ASSESSMENT & PLAN NOTE
Normotensive currently  BP meds (coreg and diovan HCT) held due to hypotension in hospital  Will continue to monitor and resume meds gradually for SBP > 140    4/2/18  BP is 136/98 and HR is 97  Takes coreg 12.5 mg bid at home but currently not ordered.  Per MD note, coreg and diovan HCT held due to hypotension.  Will restart Coreg at lower dose and monitor.    4/5/18  BP remains elevated  Continue lower coreg dose, up titrate if indicated later in course  Resumed home diovan, will monitor     4/10/18  BP is 141/89 and .  Continue Coreg and Diovan-HCT as ordered    4/16/18  BP is 126/82.  Continue home dosing of Coreg and Diovan-HCT.

## 2018-04-16 NOTE — ASSESSMENT & PLAN NOTE
No wheezing or dyspnea currently  Continue therapy with albuterol inhaler prn  Will continue to monitor    4/2/18  No wheezing or complaints of SOB.  Continue albuterol inh PRN and monitor.    4/5/18  No wheezing or complaints of SOB.  Continue albuterol inh PRN and monitor.    4/16/18  Chronic and stable.  Continue home albuterol inh as needed.

## 2018-04-16 NOTE — ASSESSMENT & PLAN NOTE
Hemoglobin A1C   Date Value Ref Range Status   03/15/2018 7.6 (H) 4.0 - 5.6 % Final     Comment:   09/26/2017 7.5 (H) 4.0 - 5.6 % Final     Comment:   03/03/2017 10.3 (H) 4.5 - 6.2 % Final     Comment:     Patient does not take stated dose of insulin on MAR as she says she misses several meals; usually Levemir 16 units BID and novology 4 units before meals  Continue current levemir and novolog doses; adequate control currently  Continue diabetic diet therapy with glucose monitoring AC and HS.    Hyperglycemia to be treated with SSNI prn.  Glucose goal is < 180mg/dl and avoidance of hypoglycemia.  Will continue to monitor and adjust regimen as necessary to achieve goals.    4/2/18  POCT Glucose   Date Value Ref Range Status   04/16/2018 167 (H) 70 - 110 mg/dL Final   04/16/2018 132 (H) 70 - 110 mg/dL Final   04/15/2018 133 (H) 70 - 110 mg/dL Final   04/15/2018 94 70 - 110 mg/dL Final   04/15/2018 232 (H) 70 - 110 mg/dL Final   04/15/2018 128 (H) 70 - 110 mg/dL Final   04/14/2018 183 (H) 70 - 110 mg/dL Final   04/14/2018 104 70 - 110 mg/dL Final   04/14/2018 209 (H) 70 - 110 mg/dL Final   04/14/2018 189 (H) 70 - 110 mg/dL Final   04/13/2018 144 (H) 70 - 110 mg/dL Final   04/13/2018 231 (H) 70 - 110 mg/dL Final     CBG is stable,   Continue Levemir 20 units qhs and Aspart 8 units with meals and per SSI.  Continue diabetic diet as ordered and monitor.    4/5/18  Blood sugar beginning to decrease below 100, patient wishes for mealtime insulin to be decreased  Mealtime insulin decreased from 8 units to 6 units, will monitor for toleration  Continue levemir 20 units qHS and SSI    4/10/18  CBG is stable.    Continue Novolog 6 units with meals and 20 units of levemir qhs.  Novolog per SSI PRN.  Maintain current diet and continue to monitor.    4/16/18  CBG has remained stable.  Will continue Novolog 6 units with meals and 20 units of levemir daily.  Patient to follow up with PCP.

## 2018-04-16 NOTE — PLAN OF CARE
[facility] HOME HEALTH ORDERS  FACE TO FACE ENCOUNTER    Patient Name: Homar Jerry  YOB: 1975    PCP: Mary Cabrera MD   PCP Address: [address] [facility] [address]  PCP Phone Number: [phone]  PCP Fax: [phone]    Encounter Date: 04/16/2018    Admit to Home Health    Diagnoses:  Active Hospital Problems    Diagnosis  POA    *Primary osteoarthritis of left knee [M17.12]  Yes    Status post total left knee replacement [Z96.652]  Not Applicable    Morbid obesity with BMI of 45.0-49.9, adult [E66.01, Z68.42]  Not Applicable    Rheumatoid arthritis [M06.9]  Yes     Chronic     Had Rheumatology evaluation in the past and was felt that   since she has fatty liver cannot do MTX.      Type 2 diabetes mellitus with hyperglycemia, with long-term current use of insulin [E11.65, Z79.4]  Not Applicable    Asthma [J45.909]  Yes     Chronic     usually with cold weather      Essential hypertension [I10]  Yes     Chronic     Carvedilol   HCTZ  Vlsartan- HCTZ  Usual BP- 110/70-80       Allergic rhinitis [J30.9]  Yes     Chronic    Migraines [G43.909]  Yes     Chronic    Chronic low back pain [M54.5, G89.29]  Yes     Chronic    Bipolar 1 disorder [F31.9]  Yes     Chronic     Under Psychiatrist care   Subjectively controlled       Hypothyroidism, postsurgical [E89.0]  Yes     Chronic     thyroid cancer s/p thyroidectomy in 2013      Gastroparesis [K31.84]  Yes     s/p lap sleeve gastrectomy   No recent problem         Resolved Hospital Problems    Diagnosis Date Resolved POA   No resolved problems to display.       Future Appointments  Date Time Provider Department Center   4/30/2018 10:00 AM Mary Cabrera MD [facility]   5/8/2018 1:15 PM Yadira Quinones PA-C [facility]     Follow-up Information     Mary Cabrera MD. Go on 4/30/2018.    Specialty:  Internal Medicine  Why:  Hospital follow up  Contact  information:  [address]  [facility] [address]  [phone]             Yadira Quinones PA-C. Go on 5/8/2018.    Specialty:  Orthopedic Surgery  Why:  For wound re-check  Contact information:  [address]  [facility] [address]  [phone]                     I have seen and examined this patient face to face today. My clinical findings that support the need for the home health skilled services and home bound status are the following:  Weakness/numbness causing balance and gait disturbance due to Joint Replacement and Weakness/Debility making it taxing to leave home.    Allergies:  Review of patient's allergies indicates:   Allergen Reactions    Dexamethasone Hives and Shortness Of Breath     Gastroparesis flare    Doxepin hcl Hives, Diarrhea and Itching    Compazine [prochlorperazine] Hives and Itching    Morphine Rash    Prednisone      Gastroparesis flare up    Ciprofloxacin      Counteracts with seroquel, xanax, tizanidine    Lyrica [pregabalin]      depression       Diet: diabetic diet: 2200 calorie and low fat, low cholesterol diet    Activities: activity as tolerated    Nursing:   SN to complete comprehensive assessment including routine vital signs. Instruct on disease process and s/s of complications to report to MD. Review/verify medication list sent home with the patient at time of discharge  and instruct patient/caregiver as needed. Frequency may be adjusted depending on start of care date.    Notify MD if SBP > 160 or < 90; DBP > 90 or < 50; HR > 120 or < 50; Temp > 101;       CONSULTS:    Physical Therapy to evaluate and treat. Evaluate for home safety and equipment needs; Establish/upgrade home exercise program. Perform / instruct on therapeutic exercises, gait training, transfer training, and Range of Motion.  Occupational Therapy to evaluate and treat. Evaluate home environment for safety and equipment needs. Perform/Instruct on transfers, ADL training, ROM, and therapeutic  exercises.   to evaluate for community resources/long-range planning.  Aide to provide assistance with personal care, ADLs, and vital signs.    MISCELLANEOUS CARE:  Diabetic Care:   SN to perform and educate Diabetic management with blood glucose monitoring:, Fingerstick blood sugar AC and HS and Report CBG < 60 or > 350 to physician.    WOUND CARE ORDERS  n/a      Medications: Review discharge medications with patient and family and provide education.      Current Discharge Medication List      START taking these medications    Details   famotidine (PEPCID) 20 MG tablet Take 1 tablet (20 mg total) by mouth 2 (two) times daily.  Qty: 60 tablet, Refills: 1      oxyCODONE-acetaminophen (PERCOCET) 7.5-325 mg per tablet Take 1-2 tablets by mouth every 4 (four) hours as needed for Pain.  Qty: 75 tablet, Refills: 0      senna-docusate 8.6-50 mg (PERICOLACE) 8.6-50 mg per tablet Take 2 tablets by mouth 2 (two) times daily.         CONTINUE these medications which have CHANGED    Details   ALPRAZolam (XANAX) 1 MG tablet Take 1 tablet (1 mg total) by mouth 2 (two) times daily as needed for Anxiety.  Qty: 30 tablet, Refills: 0    Associated Diagnoses: Panic disorder without agoraphobia      busPIRone (BUSPAR) 10 MG tablet Take 1 tablet (10 mg total) by mouth 2 (two) times daily.  Qty: 60 tablet, Refills: 0      carvedilol (COREG) 6.25 MG tablet Take 1 tablet (6.25 mg total) by mouth 2 (two) times daily.  Qty: 60 tablet, Refills: 3      insulin aspart U-100 (NOVOLOG) 100 unit/mL InPn pen Inject 6 Units into the skin 3 (three) times daily.  Qty: 5.4 mL, Refills: 3      insulin detemir U-100 (LEVEMIR FLEXTOUCH) 100 unit/mL (3 mL) SubQ InPn pen Inject 20 Units into the skin every evening.  Qty: 6 mL, Refills: 3      levothyroxine (SYNTHROID) 150 MCG tablet Take 1 tablet (150 mcg total) by mouth once daily.  Qty: 30 tablet, Refills: 3      mirtazapine (REMERON) 45 MG tablet Take 1 tablet (45 mg total) by mouth every  evening.  Qty: 30 tablet, Refills: 0      QUEtiapine (SEROQUEL) 400 MG tablet Take 2 tablets (800 mg total) by mouth every evening. Pt states takes two tablets nightly, total of 800 mg  Qty: 60 tablet, Refills: 0      traZODone (DESYREL) 50 MG tablet Take 1 tablet (50 mg total) by mouth every evening.  Qty: 30 tablet, Refills: 0      valsartan-hydrochlorothiazide (DIOVAN-HCT) 80-12.5 mg per tablet Take 1 tablet by mouth once daily.  Qty: 90 tablet, Refills: 0    Associated Diagnoses: Essential hypertension         CONTINUE these medications which have NOT CHANGED    Details   cholecalciferol, vitamin D3, 1,000 unit capsule Take 2 capsules (2,000 Units total) by mouth once daily.  Refills: 0      fluticasone (FLONASE) 50 mcg/actuation nasal spray 2 sprays by Nasal route 2 (two) times daily.       gabapentin (NEURONTIN) 600 MG tablet Take 600 mg by mouth 3 (three) times daily.       levocetirizine (XYZAL) 5 MG tablet Take 1 tablet (5 mg total) by mouth every evening.  Qty: 90 tablet, Refills: 3    Associated Diagnoses: Allergic rhinitis, unspecified allergic rhinitis type      meclizine (ANTIVERT) 25 mg tablet Take 1 tablet (25 mg total) by mouth 3 (three) times daily as needed for Dizziness or Nausea.  Qty: 90 tablet, Refills: 0    Associated Diagnoses: Chronic nausea      promethazine (PHENERGAN) 25 MG tablet TAKE 1 TABLET (25 MG TOTAL) BY MOUTH EVERY 6 HOURS AS NEEDED.  Qty: 60 tablet, Refills: 0    Associated Diagnoses: Chronic nausea      topiramate (TOPAMAX) 25 MG tablet Take 1 tablet (25 mg total) by mouth daily as needed (migraines).  Qty: 90 tablet, Refills: 3    Associated Diagnoses: Chronic migraine without aura without status migrainosus, not intractable      VENTOLIN HFA 90 mcg/actuation inhaler as needed.       aspirin 325 MG tablet Take 1 tablet (325 mg total) by mouth 2 (two) times daily.  Qty: 60 tablet, Refills: 0      blood sugar diagnostic Strp 1 each by Misc.(Non-Drug; Combo Route) route 4 (four)  "times daily with meals and nightly.  Qty: 100 each, Refills: 2      blood-glucose meter kit Use as instructed  Qty: 1 each, Refills: 0      lancets Misc 1 each by Misc.(Non-Drug; Combo Route) route 4 (four) times daily with meals and nightly.  Qty: 100 each, Refills: 2      pen needle, diabetic 31 gauge x 5/16" Ndle 1 each by Misc.(Non-Drug; Combo Route) route 5 (five) times daily.  Qty: 200 each, Refills: 2      tizanidine (ZANAFLEX) 4 MG tablet TAKE 3 TABLETS (12 MG TOTAL) BY MOUTH EVERY EVENING.  Qty: 90 tablet, Refills: 2         STOP taking these medications       docusate sodium (COLACE) 50 MG capsule Comments:   Reason for Stopping:         hydrochlorothiazide (HYDRODIURIL) 25 MG tablet Comments:   Reason for Stopping:         hydrOXYzine pamoate (VISTARIL) 50 MG Cap Comments:   Reason for Stopping:         oxycodone-acetaminophen (PERCOCET)  mg per tablet Comments:   Reason for Stopping:         POTASSIUM ORAL Comments:   Reason for Stopping:         zolpidem (AMBIEN) 10 mg Tab Comments:   Reason for Stopping:         docusate sodium (COLACE) 100 MG capsule Comments:   Reason for Stopping:         oxyCODONE-acetaminophen (PERCOCET)  mg per tablet Comments:   Reason for Stopping:               I certify that this patient is confined to her home and needs physical therapy and occupational therapy.        "

## 2018-04-20 ENCOUNTER — TELEPHONE (OUTPATIENT)
Dept: INTERNAL MEDICINE | Facility: CLINIC | Age: 43
End: 2018-04-20

## 2018-04-20 DIAGNOSIS — E11.9 TYPE 2 DIABETES MELLITUS WITHOUT COMPLICATION, UNSPECIFIED LONG TERM INSULIN USE STATUS: Primary | ICD-10-CM

## 2018-04-20 NOTE — TELEPHONE ENCOUNTER
----- Message from Joyce Milner sent at 4/20/2018  8:27 AM CDT -----  Contact: Giulia/[facility]/[phone]  Off is calling to speak with someone in the office in regards to the pt. She is trying to see if he can get a  consult to help the pt with insulin. Pt is depressed and she was crying the whole assessment about her son that recently passed in a car accident. Please advise.      Thanks

## 2018-04-23 ENCOUNTER — PATIENT MESSAGE (OUTPATIENT)
Dept: ADMINISTRATIVE | Facility: OTHER | Age: 43
End: 2018-04-23

## 2018-04-23 ENCOUNTER — TELEPHONE (OUTPATIENT)
Dept: INTERNAL MEDICINE | Facility: CLINIC | Age: 43
End: 2018-04-23

## 2018-04-23 ENCOUNTER — OUTPATIENT CASE MANAGEMENT (OUTPATIENT)
Dept: ADMINISTRATIVE | Facility: OTHER | Age: 43
End: 2018-04-23

## 2018-04-23 NOTE — PROGRESS NOTES
Please note the following patients information has been forwarded to [place] for Case Management or .    Please contact Outpatient Complex Care Mgmt at ext [phone] with questions.    Thank you,    Zainab Harp, [facility]  Ext [phone]

## 2018-04-23 NOTE — TELEPHONE ENCOUNTER
----- Message from Henna Felipe MA sent at 4/20/2018 11:17 AM CDT -----  Contact: [facility] [phone]      ----- Message -----  From: Marti Kaplan  Sent: 4/20/2018  11:12 AM  To: Rick Hernandez Staff    Nurse called to report that this pt was admitted to home health on Tuesday  and would like to speak with the nurse about possible consult with  and to inform you that the patient is very depressed when nurse was in the home/ crying a lot. Another nurse will be visiting today to do another assessment and will be calling you.

## 2018-05-01 ENCOUNTER — NURSE TRIAGE (OUTPATIENT)
Dept: ADMINISTRATIVE | Facility: CLINIC | Age: 43
End: 2018-05-01

## 2018-05-01 ENCOUNTER — TELEPHONE (OUTPATIENT)
Dept: INTERNAL MEDICINE | Facility: CLINIC | Age: 43
End: 2018-05-01

## 2018-05-01 NOTE — TELEPHONE ENCOUNTER
----- Message from Aleja Jimenez sent at 5/1/2018  3:28 PM CDT -----  Contact: Samia/[facility]/[phone]      ----- Message -----  From: Laura Clarke  Sent: 5/1/2018   3:08 PM  To: Rick Hernandez Staff    Type:Home Health(orders,updates,clarifications,etc)    Home Health Agency/Nurse:[phone]/Samia    Phone number: [phone]    Reason for call:    Comments: patient have 9/10knee pain, blood Pressure 146/114, patient took blood pressure medicine this morning. Patient was made to laid down with the feet up and recheck the blood pressure 140/90. Thank you!!!

## 2018-05-01 NOTE — TELEPHONE ENCOUNTER
Pt did not show for her hospital follow up yesterday. She needs to come in for hospital follow up.

## 2018-05-01 NOTE — TELEPHONE ENCOUNTER
Spoke with pt.  Pt informed that our office did receive a message from nurse. Pt stated that her b/p has been fluctuating lately, pt most recent reading was 160/100 prior to activity. Her other readings were 168/120, 148/100. I offered pt an urgent appointment with you, some time this week, if possible. Pt refused. Advised pt that if she is having any severe chest pain or SOB at rest, to please go to ED.

## 2018-05-01 NOTE — TELEPHONE ENCOUNTER
Reason for Disposition   BP > 160/100    Protocols used: ST HIGH BLOOD PRESSURE-A-OH  Ms. Rodríguez's physical therapist Valeria called to report that patient's blood pressure was 160/100 prior to activity. Patient took her medications including a pain pill at 7 am. Patient is symptomatic. An appointment was offered, but patient refused. Ms. Jerry is requesting to speak with Dr. Cabrera.

## 2018-05-02 NOTE — TELEPHONE ENCOUNTER
Spoke with pt. Pt is scheduled for re scheduled hospital follow up visit on 05/16 at 10 am.  Pt states that she is currently doing therapy.

## 2018-05-04 DIAGNOSIS — Z96.652 TOTAL KNEE REPLACEMENT STATUS, LEFT: Primary | ICD-10-CM

## 2018-05-07 ENCOUNTER — TELEPHONE (OUTPATIENT)
Dept: ORTHOPEDICS | Facility: CLINIC | Age: 43
End: 2018-05-07

## 2018-05-07 DIAGNOSIS — Z96.659 TOTAL KNEE REPLACEMENT STATUS, UNSPECIFIED LATERALITY: Primary | ICD-10-CM

## 2018-05-07 NOTE — TELEPHONE ENCOUNTER
----- Message from Sabrina Don sent at 5/7/2018 12:22 PM CDT -----  Contact: [facility] order for single point cane    Fax      Phone     order entered as requested

## 2018-05-07 NOTE — TELEPHONE ENCOUNTER
----- Message from Jamel Amezcua sent at 5/7/2018 12:02 PM CDT -----  Contact: Valeria-[facility]   Valeria ask for an order for Outpatient Physical Therapy at [facility]    order entered as requested

## 2018-05-08 ENCOUNTER — OFFICE VISIT (OUTPATIENT)
Dept: ORTHOPEDICS | Facility: CLINIC | Age: 43
End: 2018-05-08
Payer: COMMERCIAL

## 2018-05-08 VITALS
SYSTOLIC BLOOD PRESSURE: 136 MMHG | DIASTOLIC BLOOD PRESSURE: 100 MMHG | HEIGHT: 65 IN | BODY MASS INDEX: 43.31 KG/M2 | WEIGHT: 259.94 LBS | HEART RATE: 103 BPM

## 2018-05-08 DIAGNOSIS — Z96.652 STATUS POST TOTAL LEFT KNEE REPLACEMENT: Primary | ICD-10-CM

## 2018-05-08 PROCEDURE — 99024 POSTOP FOLLOW-UP VISIT: CPT | Mod: S$GLB,,, | Performed by: PHYSICIAN ASSISTANT

## 2018-05-08 PROCEDURE — 99999 PR PBB SHADOW E&M-EST. PATIENT-LVL V: CPT | Mod: PBBFAC,,, | Performed by: PHYSICIAN ASSISTANT

## 2018-05-08 RX ORDER — OXYCODONE AND ACETAMINOPHEN 7.5; 325 MG/1; MG/1
1 TABLET ORAL
Qty: 60 TABLET | Refills: 0 | Status: SHIPPED | OUTPATIENT
Start: 2018-05-08 | End: 2018-06-19 | Stop reason: SDUPTHER

## 2018-05-08 NOTE — PROGRESS NOTES
"Homar Jerry presents for 6 week post-operative visit following a left total knee arthroplasty performed by Dr. [facility] on 3/28/2018. D/c from [facility] 4/16.  Taking Percocet 7.5 1-2 q 4 prn.    Exam:   Blood pressure (!) 136/100, pulse 103, height 5' 5" (1.651 m), weight 117.9 kg (259 lb 14.8 oz).   Ambulating well with cane.  Incision is clean and dry without drainage or erythema. AROM: 0-95.     Initial post-operative radiographs reviewed today revealing a well fixed and aligned prosthesis.    A/P:  6 weeks s/p left total knee replacement  - Outpatient PT at [facility]  - Refilled pain medication.   - Reviewed antibiotic prophylaxis  - F/u with Dr. [facility] in 6 weeks.   "

## 2018-05-09 ENCOUNTER — TELEPHONE (OUTPATIENT)
Dept: INTERNAL MEDICINE | Facility: CLINIC | Age: 43
End: 2018-05-09

## 2018-05-09 NOTE — TELEPHONE ENCOUNTER
----- Message from Aleja Jimenez sent at 5/9/2018  1:01 PM CDT -----  Contact: physical therapy at [facility]/andie/[phone]      ----- Message -----  From: Marti Arguello  Sent: 5/9/2018  12:49 PM  To: Rick Hernandez Staff    Pt [place] [facility] called in regards to the pt having and elevated blood pressure 130/100 at rest and a symptomatic.        Please advise

## 2018-05-09 NOTE — TELEPHONE ENCOUNTER
----- Message from Aleja Jimenez sent at 5/9/2018  2:54 PM CDT -----  Contact: [facility] Occupational Therapist Samia [phone]      ----- Message -----  From: Rob Barnett  Sent: 5/9/2018   2:46 PM  To: Rick Hernandez Staff    [facility] calling just to inform you of the patient Blood Pressure 138/98    Thank you

## 2018-05-11 DIAGNOSIS — Z13.5 DIABETIC RETINOPATHY SCREENING: ICD-10-CM

## 2018-05-14 ENCOUNTER — TELEPHONE (OUTPATIENT)
Dept: ORTHOPEDICS | Facility: CLINIC | Age: 43
End: 2018-05-14

## 2018-05-14 DIAGNOSIS — Z96.659 TOTAL KNEE REPLACEMENT STATUS, UNSPECIFIED LATERALITY: Primary | ICD-10-CM

## 2018-05-14 NOTE — TELEPHONE ENCOUNTER
----- Message from Markchasity EstradaMartin sent at 5/14/2018  2:33 PM CDT -----  Contact: Pt  Message for Nurse/Provider     Who called: Pt  Message: Pt calling to speak with a nurse in regards to her physical therapy that she has scheduled, that she stated she can't afford due to it being so high. So she wanted new orders sent in to a different facility by the name of [facility]; Fax number: [phone] Attn: Chayo   Contact preferences:  [phone]  Additional Information: N/A           Done,   Patient was notified

## 2018-05-15 ENCOUNTER — TELEPHONE (OUTPATIENT)
Dept: ORTHOPEDICS | Facility: CLINIC | Age: 43
End: 2018-05-15

## 2018-05-15 NOTE — TELEPHONE ENCOUNTER
----- Message from Luz Pierre MA sent at 5/14/2018  5:30 PM CDT -----  Contact: [facility]        ----- Message -----  From: April Pineda  Sent: 5/14/2018   4:03 PM  To: [facility]    Chayo is requesting a new order be faxed to [phone] to for pt outpatient therapy. Chayo stated order was incomplete. Chayo can be reached at [phone].    No answer / left a message to please let me know what more she needs ... I do not know

## 2018-05-23 ENCOUNTER — PATIENT MESSAGE (OUTPATIENT)
Dept: RESEARCH | Facility: HOSPITAL | Age: 43
End: 2018-05-23

## 2018-06-10 ENCOUNTER — TELEPHONE (OUTPATIENT)
Dept: ADMINISTRATIVE | Facility: CLINIC | Age: 43
End: 2018-06-10

## 2018-06-11 ENCOUNTER — TELEPHONE (OUTPATIENT)
Dept: ELECTROPHYSIOLOGY | Facility: CLINIC | Age: 43
End: 2018-06-11

## 2018-06-11 ENCOUNTER — CLINICAL SUPPORT (OUTPATIENT)
Dept: ELECTROPHYSIOLOGY | Facility: CLINIC | Age: 43
End: 2018-06-11
Attending: INTERNAL MEDICINE
Payer: COMMERCIAL

## 2018-06-11 DIAGNOSIS — Z95.818 STATUS POST PLACEMENT OF IMPLANTABLE LOOP RECORDER: ICD-10-CM

## 2018-06-11 DIAGNOSIS — R55 SYNCOPE: ICD-10-CM

## 2018-06-11 PROCEDURE — 93298 REM INTERROG DEV EVAL SCRMS: CPT | Mod: S$GLB,,, | Performed by: INTERNAL MEDICINE

## 2018-06-11 PROCEDURE — 93299 LOOP RECORDER REMOTE: CPT | Mod: PBBFAC | Performed by: INTERNAL MEDICINE

## 2018-06-11 PROCEDURE — 93299 PR INTERROG EVAL, REMOTE, UP TO 30 DAYS, CV MON/LOOP REC,TECH REVIEW: CPT | Mod: PBBFAC | Performed by: INTERNAL MEDICINE

## 2018-06-11 NOTE — TELEPHONE ENCOUNTER
Calling ms espinoza to get her home loop recorder reconnected. LM on her #, Ms Miller answered her line. Said she would text Ms Graf to call me back.

## 2018-06-14 DIAGNOSIS — R11.0 CHRONIC NAUSEA: ICD-10-CM

## 2018-06-14 RX ORDER — PROMETHAZINE HYDROCHLORIDE 25 MG/1
TABLET ORAL
Qty: 60 TABLET | Refills: 0 | Status: ON HOLD | OUTPATIENT
Start: 2018-06-14 | End: 2020-07-10 | Stop reason: HOSPADM

## 2018-06-19 ENCOUNTER — TELEPHONE (OUTPATIENT)
Dept: ORTHOPEDICS | Facility: CLINIC | Age: 43
End: 2018-06-19

## 2018-06-19 ENCOUNTER — OFFICE VISIT (OUTPATIENT)
Dept: ORTHOPEDICS | Facility: CLINIC | Age: 43
End: 2018-06-19
Payer: COMMERCIAL

## 2018-06-19 VITALS — WEIGHT: 250.75 LBS | BODY MASS INDEX: 41.78 KG/M2 | HEIGHT: 65 IN

## 2018-06-19 DIAGNOSIS — Z96.652 STATUS POST TOTAL LEFT KNEE REPLACEMENT: Primary | ICD-10-CM

## 2018-06-19 PROCEDURE — 99999 PR PBB SHADOW E&M-EST. PATIENT-LVL II: CPT | Mod: PBBFAC,,, | Performed by: ORTHOPAEDIC SURGERY

## 2018-06-19 PROCEDURE — 99024 POSTOP FOLLOW-UP VISIT: CPT | Mod: S$GLB,,, | Performed by: ORTHOPAEDIC SURGERY

## 2018-06-19 RX ORDER — CEPHALEXIN 500 MG/1
CAPSULE ORAL
Status: ON HOLD | COMMUNITY
Start: 2018-05-18 | End: 2020-07-04

## 2018-06-19 RX ORDER — BUSPIRONE HYDROCHLORIDE 15 MG/1
15 TABLET ORAL 2 TIMES DAILY
Status: ON HOLD | COMMUNITY
Start: 2018-06-05 | End: 2020-07-10 | Stop reason: SDUPTHER

## 2018-06-19 RX ORDER — ZOLPIDEM TARTRATE 10 MG/1
TABLET ORAL
Status: ON HOLD | COMMUNITY
Start: 2018-06-05 | End: 2020-07-10 | Stop reason: HOSPADM

## 2018-06-19 RX ORDER — TRAZODONE HYDROCHLORIDE 100 MG/1
TABLET ORAL
COMMUNITY
Start: 2018-06-05

## 2018-06-19 RX ORDER — OXYCODONE AND ACETAMINOPHEN 10; 325 MG/1; MG/1
1 TABLET ORAL EVERY 6 HOURS PRN
Qty: 60 TABLET | Refills: 0 | Status: SHIPPED | OUTPATIENT
Start: 2018-06-19 | End: 2018-06-19 | Stop reason: SDUPTHER

## 2018-06-19 RX ORDER — OXYCODONE AND ACETAMINOPHEN 10; 325 MG/1; MG/1
TABLET ORAL
Refills: 0 | COMMUNITY
Start: 2018-06-01 | End: 2018-06-19 | Stop reason: SDUPTHER

## 2018-06-19 NOTE — TELEPHONE ENCOUNTER
----- Message from Renetta Valles sent at 6/19/2018  2:37 PM CDT -----  Contact: [facility]  twould like to be called back regarding ( percocet) RX     robyn can be reached at [phone]  We spoke I relayed the conversation to Dr [facility] sitting next to me  Robyn informed us that Mrs Jerry routinely get   # 100 Percocets  Monthly from her pain management Dr Phillips.... The pharmacist wanted Dr [facility] to be aware of this ..... Dr [facility] canceled the RX he had just electronically sent to Robyn   Told him the pain management can handle all future medication requests  Confirmed by Kaylie Carlson

## 2018-06-28 ENCOUNTER — HOSPITAL ENCOUNTER (EMERGENCY)
Facility: HOSPITAL | Age: 43
Discharge: HOME | End: 2018-06-28
Attending: EMERGENCY MEDICINE
Payer: COMMERCIAL

## 2018-06-28 VITALS
DIASTOLIC BLOOD PRESSURE: 105 MMHG | RESPIRATION RATE: 16 BRPM | TEMPERATURE: 98 F | HEART RATE: 90 BPM | OXYGEN SATURATION: 98 % | WEIGHT: 230 LBS | BODY MASS INDEX: 38.32 KG/M2 | SYSTOLIC BLOOD PRESSURE: 168 MMHG | HEIGHT: 65 IN

## 2018-06-28 DIAGNOSIS — R42 DIZZINESS: Primary | ICD-10-CM

## 2018-06-28 DIAGNOSIS — G43.909 MIGRAINE WITHOUT STATUS MIGRAINOSUS, NOT INTRACTABLE, UNSPECIFIED MIGRAINE TYPE: ICD-10-CM

## 2018-06-28 DIAGNOSIS — R07.9 CHEST PAIN: ICD-10-CM

## 2018-06-28 LAB
ABO + RH BLD: NORMAL
ALBUMIN SERPL BCP-MCNC: 3.9 G/DL
ALP SERPL-CCNC: 111 U/L
ALT SERPL W/O P-5'-P-CCNC: 11 U/L
ANION GAP SERPL CALC-SCNC: 12 MMOL/L
AST SERPL-CCNC: 15 U/L
BASOPHILS # BLD AUTO: 0.03 K/UL
BASOPHILS NFR BLD: 0.4 %
BILIRUB SERPL-MCNC: 0.3 MG/DL
BILIRUB UR QL STRIP: NEGATIVE
BLD GP AB SCN CELLS X3 SERPL QL: NORMAL
BUN SERPL-MCNC: 6 MG/DL
CALCIUM SERPL-MCNC: 9.8 MG/DL
CHLORIDE SERPL-SCNC: 106 MMOL/L
CLARITY UR REFRACT.AUTO: CLEAR
CO2 SERPL-SCNC: 21 MMOL/L
COLOR UR AUTO: NORMAL
CREAT SERPL-MCNC: 0.9 MG/DL
DIFFERENTIAL METHOD: ABNORMAL
EOSINOPHIL # BLD AUTO: 0.1 K/UL
EOSINOPHIL NFR BLD: 0.6 %
ERYTHROCYTE [DISTWIDTH] IN BLOOD BY AUTOMATED COUNT: 14.6 %
EST. GFR  (AFRICAN AMERICAN): >60 ML/MIN/1.73 M^2
EST. GFR  (NON AFRICAN AMERICAN): >60 ML/MIN/1.73 M^2
GLUCOSE SERPL-MCNC: 144 MG/DL
GLUCOSE UR QL STRIP: NEGATIVE
HCT VFR BLD AUTO: 36.7 %
HGB BLD-MCNC: 12.1 G/DL
HGB UR QL STRIP: NEGATIVE
IMM GRANULOCYTES # BLD AUTO: 0.04 K/UL
IMM GRANULOCYTES NFR BLD AUTO: 0.5 %
KETONES UR QL STRIP: NEGATIVE
LEUKOCYTE ESTERASE UR QL STRIP: NEGATIVE
LYMPHOCYTES # BLD AUTO: 2.5 K/UL
LYMPHOCYTES NFR BLD: 31.7 %
MCH RBC QN AUTO: 28.3 PG
MCHC RBC AUTO-ENTMCNC: 33 G/DL
MCV RBC AUTO: 86 FL
MONOCYTES # BLD AUTO: 0.5 K/UL
MONOCYTES NFR BLD: 6.4 %
NEUTROPHILS # BLD AUTO: 4.8 K/UL
NEUTROPHILS NFR BLD: 60.4 %
NITRITE UR QL STRIP: NEGATIVE
NRBC BLD-RTO: 0 /100 WBC
PH UR STRIP: 7 [PH] (ref 5–8)
PLATELET # BLD AUTO: 352 K/UL
PMV BLD AUTO: 10.2 FL
POTASSIUM SERPL-SCNC: 3.9 MMOL/L
PROT SERPL-MCNC: 7.8 G/DL
PROT UR QL STRIP: NEGATIVE
RBC # BLD AUTO: 4.28 M/UL
SODIUM SERPL-SCNC: 139 MMOL/L
SP GR UR STRIP: 1.01 (ref 1–1.03)
TROPONIN I SERPL DL<=0.01 NG/ML-MCNC: 0.01 NG/ML
URN SPEC COLLECT METH UR: NORMAL
UROBILINOGEN UR STRIP-ACNC: NEGATIVE EU/DL
WBC # BLD AUTO: 7.86 K/UL

## 2018-06-28 PROCEDURE — 93010 ELECTROCARDIOGRAM REPORT: CPT | Mod: ,,, | Performed by: INTERNAL MEDICINE

## 2018-06-28 PROCEDURE — 81003 URINALYSIS AUTO W/O SCOPE: CPT

## 2018-06-28 PROCEDURE — 63600175 PHARM REV CODE 636 W HCPCS: Performed by: EMERGENCY MEDICINE

## 2018-06-28 PROCEDURE — 96374 THER/PROPH/DIAG INJ IV PUSH: CPT

## 2018-06-28 PROCEDURE — 84484 ASSAY OF TROPONIN QUANT: CPT

## 2018-06-28 PROCEDURE — 85025 COMPLETE CBC W/AUTO DIFF WBC: CPT

## 2018-06-28 PROCEDURE — 99284 EMERGENCY DEPT VISIT MOD MDM: CPT | Mod: ,,, | Performed by: EMERGENCY MEDICINE

## 2018-06-28 PROCEDURE — 93005 ELECTROCARDIOGRAM TRACING: CPT

## 2018-06-28 PROCEDURE — 80053 COMPREHEN METABOLIC PANEL: CPT

## 2018-06-28 PROCEDURE — 86850 RBC ANTIBODY SCREEN: CPT

## 2018-06-28 PROCEDURE — 96375 TX/PRO/DX INJ NEW DRUG ADDON: CPT

## 2018-06-28 PROCEDURE — 25000003 PHARM REV CODE 250: Performed by: EMERGENCY MEDICINE

## 2018-06-28 PROCEDURE — 96361 HYDRATE IV INFUSION ADD-ON: CPT

## 2018-06-28 PROCEDURE — 99284 EMERGENCY DEPT VISIT MOD MDM: CPT | Mod: 25

## 2018-06-28 RX ORDER — KETOROLAC TROMETHAMINE 30 MG/ML
15 INJECTION, SOLUTION INTRAMUSCULAR; INTRAVENOUS
Status: DISCONTINUED | OUTPATIENT
Start: 2018-06-28 | End: 2018-06-28

## 2018-06-28 RX ORDER — PROMETHAZINE HYDROCHLORIDE 25 MG/1
25 TABLET ORAL
Status: COMPLETED | OUTPATIENT
Start: 2018-06-28 | End: 2018-06-28

## 2018-06-28 RX ORDER — KETOROLAC TROMETHAMINE 30 MG/ML
15 INJECTION, SOLUTION INTRAMUSCULAR; INTRAVENOUS ONCE
Status: COMPLETED | OUTPATIENT
Start: 2018-06-28 | End: 2018-06-28

## 2018-06-28 RX ORDER — BUTALBITAL, ACETAMINOPHEN AND CAFFEINE 50; 325; 40 MG/1; MG/1; MG/1
1 TABLET ORAL
Status: COMPLETED | OUTPATIENT
Start: 2018-06-28 | End: 2018-06-28

## 2018-06-28 RX ORDER — METOCLOPRAMIDE HYDROCHLORIDE 5 MG/ML
10 INJECTION INTRAMUSCULAR; INTRAVENOUS
Status: COMPLETED | OUTPATIENT
Start: 2018-06-28 | End: 2018-06-28

## 2018-06-28 RX ADMIN — PROMETHAZINE HYDROCHLORIDE 25 MG: 25 TABLET ORAL at 05:06

## 2018-06-28 RX ADMIN — METOCLOPRAMIDE 10 MG: 5 INJECTION, SOLUTION INTRAMUSCULAR; INTRAVENOUS at 04:06

## 2018-06-28 RX ADMIN — BUTALBITAL, ACETAMINOPHEN AND CAFFEINE 1 TABLET: 50; 325; 40 TABLET ORAL at 05:06

## 2018-06-28 RX ADMIN — SODIUM CHLORIDE 1000 ML: 0.9 INJECTION, SOLUTION INTRAVENOUS at 03:06

## 2018-06-28 RX ADMIN — KETOROLAC TROMETHAMINE 15 MG: 30 INJECTION, SOLUTION INTRAMUSCULAR at 04:06

## 2018-06-28 NOTE — ED TRIAGE NOTES
Homar Jerry, a 43 y.o. female presents to the ED c/o hypertension and RL back pain.  Pt states she started with diarrhea 3 days ago and now her pressure is super elevated.       Chief Complaint   Patient presents with    Hypertension     BP high at 3am - took extra BP meds, now 145/105- was 170/120.C/O CP sternal CP radiating to rt side and back. Having h'/a's and dizziness. BP high since this am .     Diarrhea     Review of patient's allergies indicates:   Allergen Reactions    Dexamethasone Hives and Shortness Of Breath     Gastroparesis flare    Doxepin hcl Hives, Diarrhea and Itching    Compazine [prochlorperazine] Hives and Itching    Morphine Rash    Prednisone      Gastroparesis flare up    Ciprofloxacin      Counteracts with seroquel, xanax, tizanidine    Lyrica [pregabalin]      depression     Past Medical History:   Diagnosis Date    Anxiety     Asthma     usually with cold weather    Bipolar 1 disorder 3/17/2015    Cervical cancer 2009    DOT    Chronic low back pain 3/17/2015    Chronic narcotic dependence 1/17/2017    Essential hypertension     Carvedilol  HCTZ Vlsartan- HCTZ Usual BP- 110/70-80     Fatty liver 3/15/2018    Fibrocystic breast     Fibromyalgia     Gastroparesis 2012    s/p sleeve to cover damaged nerves; s/p gastric pacemaker which did not help; due to nerve damage during surgery    Hypothyroidism, postsurgical     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 . In ED found to have worsening hyperglycemia, with anion gap and acidosis. Was bolused with NS and started on insulin infusion with DKA protocol.  On treatment with  Insulin   Hemoglobin A1c- 7.5 Sept 2017 Capillary glucose check-yes Pre breakfast -95 Pre lunch -120 Pre [id number]      Morbid obesity with BMI of  40.0-44.9, adult 1/18/2017    Non-intractable vomiting with nausea 9/26/2017    Rheumatoid arthritis     S/P laparoscopic appendectomy 9/26/2017    Status post placement of implantable loop recorder 7/1/2015    syncope and palpitations s/p ILR placement.      Thyroid cancer 2003 & 2013    thyroidectomy    Type 2 diabetes mellitus with hyperglycemia, without long-term current use of insulin 1/17/2017    Urinary incontinence     Vitamin D deficiency 1/21/2017     LOC: Patient name and date of birth verified. The patient is awake, alert and aware of environment with an appropriate affect, the patient is oriented x 3 and speaking appropriately.   APPEARANCE: Patient resting comfortably, patient is clean and well groomed, patient's clothing is properly fastened.  SKIN: The skin is warm and dry, color consistent with ethnicity, patient has normal skin turgor and moist mucus membranes, skin intact, no breakdown or bruising noted.  MUSCULOSKELETAL: Patient moving all extremities well, no obvious swelling or deformities noted.   RESPIRATORY: Respirations are spontaneous, patient has a normal effort and rate, no accessory muscle use noted.  CARDIAC: Patient has a normal rate and rhythm, no periphreal edema noted, capillary refill < 3 seconds.  ABDOMEN: Soft and non tender to palpation, no distention noted. Bowel sounds present in all four quadrants.  NEUROLOGIC: Eyes open spontaneously, behavior appropriate to situation, follows commands, facial expression symmetrical, bilateral hand grasp equal and even, purposeful motor response noted, normal sensation in all extremities when touched with a finger.

## 2018-06-28 NOTE — ED PROVIDER NOTES
Encounter Date: 6/28/2018    SCRIBE #1 NOTE: I, Holland Kaplan, am scribing for, and in the presence of,  Dr. Leal. I have scribed the entire note.       History     Chief Complaint   Patient presents with    Hypertension     BP high at 3am - took extra BP meds, now 145/105- was 170/120.C/O CP sternal CP radiating to rt side and back. Having h'/a's and dizziness. BP high since this am .     Diarrhea     Time patient was seen by the provider: 2:39 PM      The patient is a 43 y.o. female with PMHx of: RA, asthma, bipolar 1 disorder, essential HTN, DM, thyroid cancer, cervical cancer status post hysterectomy, and appendectomy who presents to the ED with a complaint of nausea and diarrhea, which began 3 days ago. Diarrhea is described as multiple episodes of watery stool. She notes an associated poor PO intake. No vomiting. She also developed a persistent migraine headache at that time and noted her blood pressure to be elevated at 200/150. She then took her blood pressure medications, but this did not lower her pressure. Pt also complains of dizziness, unsteady gait, and lightheadedness, which began 2 days ago. Also reports 1 day of RUQ abdominal pain that radiates to the right lower back. Associated with shortness of breath and chest tightness. No blood stool, dysuria, or fever.       The history is provided by the patient and medical records.     Review of patient's allergies indicates:   Allergen Reactions    Dexamethasone Hives and Shortness Of Breath     Gastroparesis flare    Doxepin hcl Hives, Diarrhea and Itching    Compazine [prochlorperazine] Hives and Itching    Morphine Rash    Prednisone      Gastroparesis flare up    Ciprofloxacin      Counteracts with seroquel, xanax, tizanidine    Lyrica [pregabalin]      depression     Past Medical History:   Diagnosis Date    Anxiety     Asthma     usually with cold weather    Bipolar 1 disorder 3/17/2015    [facility] 2009    [facility]    Chronic low  back pain 3/17/2015    Chronic narcotic dependence 1/17/2017    Essential hypertension     Carvedilol  HCTZ Vlsartan- HCTZ Usual BP- 110/70-80     Fatty liver 3/15/2018    Fibrocystic breast     Fibromyalgia     Gastroparesis 2012    s/p sleeve to cover damaged nerves; s/p gastric pacemaker which did not help; due to nerve damage during surgery    Hypothyroidism, postsurgical     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 . In ED found to have worsening hyperglycemia, with anion gap and acidosis. Was bolused with NS and started on insulin infusion with DKA protocol.  On treatment with  Insulin   Hemoglobin A1c- 7.5 Sept 2017 Capillary glucose check-yes Pre breakfast -95 Pre lunch -120 Pre [id number]      Morbid obesity with BMI of 40.0-44.9, adult 1/18/2017    Non-intractable vomiting with nausea 9/26/2017    Rheumatoid arthritis     S/P laparoscopic appendectomy 9/26/2017    Status post placement of implantable loop recorder 7/1/2015    syncope and palpitations s/p ILR placement.      Thyroid cancer 2003 & 2013    thyroidectomy    Type 2 diabetes mellitus with hyperglycemia, without long-term current use of insulin 1/17/2017    Urinary incontinence     Vitamin D deficiency 1/21/2017     Past Surgical History:   Procedure Laterality Date    ANKLE FRACTURE SURGERY Right     APPENDECTOMY      BREAST SURGERY      exploratory laparotomy due to complications of hysterectomy  2009    cervical cuff burst with air in abdomen    FRACTURE SURGERY      ankle around 2012    GASTRECTOMY      gastric pacemaker      gastric sleeve      HYSTERECTOMY  2009    total including cervix    KNEE ARTHROSCOPY W/ MENISCAL REPAIR Left 2016    KNEE SURGERY  05/12/2016    loop monitor  2016    multiple breast biopsies       TONSILLECTOMY      ureteral sling       Family History   Problem Relation Age of Onset    Heart disease Mother     Arthritis Mother     Rheum arthritis Mother     Heart attack Mother 55    Clotting disorder Mother         ? arterial stent     Hyperlipidemia Father     Clotting disorder Father         history of DVT,pulmonary embolism     Thyroid cancer Paternal Grandmother     Diabetes Paternal Grandmother     Clotting disorder Paternal Grandfather     No Known Problems Sister     No Known Problems Brother     No Known Problems Maternal Aunt     No Known Problems Paternal Aunt     Bipolar disorder Maternal Uncle     Pancreatic cancer Maternal Uncle     No Known Problems Paternal Uncle     No Known Problems Maternal Grandfather     No Known Problems Maternal Grandmother     No Known Problems Cousin     ADD / ADHD Son     Pneumonia Brother     HIV Brother     Alcohol abuse Neg Hx     Anxiety disorder Neg Hx     Dementia Neg Hx     Depression Neg Hx     Drug abuse Neg Hx     OCD Neg Hx     Paranoid behavior Neg Hx     Physical abuse Neg Hx     Schizophrenia Neg Hx     Seizures Neg Hx     Self injury Neg Hx     Sexual abuse Neg Hx     Suicide Neg Hx      Social History   Substance Use Topics    Smoking status: Never Smoker    Smokeless tobacco: Never Used    Alcohol use No     Review of Systems   Constitutional: Negative for fever.        Poor PO intake    HENT: Negative for sore throat.    Respiratory: Positive for chest tightness and shortness of breath.    Cardiovascular: Negative for leg swelling.   Gastrointestinal: Positive for abdominal pain, diarrhea and nausea. Negative for vomiting.   Genitourinary: Negative for dysuria.   Musculoskeletal: Positive for gait problem. Negative for back pain.   Skin: Negative for rash.   Neurological: Positive for dizziness, light-headedness and headaches. Negative for weakness.   Hematological: Does not bruise/bleed easily.       Physical  Exam     Initial Vitals [06/28/18 1348]   BP Pulse Resp Temp SpO2   (!) 145/105 104 18 98.1 °F (36.7 °C) 97 %      MAP       --         Physical Exam    Nursing note and vitals reviewed.  Constitutional: She appears well-developed and well-nourished. She is not diaphoretic. No distress.   HENT:   Mucous membranes are dry   Eyes:   Conjunctival pallor    Cardiovascular: Normal rate, regular rhythm, normal heart sounds and intact distal pulses.   Pulmonary/Chest: Breath sounds normal. No respiratory distress. She has no wheezes. She has no rhonchi. She has no rales.   Abdominal:   Mild RUQ tenderness. Negative Mcburny's    Musculoskeletal:   No calf asymmetry    Neurological: She is alert and oriented to person, place, and time. She has normal strength. No cranial nerve deficit or sensory deficit.   Skin: Skin is warm and dry. There is pallor.   Post operative incision to left knee, scar is clean dry intact          ED Course   Procedures  Labs Reviewed   CBC W/ AUTO DIFFERENTIAL - Abnormal; Notable for the following:        Result Value    Hematocrit 36.7 (*)     RDW 14.6 (*)     Platelets 352 (*)     All other components within normal limits   COMPREHENSIVE METABOLIC PANEL - Abnormal; Notable for the following:     CO2 21 (*)     Glucose 144 (*)     All other components within normal limits   TROPONIN I   URINALYSIS, REFLEX TO URINE CULTURE   TYPE & SCREEN          Imaging Results          X-Ray Chest AP Portable (Final result)  Result time 06/28/18 15:34:20    Final result by Deion Cano MD (06/28/18 15:34:20)                 Impression:      No acute intrathoracic process.      Electronically signed by: Deion Cano MD  Date:    06/28/2018  Time:    15:34             Narrative:    EXAMINATION:  XR CHEST AP PORTABLE    CLINICAL HISTORY:  Chest pain, unspecified    TECHNIQUE:  Single frontal view of the chest was performed.    COMPARISON:  Chest radiograph from 01/17/2017    FINDINGS:  No cardiomegaly.   Hypoinflation of the lungs.  Normal pulmonary vasculature.  Lungs are clear.  No evidence of acute fracture.                              X-Rays:   Independently Interpreted Readings:   Chest X-Ray: No acute process      Medical Decision Making:   History:   Old Medical Records: I decided to obtain old medical records.  Initial Assessment:   Emergent evaluation of 43 y.o. female with multiple complaints including diarrhea, dizziness, headache, and hypertension. My initial differential diagnoses include but are not limited to: anemia requiring transfusion symptomatic anemia, orthostatic hypotension, electrolyte derangement, MARY, migraine, ACS. EKG, chest x-ray, and labs ordered. Will treat with IV fluids   Independently Interpreted Test(s):   I have ordered and independently interpreted X-rays - see prior notes.  Clinical Tests:   Lab Tests: Ordered and Reviewed  Radiological Study: Ordered and Reviewed  Medical Tests: Ordered and Reviewed  ED Management:  4:47 PM  Labs reviewed with no leukocytosis. Normal creatinine. Troponin is negative. Chest x-ray is normal. Pt symptoms could be secondary to hypovolemia from diarrhea, which could trigger migraine. Pt treated with Toradol, IV fluids, and Reglan. I do not see an emergent condition at this time. Pt stable for discharge.             Scribe Attestation:   Scribe #1: I performed the above scribed service and the documentation accurately describes the services I performed. I attest to the accuracy of the note.               Clinical Impression:   The primary encounter diagnosis was Dizziness. Diagnoses of Chest pain and Migraine without status migrainosus, not intractable, unspecified migraine type were also pertinent to this visit.      Disposition:   Disposition: Discharged  Condition: Stable                        Yudith Leal MD  07/16/18 0914

## 2018-06-28 NOTE — PROVIDER PROGRESS NOTES - EMERGENCY DEPT.
Encounter Date: 6/28/2018    ED Physician Progress Notes         EKG - STEMI Decision  Initial Reading: No STEMI present.

## 2018-06-29 ENCOUNTER — PATIENT MESSAGE (OUTPATIENT)
Dept: ORTHOPEDICS | Facility: CLINIC | Age: 43
End: 2018-06-29

## 2018-06-29 DIAGNOSIS — Z96.652 STATUS POST TOTAL LEFT KNEE REPLACEMENT: Primary | ICD-10-CM

## 2018-07-16 ENCOUNTER — CLINICAL SUPPORT (OUTPATIENT)
Dept: ELECTROPHYSIOLOGY | Facility: CLINIC | Age: 43
End: 2018-07-16
Attending: INTERNAL MEDICINE
Payer: COMMERCIAL

## 2018-07-16 DIAGNOSIS — Z95.818 STATUS POST PLACEMENT OF IMPLANTABLE LOOP RECORDER: ICD-10-CM

## 2018-07-16 DIAGNOSIS — R55 SYNCOPE: ICD-10-CM

## 2018-08-10 ENCOUNTER — TELEPHONE (OUTPATIENT)
Dept: ELECTROPHYSIOLOGY | Facility: CLINIC | Age: 43
End: 2018-08-10

## 2018-08-10 NOTE — TELEPHONE ENCOUNTER
Need to reconnect Ms Brewster's home loop monitor. She states she will do it this weekend after her family member's surgery.

## 2018-09-24 ENCOUNTER — TELEPHONE (OUTPATIENT)
Dept: ORTHOPEDICS | Facility: CLINIC | Age: 43
End: 2018-09-24

## 2018-09-24 NOTE — TELEPHONE ENCOUNTER
----- Message from Estevan Calixtory sent at 9/24/2018  9:09 AM CDT -----  Contact: Self/ [phone]  Patient would like a call back to make a appt with the doctor for a f/u on left knee pain that she had surgery on. I did try to make the patient a appt with the doctor but it told be that the provider cannot be selected.       We spoke  She said her total knee locks up in the morning  She can't get up until it's ready started 2 months ago  Will ask LO when to book her appt & get back to her

## 2018-09-25 ENCOUNTER — TELEPHONE (OUTPATIENT)
Dept: ORTHOPEDICS | Facility: CLINIC | Age: 43
End: 2018-09-25

## 2018-09-26 ENCOUNTER — CLINICAL SUPPORT (OUTPATIENT)
Dept: URGENT CARE | Facility: CLINIC | Age: 43
End: 2018-09-26
Payer: MEDICARE

## 2018-09-26 VITALS
HEIGHT: 65 IN | DIASTOLIC BLOOD PRESSURE: 98 MMHG | WEIGHT: 262 LBS | SYSTOLIC BLOOD PRESSURE: 126 MMHG | RESPIRATION RATE: 16 BRPM | OXYGEN SATURATION: 99 % | TEMPERATURE: 98 F | HEART RATE: 103 BPM | BODY MASS INDEX: 43.65 KG/M2

## 2018-09-26 DIAGNOSIS — L02.416 ABSCESS OF LEFT LEG: Primary | ICD-10-CM

## 2018-09-26 PROCEDURE — 99203 OFFICE O/P NEW LOW 30 MIN: CPT | Mod: S$GLB,,, | Performed by: NURSE PRACTITIONER

## 2018-09-27 ENCOUNTER — HOSPITAL ENCOUNTER (EMERGENCY)
Facility: HOSPITAL | Age: 43
Discharge: HOME | End: 2018-09-27
Attending: EMERGENCY MEDICINE
Payer: MEDICARE

## 2018-09-27 ENCOUNTER — OFFICE VISIT (OUTPATIENT)
Dept: ORTHOPEDICS | Facility: CLINIC | Age: 43
End: 2018-09-27
Payer: MEDICARE

## 2018-09-27 ENCOUNTER — HOSPITAL ENCOUNTER (OUTPATIENT)
Dept: RADIOLOGY | Facility: HOSPITAL | Age: 43
Discharge: HOME | End: 2018-09-27
Attending: ORTHOPAEDIC SURGERY
Payer: MEDICARE

## 2018-09-27 ENCOUNTER — TELEPHONE (OUTPATIENT)
Dept: ELECTROPHYSIOLOGY | Facility: CLINIC | Age: 43
End: 2018-09-27

## 2018-09-27 VITALS
WEIGHT: 250 LBS | DIASTOLIC BLOOD PRESSURE: 99 MMHG | HEIGHT: 65 IN | RESPIRATION RATE: 16 BRPM | TEMPERATURE: 99 F | SYSTOLIC BLOOD PRESSURE: 133 MMHG | BODY MASS INDEX: 41.65 KG/M2 | HEART RATE: 109 BPM | OXYGEN SATURATION: 97 %

## 2018-09-27 VITALS — HEIGHT: 65 IN | BODY MASS INDEX: 43.36 KG/M2 | WEIGHT: 260.25 LBS

## 2018-09-27 DIAGNOSIS — Z96.652 PAIN DUE TO TOTAL LEFT KNEE REPLACEMENT, INITIAL ENCOUNTER: ICD-10-CM

## 2018-09-27 DIAGNOSIS — L03.119 CELLULITIS OF LOWER LEG: Primary | ICD-10-CM

## 2018-09-27 DIAGNOSIS — Z96.652 PAIN DUE TO TOTAL LEFT KNEE REPLACEMENT, INITIAL ENCOUNTER: Primary | ICD-10-CM

## 2018-09-27 DIAGNOSIS — T84.84XA PAIN DUE TO TOTAL LEFT KNEE REPLACEMENT, INITIAL ENCOUNTER: ICD-10-CM

## 2018-09-27 DIAGNOSIS — T84.84XA PAIN DUE TO TOTAL LEFT KNEE REPLACEMENT, INITIAL ENCOUNTER: Primary | ICD-10-CM

## 2018-09-27 PROCEDURE — 99283 EMERGENCY DEPT VISIT LOW MDM: CPT | Mod: 25,27

## 2018-09-27 PROCEDURE — 73562 X-RAY EXAM OF KNEE 3: CPT | Mod: 26,LT,, | Performed by: RADIOLOGY

## 2018-09-27 PROCEDURE — 73560 X-RAY EXAM OF KNEE 1 OR 2: CPT | Mod: 26,XS,RT, | Performed by: RADIOLOGY

## 2018-09-27 PROCEDURE — 25000003 PHARM REV CODE 250

## 2018-09-27 PROCEDURE — 99999 PR PBB SHADOW E&M-EST. PATIENT-LVL III: CPT | Mod: PBBFAC,,, | Performed by: ORTHOPAEDIC SURGERY

## 2018-09-27 PROCEDURE — 10061 I&D ABSCESS COMP/MULTIPLE: CPT

## 2018-09-27 PROCEDURE — 99213 OFFICE O/P EST LOW 20 MIN: CPT | Mod: S$PBB,,, | Performed by: ORTHOPAEDIC SURGERY

## 2018-09-27 PROCEDURE — 10060 I&D ABSCESS SIMPLE/SINGLE: CPT

## 2018-09-27 PROCEDURE — 87070 CULTURE OTHR SPECIMN AEROBIC: CPT

## 2018-09-27 PROCEDURE — 87186 SC STD MICRODIL/AGAR DIL: CPT

## 2018-09-27 PROCEDURE — 25000003 PHARM REV CODE 250: Performed by: NURSE PRACTITIONER

## 2018-09-27 PROCEDURE — 87077 CULTURE AEROBIC IDENTIFY: CPT

## 2018-09-27 PROCEDURE — 73560 X-RAY EXAM OF KNEE 1 OR 2: CPT | Mod: TC,RT

## 2018-09-27 PROCEDURE — 99213 OFFICE O/P EST LOW 20 MIN: CPT | Mod: PBBFAC,25 | Performed by: ORTHOPAEDIC SURGERY

## 2018-09-27 RX ORDER — SULFAMETHOXAZOLE AND TRIMETHOPRIM 800; 160 MG/1; MG/1
TABLET ORAL
Status: COMPLETED
Start: 2018-09-27 | End: 2018-09-27

## 2018-09-27 RX ORDER — LIDOCAINE HYDROCHLORIDE 10 MG/ML
5 INJECTION, SOLUTION EPIDURAL; INFILTRATION; INTRACAUDAL; PERINEURAL
Status: COMPLETED | OUTPATIENT
Start: 2018-09-27 | End: 2018-09-27

## 2018-09-27 RX ORDER — FLUCONAZOLE 150 MG/1
150 TABLET ORAL DAILY
Qty: 1 TABLET | Refills: 0 | Status: SHIPPED | OUTPATIENT
Start: 2018-09-27 | End: 2018-09-28

## 2018-09-27 RX ORDER — SULFAMETHOXAZOLE AND TRIMETHOPRIM 800; 160 MG/1; MG/1
1 TABLET ORAL 2 TIMES DAILY
Qty: 14 TABLET | Refills: 0 | Status: SHIPPED | OUTPATIENT
Start: 2018-09-27 | End: 2018-10-04

## 2018-09-27 RX ORDER — SULFAMETHOXAZOLE AND TRIMETHOPRIM 800; 160 MG/1; MG/1
1 TABLET ORAL
Status: COMPLETED | OUTPATIENT
Start: 2018-09-27 | End: 2018-09-27

## 2018-09-27 RX ADMIN — LIDOCAINE HYDROCHLORIDE 50 MG: 10 INJECTION, SOLUTION EPIDURAL; INFILTRATION; INTRACAUDAL at 09:09

## 2018-09-27 RX ADMIN — SULFAMETHOXAZOLE AND TRIMETHOPRIM: 800; 160 TABLET ORAL at 10:09

## 2018-09-27 RX ADMIN — BACITRACIN ZINC, NEOMYCIN SULFATE, AND POLYMYXIN B SULFATE 1 EACH: 400; 3.5; 5 OINTMENT TOPICAL at 09:09

## 2018-09-27 NOTE — PATIENT INSTRUCTIONS
Abscess (Antibiotic Treatment Only)  An abscess (sometimes called a boil) happens when bacteria get trapped under the skin and start to grow. Pus forms inside the abscess as the body responds to the bacteria. An abscess can happen with an insect bite, ingrown hair, blocked oil gland, pimple, cyst, or puncture wound.  In the early stages, your wound may be red and tender. For this stage, you may get antibiotics. If the abscess does not get better with antibiotics, it will need to be drained with a small cut.  Home care  These tips will help you care for your abscess at home:  · Soak the wound in hot water or apply hot packs (small towel soaked in hot water) to the area for 20 minutes at a time. Do this 3 to 4 times a day.  · Do not cut, squeeze, or pop the boil yourself.  · Apply antibiotic cream or ointment to the skin 3 to 4 times a day, unless something else was prescribed. Some ointments include an antibiotic plus a pain reliever.  · If your doctor prescribed antibiotics, do not stop taking them until you have finished the medicine or the doctor tells you to stop.  · You may use an over-the-counter pain medicine to control pain, unless another pain medicine was prescribed. If you have chronic liver or kidney disease or ever had a stomach ulcer or gastrointestinal bleeding, talk with your doctor before using these any of these.  Follow-up care  Follow up with your healthcare provider, or as advised. Check your wound each day for the signs of worsening infection listed below.  When to seek medical advice  Get prompt medical attention if any of these occur:  · An increase in redness or swelling  · Red streaks in the skin leading away from the abscess  · An increase in local pain or swelling  · Fever of 100.4ºF (38ºC) or higher, or as directed by your healthcare provider  · Pus or fluid coming from the abscess  · Boil returns after getting better  Date Last Reviewed: 9/1/2016  © 4109-6674 [employer] [address] [place] [address]. All rights reserved. This information is not intended as a substitute for professional medical care. Always follow your healthcare professional's instructions.

## 2018-09-27 NOTE — PROGRESS NOTES
"Subjective:       Patient ID: Homar Jerry is a 43 y.o. female.    Vitals:  height is 5' 5" (1.651 m) and weight is 118.8 kg (262 lb). Her temperature is 98.2 °F (36.8 °C). Her blood pressure is 126/98 (abnormal) and her pulse is 103. Her respiration is 16 and oxygen saturation is 99%.     Chief Complaint: Insect Bite    Insect bite within last 2-3 days to L outer leg.      Insect Bite   This is a new problem. The current episode started in the past 7 days. Associated symptoms include a rash. Pertinent negatives include no chills, fever or sore throat.     Review of Systems   Constitution: Negative for chills and fever.   HENT: Negative for sore throat.    Respiratory: Negative for shortness of breath.    Skin: Positive for rash. Negative for itching.        Abscess, erythema to left lateral lower leg.   Musculoskeletal: Negative for joint pain.       Objective:      Physical Exam   Constitutional: She is oriented to person, place, and time. She appears well-developed and well-nourished.   HENT:   Head: Normocephalic and atraumatic. Head is without abrasion, without contusion and without laceration.   Right Ear: External ear normal.   Left Ear: External ear normal.   Nose: Nose normal.   Mouth/Throat: Oropharynx is clear and moist.   Eyes: Conjunctivae, EOM and lids are normal. Pupils are equal, round, and reactive to light.   Neck: Trachea normal, full passive range of motion without pain and phonation normal. Neck supple.   Cardiovascular: Normal rate, regular rhythm and normal heart sounds.   Pulmonary/Chest: Effort normal and breath sounds normal. No stridor. No respiratory distress.   Musculoskeletal: Normal range of motion.   Neurological: She is alert and oriented to person, place, and time.   Skin: Skin is warm, dry and intact. Capillary refill takes less than 2 seconds. No abrasion, no bruising, no burn, no ecchymosis, no laceration, no lesion and no rash noted. No erythema.   Nonfluctuant " erythematous area to left lateral lower leg.   Psychiatric: She has a normal mood and affect. Her speech is normal and behavior is normal. Judgment and thought content normal. Cognition and memory are normal.   Nursing note and vitals reviewed.    Left lower leg abscess. Incision and drainage not indicated at this time. Rx Bactrim DS and Bactroban. Return precautions discussed.  Assessment:       1. Abscess of left leg        Plan:         Abscess of left leg

## 2018-09-27 NOTE — PROGRESS NOTES
HPI:    Homar Jerry is a 43 y.o. female who is here today for   Chief Complaint   Patient presents with    Left Knee - Pain    Right Knee - Pain   .   Status post left total knee 03/28/2018.  Patient describes a lot of pain at night and giving way.    Duration: 6 months  Intensity: severe  Character of pain: sharp  Location: She reports that the pain is predominately  medial    Past Medical History:   Diagnosis Date    Anxiety     Asthma     usually with cold weather    Bipolar 1 disorder 3/17/2015    Cervical cancer 2009    DOT    Chronic low back pain 3/17/2015    Chronic narcotic dependence 1/17/2017    Essential hypertension     Carvedilol  HCTZ Vlsartan- HCTZ Usual BP- 110/70-80     Fatty liver 3/15/2018    Fibrocystic breast     Fibromyalgia     Gastroparesis 2012    s/p sleeve to cover damaged nerves; s/p gastric pacemaker which did not help; due to nerve damage during surgery    Hypothyroidism, postsurgical     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 . In ED found to have worsening hyperglycemia, with anion gap and acidosis. Was bolused with NS and started on insulin infusion with DKA protocol.  On treatment with  Insulin   Hemoglobin A1c- 7.5 Sept 2017 Capillary glucose check-yes Pre breakfast -95 Pre lunch -120 Pre [id number]      Morbid obesity with BMI of 40.0-44.9, adult 1/18/2017    Non-intractable vomiting with nausea 9/26/2017    Rheumatoid arthritis     S/P laparoscopic appendectomy 9/26/2017    Status post placement of implantable loop recorder 7/1/2015    syncope and palpitations s/p ILR placement.      Thyroid cancer 2003 & 2013    thyroidectomy    Type 2 diabetes mellitus with hyperglycemia, without long-term current use of insulin 1/17/2017    Urinary incontinence     Vitamin D  deficiency 1/21/2017          Current Outpatient Medications:     ALPRAZolam (XANAX) 1 MG tablet, Take 1 tablet (1 mg total) by mouth 2 (two) times daily as needed for Anxiety., Disp: 30 tablet, Rfl: 0    blood sugar diagnostic Strp, 1 each by Misc.(Non-Drug; Combo Route) route 4 (four) times daily with meals and nightly., Disp: 100 each, Rfl: 2    busPIRone (BUSPAR) 15 MG tablet, , Disp: , Rfl:     carvedilol (COREG) 6.25 MG tablet, Take 1 tablet (6.25 mg total) by mouth 2 (two) times daily., Disp: 60 tablet, Rfl: 3    cephALEXin (KEFLEX) 500 MG capsule, , Disp: , Rfl:     cholecalciferol, vitamin D3, 1,000 unit capsule, Take 2 capsules (2,000 Units total) by mouth once daily., Disp: , Rfl: 0    famotidine (PEPCID) 20 MG tablet, Take 1 tablet (20 mg total) by mouth 2 (two) times daily., Disp: 60 tablet, Rfl: 1    fluticasone (FLONASE) 50 mcg/actuation nasal spray, 2 sprays by Nasal route 2 (two) times daily. , Disp: , Rfl:     gabapentin (NEURONTIN) 600 MG tablet, Take 600 mg by mouth 3 (three) times daily. , Disp: , Rfl:     insulin aspart U-100 (NOVOLOG) 100 unit/mL InPn pen, Inject 6 Units into the skin 3 (three) times daily., Disp: 5.4 mL, Rfl: 3    insulin detemir U-100 (LEVEMIR FLEXTOUCH) 100 unit/mL (3 mL) SubQ InPn pen, Inject 20 Units into the skin every evening., Disp: 6 mL, Rfl: 3    lancets Misc, 1 each by Misc.(Non-Drug; Combo Route) route 4 (four) times daily with meals and nightly., Disp: 100 each, Rfl: 2    levocetirizine (XYZAL) 5 MG tablet, Take 1 tablet (5 mg total) by mouth every evening. (Patient taking differently: Take 5 mg by mouth daily as needed for Allergies. ), Disp: 90 tablet, Rfl: 3    levothyroxine (SYNTHROID) 150 MCG tablet, Take 1 tablet (150 mcg total) by mouth once daily., Disp: 30 tablet, Rfl: 3    meclizine (ANTIVERT) 25 mg tablet, Take 1 tablet (25 mg total) by mouth 3 (three) times daily as needed for Dizziness or Nausea., Disp: 90 tablet, Rfl: 0    mirtazapine  "(REMERON) 45 MG tablet, Take 1 tablet (45 mg total) by mouth every evening., Disp: 30 tablet, Rfl: 0    pen needle, diabetic 31 gauge x 5/16" Ndle, 1 each by Misc.(Non-Drug; Combo Route) route 5 (five) times daily., Disp: 200 each, Rfl: 2    promethazine (PHENERGAN) 25 MG tablet, TAKE 1 TABLET (25 MG TOTAL) BY MOUTH EVERY 6 HOURS AS NEEDED., Disp: 60 tablet, Rfl: 0    promethazine (PHENERGAN) 25 MG tablet, TAKE ONE TABLET BY MOUTH EVERY 6 HOURS AS NEEDED FOR NAUSEA AND VOMITING, Disp: 60 tablet, Rfl: 0    QUEtiapine (SEROQUEL) 400 MG tablet, Take 2 tablets (800 mg total) by mouth every evening. Pt states takes two tablets nightly, total of 800 mg, Disp: 60 tablet, Rfl: 0    senna-docusate 8.6-50 mg (PERICOLACE) 8.6-50 mg per tablet, Take 2 tablets by mouth 2 (two) times daily., Disp: , Rfl:     tizanidine (ZANAFLEX) 4 MG tablet, TAKE 3 TABLETS (12 MG TOTAL) BY MOUTH EVERY EVENING. (Patient taking differently: Take 12 mg by mouth every evening. TAKE 3 TABLETS (12 MG TOTAL) BY MOUTH EVERY EVENING.), Disp: 90 tablet, Rfl: 2    topiramate (TOPAMAX) 25 MG tablet, Take 1 tablet (25 mg total) by mouth daily as needed (migraines)., Disp: 90 tablet, Rfl: 3    traZODone (DESYREL) 100 MG tablet, , Disp: , Rfl:     valsartan-hydrochlorothiazide (DIOVAN-HCT) 80-12.5 mg per tablet, Take 1 tablet by mouth once daily., Disp: 90 tablet, Rfl: 0    VENTOLIN HFA 90 mcg/actuation inhaler, as needed. , Disp: , Rfl:     zolpidem (AMBIEN) 10 mg Tab, , Disp: , Rfl:     aspirin 325 MG tablet, Take 1 tablet (325 mg total) by mouth 2 (two) times daily., Disp: 60 tablet, Rfl: 0    blood-glucose meter kit, Use as instructed, Disp: 1 each, Rfl: 0     Review of patient's allergies indicates:   Allergen Reactions    Dexamethasone Hives and Shortness Of Breath     Gastroparesis flare    Doxepin hcl Hives, Diarrhea and Itching    Compazine [prochlorperazine] Hives and Itching    Morphine Rash    Prednisone      Gastroparesis flare " "up    Ciprofloxacin      Counteracts with seroquel, xanax, tizanidine    Lyrica [pregabalin]      depression        ROS  Constitutional: Negative for fever, Negative for weight loss  HENT Negative for congestion  Cardiovascular: Negative chest pain  Respiratory: Negative Shortness of breath  Heme: Negative excessive bleeding  Skin:PositiveItching, Negative breakdown  Musculoskeletal:Positive for back pain, Positive for joint pain, Positive muscle pain, Positive muscle weakness  Neurological: Negative for numbness and paresthesias   Psychiatric/Behavioral: Negative altered mental status, Negative for depression    Physical Exam:   Ht 5' 5" (1.651 m)   Wt 118.1 kg (260 lb 4.1 oz)   LMP  (LMP Unknown)   BMI 43.31 kg/m²   General appearance: This is a well-developed, Well nourished female No obvious acute distress.  Psychiatric: normal mood and affect;  pleasant and conversant; judgment and thought content normal  Gait is coordinated. Patient has antalgic gait to the left  Cardiovascular: There are no swelling or varicosities present.   Respiratory: normal respiratory effort   Lymphatic: no adenopathy   Neurologic: alert and oriented to person, place, and time   Deep Tendon Reflexes are normal;  Coordination and Balance: Normal   Musculoskeletal   Neck    ROM shows normal flexion and extension and lateral rotation    Palpation: Non-tender    Stability is normal    Strength is normal    Skin is normal without masses and lesions    Sensation is intact to light touch   Back    ROM showsabnormal flexion, extension    and  rotation    Palpation shows no masses    Stability is normal    Strength to flexion and extension well maintained    Core strength is diminished    Skin shows no rashes or cafe au lait spots;     Sensation is intact to light touch    Right Knee  Swelling None  TendernessNone  Range of Motion: 130    Left Knee: Swelling Mild  TendernessMedial joint line  Range of Motion: 120  No obvious laxity.  I " cannot sublux the knee.    Radiograph   Implants are in excellent position no signs of loosening or failure.    Assessment:  Patient has inflammation and swelling in the knee.  She continues to have elevated CRP is in the 20s.  Her ABDULLAHI and rheumatoid factor are negative.  I suspect she has some inflammatory condition.    Plan:  At this time will start physical therapy to see if we can strengthen the knee and have a little better stability.

## 2018-09-28 NOTE — DISCHARGE INSTRUCTIONS
You have a localized infection to your leg.  Please stop taking the antibiotics that you were prescribed by urgent care.  I have prescribed an alternate medication with coverage for staph. A wound culture is also pending.  Please follow up with your PCP in the next 2-3 days in order to make sure that your infection is healing appropriately.  Return to the emergency room for any new, concerning, or worsening symptoms.    Thank you for allowing me to care for you today.  I hope our treatment plan will make you feel better in the next few days.  In order for me to take better care of my future patients and improve our Emergency Department, I would appreciate if you can provide us with feedback.  In the next few days, you may receive a survey in the mail.  If you do, it would mean a great deal to me if you would please take the time to complete it.    Thank you and I hope you feel better.  Chhaya Valverde NP

## 2018-09-28 NOTE — ED PROVIDER NOTES
"Encounter Date: 9/27/2018    SCRIBE #1 NOTE: I, Wandamini King , am scribing for, and in the presence of, Chhaya Valverde NP .       History     Chief Complaint   Patient presents with    Insect Bite     left lateral calf-noticed yesterday; c/o redness and tenderness to area       Time seen by provider: 8:20 PM on 09/27/2018    Homar Jerry is a 43 y.o. female with a PMHx of DM, ESHA, HTN, Hypothyroidism, thyroid cancer, and Rheumatoid arthritis who presents to the ED with complaints of an insect bite with an onset x 2-3 days PTA. The patient does not know what kind of bug bit her, but is concerned because the area may be infected. She reports that initially, she had "one small knot and a small dot" on her left upper thigh. Today, she claims "the knot has spread and there are three dots." She reports that the area is very red, hard, swollen, and painful. She endorses nausea, chills, and night sweats. She was seen at [facility] x 1 day ago for these symptoms and was prescribed antibiotics and believes she was prescribed Amoxicillin. She has been in good compliance with the antibiotics. The patient relays that since her left knee replacement x 6 months ago she has been getting frequent infections on the left side of her body. Patient saw her orthopedist, Dr.Ochsner, this morning for a follow up appointment and alerted him to these recurrent infections. He explained to her that she is at a higher risk for infection for up to a year, since she had a knee replacement and her body could reject this foreign material at any time. The patient reports that her DM is well controlled. Her sugar levels are usually around 125. She denies any relief or exacerbation. The patient denies vomiting or any other symptoms at this time.  No report of fever.  SHx includes gastric pacemaker placement, Knee arthroscopy w/ meniscal repair, gastric sleeve, and multiple breast biopsies.       The history is provided by the " patient.     Review of patient's allergies indicates:   Allergen Reactions    Dexamethasone Hives and Shortness Of Breath     Gastroparesis flare    Doxepin hcl Hives, Diarrhea and Itching    Compazine [prochlorperazine] Hives and Itching    Morphine Rash    Prednisone      Gastroparesis flare up    Ciprofloxacin      Counteracts with seroquel, xanax, tizanidine    Lyrica [pregabalin]      depression     Past Medical History:   Diagnosis Date    Anxiety     Asthma     usually with cold weather    Bipolar 1 disorder 3/17/2015    Cervical cancer 2009    DOT    Chronic low back pain 3/17/2015    Chronic narcotic dependence 1/17/2017    Essential hypertension     Carvedilol  HCTZ Vlsartan- HCTZ Usual BP- 110/70-80     Fatty liver 3/15/2018    Fibrocystic breast     Fibromyalgia     Gastroparesis 2012    s/p sleeve to cover damaged nerves; s/p gastric pacemaker which did not help; due to nerve damage during surgery    Hypothyroidism, postsurgical     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 . In ED found to have worsening hyperglycemia, with anion gap and acidosis. Was bolused with NS and started on insulin infusion with DKA protocol.  On treatment with  Insulin   Hemoglobin A1c- 7.5 Sept 2017 Capillary glucose check-yes Pre breakfast -95 Pre lunch -120 Pre [id number]      Morbid obesity with BMI of 40.0-44.9, adult 1/18/2017    Non-intractable vomiting with nausea 9/26/2017    Rheumatoid arthritis     S/P laparoscopic appendectomy 9/26/2017    Status post placement of implantable loop recorder 7/1/2015    syncope and palpitations s/p ILR placement.      Thyroid cancer 2003 & 2013    thyroidectomy    Type 2 diabetes mellitus with hyperglycemia, without long-term current use of insulin 1/17/2017    Urinary  incontinence     Vitamin D deficiency 1/21/2017     Past Surgical History:   Procedure Laterality Date    ANKLE FRACTURE SURGERY Right     APPENDECTOMY      APPENDECTOMY-LAPAROSCOPIC N/A 9/17/2017    [facility] OR 2ND FLR    BREAST SURGERY      EGD (ESOPHAGOGASTRODUODENOSCOPY) N/A 9/11/2013    Performed [facility] at [facility] OR 2ND FLR    ESOPHAGOGASTRODUODENOSCOPY (EGD) N/A 9/8/2015    Performed by Raul Chaudhry MD at [facility] ENDO (4TH FLR)    exploratory laparotomy due to complications of hysterectomy  2009    cervical cuff burst with air in abdomen    FRACTURE SURGERY      ankle around 2012    GASTRECTOMY      GASTRECTOMY, SLEEVE, LAPAROSCOPIC N/A 9/11/2013    [facility] OR 2ND FLR    gastric pacemaker      gastric sleeve      HYSTERECTOMY  2009    total including cervix    KNEE ARTHROSCOPY W/ MENISCAL REPAIR Left 2016    KNEE SURGERY  05/12/2016    loop monitor  2016    multiple breast biopsies      REMOVAL, NEUROSTIMULATOR N/A 9/11/2013    [facility] OR 2ND FLR    REPLACEMENT-KNEE-TOTAL Left 3/28/2018    [facility] OR 2ND FLR    TONSILLECTOMY      ureteral sling       Family History   Problem Relation Age of Onset    Heart disease Mother     Arthritis Mother     Rheum arthritis Mother     Heart attack Mother 55    Clotting disorder Mother         ? arterial stent     Hyperlipidemia Father     Clotting disorder Father         history of DVT,pulmonary embolism     Thyroid cancer Paternal Grandmother     Diabetes Paternal Grandmother     Clotting disorder Paternal Grandfather     No Known Problems Sister     No Known Problems Brother     No Known Problems Maternal Aunt     No Known Problems Paternal Aunt     Bipolar disorder Maternal Uncle     Pancreatic cancer Maternal Uncle     No Known Problems Paternal Uncle     No Known  Problems Maternal Grandfather     No Known Problems Maternal Grandmother     No Known Problems Cousin     ADD / ADHD Son     Pneumonia Brother     HIV Brother     Alcohol abuse Neg Hx     Anxiety disorder Neg Hx     Dementia Neg Hx     Depression Neg Hx     Drug abuse Neg Hx     OCD Neg Hx     Paranoid behavior Neg Hx     Physical abuse Neg Hx     Schizophrenia Neg Hx     Seizures Neg Hx     Self injury Neg Hx     Sexual abuse Neg Hx     Suicide Neg Hx      Social History     Tobacco Use    Smoking status: Never Smoker    Smokeless tobacco: Never Used   Substance Use Topics    Alcohol use: No     Alcohol/week: 0.6 oz     Types: 1 Shots of liquor per week    Drug use: No     Review of Systems   Constitutional: Positive for chills and diaphoresis. Negative for fever.   HENT: Negative for facial swelling and trouble swallowing.    Eyes: Negative for discharge.   Respiratory: Negative for cough, chest tightness, shortness of breath and wheezing.    Cardiovascular: Negative for chest pain and palpitations.   Gastrointestinal: Positive for nausea. Negative for abdominal pain, diarrhea and vomiting.   Genitourinary: Negative for dysuria and hematuria.   Musculoskeletal: Negative for arthralgias, back pain, joint swelling, myalgias, neck pain and neck stiffness.   Skin: Negative for color change, pallor, rash and wound.        Positive insect bite on the left thigh with swelling and redness    Neurological: Negative for dizziness, syncope, weakness, light-headedness, numbness and headaches.   Hematological: Does not bruise/bleed easily.   Psychiatric/Behavioral: The patient is not nervous/anxious.    All other systems reviewed and are negative.      Physical Exam     Initial Vitals [09/27/18 1923]   BP Pulse Resp Temp SpO2   (!) 133/99 109 16 98.5 °F (36.9 °C) 97 %      MAP       --         Physical Exam    Nursing note and vitals reviewed.  Constitutional: She appears well-developed and  well-nourished.  Non-toxic appearance. No distress.   HENT:   Head: Normocephalic and atraumatic.   Right Ear: Hearing and external ear normal.   Left Ear: Hearing and external ear normal.   Nose: Nose normal.   Mouth/Throat: Mucous membranes are normal.   Eyes: Conjunctivae and EOM are normal.   Neck: Full passive range of motion without pain. Neck supple.   Cardiovascular: Normal rate, regular rhythm, normal heart sounds and normal pulses. Exam reveals no gallop and no friction rub.    No murmur heard.  Pulmonary/Chest: Effort normal and breath sounds normal. No respiratory distress. She has no wheezes. She has no rhonchi. She has no rales.   Abdominal: Soft. Bowel sounds are normal. She exhibits no distension and no mass. There is no tenderness. There is no rigidity, no rebound and no guarding.   Musculoskeletal: Normal range of motion.   Neurological: She is alert and oriented to person, place, and time. She has normal strength. Gait normal. GCS eye subscore is 4. GCS verbal subscore is 5. GCS motor subscore is 6.   Skin: Skin is warm and dry. Capillary refill takes less than 2 seconds. No rash noted.        No calf tenderness to palpation.   Psychiatric: She has a normal mood and affect. Her speech is normal and behavior is normal. Judgment and thought content normal. Cognition and memory are normal.         ED Course   I & D - Incision and Drainage  Date/Time: 9/27/2018 9:00 AM  Performed by: Chhaya Valverde NP  Authorized by: Mateo Schwarz MD   Consent Done: Yes  Consent: Verbal consent obtained.  Body area: lower extremity  Location details: left leg  Anesthesia: local infiltration    Anesthesia:  Local Anesthetic: lidocaine 1% without epinephrine  Anesthetic total: 3 mL  Patient sedated: no  Scalpel size: 11  Incision type: three very small incisions to remove pustules.  Complexity: simple  Drainage: pus  Drainage amount: scant  Wound treatment: incision  Complications: No  Patient tolerance: Patient  tolerated the procedure well with no immediate complications        Labs Reviewed - No data to display       Imaging Results    None          Medical Decision Making:   History:   Old Medical Records: I decided to obtain old medical records.  Differential Diagnosis:   Abscess  Cellulitis  Sepsis  Osteomyelitis  DVT  Clinical Tests:   Lab Tests: Ordered and Reviewed  ED Management:  Small incisions have been made at the location of the pustules located in the infected area of the patient's leg to allow for drainage.  The patient will be placed on antibiotics.  The patient has no signs of systemic symptoms or significant cellulitis to warrant admission at this time.  No evidence of osteomyelitis or DVT.  The patient has been instructed to follow up with their regular doctor or the emergency department in 2 - 3 days for a wound recheck.  I've given the patient specific return precautions.  Case discussed with supervising physician who agrees with plan.            Scribe Attestation:   Scribe #1: I performed the above scribed service and the documentation accurately describes the services I performed. I attest to the accuracy of the note.               Clinical Impression:   The encounter diagnosis was Cellulitis of lower leg.            I, CATHY Johnson, ELMO-BC, CARMEN-BC, personally performed the services described in this documentation. All medical record entries made by the scribe were at my direction and in my presence. I have reviewed the chart and agree that the record reflects my personal performance and is accurate and complete. CATHY Johnson, ELMO-BC, CARMEN-BC 2:10 AM 09/28/2018                 Chhaya Valverde NP  09/28/18 [id number]

## 2018-09-29 ENCOUNTER — HOSPITAL ENCOUNTER (EMERGENCY)
Facility: HOSPITAL | Age: 43
Discharge: HOME | End: 2018-09-30
Attending: EMERGENCY MEDICINE
Payer: MEDICARE

## 2018-09-29 VITALS
TEMPERATURE: 98 F | WEIGHT: 250 LBS | RESPIRATION RATE: 18 BRPM | DIASTOLIC BLOOD PRESSURE: 83 MMHG | OXYGEN SATURATION: 99 % | HEART RATE: 94 BPM | SYSTOLIC BLOOD PRESSURE: 124 MMHG | HEIGHT: 65 IN | BODY MASS INDEX: 41.65 KG/M2

## 2018-09-29 DIAGNOSIS — T14.90XD HEALING WOUND: Primary | ICD-10-CM

## 2018-09-29 DIAGNOSIS — R78.81 POSITIVE BLOOD CULTURE: ICD-10-CM

## 2018-09-29 PROCEDURE — 99283 EMERGENCY DEPT VISIT LOW MDM: CPT

## 2018-09-30 PROCEDURE — 87040 BLOOD CULTURE FOR BACTERIA: CPT

## 2018-09-30 PROCEDURE — 36415 COLL VENOUS BLD VENIPUNCTURE: CPT

## 2018-09-30 NOTE — ED PROVIDER NOTES
"Encounter Date: 9/29/2018    SCRIBE #1 NOTE: I, Ele Medina , am scribing for, and in the presence of,  Dr. Cannon . I have scribed the entire note.       History     Chief Complaint   Patient presents with    Leg Swelling     was called to come back for positive cultures     09/29/2018  11:32 PM      The patient is a 43 y.o. female who is presenting to the ED for evaluation of LLE cellulitis s/p being contacted by the hospital for a positive wound culture taken x 2 days ago at this facility. The pt endorses persistent swelling to the area and feels that it is "still infected" despite taking Bactrim as directed. No new erythema, tenderness, fever, nausea, or vomiting. Of note the pt states that she has had numerous infections throughout the last several months and is unsure of reason. No other comments or complaints. 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 Pertinent past surgical hx includes total L knee replacement.          The history is provided by the patient and medical records.     Review of patient's allergies indicates:   Allergen Reactions    Dexamethasone Hives and Shortness Of Breath     Gastroparesis flare    Doxepin hcl Hives, Diarrhea and Itching    Compazine [prochlorperazine] Hives and Itching    Morphine Rash    Prednisone      Gastroparesis flare up    Ciprofloxacin      Counteracts with seroquel, xanax, tizanidine    Lyrica [pregabalin]      depression     Past Medical History:   Diagnosis Date    Anxiety     Asthma     usually with cold weather    Bipolar 1 disorder 3/17/2015    Cervical cancer 2009    DOT    Chronic low back pain 3/17/2015 "    Chronic narcotic dependence 1/17/2017    Essential hypertension     Carvedilol  HCTZ Vlsartan- HCTZ Usual BP- 110/70-80     Fatty liver 3/15/2018    Fibrocystic breast     Fibromyalgia     Gastroparesis 2012    s/p sleeve to cover damaged nerves; s/p gastric pacemaker which did not help; due to nerve damage during surgery    Hypothyroidism, postsurgical     SEHA (latent autoimmune diabetes in adults), 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 . In ED found to have worsening hyperglycemia, with anion gap and acidosis. Was bolused with NS and started on insulin infusion with DKA protocol.  On treatment with  Insulin   Hemoglobin A1c- 7.5 Sept 2017 Capillary glucose check-yes Pre breakfast -95 Pre lunch -120 Pre [id number]      Morbid obesity with BMI of 40.0-44.9, adult 1/18/2017    Non-intractable vomiting with nausea 9/26/2017    Rheumatoid arthritis     S/P laparoscopic appendectomy 9/26/2017    Status post placement of implantable loop recorder 7/1/2015    syncope and palpitations s/p ILR placement.      Thyroid cancer 2003 & 2013    thyroidectomy    Type 2 diabetes mellitus with hyperglycemia, without long-term current use of insulin 1/17/2017    Urinary incontinence     Vitamin D deficiency 1/21/2017     Past Surgical History:   Procedure Laterality Date    ANKLE FRACTURE SURGERY Right     APPENDECTOMY      APPENDECTOMY-LAPAROSCOPIC N/A 9/17/2017    Performed [facility] at [facility] OR 2ND FLR    BREAST SURGERY      EGD (ESOPHAGOGASTRODUODENOSCOPY) N/A 9/11/2013    Performed by [facility]ano [facility] at [facility] OR 2ND FLR    ESOPHAGOGASTRODUODENOSCOPY (EGD) N/A 9/8/2015    Performed by Raul Chaudhry MD at [facility] ENDO (4TH FLR)    exploratory laparotomy due to complications of hysterectomy  2009    cervical cuff burst with air in abdomen     FRACTURE SURGERY      ankle around 2012    GASTRECTOMY      GASTRECTOMY, SLEEVE, LAPAROSCOPIC N/A 9/11/2013    Performed by [facility] OR 2ND FLR    gastric pacemaker      gastric sleeve      HYSTERECTOMY  2009    total including cervix    KNEE ARTHROSCOPY W/ MENISCAL REPAIR Left 2016    KNEE SURGERY  05/12/2016    loop monitor  2016    multiple breast biopsies      REMOVAL, NEUROSTIMULATOR N/A 9/11/2013    [facility] OR 2ND FLR    REPLACEMENT-KNEE-TOTAL Left 3/28/2018    Performed [facility] at [facility] OR 2ND FLR    TONSILLECTOMY      ureteral sling       Family History   Problem Relation Age of Onset    Heart disease Mother     Arthritis Mother     Rheum arthritis Mother     Heart attack Mother 55    Clotting disorder Mother         ? arterial stent     Hyperlipidemia Father     Clotting disorder Father         history of DVT,pulmonary embolism     Thyroid cancer Paternal Grandmother     Diabetes Paternal Grandmother     Clotting disorder Paternal Grandfather     No Known Problems Sister     No Known Problems Brother     No Known Problems Maternal Aunt     No Known Problems Paternal Aunt     Bipolar disorder Maternal Uncle     Pancreatic cancer Maternal Uncle     No Known Problems Paternal Uncle     No Known Problems Maternal Grandfather     No Known Problems Maternal Grandmother     No Known Problems Cousin     ADD / ADHD Son     Pneumonia Brother     HIV Brother     Alcohol abuse Neg Hx     Anxiety disorder Neg Hx     Dementia Neg Hx     Depression Neg Hx     Drug abuse Neg Hx     OCD Neg Hx     Paranoid behavior Neg Hx     Physical abuse Neg Hx     Schizophrenia Neg Hx     Seizures Neg Hx     Self injury Neg Hx     Sexual abuse Neg Hx     Suicide Neg Hx      Social History     Tobacco Use    Smoking status: Never Smoker    Smokeless tobacco: Never Used   Substance Use Topics    Alcohol  use: No     Alcohol/week: 0.6 oz     Types: 1 Shots of liquor per week    Drug use: No     Review of Systems   Constitutional: Negative for fever.   HENT: Negative for sore throat.    Respiratory: Negative for shortness of breath.    Cardiovascular: Negative for chest pain.   Gastrointestinal: Negative for nausea.   Genitourinary: Negative for dysuria.   Musculoskeletal: Negative for back pain.   Skin: Negative for rash.        +swelling    Neurological: Negative for weakness.   Hematological: Does not bruise/bleed easily.       Physical Exam     Initial Vitals [09/29/18 2240]   BP Pulse Resp Temp SpO2   124/83 94 18 98.1 °F (36.7 °C) 99 %      MAP       --         Physical Exam    Nursing note and vitals reviewed.  Skin: Skin is warm and dry. There is erythema.   There are x 3 area of erythema noted to proximal LLE that appear to healing. There is no discharge, induration, or fluctuance noted. Minimal TTP      Wound appears to healing  order another blood culture with PCP follow as well as ID for recurrent infections.          ED Course   Procedures  Labs Reviewed - No data to display       Imaging Results    None          Medical Decision Making:   History:   Old Medical Records: I decided to obtain old medical records.  The patient's wound appears to be healing very well.  There is no significant swelling surrounding erythema warmth tenderness or really even drainage. The blood culture may have been a contaminant and I repeated a culture.  She is afebrile not tachycardic and I think that she is stable for discharge to continue Bactrim.  Return precautions were given.                      Clinical Impression:   The primary encounter diagnosis was Healing wound. A diagnosis of Positive blood culture was also pertinent to this visit.                             Joseph Cannon MD  09/30/18 0438

## 2018-10-01 LAB — BACTERIA SPEC AEROBE CULT: NORMAL

## 2018-10-05 LAB — BACTERIA BLD CULT: NORMAL

## 2019-05-20 NOTE — PLAN OF CARE
Problem: Physical Therapy Goal  Goal: Physical Therapy Goal  Goals to be met by: 14 days     Patient will increase functional independence with mobility by performin. Supine to sit with Modified [place]= met 4/3/2018  2. Sit to supine with Modified [place]= met 4/3/2018  3. Sit to stand transfer with Modified [place] with Rolling walker  4. Bed to chair transfer with Supervision using Rolling Walker  5. Gait  x 150 feet with Supervision using Rolling Walker.   6. Wheelchair propulsion x100 feet with Modified [place] using bilateral upper extremities  7. Ascend/Descend 4 inch curb step with Stand-by Assistance using Rolling Walker.  8. Lower extremity exercise program x20 reps per handout, with assistance as needed and gym therex, per TKA protocol      Outcome: Ongoing (interventions implemented as appropriate)  Goals remain appropriate.        UCG done at [facility], negative

## 2019-06-10 NOTE — TELEPHONE ENCOUNTER
----- Message from Jamel Amezcua sent at 9/25/2018 12:05 PM CDT -----  Contact: self  Patient states that Almita was suppose to return a call to her in regards to a post op appointment Patient states she is still having post op issues Patient can be reach at      See my previous message    - status post C4-5 ACDF with irrigation and debridement on 3/20  - status post posterior spinal fusion & T8-L2 Laminectomy on 5/8   - must use Habematolel j collar and TLSO brace at all times with ambulation   sutures removed  plain x-rays ok as per orthospine are ok, pt. will fu with Dr. Leslie as outpatient

## 2020-02-27 ENCOUNTER — HOSPITAL ENCOUNTER (EMERGENCY)
Facility: HOSPITAL | Age: 45
Discharge: HOME | End: 2020-02-27
Attending: EMERGENCY MEDICINE
Payer: MEDICARE

## 2020-02-27 VITALS
DIASTOLIC BLOOD PRESSURE: 107 MMHG | HEART RATE: 110 BPM | HEIGHT: 65 IN | BODY MASS INDEX: 36.65 KG/M2 | WEIGHT: 220 LBS | RESPIRATION RATE: 18 BRPM | TEMPERATURE: 98 F | SYSTOLIC BLOOD PRESSURE: 144 MMHG

## 2020-02-27 DIAGNOSIS — H57.11 ACUTE RIGHT EYE PAIN: Primary | ICD-10-CM

## 2020-02-27 PROCEDURE — 99284 EMERGENCY DEPT VISIT MOD MDM: CPT | Mod: ,,, | Performed by: PHYSICIAN ASSISTANT

## 2020-02-27 PROCEDURE — 99284 PR EMERGENCY DEPT VISIT,LEVEL IV: ICD-10-PCS | Mod: ,,, | Performed by: PHYSICIAN ASSISTANT

## 2020-02-27 PROCEDURE — 25000003 PHARM REV CODE 250: Performed by: EMERGENCY MEDICINE

## 2020-02-27 PROCEDURE — 99283 EMERGENCY DEPT VISIT LOW MDM: CPT

## 2020-02-27 RX ORDER — PROPARACAINE HYDROCHLORIDE 5 MG/ML
1 SOLUTION/ DROPS OPHTHALMIC
Status: COMPLETED | OUTPATIENT
Start: 2020-02-27 | End: 2020-02-27

## 2020-02-27 RX ORDER — PROPARACAINE HYDROCHLORIDE 5 MG/ML
1 SOLUTION/ DROPS OPHTHALMIC
Status: DISCONTINUED | OUTPATIENT
Start: 2020-02-27 | End: 2020-02-27 | Stop reason: HOSPADM

## 2020-02-27 RX ADMIN — FLUORESCEIN SODIUM 1 EACH: 1 STRIP OPHTHALMIC at 12:02

## 2020-02-27 RX ADMIN — PROPARACAINE HYDROCHLORIDE 1 DROP: 5 SOLUTION/ DROPS OPHTHALMIC at 12:02

## 2020-02-27 NOTE — ED NOTES
Patient identifiers verified and correct for MS Jerry  C/C: Pain to right eye, clear drainage SEE NN  APPEARANCE: awake and alert in NAD.  SKIN: warm, dry and intact. No breakdown or bruising.  MUSCULOSKELETAL: Patient moving all extremities spontaneously, no obvious swelling or deformities noted. Ambulates independently.  RESPIRATORY: Denies shortness of breath.Respirations unlabored. Denies fevers  CARDIAC: Denies CP, 2+ distal pulses; no peripheral edema  ABDOMEN: S/ND/NT, Denies nausea  : voids spontaneously, denies difficulty  Neurologic: AAO x 4; follows commands equal strength in all extremities; denies numbness/tingling. Denies dizziness Denies weakness, positive headache

## 2020-02-27 NOTE — ED TRIAGE NOTES
Patient states pain to right eye onset middle of the night, clear draiange denies fevers, Topamax this am for headache

## 2020-02-27 NOTE — ED PROVIDER NOTES
Encounter Date: 2/27/2020       History     Chief Complaint   Patient presents with    Eye Problem     something flew in my eye last night, pain and swelling to r eye lids     44-year-old female with multiple medical comorbidities significant for chronic narcotic dependence, bipolar 1 disorder, HTN, DM presents to the ED for evaluation of eye pain. Patient reports pain and foreign body sensation in the right eye that began in the middle of the night.  She does report some blurry vision in that eye.  Patient denies any URI symptoms, fever.        Review of patient's allergies indicates:   Allergen Reactions    Dexamethasone Hives and Shortness Of Breath     Gastroparesis flare    Doxepin hcl Hives, Diarrhea and Itching    Compazine [prochlorperazine] Hives and Itching    Morphine Rash    Prednisone      Gastroparesis flare up    Ciprofloxacin      Counteracts with seroquel, xanax, tizanidine    Lyrica [pregabalin]      depression     Past Medical History:   Diagnosis Date    Acute appendicitis with localized peritonitis     Anxiety     Asthma     usually with cold weather    Bipolar 1 disorder 3/17/2015    Cervical cancer 2009    DOT    Chronic low back pain 3/17/2015    Chronic narcotic dependence 1/17/2017    Essential hypertension     Carvedilol  HCTZ Vlsartan- HCTZ Usual BP- 110/70-80     Fatty liver 3/15/2018    Fibrocystic breast     Fibromyalgia     Gastroparesis 2012    s/p sleeve to cover damaged nerves; s/p gastric pacemaker which did not help; due to nerve damage during surgery    Hypothyroidism, postsurgical     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 . In ED found to have worsening hyperglycemia, with anion gap and acidosis. Was bolused with NS and started on insulin infusion with DKA protocol.  On  treatment with  Insulin   Hemoglobin A1c- 7.5 Sept 2017 Capillary glucose check-yes Pre breakfast -95 Pre lunch -120 Pre [id number]      Morbid obesity with BMI of 40.0-44.9, adult 1/18/2017    Non-intractable vomiting with nausea 9/26/2017    Rheumatoid arthritis     S/P laparoscopic appendectomy 9/26/2017    Status post placement of implantable loop recorder 7/1/2015    syncope and palpitations s/p ILR placement.      Thyroid cancer 2003 & 2013    thyroidectomy    Type 2 diabetes mellitus with hyperglycemia, without long-term current use of insulin 1/17/2017    Urinary incontinence     Vitamin D deficiency 1/21/2017     Past Surgical History:   Procedure Laterality Date    ANKLE FRACTURE SURGERY Right     APPENDECTOMY      BREAST SURGERY      exploratory laparotomy due to complications of hysterectomy  2009    cervical cuff burst with air in abdomen    FRACTURE SURGERY      ankle around 2012    GASTRECTOMY      gastric pacemaker      gastric sleeve      HYSTERECTOMY  2009    total including cervix    KNEE ARTHROSCOPY W/ MENISCAL REPAIR Left 2016    KNEE SURGERY  05/12/2016    loop monitor  2016    multiple breast biopsies      TONSILLECTOMY      ureteral sling       Family History   Problem Relation Age of Onset    Heart disease Mother     Arthritis Mother     Rheum arthritis Mother     Heart attack Mother 55    Clotting disorder Mother         ? arterial stent     Hyperlipidemia Father     Clotting disorder Father         history of DVT,pulmonary embolism     Thyroid cancer Paternal Grandmother     Diabetes Paternal Grandmother     Clotting disorder Paternal Grandfather     No Known Problems Sister     No Known Problems Brother     No Known Problems Maternal Aunt     No Known Problems Paternal Aunt     Bipolar disorder Maternal Uncle     Pancreatic cancer Maternal Uncle     No Known Problems Paternal Uncle     No Known Problems Maternal Grandfather     No Known  Problems Maternal Grandmother     No Known Problems Cousin     ADD / ADHD Son     Pneumonia Brother     HIV Brother     Alcohol abuse Neg Hx     Anxiety disorder Neg Hx     Dementia Neg Hx     Depression Neg Hx     Drug abuse Neg Hx     OCD Neg Hx     Paranoid behavior Neg Hx     Physical abuse Neg Hx     Schizophrenia Neg Hx     Seizures Neg Hx     Self injury Neg Hx     Sexual abuse Neg Hx     Suicide Neg Hx      Social History     Tobacco Use    Smoking status: Never Smoker    Smokeless tobacco: Never Used   Substance Use Topics    Alcohol use: No     Alcohol/week: 1.0 standard drinks     Types: 1 Shots of liquor per week    Drug use: No     Review of Systems   Constitutional: Negative for fever.   HENT: Negative for congestion, rhinorrhea and sore throat.    Eyes: Positive for pain and visual disturbance.        Foreign body sensation, right eye   Respiratory: Negative for cough and shortness of breath.    Cardiovascular: Negative for chest pain.   Gastrointestinal: Negative for nausea.   Genitourinary: Negative for dysuria.   Musculoskeletal: Negative for back pain.   Skin: Negative for rash.   Neurological: Negative for weakness.   Hematological: Does not bruise/bleed easily.       Physical Exam     Initial Vitals [02/27/20 1147]   BP Pulse Resp Temp SpO2   (!) 144/107 110 18 97.7 °F (36.5 °C) --      MAP       --         Physical Exam    Nursing note and vitals reviewed.  Constitutional: She appears well-developed and well-nourished. She is not diaphoretic.  Non-toxic appearance. She does not appear ill. No distress.   HENT:   Head: Normocephalic and atraumatic.   Eyes: Conjunctivae and EOM are normal. Pupils are equal, round, and reactive to light.   Slit lamp exam:       The right eye shows no fluorescein uptake.   Mild swelling to the right upper eyelid.  No significant erythema or warmth.  Lids flipped without evidence of foreign body or other abnormalities.   Neck: Neck supple.    Cardiovascular: Normal rate and regular rhythm. Exam reveals no gallop and no friction rub.    No murmur heard.  Pulmonary/Chest: Effort normal and breath sounds normal. No accessory muscle usage. No tachypnea. No respiratory distress. She has no decreased breath sounds. She has no wheezes. She has no rhonchi. She has no rales.   Abdominal: She exhibits no distension.   Neurological: She is alert.   Skin: No rash noted.   Psychiatric: She has a normal mood and affect. Her behavior is normal.         ED Course   Procedures  Labs Reviewed - No data to display       Imaging Results    None          Medical Decision Making:   History:   Old Medical Records: I decided to obtain old medical records.  Differential Diagnosis:   My differential diagnosis includes but is not limited to:  Corneal abrasion, conjunctivitis, hordeolum, iritis, acute glaucoma,       APC / Resident Notes:   44-year-old female presents to the ED for evaluation of right eye pain. On my exam, there is no evidence of corneal abrasion.  The conjunctiva is clear.  Slight swelling to the upper eyelid.  Patient shows me a picture of what appears to be a stye under the right eyelid.  I do not appreciate this on my exam.  I have advised patient to apply warm compresses to the area several times a day.  Tylenol or ibuprofen as needed for pain.  Follow up with Ophthalmology if her pain continues.      Please be advised that this text was dictated with [employer] software and may contain dictation errors.                                   Clinical Impression:       ICD-10-CM ICD-9-CM   1. Acute right eye pain H57.11 379.91         Disposition:   Disposition: Discharged  Condition: Stable     ED Disposition Condition    Discharge Stable        ED Prescriptions     None        Follow-up Information     Follow up With Specialties Details Why Contact Info Additional Information    [facility] Ophthalmology Schedule an appointment as soon as possible for  a visit in 4 days For reevaluation, If symptoms do not improve [id number]  [facility] [address] [phone] The Optometry department is located on the 10th floor. If you are seeing Dr. Coy, her clinic is located in the [facility] on the 1st floor.                                     Rowena Dover PA-C  02/27/20 1457

## 2020-02-27 NOTE — DISCHARGE INSTRUCTIONS
It does not appear that you have a significant infection in your eye.  You may have an early sty.  You should apply warm compresses to the eye for 10 minutes 4 to 5 times a day.  You can take Tylenol or ibuprofen as needed for pain.    Follow up with your eye doctor or at the ophthalmology clinic listed below for re-evaluation in 3-4 days if your symptoms continue.    Return to the ER if your symptoms worsen.

## 2020-07-04 PROBLEM — A41.9 SEPSIS: Status: ACTIVE | Noted: 2020-07-04

## 2020-07-13 ENCOUNTER — TELEPHONE (OUTPATIENT)
Dept: PRIMARY CARE CLINIC | Facility: CLINIC | Age: 45
End: 2020-07-13

## 2020-07-13 NOTE — TELEPHONE ENCOUNTER
Apt made for patient on 7/16/2020 at 1015.    No pertinent past medical history <<----- Click to add NO pertinent Past Medical History

## 2020-07-13 NOTE — TELEPHONE ENCOUNTER
----- Message from Lissa Keller sent at 7/13/2020 12:36 PM CDT -----  Contact: [phone]  Pt would like to schedule an appt to establish care. Please call back

## 2020-07-14 ENCOUNTER — PATIENT OUTREACH (OUTPATIENT)
Dept: ADMINISTRATIVE | Facility: CLINIC | Age: 45
End: 2020-07-14

## 2020-07-14 NOTE — PATIENT INSTRUCTIONS
Dehydration (Adult)  Dehydration occurs when your body loses too much fluid. This may be the result of prolonged vomiting or diarrhea, excessive sweating, or a high fever. It may also happen if you dont drink enough fluid when youre sick or out in the heat. Misuse of diuretics (water pills) can also be a cause.  Symptoms include thirst and decreased urine output. You may also feel dizzy, weak, fatigued, or very drowsy. The diet described below is usually enough to treat dehydration. In some cases, you may need medicine.  Home care  · Drink at least 12 8-ounce glasses of fluid every day to resolve the dehydration. Fluid may include water; orange juice; lemonade; apple, grape, or cranberry juice; clear fruit drinks; electrolyte replacement and sports drinks; and teas and coffee without caffeine. If you have been diagnosed with a kidney disease, ask your doctor how much and what types of fluids you should drink to prevent dehydration. If you have kidney disease, fluid can build up in the body. This can be dangerous to your health.  · If you have a fever, muscle aches, or a headache as a result of a cold or flu, you may take acetaminophen or ibuprofen, unless another medicine was prescribed. If you have chronic liver or kidney disease, or have ever had a stomach ulcer or gastrointestinal bleeding, talk with your health care provider before using these medicines. Don't take aspirin if you are younger than 18 and have a fever. Aspirin raises the chance for severe liver injury.  Follow-up care  Follow up with your health care provider, or as advised.  When to seek medical advice  Call your health care provider right away if any of these occur:  · Continued vomiting  · Frequent diarrhea (more than 5 times a day); blood (red or black color) or mucus in diarrhea  · Blood in vomit or stool  · Swollen abdomen or increasing abdominal pain  · Weakness, dizziness, or fainting  · Unusual drowsiness or confusion  · Reduced urine  output or extreme thirst  · Fever of 100.4°F (34°C) or higher  Date Last Reviewed: 5/31/2015  © 1214-5372 [employer]. [address] [place] [address], [place] [address]. All rights reserved. This information is not intended as a substitute for professional medical care. Always follow your healthcare professional's instructions.

## 2020-07-16 ENCOUNTER — TELEPHONE (OUTPATIENT)
Dept: PRIMARY CARE CLINIC | Facility: CLINIC | Age: 45
End: 2020-07-16

## 2020-07-16 ENCOUNTER — OFFICE VISIT (OUTPATIENT)
Dept: PRIMARY CARE CLINIC | Facility: CLINIC | Age: 45
End: 2020-07-16
Payer: MEDICARE

## 2020-07-16 ENCOUNTER — CLINICAL SUPPORT (OUTPATIENT)
Dept: PRIMARY CARE CLINIC | Facility: CLINIC | Age: 45
End: 2020-07-16

## 2020-07-16 VITALS
DIASTOLIC BLOOD PRESSURE: 74 MMHG | OXYGEN SATURATION: 96 % | HEIGHT: 64 IN | WEIGHT: 229.19 LBS | RESPIRATION RATE: 18 BRPM | SYSTOLIC BLOOD PRESSURE: 102 MMHG | HEART RATE: 131 BPM | TEMPERATURE: 98 F | BODY MASS INDEX: 39.13 KG/M2

## 2020-07-16 DIAGNOSIS — E87.6 HYPOKALEMIA: ICD-10-CM

## 2020-07-16 DIAGNOSIS — A04.72 C. DIFFICILE COLITIS: Primary | ICD-10-CM

## 2020-07-16 DIAGNOSIS — E89.0 HYPOTHYROIDISM, POSTSURGICAL: Chronic | ICD-10-CM

## 2020-07-16 DIAGNOSIS — G43.709 CHRONIC MIGRAINE WITHOUT AURA WITHOUT STATUS MIGRAINOSUS, NOT INTRACTABLE: Chronic | ICD-10-CM

## 2020-07-16 DIAGNOSIS — B37.2 CUTANEOUS CANDIDIASIS: ICD-10-CM

## 2020-07-16 PROBLEM — E13.9 LADA (LATENT AUTOIMMUNE DIABETES IN ADULTS), MANAGED AS TYPE 1: Status: RESOLVED | Noted: 2017-01-23 | Resolved: 2020-07-16

## 2020-07-16 LAB
ALBUMIN SERPL BCP-MCNC: 3.3 G/DL (ref 3.5–5.2)
ALP SERPL-CCNC: 73 U/L (ref 38–126)
ALT SERPL W/O P-5'-P-CCNC: 26 U/L (ref 14–54)
ANION GAP SERPL CALC-SCNC: 10 MMOL/L (ref 8–16)
AST SERPL-CCNC: 42 U/L (ref 15–41)
BASOPHILS # BLD AUTO: 0 K/UL (ref 0–0.2)
BASOPHILS NFR BLD: 0.6 % (ref 0–1.9)
BILIRUB SERPL-MCNC: 0.1 MG/DL (ref 0.3–1.2)
BUN SERPL-MCNC: 11 MG/DL (ref 6–20)
CALCIUM SERPL-MCNC: 7.8 MG/DL (ref 8.6–10)
CHLORIDE SERPL-SCNC: 110 MMOL/L (ref 101–111)
CO2 SERPL-SCNC: 23 MMOL/L (ref 23–29)
CREAT SERPL-MCNC: 1.2 MG/DL (ref 0.5–1.4)
DIFFERENTIAL METHOD: ABNORMAL
EOSINOPHIL # BLD AUTO: 0.1 K/UL (ref 0–0.5)
EOSINOPHIL NFR BLD: 1 % (ref 0–8)
ERYTHROCYTE [DISTWIDTH] IN BLOOD BY AUTOMATED COUNT: 18.3 % (ref 11.5–14.5)
EST. GFR  (AFRICAN AMERICAN): >60 ML/MIN/1.73 M^2
EST. GFR  (NON AFRICAN AMERICAN): 54.3 ML/MIN/1.73 M^2
GLUCOSE SERPL-MCNC: 113 MG/DL (ref 74–118)
HCT VFR BLD AUTO: 31.5 % (ref 37–48.5)
HGB BLD-MCNC: 10.5 G/DL (ref 12–16)
LYMPHOCYTES # BLD AUTO: 2.1 K/UL (ref 1–4.8)
LYMPHOCYTES NFR BLD: 27.3 % (ref 18–48)
MAGNESIUM SERPL-MCNC: 2.3 MG/DL (ref 1.6–2.6)
MCH RBC QN AUTO: 30.6 PG (ref 27–31)
MCHC RBC AUTO-ENTMCNC: 33.4 G/DL (ref 32–36)
MCV RBC AUTO: 92 FL (ref 82–98)
MONOCYTES # BLD AUTO: 0.5 K/UL (ref 0.3–1)
MONOCYTES NFR BLD: 6.2 % (ref 4–15)
NEUTROPHILS # BLD AUTO: 5 K/UL (ref 1.8–7.7)
NEUTROPHILS NFR BLD: 64.9 % (ref 38–73)
PLATELET # BLD AUTO: 333 K/UL (ref 150–350)
PMV BLD AUTO: 8 FL (ref 9.2–12.9)
POTASSIUM SERPL-SCNC: 3.7 MMOL/L (ref 3.5–5.1)
PROT SERPL-MCNC: 6.6 G/DL (ref 6–8.4)
RBC # BLD AUTO: 3.44 M/UL (ref 4–5.4)
SODIUM SERPL-SCNC: 143 MMOL/L (ref 136–145)
WBC # BLD AUTO: 7.8 K/UL (ref 3.9–12.7)

## 2020-07-16 PROCEDURE — 85025 COMPLETE CBC W/AUTO DIFF WBC: CPT

## 2020-07-16 PROCEDURE — 99204 OFFICE O/P NEW MOD 45 MIN: CPT | Mod: S$PBB,,, | Performed by: FAMILY MEDICINE

## 2020-07-16 PROCEDURE — 80053 COMPREHEN METABOLIC PANEL: CPT

## 2020-07-16 PROCEDURE — 99204 PR OFFICE/OUTPT VISIT, NEW, LEVL IV, 45-59 MIN: ICD-10-PCS | Mod: S$PBB,,, | Performed by: FAMILY MEDICINE

## 2020-07-16 PROCEDURE — 99999 PR PBB SHADOW E&M-EST. PATIENT-LVL III: CPT | Mod: PBBFAC,,, | Performed by: FAMILY MEDICINE

## 2020-07-16 PROCEDURE — 36415 COLL VENOUS BLD VENIPUNCTURE: CPT | Mod: PBBFAC,PN

## 2020-07-16 PROCEDURE — 99999 PR PBB SHADOW E&M-EST. PATIENT-LVL III: ICD-10-PCS | Mod: PBBFAC,,, | Performed by: FAMILY MEDICINE

## 2020-07-16 PROCEDURE — 83735 ASSAY OF MAGNESIUM: CPT

## 2020-07-16 RX ORDER — LEVOTHYROXINE SODIUM 175 UG/1
175 TABLET ORAL DAILY
Qty: 90 TABLET | Refills: 1 | Status: SHIPPED | OUTPATIENT
Start: 2020-07-16

## 2020-07-16 RX ORDER — TOPIRAMATE 25 MG/1
25 TABLET ORAL DAILY
Qty: 90 TABLET | Refills: 1 | Status: SHIPPED | OUTPATIENT
Start: 2020-07-16

## 2020-07-16 RX ORDER — NYSTATIN 100000 U/G
CREAM TOPICAL 2 TIMES DAILY
Qty: 30 G | Refills: 2 | Status: SHIPPED | OUTPATIENT
Start: 2020-07-16

## 2020-07-16 RX ORDER — FLUCONAZOLE 100 MG/1
100 TABLET ORAL DAILY
Qty: 7 TABLET | Refills: 0 | Status: SHIPPED | OUTPATIENT
Start: 2020-07-16 | End: 2020-07-23

## 2020-07-16 RX ORDER — ALBUTEROL SULFATE 90 UG/1
2 AEROSOL, METERED RESPIRATORY (INHALATION) EVERY 4 HOURS PRN
Qty: 18 G | Refills: 5 | Status: SHIPPED | OUTPATIENT
Start: 2020-07-16

## 2020-07-16 NOTE — TELEPHONE ENCOUNTER
----- Message from Raphael Kruger sent at 7/16/2020 11:39 AM CDT -----  Contact: [facility] [phone]  Pharmacy is calling to clarify an RX.  RX name:  VENTOLIN HFA 90 mcg/actuation inhaler  What do they need to clarify:  the Insurance do not cover the band name   Comments:

## 2020-07-16 NOTE — PROGRESS NOTES
"Subjective:       Patient ID: Homar Jerry is a 45 y.o. female.    Chief Complaint: Hospital Follow Up (also c/o pain/numbess R ear and left, elbow is "tender", stomach distention) and Establish Care ( pt also wants to est care with you)    Presented to ER 7/4 with AMS, confusion, disorientation, and weakness that had worsened over the prior several days, was having copious amounts of watery, non-bloody diarrhea. Positive for C diff, blood cultures ultimately negative (contaminant), urine culture negative. Lactic acidosis gradually improved, in ICU x 2d, mental status gradually improved over several days, now feels back to baseline mental status. Discharged 7/13. Persistently hypokalemic during hospitalization, worse than usual. No antibiotics or travel prior to recent hospitalization. Appetite slowly improving, but mostly eating just salad and fruit. On tapering regimen of oral vancomycin, diarrhea has resolved. No fever or chills  History of diabetes, not usually on meds, says her blood sugar only goes high when she is sick. C/o yeast infection, itching in groin due to all antibiotics she was on in hospital    Review of Systems   Constitutional: Positive for appetite change. Negative for chills and fever.   HENT: Negative for trouble swallowing.    Respiratory: Negative for shortness of breath.    Cardiovascular: Negative for chest pain.   Gastrointestinal: Negative for abdominal distention and blood in stool.   Genitourinary: Negative for difficulty urinating.   Musculoskeletal: Positive for myalgias.   Skin: Positive for rash.   Allergic/Immunologic: Negative for immunocompromised state.   Hematological: Does not bruise/bleed easily.   Psychiatric/Behavioral: Negative for agitation.       Objective:      Vitals:    07/16/20 1002   BP: 102/74   BP Location: Right arm   Patient Position: Sitting   BP Method: Large (Manual)   Pulse: (!) 131   Resp: 18   Temp: 98.4 °F (36.9 °C)   TempSrc: Oral   SpO2: 96% " "  Weight: 103.9 kg (229 lb 2.7 oz)   Height: 5' 4" (1.626 m)     Physical Exam  Vitals signs and nursing note reviewed.   Constitutional:       Appearance: She is well-developed.   HENT:      Head: Normocephalic and atraumatic.   Cardiovascular:      Rate and Rhythm: Regular rhythm. Tachycardia present.      Heart sounds: Normal heart sounds.   Pulmonary:      Effort: Pulmonary effort is normal.      Breath sounds: Normal breath sounds.   Abdominal:      General: Abdomen is flat. Bowel sounds are normal. There is no distension.      Tenderness: There is no abdominal tenderness. There is no guarding or rebound.   Skin:     General: Skin is warm and dry.      Findings: Rash (erythematous, excoriated rash to groin bilaterally) present.   Neurological:      Mental Status: She is alert and oriented to person, place, and time.   Psychiatric:         Mood and Affect: Mood normal.         Behavior: Behavior normal.         Lab Results   Component Value Date    WBC 4.70 07/13/2020    HGB 10.5 (L) 07/13/2020    HCT 31.6 (L) 07/13/2020     07/13/2020    CHOL 192 06/30/2015    TRIG 153 (H) 06/30/2015    HDL 76 (H) 06/30/2015    ALT 20 07/13/2020    AST 16 07/13/2020     07/13/2020    K 3.6 07/13/2020     07/13/2020    CREATININE 1.3 07/13/2020    BUN 5 (L) 07/13/2020    CO2 25 07/13/2020    TSH 7.09 (H) 07/04/2020    INR 1.0 03/15/2018    HGBA1C 6.0 (H) 07/04/2020      Assessment:       1. C. difficile colitis    2. Chronic migraine without aura without status migrainosus, not intractable    3. Hypokalemia    4. Cutaneous candidiasis    5. Hypothyroidism, postsurgical        Plan:       C. difficile colitis  -     Comprehensive metabolic panel; Future; Expected date: 07/16/2020  -     Magnesium; Future; Expected date: 07/16/2020  -     CBC auto differential; Future; Expected date: 07/16/2020  Anticipate that she should continue to have slow, but steady improvement  Chronic migraine without aura without status " migrainosus, not intractable  -     topiramate (TOPAMAX) 25 MG tablet; Take 1 tablet (25 mg total) by mouth once daily.  Dispense: 90 tablet; Refill: 1    Hypokalemia  -     Comprehensive metabolic panel; Future; Expected date: 07/16/2020  -     Magnesium; Future; Expected date: 07/16/2020  May ultimately need long-term potassium supplementation, particularly since she is on a thiazide diuretic  Cutaneous candidiasis  -     fluconazole (DIFLUCAN) 100 MG tablet; Take 1 tablet (100 mg total) by mouth once daily. for 7 days  Dispense: 7 tablet; Refill: 0  -     nystatin (MYCOSTATIN) cream; Apply topically 2 (two) times daily.  Dispense: 30 g; Refill: 2    Hypothyroidism, postsurgical  -     levothyroxine (SYNTHROID, LEVOTHROID) 175 MCG tablet; Take 1 tablet (175 mcg total) by mouth once daily.  Dispense: 90 tablet; Refill: 1  Most recent TSH elevated, and free T4 slightly low.  Has been compliant with her previous dose of levothyroxine for several years.  Increase dose, recheck TSH in 6-8 weeks.  Other orders  -     VENTOLIN HFA 90 mcg/actuation inhaler; Inhale 2 puffs into the lungs every 4 (four) hours as needed.  Dispense: 18 g; Refill: 5      Medication List with Changes/Refills   New Medications    FLUCONAZOLE (DIFLUCAN) 100 MG TABLET    Take 1 tablet (100 mg total) by mouth once daily. for 7 days    NYSTATIN (MYCOSTATIN) CREAM    Apply topically 2 (two) times daily.   Current Medications    ALPRAZOLAM (XANAX) 1 MG TABLET    Take 1 tablet (1 mg total) by mouth 2 (two) times daily as needed for Anxiety. Please contact your prescribing psychiatrist for further refills if needed    AMLODIPINE (NORVASC) 10 MG TABLET    Take 1 tablet (10 mg total) by mouth once daily.    ASPIRIN 325 MG TABLET    Take 1 tablet (325 mg total) by mouth 2 (two) times daily.    BLOOD SUGAR DIAGNOSTIC STRP    1 each by Misc.(Non-Drug; Combo Route) route 4 (four) times daily with meals and nightly.    BLOOD-GLUCOSE METER KIT    Use as  "instructed    BUSPIRONE (BUSPAR) 15 MG TABLET    Take 1 tablet (15 mg total) by mouth 2 (two) times daily. Please contact your prescribing psychiatrist for further refills for 15 days    CARVEDILOL (COREG) 6.25 MG TABLET    Take 1 tablet (6.25 mg total) by mouth 2 (two) times daily.    CHOLECALCIFEROL, VITAMIN D3, 1,000 UNIT CAPSULE    Take 2 capsules (2,000 Units total) by mouth once daily.    CLONIDINE 0.2 MG/24 HR TD PTWK (CATAPRES) 0.2 MG/24 HR    Place 1 patch onto the skin every 7 days.    FAMOTIDINE (PEPCID) 20 MG TABLET    Take 1 tablet (20 mg total) by mouth 2 (two) times daily.    FLUTICASONE (FLONASE) 50 MCG/ACTUATION NASAL SPRAY    2 sprays by Nasal route 2 (two) times daily.     GABAPENTIN (NEURONTIN) 400 MG CAPSULE    Take 1 capsule (400 mg total) by mouth 3 (three) times daily. for 10 days    INSULIN ASPART U-100 (NOVOLOG) 100 UNIT/ML INPN PEN    Inject 6 Units into the skin 3 (three) times daily.    INSULIN GLARGINE (LANTUS U-100 INSULIN) 100 UNIT/ML INJECTION    Inject 20 Units into the skin once daily.    LANCETS MISC    1 each by Misc.(Non-Drug; Combo Route) route 4 (four) times daily with meals and nightly.    LEVOCETIRIZINE (XYZAL) 5 MG TABLET    Take 1 tablet (5 mg total) by mouth every evening.    MICONAZOLE NITRATE 2 % (MICOTIN) 2 % TOP POWDER    Apply topically 2 (two) times daily.    MIRTAZAPINE (REMERON) 45 MG TABLET    Take 1 tablet (45 mg total) by mouth every evening.    PEN NEEDLE, DIABETIC 31 GAUGE X 5/16" NDLE    1 each by Misc.(Non-Drug; Combo Route) route 5 (five) times daily.    TRAZODONE (DESYREL) 100 MG TABLET        VALSARTAN-HYDROCHLOROTHIAZIDE (DIOVAN-HCT) 80-12.5 MG PER TABLET    Take 1 tablet by mouth once daily.    VANCOMYCIN 25 MG/ML SOLN    Take 5 mLs (125 mg total) by mouth every 12 (twelve) hours. for 7 days    VANCOMYCIN 25 MG/ML SOLN    Take 5 mLs (125 mg total) by mouth once daily. for 7 days    VANCOMYCIN 25 MG/ML SOLN    Take 5 mLs (125 mg total) by mouth Every 3 " (three) days. for 6 days    VANCOMYCIN 25 MG/ML SOLN    Take 5 mLs (125 mg total) by mouth every 6 (six) hours. for 7 days   Changed and/or Refilled Medications    Modified Medication Previous Medication    LEVOTHYROXINE (SYNTHROID, LEVOTHROID) 175 MCG TABLET levothyroxine (SYNTHROID) 150 MCG tablet       Take 1 tablet (175 mcg total) by mouth once daily.    Take 1 tablet (150 mcg total) by mouth once daily.    TOPIRAMATE (TOPAMAX) 25 MG TABLET topiramate (TOPAMAX) 25 MG tablet       Take 1 tablet (25 mg total) by mouth once daily.    Take 1 tablet (25 mg total) by mouth daily as needed (migraines).    VENTOLIN HFA 90 MCG/ACTUATION INHALER VENTOLIN HFA 90 mcg/actuation inhaler       Inhale 2 puffs into the lungs every 4 (four) hours as needed.    as needed.    Discontinued Medications    ALBUTEROL 90 MCG/ACTUATION INHALER    Inhale 2 puffs into the lungs every 6 (six) hours as needed for Wheezing. Please provide cheapest brand/generic formulation

## 2020-08-05 ENCOUNTER — TELEPHONE (OUTPATIENT)
Dept: PRIMARY CARE CLINIC | Facility: CLINIC | Age: 45
End: 2020-08-05

## 2020-08-05 NOTE — TELEPHONE ENCOUNTER
Dr. Lucia received a death certificate to be signed in [facility] but he has no information as to why the patient passed away. I spoke to Antonieta with [facility] to let her know that Dr. Lucia only saw the patient once for a hospital follow up. I asked that she reach out to the coroners office to have them sign the certificate and let me know if she has an problems. She states understanding

## 2021-06-19 NOTE — NURSING
Patient complains of pain 9 to 10/10. Dr. Gale notified that patient's pain not controlled. Stated she would look into it. Patient is to be discharged today.   Yes - the patient is able to be screened

## 2021-10-18 NOTE — PROGRESS NOTES
Patient is here today for 12 week PO FU of her TKA on 3/31/18. She is progressing well.      We will allow her to return as needed for repeat radiographs and certainly for any other questions.    xrays are  reviewed today with good alignment.    We will check repeat radiographs in 1 year.     meds at bedside. Grandson at bedside and states will take meds home/no

## 2021-12-24 NOTE — PROGRESS NOTES
"[facility] Hospital Medicine  Progress Note    Patient Name: Homar Jerry  MRN: [id number]  Patient Class: IP- Inpatient   Admission Date: 2/2/2017  Length of Stay: 2 days  Attending Physician: Veto Stern MD  Primary Care Provider: Mary Cabrera MD    [facility] Medicine Team: [facility] HOSP MED 2 Carmelo Leon MD    Subjective:     Principal Problem:Gastroparesis    HPI:  Homar Jerry is a 40 yo female with PMHx of HTN, fibromyalgia, RA, gastroparesis s/p gastric stimulator and sleeve gastrectomy, DM, thyroid cancer s/p thyroidectomy, cervical cancer being admitted for gastroparesis flare.  She presented to the ED complaining of n/v/d and abdominal pain that started at 5:45 am.  She reports this feels like her chronic gastroparesis flareups.  She describes her abdominal pain as a constant 10/10 "stabbing" pain in the epigastric and periumbilical region that has not been relieved with her at-home Percocet.  She states that moving increases her pain.  She's been unable to tolerate neither solids nor liquids by mouth.  She denies hematemesis, melena, or hematochezia.  She denies any abdominal trauma.  She reports associated chills, headache, and lightheadedness.  She denies fever, chest pain, shortness of breath, dysuria, hematuria, vaginal bleeding, vaginal discharge, numbness, weakness.  She denies tobacco, alcohol, or drug use.     She was recently admitted on 1/17 and discharged on 1/23 for DKA.      Hospital Course:       Interval History: NAEON. VSS last 24 hours; patient remains afebrile. Still reports epigastric pain this morning but is amenable to trying a clear liquid diet today and to advance as tolerated. No further episodes of emesis. When seen later this afternoon patient reports some minor improvement in her symptoms.     Review of Systems   Constitutional: Positive for appetite change. Negative for chills, fatigue and fever.   Respiratory: Negative " for cough, shortness of breath, wheezing and stridor.    Cardiovascular: Negative for chest pain, palpitations and leg swelling.   Gastrointestinal: Positive for abdominal pain, diarrhea and nausea. Negative for blood in stool, constipation and vomiting.   Genitourinary: Negative for dysuria.   Skin: Negative for color change, pallor and rash.   Neurological: Negative for dizziness, weakness, light-headedness, numbness and headaches.     Objective:     Vital Signs (Most Recent):  Temp: 98.2 °F (36.8 °C) (02/04/17 0737)  Pulse: 89 (02/04/17 0737)  Resp: 18 (02/04/17 0737)  BP: 125/75 (02/04/17 0737)  SpO2: (!) 93 % (02/04/17 0737) Vital Signs (24h Range):  Temp:  [98 °F (36.7 °C)-98.5 °F (36.9 °C)] 98.2 °F (36.8 °C)  Pulse:  [74-89] 89  Resp:  [18] 18  SpO2:  [93 %-98 %] 93 %  BP: (100-134)/(59-75) 125/75     Weight: 113.4 kg (250 lb)  Body mass index is 41.6 kg/(m^2).  No intake or output data in the 24 hours ending 02/04/17 0903   Physical Exam   Constitutional: She is oriented to person, place, and time. She appears well-developed and well-nourished. No distress.   HENT:   Head: Normocephalic and atraumatic.   Mouth/Throat: Oropharynx is clear and moist. No oropharyngeal exudate.   Eyes: Conjunctivae and EOM are normal. Pupils are equal, round, and reactive to light. No scleral icterus.   Neck: Normal range of motion. Neck supple. No JVD present. No tracheal deviation present.   Cardiovascular: Normal rate, regular rhythm, normal heart sounds and intact distal pulses.  Exam reveals no gallop and no friction rub.    No murmur heard.  Pulmonary/Chest: Effort normal and breath sounds normal. No stridor. No respiratory distress. She has no wheezes. She has no rales.   Abdominal: Soft. She exhibits distension. She exhibits no mass. There is tenderness. There is no rebound and no guarding.   Normal bowel sounds.  Epigastric TTP.   Musculoskeletal: Normal range of motion. She exhibits no edema, tenderness or deformity.    Neurological: She is alert and oriented to person, place, and time.   Skin: Skin is warm and dry. No rash noted. She is not diaphoretic. No erythema. No pallor.       Significant Labs:   CBC:     Recent Labs  Lab 02/02/17 1832 02/03/17  0536 02/04/17  0433   WBC 12.13 10.43 4.82   HGB 14.1 11.9* 11.0*   HCT 43.3 36.4* 34.1*    279  --      CMP:     Recent Labs  Lab 02/02/17 2015 02/03/17  0536 02/04/17  0433    139 139   K 3.4* 4.5 3.7   * 112* 111*   CO2 13* 18* 18*   * 204* 147*   BUN 9 13 13   CREATININE 0.8 0.8 0.8   CALCIUM 7.8* 7.9* 7.9*   PROT 6.4  --   --    ALBUMIN 2.9*  --   --    BILITOT 0.2  --   --    ALKPHOS 87  --   --    AST 9*  --   --    ALT 7*  --   --    ANIONGAP 12 9 10   EGFRNONAA >60.0 >60.0 >60.0       Significant Imaging: I have reviewed all pertinent imaging results/findings within the past 24 hours.     Abdominal Xray-Limited exam, no evidence of high-grade obstruction.  Correlation for constipation as warranted.    Assessment/Plan:      * Gastroparesis  -s/p gastric pacemaker (removed) and sleeve gastrectomy  -in ED received bentyl 20 IM, metoclopramide 10 IV, morphine 4 IV, phenergan 12.5 IV, 2L NS bolus    -NPO, but continued many PO home meds to encourage return to PO, patient seems to have stabilized.  -started on erythromycin 500mg IV TID; transitioned to PO this morning for a seven day course  -pain control with toradol and morphine PRN  -phenergan IV PRN nausea, zofran IV PRN nausea, can transition to PO as desired by day team  -continue pantoprazole 40 mg po BID    Fibromyalgia  - continue tizanidine  - continue gabapentin    Anxiety  - continue home xanax 1 mg po BID  - continue home mirtazapine 15 mg QHS    Bipolar 1 disorder  - continue oxcarbezepine  - continue home xanax  - continue olanzapine  - continue seroquel    Hypothyroidism, postsurgical  - continue levothyroxine 150 mcg po daily    Migraines  - continue topirimate PRN    Essential  hypertension  - continue carvedilol 25 mg po BID  - continue HCTZ 12.5 mg po daily    Chronic narcotic dependence        Type 2 diabetes mellitus with hyperglycemia, without long-term current use of insulin  -Home regimen of 16 aspart with meals and 30 detemir BID  -LDSSI with 15 detemir BID; will restart Aspart once patient starts eating regular meals     Abdominal pain  -epigastric tenderness that is intermittent and poorly defined   -lipase normal at 10  -XR abdomen limited by body habitus but no signs of obstruction or acute processes; patient reports an episode of diarrhea 2/3  -UA clean  -WBC 12.13>4.23, no signs of acute abdomen or infectious etiology     VTE Risk Mitigation         Ordered     enoxaparin injection 40 mg  Daily     Route:  Subcutaneous        02/02/17 6267     Medium Risk of VTE  Once      02/03/17 0741     Place DANDRE hose  Until discontinued      02/03/17 0741     Place sequential compression device  Until discontinued      02/03/17 0741          Carmelo Leon MD  Department of Hospital Medicine   [facility]   Gen: WDWN, NAD, comfortable appearing   HEENT: PERRLA, EOMI, no nasal discharge, mucous membranes moist, no oropharyngeal edema/erythema/exudates   CV: RRR, +S1/S2, no M/R/G, equal b/l radial pulses 2+  Resp: CTAB, no W/R/R, no increased WOB   MSK/Skin: RLE foot swelling with erythema localized to 1st and 2nd digits with yellow discharge in between digits at 1st surgical site. Dorsal foot surgical site c/d/i with sutures in place. Gross sensation intact with intact DP/PT pulses. No crepitus   Neuro: A&Ox4, moving all 4 extremities spontaneously   Psych: appropriate mood

## 2023-09-11 NOTE — ASSESSMENT & PLAN NOTE
42 y.o. female POD1 s/p L TKA    Pain control: multimodal  PT/OT: WBAT LLE  DVT PPx:  BID, FCDs at all times when not ambulating  Abx: postop Ancef    Dispo: f/u PT recs. Patient desires SNF placement.     Nasal Turnover Hinge Flap Text: The defect edges were debeveled with a #15 scalpel blade.  Given the size, depth, location of the defect and the defect being full thickness a nasal turnover hinge flap was deemed most appropriate. Using a sterile surgical marker, an appropriate hinge flap was drawn incorporating the defect. The area thus outlined was incised with a #15 scalpel blade. The flap was designed to recreate the nasal mucosal lining and the alar rim. The skin margins were undermined to an appropriate distance in all directions utilizing iris scissors. Following this, the designed flap was carried over into the primary defect and sutured into place

## 2024-09-17 NOTE — PROGRESS NOTES
10/27/17  CM was notified by LCSW of LCSW case closure.   CM f/u to update plan of care. Verbal message left requesting return call.    [Cardiac Failure] : cardiac failure

## (undated) DEVICE — KIT TOTAL KNEE TKOFG

## (undated) DEVICE — ELECTRODE REM PLYHSV RETURN 9

## (undated) DEVICE — BLADE SURG CARBON STEEL #10

## (undated) DEVICE — SEE MEDLINE ITEM 152529

## (undated) DEVICE — SUT VICRYL 3-0 27 SH

## (undated) DEVICE — SCREW HEX HEAD 3.5X38MM
Type: IMPLANTABLE DEVICE | Site: KNEE | Status: NON-FUNCTIONAL
Removed: 2018-03-28

## (undated) DEVICE — PUMP COLD THERAPY

## (undated) DEVICE — SEE MEDLINE ITEM 152487

## (undated) DEVICE — BANDAGE ACE ELASTIC 6"

## (undated) DEVICE — KIT IRR SUCTION HND PIECE

## (undated) DEVICE — BLADE SAG 18.0X1.27X100

## (undated) DEVICE — DRAPE STERI INSTRUMENT 1018

## (undated) DEVICE — SUT 0 VICRYL / UR6 (J603)

## (undated) DEVICE — TROCAR ENDOPATH EXCEL

## (undated) DEVICE — KIT ANTIFOG

## (undated) DEVICE — SUT VICRYL BR 1 GEN 27 CT-1

## (undated) DEVICE — WARMER DRAPE STERILE LF

## (undated) DEVICE — SEE MEDLINE ITEM 154981

## (undated) DEVICE — TROCAR SPACEMAKER BLUNT 12MM

## (undated) DEVICE — TRAY MINOR GEN SURG

## (undated) DEVICE — BLADE SURG CARBON STEEL SZ11

## (undated) DEVICE — BANDAGE ESMARK 6X12

## (undated) DEVICE — SOL IRR NACL .9% 3000ML

## (undated) DEVICE — PAD CAST SPECIALIST STRL 6

## (undated) DEVICE — SYR 30CC LUER LOCK

## (undated) DEVICE — SET CEMENT (SCULP)

## (undated) DEVICE — ADHESIVE DERMABOND ADVANCED

## (undated) DEVICE — BIT DRILL PIN TROCAR 3.2MM
Type: IMPLANTABLE DEVICE | Site: KNEE | Status: NON-FUNCTIONAL
Removed: 2018-03-28

## (undated) DEVICE — SUT MCRYL PLUS 4-0 PS2 27IN

## (undated) DEVICE — TAPE SURG DURAPORE 2 X10YD

## (undated) DEVICE — BRUSH SURGICAL SCRUB STERILE

## (undated) DEVICE — CART STAPLE FLEX ETX 3.5MM BLU

## (undated) DEVICE — SYR ONLY LUER LOCK 20CC

## (undated) DEVICE — BOWL CEMENT

## (undated) DEVICE — SEE MEDLINE ITEM 146298

## (undated) DEVICE — TOURNIQUET SB QC DP 34X4IN

## (undated) DEVICE — CART STAPLE RELD 45MM WHT

## (undated) DEVICE — PAD COLD THERAPY KNEE WRAP ON

## (undated) DEVICE — TROCAR ENDOPATH XCEL 5MM 15CM

## (undated) DEVICE — SEE MEDLINE ITEM 157131

## (undated) DEVICE — STAPLER INT LINEAR ARTC 3.5-45

## (undated) DEVICE — SUT CTD VICRYL 0 UND BR CPX

## (undated) DEVICE — HOOD T-5 TEAR AWAY STERILE

## (undated) DEVICE — Device

## (undated) DEVICE — BLADE RECIP RIBBED

## (undated) DEVICE — DRAPE ABDOMINAL TIBURON 14X11

## (undated) DEVICE — SEE MEDLINE ITEM 157117

## (undated) DEVICE — SOL NS 1000CC

## (undated) DEVICE — SUT MONOCRYL 3-0 SH U/D

## (undated) DEVICE — NDL HYPO REG 25G X 1 1/2

## (undated) DEVICE — TUBING HF INSUFFLATION W/ FLTR

## (undated) DEVICE — SEE MEDLINE ITEM 152622

## (undated) DEVICE — SCREW HEX HEAD
Type: IMPLANTABLE DEVICE | Site: KNEE | Status: NON-FUNCTIONAL
Removed: 2018-03-28

## (undated) DEVICE — MASK FLYTE HOOD PEEL AWAY

## (undated) DEVICE — BAG TISS RETRV MONARCH 10MM

## (undated) DEVICE — BLADE SAG 13.0 X1.27X90

## (undated) DEVICE — NDL 18GA X1 1/2 REG BEVEL

## (undated) DEVICE — IRRIGATOR ENDOSCOPY DISP.

## (undated) DEVICE — TRAY CATH UM FOLEY SIL W 16FR

## (undated) DEVICE — DRESSING AQUACEL AG ADV 3.5X12

## (undated) DEVICE — SEE MEDLINE ITEM 152515